# Patient Record
Sex: FEMALE
[De-identification: names, ages, dates, MRNs, and addresses within clinical notes are randomized per-mention and may not be internally consistent; named-entity substitution may affect disease eponyms.]

---

## 2018-10-06 ENCOUNTER — HOSPITAL ENCOUNTER (EMERGENCY)
Dept: HOSPITAL 88 - ER | Age: 53
Discharge: HOME | End: 2018-10-06
Payer: COMMERCIAL

## 2018-10-06 VITALS — WEIGHT: 187 LBS | BODY MASS INDEX: 36.71 KG/M2 | HEIGHT: 60 IN

## 2018-10-06 DIAGNOSIS — Z46.6: Primary | ICD-10-CM

## 2018-10-06 PROCEDURE — 51700 IRRIGATION OF BLADDER: CPT

## 2018-10-06 PROCEDURE — 99282 EMERGENCY DEPT VISIT SF MDM: CPT

## 2018-10-16 NOTE — XMS REPORT
Continuity of Care Document

                             Created on: 2016



CRISTINA RICH

External Reference #: [id number]

: 1965

Sex: Female



Demographics







                          Address                   [address] [place] [address]  [id number]

 

                          Home Phone                [phone]

 

                          Preferred Language        Unknown

 

                          Marital Status            Unknown

 

                          Jehovah's witness Affiliation     Unknown

 

                          Race                      Unknown

 

                          Ethnic Group              Unknown





Author







                          Author                    [facility]

 

                          [facility]              Interface

 

                          Address                   Unknown

 

                          Phone                     Unavailable



                                                    



Problems

                    





                    Problem                            Status                            Onset Date     

                          Classification                            Date Reported       

                          Comments                            Source                    

 

                                        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    

                                                      2016                                            

                                        [facility]                    

 

                    GEN. PAIN                            Active                            2016   

                                                                                       

                                        [facility]                    

 

                                        Discharge Diagnosis: Urinary tract infection, site not specified                

                                                2016                                     

                    01/10/2016                                                        [facility]                    

 

                    WEAKNESS                            Active                            2016    

                                                                                       

                                        [facility]                    

 

                    MRSA<sup>1</sup>                            Active                                  

                          Problem                            2016                  

                          Problem added by Discern Expert.                            [facility]   

                 



                                                                                
                                                                       



Medications

                    





                    Medication                            Details                            Route      

                          Status                            Patient Instructions         

                          Ordering Provider                            Order Date           

                                        Source                    

 

                          Ativan                            1 mg, Route: PO, Drug form: TAB, ONCE, Dosing Weight

 86.364, kg, Priority: STAT, Start date: 16 23:44:00, Stop date: 16 
23:44:00                                                        No Longer Active     

                                                                               2016

                                        [facility]                    

 

                          tramadol hydrochloride 50 MG Oral Tablet                            50 mg=1 tab, PO,

 BID, X 15 day, # 30 tab, 0 Refill(s)                                              

                    Active                                                                       

                          2016                            [facility]                    

 

                          tramadol hydrochloride 50 MG Oral Tablet [Ultram]                            1 - 2

 tabs, PO, Q4-6H, PRN as needed for pain, X 7 day, # 12 tab, 0 Refill(s)        
                                                Active                                 

                                                      2016                        

                                        [facility]                    

 

                                        Sulfamethoxazole 800 MG / Trimethoprim 160 MG Oral Tablet [Bactrim]             

                          1 tab, PO, BID, # 20 tab, 0 Refill(s)                                 

                       Active                                                          

                          2016                            [facility]         

           

 

                          Rocephin                            1 gm, Route: IM, ONCE, Dosing Weight 84.091, kg,

 Start date: 16 17:48:00, Stop date: 16 17:48:00                    
                                                Inactive                                           

                                                2016                            [facility]

                    

 

                          Ondansetron                            4 mg, 2 mL, Route: IVP, Drug form: INJ, ONCE,

 Dosing Weight 84.091, kg, Priority: STAT, Start date: 16 15:36:00, Stop 
date: 16 15:36:00Notes: (Same as: Zofran)   *** MEDICATION WASTE *** 
Product Size:  4 mg Product Wasted:  ___ mg                                        

                    Inactive                                                               

                          2016                            [facility]              

      

 

                          Sodium Chloride 0.154 MEQ/ML Injectable Solution                            1,000 

mL, 1000 ml/hr, Infuse Over: 1 hr, Route: IV, 1,000, Drug form: INJ, ONCE, 
Priority: STAT, Dosing Weight 84.091 kg, Start date: 16 15:36:00, 
Duration: 1 doses or times, Stop date: 16 15:36:00                        
                                                Inactive                                               

                                                2016                            [facility]

                    

 

                          Saline Flush 0.9%                            10 mL, Route: IVP, Drug Form: INJ, Dosing

 Weight 84.091, kg, PRN, PRN Line Flush, Start date: 16 15:36:00, 
Duration: 30 day, Stop date: 16 15:35:00Notes: Same as: BD Posiflush 
Sterile                                                        Inactive              

                                                                            2016       

                                        [facility]                    



                                                                                
                                                                                
                                        



Allergies, Adverse Reactions, Alerts

                    





                    Substance                            Category                            Reaction   

                          Severity                            Reaction type           

                          Status                            Date Reported                     

                          Comments                            Source                    

 

                    diphenhydrAMINE                            Assertion                                

                                                      Drug allergy                     

                    Active                                                                          

                                        [facility]                    



                                                                        



Immunizations

                    





                    Immunization                            Date Given                            Site  

                          Status                            Last Updated             

                          Comments                            Source                    



                                                                        



Results

                    





                    Order Name                            Results                            Value      

                          Reference Range                            Date                

                          Interpretation                            Comments                       

                                        Source                    

 

                          URINE AND STOOL                            UA Urobilinogen                        

                    <=1.0 mg/dL                              0.1 - 1.0                            2016

                                                                                    [facility]                    

 

                    URINE AND STOOL                            UA Protein                            100

 mg/dL                              Negative mg/dL                            2016

                                                                                    [facility]                    

 

                    URINE AND STOOL                            UA Glucose                            Negative

 mg/dL                              Negative mg/dL                            2016

                                                                                    [facility]                    

 

                    URINE AND STOOL                            UA Ketones                            Negative

 mg/dL                              Negative mg/dL                            2016

                                                                                    [facility]                    

 

                    URINE AND STOOL                            UA Bili                            Negative

 

*NA*

                    (16 4:54 PM)                              Negative                            2016

                                                                                    [facility]                    

 

                    URINE AND STOOL                            UA Blood                            Moderate

 

*ABN*

                    (16 4:54 PM)                              Negative                            2016

                                                                                    [facility]                    

 

                    URINE AND STOOL                            UA Nitrite                            Positive

 

*ABN*

                    (16 4:54 PM)                              Negative                            2016

                                                                                    [facility]                    

 

                    URINE AND STOOL                            UA Leuk Est                            Large

 

*ABN*

                    (16 4:54 PM)                              Negative                            2016

                                                                                    [facility]                    

 

                    URINE AND STOOL                            UA Sq Epi                            Few 

/LPF                              Few /LPF                            2016     

                                                                               [facility]

                    

 

                    URINE AND STOOL                            UA WBC                            null   

                          0 - 5                            2016                 

                                                                            [facility]        

            

 

                    URINE AND STOOL                            UA RBC                            155 /HPF

                             0 - 2                            2016             

                                                                            [facility]    

                

 

                    URINE AND STOOL                            UA Bacteria                            Many

 /HPF                              None Seen /HPF                            2016

                                                                                    [facility]                    

 

                    URINE AND STOOL                            UA WBC Cast                            34

 /LPF                             <=0 /LPF                            2016     

                                                                               [facility]

                    

 

                    URINE AND STOOL                            UA Mucus                            Few /LPF

                              None Seen /LPF                            2016   

                                                                                 [facility]

                    

 

                    URINE AND STOOL                            UA Color                            Yellow

 

*NA*

                    (16 4:54 PM)                              Yellow                            2016

                                                                                    [facility]                    

 

                    URINE AND STOOL                            UA Turbidity                            Marked

 

*ABN*

                    (16 4:54 PM)                              Clear                            2016

                                                                                    [facility]                    

 

                    URINE AND STOOL                            UA pH                            5.0     

                          5.0 - 8.0                            2016             

                                                                            [facility]    

                

 

                    URINE AND STOOL                            UA Spec Grav                            1.018

                              <=1.030                            2016          

                                                                            [facility] 

                   

 

                    CHEM PANEL                            A/G Ratio                            0.6      

                          0.7 - 1.6                            2016              

                                                                            [facility]     

               

 

                    CHEM PANEL                            Globulin                            5.8 g/dL  

                           2.0 - 4.0                            2016           

                                                                            [facility]  

                  

 

                    CHEM PANEL                            B/C Ratio                            12       

                          6 - 25                            2016                  

                                                                            [facility]         

           

 

                    CHEM PANEL                            AGAP                            12.6 meq/L    

                          10.0 - 20.0                            2016           

                                                                            [facility]  

                  

 

                    CHEM PANEL                            Alk Phos                            115 unit/L

                             39 - 136                            2016          

                                                                            [facility] 

                   

 

                    CHEM PANEL                            AST                            28 unit/L      

                          0 - 37                            2016                  

                                                                            [facility]         

           

 

                    CHEM PANEL                            Bili Total                            0.3 mg/dL

                             0.2 - 1.3                            2016         

                                                                            [facility]

                    

 

                    CHEM PANEL                            eGFR                            73 mL/min/1.73m2

                                                         2016                  

                                                      Result Comment: The eGFR is calculated using the

 CKD-EPI formula. In most young, healthy individuals the eGFR will be >90 
mL/min/1.73m2. The eGFR declines with age. An eGFR of 60-89 may be normal in 
some populations, particularly the elderly, for whom the CKD-EPI formula has not
 been extensively validated. Use of the eGFR is not recommended in the following
 populations:



Individuals with unstable creatinine concentrations, including pregnant patients
 and those with serious co-morbid conditions.



Patients with extremes in muscle mass or diet. 



The data above are obtained from the National Kid[employer] Education Program 
(NKDEP) which additionally recommends that when the eGFR is used in patients 
with extremes of body mass index for purposes of drug dosing, the eGFR should be
 multiplied by the estimated BMI.                            [facility]          

          

 

                    CHEM PANEL                            ALT                            25 unit/L      

                          0 - 65                            2016                  

                                                                            [facility]         

           

 

                    CHEM PANEL                            Albumin Lvl                            3.4 g/dL

                             3.5 - 5.0                            2016         

                                                                            [facility]

                    

 

                    CHEM PANEL                            Total Protein                            9.2 g/dL

                             6.4 - 8.4                            2016         

                                                                            [facility]

                    

 

                    CHEM PANEL                            Calcium Lvl                            9.1 mg/dL

                             8.5 - 10.5                            2016        

                                                                            [facility]

                    

 

                    CHEM PANEL                            CO2                            26 meq/L       

                          24 - 32                            2016                  

                                                                            [facility]         

           

 

                    CHEM PANEL                            Sodium Lvl                            140 meq/L

                             135 - 145                            2016         

                                                                            [facility]

                    

 

                    CHEM PANEL                            Chloride Lvl                            106 meq/L

                             95 - 109                            2016          

                                                                            [facility] 

                   

 

                    CHEM PANEL                            Potassium Lvl                            4.6 meq/L

                             3.5 - 5.1                            2016         

                                                                            [facility]

                    

 

                    CHEM PANEL                            Creatinine Lvl                            0.91

 mg/dL                             0.50 - 1.40                            2016 

                                                                                   [facility]

                    

 

                    CHEM PANEL                            BUN                            11 mg/dL       

                          7 - 22                            2016                   

                                                                            [facility]          

          

 

                    CHEM PANEL                            Glucose Lvl                            118 mg/dL

                             70 - 99                            2016           

                                                                            [facility]  

                  

 

                    ENDOCRINOLOGY                            S Preg                            Negative 



*NA*

                    (16 4:40 PM)                              Negative                            2016

                                                                                    [facility]                    

 

                    HEMATOLOGY                            Lymphocytes                            32.8 % 

                            20.0 - 40.0                            2016        

                                                                            [facility]

                    

 

                    HEMATOLOGY                            Segs                            60.0 %        

                          45.0 - 75.0                            2016               

                                                                            [facility]      

              

 

                    HEMATOLOGY                            Eosinophils #                            0.2 K/CMM

                             0.0 - 0.5                            2016         

                                                                            [facility]

                    

 

                    HEMATOLOGY                            Basophils #                            0.2 K/CMM

                             0.0 - 0.2                            2016         

                                                                            [facility]

                    

 

                    HEMATOLOGY                            Monocytes                            4.6 %    

                          2.0 - 12.0                            2016            

                                                                            [facility]   

                 

 

                    HEMATOLOGY                            Eosinophils                            1.2 %  

                           0.0 - 4.0                            2016           

                                                                            [facility]  

                  

 

                    HEMATOLOGY                            Basophils                            1.4 %    

                          0.0 - 1.0                            2016             

                                                                            [facility]    

                

 

                    HEMATOLOGY                            Segs-Bands #                            7.3 K/CMM

                             1.5 - 8.1                            2016         

                                                                            [facility]

                    

 

                    HEMATOLOGY                            Monocytes #                            0.6 K/CMM

                             0.0 - 0.8                            2016         

                                                                            [facility]

                    

 

                    HEMATOLOGY                            Lymphocytes #                            4.0 K/CMM

                             1.0 - 5.5                            2016         

                                                                            [facility]

                    

 

                    HEMATOLOGY                            Plt Morph                            Clumped 

                    (16 4:40 PM)                                                          2016

                                                                                    [facility]                    

 

                    HEMATOLOGY                            RBC Morph                            Normal 

                    (16 4:40 PM)                                                          2016

                                                                                    [facility]                    

 

                    HEMATOLOGY                            MPV                            8.7 fL         

                          7.4 - 10.4                            2016                 

                                                                            [facility]        

            

 

                    HEMATOLOGY                            WBC                            12.0 K/CMM     

                          3.7 - 10.4                            2016             

                                                                            [facility]    

                

 

                    HEMATOLOGY                            RBC                            4.90 M/CMM     

                          4.20 - 5.40                            2016            

                                                                            [facility]   

                 

 

                    HEMATOLOGY                            Hct                            39.0 %         

                          36.0 - 48.0                            2016                

                                                                            [facility]       

             

 

                    HEMATOLOGY                            MCV                            79.7 fL        

                          80.0 - 98.0                            2016               

                                                                            [facility]      

              

 

                    HEMATOLOGY                            Hgb                            12.0 g/dL      

                          12.0 - 16.0                            2016             

                                                                            [facility]    

                

 

                    HEMATOLOGY                            RDW                            14.8 %         

                          11.5 - 14.5                            2016                

                                                                            [facility]                            MCHC                            30.8 g/dL     

                          32.0 - 36.0                            2016            

                                                                            [facility]   

                 

 

                    HEMATOLOGY                            Platelet                            220 K/CMM 

                            133 - 450                            2016          

                                                                            [facility]                            MCH                            24.5 pg        

                          27.0 - 31.0                            2016               

                                                                            [facility]      

              



                                                                                
                                                                                
                                                                                
                                                                                
                                                                                
                                                                                
                                                                                
                                                                                
                                                                                
                                                                                
                                                                                
                                                                                
                                                                                



Vital Signs

                    





                    Vital Sign                            Value                            Date         

                          Comments                            Source                    

 

                    Weight                            86.364                             2016     

                                                      [facility]                    

 

                    Temperature Oral (F)                            97.9 F                            2016

                                                        [facility]                 

   

 

                    Respitory Rate                            18                             2016 

                                                       [facility]                  

  

 

                    Heart Rate                            91                             2016     

                                                      [facility]                    

 

                    Systolic (mm Hg)                            140                             2016

                                                        [facility]                 

   

 

                    Diastolic (mm Hg)                            99                             2016

                                                        [facility]                 

   

 

                    Temperature Oral (F)                            98.4 F                            2016

                                                        [facility]                 

   

 

                    Respitory Rate                            18                             2016 

                                                       [facility]                  

  

 

                    Heart Rate                            97                             2016     

                                                      [facility]                    

 

                    Systolic (mm Hg)                            142                             2016

                                                        [facility]                 

   

 

                    Diastolic (mm Hg)                            82                             2016

                                                        [facility]                 

   

 

                    Systolic (mm Hg)                            147                             2016

                                                        [facility]                 

   

 

                    Diastolic (mm Hg)                            93                             2016

                                                        [facility]                 

   

 

                    Weight                            84.091                             2016     

                                                      [facility]                    

 

                    BMI Calculated                            36.21                             2016

                                                        [facility]                 

   

 

                    Height                            152.4 cm                            2016    

                                                      [facility]                    

 

                    Heart Rate                            76                             2016     

                                                      [facility]                    

 

                    Respitory Rate                            18                             2016 

                                                       [facility]                  

  

 

                    Temperature Oral (F)                            98.1 F                            2016

                                                        [facility]                 

   



                                                                                
                                                                                
                                                                                
                                                                                
                                                        



Encounters

                    





                    Location                            Location Details                            Encounter

 Type                            Encounter Number                            Reason For

 Visit                            Attending Provider                            ADM Date

                            DC Date                            Status                

                                        Source                    

 

                          [facility]                            [id number]                 

                                                Parulchristopher Wilson                             2016                                               

                                        [facility]                            [id number]                 

                                                Mohan Curtis                             2016                                               

                                        [facility]                    



                                                                                
                



Procedures

                    





                    Procedure                            Code                            Date           

                          Perfomer                            Comments                        

                                        Source

## 2018-10-22 ENCOUNTER — HOSPITAL ENCOUNTER (INPATIENT)
Dept: HOSPITAL 88 - ER | Age: 53
LOS: 4 days | Discharge: HOME | DRG: 698 | End: 2018-10-26
Attending: INTERNAL MEDICINE | Admitting: INTERNAL MEDICINE
Payer: COMMERCIAL

## 2018-10-22 VITALS — BODY MASS INDEX: 38.09 KG/M2 | HEIGHT: 60 IN | WEIGHT: 194 LBS

## 2018-10-22 DIAGNOSIS — T83.518A: Primary | ICD-10-CM

## 2018-10-22 DIAGNOSIS — N18.9: ICD-10-CM

## 2018-10-22 DIAGNOSIS — Z85.41: ICD-10-CM

## 2018-10-22 DIAGNOSIS — N20.0: ICD-10-CM

## 2018-10-22 DIAGNOSIS — N17.9: ICD-10-CM

## 2018-10-22 DIAGNOSIS — D64.9: ICD-10-CM

## 2018-10-22 DIAGNOSIS — B95.2: ICD-10-CM

## 2018-10-22 DIAGNOSIS — B96.89: ICD-10-CM

## 2018-10-22 DIAGNOSIS — E87.2: ICD-10-CM

## 2018-10-22 DIAGNOSIS — B96.5: ICD-10-CM

## 2018-10-22 DIAGNOSIS — R00.0: ICD-10-CM

## 2018-10-22 DIAGNOSIS — E86.0: ICD-10-CM
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
## 2018-10-22 DIAGNOSIS — A41.9: ICD-10-CM

## 2018-10-22 DIAGNOSIS — I12.9: ICD-10-CM

## 2018-10-22 DIAGNOSIS — E11.22: ICD-10-CM

## 2018-10-22 DIAGNOSIS — E87.6: ICD-10-CM

## 2018-10-22 LAB
ALBUMIN SERPL-MCNC: 3.4 G/DL (ref 3.5–5)
ALBUMIN/GLOB SERPL: 0.7 {RATIO} (ref 0.8–2)
ALP SERPL-CCNC: 89 IU/L (ref 40–150)
ALT SERPL-CCNC: 24 IU/L (ref 0–55)
AMYLASE SERPL-CCNC: 46 U/L (ref 25–125)
ANION GAP SERPL CALC-SCNC: 17.4 MMOL/L (ref 8–16)
BACTERIA URNS QL MICRO: (no result) /HPF
BASOPHILS # BLD AUTO: 0.1 10*3/UL (ref 0–0.1)
BASOPHILS NFR BLD AUTO: 0.4 % (ref 0–1)
BILIRUB UR QL: NEGATIVE
BUN SERPL-MCNC: 17 MG/DL (ref 7–26)
BUN/CREAT SERPL: 12 (ref 6–25)
CALCIUM SERPL-MCNC: 9.5 MG/DL (ref 8.4–10.2)
CHLORIDE SERPL-SCNC: 105 MMOL/L (ref 98–107)
CLARITY UR: (no result)
CO2 SERPL-SCNC: 20 MMOL/L (ref 22–29)
COLOR UR: YELLOW
DEPRECATED NEUTROPHILS # BLD AUTO: 10.7 10*3/UL (ref 2.1–6.9)
DEPRECATED RBC URNS MANUAL-ACNC: (no result) /HPF (ref 0–5)
EGFRCR SERPLBLD CKD-EPI 2021: 38 ML/MIN (ref 60–?)
EOSINOPHIL # BLD AUTO: 0.1 10*3/UL (ref 0–0.4)
EOSINOPHIL NFR BLD AUTO: 0.5 % (ref 0–6)
EPI CELLS URNS QL MICRO: (no result) /LPF
ERYTHROCYTE [DISTWIDTH] IN CORD BLOOD: 15.3 % (ref 11.7–14.4)
GLOBULIN PLAS-MCNC: 4.6 G/DL (ref 2.3–3.5)
GLUCOSE SERPLBLD-MCNC: 164 MG/DL (ref 74–118)
HCT VFR BLD AUTO: 34.6 % (ref 34.2–44.1)
HGB BLD-MCNC: 11 G/DL (ref 12–16)
KETONES UR QL STRIP.AUTO: NEGATIVE
LEUKOCYTE ESTERASE UR QL STRIP.AUTO: (no result)
LIPASE SERPL-CCNC: 24 U/L (ref 8–78)
LYMPHOCYTES # BLD: 1.4 10*3/UL (ref 1–3.2)
LYMPHOCYTES NFR BLD AUTO: 10.7 % (ref 18–39.1)
MCH RBC QN AUTO: 25.3 PG (ref 28–32)
MCHC RBC AUTO-ENTMCNC: 31.8 G/DL (ref 31–35)
MCV RBC AUTO: 79.7 FL (ref 81–99)
MONOCYTES # BLD AUTO: 0.9 10*3/UL (ref 0.2–0.8)
MONOCYTES NFR BLD AUTO: 6.5 % (ref 4.4–11.3)
NEUTS SEG NFR BLD AUTO: 81.6 % (ref 38.7–80)
NITRITE UR QL STRIP.AUTO: POSITIVE
PLATELET # BLD AUTO: 360 X10E3/UL (ref 140–360)
POTASSIUM SERPL-SCNC: 3.4 MMOL/L (ref 3.5–5.1)
PROT UR QL STRIP.AUTO: (no result)
RBC # BLD AUTO: 4.34 X10E6/UL (ref 3.6–5.1)
SODIUM SERPL-SCNC: 139 MMOL/L (ref 136–145)
SP GR UR STRIP: 1.01 (ref 1.01–1.02)
UROBILINOGEN UR STRIP-MCNC: 0.2 MG/DL (ref 0.2–1)

## 2018-10-22 PROCEDURE — 51700 IRRIGATION OF BLADDER: CPT

## 2018-10-22 PROCEDURE — 82150 ASSAY OF AMYLASE: CPT

## 2018-10-22 PROCEDURE — 80053 COMPREHEN METABOLIC PANEL: CPT

## 2018-10-22 PROCEDURE — 93005 ELECTROCARDIOGRAM TRACING: CPT

## 2018-10-22 PROCEDURE — 80048 BASIC METABOLIC PNL TOTAL CA: CPT

## 2018-10-22 PROCEDURE — 87040 BLOOD CULTURE FOR BACTERIA: CPT

## 2018-10-22 PROCEDURE — 74176 CT ABD & PELVIS W/O CONTRAST: CPT

## 2018-10-22 PROCEDURE — 85025 COMPLETE CBC W/AUTO DIFF WBC: CPT

## 2018-10-22 PROCEDURE — 83690 ASSAY OF LIPASE: CPT

## 2018-10-22 PROCEDURE — 96361 HYDRATE IV INFUSION ADD-ON: CPT

## 2018-10-22 PROCEDURE — 99284 EMERGENCY DEPT VISIT MOD MDM: CPT

## 2018-10-22 PROCEDURE — 87086 URINE CULTURE/COLONY COUNT: CPT

## 2018-10-22 PROCEDURE — 87186 SC STD MICRODIL/AGAR DIL: CPT

## 2018-10-22 PROCEDURE — 81001 URINALYSIS AUTO W/SCOPE: CPT

## 2018-10-22 PROCEDURE — 83605 ASSAY OF LACTIC ACID: CPT

## 2018-10-22 PROCEDURE — 36415 COLL VENOUS BLD VENIPUNCTURE: CPT

## 2018-10-22 RX ADMIN — SODIUM CHLORIDE SCH MLS/HR: 9 INJECTION, SOLUTION INTRAVENOUS at 23:01

## 2018-10-22 RX ADMIN — MEROPENEM SCH GM: 1 INJECTION INTRAVENOUS at 20:20

## 2018-10-22 NOTE — DIAGNOSTIC IMAGING REPORT
EXAM: CT Abdomen and Pelvis WITHOUT contrast  

INDICATION: Left-sided abdominal pain. Kidney stones? Cervical cancer.

COMPARISON: /9/18.

TECHNIQUE: Abdomen and pelvis were scanned utilizing a multidetector helical

scanner from the lung base to the pubic symphysis without administration of IV

contrast. Absence of intravenous contrast decreases sensitivity for detection

of focal lesions and vascular pathology. Coronal and sagittal reformations were

obtained. Routine protocol was performed. 

            IV CONTRAST: None.

            ORAL CONTRAST: Water

            RADIATION DOSE: Total DLP: 567.26 mGy*cm

             Estimated effective dose: (DLP x 0.015 x size factor) mSv

            COMPLICATIONS: None



FINDINGS:



LINES and TUBES: Right nephrostomy tube with distal tip coiled within the right

renal pelvis. Interval placement of a left double-J ureteral stent in adequate

position. Pole catheter within the bladder.



LOWER THORAX:  Costophrenic granuloma in the posterior right lung base.



HEPATOBILIARY:   No focal hepatic lesions. No biliary ductal dilation. 



GALLBLADDER: No stomach and observed. No wall thickening.



SPLEEN: No splenomegaly. 



PANCREAS: No focal masses or ductal dilatation.  



ADRENALS: No adrenal nodules.



KIDNEYS/URETERS:  9 mm top within the lower pole of the left kidney. Mild right

hydronephrosis new since the prior examinations the presence of a nephrostomy

tube. Mild to moderate left hydronephrosis appears unchanged. New left

perinephric stranding may reflect pyelonephritis. Left periureteral fat

stranding.



GI TRACT: No abnormal distention, wall thickening, or evidence of bowel

obstruction.   Appendix is normal.



PELVIC ORGANS/BLADDER: Radiation seeds again observed in the uterine cervix.

Urinary bladder is decompressed by a Gomez catheter.



LYMPH NODES: No lymphadenopathy.



VESSELS: Unremarkable.



PERITONEUM / RETROPERITONEUM: No free air or fluid.



BONES: There are degenerative changes in the lumbar spine.



SOFT TISSUES: Unremarkable.  



IMPRESSION: 



1.  New left perinephric stranding may reflect pyelonephritis.  Mild to

moderate left hydroureteronephrosis is stable.

2. Interval development of right hydroureteronephrosis in spite of presence of

a nephrostomy tube. Please correlate with nephrostomy output.



Signed by: Dr. DELL Grimaldo M.D. on 10/22/2018 6:39 PM

## 2018-10-23 VITALS — DIASTOLIC BLOOD PRESSURE: 63 MMHG | SYSTOLIC BLOOD PRESSURE: 105 MMHG

## 2018-10-23 VITALS — DIASTOLIC BLOOD PRESSURE: 76 MMHG | SYSTOLIC BLOOD PRESSURE: 127 MMHG

## 2018-10-23 VITALS — DIASTOLIC BLOOD PRESSURE: 75 MMHG | SYSTOLIC BLOOD PRESSURE: 128 MMHG

## 2018-10-23 VITALS — DIASTOLIC BLOOD PRESSURE: 79 MMHG | SYSTOLIC BLOOD PRESSURE: 131 MMHG

## 2018-10-23 LAB
ALBUMIN SERPL-MCNC: 2.5 G/DL (ref 3.5–5)
ALBUMIN/GLOB SERPL: 0.6 {RATIO} (ref 0.8–2)
ALP SERPL-CCNC: 78 IU/L (ref 40–150)
ALT SERPL-CCNC: 17 IU/L (ref 0–55)
ANION GAP SERPL CALC-SCNC: 11.3 MMOL/L (ref 8–16)
BASOPHILS # BLD AUTO: 0.1 10*3/UL (ref 0–0.1)
BASOPHILS NFR BLD AUTO: 0.3 % (ref 0–1)
BUN SERPL-MCNC: 15 MG/DL (ref 7–26)
BUN/CREAT SERPL: 13 (ref 6–25)
CALCIUM SERPL-MCNC: 8.4 MG/DL (ref 8.4–10.2)
CHLORIDE SERPL-SCNC: 107 MMOL/L (ref 98–107)
CO2 SERPL-SCNC: 21 MMOL/L (ref 22–29)
DEPRECATED NEUTROPHILS # BLD AUTO: 16.8 10*3/UL (ref 2.1–6.9)
EGFRCR SERPLBLD CKD-EPI 2021: 49 ML/MIN (ref 60–?)
EOSINOPHIL # BLD AUTO: 0 10*3/UL (ref 0–0.4)
EOSINOPHIL NFR BLD AUTO: 0.1 % (ref 0–6)
ERYTHROCYTE [DISTWIDTH] IN CORD BLOOD: 15.1 % (ref 11.7–14.4)
GLOBULIN PLAS-MCNC: 4 G/DL (ref 2.3–3.5)
GLUCOSE SERPLBLD-MCNC: 210 MG/DL (ref 74–118)
HCT VFR BLD AUTO: 30.5 % (ref 34.2–44.1)
HGB BLD-MCNC: 9.5 G/DL (ref 12–16)
LYMPHOCYTES # BLD: 0.8 10*3/UL (ref 1–3.2)
LYMPHOCYTES NFR BLD AUTO: 4.5 % (ref 18–39.1)
MCH RBC QN AUTO: 25.8 PG (ref 28–32)
MCHC RBC AUTO-ENTMCNC: 31.1 G/DL (ref 31–35)
MCV RBC AUTO: 82.9 FL (ref 81–99)
MONOCYTES # BLD AUTO: 0.9 10*3/UL (ref 0.2–0.8)
MONOCYTES NFR BLD AUTO: 4.9 % (ref 4.4–11.3)
NEUTS SEG NFR BLD AUTO: 89.6 % (ref 38.7–80)
PLATELET # BLD AUTO: 252 X10E3/UL (ref 140–360)
POTASSIUM SERPL-SCNC: 3.3 MMOL/L (ref 3.5–5.1)
RBC # BLD AUTO: 3.68 X10E6/UL (ref 3.6–5.1)
SODIUM SERPL-SCNC: 136 MMOL/L (ref 136–145)

## 2018-10-23 RX ADMIN — MEROPENEM SCH GM: 1 INJECTION INTRAVENOUS at 20:41

## 2018-10-23 RX ADMIN — HYDROMORPHONE HYDROCHLORIDE PRN MG: 2 INJECTION INTRAMUSCULAR; INTRAVENOUS; SUBCUTANEOUS at 17:00

## 2018-10-23 RX ADMIN — MEROPENEM SCH GM: 1 INJECTION INTRAVENOUS at 08:00

## 2018-10-23 RX ADMIN — HYDROMORPHONE HYDROCHLORIDE PRN MG: 2 INJECTION INTRAMUSCULAR; INTRAVENOUS; SUBCUTANEOUS at 23:18

## 2018-10-23 RX ADMIN — SODIUM CHLORIDE PRN MG: 900 INJECTION INTRAVENOUS at 05:05

## 2018-10-23 RX ADMIN — SODIUM CHLORIDE SCH MLS/HR: 9 INJECTION, SOLUTION INTRAVENOUS at 17:00

## 2018-10-23 RX ADMIN — SODIUM CHLORIDE SCH MLS/HR: 9 INJECTION, SOLUTION INTRAVENOUS at 20:41

## 2018-10-23 RX ADMIN — FAMOTIDINE SCH MG: 10 INJECTION, SOLUTION INTRAVENOUS at 17:00

## 2018-10-23 RX ADMIN — SODIUM CHLORIDE SCH MLS/HR: 9 INJECTION, SOLUTION INTRAVENOUS at 00:52

## 2018-10-23 RX ADMIN — SODIUM CHLORIDE PRN MG: 900 INJECTION INTRAVENOUS at 17:00

## 2018-10-23 RX ADMIN — SODIUM CHLORIDE PRN MG: 900 INJECTION INTRAVENOUS at 23:18

## 2018-10-23 RX ADMIN — FAMOTIDINE SCH MG: 10 INJECTION, SOLUTION INTRAVENOUS at 08:00

## 2018-10-24 VITALS — SYSTOLIC BLOOD PRESSURE: 139 MMHG | DIASTOLIC BLOOD PRESSURE: 77 MMHG

## 2018-10-24 VITALS — SYSTOLIC BLOOD PRESSURE: 132 MMHG | DIASTOLIC BLOOD PRESSURE: 78 MMHG

## 2018-10-24 VITALS — DIASTOLIC BLOOD PRESSURE: 57 MMHG | SYSTOLIC BLOOD PRESSURE: 111 MMHG

## 2018-10-24 VITALS — SYSTOLIC BLOOD PRESSURE: 122 MMHG | DIASTOLIC BLOOD PRESSURE: 61 MMHG

## 2018-10-24 VITALS — DIASTOLIC BLOOD PRESSURE: 75 MMHG | SYSTOLIC BLOOD PRESSURE: 128 MMHG

## 2018-10-24 VITALS — DIASTOLIC BLOOD PRESSURE: 77 MMHG | SYSTOLIC BLOOD PRESSURE: 139 MMHG

## 2018-10-24 VITALS — SYSTOLIC BLOOD PRESSURE: 147 MMHG | DIASTOLIC BLOOD PRESSURE: 85 MMHG

## 2018-10-24 VITALS — DIASTOLIC BLOOD PRESSURE: 76 MMHG | SYSTOLIC BLOOD PRESSURE: 138 MMHG

## 2018-10-24 RX ADMIN — MEROPENEM SCH GM: 1 INJECTION INTRAVENOUS at 09:12

## 2018-10-24 RX ADMIN — MEROPENEM SCH GM: 1 INJECTION INTRAVENOUS at 20:00

## 2018-10-24 RX ADMIN — FAMOTIDINE SCH MG: 10 INJECTION, SOLUTION INTRAVENOUS at 09:13

## 2018-10-24 RX ADMIN — HYDROCODONE BITARTRATE AND ACETAMINOPHEN PRN EA: 7.5; 325 TABLET ORAL at 14:30

## 2018-10-24 RX ADMIN — SODIUM CHLORIDE SCH MLS/HR: 9 INJECTION, SOLUTION INTRAVENOUS at 12:26

## 2018-10-24 RX ADMIN — SODIUM CHLORIDE SCH MLS/HR: 9 INJECTION, SOLUTION INTRAVENOUS at 04:56

## 2018-10-24 RX ADMIN — FAMOTIDINE SCH MG: 10 INJECTION, SOLUTION INTRAVENOUS at 17:38

## 2018-10-24 RX ADMIN — SODIUM CHLORIDE SCH MLS/HR: 9 INJECTION, SOLUTION INTRAVENOUS at 19:35

## 2018-10-25 VITALS — SYSTOLIC BLOOD PRESSURE: 116 MMHG | DIASTOLIC BLOOD PRESSURE: 77 MMHG

## 2018-10-25 VITALS — DIASTOLIC BLOOD PRESSURE: 80 MMHG | SYSTOLIC BLOOD PRESSURE: 140 MMHG

## 2018-10-25 VITALS — DIASTOLIC BLOOD PRESSURE: 80 MMHG | SYSTOLIC BLOOD PRESSURE: 131 MMHG

## 2018-10-25 VITALS — SYSTOLIC BLOOD PRESSURE: 142 MMHG | DIASTOLIC BLOOD PRESSURE: 97 MMHG

## 2018-10-25 VITALS — DIASTOLIC BLOOD PRESSURE: 78 MMHG | SYSTOLIC BLOOD PRESSURE: 153 MMHG

## 2018-10-25 VITALS — SYSTOLIC BLOOD PRESSURE: 140 MMHG | DIASTOLIC BLOOD PRESSURE: 80 MMHG

## 2018-10-25 VITALS — DIASTOLIC BLOOD PRESSURE: 74 MMHG | SYSTOLIC BLOOD PRESSURE: 127 MMHG

## 2018-10-25 LAB
ANION GAP SERPL CALC-SCNC: 8.7 MMOL/L (ref 8–16)
BASOPHILS # BLD AUTO: 0 10*3/UL (ref 0–0.1)
BASOPHILS NFR BLD AUTO: 0.4 % (ref 0–1)
BUN SERPL-MCNC: 6 MG/DL (ref 7–26)
BUN/CREAT SERPL: 8 (ref 6–25)
CALCIUM SERPL-MCNC: 8.9 MG/DL (ref 8.4–10.2)
CHLORIDE SERPL-SCNC: 103 MMOL/L (ref 98–107)
CO2 SERPL-SCNC: 27 MMOL/L (ref 22–29)
DEPRECATED NEUTROPHILS # BLD AUTO: 5.3 10*3/UL (ref 2.1–6.9)
EGFRCR SERPLBLD CKD-EPI 2021: > 60 ML/MIN (ref 60–?)
EOSINOPHIL # BLD AUTO: 0.2 10*3/UL (ref 0–0.4)
EOSINOPHIL NFR BLD AUTO: 2.4 % (ref 0–6)
ERYTHROCYTE [DISTWIDTH] IN CORD BLOOD: 14.6 % (ref 11.7–14.4)
GLUCOSE SERPLBLD-MCNC: 121 MG/DL (ref 74–118)
HCT VFR BLD AUTO: 29.1 % (ref 34.2–44.1)
HGB BLD-MCNC: 9.2 G/DL (ref 12–16)
LYMPHOCYTES # BLD: 1.9 10*3/UL (ref 1–3.2)
LYMPHOCYTES NFR BLD AUTO: 23.5 % (ref 18–39.1)
MCH RBC QN AUTO: 25.2 PG (ref 28–32)
MCHC RBC AUTO-ENTMCNC: 31.6 G/DL (ref 31–35)
MCV RBC AUTO: 79.7 FL (ref 81–99)
MONOCYTES # BLD AUTO: 0.5 10*3/UL (ref 0.2–0.8)
MONOCYTES NFR BLD AUTO: 5.8 % (ref 4.4–11.3)
NEUTS SEG NFR BLD AUTO: 67.4 % (ref 38.7–80)
PLATELET # BLD AUTO: 244 X10E3/UL (ref 140–360)
POTASSIUM SERPL-SCNC: 2.7 MMOL/L (ref 3.5–5.1)
RBC # BLD AUTO: 3.65 X10E6/UL (ref 3.6–5.1)
SODIUM SERPL-SCNC: 136 MMOL/L (ref 136–145)

## 2018-10-25 RX ADMIN — POTASSIUM CHLORIDE SCH MEQ: 1500 TABLET, EXTENDED RELEASE ORAL at 15:48

## 2018-10-25 RX ADMIN — POTASSIUM CHLORIDE SCH MEQ: 1500 TABLET, EXTENDED RELEASE ORAL at 11:03

## 2018-10-25 RX ADMIN — POTASSIUM CHLORIDE SCH MEQ: 1500 TABLET, EXTENDED RELEASE ORAL at 16:20

## 2018-10-25 RX ADMIN — MEROPENEM SCH GM: 1 INJECTION INTRAVENOUS at 20:05

## 2018-10-25 RX ADMIN — SODIUM CHLORIDE SCH MLS/HR: 9 INJECTION, SOLUTION INTRAVENOUS at 23:30

## 2018-10-25 RX ADMIN — FAMOTIDINE SCH MG: 10 INJECTION, SOLUTION INTRAVENOUS at 16:20

## 2018-10-25 RX ADMIN — HYDROCODONE BITARTRATE AND ACETAMINOPHEN PRN EA: 7.5; 325 TABLET ORAL at 13:03

## 2018-10-25 RX ADMIN — HYDROCODONE BITARTRATE AND ACETAMINOPHEN PRN EA: 7.5; 325 TABLET ORAL at 04:02

## 2018-10-25 RX ADMIN — SODIUM CHLORIDE PRN MG: 900 INJECTION INTRAVENOUS at 04:02

## 2018-10-25 RX ADMIN — SODIUM CHLORIDE SCH MLS/HR: 9 INJECTION, SOLUTION INTRAVENOUS at 03:37

## 2018-10-25 RX ADMIN — FAMOTIDINE SCH MG: 10 INJECTION, SOLUTION INTRAVENOUS at 08:30

## 2018-10-25 RX ADMIN — POTASSIUM CHLORIDE SCH MEQ: 1500 TABLET, EXTENDED RELEASE ORAL at 12:54

## 2018-10-25 RX ADMIN — SODIUM CHLORIDE SCH MLS/HR: 9 INJECTION, SOLUTION INTRAVENOUS at 12:54

## 2018-10-25 RX ADMIN — HYDROCODONE BITARTRATE AND ACETAMINOPHEN PRN EA: 7.5; 325 TABLET ORAL at 23:30

## 2018-10-25 RX ADMIN — MEROPENEM SCH GM: 1 INJECTION INTRAVENOUS at 08:30

## 2018-10-25 RX ADMIN — VANCOMYCIN HYDROCHLORIDE SCH MLS/HR: 1 INJECTION, SOLUTION INTRAVENOUS at 18:35

## 2018-10-26 VITALS — DIASTOLIC BLOOD PRESSURE: 79 MMHG | SYSTOLIC BLOOD PRESSURE: 144 MMHG

## 2018-10-26 VITALS — DIASTOLIC BLOOD PRESSURE: 75 MMHG | SYSTOLIC BLOOD PRESSURE: 121 MMHG

## 2018-10-26 VITALS — SYSTOLIC BLOOD PRESSURE: 130 MMHG | DIASTOLIC BLOOD PRESSURE: 80 MMHG

## 2018-10-26 VITALS — DIASTOLIC BLOOD PRESSURE: 84 MMHG | SYSTOLIC BLOOD PRESSURE: 143 MMHG

## 2018-10-26 LAB
ANION GAP SERPL CALC-SCNC: 11.5 MMOL/L (ref 8–16)
BUN SERPL-MCNC: 6 MG/DL (ref 7–26)
BUN/CREAT SERPL: 8 (ref 6–25)
CALCIUM SERPL-MCNC: 8.8 MG/DL (ref 8.4–10.2)
CHLORIDE SERPL-SCNC: 107 MMOL/L (ref 98–107)
CO2 SERPL-SCNC: 26 MMOL/L (ref 22–29)
EGFRCR SERPLBLD CKD-EPI 2021: > 60 ML/MIN (ref 60–?)
GLUCOSE SERPLBLD-MCNC: 119 MG/DL (ref 74–118)
POTASSIUM SERPL-SCNC: 3.5 MMOL/L (ref 3.5–5.1)
SODIUM SERPL-SCNC: 141 MMOL/L (ref 136–145)

## 2018-10-26 RX ADMIN — HYDROCODONE BITARTRATE AND ACETAMINOPHEN PRN EA: 7.5; 325 TABLET ORAL at 05:45

## 2018-10-26 RX ADMIN — SODIUM CHLORIDE PRN MG: 900 INJECTION INTRAVENOUS at 04:44

## 2018-10-26 RX ADMIN — MEROPENEM SCH GM: 1 INJECTION INTRAVENOUS at 09:29

## 2018-10-26 RX ADMIN — FAMOTIDINE SCH MG: 10 INJECTION, SOLUTION INTRAVENOUS at 09:29

## 2018-10-26 RX ADMIN — VANCOMYCIN HYDROCHLORIDE SCH MLS/HR: 1 INJECTION, SOLUTION INTRAVENOUS at 06:01

## 2018-10-26 RX ADMIN — SODIUM CHLORIDE SCH MLS/HR: 9 INJECTION, SOLUTION INTRAVENOUS at 09:29

## 2018-11-01 ENCOUNTER — HOSPITAL ENCOUNTER (INPATIENT)
Dept: HOSPITAL 88 - ER | Age: 53
LOS: 5 days | Discharge: HOME | DRG: 872 | End: 2018-11-06
Attending: INTERNAL MEDICINE | Admitting: INTERNAL MEDICINE
Payer: COMMERCIAL

## 2018-11-01 VITALS — BODY MASS INDEX: 36.32 KG/M2 | HEIGHT: 60 IN | WEIGHT: 185 LBS

## 2018-11-01 VITALS — SYSTOLIC BLOOD PRESSURE: 146 MMHG | DIASTOLIC BLOOD PRESSURE: 73 MMHG

## 2018-11-01 VITALS — DIASTOLIC BLOOD PRESSURE: 73 MMHG | SYSTOLIC BLOOD PRESSURE: 146 MMHG

## 2018-11-01 DIAGNOSIS — N23: ICD-10-CM

## 2018-11-01 DIAGNOSIS — J06.9: ICD-10-CM

## 2018-11-01 DIAGNOSIS — R31.29: ICD-10-CM

## 2018-11-01 DIAGNOSIS — Z85.41: ICD-10-CM

## 2018-11-01 DIAGNOSIS — N20.0: ICD-10-CM

## 2018-11-01 DIAGNOSIS — E66.9: ICD-10-CM

## 2018-11-01 DIAGNOSIS — D63.8: ICD-10-CM

## 2018-11-01 DIAGNOSIS — B96.89: ICD-10-CM

## 2018-11-01 DIAGNOSIS — E87.6: ICD-10-CM

## 2018-11-01 DIAGNOSIS — A41.9: Primary | ICD-10-CM

## 2018-11-01 DIAGNOSIS — N13.6: ICD-10-CM

## 2018-11-01 DIAGNOSIS — R53.81: ICD-10-CM

## 2018-11-01 LAB
ALBUMIN SERPL-MCNC: 3.5 G/DL (ref 3.5–5)
ALBUMIN/GLOB SERPL: 0.7 {RATIO} (ref 0.8–2)
ALP SERPL-CCNC: 94 IU/L (ref 40–150)
ALT SERPL-CCNC: 15 IU/L (ref 0–55)
ANION GAP SERPL CALC-SCNC: 17.6 MMOL/L (ref 8–16)
BACTERIA URNS QL MICRO: (no result) /HPF
BASOPHILS # BLD AUTO: 0.1 10*3/UL (ref 0–0.1)
BASOPHILS NFR BLD AUTO: 0.4 % (ref 0–1)
BILIRUB UR QL: NEGATIVE
BUN SERPL-MCNC: 17 MG/DL (ref 7–26)
BUN/CREAT SERPL: 17 (ref 6–25)
CALCIUM SERPL-MCNC: 10 MG/DL (ref 8.4–10.2)
CHLORIDE SERPL-SCNC: 104 MMOL/L (ref 98–107)
CLARITY UR: (no result)
CO2 SERPL-SCNC: 20 MMOL/L (ref 22–29)
COLOR UR: YELLOW
DEPRECATED NEUTROPHILS # BLD AUTO: 11.8 10*3/UL (ref 2.1–6.9)
EGFRCR SERPLBLD CKD-EPI 2021: 58 ML/MIN (ref 60–?)
EOSINOPHIL # BLD AUTO: 0.1 10*3/UL (ref 0–0.4)
EOSINOPHIL NFR BLD AUTO: 0.4 % (ref 0–6)
EPI CELLS URNS QL MICRO: (no result) /LPF
ERYTHROCYTE [DISTWIDTH] IN CORD BLOOD: 15.1 % (ref 11.7–14.4)
GLOBULIN PLAS-MCNC: 5.3 G/DL (ref 2.3–3.5)
GLUCOSE SERPLBLD-MCNC: 161 MG/DL (ref 74–118)
HCT VFR BLD AUTO: 34.8 % (ref 34.2–44.1)
HGB BLD-MCNC: 10.6 G/DL (ref 12–16)
KETONES UR QL STRIP.AUTO: NEGATIVE
LEUKOCYTE ESTERASE UR QL STRIP.AUTO: (no result)
LYMPHOCYTES # BLD: 1.4 10*3/UL (ref 1–3.2)
LYMPHOCYTES NFR BLD AUTO: 10.1 % (ref 18–39.1)
MCH RBC QN AUTO: 24.5 PG (ref 28–32)
MCHC RBC AUTO-ENTMCNC: 30.5 G/DL (ref 31–35)
MCV RBC AUTO: 80.6 FL (ref 81–99)
MONOCYTES # BLD AUTO: 0.7 10*3/UL (ref 0.2–0.8)
MONOCYTES NFR BLD AUTO: 4.7 % (ref 4.4–11.3)
NEUTS SEG NFR BLD AUTO: 84 % (ref 38.7–80)
NITRITE UR QL STRIP.AUTO: NEGATIVE
PLATELET # BLD AUTO: 396 X10E3/UL (ref 140–360)
POTASSIUM SERPL-SCNC: 3.6 MMOL/L (ref 3.5–5.1)
PROT UR QL STRIP.AUTO: (no result)
RBC # BLD AUTO: 4.32 X10E6/UL (ref 3.6–5.1)
SODIUM SERPL-SCNC: 138 MMOL/L (ref 136–145)
SP GR UR STRIP: 1.01 (ref 1.01–1.02)
UROBILINOGEN UR STRIP-MCNC: 0.2 MG/DL (ref 0.2–1)
WBC #/AREA URNS HPF: >50 /HPF (ref 0–5)
YEAST URNS QL MICRO: (no result)

## 2018-11-01 PROCEDURE — 36415 COLL VENOUS BLD VENIPUNCTURE: CPT

## 2018-11-01 PROCEDURE — 87086 URINE CULTURE/COLONY COUNT: CPT

## 2018-11-01 PROCEDURE — 87040 BLOOD CULTURE FOR BACTERIA: CPT

## 2018-11-01 PROCEDURE — 83605 ASSAY OF LACTIC ACID: CPT

## 2018-11-01 PROCEDURE — 74176 CT ABD & PELVIS W/O CONTRAST: CPT

## 2018-11-01 PROCEDURE — 85025 COMPLETE CBC W/AUTO DIFF WBC: CPT

## 2018-11-01 PROCEDURE — 50435 EXCHANGE NEPHROSTOMY CATH: CPT

## 2018-11-01 PROCEDURE — 99152 MOD SED SAME PHYS/QHP 5/>YRS: CPT

## 2018-11-01 PROCEDURE — 36568 INSJ PICC <5 YR W/O IMAGING: CPT

## 2018-11-01 PROCEDURE — 80053 COMPREHEN METABOLIC PANEL: CPT

## 2018-11-01 PROCEDURE — 80048 BASIC METABOLIC PNL TOTAL CA: CPT

## 2018-11-01 PROCEDURE — 83735 ASSAY OF MAGNESIUM: CPT

## 2018-11-01 PROCEDURE — 87186 SC STD MICRODIL/AGAR DIL: CPT

## 2018-11-01 PROCEDURE — 81001 URINALYSIS AUTO W/SCOPE: CPT

## 2018-11-01 PROCEDURE — 74470 X-RAY EXAM OF KIDNEY LESION: CPT

## 2018-11-01 PROCEDURE — 99284 EMERGENCY DEPT VISIT MOD MDM: CPT

## 2018-11-01 RX ADMIN — SODIUM CHLORIDE PRN MG: 900 INJECTION INTRAVENOUS at 15:20

## 2018-11-01 RX ADMIN — SODIUM CHLORIDE PRN MG: 900 INJECTION INTRAVENOUS at 20:13

## 2018-11-01 RX ADMIN — SODIUM CHLORIDE SCH MLS/HR: 9 INJECTION, SOLUTION INTRAVENOUS at 17:50

## 2018-11-01 RX ADMIN — Medication PRN MG: at 20:13

## 2018-11-01 RX ADMIN — MEROPENEM SCH GM: 1 INJECTION INTRAVENOUS at 15:15

## 2018-11-01 RX ADMIN — Medication PRN MG: at 15:23

## 2018-11-01 RX ADMIN — MEROPENEM SCH GM: 1 INJECTION INTRAVENOUS at 23:20

## 2018-11-01 NOTE — DIAGNOSTIC IMAGING REPORT
EXAM: CT Abdomen and Pelvis WITHOUT contrast  

INDICATION: Left flank pain. Previous kidney stone with stent.

COMPARISON: 10/22/2018

TECHNIQUE: Abdomen and pelvis were scanned utilizing a multidetector helical

scanner from the lung base to the pubic symphysis without administration of IV

contrast. Absence of intravenous contrast decreases sensitivity for detection

of focal lesions and vascular pathology. Coronal and sagittal reformations were

obtained. Routine protocol was performed. 

            IV CONTRAST: None.

            ORAL CONTRAST: Water

            RADIATION DOSE: Total DLP: 797.04 mGy*cm

             Estimated effective dose: (DLP x 0.015 x size factor) mSv

            COMPLICATIONS: None



FINDINGS:



LINES and TUBES: Right nephrostomy tube with distal tip coiled within the right

renal pelvis. Left double-J ureteral stent in adequate position. Catheter

within the bladder.



LOWER THORAX:  Calcified granuloma in the posterior right lung base.



HEPATOBILIARY:   No focal hepatic lesions. No biliary ductal dilation. 



GALLBLADDER: Peripherally calcified gallstone in the lumen of the gallbladder.

No wall thickening.



SPLEEN: No splenomegaly. 



PANCREAS: No focal masses or ductal dilatation.  



ADRENALS: No adrenal nodules.



KIDNEYS/URETERS: 7.0 mm stone within the lower pole of the left kidney. This

appears to be fragmented when compared with the prior exam. Minimal right

hydronephrosis improved since the prior exam. Mild left hydronephrosis improved

when compared with the prior exam. 



Unchanged left perinephric stranding may reflect pyelonephritis. Unchanged left

periureteral fat stranding.



GI TRACT: No abnormal distention, wall thickening, or evidence of bowel

obstruction.      



PELVIC ORGANS/BLADDER: Radiation seeds again observed in the uterine cervix.

Urinary bladder is decompressed by a Gomez catheter.



LYMPH NODES: No lymphadenopathy.



VESSELS: Unremarkable.



PERITONEUM / RETROPERITONEUM: No free air or fluid.



BONES: There are degenerative changes in the lumbar spine.



SOFT TISSUES: Unremarkable.  



IMPRESSION: 



1.  Unchanged left perinephric stranding may reflect pyelonephritis. Minimal

left hydroureteronephrosis is improved when compared with the prior exam. The

previously seen 9.0 mm stone in the lower pole of the left kidney appears to be

fragmented and slightly smaller.

2. Minimal right hydroureteronephrosis improved when compared with the prior

exam. Signed by: Dr. Negrito Del Castillo M.D. on 11/1/2018 1:09 PM

## 2018-11-01 NOTE — CONSULTATION
DATE OF CONSULTATION:  November 01, 2018 



REASON FOR CONSULTATION:  URI.



HISTORY OF PRESENT ILLNESS:  This patient who is a 53-year-old female who 

was recently in the hospital with acute pyelonephritis, so she was admitted 

on November 18, 2017.  The patient comes in at that time with dysuria, 

fever, and chills.  Patient has history of cervical cancer, hypertension, 

and right nephrostomy tube.  She was admitted with UTI.  She was seen by 

urology.  She was given antibiotic and discharged home after a stent 

placement.  The patient is coming back to the hospital now with left-sided 

pain.



Patient still have pain 2 days ago.  It is on the left side, severe and 

feverish.  Patient is admitted.



PAST MEDICAL HISTORY:  Hyperlipidemia, obesity, depression, anxiety, 

cervical cancer.



PAST SURGICAL HISTORY:  Bilateral renal stent placement.



ALLERGIES:  NKA.



SOCIAL HISTORY:  There is no smoking, drug abuse, alcohol abuse.



FAMILY HISTORY:  Otherwise unremarkable.



LABORATORY DATA:  Urine culture is still pending.  Back in October 22nd, 

her blood culture was negative, but she was on meropenem.  White count 

today 14.0, hemoglobin of 10.  Her sodium 130, potassium 3.6, creatinine 

0.99.  She has a CAT scan of abdomen and pelvis showed unchanged left 

perinephric stranding reflect pyelonephritis.  There is 9 mm stone in the 

left kidney.



PHYSICAL EXAMINATION

GENERAL:  She is currently alert and oriented.  Does not seem in acute 

distress.

VITALS:  Stable, currently afebrile.

HEENT:  She is not icteric.

NECK:  Supple.

CHEST:  Clear.

ABDOMEN:  Soft.  Bowel sounds present.  No tenderness.

EXTREMITIES:  No edema.  She did have left CVA tenderness.



IMPRESSION

1. Pyelonephritis.

2. Urinary tract infection.  Agree with meropenem.  Agree with urologic 

evaluation with blood cultures and urine cultures.

3. History of cervical cancer.

4. Obesity.

5. We will follow.







DD:  11/01/2018 15:17

DT:  11/01/2018 15:28

Job#:  [id number] [email or web address]

## 2018-11-02 VITALS — DIASTOLIC BLOOD PRESSURE: 77 MMHG | SYSTOLIC BLOOD PRESSURE: 118 MMHG

## 2018-11-02 VITALS — SYSTOLIC BLOOD PRESSURE: 138 MMHG | DIASTOLIC BLOOD PRESSURE: 77 MMHG

## 2018-11-02 VITALS — SYSTOLIC BLOOD PRESSURE: 112 MMHG | DIASTOLIC BLOOD PRESSURE: 68 MMHG

## 2018-11-02 VITALS — DIASTOLIC BLOOD PRESSURE: 68 MMHG | SYSTOLIC BLOOD PRESSURE: 112 MMHG

## 2018-11-02 VITALS — DIASTOLIC BLOOD PRESSURE: 74 MMHG | SYSTOLIC BLOOD PRESSURE: 125 MMHG

## 2018-11-02 VITALS — SYSTOLIC BLOOD PRESSURE: 100 MMHG | DIASTOLIC BLOOD PRESSURE: 64 MMHG

## 2018-11-02 VITALS — DIASTOLIC BLOOD PRESSURE: 90 MMHG | SYSTOLIC BLOOD PRESSURE: 136 MMHG

## 2018-11-02 LAB
ANION GAP SERPL CALC-SCNC: 10.5 MMOL/L (ref 8–16)
BASOPHILS # BLD AUTO: 0.1 10*3/UL (ref 0–0.1)
BASOPHILS NFR BLD AUTO: 0.4 % (ref 0–1)
BUN SERPL-MCNC: 11 MG/DL (ref 7–26)
BUN/CREAT SERPL: 12 (ref 6–25)
CALCIUM SERPL-MCNC: 8.5 MG/DL (ref 8.4–10.2)
CHLORIDE SERPL-SCNC: 109 MMOL/L (ref 98–107)
CO2 SERPL-SCNC: 24 MMOL/L (ref 22–29)
DEPRECATED NEUTROPHILS # BLD AUTO: 10 10*3/UL (ref 2.1–6.9)
EGFRCR SERPLBLD CKD-EPI 2021: > 60 ML/MIN (ref 60–?)
EOSINOPHIL # BLD AUTO: 0.1 10*3/UL (ref 0–0.4)
EOSINOPHIL NFR BLD AUTO: 0.5 % (ref 0–6)
ERYTHROCYTE [DISTWIDTH] IN CORD BLOOD: 15.2 % (ref 11.7–14.4)
GLUCOSE SERPLBLD-MCNC: 133 MG/DL (ref 74–118)
HCT VFR BLD AUTO: 28.8 % (ref 34.2–44.1)
HGB BLD-MCNC: 8.9 G/DL (ref 12–16)
LYMPHOCYTES # BLD: 2 10*3/UL (ref 1–3.2)
LYMPHOCYTES NFR BLD AUTO: 15.8 % (ref 18–39.1)
MCH RBC QN AUTO: 24.9 PG (ref 28–32)
MCHC RBC AUTO-ENTMCNC: 30.9 G/DL (ref 31–35)
MCV RBC AUTO: 80.4 FL (ref 81–99)
MONOCYTES # BLD AUTO: 0.7 10*3/UL (ref 0.2–0.8)
MONOCYTES NFR BLD AUTO: 5.5 % (ref 4.4–11.3)
NEUTS SEG NFR BLD AUTO: 77.3 % (ref 38.7–80)
PLATELET # BLD AUTO: 304 X10E3/UL (ref 140–360)
POTASSIUM SERPL-SCNC: 3.5 MMOL/L (ref 3.5–5.1)
RBC # BLD AUTO: 3.58 X10E6/UL (ref 3.6–5.1)
SODIUM SERPL-SCNC: 140 MMOL/L (ref 136–145)

## 2018-11-02 RX ADMIN — MEROPENEM SCH GM: 1 INJECTION INTRAVENOUS at 14:12

## 2018-11-02 RX ADMIN — Medication PRN MG: at 05:53

## 2018-11-02 RX ADMIN — SODIUM CHLORIDE SCH MLS/HR: 9 INJECTION, SOLUTION INTRAVENOUS at 20:02

## 2018-11-02 RX ADMIN — SODIUM CHLORIDE PRN MG: 900 INJECTION INTRAVENOUS at 20:03

## 2018-11-02 RX ADMIN — SODIUM CHLORIDE SCH MLS/HR: 9 INJECTION, SOLUTION INTRAVENOUS at 14:30

## 2018-11-02 RX ADMIN — SODIUM CHLORIDE PRN MG: 900 INJECTION INTRAVENOUS at 00:38

## 2018-11-02 RX ADMIN — Medication PRN MG: at 00:38

## 2018-11-02 RX ADMIN — SODIUM CHLORIDE SCH MLS/HR: 9 INJECTION, SOLUTION INTRAVENOUS at 02:14

## 2018-11-02 RX ADMIN — SODIUM CHLORIDE PRN MG: 900 INJECTION INTRAVENOUS at 05:53

## 2018-11-02 RX ADMIN — MEROPENEM SCH GM: 1 INJECTION INTRAVENOUS at 21:01

## 2018-11-02 RX ADMIN — MEROPENEM SCH GM: 1 INJECTION INTRAVENOUS at 05:53

## 2018-11-02 RX ADMIN — FLUCONAZOLE IN SODIUM CHLORIDE SCH MLS/HR: 2 INJECTION, SOLUTION INTRAVENOUS at 13:05

## 2018-11-02 RX ADMIN — SODIUM CHLORIDE SCH MLS/HR: 9 INJECTION, SOLUTION INTRAVENOUS at 08:53

## 2018-11-02 RX ADMIN — Medication PRN MG: at 20:03

## 2018-11-02 NOTE — HISTORY AND PHYSICAL
HISTORY OF PRESENT ILLNESS:  Patient is a 53-year-old female with past 

medical history positive for bilateral kidney stone.  She got a stent 

placed last week in the left kidney.  Also she had a nephrostomy on the 

right side by Dr. Herndon, urologist.  Patient came here with complaint of 

left flank pain.  She was found to have possible pyelonephritis.



REVIEW OF SYSTEMS:  

CARDIOVASCULAR:  No chest pain or palpitation. 

RESPIRATORY:  No shortness of breath.  No cough. 

GASTROINTESTINAL:  She has nausea and vomiting, no diarrhea.  She did have 

left flank pain. 

GENITOURINARY:  No frequency, no dysuria.



ALLERGIES:  SHE CLAIMS THAT SHE IS NOT ALLERGIC TO ANY MEDICATION.



SOCIAL HISTORY:  She does not smoke.  She does not drink.



PAST MEDICAL HISTORY:  She claims that she has no other past medical 

history except for kidney stones and a recent nephrostomy tube placed on 

the right flank.



PHYSICAL EXAMINATION:  

HEART:   Shows regular rhythm.  Normal S1 and S2 sounds. 

LUNGS:  Clear bilaterally. 

ABDOMEN:  Soft.  She does have a nephrostomy tube in the right flank.  

VITAL SIGNS:  Blood pressure 118/77, temperature 99.0, heart rate 81 per 

minute, respiratory rate 18 per minute, and oxygen saturation 96%.  



On the BMP:  Sodium 140, potassium 3.5, chloride 109, CO2 24, BUN 11, 

creatinine 0.91, glucose 133.  On the CBC:  White blood count 12.8, 

hemoglobin 8.9, hematocrit 28.8, platelet count 304,000.  AST 16, ALT 15, 

total bilirubin 0.7, alkaline phosphatase 94. 



FINAL IMPRESSION:  

1. Left pyelonephritis.

2. Bilateral hydronephrosis.

3. Chronic anemia.



PLAN OF TREATMENT:  Continue fluconazole 100 mg IV once a day.  Normal 

saline 125 mL an hour.  Morphine 4 mg IV q.4 h. as needed.  Zofran 4 mg IV 

q.4 h.  Dr. Herndon has been consulted from urology and Dr. Hinton for 

infectious diseases.  We are going to repeat a CBC tomorrow, replace the 

potassium.  We are going to recheck BMP, magnesium level tomorrow.  Dr. Patel is covering for me from today, which is November 2 at 4:30 p.m. 

until Monday, November 5, at 7 a.m.









DD:  11/02/2018 17:27

DT:  11/02/2018 17:37

Job#:  [id number]

## 2018-11-03 VITALS — DIASTOLIC BLOOD PRESSURE: 71 MMHG | SYSTOLIC BLOOD PRESSURE: 119 MMHG

## 2018-11-03 VITALS — DIASTOLIC BLOOD PRESSURE: 82 MMHG | SYSTOLIC BLOOD PRESSURE: 117 MMHG

## 2018-11-03 VITALS — SYSTOLIC BLOOD PRESSURE: 145 MMHG | DIASTOLIC BLOOD PRESSURE: 86 MMHG

## 2018-11-03 VITALS — DIASTOLIC BLOOD PRESSURE: 58 MMHG | SYSTOLIC BLOOD PRESSURE: 115 MMHG

## 2018-11-03 VITALS — SYSTOLIC BLOOD PRESSURE: 119 MMHG | DIASTOLIC BLOOD PRESSURE: 71 MMHG

## 2018-11-03 VITALS — SYSTOLIC BLOOD PRESSURE: 134 MMHG | DIASTOLIC BLOOD PRESSURE: 84 MMHG

## 2018-11-03 VITALS — SYSTOLIC BLOOD PRESSURE: 138 MMHG | DIASTOLIC BLOOD PRESSURE: 57 MMHG

## 2018-11-03 LAB
ANION GAP SERPL CALC-SCNC: 12.6 MMOL/L (ref 8–16)
BASOPHILS # BLD AUTO: 0 10*3/UL (ref 0–0.1)
BASOPHILS NFR BLD AUTO: 0.4 % (ref 0–1)
BUN SERPL-MCNC: 8 MG/DL (ref 7–26)
BUN/CREAT SERPL: 10 (ref 6–25)
CALCIUM SERPL-MCNC: 8.9 MG/DL (ref 8.4–10.2)
CHLORIDE SERPL-SCNC: 108 MMOL/L (ref 98–107)
CO2 SERPL-SCNC: 23 MMOL/L (ref 22–29)
DEPRECATED NEUTROPHILS # BLD AUTO: 7 10*3/UL (ref 2.1–6.9)
EGFRCR SERPLBLD CKD-EPI 2021: > 60 ML/MIN (ref 60–?)
EOSINOPHIL # BLD AUTO: 0.1 10*3/UL (ref 0–0.4)
EOSINOPHIL NFR BLD AUTO: 1.5 % (ref 0–6)
ERYTHROCYTE [DISTWIDTH] IN CORD BLOOD: 15 % (ref 11.7–14.4)
GLUCOSE SERPLBLD-MCNC: 127 MG/DL (ref 74–118)
HCT VFR BLD AUTO: 29.2 % (ref 34.2–44.1)
HGB BLD-MCNC: 9 G/DL (ref 12–16)
LYMPHOCYTES # BLD: 1.7 10*3/UL (ref 1–3.2)
LYMPHOCYTES NFR BLD AUTO: 17.5 % (ref 18–39.1)
MCH RBC QN AUTO: 24.9 PG (ref 28–32)
MCHC RBC AUTO-ENTMCNC: 30.8 G/DL (ref 31–35)
MCV RBC AUTO: 80.7 FL (ref 81–99)
MONOCYTES # BLD AUTO: 0.7 10*3/UL (ref 0.2–0.8)
MONOCYTES NFR BLD AUTO: 7 % (ref 4.4–11.3)
NEUTS SEG NFR BLD AUTO: 73.2 % (ref 38.7–80)
PLATELET # BLD AUTO: 307 X10E3/UL (ref 140–360)
POTASSIUM SERPL-SCNC: 3.6 MMOL/L (ref 3.5–5.1)
RBC # BLD AUTO: 3.62 X10E6/UL (ref 3.6–5.1)
SODIUM SERPL-SCNC: 140 MMOL/L (ref 136–145)

## 2018-11-03 RX ADMIN — MEROPENEM SCH GM: 1 INJECTION INTRAVENOUS at 13:33

## 2018-11-03 RX ADMIN — MEROPENEM SCH GM: 1 INJECTION INTRAVENOUS at 23:23

## 2018-11-03 RX ADMIN — SODIUM CHLORIDE SCH MLS/HR: 9 INJECTION, SOLUTION INTRAVENOUS at 22:41

## 2018-11-03 RX ADMIN — LISINOPRIL SCH MG: 2.5 TABLET ORAL at 08:33

## 2018-11-03 RX ADMIN — Medication PRN MG: at 15:44

## 2018-11-03 RX ADMIN — MEROPENEM SCH GM: 1 INJECTION INTRAVENOUS at 05:15

## 2018-11-03 RX ADMIN — SODIUM CHLORIDE SCH MLS/HR: 9 INJECTION, SOLUTION INTRAVENOUS at 13:47

## 2018-11-03 RX ADMIN — SODIUM CHLORIDE PRN MG: 900 INJECTION INTRAVENOUS at 04:33

## 2018-11-03 RX ADMIN — SODIUM CHLORIDE SCH MLS/HR: 9 INJECTION, SOLUTION INTRAVENOUS at 05:16

## 2018-11-03 RX ADMIN — FLUCONAZOLE IN SODIUM CHLORIDE SCH MLS/HR: 2 INJECTION, SOLUTION INTRAVENOUS at 13:33

## 2018-11-03 RX ADMIN — Medication PRN MG: at 04:34

## 2018-11-04 VITALS — SYSTOLIC BLOOD PRESSURE: 118 MMHG | DIASTOLIC BLOOD PRESSURE: 80 MMHG

## 2018-11-04 VITALS — SYSTOLIC BLOOD PRESSURE: 120 MMHG | DIASTOLIC BLOOD PRESSURE: 76 MMHG

## 2018-11-04 VITALS — SYSTOLIC BLOOD PRESSURE: 130 MMHG | DIASTOLIC BLOOD PRESSURE: 77 MMHG

## 2018-11-04 VITALS — DIASTOLIC BLOOD PRESSURE: 85 MMHG | SYSTOLIC BLOOD PRESSURE: 138 MMHG

## 2018-11-04 VITALS — DIASTOLIC BLOOD PRESSURE: 72 MMHG | SYSTOLIC BLOOD PRESSURE: 110 MMHG

## 2018-11-04 VITALS — DIASTOLIC BLOOD PRESSURE: 65 MMHG | SYSTOLIC BLOOD PRESSURE: 108 MMHG

## 2018-11-04 RX ADMIN — SODIUM CHLORIDE SCH MLS/HR: 9 INJECTION, SOLUTION INTRAVENOUS at 15:08

## 2018-11-04 RX ADMIN — MEROPENEM SCH GM: 1 INJECTION INTRAVENOUS at 05:49

## 2018-11-04 RX ADMIN — MEROPENEM SCH GM: 1 INJECTION INTRAVENOUS at 21:25

## 2018-11-04 RX ADMIN — SODIUM CHLORIDE PRN MG: 900 INJECTION INTRAVENOUS at 18:00

## 2018-11-04 RX ADMIN — FLUCONAZOLE IN SODIUM CHLORIDE SCH MLS/HR: 2 INJECTION, SOLUTION INTRAVENOUS at 14:40

## 2018-11-04 RX ADMIN — MEROPENEM SCH GM: 1 INJECTION INTRAVENOUS at 14:40

## 2018-11-04 RX ADMIN — LISINOPRIL SCH MG: 2.5 TABLET ORAL at 08:35

## 2018-11-04 RX ADMIN — SODIUM CHLORIDE SCH MLS/HR: 9 INJECTION, SOLUTION INTRAVENOUS at 08:35

## 2018-11-04 RX ADMIN — Medication PRN MG: at 18:00

## 2018-11-04 RX ADMIN — Medication PRN MG: at 01:04

## 2018-11-04 NOTE — CONSULTATION
DATE OF CONSULTATION:  November 02, 2018 



UROLOGY CONSULTATION



CONSULTATION CALLED BY:  Dr. Lowry.



CHIEF COMPLAINT/REASON FOR CONSULTATION:  Nephrostomy stent, 

pyelonephritis.



HISTORY OF PRESENT ILLNESS:  Ms. Jackson is a 53-year-old female patient, 

previously seen Dr. Goldy Rachel who has left stent, right nephrostomy, 

re-admitted to the hospital for pyelonephritis.  Denied gross hematuria.



PAST MEDICAL HISTORY:  Depression, anxiety, cervical cancer, 

hyperlipidemia.



MEDICATIONS:  Please see MAR.



ALLERGIES:  NKDA.



SOCIAL HISTORY:  Denied smoking or drinking.



FAMILY HISTORY:  Denied urologic stones or malignancies.



REVIEW OF SYSTEMS:  Noncontributory other than problems as mentioned above 

for 12 organ systems.



PHYSICAL EXAMINATION

GENERAL:  Elderly female, in no acute distress.

VITAL SIGNS:  Temperature 99.3, pulse 113, respirations 16, blood pressure 

100/64.

HEENT:  Sclerae anicteric.

NECK:  Supple.

BACK:  Without costovertebral angle tenderness bilaterally.

ABDOMEN:  Soft, nontender, and nondistended.  No palpable mass.  No 

palpable hernias.  No palpable inguinal lymphadenopathy.

:  Normal female external genitalia.

EXTREMITIES:  Without edema.

NEURO:  Moves all 4 extremities.

PSYCH:  Mood appropriate.

SKIN:  Intact.  Normal color.



LABORATORY DATA:  CT scan revealing left perinephric stranding, right-sided 

hydronephrosis.  Sodium 140, potassium 3.5, chloride 109, bicarb 24, BUN 

11, creatinine 0.9, glucose 133.  Hemoglobin 8.9, hematocrit 28, platelet 

count 304,000, white cell count 12,800.  Urinalysis greater 50 white cells, 

10 to 20 reds.



IMPRESSION

1. Right nephrostomy.

2. Left ureteral stent.

3. Right hydronephrosis.

4. Urinary tract infection.

5. Microscopic hematuria.

6. Pyelonephritis.

7. Anemia.

8. Hypokalemia.

9. Renal colic.

10. Tachycardia.





PLAN:  The patient has been on broad-spectrum antibiotics, I agree with 

this.  The patient needs followup with routine nephrostomy changes and 

stent changes.  Explained the importance of this to the patient.  We will 

await culture, adjust culture-specific antibiotics.



Thank you for allowing us to participate in the care of this patient.  We 

will be happy to follow along with you.









DD:  11/04/2018 08:48

DT:  11/04/2018 16:44

Job#:  [id number]

## 2018-11-05 VITALS — SYSTOLIC BLOOD PRESSURE: 138 MMHG | DIASTOLIC BLOOD PRESSURE: 65 MMHG

## 2018-11-05 VITALS — DIASTOLIC BLOOD PRESSURE: 65 MMHG | SYSTOLIC BLOOD PRESSURE: 138 MMHG

## 2018-11-05 VITALS — SYSTOLIC BLOOD PRESSURE: 119 MMHG | DIASTOLIC BLOOD PRESSURE: 92 MMHG

## 2018-11-05 VITALS — DIASTOLIC BLOOD PRESSURE: 73 MMHG | SYSTOLIC BLOOD PRESSURE: 133 MMHG

## 2018-11-05 VITALS — DIASTOLIC BLOOD PRESSURE: 83 MMHG | SYSTOLIC BLOOD PRESSURE: 135 MMHG

## 2018-11-05 VITALS — SYSTOLIC BLOOD PRESSURE: 108 MMHG | DIASTOLIC BLOOD PRESSURE: 57 MMHG

## 2018-11-05 VITALS — SYSTOLIC BLOOD PRESSURE: 117 MMHG | DIASTOLIC BLOOD PRESSURE: 75 MMHG

## 2018-11-05 RX ADMIN — ACETAMINOPHEN AND CODEINE PHOSPHATE PRN EA: 300; 30 TABLET ORAL at 22:23

## 2018-11-05 RX ADMIN — MEROPENEM SCH GM: 1 INJECTION INTRAVENOUS at 14:36

## 2018-11-05 RX ADMIN — LISINOPRIL SCH MG: 2.5 TABLET ORAL at 09:00

## 2018-11-05 RX ADMIN — ACETAMINOPHEN AND CODEINE PHOSPHATE PRN EA: 300; 30 TABLET ORAL at 14:37

## 2018-11-05 RX ADMIN — MEROPENEM SCH GM: 1 INJECTION INTRAVENOUS at 05:38

## 2018-11-05 RX ADMIN — FLUCONAZOLE IN SODIUM CHLORIDE SCH MLS/HR: 2 INJECTION, SOLUTION INTRAVENOUS at 14:36

## 2018-11-05 RX ADMIN — SODIUM CHLORIDE PRN MG: 900 INJECTION INTRAVENOUS at 04:27

## 2018-11-05 RX ADMIN — Medication PRN MG: at 04:27

## 2018-11-05 RX ADMIN — MEROPENEM SCH GM: 1 INJECTION INTRAVENOUS at 22:15

## 2018-11-05 NOTE — PROGRESS NOTE
DATE:  November 05, 2018 



INTERNAL MEDICINE PROGRESS NOTE



SUBJECTIVE:  Patient is doing well.  No significant complaint.



PHYSICAL EXAM:  

VITAL SIGNS:  Blood pressure 138/65.  Temperature 97.9.  Heart rate 70 per 

minute.  Respiratory rate is 17 per minute.  Oxygen saturation 99%. 

HEART:  Shows regular rhythm.  Normal S1 and S2 sounds. 

LUNGS:  Clear bilaterally. 

ABDOMEN:  Soft.  She has got a right nephrostomy.

EXTREMITIES:  Show no evidence of cyanosis, edema or trauma.  



BMP showed sodium 140, potassium 3.6, chloride 108, CO2 23, BUN 8, 

creatinine 0.82, glucose 127.



CBC:  White blood count 9.59, hemoglobin 9.0, hematocrit 29.2, platelet 

count 307,000.  AST 16, ALT 15, total bilirubin 0.7, alkaline phosphatase 

94. 



FINAL IMPRESSION:  

1. Urinary tract infection.

2. Possible pyelonephritis.  

3. Left hydronephrosis.  

4. Anemia of chronic disease probably.  



PLAN OF TREATMENT:   Continue meropenem 1 gram IV q.8 hours for 14 days.  

Fluconazole 100 mg IV daily.  Morphine 4 mg IV q.4 hours as needed.  

Tylenol 650 mg p.o. q.4 hours as needed for pain and fever.  Lisinopril 2.5 

mg daily.  Tylenol No. 3 two tablets q.6. hours as 

needed for pain.  Zofran 4 mg IV q.4 hours as needed.  We are going to 

discontinue the IV fluids. We are going to have  arrange for 

outpatient IV antibiotics.









DD:  11/05/2018 18:02

DT:  11/05/2018 18:45

Job#:  [id number]

## 2018-11-06 VITALS — SYSTOLIC BLOOD PRESSURE: 106 MMHG | DIASTOLIC BLOOD PRESSURE: 63 MMHG

## 2018-11-06 VITALS — DIASTOLIC BLOOD PRESSURE: 72 MMHG | SYSTOLIC BLOOD PRESSURE: 119 MMHG

## 2018-11-06 VITALS — SYSTOLIC BLOOD PRESSURE: 109 MMHG | DIASTOLIC BLOOD PRESSURE: 76 MMHG

## 2018-11-06 VITALS — SYSTOLIC BLOOD PRESSURE: 110 MMHG | DIASTOLIC BLOOD PRESSURE: 69 MMHG

## 2018-11-06 PROCEDURE — 0T25X0Z CHANGE DRAINAGE DEVICE IN KIDNEY, EXTERNAL APPROACH: ICD-10-PCS | Performed by: UROLOGY

## 2018-11-06 RX ADMIN — MEROPENEM SCH GM: 1 INJECTION INTRAVENOUS at 13:28

## 2018-11-06 RX ADMIN — FLUCONAZOLE IN SODIUM CHLORIDE SCH MLS/HR: 2 INJECTION, SOLUTION INTRAVENOUS at 13:28

## 2018-11-06 RX ADMIN — LISINOPRIL SCH MG: 2.5 TABLET ORAL at 08:22

## 2018-11-06 RX ADMIN — MEROPENEM SCH GM: 1 INJECTION INTRAVENOUS at 05:33

## 2018-11-07 NOTE — DISCHARGE SUMMARY
HOSPITAL COURSE:  A 53-year-old female who came here to the hospital 

because of flank pain.  She was found to have a urinary tract infection 

with multidrug resistant bacteria.  Seen by Dr. Rachel, seen by Dr. Hinton. 

 She was started on meropenem.  Dr. Rachel changed the nephrostomy tube and 

she went home today.   has arranged for outpatient IV 

antibiotics for 14 days.



PHYSICAL EXAM:  The heart showed regular rhythm.  Normal S1 and S2 sounds.  

Lungs are clear bilaterally.  Abdomen is soft.  Extremities show no 

evidence of cyanosis, edema, or trauma.



On the blood pressure 110/69, temperature 98.2, heart rate 76 per minute, 

respiratory rate 18 per minute, and oxygen saturation 99%.



On the lab work, her BMP was sodium 140, potassium 3.6, chloride 108, CO2 

of 23, BUN 7, creatinine 0.82, and glucose 127.  On the CBC, white blood 

count 9.59, hemoglobin 9.0, hematocrit 29.2, and platelet count 307,000.  

AST 16, ALT 15, total bilirubin 0.7, and alkaline phosphatase 94.



FINAL IMPRESSIONS

1.  Sepsis secondary to urinary tract infection.

2.  Urinary tract infection.

3.  Pyelonephritis.

4.  Hypertension.

5.  Status post right nephrostomy.

6.  Anemia of chronic disease.



PLAN OF TREATMENT

1.  She is going to continue the meropenem 1 g IV q.8 h. x14 days.  

Continue lisinopril 2.5 mg daily.  Patient has IV antibiotic arranged as an 

outpatient.



2.  She is going to follow up with Dr. Rachel and Dr. Hinton as an 

outpatient in a week.









 _________________________________

MATTHEW FLORENTINO MD



DD:  11/06/2018 17:23

DT:  11/07/2018 00:20

Job#:  [id number]

## 2018-11-09 ENCOUNTER — HOSPITAL ENCOUNTER (EMERGENCY)
Dept: HOSPITAL 88 - ER | Age: 53
Discharge: HOME | End: 2018-11-09
Payer: COMMERCIAL

## 2018-11-09 VITALS — WEIGHT: 185 LBS | HEIGHT: 66 IN | BODY MASS INDEX: 29.73 KG/M2

## 2018-11-09 DIAGNOSIS — R21: ICD-10-CM

## 2018-11-09 DIAGNOSIS — Z48.03: Primary | ICD-10-CM

## 2018-11-09 PROCEDURE — 71045 X-RAY EXAM CHEST 1 VIEW: CPT

## 2018-11-09 PROCEDURE — 99283 EMERGENCY DEPT VISIT LOW MDM: CPT

## 2018-11-09 NOTE — DIAGNOSTIC IMAGING REPORT
Procedure: Right nephrostomy catheter exchange



Medications: Versed 2 mg intravenous, Fentanyl 100 mcg intravenous.  The

patient's vital signs, including pulse oximetry, were continuously monitored by

the interventional radiology nurse.



Fluoroscopy time: 1.9 minutes.

Dose area product: 1.76 cGycm2





Procedure in detail:



Informed consent for the procedure was obtained from the patient after

discussion of risks and benefits. The right back was prepped and draped in the

usual fashion.



1% lidocaine was administered into the skin and subcutaneous tissues of the

right back for local anesthesia. Contrast was injected showing proximal

ureteral obstruction. A 

0.035  " Amplatz superstiff wire was placed through the catheter. A new 10

Brune[place] all-purpose drainage catheter was then placed into the renal pelvis.

Contrast injection confirms appropriate position.  Catheter was secured to the

skin with a sterile dressing.



Impression:



Successful right percutaneous nephrostomy catheter exchange.



Signed by: Dr. José Luis Munoz DO on 11/9/2018 7:11 AM

## 2018-11-09 NOTE — DIAGNOSTIC IMAGING REPORT
EXAM: CHEST SINGLE (NOT PORTABLE), AP 1 view

INDICATION: PIC line leaking

COMPARISON: None



FINDINGS:

LINES/TUBES: There is a left midline catheter with tip projected over the

axilla.



LUNGS: No consolidations or edema. 



PLEURA: No effusions or pneumothorax.



HEART AND MEDIASTINUM: Normal size and contour.



BONES AND SOFT TISSUES: No acute findings. 



IMPRESSION:

There is a left midline catheter with tip projected over the axilla.

No priors are available to determine original location.







Signed by: Dr. Savannah Main M.D. on 11/9/2018 6:03 PM

## 2018-11-13 ENCOUNTER — HOSPITAL ENCOUNTER (EMERGENCY)
Dept: HOSPITAL 88 - ER | Age: 53
Discharge: HOME | End: 2018-11-13
Payer: COMMERCIAL

## 2018-11-13 VITALS — HEIGHT: 66 IN | BODY MASS INDEX: 29.73 KG/M2 | WEIGHT: 185 LBS

## 2018-11-13 DIAGNOSIS — E78.5: ICD-10-CM

## 2018-11-13 DIAGNOSIS — Z85.41: ICD-10-CM

## 2018-11-13 DIAGNOSIS — T85.890A: Primary | ICD-10-CM

## 2018-11-13 DIAGNOSIS — I10: ICD-10-CM

## 2018-11-13 DIAGNOSIS — Z48.03: ICD-10-CM

## 2018-11-13 DIAGNOSIS — F41.9: ICD-10-CM

## 2018-11-13 PROCEDURE — 99283 EMERGENCY DEPT VISIT LOW MDM: CPT

## 2018-11-13 NOTE — XMS REPORT
Continuity of Care Document

                             Created on: 2016



CRISTINA RICH

External Reference #: [id number]

: 1965

Sex: Female



Demographics







                          Address                   [address] [place] [address]  [id number]

 

                          Home Phone                [phone]

 

                          Preferred Language        Unknown

 

                          Marital Status            Unknown

 

                          Oriental orthodox Affiliation     Unknown

 

                          Race                      Unknown

 

                          Ethnic Group              Unknown





Author







                          Author                    [facility]

 

                          [facility]              Interface

 

                          Address                   Unknown

 

                          Phone                     Unavailable



                                                    



Problems

                    





                    Problem                            Status                            Onset Date     

                          Classification                            Date Reported       

                          Comments                            Source                    

 

                                        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    

                                                      2016                                            

                                        [facility]                    

 

                    GEN. PAIN                            Active                            2016   

                                                                                       

                                        [facility]                    

 

                                        Discharge Diagnosis: Urinary tract infection, site not specified                

                                                2016                                     

                    01/10/2016                                                        [facility]                    

 

                    WEAKNESS                            Active                            2016    

                                                                                       

                                        [facility]                    

 

                    MRSA<sup>1</sup>                            Active                                  

                          Problem                            2016                  

                          Problem added by Discern Expert.                            [facility]   

                 



                                                                                
                                                                       



Medications

                    





                    Medication                            Details                            Route      

                          Status                            Patient Instructions         

                          Ordering Provider                            Order Date           

                                        Source                    

 

                          Ativan                            1 mg, Route: PO, Drug form: TAB, ONCE, Dosing Weight

 86.364, kg, Priority: STAT, Start date: 16 23:44:00, Stop date: 16 
23:44:00                                                        No Longer Active     

                                                                               2016

                                        [facility]                    

 

                          tramadol hydrochloride 50 MG Oral Tablet                            50 mg=1 tab, PO,

 BID, X 15 day, # 30 tab, 0 Refill(s)                                              

                    Active                                                                       

                          2016                            [facility]                    

 

                          tramadol hydrochloride 50 MG Oral Tablet [Ultram]                            1 - 2

 tabs, PO, Q4-6H, PRN as needed for pain, X 7 day, # 12 tab, 0 Refill(s)        
                                                Active                                 

                                                      2016                        

                                        [facility]                    

 

                                        Sulfamethoxazole 800 MG / Trimethoprim 160 MG Oral Tablet [Bactrim]             

                          1 tab, PO, BID, # 20 tab, 0 Refill(s)                                 

                       Active                                                          

                          2016                            [facility]         

           

 

                          Rocephin                            1 gm, Route: IM, ONCE, Dosing Weight 84.091, kg,

 Start date: 16 17:48:00, Stop date: 16 17:48:00                    
                                                Inactive                                           

                                                2016                            [facility]

                    

 

                          Ondansetron                            4 mg, 2 mL, Route: IVP, Drug form: INJ, ONCE,

 Dosing Weight 84.091, kg, Priority: STAT, Start date: 16 15:36:00, Stop 
date: 16 15:36:00Notes: (Same as: Zofran)   *** MEDICATION WASTE *** 
Product Size:  4 mg Product Wasted:  ___ mg                                        

                    Inactive                                                               

                          2016                            [facility]              

      

 

                          Sodium Chloride 0.154 MEQ/ML Injectable Solution                            1,000 

mL, 1000 ml/hr, Infuse Over: 1 hr, Route: IV, 1,000, Drug form: INJ, ONCE, 
Priority: STAT, Dosing Weight 84.091 kg, Start date: 16 15:36:00, 
Duration: 1 doses or times, Stop date: 16 15:36:00                        
                                                Inactive                                               

                                                2016                            [facility]

                    

 

                          Saline Flush 0.9%                            10 mL, Route: IVP, Drug Form: INJ, Dosing

 Weight 84.091, kg, PRN, PRN Line Flush, Start date: 16 15:36:00, 
Duration: 30 day, Stop date: 16 15:35:00Notes: Same as: BD Posiflush 
Sterile                                                        Inactive              

                                                                            2016       

                                        [facility]                    



                                                                                
                                                                                
                                        



Allergies, Adverse Reactions, Alerts

                    





                    Substance                            Category                            Reaction   

                          Severity                            Reaction type           

                          Status                            Date Reported                     

                          Comments                            Source                    

 

                    diphenhydrAMINE                            Assertion                                

                                                      Drug allergy                     

                    Active                                                                          

                                        [facility]                    



                                                                        



Immunizations

                    





                    Immunization                            Date Given                            Site  

                          Status                            Last Updated             

                          Comments                            Source                    



                                                                        



Results

                    





                    Order Name                            Results                            Value      

                          Reference Range                            Date                

                          Interpretation                            Comments                       

                                        Source                    

 

                          URINE AND STOOL                            UA Urobilinogen                        

                    <=1.0 mg/dL                              0.1 - 1.0                            2016

                                                                                    [facility]                    

 

                    URINE AND STOOL                            UA Protein                            100

 mg/dL                              Negative mg/dL                            2016

                                                                                    [facility]                    

 

                    URINE AND STOOL                            UA Glucose                            Negative

 mg/dL                              Negative mg/dL                            2016

                                                                                    [facility]                    

 

                    URINE AND STOOL                            UA Ketones                            Negative

 mg/dL                              Negative mg/dL                            2016

                                                                                    [facility]                    

 

                    URINE AND STOOL                            UA Bili                            Negative

 

*NA*

                    (16 4:54 PM)                              Negative                            2016

                                                                                    [facility]                    

 

                    URINE AND STOOL                            UA Blood                            Moderate

 

*ABN*

                    (16 4:54 PM)                              Negative                            2016

                                                                                    [facility]                    

 

                    URINE AND STOOL                            UA Nitrite                            Positive

 

*ABN*

                    (16 4:54 PM)                              Negative                            2016

                                                                                    [facility]                    

 

                    URINE AND STOOL                            UA Leuk Est                            Large

 

*ABN*

                    (16 4:54 PM)                              Negative                            2016

                                                                                    [facility]                    

 

                    URINE AND STOOL                            UA Sq Epi                            Few 

/LPF                              Few /LPF                            2016     

                                                                               [facility]

                    

 

                    URINE AND STOOL                            UA WBC                            null   

                          0 - 5                            2016                 

                                                                            [facility]        

            

 

                    URINE AND STOOL                            UA RBC                            155 /HPF

                             0 - 2                            2016             

                                                                            [facility]    

                

 

                    URINE AND STOOL                            UA Bacteria                            Many

 /HPF                              None Seen /HPF                            2016

                                                                                    [facility]                    

 

                    URINE AND STOOL                            UA WBC Cast                            34

 /LPF                             <=0 /LPF                            2016     

                                                                               [facility]

                    

 

                    URINE AND STOOL                            UA Mucus                            Few /LPF

                              None Seen /LPF                            2016   

                                                                                 [facility]

                    

 

                    URINE AND STOOL                            UA Color                            Yellow

 

*NA*

                    (16 4:54 PM)                              Yellow                            2016

                                                                                    [facility]                    

 

                    URINE AND STOOL                            UA Turbidity                            Marked

 

*ABN*

                    (16 4:54 PM)                              Clear                            2016

                                                                                    [facility]                    

 

                    URINE AND STOOL                            UA pH                            5.0     

                          5.0 - 8.0                            2016             

                                                                            [facility]    

                

 

                    URINE AND STOOL                            UA Spec Grav                            1.018

                              <=1.030                            2016          

                                                                            [facility] 

                   

 

                    CHEM PANEL                            A/G Ratio                            0.6      

                          0.7 - 1.6                            2016              

                                                                            [facility]     

               

 

                    CHEM PANEL                            Globulin                            5.8 g/dL  

                           2.0 - 4.0                            2016           

                                                                            [facility]  

                  

 

                    CHEM PANEL                            B/C Ratio                            12       

                          6 - 25                            2016                  

                                                                            [facility]         

           

 

                    CHEM PANEL                            AGAP                            12.6 meq/L    

                          10.0 - 20.0                            2016           

                                                                            [facility]  

                  

 

                    CHEM PANEL                            Alk Phos                            115 unit/L

                             39 - 136                            2016          

                                                                            [facility] 

                   

 

                    CHEM PANEL                            AST                            28 unit/L      

                          0 - 37                            2016                  

                                                                            [facility]         

           

 

                    CHEM PANEL                            Bili Total                            0.3 mg/dL

                             0.2 - 1.3                            2016         

                                                                            [facility]

                    

 

                    CHEM PANEL                            eGFR                            73 mL/min/1.73m2

                                                         2016                  

                                                      Result Comment: The eGFR is calculated using the

 CKD-EPI formula. In most young, healthy individuals the eGFR will be >90 
mL/min/1.73m2. The eGFR declines with age. An eGFR of 60-89 may be normal in 
some populations, particularly the elderly, for whom the CKD-EPI formula has not
 been extensively validated. Use of the eGFR is not recommended in the following
 populations:



Individuals with unstable creatinine concentrations, including pregnant patients
 and those with serious co-morbid conditions.



Patients with extremes in muscle mass or diet. 



The data above are obtained from the National Kid[employer] Education Program 
(NKDEP) which additionally recommends that when the eGFR is used in patients 
with extremes of body mass index for purposes of drug dosing, the eGFR should be
 multiplied by the estimated BMI.                            [facility]          

          

 

                    CHEM PANEL                            ALT                            25 unit/L      

                          0 - 65                            2016                  

                                                                            [facility]         

           

 

                    CHEM PANEL                            Albumin Lvl                            3.4 g/dL

                             3.5 - 5.0                            2016         

                                                                            [facility]

                    

 

                    CHEM PANEL                            Total Protein                            9.2 g/dL

                             6.4 - 8.4                            2016         

                                                                            [facility]

                    

 

                    CHEM PANEL                            Calcium Lvl                            9.1 mg/dL

                             8.5 - 10.5                            2016        

                                                                            [facility]

                    

 

                    CHEM PANEL                            CO2                            26 meq/L       

                          24 - 32                            2016                  

                                                                            [facility]         

           

 

                    CHEM PANEL                            Sodium Lvl                            140 meq/L

                             135 - 145                            2016         

                                                                            [facility]

                    

 

                    CHEM PANEL                            Chloride Lvl                            106 meq/L

                             95 - 109                            2016          

                                                                            [facility] 

                   

 

                    CHEM PANEL                            Potassium Lvl                            4.6 meq/L

                             3.5 - 5.1                            2016         

                                                                            [facility]

                    

 

                    CHEM PANEL                            Creatinine Lvl                            0.91

 mg/dL                             0.50 - 1.40                            2016 

                                                                                   [facility]

                    

 

                    CHEM PANEL                            BUN                            11 mg/dL       

                          7 - 22                            2016                   

                                                                            [facility]          

          

 

                    CHEM PANEL                            Glucose Lvl                            118 mg/dL

                             70 - 99                            2016           

                                                                            [facility]  

                  

 

                    ENDOCRINOLOGY                            S Preg                            Negative 



*NA*

                    (16 4:40 PM)                              Negative                            2016

                                                                                    [facility]                    

 

                    HEMATOLOGY                            Lymphocytes                            32.8 % 

                            20.0 - 40.0                            2016        

                                                                            [facility]

                    

 

                    HEMATOLOGY                            Segs                            60.0 %        

                          45.0 - 75.0                            2016               

                                                                            [facility]      

              

 

                    HEMATOLOGY                            Eosinophils #                            0.2 K/CMM

                             0.0 - 0.5                            2016         

                                                                            [facility]

                    

 

                    HEMATOLOGY                            Basophils #                            0.2 K/CMM

                             0.0 - 0.2                            2016         

                                                                            [facility]

                    

 

                    HEMATOLOGY                            Monocytes                            4.6 %    

                          2.0 - 12.0                            2016            

                                                                            [facility]   

                 

 

                    HEMATOLOGY                            Eosinophils                            1.2 %  

                           0.0 - 4.0                            2016           

                                                                            [facility]  

                  

 

                    HEMATOLOGY                            Basophils                            1.4 %    

                          0.0 - 1.0                            2016             

                                                                            [facility]    

                

 

                    HEMATOLOGY                            Segs-Bands #                            7.3 K/CMM

                             1.5 - 8.1                            2016         

                                                                            [facility]

                    

 

                    HEMATOLOGY                            Monocytes #                            0.6 K/CMM

                             0.0 - 0.8                            2016         

                                                                            [facility]

                    

 

                    HEMATOLOGY                            Lymphocytes #                            4.0 K/CMM

                             1.0 - 5.5                            2016         

                                                                            [facility]

                    

 

                    HEMATOLOGY                            Plt Morph                            Clumped 

                    (16 4:40 PM)                                                          2016

                                                                                    [facility]                    

 

                    HEMATOLOGY                            RBC Morph                            Normal 

                    (16 4:40 PM)                                                          2016

                                                                                    [facility]                    

 

                    HEMATOLOGY                            MPV                            8.7 fL         

                          7.4 - 10.4                            2016                 

                                                                            [facility]        

            

 

                    HEMATOLOGY                            WBC                            12.0 K/CMM     

                          3.7 - 10.4                            2016             

                                                                            [facility]    

                

 

                    HEMATOLOGY                            RBC                            4.90 M/CMM     

                          4.20 - 5.40                            2016            

                                                                            [facility]   

                 

 

                    HEMATOLOGY                            Hct                            39.0 %         

                          36.0 - 48.0                            2016                

                                                                            [facility]       

             

 

                    HEMATOLOGY                            MCV                            79.7 fL        

                          80.0 - 98.0                            2016               

                                                                            [facility]      

              

 

                    HEMATOLOGY                            Hgb                            12.0 g/dL      

                          12.0 - 16.0                            2016             

                                                                            [facility]    

                

 

                    HEMATOLOGY                            RDW                            14.8 %         

                          11.5 - 14.5                            2016                

                                                                            [facility]                            [facility]                            30.8 g/dL     

                          32.0 - 36.0                            2016            

                                                                            [facility]   

                 

 

                    HEMATOLOGY                            Platelet                            220 K/CMM 

                            133 - 450                            2016          

                                                                            [facility]                            [facility]CH                            24.5 pg        

                          27.0 - 31.0                            2016               

                                                                            [facility]      

              



                                                                                
                                                                                
                                                                                
                                                                                
                                                                                
                                                                                
                                                                                
                                                                                
                                                                                
                                                                                
                                                                                
                                                                                
                                                                                



Vital Signs

                    





                    Vital Sign                            Value                            Date         

                          Comments                            Source                    

 

                    Weight                            86.364                             2016     

                                                      [facility]                    

 

                    Temperature Oral (F)                            97.9 F                            2016

                                                        [facility]                 

   

 

                    Respitory Rate                            18                             2016 

                                                       [facility]                  

  

 

                    Heart Rate                            91                             2016     

                                                      [facility]                    

 

                    Systolic (mm Hg)                            140                             2016

                                                        [facility]                 

   

 

                    Diastolic (mm Hg)                            99                             2016

                                                        [facility]                 

   

 

                    Temperature Oral (F)                            98.4 F                            2016

                                                        [facility]                 

   

 

                    Respitory Rate                            18                             2016 

                                                       [facility]                  

  

 

                    Heart Rate                            97                             2016     

                                                      [facility]                    

 

                    Systolic (mm Hg)                            142                             2016

                                                        [facility]                 

   

 

                    Diastolic (mm Hg)                            82                             2016

                                                        [facility]                 

   

 

                    Systolic (mm Hg)                            147                             2016

                                                        [facility]                 

   

 

                    Diastolic (mm Hg)                            93                             2016

                                                        [facility]                 

   

 

                    Weight                            84.091                             2016     

                                                      [facility]                    

 

                    BMI Calculated                            36.21                             2016

                                                        [facility]                 

   

 

                    Height                            152.4 cm                            2016    

                                                      [facility]                    

 

                    Heart Rate                            76                             2016     

                                                      [facility]                    

 

                    Respitory Rate                            18                             2016 

                                                       [facility]                  

  

 

                    Temperature Oral (F)                            98.1 F                            2016

                                                        [facility]                 

   



                                                                                
                                                                                
                                                                                
                                                                                
                                                        



Encounters

                    





                    Location                            Location Details                            Encounter

 Type                            Encounter Number                            Reason For

 Visit                            Attending Provider                            ADM Date

                            DC Date                            Status                

                                        Source                    

 

                          [facility]                            [id number]                 

                                                Parulchristopher Wilson                             2016                                               

                                        [facility]                            [id number]                 

                                                Mohan Curtis                             2016                                               

                                        [facility]                    



                                                                                
                



Procedures

                    





                    Procedure                            Code                            Date           

                          Perfomer                            Comments                        

                                        Source

## 2018-11-30 ENCOUNTER — HOSPITAL ENCOUNTER (INPATIENT)
Dept: HOSPITAL 88 - ER | Age: 53
LOS: 5 days | Discharge: HOME | DRG: 660 | End: 2018-12-05
Attending: INTERNAL MEDICINE | Admitting: INTERNAL MEDICINE
Payer: COMMERCIAL

## 2018-11-30 VITALS — BODY MASS INDEX: 36.98 KG/M2 | WEIGHT: 188.38 LBS | HEIGHT: 60 IN

## 2018-11-30 DIAGNOSIS — Z85.43: ICD-10-CM

## 2018-11-30 DIAGNOSIS — N95.2: ICD-10-CM

## 2018-11-30 DIAGNOSIS — Z93.6: ICD-10-CM

## 2018-11-30 DIAGNOSIS — B48.8: ICD-10-CM

## 2018-11-30 DIAGNOSIS — Z87.440: ICD-10-CM

## 2018-11-30 DIAGNOSIS — Z82.49: ICD-10-CM

## 2018-11-30 DIAGNOSIS — N35.92: ICD-10-CM

## 2018-11-30 DIAGNOSIS — D64.9: ICD-10-CM

## 2018-11-30 DIAGNOSIS — R33.8: ICD-10-CM

## 2018-11-30 DIAGNOSIS — T83.511A: Primary | ICD-10-CM

## 2018-11-30 DIAGNOSIS — N11.0: ICD-10-CM

## 2018-11-30 DIAGNOSIS — B37.49: ICD-10-CM

## 2018-11-30 DIAGNOSIS — N13.6: ICD-10-CM

## 2018-11-30 DIAGNOSIS — N28.9: ICD-10-CM

## 2018-11-30 DIAGNOSIS — Z87.442: ICD-10-CM

## 2018-11-30 LAB
ALBUMIN SERPL-MCNC: 3.4 G/DL (ref 3.5–5)
ALBUMIN/GLOB SERPL: 0.6 {RATIO} (ref 0.8–2)
ALP SERPL-CCNC: 120 IU/L (ref 40–150)
ALT SERPL-CCNC: 25 IU/L (ref 0–55)
ANION GAP SERPL CALC-SCNC: 16.9 MMOL/L (ref 8–16)
BACTERIA URNS QL MICRO: (no result) /HPF
BASOPHILS # BLD AUTO: 0 10*3/UL (ref 0–0.1)
BASOPHILS NFR BLD AUTO: 0.4 % (ref 0–1)
BILIRUB UR QL: NEGATIVE
BUN SERPL-MCNC: 19 MG/DL (ref 7–26)
BUN/CREAT SERPL: 14 (ref 6–25)
CALCIUM SERPL-MCNC: 10.1 MG/DL (ref 8.4–10.2)
CHLORIDE SERPL-SCNC: 106 MMOL/L (ref 98–107)
CLARITY UR: (no result)
CO2 SERPL-SCNC: 22 MMOL/L (ref 22–29)
COLOR UR: YELLOW
DEPRECATED NEUTROPHILS # BLD AUTO: 7.2 10*3/UL (ref 2.1–6.9)
EGFRCR SERPLBLD CKD-EPI 2021: 41 ML/MIN (ref 60–?)
EOSINOPHIL # BLD AUTO: 0.1 10*3/UL (ref 0–0.4)
EOSINOPHIL NFR BLD AUTO: 1.4 % (ref 0–6)
EPI CELLS URNS QL MICRO: (no result) /LPF
ERYTHROCYTE [DISTWIDTH] IN CORD BLOOD: 16.1 % (ref 11.7–14.4)
GLOBULIN PLAS-MCNC: 6 G/DL (ref 2.3–3.5)
GLUCOSE SERPLBLD-MCNC: 132 MG/DL (ref 74–118)
HCT VFR BLD AUTO: 32.8 % (ref 34.2–44.1)
HGB BLD-MCNC: 10.1 G/DL (ref 12–16)
KETONES UR QL STRIP.AUTO: NEGATIVE
LEUKOCYTE ESTERASE UR QL STRIP.AUTO: (no result)
LYMPHOCYTES # BLD: 2 10*3/UL (ref 1–3.2)
LYMPHOCYTES NFR BLD AUTO: 19.8 % (ref 18–39.1)
MCH RBC QN AUTO: 25 PG (ref 28–32)
MCHC RBC AUTO-ENTMCNC: 30.8 G/DL (ref 31–35)
MCV RBC AUTO: 81.2 FL (ref 81–99)
MONOCYTES # BLD AUTO: 0.7 10*3/UL (ref 0.2–0.8)
MONOCYTES NFR BLD AUTO: 6.6 % (ref 4.4–11.3)
NEUTS SEG NFR BLD AUTO: 71.4 % (ref 38.7–80)
NITRITE UR QL STRIP.AUTO: NEGATIVE
PLATELET # BLD AUTO: 326 X10E3/UL (ref 140–360)
POTASSIUM SERPL-SCNC: 4.9 MMOL/L (ref 3.5–5.1)
PROT UR QL STRIP.AUTO: (no result)
RBC # BLD AUTO: 4.04 X10E6/UL (ref 3.6–5.1)
SODIUM SERPL-SCNC: 140 MMOL/L (ref 136–145)
SP GR UR STRIP: 1.01 (ref 1.01–1.02)
UROBILINOGEN UR STRIP-MCNC: 0.2 MG/DL (ref 0.2–1)
WBC #/AREA URNS HPF: >50 /HPF (ref 0–5)

## 2018-11-30 PROCEDURE — 99284 EMERGENCY DEPT VISIT MOD MDM: CPT

## 2018-11-30 PROCEDURE — 36415 COLL VENOUS BLD VENIPUNCTURE: CPT

## 2018-11-30 PROCEDURE — 51700 IRRIGATION OF BLADDER: CPT

## 2018-11-30 PROCEDURE — 74420 UROGRAPHY RTRGR +-KUB: CPT

## 2018-11-30 PROCEDURE — 87206 SMEAR FLUORESCENT/ACID STAI: CPT

## 2018-11-30 PROCEDURE — 80048 BASIC METABOLIC PNL TOTAL CA: CPT

## 2018-11-30 PROCEDURE — 85025 COMPLETE CBC W/AUTO DIFF WBC: CPT

## 2018-11-30 PROCEDURE — 87102 FUNGUS ISOLATION CULTURE: CPT

## 2018-11-30 PROCEDURE — 80053 COMPREHEN METABOLIC PANEL: CPT

## 2018-11-30 PROCEDURE — 87086 URINE CULTURE/COLONY COUNT: CPT

## 2018-11-30 PROCEDURE — 81001 URINALYSIS AUTO W/SCOPE: CPT

## 2018-11-30 RX ADMIN — Medication PRN MG: at 20:50

## 2018-11-30 RX ADMIN — MEROPENEM SCH MLS/HR: 1 INJECTION INTRAVENOUS at 20:26

## 2018-11-30 RX ADMIN — SODIUM CHLORIDE SCH MLS/HR: 9 INJECTION, SOLUTION INTRAVENOUS at 23:10

## 2018-12-01 VITALS — SYSTOLIC BLOOD PRESSURE: 111 MMHG | DIASTOLIC BLOOD PRESSURE: 58 MMHG

## 2018-12-01 VITALS — SYSTOLIC BLOOD PRESSURE: 111 MMHG | DIASTOLIC BLOOD PRESSURE: 57 MMHG

## 2018-12-01 VITALS — DIASTOLIC BLOOD PRESSURE: 66 MMHG | SYSTOLIC BLOOD PRESSURE: 137 MMHG

## 2018-12-01 VITALS — SYSTOLIC BLOOD PRESSURE: 111 MMHG | DIASTOLIC BLOOD PRESSURE: 67 MMHG

## 2018-12-01 VITALS — SYSTOLIC BLOOD PRESSURE: 120 MMHG | DIASTOLIC BLOOD PRESSURE: 72 MMHG

## 2018-12-01 VITALS — SYSTOLIC BLOOD PRESSURE: 114 MMHG | DIASTOLIC BLOOD PRESSURE: 59 MMHG

## 2018-12-01 VITALS — SYSTOLIC BLOOD PRESSURE: 137 MMHG | DIASTOLIC BLOOD PRESSURE: 60 MMHG

## 2018-12-01 LAB
ANION GAP SERPL CALC-SCNC: 13.7 MMOL/L (ref 8–16)
BASOPHILS # BLD AUTO: 0 10*3/UL (ref 0–0.1)
BASOPHILS NFR BLD AUTO: 0.4 % (ref 0–1)
BUN SERPL-MCNC: 15 MG/DL (ref 7–26)
BUN/CREAT SERPL: 12 (ref 6–25)
CALCIUM SERPL-MCNC: 9.1 MG/DL (ref 8.4–10.2)
CHLORIDE SERPL-SCNC: 104 MMOL/L (ref 98–107)
CO2 SERPL-SCNC: 22 MMOL/L (ref 22–29)
DEPRECATED NEUTROPHILS # BLD AUTO: 5.8 10*3/UL (ref 2.1–6.9)
EGFRCR SERPLBLD CKD-EPI 2021: 44 ML/MIN (ref 60–?)
EOSINOPHIL # BLD AUTO: 0.2 10*3/UL (ref 0–0.4)
EOSINOPHIL NFR BLD AUTO: 2.8 % (ref 0–6)
ERYTHROCYTE [DISTWIDTH] IN CORD BLOOD: 16 % (ref 11.7–14.4)
GLUCOSE SERPLBLD-MCNC: 134 MG/DL (ref 74–118)
HCT VFR BLD AUTO: 28.8 % (ref 34.2–44.1)
HGB BLD-MCNC: 8.9 G/DL (ref 12–16)
LYMPHOCYTES # BLD: 1.6 10*3/UL (ref 1–3.2)
LYMPHOCYTES NFR BLD AUTO: 18.8 % (ref 18–39.1)
MCH RBC QN AUTO: 25.1 PG (ref 28–32)
MCHC RBC AUTO-ENTMCNC: 30.9 G/DL (ref 31–35)
MCV RBC AUTO: 81.1 FL (ref 81–99)
MONOCYTES # BLD AUTO: 0.7 10*3/UL (ref 0.2–0.8)
MONOCYTES NFR BLD AUTO: 8.1 % (ref 4.4–11.3)
NEUTS SEG NFR BLD AUTO: 69.7 % (ref 38.7–80)
PLATELET # BLD AUTO: 269 X10E3/UL (ref 140–360)
POTASSIUM SERPL-SCNC: 3.7 MMOL/L (ref 3.5–5.1)
RBC # BLD AUTO: 3.55 X10E6/UL (ref 3.6–5.1)
SODIUM SERPL-SCNC: 136 MMOL/L (ref 136–145)

## 2018-12-01 RX ADMIN — SODIUM CHLORIDE PRN MG: 900 INJECTION INTRAVENOUS at 23:48

## 2018-12-01 RX ADMIN — Medication SCH MG: at 17:21

## 2018-12-01 RX ADMIN — Medication PRN MG: at 02:53

## 2018-12-01 RX ADMIN — MEROPENEM SCH MLS/HR: 1 INJECTION INTRAVENOUS at 08:21

## 2018-12-01 RX ADMIN — SODIUM CHLORIDE SCH MLS/HR: 9 INJECTION, SOLUTION INTRAVENOUS at 11:26

## 2018-12-01 RX ADMIN — NEBIVOLOL HYDROCHLORIDE SCH MG: 10 TABLET ORAL at 09:47

## 2018-12-01 RX ADMIN — SODIUM CHLORIDE PRN MG: 900 INJECTION INTRAVENOUS at 02:53

## 2018-12-01 RX ADMIN — Medication SCH MG: at 17:22

## 2018-12-01 RX ADMIN — Medication PRN MG: at 14:48

## 2018-12-01 RX ADMIN — MEROPENEM SCH MLS/HR: 1 INJECTION INTRAVENOUS at 18:14

## 2018-12-01 RX ADMIN — Medication PRN MG: at 23:48

## 2018-12-01 NOTE — HISTORY AND PHYSICAL
CONSULTANT:  Dr. Goldy Rachel.



CHIEF COMPLAINT

1. Complicated urinary tract infection associated with fever.

2. Urinary retention.

3. Right nephrostomy tube.

4. Right ureteral stent.

5. Left hydronephrosis.



HISTORY:  This is a 53-year-old female, chronically ill with multiple 

recurrent infection due to indwelling Gomez catheter, but also has a 

nephrostomy tube along with right ureteral stent.  Patient has a long 

history of urinary problems due to her previous surgery for her pelvic 

cancer.  She did have radiation and chemotherapy.  Since then, she has a 

lot of scarring and had recurrent stent placement to the urinary tract 

system.  Patient is admitted for infection and meropenem was initiated.  

She has her right nephrostomy tube exchange, right stent exchange, and 

Gomez change as well.  The patient is stable now, continue antibiotic, 

awaiting for urine culture and sensitivity.



PAST MEDICAL HISTORY:  Extensive with urinary tract system infection and 

obstruction with percutaneous nephrostomy tube on the right, stent on the 

left and right as well with bilateral hydronephrosis with a long history of 

urinary retention.  Gomez catheter in place.



SOCIAL HISTORY:  Patient does not smoke or use alcohol.  No recreational 

drugs.



ALLERGIES:  TO NO KNOWN ALLERGIES.



HOME MEDICATIONS:  List will be available for review.



REVIEW OF SYSTEMS:  Right and left flank pain, low-grade fever.  Some 

nausea, but no chest pain or shortness of breath.



PHYSICAL EXAMINATION

VITAL SIGNS:  Temperature is 99.6, blood pressure 148/91, pulse rate 111, 

respirations 20.

GENERAL:  The patient is in no acute distress.  He is awake.

HEENT:  Normocephalic, atraumatic, and anicteric.

NECK:  Supple grossly.

PULMONARY:  Diminished breath sounds without any wheezing.

CARDIOVASCULAR:  Tachycardia.

ABDOMEN:  Soft.  Generalized tenderness.  Right nephrostomy tube.  Gomez 

catheter in place.

NEUROLOGIC:  No focal deficit.



LABORATORY DATA:  WBC is 10.0.  Hemoglobin 10.1, hematocrit 32.8, and 

platelets 326.  Urinalysis:  2+ protein, 2+ blood, 2+ leukocyte esterase 

greater than 50 wbc's.  Many bacteria.  Chemistry:  Sodium is 140, 

potassium 4.9, chloride 106, bicarb 22, BUN 19, and creatinine 1.33.  

Glucose 132.



IMPRESSION

1. Complicated urinary tract infection associated with Gomez catheter, 

nephrostomy tube, and stents.

2. Recurrent urinary bladder infection secondary to the above.

3. Acute on chronic urinary tract infection.



PLAN:  Continue with current treatment.  Microbiology is still pending.  

Patient will be on meropenem for now.  IV fluid rehydration.  Repeated lab 

workup.  Continue with home medications.  The patient will be admitted for 

treatment.







DD:  12/01/2018 09:39

DT:  12/01/2018 09:52

Job#:  [id number]

## 2018-12-02 VITALS — SYSTOLIC BLOOD PRESSURE: 132 MMHG | DIASTOLIC BLOOD PRESSURE: 59 MMHG

## 2018-12-02 VITALS — SYSTOLIC BLOOD PRESSURE: 128 MMHG | DIASTOLIC BLOOD PRESSURE: 68 MMHG

## 2018-12-02 VITALS — DIASTOLIC BLOOD PRESSURE: 72 MMHG | SYSTOLIC BLOOD PRESSURE: 123 MMHG

## 2018-12-02 VITALS — SYSTOLIC BLOOD PRESSURE: 167 MMHG | DIASTOLIC BLOOD PRESSURE: 78 MMHG

## 2018-12-02 VITALS — SYSTOLIC BLOOD PRESSURE: 113 MMHG | DIASTOLIC BLOOD PRESSURE: 63 MMHG

## 2018-12-02 VITALS — SYSTOLIC BLOOD PRESSURE: 129 MMHG | DIASTOLIC BLOOD PRESSURE: 62 MMHG

## 2018-12-02 LAB
ANION GAP SERPL CALC-SCNC: 9.2 MMOL/L (ref 8–16)
BASOPHILS # BLD AUTO: 0 10*3/UL (ref 0–0.1)
BASOPHILS NFR BLD AUTO: 0.5 % (ref 0–1)
BUN SERPL-MCNC: 14 MG/DL (ref 7–26)
BUN/CREAT SERPL: 11 (ref 6–25)
CALCIUM SERPL-MCNC: 9 MG/DL (ref 8.4–10.2)
CHLORIDE SERPL-SCNC: 108 MMOL/L (ref 98–107)
CO2 SERPL-SCNC: 26 MMOL/L (ref 22–29)
DEPRECATED NEUTROPHILS # BLD AUTO: 5.2 10*3/UL (ref 2.1–6.9)
EGFRCR SERPLBLD CKD-EPI 2021: 42 ML/MIN (ref 60–?)
EOSINOPHIL # BLD AUTO: 0.3 10*3/UL (ref 0–0.4)
EOSINOPHIL NFR BLD AUTO: 3.3 % (ref 0–6)
ERYTHROCYTE [DISTWIDTH] IN CORD BLOOD: 15.9 % (ref 11.7–14.4)
GLUCOSE SERPLBLD-MCNC: 134 MG/DL (ref 74–118)
HCT VFR BLD AUTO: 28.8 % (ref 34.2–44.1)
HGB BLD-MCNC: 8.6 G/DL (ref 12–16)
LYMPHOCYTES # BLD: 1.9 10*3/UL (ref 1–3.2)
LYMPHOCYTES NFR BLD AUTO: 23.8 % (ref 18–39.1)
MCH RBC QN AUTO: 24.4 PG (ref 28–32)
MCHC RBC AUTO-ENTMCNC: 29.9 G/DL (ref 31–35)
MCV RBC AUTO: 81.8 FL (ref 81–99)
MONOCYTES # BLD AUTO: 0.6 10*3/UL (ref 0.2–0.8)
MONOCYTES NFR BLD AUTO: 7.4 % (ref 4.4–11.3)
NEUTS SEG NFR BLD AUTO: 64.5 % (ref 38.7–80)
PLATELET # BLD AUTO: 270 X10E3/UL (ref 140–360)
POTASSIUM SERPL-SCNC: 4.2 MMOL/L (ref 3.5–5.1)
RBC # BLD AUTO: 3.52 X10E6/UL (ref 3.6–5.1)
SODIUM SERPL-SCNC: 139 MMOL/L (ref 136–145)

## 2018-12-02 RX ADMIN — Medication SCH MG: at 16:46

## 2018-12-02 RX ADMIN — SODIUM CHLORIDE SCH MLS/HR: 9 INJECTION, SOLUTION INTRAVENOUS at 01:14

## 2018-12-02 RX ADMIN — MEROPENEM SCH MLS/HR: 1 INJECTION INTRAVENOUS at 05:43

## 2018-12-02 RX ADMIN — Medication SCH MG: at 08:20

## 2018-12-02 RX ADMIN — MEROPENEM SCH MLS/HR: 1 INJECTION INTRAVENOUS at 18:06

## 2018-12-02 RX ADMIN — NEBIVOLOL HYDROCHLORIDE SCH MG: 10 TABLET ORAL at 08:20

## 2018-12-02 RX ADMIN — Medication PRN MG: at 15:48

## 2018-12-03 VITALS — SYSTOLIC BLOOD PRESSURE: 135 MMHG | DIASTOLIC BLOOD PRESSURE: 78 MMHG

## 2018-12-03 VITALS — DIASTOLIC BLOOD PRESSURE: 52 MMHG | SYSTOLIC BLOOD PRESSURE: 107 MMHG

## 2018-12-03 VITALS — DIASTOLIC BLOOD PRESSURE: 72 MMHG | SYSTOLIC BLOOD PRESSURE: 147 MMHG

## 2018-12-03 VITALS — DIASTOLIC BLOOD PRESSURE: 86 MMHG | SYSTOLIC BLOOD PRESSURE: 150 MMHG

## 2018-12-03 VITALS — SYSTOLIC BLOOD PRESSURE: 147 MMHG | DIASTOLIC BLOOD PRESSURE: 72 MMHG

## 2018-12-03 VITALS — DIASTOLIC BLOOD PRESSURE: 70 MMHG | SYSTOLIC BLOOD PRESSURE: 119 MMHG

## 2018-12-03 VITALS — DIASTOLIC BLOOD PRESSURE: 83 MMHG | SYSTOLIC BLOOD PRESSURE: 146 MMHG

## 2018-12-03 VITALS — SYSTOLIC BLOOD PRESSURE: 107 MMHG | DIASTOLIC BLOOD PRESSURE: 52 MMHG

## 2018-12-03 LAB
ANION GAP SERPL CALC-SCNC: 13 MMOL/L (ref 8–16)
BASOPHILS # BLD AUTO: 0 10*3/UL (ref 0–0.1)
BASOPHILS NFR BLD AUTO: 0.4 % (ref 0–1)
BUN SERPL-MCNC: 14 MG/DL (ref 7–26)
BUN/CREAT SERPL: 11 (ref 6–25)
CALCIUM SERPL-MCNC: 9.6 MG/DL (ref 8.4–10.2)
CHLORIDE SERPL-SCNC: 109 MMOL/L (ref 98–107)
CO2 SERPL-SCNC: 23 MMOL/L (ref 22–29)
DEPRECATED NEUTROPHILS # BLD AUTO: 6 10*3/UL (ref 2.1–6.9)
EGFRCR SERPLBLD CKD-EPI 2021: 45 ML/MIN (ref 60–?)
EOSINOPHIL # BLD AUTO: 0.3 10*3/UL (ref 0–0.4)
EOSINOPHIL NFR BLD AUTO: 2.6 % (ref 0–6)
ERYTHROCYTE [DISTWIDTH] IN CORD BLOOD: 15.6 % (ref 11.7–14.4)
GLUCOSE SERPLBLD-MCNC: 147 MG/DL (ref 74–118)
HCT VFR BLD AUTO: 32.7 % (ref 34.2–44.1)
HGB BLD-MCNC: 9.9 G/DL (ref 12–16)
LYMPHOCYTES # BLD: 3.1 10*3/UL (ref 1–3.2)
LYMPHOCYTES NFR BLD AUTO: 30.9 % (ref 18–39.1)
MCH RBC QN AUTO: 24.5 PG (ref 28–32)
MCHC RBC AUTO-ENTMCNC: 30.3 G/DL (ref 31–35)
MCV RBC AUTO: 80.9 FL (ref 81–99)
MONOCYTES # BLD AUTO: 0.5 10*3/UL (ref 0.2–0.8)
MONOCYTES NFR BLD AUTO: 5.3 % (ref 4.4–11.3)
NEUTS SEG NFR BLD AUTO: 60.4 % (ref 38.7–80)
PLATELET # BLD AUTO: 367 X10E3/UL (ref 140–360)
POTASSIUM SERPL-SCNC: 4 MMOL/L (ref 3.5–5.1)
RBC # BLD AUTO: 4.04 X10E6/UL (ref 3.6–5.1)
SODIUM SERPL-SCNC: 141 MMOL/L (ref 136–145)

## 2018-12-03 PROCEDURE — 0TP98DZ REMOVAL OF INTRALUMINAL DEVICE FROM URETER, VIA NATURAL OR ARTIFICIAL OPENING ENDOSCOPIC: ICD-10-PCS | Performed by: UROLOGY

## 2018-12-03 PROCEDURE — 0T778DZ DILATION OF LEFT URETER WITH INTRALUMINAL DEVICE, VIA NATURAL OR ARTIFICIAL OPENING ENDOSCOPIC: ICD-10-PCS | Performed by: UROLOGY

## 2018-12-03 PROCEDURE — BT1B1ZZ FLUOROSCOPY OF BLADDER AND URETHRA USING LOW OSMOLAR CONTRAST: ICD-10-PCS | Performed by: UROLOGY

## 2018-12-03 RX ADMIN — MEROPENEM SCH MLS/HR: 1 INJECTION INTRAVENOUS at 18:00

## 2018-12-03 RX ADMIN — MEROPENEM SCH MLS/HR: 1 INJECTION INTRAVENOUS at 05:45

## 2018-12-03 RX ADMIN — NEBIVOLOL HYDROCHLORIDE SCH MG: 10 TABLET ORAL at 10:00

## 2018-12-03 RX ADMIN — FLUCONAZOLE IN SODIUM CHLORIDE SCH MLS/HR: 2 INJECTION, SOLUTION INTRAVENOUS at 10:00

## 2018-12-03 RX ADMIN — Medication SCH MG: at 10:00

## 2018-12-03 RX ADMIN — SODIUM CHLORIDE PRN MG: 900 INJECTION INTRAVENOUS at 15:09

## 2018-12-03 RX ADMIN — Medication PRN MG: at 00:10

## 2018-12-03 RX ADMIN — Medication SCH MG: at 17:00

## 2018-12-03 RX ADMIN — SODIUM CHLORIDE PRN MG: 900 INJECTION INTRAVENOUS at 00:10

## 2018-12-03 RX ADMIN — Medication PRN MG: at 15:09

## 2018-12-03 RX ADMIN — SODIUM CHLORIDE PRN MG: 900 INJECTION INTRAVENOUS at 05:59

## 2018-12-04 VITALS — SYSTOLIC BLOOD PRESSURE: 122 MMHG | DIASTOLIC BLOOD PRESSURE: 58 MMHG

## 2018-12-04 VITALS — DIASTOLIC BLOOD PRESSURE: 59 MMHG | SYSTOLIC BLOOD PRESSURE: 124 MMHG

## 2018-12-04 VITALS — DIASTOLIC BLOOD PRESSURE: 58 MMHG | SYSTOLIC BLOOD PRESSURE: 108 MMHG

## 2018-12-04 VITALS — DIASTOLIC BLOOD PRESSURE: 54 MMHG | SYSTOLIC BLOOD PRESSURE: 110 MMHG

## 2018-12-04 VITALS — SYSTOLIC BLOOD PRESSURE: 137 MMHG | DIASTOLIC BLOOD PRESSURE: 95 MMHG

## 2018-12-04 VITALS — SYSTOLIC BLOOD PRESSURE: 132 MMHG | DIASTOLIC BLOOD PRESSURE: 70 MMHG

## 2018-12-04 LAB
ANION GAP SERPL CALC-SCNC: 11.2 MMOL/L (ref 8–16)
BASOPHILS # BLD AUTO: 0 10*3/UL (ref 0–0.1)
BASOPHILS NFR BLD AUTO: 0.3 % (ref 0–1)
BUN SERPL-MCNC: 16 MG/DL (ref 7–26)
BUN/CREAT SERPL: 14 (ref 6–25)
CALCIUM SERPL-MCNC: 9.4 MG/DL (ref 8.4–10.2)
CHLORIDE SERPL-SCNC: 105 MMOL/L (ref 98–107)
CO2 SERPL-SCNC: 26 MMOL/L (ref 22–29)
DEPRECATED NEUTROPHILS # BLD AUTO: 10.2 10*3/UL (ref 2.1–6.9)
EGFRCR SERPLBLD CKD-EPI 2021: 48 ML/MIN (ref 60–?)
EOSINOPHIL # BLD AUTO: 0 10*3/UL (ref 0–0.4)
EOSINOPHIL NFR BLD AUTO: 0.1 % (ref 0–6)
ERYTHROCYTE [DISTWIDTH] IN CORD BLOOD: 15.5 % (ref 11.7–14.4)
GLUCOSE SERPLBLD-MCNC: 246 MG/DL (ref 74–118)
HCT VFR BLD AUTO: 30.2 % (ref 34.2–44.1)
HGB BLD-MCNC: 9.4 G/DL (ref 12–16)
LYMPHOCYTES # BLD: 1.8 10*3/UL (ref 1–3.2)
LYMPHOCYTES NFR BLD AUTO: 13.9 % (ref 18–39.1)
MCH RBC QN AUTO: 25 PG (ref 28–32)
MCHC RBC AUTO-ENTMCNC: 31.1 G/DL (ref 31–35)
MCV RBC AUTO: 80.3 FL (ref 81–99)
MONOCYTES # BLD AUTO: 0.6 10*3/UL (ref 0.2–0.8)
MONOCYTES NFR BLD AUTO: 4.7 % (ref 4.4–11.3)
NEUTS SEG NFR BLD AUTO: 80 % (ref 38.7–80)
PLATELET # BLD AUTO: 336 X10E3/UL (ref 140–360)
POTASSIUM SERPL-SCNC: 4.2 MMOL/L (ref 3.5–5.1)
RBC # BLD AUTO: 3.76 X10E6/UL (ref 3.6–5.1)
SODIUM SERPL-SCNC: 138 MMOL/L (ref 136–145)

## 2018-12-04 RX ADMIN — NEBIVOLOL HYDROCHLORIDE SCH MG: 10 TABLET ORAL at 08:22

## 2018-12-04 RX ADMIN — MEROPENEM SCH MLS/HR: 1 INJECTION INTRAVENOUS at 18:16

## 2018-12-04 RX ADMIN — Medication SCH MG: at 08:22

## 2018-12-04 RX ADMIN — Medication PRN MG: at 06:10

## 2018-12-04 RX ADMIN — FLUCONAZOLE IN SODIUM CHLORIDE SCH MLS/HR: 2 INJECTION, SOLUTION INTRAVENOUS at 09:05

## 2018-12-04 RX ADMIN — Medication SCH MG: at 18:16

## 2018-12-04 RX ADMIN — MEROPENEM SCH MLS/HR: 1 INJECTION INTRAVENOUS at 07:15

## 2018-12-04 NOTE — OPERATIVE REPORT
DATE OF PROCEDURE:  December 03, 2018 

 

PREOPERATIVE DIAGNOSES

1. Left-sided hydronephrosis.

2. Left indwelling ureteral stent.  



POSTOPERATIVE DIAGNOSES 

1. Left indwelling ureteral stent.  

2. Left ureteral stricture.  

3. Left hydronephrosis due to stricture.

4. Atrophic (senile) vaginitis.



PROCEDURES PERFORMED

1. Cystourethroscopy with complicated removal of left indwelling 

ureteral stent (separate procedure performed with separate scope for the 

diagnosis of stent).  

2. Left ureteroscopy with dilation of ureteral stricture (separate 

procedure performed for the diagnosis of stricture).

3. Left cystourethroscopy with insertion of left indwelling ureteral 

stent times 2 (separate procedure performed to relieve the 

hydronephrosis).

4. Radiological services for supervision and interpretation of 

ureteroscopy.

5. Interpretation of retrograde ureteropyelography.

6. Supervision of fluoroscopy.  No radiologist present.

7. Pelvic examination under anesthesia.



ANESTHESIA:  General.  



COMPLICATIONS:  None. 



CLINICAL SUMMARY:  Savannah Jackson is a 53-year-old woman who 

underwent radiotherapy for a gynecological malignancy.  The patient 

developed severe right ureteral stricture that has failed stenting and is 

now managed with chronic nephrostomy tube.  The patient developed new 

left-sided hydronephrosis and underwent ureteral stenting and evaluation 

and was found to have vesicoureteric reflux.  She also has a very small 

bladder and immediately refluxes and has no bladder capacity.  This has 

probably caused some nephropathy.  The patient is brought to the operating 

room to remove her stent and evaluate the need for additional stenting.  

She is aware of the risks of bleeding, infection, injury to adjacent 

structures, need for additional procedures and elected to proceed.  The 

patient's urine culture only grew yeast.



OPERATIVE PROCEDURE IN DETAIL:  Informed consent was verified.  Savannah Jackson was properly identified, taken to the operating room, 

placed on the cystoscopy table in the supine position.  Anesthesia was 

uneventfully begun.  The patient was then carefully and gently repositioned 

in dorsal lithotomy position with all pressure points well padded.  Her 

genitalia were prepared and draped in the usual sterile fashion.  A 

22.5-Bangl[place]hi cystoscope sheath was inserted into the patient's urethra, and 

the bladder was drained.  Panendoscopy revealed a very small bladder with 

blanching and changes from radiation, but no suspicious lesions were 

identified.  A stent was emerging from the left ureteral orifice.  A 

guidewire was then placed alongside the stent and guided to the level of 

the patient's kidney.  The stent was then grasped, completely removed and 

discarded.  



Semirigid ureteroscope was placed alongside the guidewire and guided into 

the left ureter.  We identified a rather dense stricture.  We could not 

turn the ureteroscope in such a way as to dilate with the rigid 

ureteroscope.  Therefore, the ureteroscope was withdrawn.  A double-lumen 

ureteral catheter was then placed and utilized as a coaxial dilator.  Thus, 

we dilated this significant ureteral stricture that is new.  Flexible 

ureteroscope was then brought up over a guidewire into the patient's kidney 

where panendoscopy revealed no stones.  Maxwell plaques were present, but 

there were fungus balls.  We irrigated.  We aspirated approximately 80 mL 

of whitish, thick fluid consistent with candidal infection.  We then 

thoroughly irrigated out the kidney until no visible fungus balls remained. 

 A secondary guidewire was left in place.  



With cystoscopic and fluoroscopic guidance, 2 separate indwelling ureteral 

stents were then placed.  They were coiled in the patient's kidney as well 

as the patient's bladder.  The retaining sutures were removed.  



Interpretation of retrograde ureteropyelography:  Contrast was instilled in 

a retrograde fashion on the left-hand side via the ureteroscope and the 

double-lumen ureteral catheter.  There was chronic hydronephrosis.  The 

stents were in good position, coiled in the patient's kidney as well as the 

patient's bladder at the end of the case.  



The cystoscope was withdrawn.  The Gomez catheter was placed.  It was 

irrigated to and fro to ensure it worked properly.  Pelvic examination 

revealed severely atrophic vaginitis.  No abnormal palpable pelvic masses 

could be appreciated.  Patient was then uneventfully reversed from 

anesthesia and taken to the recovery room in stable condition.  



Explicit postoperative instructions were given.  Will follow the patient.  

Will also follow up on the urine culture we sent from inside the patient's 

left kidney.  







DD:  12/04/2018 08:57

DT:  12/04/2018 09:26

Job#:  [id number]

## 2018-12-04 NOTE — CONSULTATION
DATE OF CONSULTATION:    



REASON FOR CONSULTATION:  UTI.  



HISTORY OF PRESENT ILLNESS:  This patient who is well known to me from 

before.  She is a 53-year-old  female, history of recurrent 

UTI, history of right nephrostomy tube, history of stent placement.  The 

patient had history of UTI before.  She is coming because she is having a 

recurrent infection from a stent from a stone and chronic Gomez.  Patient 

has been seen by Dr. Goldy Rachel.  Multiple stent procedures.  She is 

admitting for fatigue and not feeling well.  In October, she had a 

cystogram with pelvic examination which showed that she had a left 

vesicoureteric reflux disease, left hydronephrosis, atrophic vaginitis.  

The patient had multiple pathogens before.  She was admitted and started on 

meropenem because she has been in the hospital several times, but the urine 

culture showing yeast, so infectious disease was consulted.  Patient is 

currently laying in bed comfortably.  No fever or chills since admission.  

She says she has been having pain on the left side, but she said that since 

they exchanged the stent, she is feeling better and the urine catheter is 

overall doing well.



PAST MEDICAL HISTORY:  Multiple UTIs, recurrent infection, multiple 

pathogens before.



PAST SURGICAL HISTORY:  Multiple stents replacement.



ALLERGIES:  NKA.



SOCIAL HISTORY:  There is no smoking, drug abuse, alcohol abuse.



FAMILY HISTORY:  Hypertension.



REVIEW OF SYSTEMS   

HEENT:  Negative.  

PULMONARY:  Negative. 

CARDIAC:  Negative. 

:  Negative.  

SKIN:  There are no other rashes.  



PHYSICAL EXAMINATION 

GENERAL:  She is currently alert, oriented.  Does not seem to be in acute 

distress.  

VITAL SIGNS:  Stable, currently afebrile.  

HEENT:  Normocephalic.  Not icteric.  

NECK:  Supple.  

CHEST:  Clear.  

HEART:  S1, S2.  No S3, S4, or murmur. 

ABDOMEN:  Soft.  Bowel sounds present.  No tenderness.  

EXTREMITIES:  No edema. 

SKIN:  No rash. 



LABORATORY DATA:  Reviewed.  White count 12.7, hemoglobin 9.4.  Sodium 138, 

potassium 4.2, creatinine of 1.18.  Urine culture showing Candida albicans. 

 



The patient underwent, on December 3rd, left indwelling ureteral stent 

placement, underwent cystoscopy, removal of the left indwelling ureteral 

stent, left ureteroscopy with dilatation of the ureteral stricture.  



Patient is currently lying in bed comfortably.



IMPRESSION:  Funguria urinary tract infection.  



Patient is doing well.  She is currently on meropenem and Diflucan.  We 

will keep on same while she is in the hospital, but I think we can 

discharge her with oral Diflucan for 3 weeks.  Follow up as an outpatient.  

Will follow.



 



DD:  12/04/2018 15:47

DT:  12/04/2018 15:58

Job#:  [id number]

## 2018-12-05 VITALS — SYSTOLIC BLOOD PRESSURE: 128 MMHG | DIASTOLIC BLOOD PRESSURE: 60 MMHG

## 2018-12-05 VITALS — DIASTOLIC BLOOD PRESSURE: 65 MMHG | SYSTOLIC BLOOD PRESSURE: 127 MMHG

## 2018-12-05 VITALS — SYSTOLIC BLOOD PRESSURE: 127 MMHG | DIASTOLIC BLOOD PRESSURE: 65 MMHG

## 2018-12-05 VITALS — SYSTOLIC BLOOD PRESSURE: 143 MMHG | DIASTOLIC BLOOD PRESSURE: 59 MMHG

## 2018-12-05 VITALS — SYSTOLIC BLOOD PRESSURE: 90 MMHG | DIASTOLIC BLOOD PRESSURE: 62 MMHG

## 2018-12-05 VITALS — SYSTOLIC BLOOD PRESSURE: 141 MMHG | DIASTOLIC BLOOD PRESSURE: 68 MMHG

## 2018-12-05 RX ADMIN — FLUCONAZOLE IN SODIUM CHLORIDE SCH MLS/HR: 2 INJECTION, SOLUTION INTRAVENOUS at 09:50

## 2018-12-05 RX ADMIN — Medication PRN MG: at 01:03

## 2018-12-05 RX ADMIN — Medication SCH MG: at 09:50

## 2018-12-05 RX ADMIN — NEBIVOLOL HYDROCHLORIDE SCH MG: 10 TABLET ORAL at 09:50

## 2018-12-05 RX ADMIN — MEROPENEM SCH MLS/HR: 1 INJECTION INTRAVENOUS at 06:35

## 2018-12-05 RX ADMIN — SODIUM CHLORIDE PRN MG: 900 INJECTION INTRAVENOUS at 01:03

## 2018-12-12 NOTE — DISCHARGE SUMMARY
CHIEF COMPLAINT:  Right flank pain.



FINAL DIAGNOSES

1. Sepsis/urinary tract infection.  

2. Urostomy.  

3. Hypertension.



DISPOSITION:  Home.



[age]-year-old female, history of hypertension, cervical CA, urinary 

calculus, brought to the ER with complaint of right flank pain.  She does 

have an onboard urostomy.  There has been no fever or chills.  No nausea or 

vomiting.  No chest pain or shortness of breath.  He is a historian of her 

right nephrectomy.



Noted in the ER that her urostomy is in place.  Further care and review was 

given.  The patient was admitted to the facility for treatment regarding a 

left flank pain, UTI, history of urostomy tube, hypertension, history of 

cervical CA.  Will be undergoing urine cultures, begin IV antibiotics.  

Continue home medications.  With her admission, she was being reviewed and 

followed by urology, Dr. Rachel, with issues of the urostomy tube and as 

well as the flank pain that she was having, the right ureteral stricture.



Laboratory studies were showing a potassium to be at 3.3.  White cell count 

was at 18,000.  Blood sugar was 210.  Hemoglobin was 9.5.  Gomez was placed 

on the patient.  Urine was showing Gram stain of gram-negative rods along 

with strep.  She was continued on IV fluids.  Continuing on pain medication 

of hydrocodone.  Antibiotics were consistent of meropenem.  White cell 

count improved to 7900.  Potassium trended down to 2.7.  She was receiving 

replenishment.  Her antibiotics continued.  Urine culture was showing 

evidence of Pseudomonas enterobacter, Enterococcus faecalis.  Vancomycin 

was being added to the meropenem.  



Potassium improved to 3.5.  Hemoglobin now at 9.2, white cell count still 

at 7900.  She was being given clearance for discharge.  She will be taken 

off her IV antibiotics.  She will be switched over to p.o. Cipro and she 

will continue her Gomez as an outpatient per Dr. Rachel and she was 

discharged home on 10/26/2018 in stable condition.



EKG shows sinus tachycardia, nonspecific T-wave abnormality.



With discharge, she will continue on her current diet.  She was discharged 

with her nephrostomy tube and Gomez catheter in place.  She was given a 

prescription for Cipro 250 mg 1 tablet p.o. b.i.d. #28.  Activity level as 

tolerated, by myself, as well as Dr. Rachel's explanations.  She will 

continue on:

1. Acetaminophen 650 every 4 hours needed for pain and temperature.  

2. Tylenol No. 3 two tablets every 6 hours as needed for pain.  

3. Diflucan 100 mg 1 tablet daily.  

4. Lisinopril 2.5 mg daily.  



As mentioned, she will be following back up with Dr. Rachel's office within 

1 to 2 weeks for further Gomez care and nephrostomy care. 





Dictated By:  FABIAN Heredia









DD:  12/12/2018 00:36

DT:  12/12/2018 01:17

Job#:  [id number]

## 2019-01-13 ENCOUNTER — HOSPITAL ENCOUNTER (EMERGENCY)
Dept: HOSPITAL 88 - ER | Age: 54
Discharge: HOME | End: 2019-01-13
Payer: COMMERCIAL

## 2019-01-13 VITALS — HEIGHT: 60 IN | BODY MASS INDEX: 36.91 KG/M2 | WEIGHT: 188 LBS

## 2019-01-13 DIAGNOSIS — E78.5: ICD-10-CM

## 2019-01-13 DIAGNOSIS — R10.30: Primary | ICD-10-CM

## 2019-01-13 DIAGNOSIS — I10: ICD-10-CM

## 2019-01-13 DIAGNOSIS — N30.91: ICD-10-CM

## 2019-01-13 DIAGNOSIS — F41.9: ICD-10-CM

## 2019-01-13 LAB
BACTERIA URNS QL MICRO: (no result) /HPF
BILIRUB UR QL: NEGATIVE
CLARITY UR: (no result)
COLOR UR: YELLOW
EPI CELLS URNS QL MICRO: (no result) /LPF
KETONES UR QL STRIP.AUTO: NEGATIVE
LEUKOCYTE ESTERASE UR QL STRIP.AUTO: (no result)
NITRITE UR QL STRIP.AUTO: NEGATIVE
PROT UR QL STRIP.AUTO: (no result)
SP GR UR STRIP: 1.03 (ref 1.01–1.02)
UROBILINOGEN UR STRIP-MCNC: 0.2 MG/DL (ref 0.2–1)
WBC #/AREA URNS HPF: (no result) /HPF (ref 0–5)

## 2019-01-13 PROCEDURE — 87086 URINE CULTURE/COLONY COUNT: CPT

## 2019-01-13 PROCEDURE — 87186 SC STD MICRODIL/AGAR DIL: CPT

## 2019-01-13 PROCEDURE — 99284 EMERGENCY DEPT VISIT MOD MDM: CPT

## 2019-01-13 PROCEDURE — 81001 URINALYSIS AUTO W/SCOPE: CPT

## 2019-01-30 ENCOUNTER — HOSPITAL ENCOUNTER (EMERGENCY)
Dept: HOSPITAL 88 - ER | Age: 54
Discharge: HOME | End: 2019-01-30
Payer: COMMERCIAL

## 2019-01-30 VITALS — BODY MASS INDEX: 36.91 KG/M2 | WEIGHT: 188 LBS | HEIGHT: 60 IN

## 2019-01-30 VITALS — DIASTOLIC BLOOD PRESSURE: 92 MMHG | SYSTOLIC BLOOD PRESSURE: 164 MMHG

## 2019-01-30 DIAGNOSIS — R30.0: Primary | ICD-10-CM

## 2019-01-30 DIAGNOSIS — M54.5: ICD-10-CM

## 2019-01-30 DIAGNOSIS — N30.91: ICD-10-CM

## 2019-01-30 LAB
ALBUMIN SERPL-MCNC: 3.4 G/DL (ref 3.5–5)
ALBUMIN/GLOB SERPL: 0.8 {RATIO} (ref 0.8–2)
ALP SERPL-CCNC: 98 IU/L (ref 40–150)
ALT SERPL-CCNC: 20 IU/L (ref 0–55)
ANION GAP SERPL CALC-SCNC: 15.5 MMOL/L (ref 8–16)
BACTERIA URNS QL MICRO: (no result) /HPF
BASOPHILS # BLD AUTO: 0.1 10*3/UL (ref 0–0.1)
BASOPHILS NFR BLD AUTO: 0.5 % (ref 0–1)
BILIRUB UR QL: NEGATIVE
BUN SERPL-MCNC: 20 MG/DL (ref 7–26)
BUN/CREAT SERPL: 18 (ref 6–25)
CALCIUM SERPL-MCNC: 9.3 MG/DL (ref 8.4–10.2)
CHLORIDE SERPL-SCNC: 102 MMOL/L (ref 98–107)
CLARITY UR: (no result)
CO2 SERPL-SCNC: 22 MMOL/L (ref 22–29)
COLOR UR: (no result)
DEPRECATED NEUTROPHILS # BLD AUTO: 8.4 10*3/UL (ref 2.1–6.9)
EGFRCR SERPLBLD CKD-EPI 2021: 49 ML/MIN (ref 60–?)
EOSINOPHIL # BLD AUTO: 0.1 10*3/UL (ref 0–0.4)
EOSINOPHIL NFR BLD AUTO: 1 % (ref 0–6)
EPI CELLS URNS QL MICRO: (no result) /LPF
ERYTHROCYTE [DISTWIDTH] IN CORD BLOOD: 16.8 % (ref 11.7–14.4)
GLOBULIN PLAS-MCNC: 4.3 G/DL (ref 2.3–3.5)
GLUCOSE SERPLBLD-MCNC: 162 MG/DL (ref 74–118)
HCT VFR BLD AUTO: 32.6 % (ref 34.2–44.1)
HGB BLD-MCNC: 10.2 G/DL (ref 12–16)
KETONES UR QL STRIP.AUTO: NEGATIVE
LEUKOCYTE ESTERASE UR QL STRIP.AUTO: (no result)
LYMPHOCYTES # BLD: 1.8 10*3/UL (ref 1–3.2)
LYMPHOCYTES NFR BLD AUTO: 16.4 % (ref 18–39.1)
MCH RBC QN AUTO: 25.3 PG (ref 28–32)
MCHC RBC AUTO-ENTMCNC: 31.3 G/DL (ref 31–35)
MCV RBC AUTO: 80.9 FL (ref 81–99)
MONOCYTES # BLD AUTO: 0.6 10*3/UL (ref 0.2–0.8)
MONOCYTES NFR BLD AUTO: 5.6 % (ref 4.4–11.3)
NEUTS SEG NFR BLD AUTO: 76.2 % (ref 38.7–80)
NITRITE UR QL STRIP.AUTO: NEGATIVE
PLATELET # BLD AUTO: 289 X10E3/UL (ref 140–360)
POTASSIUM SERPL-SCNC: 3.5 MMOL/L (ref 3.5–5.1)
PROT UR QL STRIP.AUTO: (no result)
RBC # BLD AUTO: 4.03 X10E6/UL (ref 3.6–5.1)
SODIUM SERPL-SCNC: 136 MMOL/L (ref 136–145)
SP GR UR STRIP: 1.02 (ref 1.01–1.02)
UROBILINOGEN UR STRIP-MCNC: 0.2 MG/DL (ref 0.2–1)

## 2019-01-30 PROCEDURE — 80053 COMPREHEN METABOLIC PANEL: CPT

## 2019-01-30 PROCEDURE — 99284 EMERGENCY DEPT VISIT MOD MDM: CPT

## 2019-01-30 PROCEDURE — 74176 CT ABD & PELVIS W/O CONTRAST: CPT

## 2019-01-30 PROCEDURE — 85025 COMPLETE CBC W/AUTO DIFF WBC: CPT

## 2019-01-30 PROCEDURE — 36415 COLL VENOUS BLD VENIPUNCTURE: CPT

## 2019-01-30 PROCEDURE — 81001 URINALYSIS AUTO W/SCOPE: CPT

## 2019-01-30 NOTE — DIAGNOSTIC IMAGING REPORT
EXAMINATION: CT of the abdomen and pelvis without contrast.

 

TECHNIQUE: Spiral CT images of the abdomen and pelvis were performed from the

lung bases to the lesser trochanters.  No intravenous contrast was given per

renal stone protocol.  Coronal and sagittal reformatted images were obtained.



COMPARISON:  None.



CLINICAL HISTORY:Nephrostomy tubes, catheters obstruction

     

DISCUSSION: ABSENCE OF INTRAVENOUS CONTRAST DECREASES SENSITIVITY FOR DETECTION

OF FOCAL LESIONS AND VASCULAR PATHOLOGY.



ABDOMEN/PELVIS:



LOWER THORAX: Unremarkable. 



HEPATOBILIARY:No focal hepatic lesions. Calcified gallstone.



SPLEEN: No splenomegaly.



PANCREAS: No focal masses or ductal dilatation.



ADRENALS: No adrenal nodules.



KIDNEYS/URETERS: Right percutaneous nephrostomy tube. No obstruction. 



2 left ureteral stents extending from the renal pelvis to the bladder. Left

hydronephrosis. In the inferior pole 3 small calculi measuring up to 3 mm.



PELVIC ORGANS/BLADDER: Gomez catheter. Bladder is decompressed. Hysterectomy.



PERITONEUM/RETROPERITONEUM: No free air or fluid.



LYMPH NODES: No intra-abdominal,retroperitoneal, pelvic or inguinal

lymphadenopathy.



VESSELS: Vascular calcifications.



GI TRACT: No distention or wall thickening.



BONES AND SOFT TISSUES: No bony destructive lesions.  No soft tissue

abnormalities.  



IMPRESSION: 



Left hydronephrosis despite 2 left renal stents present.



Small nonobstructing left inferior pole renal calculi measuring up to 3 mm



Right percutaneous nephrostomy tube. No obstruction.



Gomez catheter within the decompressed bladder.



Cholelithiasis.



Signed by: Dr. Andrew Palisch, M.D. on 1/30/2019 2:10 PM

## 2019-02-14 LAB
ANION GAP SERPL CALC-SCNC: 17.8 MMOL/L (ref 8–16)
BASOPHILS # BLD AUTO: 0.1 10*3/UL (ref 0–0.1)
BASOPHILS NFR BLD AUTO: 0.5 % (ref 0–1)
BUN SERPL-MCNC: 17 MG/DL (ref 7–26)
BUN/CREAT SERPL: 16 (ref 6–25)
CALCIUM SERPL-MCNC: 9.5 MG/DL (ref 8.4–10.2)
CHLORIDE SERPL-SCNC: 103 MMOL/L (ref 98–107)
CO2 SERPL-SCNC: 19 MMOL/L (ref 22–29)
DEPRECATED NEUTROPHILS # BLD AUTO: 7.2 10*3/UL (ref 2.1–6.9)
EGFRCR SERPLBLD CKD-EPI 2021: 54 ML/MIN (ref 60–?)
EOSINOPHIL # BLD AUTO: 0.1 10*3/UL (ref 0–0.4)
EOSINOPHIL NFR BLD AUTO: 1.1 % (ref 0–6)
ERYTHROCYTE [DISTWIDTH] IN CORD BLOOD: 15.9 % (ref 11.7–14.4)
GLUCOSE SERPLBLD-MCNC: 155 MG/DL (ref 74–118)
HCT VFR BLD AUTO: 33.2 % (ref 34.2–44.1)
HGB BLD-MCNC: 10.3 G/DL (ref 12–16)
LYMPHOCYTES # BLD: 1.9 10*3/UL (ref 1–3.2)
LYMPHOCYTES NFR BLD AUTO: 19.3 % (ref 18–39.1)
MCH RBC QN AUTO: 25.8 PG (ref 28–32)
MCHC RBC AUTO-ENTMCNC: 31 G/DL (ref 31–35)
MCV RBC AUTO: 83.2 FL (ref 81–99)
MONOCYTES # BLD AUTO: 0.5 10*3/UL (ref 0.2–0.8)
MONOCYTES NFR BLD AUTO: 5.2 % (ref 4.4–11.3)
NEUTS SEG NFR BLD AUTO: 73.5 % (ref 38.7–80)
PLATELET # BLD AUTO: 255 X10E3/UL (ref 140–360)
POTASSIUM SERPL-SCNC: 3.8 MMOL/L (ref 3.5–5.1)
RBC # BLD AUTO: 3.99 X10E6/UL (ref 3.6–5.1)
SODIUM SERPL-SCNC: 136 MMOL/L (ref 136–145)

## 2019-02-14 NOTE — DIAGNOSTIC IMAGING REPORT
Exam: Abdominal film 



Clinical History: Nephrolithiasis



Comparison: CT abdomen and pelvis 1/30/2019



DISCUSSION: Right percutaneous nephrostomy and parallel left internal ureteral

stents are again noted. Positions are unchanged relative to  tomogram from

comparison CT. Pelvic surgical clips are unchanged. No calcifications project

over the renal shadows or expected ureteral courses. Calcification in the

dependent portion of the dilated left collecting system seen on the comparison

CT is not appreciated by plain radiography.



Bowel gas pattern is nonobstructive. Regional skeletal structures are intact.

Calcified gallstone projects over the right upper quadrant.



IMPRESSION:



Right percutaneous nephrostomy catheter and parallel left internal ureteral

stents are unchanged in position. Left renal calculus seen on the comparison CT

is not identified by plain radiography.









Signed by: Dr. Zeus Gonzalez M.D. on 2/14/2019 2:47 PM

## 2019-02-18 ENCOUNTER — HOSPITAL ENCOUNTER (EMERGENCY)
Dept: HOSPITAL 88 - FSED | Age: 54
Discharge: HOME | End: 2019-02-18
Payer: COMMERCIAL

## 2019-02-18 VITALS — HEIGHT: 60 IN | BODY MASS INDEX: 36.91 KG/M2 | WEIGHT: 188 LBS

## 2019-02-18 DIAGNOSIS — N30.91: ICD-10-CM

## 2019-02-18 DIAGNOSIS — R30.0: Primary | ICD-10-CM

## 2019-02-18 DIAGNOSIS — Z85.41: ICD-10-CM

## 2019-02-18 DIAGNOSIS — I10: ICD-10-CM

## 2019-02-18 DIAGNOSIS — N12: ICD-10-CM

## 2019-02-18 PROCEDURE — 87086 URINE CULTURE/COLONY COUNT: CPT

## 2019-02-18 PROCEDURE — 99283 EMERGENCY DEPT VISIT LOW MDM: CPT

## 2019-02-18 PROCEDURE — 81003 URINALYSIS AUTO W/O SCOPE: CPT

## 2019-02-18 PROCEDURE — 87186 SC STD MICRODIL/AGAR DIL: CPT

## 2019-02-18 NOTE — NUR
RT NEPHROSTOMY TUBE OPENED AND ALLOWED APPROX 3 CC DRAIN OUT AND THEN DRIPPED 
CLEAN STERILE SPECIMEN OBTAINED AND RAN AND PUT INTO GREY CX TUBE AND LABELED 
AND BIOBAG AND INTO FRIDGE.

## 2019-02-27 ENCOUNTER — HOSPITAL ENCOUNTER (OUTPATIENT)
Dept: HOSPITAL 88 - OR | Age: 54
Discharge: HOME | End: 2019-02-27
Attending: UROLOGY
Payer: COMMERCIAL

## 2019-02-27 VITALS — DIASTOLIC BLOOD PRESSURE: 88 MMHG | SYSTOLIC BLOOD PRESSURE: 170 MMHG

## 2019-02-27 DIAGNOSIS — Z01.810: ICD-10-CM

## 2019-02-27 DIAGNOSIS — N31.9: ICD-10-CM

## 2019-02-27 DIAGNOSIS — Z92.3: ICD-10-CM

## 2019-02-27 DIAGNOSIS — N20.0: Primary | ICD-10-CM

## 2019-02-27 DIAGNOSIS — E11.9: ICD-10-CM

## 2019-02-27 DIAGNOSIS — N81.6: ICD-10-CM

## 2019-02-27 DIAGNOSIS — Z46.6: ICD-10-CM

## 2019-02-27 DIAGNOSIS — Z01.812: ICD-10-CM

## 2019-02-27 DIAGNOSIS — C53.9: ICD-10-CM

## 2019-02-27 DIAGNOSIS — F32.9: ICD-10-CM

## 2019-02-27 DIAGNOSIS — Z92.21: ICD-10-CM

## 2019-02-27 DIAGNOSIS — N81.10: ICD-10-CM

## 2019-02-27 DIAGNOSIS — F41.9: ICD-10-CM

## 2019-02-27 DIAGNOSIS — N95.2: ICD-10-CM

## 2019-02-27 DIAGNOSIS — N13.1: ICD-10-CM
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
## 2019-02-27 DIAGNOSIS — Z87.440: ICD-10-CM

## 2019-02-27 DIAGNOSIS — I10: ICD-10-CM

## 2019-02-27 DIAGNOSIS — T19.1XXA: ICD-10-CM

## 2019-02-27 DIAGNOSIS — Z93.6: ICD-10-CM

## 2019-02-27 DIAGNOSIS — D64.9: ICD-10-CM

## 2019-02-27 PROCEDURE — 74420 UROGRAPHY RTRGR +-KUB: CPT

## 2019-02-27 PROCEDURE — 74018 RADEX ABDOMEN 1 VIEW: CPT

## 2019-02-27 PROCEDURE — 87186 SC STD MICRODIL/AGAR DIL: CPT

## 2019-02-27 PROCEDURE — 85025 COMPLETE CBC W/AUTO DIFF WBC: CPT

## 2019-02-27 PROCEDURE — 93005 ELECTROCARDIOGRAM TRACING: CPT

## 2019-02-27 PROCEDURE — 80048 BASIC METABOLIC PNL TOTAL CA: CPT

## 2019-02-27 PROCEDURE — 52332 CYSTOSCOPY AND TREATMENT: CPT

## 2019-02-27 PROCEDURE — 36415 COLL VENOUS BLD VENIPUNCTURE: CPT

## 2019-02-27 PROCEDURE — 52344 CYSTO/URETERO STRICTURE TX: CPT

## 2019-02-27 PROCEDURE — 87086 URINE CULTURE/COLONY COUNT: CPT

## 2019-03-04 NOTE — OPERATIVE REPORT
DATE OF PROCEDURE:  02/27/2019

 

SURGEON:  Goldy Rachel MD

 

PREOPERATIVE DIAGNOSES:  

1. Left ureteral stricture.

2. Left nephrolithiasis.

3. Left hydronephrosis due to stricture.

4. Left indwelling ureteral stents.

5. Cystocele.

6. Rectocele.

7. Atrophic vaginitis.

8. Neurogenic bladder.

 

POSTOPERATIVE DIAGNOSES:  

1. Left ureteral stricture.

2. Left nephrolithiasis.

3. Left hydronephrosis due to stricture.

4. Left indwelling ureteral stents.

5. Cystocele.

6. Rectocele.

7. Atrophic vaginitis.

8. Neurogenic bladder.

 

OPERATIONS PERFORMED:  

1. Cystourethroscopy with complicated removal of left indwelling ureteral stents

(separate procedure performed for the diagnosis of stents). 

2. Left cystourethroscopy with ureteroscopy with left ureteral stricture dilation

(separate procedure performed for the diagnosis of stricture). 

3. Cystourethroscopy with ureteroscopy with stone manipulation (separate procedure

performed for the nephrolithiasis). 

4. Radiological services for supervision and interpretation of ureteroscopy.

5. Interpretation of retrograde ureteropyelography.

6. Supervision of fluoroscopy, no radiologist present.

7. Interpretation of cystography.

8. Cystourethroscopy with insertion of 2 separate left-sided indwelling ureteral stents

(separate procedure performed to relieve the extensive hydronephrosis). 

9. Injection procedure for cystography.

10. Pelvic examination under anesthesia.

 

ANESTHESIA:  General.

 

COMPLICATIONS:  None.

 

CLINICAL SUMMARY:  Savannah Jackson is a complicated 53-year-old woman, who has

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

The patient has a chronic right-sided ureteral stricture that has failed ureteral

stenting and is managed with a chronic nephrostomy tube.  The left side has ureteral

stricture, it is managed with 2 separate ureteral stents, both on the same side.  She

has had recurrent urinary tract infections.  She also has a neurogenic bladder that is

extremely small with very prompt reflux and to protect her deteriorating left kidney,

she is to manage with a chronic Gomez catheter.  She is aware of the risks of bleeding,

infection, injury to adjacent structures, need for additional procedures, and elected to

proceed. 

 

OPERATIVE PROCEDURE IN DETAIL:  Informed consent was verified.  Savannah Jackson was

properly identified, taken to the operating room, placed on the cystoscopy table in

supine position.  Anesthesia was uneventfully begun.  The patient was then carefully and

gently repositioned in dorsal lithotomy position with all pressure points well padded.

Her genitalia were prepared and draped in usual sterile fashion.  The cystoscope sheath

was inserted under direct vision into the patient's bladder.  Panendoscopy revealed 2

hairs in the bladder.  These hair were grasped with graspers and extracted.  It is

unclear how the hair ended up in the patient's bladder.  We presume it is from ureteral

catheterization by the emergency room at her last emergency room visit.  Two stents were

emerging from the left ureteral orifice.  There was also appeared to be a small

diverticulum near the dome of the bladder.  A guidewire was then placed into the left

ureter and guided to the level of the patient's kidney.  The stents were then grasped,

completely removed, and discarded. 

 

Semi-rigid ureteroscope was then placed alongside the guidewire and guided into the left

ureter.  In the left ureter, the patient had a very dense ureteral stricture at the mid

and distal ureter.  We then dilated this stricture utilizing a flexible ureteroscopy

sheath of 13-Malawian and 15-Malawian diameters.  We progressively dilated the stricture to

15-Malawian in size and allowed the dilator stay in position for a few moments.  We then

removed it and then we were able to place a secondary guidewire as well.  Flexible

ureteroscope was then brought up over the guidewire and into the patient's bladder.  A

panendoscopy revealed Maxwell's plaques throughout the patient's left kidney and there

was some fine sand.  We vigorously irrigated the stone fragments to dislodge them from

the patient's renal mucosa.  These stones were all small and should be passable.  They

were too small to grasp with a Nitinol tipless basket.  We carefully re-examined the

ureter as we exited leading to 2 wires in place. 

 

With cystoscopic and fluoroscopic guidance, 2 separate left-sided indwelling ureteral

stents were then placed to coil the patient's kidneys as well as the patient's bladder.

The retaining sutures were cut short. 

 

Interpretation of Retrograde Ureteropyelography:  Contrast was instilled in a retrograde

fashion bilaterally.  There was chronic-appearing type of left-sided hydronephrosis.

The stents were in good position.  We coiled the patient's kidneys as well as the

patient's bladder at the end of the case. 

 

We then studied that diverticulum at the dome of the bladder by injecting it with a

ureteral catheter.  There was no evidence of fistulous tract. 

 

The interpretation of cystography:  Cystography was performed.  There was no evidence of

fistulous tract from the bladder.  There were what appeared to be significant

trabeculations noted upon cystography. 

 

The patient's bladder was drained, cystoscope withdrawn, the Gomez catheter was placed

and she was uneventfully reversed from the anesthesia and taken to the recovery room in

stable condition.  There were no complications to the procedure.  She tolerated the

procedure well. 

Explicit postoperative instructions were given.  We will follow up with the patient up

in approximately 1 month to change the Gomez catheter. 

 

 

 

______________________________

Goldy MD PIERRE Rachel/MALORIE

D:  03/03/2019 19:33:45

T:  03/04/2019 03:56:10

Job #:  [id number]

## 2019-03-28 ENCOUNTER — HOSPITAL ENCOUNTER (INPATIENT)
Dept: HOSPITAL 88 - ER | Age: 54
LOS: 4 days | Discharge: HOME | DRG: 690 | End: 2019-04-01
Attending: INTERNAL MEDICINE | Admitting: INTERNAL MEDICINE
Payer: COMMERCIAL

## 2019-03-28 VITALS — DIASTOLIC BLOOD PRESSURE: 77 MMHG | SYSTOLIC BLOOD PRESSURE: 159 MMHG

## 2019-03-28 VITALS — WEIGHT: 199.12 LBS | BODY MASS INDEX: 39.09 KG/M2 | HEIGHT: 60 IN

## 2019-03-28 VITALS — SYSTOLIC BLOOD PRESSURE: 159 MMHG | DIASTOLIC BLOOD PRESSURE: 77 MMHG

## 2019-03-28 VITALS — DIASTOLIC BLOOD PRESSURE: 65 MMHG | SYSTOLIC BLOOD PRESSURE: 133 MMHG

## 2019-03-28 DIAGNOSIS — R31.29: ICD-10-CM

## 2019-03-28 DIAGNOSIS — N39.0: Primary | ICD-10-CM

## 2019-03-28 DIAGNOSIS — E11.9: ICD-10-CM

## 2019-03-28 DIAGNOSIS — E66.9: ICD-10-CM

## 2019-03-28 DIAGNOSIS — E87.6: ICD-10-CM

## 2019-03-28 DIAGNOSIS — Z85.89: ICD-10-CM

## 2019-03-28 DIAGNOSIS — B96.5: ICD-10-CM

## 2019-03-28 DIAGNOSIS — Z16.24: ICD-10-CM

## 2019-03-28 DIAGNOSIS — N99.528: ICD-10-CM

## 2019-03-28 DIAGNOSIS — D64.9: ICD-10-CM

## 2019-03-28 DIAGNOSIS — R32: ICD-10-CM

## 2019-03-28 DIAGNOSIS — N13.30: ICD-10-CM

## 2019-03-28 LAB
ALBUMIN SERPL-MCNC: 3.4 G/DL (ref 3.5–5)
ALBUMIN/GLOB SERPL: 0.6 {RATIO} (ref 0.8–2)
ALP SERPL-CCNC: 107 IU/L (ref 40–150)
ALT SERPL-CCNC: 17 IU/L (ref 0–55)
ANION GAP SERPL CALC-SCNC: 13.5 MMOL/L (ref 8–16)
BACTERIA URNS QL MICRO: (no result) /HPF
BACTERIA URNS QL MICRO: (no result) /HPF
BASOPHILS # BLD AUTO: 0 10*3/UL (ref 0–0.1)
BASOPHILS NFR BLD AUTO: 0.3 % (ref 0–1)
BILIRUB UR QL: NEGATIVE
BILIRUB UR QL: NEGATIVE
BUN SERPL-MCNC: 14 MG/DL (ref 7–26)
BUN/CREAT SERPL: 12 (ref 6–25)
CALCIUM SERPL-MCNC: 9.9 MG/DL (ref 8.4–10.2)
CHLORIDE SERPL-SCNC: 102 MMOL/L (ref 98–107)
CLARITY UR: (no result)
CLARITY UR: (no result)
CO2 SERPL-SCNC: 25 MMOL/L (ref 22–29)
COLOR UR: YELLOW
COLOR UR: YELLOW
DEPRECATED NEUTROPHILS # BLD AUTO: 6.5 10*3/UL (ref 2.1–6.9)
DEPRECATED RBC URNS MANUAL-ACNC: (no result) /HPF (ref 0–5)
EGFRCR SERPLBLD CKD-EPI 2021: 47 ML/MIN (ref 60–?)
EOSINOPHIL # BLD AUTO: 0.1 10*3/UL (ref 0–0.4)
EOSINOPHIL NFR BLD AUTO: 1.1 % (ref 0–6)
EPI CELLS URNS QL MICRO: (no result) /LPF
EPI CELLS URNS QL MICRO: (no result) /LPF
ERYTHROCYTE [DISTWIDTH] IN CORD BLOOD: 14.4 % (ref 11.7–14.4)
GLOBULIN PLAS-MCNC: 5.5 G/DL (ref 2.3–3.5)
GLUCOSE SERPLBLD-MCNC: 179 MG/DL (ref 74–118)
HCT VFR BLD AUTO: 33.8 % (ref 34.2–44.1)
HGB BLD-MCNC: 10.3 G/DL (ref 12–16)
KETONES UR QL STRIP.AUTO: NEGATIVE
KETONES UR QL STRIP.AUTO: NEGATIVE
LEUKOCYTE ESTERASE UR QL STRIP.AUTO: (no result)
LEUKOCYTE ESTERASE UR QL STRIP.AUTO: (no result)
LYMPHOCYTES # BLD: 2.2 10*3/UL (ref 1–3.2)
LYMPHOCYTES NFR BLD AUTO: 23.4 % (ref 18–39.1)
MCH RBC QN AUTO: 25.6 PG (ref 28–32)
MCHC RBC AUTO-ENTMCNC: 30.5 G/DL (ref 31–35)
MCV RBC AUTO: 83.9 FL (ref 81–99)
MONOCYTES # BLD AUTO: 0.4 10*3/UL (ref 0.2–0.8)
MONOCYTES NFR BLD AUTO: 4.8 % (ref 4.4–11.3)
NEUTS SEG NFR BLD AUTO: 70.1 % (ref 38.7–80)
NITRITE UR QL STRIP.AUTO: POSITIVE
NITRITE UR QL STRIP.AUTO: POSITIVE
PLATELET # BLD AUTO: 333 X10E3/UL (ref 140–360)
POTASSIUM SERPL-SCNC: 3.5 MMOL/L (ref 3.5–5.1)
PROT UR QL STRIP.AUTO: (no result)
PROT UR QL STRIP.AUTO: (no result)
RBC # BLD AUTO: 4.03 X10E6/UL (ref 3.6–5.1)
SODIUM SERPL-SCNC: 137 MMOL/L (ref 136–145)
SP GR UR STRIP: 1.01 (ref 1.01–1.02)
SP GR UR STRIP: 1.02 (ref 1.01–1.02)
UROBILINOGEN UR STRIP-MCNC: 0.2 MG/DL (ref 0.2–1)
UROBILINOGEN UR STRIP-MCNC: 0.2 MG/DL (ref 0.2–1)
WBC #/AREA URNS HPF: (no result) /HPF (ref 0–5)

## 2019-03-28 PROCEDURE — 84702 CHORIONIC GONADOTROPIN TEST: CPT

## 2019-03-28 PROCEDURE — 80048 BASIC METABOLIC PNL TOTAL CA: CPT

## 2019-03-28 PROCEDURE — 50435 EXCHANGE NEPHROSTOMY CATH: CPT

## 2019-03-28 PROCEDURE — 74470 X-RAY EXAM OF KIDNEY LESION: CPT

## 2019-03-28 PROCEDURE — 81001 URINALYSIS AUTO W/SCOPE: CPT

## 2019-03-28 PROCEDURE — 82948 REAGENT STRIP/BLOOD GLUCOSE: CPT

## 2019-03-28 PROCEDURE — 87040 BLOOD CULTURE FOR BACTERIA: CPT

## 2019-03-28 PROCEDURE — 36415 COLL VENOUS BLD VENIPUNCTURE: CPT

## 2019-03-28 PROCEDURE — 99284 EMERGENCY DEPT VISIT MOD MDM: CPT

## 2019-03-28 PROCEDURE — 74018 RADEX ABDOMEN 1 VIEW: CPT

## 2019-03-28 PROCEDURE — 85025 COMPLETE CBC W/AUTO DIFF WBC: CPT

## 2019-03-28 PROCEDURE — 80053 COMPREHEN METABOLIC PANEL: CPT

## 2019-03-28 PROCEDURE — 87086 URINE CULTURE/COLONY COUNT: CPT

## 2019-03-28 PROCEDURE — 87186 SC STD MICRODIL/AGAR DIL: CPT

## 2019-03-28 RX ADMIN — SODIUM CHLORIDE SCH MLS/HR: 9 INJECTION, SOLUTION INTRAVENOUS at 22:32

## 2019-03-28 RX ADMIN — SODIUM CHLORIDE SCH MLS/HR: 9 INJECTION, SOLUTION INTRAVENOUS at 12:43

## 2019-03-28 RX ADMIN — HYDROCODONE BITARTRATE AND ACETAMINOPHEN PRN EA: 10; 325 TABLET ORAL at 19:57

## 2019-03-28 RX ADMIN — CEFEPIME HYDROCHLORIDE SCH MLS/HR: 1 INJECTION, POWDER, FOR SOLUTION INTRAMUSCULAR; INTRAVENOUS at 17:34

## 2019-03-28 NOTE — DIAGNOSTIC IMAGING REPORT
Exam: KUB.



Clinical History: Nephrostomy placement.



Comparison: 2/14/2019. CT scan 1/30/2019.



Findings: Right percutaneous nephrostomy and parallel left internal ureteral

stents are again noted.  Pelvic surgical clips are unchanged. No calcifications

project over the renal shadows or expected ureteral courses. Calcification in

the dependent portion of the dilated left collecting system seen on the

comparison CT is not appreciated by plain radiography.



Bowel gas pattern is nonobstructive. Regional skeletal structures are intact.

Calcified gallstone projects over the right upper quadrant.



IMPRESSION:



Right percutaneous nephrostomy catheter and parallel left internal ureteral

stents are again seen. Left renal calculus seen on the comparison CT is not

identified by plain radiography.



Signed by: Dr. Negrito Del Castillo M.D. on 3/28/2019 12:58 PM

## 2019-03-28 NOTE — NUR
RIGHT NEPHROSTOMY TUBE DRAINED 150CC

NOLASCO CATH DRAINED 200 CC

CHANGED LEG BAG TO BEDSIDE DRAINAGE

## 2019-03-28 NOTE — NUR
PATIENT IS REQUESTING PAIN MEDICATION, NO MEDICATIONS AVAILABLE. PAGED DR. PEMBERTON FOR PAIN 
MEDICATION.

## 2019-03-28 NOTE — NUR
RECEIVED PATIENT FROM ER TO ROOM 285, SHE IS IN STABLE CONDITION. PAIN AT A TOLERABLE LEVEL 
AT THIS TIME. PER ER PHYSICIAN NEPHROSTOMY BAG LEAKING DR. ARORA ALREADY NOTIFIED. 
ADMISSION HISTORY AND INITIAL PHYSICAL ASSESSMENT COMPLETED. PATIENT ORIENTED TO ROOM AND 
POLICIES. CALL LIGHT WITHIN REACH. BED IN THE LOWEST POSITION.

## 2019-03-28 NOTE — NUR
REPORT GIVEN TO ONCOMING NURSE, PATIENT IS RESTING IN BED. NO ACUTE DISTRESS NOTED. CALL 
LIGHT WITHIN REACH. BED IN THE LOWEST POSITION.

## 2019-03-28 NOTE — NUR
PT IS RESTING IN BED. RESPIRATION IS EVEN AND UNLABORED, NO DISTRESS NOTED. BED IN THE 
LOWEST POSITION, LOCKED, AND CALL LIGHT WITHIN REACH. WILL CONTINUE TO MONITOR.

## 2019-03-29 VITALS — DIASTOLIC BLOOD PRESSURE: 66 MMHG | SYSTOLIC BLOOD PRESSURE: 141 MMHG

## 2019-03-29 VITALS — DIASTOLIC BLOOD PRESSURE: 58 MMHG | SYSTOLIC BLOOD PRESSURE: 104 MMHG

## 2019-03-29 VITALS — DIASTOLIC BLOOD PRESSURE: 93 MMHG | SYSTOLIC BLOOD PRESSURE: 178 MMHG

## 2019-03-29 VITALS — DIASTOLIC BLOOD PRESSURE: 80 MMHG | SYSTOLIC BLOOD PRESSURE: 159 MMHG

## 2019-03-29 VITALS — DIASTOLIC BLOOD PRESSURE: 76 MMHG | SYSTOLIC BLOOD PRESSURE: 145 MMHG

## 2019-03-29 VITALS — SYSTOLIC BLOOD PRESSURE: 117 MMHG | DIASTOLIC BLOOD PRESSURE: 75 MMHG

## 2019-03-29 VITALS — DIASTOLIC BLOOD PRESSURE: 61 MMHG | SYSTOLIC BLOOD PRESSURE: 128 MMHG

## 2019-03-29 VITALS — SYSTOLIC BLOOD PRESSURE: 117 MMHG | DIASTOLIC BLOOD PRESSURE: 63 MMHG

## 2019-03-29 VITALS — DIASTOLIC BLOOD PRESSURE: 66 MMHG | SYSTOLIC BLOOD PRESSURE: 144 MMHG

## 2019-03-29 VITALS — SYSTOLIC BLOOD PRESSURE: 159 MMHG | DIASTOLIC BLOOD PRESSURE: 80 MMHG

## 2019-03-29 VITALS — SYSTOLIC BLOOD PRESSURE: 128 MMHG | DIASTOLIC BLOOD PRESSURE: 61 MMHG

## 2019-03-29 LAB
ALBUMIN SERPL-MCNC: 2.7 G/DL (ref 3.5–5)
ALBUMIN/GLOB SERPL: 0.6 {RATIO} (ref 0.8–2)
ALP SERPL-CCNC: 87 IU/L (ref 40–150)
ALT SERPL-CCNC: 13 IU/L (ref 0–55)
ANION GAP SERPL CALC-SCNC: 11.5 MMOL/L (ref 8–16)
BASOPHILS # BLD AUTO: 0 10*3/UL (ref 0–0.1)
BASOPHILS NFR BLD AUTO: 0.4 % (ref 0–1)
BUN SERPL-MCNC: 12 MG/DL (ref 7–26)
BUN/CREAT SERPL: 11 (ref 6–25)
CALCIUM SERPL-MCNC: 8.8 MG/DL (ref 8.4–10.2)
CHLORIDE SERPL-SCNC: 110 MMOL/L (ref 98–107)
CO2 SERPL-SCNC: 22 MMOL/L (ref 22–29)
DEPRECATED NEUTROPHILS # BLD AUTO: 4.7 10*3/UL (ref 2.1–6.9)
EGFRCR SERPLBLD CKD-EPI 2021: 54 ML/MIN (ref 60–?)
EOSINOPHIL # BLD AUTO: 0.1 10*3/UL (ref 0–0.4)
EOSINOPHIL NFR BLD AUTO: 1.8 % (ref 0–6)
ERYTHROCYTE [DISTWIDTH] IN CORD BLOOD: 14.3 % (ref 11.7–14.4)
GLOBULIN PLAS-MCNC: 4.6 G/DL (ref 2.3–3.5)
GLUCOSE SERPLBLD-MCNC: 145 MG/DL (ref 74–118)
HCT VFR BLD AUTO: 33.3 % (ref 34.2–44.1)
HGB BLD-MCNC: 10.1 G/DL (ref 12–16)
LYMPHOCYTES # BLD: 1.7 10*3/UL (ref 1–3.2)
LYMPHOCYTES NFR BLD AUTO: 24.3 % (ref 18–39.1)
MCH RBC QN AUTO: 25.8 PG (ref 28–32)
MCHC RBC AUTO-ENTMCNC: 30.3 G/DL (ref 31–35)
MCV RBC AUTO: 84.9 FL (ref 81–99)
MONOCYTES # BLD AUTO: 0.5 10*3/UL (ref 0.2–0.8)
MONOCYTES NFR BLD AUTO: 7.5 % (ref 4.4–11.3)
NEUTS SEG NFR BLD AUTO: 65.7 % (ref 38.7–80)
PLATELET # BLD AUTO: 274 X10E3/UL (ref 140–360)
POTASSIUM SERPL-SCNC: 3.5 MMOL/L (ref 3.5–5.1)
RBC # BLD AUTO: 3.92 X10E6/UL (ref 3.6–5.1)
SODIUM SERPL-SCNC: 140 MMOL/L (ref 136–145)

## 2019-03-29 PROCEDURE — 0T25X0Z CHANGE DRAINAGE DEVICE IN KIDNEY, EXTERNAL APPROACH: ICD-10-PCS | Performed by: RADIOLOGY

## 2019-03-29 RX ADMIN — CEFEPIME HYDROCHLORIDE SCH MLS/HR: 1 INJECTION, POWDER, FOR SOLUTION INTRAMUSCULAR; INTRAVENOUS at 10:25

## 2019-03-29 RX ADMIN — SODIUM CHLORIDE SCH MLS/HR: 9 INJECTION, SOLUTION INTRAVENOUS at 06:58

## 2019-03-29 RX ADMIN — SODIUM CHLORIDE SCH MLS/HR: 9 INJECTION, SOLUTION INTRAVENOUS at 20:15

## 2019-03-29 RX ADMIN — CEFEPIME HYDROCHLORIDE SCH MLS/HR: 1 INJECTION, POWDER, FOR SOLUTION INTRAMUSCULAR; INTRAVENOUS at 01:40

## 2019-03-29 RX ADMIN — SODIUM CHLORIDE SCH MLS/HR: 9 INJECTION, SOLUTION INTRAVENOUS at 18:17

## 2019-03-29 RX ADMIN — HYDROCODONE BITARTRATE AND ACETAMINOPHEN PRN EA: 10; 325 TABLET ORAL at 16:45

## 2019-03-29 RX ADMIN — HYDROCODONE BITARTRATE AND ACETAMINOPHEN PRN EA: 10; 325 TABLET ORAL at 05:16

## 2019-03-29 RX ADMIN — CEFEPIME HYDROCHLORIDE SCH MLS/HR: 1 INJECTION, POWDER, FOR SOLUTION INTRAMUSCULAR; INTRAVENOUS at 17:30

## 2019-03-29 NOTE — NUR
RECEIVED PATIENT RESTING IN BED, RESPIRATIONS EVEN AND UNLABORED, NO ACUTE DISTRESS NOTED. 
CALL LIGHT WITHIN REACH. BED IN THE LOWEST POSITION. BED ALARM ON.

## 2019-03-29 NOTE — NUR
REPORT GIVEN TO ONCOMING NURSE. PATIENT IS RESTING IN BED. NO ACUTE DISTRESS NOTED. NO S/S 
OF PAIN NOTED. CALL LIGHT WITHIN REACH. BED IN THE LOWEST POSITION.

## 2019-03-29 NOTE — NUR
PATIENT C/O NAUSEA, NO MEDICATIONS AVAILABLE ON MAR. CALLED DR. PEMBERTON FOR ORDERS, NO ANSWER, 
LVM.

## 2019-03-30 VITALS — SYSTOLIC BLOOD PRESSURE: 176 MMHG | DIASTOLIC BLOOD PRESSURE: 92 MMHG

## 2019-03-30 VITALS — SYSTOLIC BLOOD PRESSURE: 123 MMHG | DIASTOLIC BLOOD PRESSURE: 83 MMHG

## 2019-03-30 VITALS — DIASTOLIC BLOOD PRESSURE: 75 MMHG | SYSTOLIC BLOOD PRESSURE: 131 MMHG

## 2019-03-30 VITALS — SYSTOLIC BLOOD PRESSURE: 150 MMHG | DIASTOLIC BLOOD PRESSURE: 94 MMHG

## 2019-03-30 VITALS — DIASTOLIC BLOOD PRESSURE: 71 MMHG | SYSTOLIC BLOOD PRESSURE: 140 MMHG

## 2019-03-30 VITALS — DIASTOLIC BLOOD PRESSURE: 77 MMHG | SYSTOLIC BLOOD PRESSURE: 129 MMHG

## 2019-03-30 RX ADMIN — CEFEPIME HYDROCHLORIDE SCH MLS/HR: 1 INJECTION, POWDER, FOR SOLUTION INTRAMUSCULAR; INTRAVENOUS at 17:35

## 2019-03-30 RX ADMIN — HYDROCODONE BITARTRATE AND ACETAMINOPHEN PRN EA: 10; 325 TABLET ORAL at 04:32

## 2019-03-30 RX ADMIN — CEFEPIME HYDROCHLORIDE SCH MLS/HR: 1 INJECTION, POWDER, FOR SOLUTION INTRAMUSCULAR; INTRAVENOUS at 09:00

## 2019-03-30 RX ADMIN — HYDROCODONE BITARTRATE AND ACETAMINOPHEN PRN EA: 10; 325 TABLET ORAL at 20:09

## 2019-03-30 RX ADMIN — SODIUM CHLORIDE SCH MLS/HR: 9 INJECTION, SOLUTION INTRAVENOUS at 13:51

## 2019-03-30 RX ADMIN — SODIUM CHLORIDE SCH MLS/HR: 9 INJECTION, SOLUTION INTRAVENOUS at 05:33

## 2019-03-30 RX ADMIN — CEFEPIME HYDROCHLORIDE SCH MLS/HR: 1 INJECTION, POWDER, FOR SOLUTION INTRAMUSCULAR; INTRAVENOUS at 02:00

## 2019-03-30 NOTE — NUR
Received patient from day nurse, patient is alert, safety and fall precautions maintained as 
per hospital protocol: bed in lowest position and locked, needed items beside bed, and call 
bell placed close to patient. patient is currently stable will continue to monitor.

## 2019-03-30 NOTE — NUR
RECEIVED PT N BED AOX3 .RESPIRATIONS ARE EVEN AND UNLABORED .PT HAS RT 
NEPHROSTOMY.DENIESPAIN .CALL LIGHT WITH IN REACH .CONTINUE TO MONITOR

## 2019-03-30 NOTE — PROGRESS NOTE
DATE:    

 

SUBJECTIVE:  Ms. Jackson is doing well.  There are no new complaints.

 

REVIEW OF SYSTEMS:

HEENT:  Negative. 

PULMONARY:  Negative. 

CARDIAC:  Negative.

 

LABORATORY DATA:  Reviewed.

 

PHYSICAL EXAMINATION:

GENERAL:  She is currently alert, oriented, does not seem to be in acute distress. 

VITAL SIGNS:  Stable.  Currently afebrile. 

HEENT:  She is not icteric. 

NECK:  Supple. 

CHEST:  Clear. 

HEART:  S1, S2.  No S3, S4, or murmur. 

ABDOMEN:  Soft.  Bowel sounds present.  No tenderness. 

EXTREMITIES:  No edema.

IMPRESSION:  Urinary tract infection.  Clinically, seems to be doing better.  No new

complaint.  Urology is following. 

 

We will follow.

 

 

 

 

______________________________

MD ROWDY Lopez/MODMARKUS

D:  03/30/2019 19:29:28

T:  03/30/2019 23:03:53

Job #:  [id number]

## 2019-03-30 NOTE — NUR
Patient alert and responsive, VSS, tolerated IV fluids Gomez in place draining C/Y/U, 
urostomy also in place and draining, no c/o pains, continues on IV abx, call light within 
reach and will monitor.

## 2019-03-31 VITALS — SYSTOLIC BLOOD PRESSURE: 172 MMHG | DIASTOLIC BLOOD PRESSURE: 93 MMHG

## 2019-03-31 VITALS — SYSTOLIC BLOOD PRESSURE: 143 MMHG | DIASTOLIC BLOOD PRESSURE: 69 MMHG

## 2019-03-31 VITALS — SYSTOLIC BLOOD PRESSURE: 122 MMHG | DIASTOLIC BLOOD PRESSURE: 69 MMHG

## 2019-03-31 VITALS — DIASTOLIC BLOOD PRESSURE: 59 MMHG | SYSTOLIC BLOOD PRESSURE: 120 MMHG

## 2019-03-31 VITALS — SYSTOLIC BLOOD PRESSURE: 147 MMHG | DIASTOLIC BLOOD PRESSURE: 77 MMHG

## 2019-03-31 VITALS — DIASTOLIC BLOOD PRESSURE: 78 MMHG | SYSTOLIC BLOOD PRESSURE: 163 MMHG

## 2019-03-31 VITALS — SYSTOLIC BLOOD PRESSURE: 130 MMHG | DIASTOLIC BLOOD PRESSURE: 74 MMHG

## 2019-03-31 RX ADMIN — SODIUM CHLORIDE SCH MLS/HR: 9 INJECTION, SOLUTION INTRAVENOUS at 03:43

## 2019-03-31 RX ADMIN — CEFEPIME HYDROCHLORIDE SCH MLS/HR: 1 INJECTION, POWDER, FOR SOLUTION INTRAMUSCULAR; INTRAVENOUS at 17:13

## 2019-03-31 RX ADMIN — GENTAMICIN SULFATE SCH MLS/HR: 80 INJECTION, SOLUTION INTRAVENOUS at 20:45

## 2019-03-31 RX ADMIN — HYDROCODONE BITARTRATE AND ACETAMINOPHEN PRN EA: 10; 325 TABLET ORAL at 21:43

## 2019-03-31 RX ADMIN — SODIUM CHLORIDE SCH MLS/HR: 9 INJECTION, SOLUTION INTRAVENOUS at 07:17

## 2019-03-31 RX ADMIN — CEFEPIME HYDROCHLORIDE SCH MLS/HR: 1 INJECTION, POWDER, FOR SOLUTION INTRAMUSCULAR; INTRAVENOUS at 02:00

## 2019-03-31 RX ADMIN — SODIUM CHLORIDE SCH MLS/HR: 9 INJECTION, SOLUTION INTRAVENOUS at 12:23

## 2019-03-31 RX ADMIN — CEFEPIME HYDROCHLORIDE SCH MLS/HR: 1 INJECTION, POWDER, FOR SOLUTION INTRAMUSCULAR; INTRAVENOUS at 10:00

## 2019-03-31 RX ADMIN — SODIUM CHLORIDE SCH MLS/HR: 9 INJECTION, SOLUTION INTRAVENOUS at 20:04

## 2019-03-31 RX ADMIN — HYDROCODONE BITARTRATE AND ACETAMINOPHEN PRN EA: 10; 325 TABLET ORAL at 11:28

## 2019-03-31 NOTE — NUR
PT C/O PAIN AND GIVEN NORCO .PT RESTING .DRAINING CLEAR FLUID IN THE NEPHROSTOMY BAG .CALL 
LIHT WITH IN REACH.CONTINUE TO MONITOR

## 2019-03-31 NOTE — PROGRESS NOTE
DATE:    

 

SUBJECTIVE:  Ms. Jackson is feeling better.  No new complaints.

 

LABORATORY DATA:  Her urine showed Pseudomonas aeruginosa, which was multidrug

resistant, but clinically she is doing better.  The patient is on cefepime, but there is

no fever, no chills.  Urine was clear. 

 

PHYSICAL EXAMINATION:

GENERAL:  She is currently alert, oriented, does not seem to be in acute distress. 

VITAL SIGNS:  Stable, afebrile. 

HEENT:  She is not icteric. 

NECK:  Supple. 

CHEST:  Clear. 

HEART:  S1, S2.  No murmurs. 

ABDOMEN:  Soft.  Bowel sounds present.  No tenderness. 

EXTREMITIES:  No edema.

IMPRESSION:  Urinary tract infection with multidrug resistant.  Clinically, seems to be

stable, so I am going to avoid aminoglycoside because that puts her in acute renal

failure.  The patient had diabetes.  We will discuss with Urology tomorrow.  I am hoping

that maybe the 

concentration of urine exceed SRINATH and that is what we are seeing clinical improvement.

We will follow. 

 

 

 

 

______________________________

MD ROWDY Lopez/MODL

D:  03/31/2019 15:50:56

T:  03/31/2019 19:07:28

Job #:  [id number]

## 2019-04-01 VITALS — DIASTOLIC BLOOD PRESSURE: 86 MMHG | SYSTOLIC BLOOD PRESSURE: 190 MMHG

## 2019-04-01 VITALS — SYSTOLIC BLOOD PRESSURE: 133 MMHG | DIASTOLIC BLOOD PRESSURE: 73 MMHG

## 2019-04-01 VITALS — DIASTOLIC BLOOD PRESSURE: 90 MMHG | SYSTOLIC BLOOD PRESSURE: 149 MMHG

## 2019-04-01 VITALS — SYSTOLIC BLOOD PRESSURE: 162 MMHG | DIASTOLIC BLOOD PRESSURE: 75 MMHG

## 2019-04-01 LAB
ANION GAP SERPL CALC-SCNC: 11.3 MMOL/L (ref 8–16)
BASOPHILS # BLD AUTO: 0 10*3/UL (ref 0–0.1)
BASOPHILS NFR BLD AUTO: 0.5 % (ref 0–1)
BUN SERPL-MCNC: 9 MG/DL (ref 7–26)
BUN/CREAT SERPL: 9 (ref 6–25)
CALCIUM SERPL-MCNC: 9.5 MG/DL (ref 8.4–10.2)
CHLORIDE SERPL-SCNC: 108 MMOL/L (ref 98–107)
CO2 SERPL-SCNC: 25 MMOL/L (ref 22–29)
DEPRECATED NEUTROPHILS # BLD AUTO: 4.7 10*3/UL (ref 2.1–6.9)
EGFRCR SERPLBLD CKD-EPI 2021: 57 ML/MIN (ref 60–?)
EOSINOPHIL # BLD AUTO: 0.2 10*3/UL (ref 0–0.4)
EOSINOPHIL NFR BLD AUTO: 2.5 % (ref 0–6)
ERYTHROCYTE [DISTWIDTH] IN CORD BLOOD: 13.6 % (ref 11.7–14.4)
GLUCOSE SERPLBLD-MCNC: 127 MG/DL (ref 74–118)
HCT VFR BLD AUTO: 32.7 % (ref 34.2–44.1)
HGB BLD-MCNC: 10.2 G/DL (ref 12–16)
LYMPHOCYTES # BLD: 2.2 10*3/UL (ref 1–3.2)
LYMPHOCYTES NFR BLD AUTO: 28.6 % (ref 18–39.1)
MCH RBC QN AUTO: 25.6 PG (ref 28–32)
MCHC RBC AUTO-ENTMCNC: 31.2 G/DL (ref 31–35)
MCV RBC AUTO: 82 FL (ref 81–99)
MONOCYTES # BLD AUTO: 0.4 10*3/UL (ref 0.2–0.8)
MONOCYTES NFR BLD AUTO: 5.8 % (ref 4.4–11.3)
NEUTS SEG NFR BLD AUTO: 62.3 % (ref 38.7–80)
PLATELET # BLD AUTO: 296 X10E3/UL (ref 140–360)
POTASSIUM SERPL-SCNC: 3.3 MMOL/L (ref 3.5–5.1)
RBC # BLD AUTO: 3.99 X10E6/UL (ref 3.6–5.1)
SODIUM SERPL-SCNC: 141 MMOL/L (ref 136–145)

## 2019-04-01 RX ADMIN — HYDROCODONE BITARTRATE AND ACETAMINOPHEN PRN EA: 10; 325 TABLET ORAL at 14:40

## 2019-04-01 RX ADMIN — SODIUM CHLORIDE SCH MLS/HR: 9 INJECTION, SOLUTION INTRAVENOUS at 04:15

## 2019-04-01 RX ADMIN — GENTAMICIN SULFATE SCH MLS/HR: 80 INJECTION, SOLUTION INTRAVENOUS at 08:45

## 2019-04-01 RX ADMIN — SODIUM CHLORIDE SCH MLS/HR: 9 INJECTION, SOLUTION INTRAVENOUS at 14:53

## 2019-04-01 NOTE — NUR
Call to Dr. Hinton and he stated patient does not need to be on Gentamycin and that patient 
can go home on Cipro 500mg BID for 3 weeks.

## 2019-04-01 NOTE — NUR
Rounds by Dr. Rachel and stated patient will need to be on Gentamycin and will need this for 
home infusion to be discharged. Call to ID and left message regarding this plan and waiting 
for call back

## 2019-04-01 NOTE — CONSULTATION
DATE OF CONSULTATION:    

 

REASON FOR CONSULTATION:  UTI.

 

HISTORY OF PRESENT ILLNESS:  This patient who is known to me from before, she is a

53-year-old  female, who has history of left flank UTI, history of

nephrostomy tube.  She does see Dr. Goldy Rachel as an outpatient.  We will plan for her

to have it changed.  She is coming with feeling really bad.  There is no specific fever

or chills.  She is just not feeling well.  The urine looked turbulent according to her,

so the patient came to the Emergency Room.  She was supposed to see Dr. Goldy Rachel

tomorrow to change her bag.  The patient who has history of recurrent infection,

indwelling Gomez catheterization, obesity, nephrostomy tube, right ureteral stent

placement, multiple surgeries, pelvic cancer and radiation chemotherapy.  The patient

who has history of kidney stone, nephrostomy tube on the left. 

 

The patient had multiple stent procedures done.  The patient was admitted with not

feeling well, UTI.  The patient was admitted, started on linezolid and Azactam.  Blood

cultures and urine cultures were obtained.  Infectious Disease was asked to see the

patient. 

 

PAST MEDICAL HISTORY:  As mentioned above.

 

PAST SURGICAL HISTORY:  Multiple procedures, nephrostomy.

 

ALLERGIES:  NKA.

 

SOCIAL HISTORY:  There is no smoking, drug abuse, or alcohol abuse.

 

FAMILY HISTORY:  Noncontributory.

 

REVIEW OF SYSTEMS:

HEENT:  There is no headache, visual changes, or hearing changes. 

GI:  There is no nausea, no vomiting, no diarrhea. 

CARDIAC:  There is no arrhythmia.

 

IMPRESSION:  

1. Bacteriuria and urinary tract infection in a patient with complicated medical

history.  We will put her on cefepime 1 g q.8.  Obtain urine culture and blood cultures.

 There was leaking from the nephrostomy tube, Dr. Rachel was consulted. 

2. Obesity.

3. Debility. 

 

We will follow with you.  Thank you for asking me to see this patient.

 

 

 

 

______________________________

MD ROWDY Lopez/MALORIE

D:  03/28/2019 17:00:08

T:  03/29/2019 04:51:46

Job #:  [id number]

## 2019-04-01 NOTE — NUR
Spoke with Dr. Elias and orders in place for discharge patient as Dr. Rachel and Jamaal also 
cleared patient. Provided with discharge summary and med list, Dr. Hinton will send an 
electronic prescription to her pharmacy for Cipro 500mg BID for 3 weeks. IV line removed and 
cath tip in place, dressing in place. Contact info provided for f/u appt with all MDs. 
Patient discharged.

## 2019-04-20 ENCOUNTER — HOSPITAL ENCOUNTER (EMERGENCY)
Dept: HOSPITAL 88 - ER | Age: 54
Discharge: HOME | End: 2019-04-20
Payer: COMMERCIAL

## 2019-04-20 VITALS — BODY MASS INDEX: 39.07 KG/M2 | WEIGHT: 199 LBS | HEIGHT: 60 IN

## 2019-04-20 DIAGNOSIS — Z46.6: Primary | ICD-10-CM

## 2019-04-20 DIAGNOSIS — N30.91: ICD-10-CM

## 2019-04-20 LAB
ALBUMIN SERPL-MCNC: 3.4 G/DL (ref 3.5–5)
ALBUMIN/GLOB SERPL: 0.7 {RATIO} (ref 0.8–2)
ALP SERPL-CCNC: 108 IU/L (ref 40–150)
ALT SERPL-CCNC: 25 IU/L (ref 0–55)
ANION GAP SERPL CALC-SCNC: 13.9 MMOL/L (ref 8–16)
BACTERIA URNS QL MICRO: (no result) /HPF
BASOPHILS # BLD AUTO: 0 10*3/UL (ref 0–0.1)
BASOPHILS NFR BLD AUTO: 0.3 % (ref 0–1)
BILIRUB UR QL: NEGATIVE
BUN SERPL-MCNC: 10 MG/DL (ref 7–26)
BUN/CREAT SERPL: 8 (ref 6–25)
CALCIUM SERPL-MCNC: 9.8 MG/DL (ref 8.4–10.2)
CHLORIDE SERPL-SCNC: 104 MMOL/L (ref 98–107)
CLARITY UR: (no result)
CO2 SERPL-SCNC: 23 MMOL/L (ref 22–29)
COLOR UR: (no result)
DEPRECATED NEUTROPHILS # BLD AUTO: 9.4 10*3/UL (ref 2.1–6.9)
DEPRECATED RBC URNS MANUAL-ACNC: >50 /HPF (ref 0–5)
EGFRCR SERPLBLD CKD-EPI 2021: 43 ML/MIN (ref 60–?)
EOSINOPHIL # BLD AUTO: 0 10*3/UL (ref 0–0.4)
EOSINOPHIL NFR BLD AUTO: 0.3 % (ref 0–6)
EPI CELLS URNS QL MICRO: (no result) /LPF
ERYTHROCYTE [DISTWIDTH] IN CORD BLOOD: 14.8 % (ref 11.7–14.4)
GLOBULIN PLAS-MCNC: 5 G/DL (ref 2.3–3.5)
GLUCOSE SERPLBLD-MCNC: 125 MG/DL (ref 74–118)
HCT VFR BLD AUTO: 34.4 % (ref 34.2–44.1)
HGB BLD-MCNC: 10.7 G/DL (ref 12–16)
KETONES UR QL STRIP.AUTO: NEGATIVE
LEUKOCYTE ESTERASE UR QL STRIP.AUTO: (no result)
LYMPHOCYTES # BLD: 1.4 10*3/UL (ref 1–3.2)
LYMPHOCYTES NFR BLD AUTO: 11.8 % (ref 18–39.1)
MCH RBC QN AUTO: 25.8 PG (ref 28–32)
MCHC RBC AUTO-ENTMCNC: 31.1 G/DL (ref 31–35)
MCV RBC AUTO: 82.9 FL (ref 81–99)
MONOCYTES # BLD AUTO: 0.5 10*3/UL (ref 0.2–0.8)
MONOCYTES NFR BLD AUTO: 4.6 % (ref 4.4–11.3)
NEUTS SEG NFR BLD AUTO: 82.6 % (ref 38.7–80)
NITRITE UR QL STRIP.AUTO: NEGATIVE
PLATELET # BLD AUTO: 305 X10E3/UL (ref 140–360)
POTASSIUM SERPL-SCNC: 3.9 MMOL/L (ref 3.5–5.1)
PROT UR QL STRIP.AUTO: (no result)
RBC # BLD AUTO: 4.15 X10E6/UL (ref 3.6–5.1)
SODIUM SERPL-SCNC: 137 MMOL/L (ref 136–145)
SP GR UR STRIP: 1.01 (ref 1.01–1.02)
UROBILINOGEN UR STRIP-MCNC: 0.2 MG/DL (ref 0.2–1)
WBC #/AREA URNS HPF: >50 /HPF (ref 0–5)

## 2019-04-20 PROCEDURE — 81001 URINALYSIS AUTO W/SCOPE: CPT

## 2019-04-20 PROCEDURE — 74176 CT ABD & PELVIS W/O CONTRAST: CPT

## 2019-04-20 PROCEDURE — 80053 COMPREHEN METABOLIC PANEL: CPT

## 2019-04-20 PROCEDURE — 85025 COMPLETE CBC W/AUTO DIFF WBC: CPT

## 2019-04-20 PROCEDURE — 51702 INSERT TEMP BLADDER CATH: CPT

## 2019-04-20 PROCEDURE — 99284 EMERGENCY DEPT VISIT MOD MDM: CPT

## 2019-04-20 PROCEDURE — 87186 SC STD MICRODIL/AGAR DIL: CPT

## 2019-04-20 PROCEDURE — 87086 URINE CULTURE/COLONY COUNT: CPT

## 2019-04-20 PROCEDURE — 36415 COLL VENOUS BLD VENIPUNCTURE: CPT

## 2019-04-20 NOTE — DIAGNOSTIC IMAGING REPORT
EXAM: CT Abdomen and Pelvis WITHOUT contrast  

INDICATION:      

^CT CYSTOGRAM

^[id number]

^1626

^Y    



COMPARISON: Nephrostomy tube x-ray 3/29/2019

TECHNIQUE: Abdomen and pelvis were scanned utilizing a multidetector helical

scanner from the lung base to the pubic symphysis without administration of IV

contrast. Absence of intravenous contrast decreases sensitivity for detection

of focal lesions and vascular pathology. Coronal and sagittal reformations were

obtained. Routine protocol was performed.  Contrast injected through Gomez

times 2.

            IV CONTRAST: None.

            ORAL CONTRAST: Water

            RADIATION DOSE: Total DLP: 889.2 mGy*cm

             Estimated effective dose: (DLP x 0.015 x size factor) mSv

            COMPLICATIONS: None



FINDINGS:



LINES and TUBES: Right percutaneous nephrostomy tube. Left internal ureteral

stent with proximal aspect in the left renal pelvis and distal stent within the

urinary bladder.



LOWER THORAX:  Unremarkable



HEPATOBILIARY:      No focal hepatic lesions. No biliary ductal dilation. 



GALLBLADDER: Cholelithiasis. 



SPLEEN: No splenomegaly. 



PANCREAS: No focal masses or ductal dilatation.  



ADRENALS: No adrenal nodules    



KIDNEYS/URETERS:  Stable position of the right buttock without intravenous

nephrostomy tube with decompressed right kidney. No new calcified stones in the

right kidney. The right ureter is decompressed without calcified stones.

Moderate left hydroureteronephrosis has not improved despite with stent

placement. Persistent 3 mm calcified stone in the inferior pole of the left

kidney. No visualized new when stones along the left ureter on limited

evaluation or remaining left kidney. Mild left perinephric fat stranding

without fluid collections.



GI TRACT: No abnormal distention, wall thickening, or evidence of bowel

obstruction.       Appendix is normal.



PELVIC ORGANS/BLADDER: The urinary bladder is decompressed with 4 Mata catheter

in place. No calcified stones in the urinary bladder. Hysterectomy.



LYMPH NODES: No lymphadenopathy.



VESSELS: Unremarkable.



PERITONEUM / RETROPERITONEUM: No free air or fluid.



BONES: Unremarkable.



SOFT TISSUES: Unremarkable.            



IMPRESSION: 

1.  Unchanged right nephrostomy tube with decompressed right kidney. No

calcified stones in the right kidney or right ureter.



2.  Persistent moderate left hydroureteronephrosis despite ureteral stent in

adequate position. Unchanged left nephrolithiasis. Recommend revision of the

left ureteral stent.



3.  Suboptimal cystogram with only a small amount of contrast in the urinary

bladder, which persist decompressed with Gomez catheter in place.



Signed by: Dr. Georgiana Reagan M.D. on 4/20/2019 5:43 PM

## 2019-06-10 ENCOUNTER — HOSPITAL ENCOUNTER (EMERGENCY)
Dept: HOSPITAL 88 - ER | Age: 54
Discharge: HOME | End: 2019-06-10
Payer: COMMERCIAL

## 2019-06-10 VITALS — HEIGHT: 60 IN | WEIGHT: 199 LBS | BODY MASS INDEX: 39.07 KG/M2

## 2019-06-10 VITALS — DIASTOLIC BLOOD PRESSURE: 70 MMHG | SYSTOLIC BLOOD PRESSURE: 136 MMHG

## 2019-06-10 DIAGNOSIS — T83.511A: Primary | ICD-10-CM

## 2019-06-10 DIAGNOSIS — E78.5: ICD-10-CM

## 2019-06-10 DIAGNOSIS — Z93.6: ICD-10-CM

## 2019-06-10 DIAGNOSIS — I10: ICD-10-CM

## 2019-06-10 DIAGNOSIS — N30.21: ICD-10-CM

## 2019-06-10 LAB
ANION GAP SERPL CALC-SCNC: 12.6 MMOL/L (ref 8–16)
BACTERIA URNS QL MICRO: (no result) /HPF
BACTERIA URNS QL MICRO: (no result) /HPF
BASOPHILS # BLD AUTO: 0.1 10*3/UL (ref 0–0.1)
BASOPHILS NFR BLD AUTO: 0.8 % (ref 0–1)
BILIRUB UR QL: NEGATIVE
BILIRUB UR QL: NEGATIVE
BUN SERPL-MCNC: 18 MG/DL (ref 7–26)
BUN/CREAT SERPL: 14 (ref 6–25)
CALCIUM SERPL-MCNC: 9.8 MG/DL (ref 8.4–10.2)
CHLORIDE SERPL-SCNC: 105 MMOL/L (ref 98–107)
CLARITY UR: CLEAR
CLARITY UR: CLEAR
CO2 SERPL-SCNC: 21 MMOL/L (ref 22–29)
COLOR UR: YELLOW
COLOR UR: YELLOW
DEPRECATED NEUTROPHILS # BLD AUTO: 5.8 10*3/UL (ref 2.1–6.9)
DEPRECATED RBC URNS MANUAL-ACNC: (no result) /HPF (ref 0–5)
DEPRECATED RBC URNS MANUAL-ACNC: (no result) /HPF (ref 0–5)
EGFRCR SERPLBLD CKD-EPI 2021: 42 ML/MIN (ref 60–?)
EOSINOPHIL # BLD AUTO: 0.2 10*3/UL (ref 0–0.4)
EOSINOPHIL NFR BLD AUTO: 2.3 % (ref 0–6)
EPI CELLS URNS QL MICRO: (no result) /LPF
EPI CELLS URNS QL MICRO: (no result) /LPF
ERYTHROCYTE [DISTWIDTH] IN CORD BLOOD: 16.1 % (ref 11.7–14.4)
GLUCOSE SERPLBLD-MCNC: 147 MG/DL (ref 74–118)
HCT VFR BLD AUTO: 33.7 % (ref 34.2–44.1)
HGB BLD-MCNC: 10.2 G/DL (ref 12–16)
HYALINE CASTS #/AREA URNS LPF: (no result) /[LPF] (ref 0–1)
KETONES UR QL STRIP.AUTO: NEGATIVE
KETONES UR QL STRIP.AUTO: NEGATIVE
LEUKOCYTE ESTERASE UR QL STRIP.AUTO: (no result)
LEUKOCYTE ESTERASE UR QL STRIP.AUTO: NEGATIVE
LYMPHOCYTES # BLD: 1.9 10*3/UL (ref 1–3.2)
LYMPHOCYTES NFR BLD AUTO: 22.7 % (ref 18–39.1)
MCH RBC QN AUTO: 25.2 PG (ref 28–32)
MCHC RBC AUTO-ENTMCNC: 30.3 G/DL (ref 31–35)
MCV RBC AUTO: 83.4 FL (ref 81–99)
MONOCYTES # BLD AUTO: 0.5 10*3/UL (ref 0.2–0.8)
MONOCYTES NFR BLD AUTO: 5.4 % (ref 4.4–11.3)
NEUTS SEG NFR BLD AUTO: 68.2 % (ref 38.7–80)
NITRITE UR QL STRIP.AUTO: NEGATIVE
NITRITE UR QL STRIP.AUTO: NEGATIVE
PLATELET # BLD AUTO: 276 X10E3/UL (ref 140–360)
POTASSIUM SERPL-SCNC: 4.6 MMOL/L (ref 3.5–5.1)
PROT UR QL STRIP.AUTO: (no result)
PROT UR QL STRIP.AUTO: (no result)
RBC # BLD AUTO: 4.04 X10E6/UL (ref 3.6–5.1)
SODIUM SERPL-SCNC: 134 MMOL/L (ref 136–145)
SP GR UR STRIP: 1.02 (ref 1.01–1.02)
SP GR UR STRIP: <=1.005 (ref 1.01–1.02)
UROBILINOGEN UR STRIP-MCNC: 0.2 MG/DL (ref 0.2–1)
UROBILINOGEN UR STRIP-MCNC: 0.2 MG/DL (ref 0.2–1)
WBC #/AREA URNS HPF: (no result) /HPF (ref 0–5)

## 2019-06-10 PROCEDURE — 81001 URINALYSIS AUTO W/SCOPE: CPT

## 2019-06-10 PROCEDURE — 99283 EMERGENCY DEPT VISIT LOW MDM: CPT

## 2019-06-10 PROCEDURE — 87086 URINE CULTURE/COLONY COUNT: CPT

## 2019-06-10 PROCEDURE — 36415 COLL VENOUS BLD VENIPUNCTURE: CPT

## 2019-06-10 PROCEDURE — 85025 COMPLETE CBC W/AUTO DIFF WBC: CPT

## 2019-06-10 PROCEDURE — 87186 SC STD MICRODIL/AGAR DIL: CPT

## 2019-06-10 PROCEDURE — 80048 BASIC METABOLIC PNL TOTAL CA: CPT

## 2019-06-10 NOTE — NUR
Two urinalysis sent off at this time. One from urostomy and other from current 
Gomez catheter. 

-------------------------------------------------------------------------------

Addendum: 06/10/19 at 1638 by GURDEEP

-------------------------------------------------------------------------------

Patient has nephrostomy not a urostomy.

## 2019-06-21 ENCOUNTER — HOSPITAL ENCOUNTER (EMERGENCY)
Dept: HOSPITAL 88 - ER | Age: 54
Discharge: HOME | End: 2019-06-21
Payer: COMMERCIAL

## 2019-06-21 VITALS — HEIGHT: 60 IN | WEIGHT: 199 LBS | BODY MASS INDEX: 39.07 KG/M2

## 2019-06-21 VITALS — SYSTOLIC BLOOD PRESSURE: 127 MMHG | DIASTOLIC BLOOD PRESSURE: 85 MMHG

## 2019-06-21 DIAGNOSIS — Z46.6: Primary | ICD-10-CM

## 2019-06-21 DIAGNOSIS — Z85.53: ICD-10-CM

## 2019-06-21 DIAGNOSIS — E11.9: ICD-10-CM

## 2019-06-21 DIAGNOSIS — I10: ICD-10-CM

## 2019-06-21 PROCEDURE — 51700 IRRIGATION OF BLADDER: CPT

## 2019-06-21 PROCEDURE — 99282 EMERGENCY DEPT VISIT SF MDM: CPT

## 2019-06-21 NOTE — NUR
CATHETER PLACEMENT PER MD/NP OF 16 FR NOLASCO AND DRAIN TO GRAVITY AND THEN TO 
LEG BAG. PT EDUCATION DONE. NOLASCO PLACED PER ORDER AND POLICY PROTOCOL. LEG BAG 
AT BEDSIDE TO [facility]. BLOODY URINE NO CLOTS NOTED. NOTIFIED NP OF FINDINGS.

## 2019-06-28 ENCOUNTER — HOSPITAL ENCOUNTER (OUTPATIENT)
Dept: HOSPITAL 88 - CATH LAB | Age: 54
Discharge: HOME | End: 2019-06-28
Attending: UROLOGY
Payer: COMMERCIAL

## 2019-06-28 VITALS — DIASTOLIC BLOOD PRESSURE: 69 MMHG | SYSTOLIC BLOOD PRESSURE: 170 MMHG

## 2019-06-28 VITALS — BODY MASS INDEX: 37.69 KG/M2 | HEIGHT: 60 IN | WEIGHT: 192 LBS

## 2019-06-28 VITALS — SYSTOLIC BLOOD PRESSURE: 131 MMHG | DIASTOLIC BLOOD PRESSURE: 77 MMHG

## 2019-06-28 VITALS — SYSTOLIC BLOOD PRESSURE: 134 MMHG | DIASTOLIC BLOOD PRESSURE: 84 MMHG

## 2019-06-28 VITALS — DIASTOLIC BLOOD PRESSURE: 74 MMHG | SYSTOLIC BLOOD PRESSURE: 142 MMHG

## 2019-06-28 DIAGNOSIS — R33.9: ICD-10-CM

## 2019-06-28 DIAGNOSIS — Z46.6: ICD-10-CM

## 2019-06-28 DIAGNOSIS — N13.5: Primary | ICD-10-CM

## 2019-06-28 PROCEDURE — 75984 XRAY CONTROL CATHETER CHANGE: CPT

## 2019-06-28 PROCEDURE — 36415 COLL VENOUS BLD VENIPUNCTURE: CPT

## 2019-06-28 PROCEDURE — 50435 EXCHANGE NEPHROSTOMY CATH: CPT

## 2019-06-28 PROCEDURE — 84702 CHORIONIC GONADOTROPIN TEST: CPT

## 2019-06-28 NOTE — NUR
1330 Received pt in Rm #9 Report Enrique GONSALES Identiferx2 Gomez remains in place. Rt Posterior 
Neph. tube in place. clear output dressing intact. Back to baseline orientation Ok to dc at 
1400pm. Resp shallow and regular 989% RA.  Abdomen soft and non tender denies necessity to 
defecate. Bilateral PPx4 Pt/Dp.No gross issues pain pallor pressure .

Discharge plans discussed with family has copies aware of importance of f/o 3mths for 
replacement or if s/s infection occurs

Has copies of POC .

1400pm Pt dressed and discharged with family . Iv removed no s/s infiltration Coban 
dressing in place. Pt assisted to car

with  and copies of Dc papers Escorted per [facility] employee per wc. No gross issues pain 
pallor or pressure Rt Neph Tube site w/o s/s oozing.

ds/rn

## 2019-06-28 NOTE — XMS REPORT
Continuity of Care Document

                             Created on: 2019



CRISTINA RICH

External Reference #: [id number]

: 1965

Sex: Female



Demographics







                          Address                   [address] [place] [address]  [id number]

 

                          Home Phone                [phone]

 

                          Preferred Language        English

 

                          Marital Status            Unknown

 

                          Sikhism Affiliation     Unknown

 

                          Race                      Unknown

 

                          Ethnic Group              Unknown





Author







                          Author                    [employer]

 

                          Address                   Unknown

 

                          Phone                     Unavailable







Care Team Providers







                    Care Team Member Name    Role                Phone

 

                    BusyEvent    Unavailable         Unavailable



                                    



Problems

                    





                    Problem                            Status                            Onset Date     

                          Classification                            Date Reported       

                          Comments                            Source                    

 

                                        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    

                                                      2016                                            

                                        [facility]                    

 

                    GEN. PAIN                            Active                            2016   

                                                                                       

                                        [facility]                    

 

                                        Discharge Diagnosis: Urinary tract infection, site not specified                

                                                2016                                     

                    01/10/2016                                                        [facility]                    

 

                    WEAKNESS                            Active                            2016    

                                                                                       

                                        [facility]                    

 

                    MRSA1                            Active                                             

                    Problem                            2016                            Problem

 added by Discern Expert.                            [facility]                  

  



                                                                                
                                                       



Medications

                    





                    Medication                            Details                            Route      

                          Status                            Patient Instructions         

                          Ordering Provider                            Order Date           

                                        Source                    

 

                          Ativan                            1 mg, Route: PO, Drug form: TAB, ONCE, Dosing Weight

 86.364, kg, Priority: STAT, Start date: 16 23:44:00, Stop date: 16 
23:44:00                                                        No Longer Active     

                                                                               2016

                                        [facility]                    

 

                          tramadol hydrochloride 50 MG Oral Tablet                            50 mg=1 tab, PO,

 BID, X 15 day, # 30 tab, 0 Refill(s)                                              

                    Active                                                                       

                          2016                            [facility]                    

 

                          tramadol hydrochloride 50 MG Oral Tablet [Ultram]                            1 - 2

 tabs, PO, Q4-6H, PRN as needed for pain, X 7 day, # 12 tab, 0 Refill(s)        
                                                Active                                 

                                                      2016                        

                                        [facility]                    

 

                                        Sulfamethoxazole 800 MG / Trimethoprim 160 MG Oral Tablet [Bactrim]             

                          1 tab, PO, BID, # 20 tab, 0 Refill(s)                                 

                       Active                                                          

                          2016                            [facility]         

           

 

                          Rocephin                            1 gm, Route: IM, ONCE, Dosing Weight 84.091, kg,

 Start date: 16 17:48:00, Stop date: 16 17:48:00                    
                                                Inactive                                           

                                                2016                            [facility]

                    

 

                          Ondansetron                            4 mg, 2 mL, Route: IVP, Drug form: INJ, ONCE,

 Dosing Weight 84.091, kg, Priority: STAT, Start date: 16 15:36:00, Stop 
date: 16 15:36:00Notes: (Same as: Zofran)   *** MEDICATION WASTE *** 
Product Size:  4 mg Product Wasted:  ___ mg                                        

                    Inactive                                                               

                          2016                            [facility]              

      

 

                          Sodium Chloride 0.154 MEQ/ML Injectable Solution                            1,000 

mL, 1000 ml/hr, Infuse Over: 1 hr, Route: IV, 1,000, Drug form: INJ, ONCE, 
Priority: STAT, Dosing Weight 84.091 kg, Start date: 16 15:36:00, 
Duration: 1 doses or times, Stop date: 16 15:36:00                        
                                                Inactive                                               

                                                2016                            [facility]

                    

 

                          Saline Flush 0.9%                            10 mL, Route: IVP, Drug Form: INJ, Dosing

 Weight 84.091, kg, PRN, PRN Line Flush, Start date: 16 15:36:00, 
Duration: 30 day, Stop date: 16 15:35:00Notes: Same as: BD Posiflush 
Sterile                                                        Inactive              

                                                                            2016       

                                        [facility]                    



                                                                                
                                                                                
                                    



Allergies, Adverse Reactions, Alerts

                    





                    Substance                            Category                            Reaction   

                          Severity                            Reaction type           

                          Status                            Date Reported                     

                          Comments                            Source                    

 

                    diphenhydrAMINE                            Assertion                                

                                                      Drug allergy                     

                    Active                                                                          

                                        [facility]                    



                                                        



Immunizations

        





                                        No Data Provided for This Section



                                    



Results







                    Order Name                            Results                            Value      

                          Reference Range                            Date                

                          Interpretation                            Comments                       

                                        Source                    

 

                    URINE AND STOOL                            UA Urobilinogen     <=1.0 mg/dL           

                          0.1 - 1.0                            2016                     

                                                                            [facility]            

        

 

                    URINE AND STOOL                            UA Protein          100 mg/dL                  

                          Negative mg/dL                            2016                       

                                                                            [facility]              

      

 

                    URINE AND STOOL                            UA Glucose          Negative mg/dL             

                          Negative mg/dL                            2016                  

                                                                            [facility]         

           

 

                    URINE AND STOOL                            UA Ketones          Negative mg/dL             

                          Negative mg/dL                            2016                  

                                                                            [facility]         

           

 

                    URINE AND STOOL                            UA Bili             Negative 

*NA*

                    (16 4:54 PM)                            Negative                            2016

                                                                                    [facility]                    

 

                    URINE AND STOOL                            UA Blood            Moderate 

*ABN*

                    (16 4:54 PM)                            Negative                            2016

                                                                                    [facility]                    

 

                    URINE AND STOOL                            UA Nitrite          Positive 

*ABN*

                    (16 4:54 PM)                            Negative                            2016

                                                                                    [facility]                    

 

                    URINE AND STOOL                            UA Leuk Est         Large 

*ABN*

                    (16 4:54 PM)                            Negative                            2016

                                                                                    [facility]                    

 

                    URINE AND STOOL                            UA Sq Epi           Few /LPF                    

                    Few /LPF                            2016                                 

                                                      [facility]                    

 

                URINE AND STOOL                            UA WBC          >182                            0

 - 5                            2016                                           

                                                      [facility]                    

 

                URINE AND STOOL                            UA RBC          155                            0 

- 2                            2016                                            

                                                      [facility]                    

 

                    URINE AND STOOL                            UA Bacteria         Many /HPF                 

                          None Seen /HPF                            2016                      

                                                                            [facility]             

       

 

                    URINE AND STOOL                            UA WBC Cast         34                        

                    <=0 /LPF                            2016                                     

                                                      [facility]                    

 

                    URINE AND STOOL                            UA Mucus            Few /LPF                     

                    None Seen /LPF                            2016                            

                                                        [facility]                 

   

 

                    URINE AND STOOL                            UA Color            Yellow 

*NA*

                    (16 4:54 PM)                            Yellow                            2016

                                                                                    [facility]                    

 

                    URINE AND STOOL                            UA Turbidity        Marked 

*ABN*

                    (16 4:54 PM)                            Clear                            2016

                                                                                    [facility]                    

 

                URINE AND STOOL                            UA pH           5.0                            5.0

 - 8.0                            2016                                         

                                                      [facility]                    

 

                    URINE AND STOOL                            UA Spec Grav        1.018                    

                    <=1.030                            2016                                  

                                                      [facility]                    

 

                CHEM PANEL                            A/G Ratio       0.6                            0.7 

- 1.6                            2016                                          

                                                      [facility]                    

 

                CHEM PANEL                            Globulin        5.8                            2.0 -

 4.0                            2016                                           

                                                      [facility]                    

 

                CHEM PANEL                            B/C Ratio       12                            6 - 25

                            2016                                               

                                                      [facility]                    

 

                CHEM PANEL                            AGAP            12.6                            10.0 - 20.0

                            2016                                               

                                                      [facility]                    

 

                CHEM PANEL                            Alk Phos        115                            39 - 

136                            2016                                            

                                                      [facility]                    

 

                CHEM PANEL                            AST             28                            0 - 37     

                          2016                                                    

                                                      [facility]                    

 

                CHEM PANEL                            Bili Total      0.3                            0.2

 - 1.3                            2016                                         

                                                      [facility]                    

 

                CHEM PANEL                            eGFR            73                                      

                    2016                                                        Result

 Comment: The eGFR is calculated using the CKD-EPI formula. In most young, 
healthy individuals the eGFR will be >90 mL/min/1.73m2. The eGFR declines with 
age. An eGFR of 60-89 may be normal in some populations, particularly the 
elderly, for whom the CKD-EPI formula has not been extensively validated. Use of
 the eGFR is not recommended in the following populations:<br/><br/>Individuals 
with unstable creatinine concentrations, including pregnant patients and those 
with serious co-morbid conditions.<br/><br/>Patients with extremes in muscle 
mass or diet. <br/><br/>The data above are obtained from the National Kid[employer] 
Disease Education Program (NKDEP) which additionally recommends that when the 
eGFR is used in patients with extremes of body mass index for purposes of drug 
dosing, the eGFR should be multiplied by the estimated BMI.                     
                                        [facility]                    

 

                CHEM PANEL                            ALT             25                            0 - 65     

                          2016                                                    

                                                      [facility]                    

 

                CHEM PANEL                            Albumin Lvl     3.4                            3.5

 - 5.0                            2016                                         

                                                      [facility]                    

 

                CHEM PANEL                            Total Protein    9.2                            

6.4 - 8.4                            2016                                      

                                                      [facility]                    

 

                CHEM PANEL                            Calcium Lvl     9.1                            8.5

 - 10.5                            2016                                        

                                                      [facility]                    

 

                CHEM PANEL                            CO2             26                            24 - 32    

                          2016                                                   

                                                      [facility]                    

 

                CHEM PANEL                            Sodium Lvl      140                            135

 - 145                            2016                                         

                                                      [facility]                    

 

                CHEM PANEL                            Chloride Lvl    106                            95

 - 109                            2016                                         

                                                      [facility]                    

 

                CHEM PANEL                            Potassium Lvl    4.6                            

3.5 - 5.1                            2016                                      

                                                      [facility]                    

 

                    CHEM PANEL                            Creatinine Lvl      0.91                        

                    0.50 - 1.40                            2016                                  

                                                      [facility]                    

 

                CHEM PANEL                            BUN             11                            7 - 22     

                          2016                                                    

                                                      [facility]                    

 

                CHEM PANEL                            Glucose Lvl     118                            70

 - 99                            2016                                          

                                                      [facility]                    

 

                    ENDOCRINOLOGY                            S Preg              Negative 

*NA*

                    (16 4:40 PM)                            Negative                            2016

                                                                                    [facility]                    

 

                HEMATOLOGY                            Lymphocytes     32.8                            20.0

 - 40.0                            2016                                        

                                                      [facility]                    

 

                HEMATOLOGY                            Segs            60.0                            45.0 - 75.0

                            2016                                               

                                                      [facility]                    

 

                HEMATOLOGY                            Eosinophils #    0.2                            

0.0 - 0.5                            2016                                      

                                                      [facility]                    

 

                HEMATOLOGY                            Basophils #     0.2                            0.0

 - 0.2                            2016                                         

                                                      [facility]                    

 

                HEMATOLOGY                            Monocytes       4.6                            2.0 

- 12.0                            2016                                         

                                                      [facility]                    

 

                HEMATOLOGY                            Eosinophils     1.2                            0.0

 - 4.0                            2016                                         

                                                      [facility]                    

 

                HEMATOLOGY                            Basophils       1.4                            0.0 

- 1.0                            2016                                          

                                                      [facility]                    

 

                HEMATOLOGY                            Segs-Bands #    7.3                            1.5

 - 8.1                            2016                                         

                                                      [facility]                            Monocytes #     0.6                            0.0

 - 0.8                            2016                                         

                                                      [facility]                    

 

                HEMATOLOGY                            Lymphocytes #    4.0                            

1.0 - 5.5                            2016                                      

                                                      [facility]                    

 

                    HEMATOLOGY                            Plt Morph           Clumped 

                    (16 4:40 PM)                                                        2016  

                                                                                  [facility]                            RBC Morph           Normal 

                    (16 4:40 PM)                                                        2016  

                                                                                  [facility]

                    

 

                HEMATOLOGY                            MPV             8.7                            7.4 - 10.4

                            2016                                               

                                                      [facility]                    

 

                HEMATOLOGY                            WBC             12.0                            3.7 - 10.4

                            2016                                               

                                                      [facility]                    

 

                HEMATOLOGY                            RBC             4.90                            4.20 - 5.40

                            2016                                               

                                                      [facility]                    

 

                HEMATOLOGY                            Hct             39.0                            36.0 - 48.0

                            2016                                               

                                                      [facility]                    

 

                HEMATOLOGY                            MCV             79.7                            80.0 - 98.0

                            2016                                               

                                                      [facility]                    

 

                HEMATOLOGY                            Hgb             12.0                            12.0 - 16.0

                            2016                                               

                                                      [facility]                    

 

                HEMATOLOGY                            RDW             14.8                            11.5 - 14.5

                            2016                                               

                                                      [facility]                    

 

                HEMATOLOGY                            MCHC            30.8                            32.0 - 36.0

                            2016                                               

                                                      [facility]                    

 

                HEMATOLOGY                            Platelet        220                            133 -

 450                            2016                                           

                                                      [facility]                    

 

                HEMATOLOGY                            MCH             24.5                            27.0 - 31.0

                            2016                                               

                                                      [facility]                    



                                                                                
                                                                                
                                                                                
                                                                                
                                                                                
                                                                                
                                                                        



Pathology Reports







                                        No Data Provided for This Section                    



                            



Diagnostic Reports

            





                                        No Data Provided for This Section                    



                                                            



Consultation Notes

                    





                                        No Data Provided for This Section                    



                                                            



Discharge Summaries

                    





                                        No Data Provided for This Section                    



                                                            



History and Physicals

                    





                                        No Data Provided for This Section                    



                                                                



Vital Signs

                     





                    Vital Sign                            Value                            Date         

                          Comments                            Source                    

 

                    Weight                            86.364                             2016     

                                                      [facility]                    

 

                    Temperature Oral (F)                            97.9 F                            2016

                                                        [facility]                 

   

 

                    Respitory Rate                            18                             2016 

                                                       [facility]                  

  

 

                    Heart Rate                            91                             2016     

                                                      [facility]                    

 

                    Systolic (mm Hg)                            140                             2016

                                                        [facility]                 

   

 

                    Diastolic (mm Hg)                            99                             2016

                                                        [facility]                 

   

 

                    Temperature Oral (F)                            98.4 F                            2016

                                                        [facility]                 

   

 

                    Respitory Rate                            18                             2016 

                                                       [facility]                  

  

 

                    Heart Rate                            97                             2016     

                                                      [facility]                    

 

                    Systolic (mm Hg)                            142                             2016

                                                        [facility]                 

   

 

                    Diastolic (mm Hg)                            82                             2016

                                                        [facility]                 

   

 

                    Systolic (mm Hg)                            147                             2016

                                                        [facility]                 

   

 

                    Diastolic (mm Hg)                            93                             2016

                                                        [facility]                 

   

 

                    Weight                            84.091                             2016     

                                                      [facility]                    

 

                    BMI Calculated                            36.21                             2016

                                                        [facility]                 

   

 

                    Height                            152.4 cm                            2016    

                                                      [facility]                    

 

                    Heart Rate                            76                             2016     

                                                      [facility]                    

 

                    Respitory Rate                            18                             2016 

                                                       [facility]                  

  

 

                    Temperature Oral (F)                            98.1 F                            2016

                                                        [facility]                 

   



                                                                                
                                                                                
                                                                                
                                                                                
                                                        



Encounters

                    





                    Location                            Location Details                            Encounter

 Type                            Encounter Number                            Reason For

 Visit                            Attending Provider                            ADM Date

                            DC Date                            Status                

                                        Source                    

 

                          [facility]                            [id number]                 

                                                Parul Gonzálescole                             2016                                               

                                        [facility]                                               

                          [facility]                            [id number]                 

                                                Mohan Curtis                             2016                                               

                                        [facility]                    



                                                                                
    



Procedures

        





                                        No Data Provided for This Section



                                                    



Assessment and Plan

                    





                                        No Data Provided for This Section                    



                                     



Plan of Care







                                        No Data Provided for This Section                    



                                                                



Social History

                    





                    Social History                            Date                            Source    

                

 

                                        Social History TypeResponse

Smoking Status

Never smoker; Exposure to Tobacco Smoke None; Cigarette Smoking Last 365 Days 
No; Reg Smoking Cessation Counseling No

                            2016                            [facility]       

             



                                                                    



Family History

                    





                                        No Data Provided for This Section                    



                                                            



Advance Directives

                    





                                        No Data Provided for This Section                    



                                                            



Functional Status

                    





                                        No Data Provided for This Section

## 2019-07-09 NOTE — DIAGNOSTIC IMAGING REPORT
PROCEDURE:CHG PERC TUBE/DRN CATH/CON/S I

 

COMPARISON:Prior nephrostomy tube change 4/8/2019

 

Preprocedure diagnosis: right ureteral obstruction, chronic indwelling 

nephrostomy

Post procedure diagnosis: Right ureteral obstruction, chronic 

indwelling nephrostomy.

 

Sedation/anesthesia: Intravenous fentanyl and Versed. Refer to nursing 

record. The patient's heart rate and pulse oximetry were continuously 

monitored by the interventional radiology nurse. Blood pressure was 

monitored at 5 minute intervals.

 

Additional medications: Lidocaine 1% for local anesthesia.

 

Total fluoroscopy time: One minute

Dose-area product:  267.5 cGycm2

 

Contrast used: 10 cc Isovue-300

Estimated blood loss: Minimal

Blood products administered: None

Specimens: 10 [place]ian percutaneous nephrostomy catheter, discarded

Implants/grafts: 10 Gambian percutaneous nephrostomy catheter

 

Condition at completion: Stable

Disposition: Catheter lab holding area for recovery

Complications: No immediate

 

Procedure in detail:

Informed consent was obtained and documented in the medical record 

after discussion of risks and benefits.

 

The patient was placed in the prone position on the angiographic table. 

The right flank and existing percutaneous nephrostomy catheter were 

prepped and draped in standard sterile fashion. 1% lidocaine was 

infiltrated into the skin and subcutaneous tissues along the existing 

catheter for local anesthesia.

 

Injection of dilute contrast material through the catheter confirmed 

appropriate position within the collecting system. The catheter was 

severed and a 0.035 inch stiff Glidewire was advanced through the 

catheter and coiled within the renal pelvis. The catheter was then 

exchanged over-the-wire for a new 10 Gambian locking loop percutaneous 

nephrostomy catheter. The wire was removed and the locking loop was 

formed in the renal pelvis. Appropriate positioning was confirmed by 

injection of dilute contrast material. The catheter was then flushed 

with sterile saline, secured to the skin with monofilament nylon 

suture, and connected to gravity drainage. A sterile dressing was 

applied. The patient tolerated the procedure well without immediate 

complication.

 

 

CONCLUSION:

 

Successful exchange of a 10 Gambian locking loop right percutaneous 

nephrostomy catheter under fluoroscopic guidance.

 

The patient should return to interventional radiology for routine 

catheter exchange in 3 months if the catheter is still needed at that 

time. 

Dictated by:  Zeus Gonzalez M.D. on 7/08/2019 at 14:18     

Electronically approved by:  Zeus Gonzalez M.D. on 7/08/2019 at 14:18

## 2019-08-01 ENCOUNTER — HOSPITAL ENCOUNTER (INPATIENT)
Dept: HOSPITAL 88 - ER | Age: 54
LOS: 4 days | Discharge: HOME | DRG: 699 | End: 2019-08-05
Attending: INTERNAL MEDICINE | Admitting: INTERNAL MEDICINE
Payer: COMMERCIAL

## 2019-08-01 VITALS — BODY MASS INDEX: 39.07 KG/M2 | HEIGHT: 60 IN | WEIGHT: 199 LBS

## 2019-08-01 DIAGNOSIS — Z16.24: ICD-10-CM

## 2019-08-01 DIAGNOSIS — E66.9: ICD-10-CM

## 2019-08-01 DIAGNOSIS — N13.6: ICD-10-CM

## 2019-08-01 DIAGNOSIS — T83.593A: Primary | ICD-10-CM

## 2019-08-01 DIAGNOSIS — B95.61: ICD-10-CM

## 2019-08-01 DIAGNOSIS — N12: ICD-10-CM

## 2019-08-01 LAB
ALBUMIN SERPL-MCNC: 3.6 G/DL (ref 3.5–5)
ALBUMIN/GLOB SERPL: 0.7 {RATIO} (ref 0.8–2)
ALP SERPL-CCNC: 122 IU/L (ref 40–150)
ALT SERPL-CCNC: 14 IU/L (ref 0–55)
ANION GAP SERPL CALC-SCNC: 18.2 MMOL/L (ref 8–16)
BACTERIA URNS QL MICRO: (no result) /HPF
BASOPHILS # BLD AUTO: 0.1 10*3/UL (ref 0–0.1)
BASOPHILS NFR BLD AUTO: 0.3 % (ref 0–1)
BILIRUB UR QL: NEGATIVE
BUN SERPL-MCNC: 12 MG/DL (ref 7–26)
BUN/CREAT SERPL: 11 (ref 6–25)
CALCIUM SERPL-MCNC: 9.8 MG/DL (ref 8.4–10.2)
CHLORIDE SERPL-SCNC: 104 MMOL/L (ref 98–107)
CLARITY UR: (no result)
CO2 SERPL-SCNC: 21 MMOL/L (ref 22–29)
COLOR UR: (no result)
DEPRECATED NEUTROPHILS # BLD AUTO: 13.1 10*3/UL (ref 2.1–6.9)
DEPRECATED RBC URNS MANUAL-ACNC: (no result) /HPF (ref 0–5)
EGFRCR SERPLBLD CKD-EPI 2021: 54 ML/MIN (ref 60–?)
EOSINOPHIL # BLD AUTO: 0.1 10*3/UL (ref 0–0.4)
EOSINOPHIL NFR BLD AUTO: 0.3 % (ref 0–6)
EPI CELLS URNS QL MICRO: (no result) /LPF
ERYTHROCYTE [DISTWIDTH] IN CORD BLOOD: 16 % (ref 11.7–14.4)
GLOBULIN PLAS-MCNC: 5.5 G/DL (ref 2.3–3.5)
GLUCOSE SERPLBLD-MCNC: 161 MG/DL (ref 74–118)
HCT VFR BLD AUTO: 36.1 % (ref 34.2–44.1)
HGB BLD-MCNC: 11.5 G/DL (ref 12–16)
KETONES UR QL STRIP.AUTO: NEGATIVE
LEUKOCYTE ESTERASE UR QL STRIP.AUTO: (no result)
LYMPHOCYTES # BLD: 1.6 10*3/UL (ref 1–3.2)
LYMPHOCYTES NFR BLD AUTO: 10.1 % (ref 18–39.1)
MCH RBC QN AUTO: 25.6 PG (ref 28–32)
MCHC RBC AUTO-ENTMCNC: 31.9 G/DL (ref 31–35)
MCV RBC AUTO: 80.2 FL (ref 81–99)
MONOCYTES # BLD AUTO: 0.4 10*3/UL (ref 0.2–0.8)
MONOCYTES NFR BLD AUTO: 2.6 % (ref 4.4–11.3)
NEUTS SEG NFR BLD AUTO: 86 % (ref 38.7–80)
NITRITE UR QL STRIP.AUTO: NEGATIVE
PLATELET # BLD AUTO: 390 X10E3/UL (ref 140–360)
POTASSIUM SERPL-SCNC: 3.2 MMOL/L (ref 3.5–5.1)
PROT UR QL STRIP.AUTO: NEGATIVE
RBC # BLD AUTO: 4.5 X10E6/UL (ref 3.6–5.1)
SODIUM SERPL-SCNC: 140 MMOL/L (ref 136–145)
SP GR UR STRIP: 1.01 (ref 1.01–1.02)
UROBILINOGEN UR STRIP-MCNC: 0.2 MG/DL (ref 0.2–1)

## 2019-08-01 PROCEDURE — 99284 EMERGENCY DEPT VISIT MOD MDM: CPT

## 2019-08-01 PROCEDURE — 80048 BASIC METABOLIC PNL TOTAL CA: CPT

## 2019-08-01 PROCEDURE — 80053 COMPREHEN METABOLIC PANEL: CPT

## 2019-08-01 PROCEDURE — 83036 HEMOGLOBIN GLYCOSYLATED A1C: CPT

## 2019-08-01 PROCEDURE — 74018 RADEX ABDOMEN 1 VIEW: CPT

## 2019-08-01 PROCEDURE — 36415 COLL VENOUS BLD VENIPUNCTURE: CPT

## 2019-08-01 PROCEDURE — 82948 REAGENT STRIP/BLOOD GLUCOSE: CPT

## 2019-08-01 PROCEDURE — 87086 URINE CULTURE/COLONY COUNT: CPT

## 2019-08-01 PROCEDURE — 87186 SC STD MICRODIL/AGAR DIL: CPT

## 2019-08-01 PROCEDURE — 85025 COMPLETE CBC W/AUTO DIFF WBC: CPT

## 2019-08-01 PROCEDURE — 81001 URINALYSIS AUTO W/SCOPE: CPT

## 2019-08-01 PROCEDURE — 84443 ASSAY THYROID STIM HORMONE: CPT

## 2019-08-01 RX ADMIN — Medication PRN MG: at 21:50

## 2019-08-01 RX ADMIN — CEFEPIME SCH MLS/HR: 1 INJECTION, SOLUTION INTRAVENOUS at 19:15

## 2019-08-01 RX ADMIN — LINEZOLID SCH MLS/HR: 600 INJECTION, SOLUTION INTRAVENOUS at 20:06

## 2019-08-01 NOTE — XMS REPORT
Continuity of Care Document

                             Created on: 2019



CRISTINA RICH

External Reference #: [id number]

: 1965

Sex: Female



Demographics







                          Address                   [address] [place] [address]  [address]

 

                          Home Phone                [phone]

 

                          Preferred Language        English

 

                          Marital Status            Unknown

 

                          Anabaptist Affiliation     Unknown

 

                          Race                      Unknown

 

                          Ethnic Group              Unknown





Author







                          Author                    [facility]

 

                          Address                   Unknown

 

                          Phone                     Unavailable







Care Team Providers







                    Care Team Member Name    Role                Phone

 

                    [employer]    Unavailable         Unavailable



                                    



Problems

                    





                    Problem                            Status                            Onset Date     

                          Classification                            Date Reported       

                          Comments                            Source                    

 

                                        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    

                                                      2016                                            

                                        [facility]                    

 

                    GEN. PAIN                            Active                            2016   

                                                                                       

                                        [facility]                    

 

                                        Discharge Diagnosis: Urinary tract infection, site not specified                

                                                2016                                     

                    01/10/2016                                                        [facility]                    

 

                    WEAKNESS                            Active                            2016    

                                                                                       

                                        [facility]                    

 

                    MRSA1                            Active                                             

                    Problem                            2016                            Problem

 added by Discern Expert.                            [facility]                  

  



                                                                                
                                                       



Medications

                    





                    Medication                            Details                            Route      

                          Status                            Patient Instructions         

                          Ordering Provider                            Order Date           

                                        Source                    

 

                          Ativan                            1 mg, Route: PO, Drug form: TAB, ONCE, Dosing Weight

 86.364, kg, Priority: STAT, Start date: 16 23:44:00, Stop date: 16 
23:44:00                                                        No Longer Active     

                                                                               2016

                                        [facility]                    

 

                          tramadol hydrochloride 50 MG Oral Tablet                            50 mg=1 tab, PO,

 BID, X 15 day, # 30 tab, 0 Refill(s)                                              

                    Active                                                                       

                          2016                            [facility]                    

 

                          tramadol hydrochloride 50 MG Oral Tablet [Ultram]                            1 - 2

 tabs, PO, Q4-6H, PRN as needed for pain, X 7 day, # 12 tab, 0 Refill(s)        
                                                Active                                 

                                                      2016                        

                                        [facility]                    

 

                                        Sulfamethoxazole 800 MG / Trimethoprim 160 MG Oral Tablet [Bactrim]             

                          1 tab, PO, BID, # 20 tab, 0 Refill(s)                                 

                       Active                                                          

                          2016                            [facility]         

           

 

                          Rocephin                            1 gm, Route: IM, ONCE, Dosing Weight 84.091, kg,

 Start date: 16 17:48:00, Stop date: 16 17:48:00                    
                                                Inactive                                           

                                                2016                            [facility]

                    

 

                          Ondansetron                            4 mg, 2 mL, Route: IVP, Drug form: INJ, ONCE,

 Dosing Weight 84.091, kg, Priority: STAT, Start date: 16 15:36:00, Stop 
date: 16 15:36:00Notes: (Same as: Zofran)   *** MEDICATION WASTE *** 
Product Size:  4 mg Product Wasted:  ___ mg                                        

                    Inactive                                                               

                          2016                            [facility]              

      

 

                          Sodium Chloride 0.154 MEQ/ML Injectable Solution                            1,000 

mL, 1000 ml/hr, Infuse Over: 1 hr, Route: IV, 1,000, Drug form: INJ, ONCE, 
Priority: STAT, Dosing Weight 84.091 kg, Start date: 16 15:36:00, 
Duration: 1 doses or times, Stop date: 16 15:36:00                        
                                                Inactive                                               

                                                2016                            [facility]

                    

 

                          Saline Flush 0.9%                            10 mL, Route: IVP, Drug Form: INJ, Dosing

 Weight 84.091, kg, PRN, PRN Line Flush, Start date: 16 15:36:00, 
Duration: 30 day, Stop date: 16 15:35:00Notes: Same as: BD Posiflush 
Sterile                                                        Inactive              

                                                                            2016       

                                        [facility]                    



                                                                                
                                                                                
                                    



Allergies, Adverse Reactions, Alerts

                    





                    Substance                            Category                            Reaction   

                          Severity                            Reaction type           

                          Status                            Date Reported                     

                          Comments                            Source                    

 

                    diphenhydrAMINE                            Assertion                                

                                                      Drug allergy                     

                    Active                                                                          

                                        [facility]                    



                                                        



Immunizations

        





                                        No Data Provided for This Section



                                    



Results







                    Order Name                            Results                            Value      

                          Reference Range                            Date                

                          Interpretation                            Comments                       

                                        Source                    

 

                    URINE AND STOOL                            UA Urobilinogen     <=1.0 mg/dL           

                          0.1 - 1.0                            2016                     

                                                                            [facility]            

        

 

                    URINE AND STOOL                            UA Protein          100 mg/dL                  

                          Negative mg/dL                            2016                       

                                                                            [facility]              

      

 

                    URINE AND STOOL                            UA Glucose          Negative mg/dL             

                          Negative mg/dL                            2016                  

                                                                            [facility]         

           

 

                    URINE AND STOOL                            UA Ketones          Negative mg/dL             

                          Negative mg/dL                            2016                  

                                                                            [facility]         

           

 

                    URINE AND STOOL                            UA Bili             Negative 

*NA*

                    (16 4:54 PM)                            Negative                            2016

                                                                                    [facility]                    

 

                    URINE AND STOOL                            UA Blood            Moderate 

*ABN*

                    (16 4:54 PM)                            Negative                            2016

                                                                                    [facility]                    

 

                    URINE AND STOOL                            UA Nitrite          Positive 

*ABN*

                    (16 4:54 PM)                            Negative                            2016

                                                                                    [facility]                    

 

                    URINE AND STOOL                            UA Leuk Est         Large 

*ABN*

                    (16 4:54 PM)                            Negative                            2016

                                                                                    [facility]                    

 

                    URINE AND STOOL                            UA Sq Epi           Few /LPF                    

                    Few /LPF                            2016                                 

                                                      [facility]                    

 

                URINE AND STOOL                            UA WBC          >182                            0

 - 5                            2016                                           

                                                      [facility]                    

 

                URINE AND STOOL                            UA RBC          155                            0 

- 2                            2016                                            

                                                      [facility]                    

 

                    URINE AND STOOL                            UA Bacteria         Many /HPF                 

                          None Seen /HPF                            2016                      

                                                                            [facility]             

       

 

                    URINE AND STOOL                            UA WBC Cast         34                        

                    <=0 /LPF                            2016                                     

                                                      [facility]                    

 

                    URINE AND STOOL                            UA Mucus            Few /LPF                     

                    None Seen /LPF                            2016                            

                                                        [facility]                 

   

 

                    URINE AND STOOL                            UA Color            Yellow 

*NA*

                    (16 4:54 PM)                            Yellow                            2016

                                                                                    [facility]                    

 

                    URINE AND STOOL                            UA Turbidity        Marked 

*ABN*

                    (16 4:54 PM)                            Clear                            2016

                                                                                    [facility]                    

 

                URINE AND STOOL                            UA pH           5.0                            5.0

 - 8.0                            2016                                         

                                                      [facility]                    

 

                    URINE AND STOOL                            UA Spec Grav        1.018                    

                    <=1.030                            2016                                  

                                                      [facility]                    

 

                CHEM PANEL                            A/G Ratio       0.6                            0.7 

- 1.6                            2016                                          

                                                      [facility]                    

 

                CHEM PANEL                            Globulin        5.8                            2.0 -

 4.0                            2016                                           

                                                      [facility]                    

 

                CHEM PANEL                            B/C Ratio       12                            6 - 25

                            2016                                               

                                                      [facility]                    

 

                CHEM PANEL                            AGAP            12.6                            10.0 - 20.0

                            2016                                               

                                                      [facility]                    

 

                CHEM PANEL                            Alk Phos        115                            39 - 

136                            2016                                            

                                                      [facility]                    

 

                CHEM PANEL                            AST             28                            0 - 37     

                          2016                                                    

                                                      [facility]                    

 

                CHEM PANEL                            Bili Total      0.3                            0.2

 - 1.3                            2016                                         

                                                      [facility]                    

 

                CHEM PANEL                            eGFR            73                                      

                    2016                                                        Result

 Comment: The eGFR is calculated using the CKD-EPI formula. In most young, 
healthy individuals the eGFR will be >90 mL/min/1.73m2. The eGFR declines with 
age. An eGFR of 60-89 may be normal in some populations, particularly the 
elderly, for whom the CKD-EPI formula has not been extensively validated. Use of
 the eGFR is not recommended in the following populations:<br/><br/>Individuals 
with unstable creatinine concentrations, including pregnant patients and those 
with serious co-morbid conditions.<br/><br/>Patients with extremes in muscle 
mass or diet. <br/><br/>The data above are obtained from the National [employer] 
D[employer] Education Program (NKDEP) which additionally recommends that when the 
eGFR is used in patients with extremes of body mass index for purposes of drug 
dosing, the eGFR should be multiplied by the estimated BMI.                     
                                        [facility]                    

 

                CHEM PANEL                            ALT             25                            0 - 65     

                          2016                                                    

                                                      [facility]                    

 

                CHEM PANEL                            Albumin Lvl     3.4                            3.5

 - 5.0                            2016                                         

                                                      [facility]                    

 

                CHEM PANEL                            Total Protein    9.2                            

6.4 - 8.4                            2016                                      

                                                      [facility]                    

 

                CHEM PANEL                            Calcium Lvl     9.1                            8.5

 - 10.5                            2016                                        

                                                      [facility]                    

 

                CHEM PANEL                            CO2             26                            24 - 32    

                          2016                                                   

                                                      [facility]                    

 

                CHEM PANEL                            Sodium Lvl      140                            135

 - 145                            2016                                         

                                                      [facility]                    

 

                CHEM PANEL                            Chloride Lvl    106                            95

 - 109                            2016                                         

                                                      [facility]                    

 

                CHEM PANEL                            Potassium Lvl    4.6                            

3.5 - 5.1                            2016                                      

                                                      [facility]                    

 

                    CHEM PANEL                            Creatinine Lvl      0.91                        

                    0.50 - 1.40                            2016                                  

                                                      [facility]                    

 

                CHEM PANEL                            BUN             11                            7 - 22     

                          2016                                                    

                                                      [facility]                    

 

                CHEM PANEL                            Glucose Lvl     118                            70

 - 99                            2016                                          

                                                      [facility]                    

 

                    ENDOCRINOLOGY                            S Preg              Negative 

*NA*

                    (16 4:40 PM)                            Negative                            2016

                                                                                    [facility]                    

 

                HEMATOLOGY                            Lymphocytes     32.8                            20.0

 - 40.0                            2016                                        

                                                      [facility]                    

 

                HEMATOLOGY                            Segs            60.0                            45.0 - 75.0

                            2016                                               

                                                      [facility]                    

 

                HEMATOLOGY                            Eosinophils #    0.2                            

0.0 - 0.5                            2016                                      

                                                      [facility]                    

 

                HEMATOLOGY                            Basophils #     0.2                            0.0

 - 0.2                            2016                                         

                                                      [facility]                    

 

                HEMATOLOGY                            Monocytes       4.6                            2.0 

- 12.0                            2016                                         

                                                      [facility]                    

 

                HEMATOLOGY                            Eosinophils     1.2                            0.0

 - 4.0                            2016                                         

                                                      [facility]                    

 

                HEMATOLOGY                            Basophils       1.4                            0.0 

- 1.0                            2016                                          

                                                      [facility]                    

 

                HEMATOLOGY                            Segs-Bands #    7.3                            1.5

 - 8.1                            2016                                         

                                                      [facility]                            Monocytes #     0.6                            0.0

 - 0.8                            2016                                         

                                                      [facility]                    

 

                HEMATOLOGY                            Lymphocytes #    4.0                            

1.0 - 5.5                            2016                                      

                                                      [facility]                    

 

                    HEMATOLOGY                            Plt Morph           Clumped 

                    (16 4:40 PM)                                                        2016  

                                                                                  [facility]                            RBC Morph           Normal 

                    (16 4:40 PM)                                                        2016  

                                                                                  [facility]

                    

 

                HEMATOLOGY                            MPV             8.7                            7.4 - 10.4

                            2016                                               

                                                      [facility]                    

 

                HEMATOLOGY                            WBC             12.0                            3.7 - 10.4

                            2016                                               

                                                      [facility]                    

 

                HEMATOLOGY                            RBC             4.90                            4.20 - 5.40

                            2016                                               

                                                      [facility]                    

 

                HEMATOLOGY                            Hct             39.0                            36.0 - 48.0

                            2016                                               

                                                      [facility]                    

 

                HEMATOLOGY                            MCV             79.7                            80.0 - 98.0

                            2016                                               

                                                      [facility]                    

 

                HEMATOLOGY                            Hgb             12.0                            12.0 - 16.0

                            2016                                               

                                                      [facility]                    

 

                HEMATOLOGY                            RDW             14.8                            11.5 - 14.5

                            2016                                               

                                                      [facility]                    

 

                HEMATOLOGY                            MCHC            30.8                            32.0 - 36.0

                            2016                                               

                                                      [facility]                    

 

                HEMATOLOGY                            Platelet        220                            133 -

 450                            2016                                           

                                                      [facility]                    

 

                HEMATOLOGY                            MCH             24.5                            27.0 - 31.0

                            2016                                               

                                                      [facility]                    



                                                                                
                                                                                
                                                                                
                                                                                
                                                                                
                                                                                
                                                                        



Pathology Reports







                                        No Data Provided for This Section                    



                            



Diagnostic Reports

            





                                        No Data Provided for This Section                    



                                                            



Consultation Notes

                    





                                        No Data Provided for This Section                    



                                                            



Discharge Summaries

                    





                                        No Data Provided for This Section                    



                                                            



History and Physicals

                    





                                        No Data Provided for This Section                    



                                                                



Vital Signs

                     





                    Vital Sign                            Value                            Date         

                          Comments                            Source                    

 

                    Weight                            86.364                             2016     

                                                      [facility]                    

 

                    Temperature Oral (F)                            97.9 F                            2016

                                                        [facility]                 

   

 

                    Respitory Rate                            18                             2016 

                                                       [facility]                  

  

 

                    Heart Rate                            91                             2016     

                                                      [facility]                    

 

                    Systolic (mm Hg)                            140                             2016

                                                        [facility]                 

   

 

                    Diastolic (mm Hg)                            99                             2016

                                                        [facility]                 

   

 

                    Temperature Oral (F)                            98.4 F                            2016

                                                        [facility]                 

   

 

                    Respitory Rate                            18                             2016 

                                                       [facility]                  

  

 

                    Heart Rate                            97                             2016     

                                                      [facility]                    

 

                    Systolic (mm Hg)                            142                             2016

                                                        [facility]                 

   

 

                    Diastolic (mm Hg)                            82                             2016

                                                        [facility]                 

   

 

                    Systolic (mm Hg)                            147                             2016

                                                        [facility]                 

   

 

                    Diastolic (mm Hg)                            93                             2016

                                                        [facility]                 

   

 

                    Weight                            84.091                             2016     

                                                      [facility]                    

 

                    BMI Calculated                            36.21                             2016

                                                        [facility]                 

   

 

                    Height                            152.4 cm                            2016    

                                                      [facility]                    

 

                    Heart Rate                            76                             2016     

                                                      [facility]                    

 

                    Respitory Rate                            18                             2016 

                                                       [facility]                  

  

 

                    Temperature Oral (F)                            98.1 F                            2016

                                                        [facility]                 

   



                                                                                
                                                                                
                                                                                
                                                                                
                                                        



Encounters

                    





                    Location                            Location Details                            Encounter

 Type                            Encounter Number                            Reason For

 Visit                            Attending Provider                            ADM Date

                            DC Date                            Status                

                                        Source                    

 

                          [facility]                            [id number]                 

                                                Parul Gonzálescole                             2016                                               

                                        [facility]                                               

                          [facility]                            [id number]                 

                                                Mohan Curtis                             2016                                               

                                        [facility]                    



                                                                                
    



Procedures

        





                                        No Data Provided for This Section



                                                    



Assessment and Plan

                    





                                        No Data Provided for This Section                    



                                     



Plan of Care







                                        No Data Provided for This Section                    



                                                                



Social History

                    





                    Social History                            Date                            Source    

                

 

                                        Social History TypeResponse

Smoking Status

Never smoker; Exposure to Tobacco Smoke None; Cigarette Smoking Last 365 Days 
No; Reg Smoking Cessation Counseling No

                            2016                            [facility]       

             



                                                                    



Family History

                    





                                        No Data Provided for This Section                    



                                                            



Advance Directives

                    





                                        No Data Provided for This Section                    



                                                            



Functional Status

                    





                                        No Data Provided for This Section

## 2019-08-01 NOTE — DIAGNOSTIC IMAGING REPORT
RADIOGRAPH(S) OF THE ABDOMEN AND PELVIS, 2 view(s)    



HISTORY:  Abdominal pain, right kidney drain



COMPARISON:  CT of the abdomen and pelvis April 20, 2019. Abdominal pelvic

radiographs March 28, 2019.



FINDINGS:

No specific evidence of obstruction or ileus.  

Unchanged appearance of the right nephrostomy tube.

Unchanged appearance of the 2 left-sided ureteral stents.

Unchanged multiple metallic surgical clips within the pelvis.

Presumed Gomez catheter, the tip of the catheter is not definitively visualized

in the region of the urinary bladder.

The bones are partially obscured by stool and overlying bowel gas.

Cholelithiasis.



IMPRESSION: 

1.  Nonobstructive bowel gas pattern.

2.  Unchanged appearance of the right nephrostomy tube and left ureteral

stents.

3.  Cholelithiasis.



Signed by: Dr. Vamsi Yoon D.O., M.M.M. on 8/1/2019 6:37 PM

## 2019-08-01 NOTE — XMS REPORT
Continuity of Care Document

                             Created on: 2019



CRISTINA RICH

External Reference #: [id number]

: 1965

Sex: Female



Demographics







                          Address                   [address] [place] [address]  [id number]

 

                          Home Phone                [phone]

 

                          Preferred Language        English

 

                          Marital Status            Unknown

 

                          Evangelical Affiliation     Unknown

 

                          Race                      Unknown

 

                          Ethnic Group              Unknown





Author







                          Author                    [employer]

 

                          Address                   Unknown

 

                          Phone                     Unavailable







Care Team Providers







                    Care Team Member Name    Role                Phone

 

                    [employer]    Unavailable         Unavailable



                                    



Problems

                    





                    Problem                            Status                            Onset Date     

                          Classification                            Date Reported       

                          Comments                            Source                    

 

                                        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    

                                                      2016                                            

                                        [facility]                    

 

                    GEN. PAIN                            Active                            2016   

                                                                                       

                                        [facility]                    

 

                                        Discharge Diagnosis: Urinary tract infection, site not specified                

                                                2016                                     

                    01/10/2016                                                        [facility]                    

 

                    WEAKNESS                            Active                            2016    

                                                                                       

                                        [facility]                    

 

                    MRSA1                            Active                                             

                    Problem                            2016                            Problem

 added by Discern Expert.                            [facility]                  

  



                                                                                
                                                       



Medications

                    





                    Medication                            Details                            Route      

                          Status                            Patient Instructions         

                          Ordering Provider                            Order Date           

                                        Source                    

 

                          Ativan                            1 mg, Route: PO, Drug form: TAB, ONCE, Dosing Weight

 86.364, kg, Priority: STAT, Start date: 16 23:44:00, Stop date: 16 
23:44:00                                                        No Longer Active     

                                                                               2016

                                        [facility]                    

 

                          tramadol hydrochloride 50 MG Oral Tablet                            50 mg=1 tab, PO,

 BID, X 15 day, # 30 tab, 0 Refill(s)                                              

                    Active                                                                       

                          2016                            [facility]                    

 

                          tramadol hydrochloride 50 MG Oral Tablet [Ultram]                            1 - 2

 tabs, PO, Q4-6H, PRN as needed for pain, X 7 day, # 12 tab, 0 Refill(s)        
                                                Active                                 

                                                      2016                        

                                        [facility]                    

 

                                        Sulfamethoxazole 800 MG / Trimethoprim 160 MG Oral Tablet [Bactrim]             

                          1 tab, PO, BID, # 20 tab, 0 Refill(s)                                 

                       Active                                                          

                          2016                            [facility]         

           

 

                          Rocephin                            1 gm, Route: IM, ONCE, Dosing Weight 84.091, kg,

 Start date: 16 17:48:00, Stop date: 16 17:48:00                    
                                                Inactive                                           

                                                2016                            [facility]

                    

 

                          Ondansetron                            4 mg, 2 mL, Route: IVP, Drug form: INJ, ONCE,

 Dosing Weight 84.091, kg, Priority: STAT, Start date: 16 15:36:00, Stop 
date: 16 15:36:00Notes: (Same as: Zofran)   *** MEDICATION WASTE *** 
Product Size:  4 mg Product Wasted:  ___ mg                                        

                    Inactive                                                               

                          2016                            [facility]              

      

 

                          Sodium Chloride 0.154 MEQ/ML Injectable Solution                            1,000 

mL, 1000 ml/hr, Infuse Over: 1 hr, Route: IV, 1,000, Drug form: INJ, ONCE, 
Priority: STAT, Dosing Weight 84.091 kg, Start date: 16 15:36:00, 
Duration: 1 doses or times, Stop date: 16 15:36:00                        
                                                Inactive                                               

                                                2016                            [facility]

                    

 

                          Saline Flush 0.9%                            10 mL, Route: IVP, Drug Form: INJ, Dosing

 Weight 84.091, kg, PRN, PRN Line Flush, Start date: 16 15:36:00, 
Duration: 30 day, Stop date: 16 15:35:00Notes: Same as: BD Posiflush 
Sterile                                                        Inactive              

                                                                            2016       

                                        [facility]                    



                                                                                
                                                                                
                                    



Allergies, Adverse Reactions, Alerts

                    





                    Substance                            Category                            Reaction   

                          Severity                            Reaction type           

                          Status                            Date Reported                     

                          Comments                            Source                    

 

                    diphenhydrAMINE                            Assertion                                

                                                      Drug allergy                     

                    Active                                                                          

                                        [facility]                    



                                                        



Immunizations

        





                                        No Data Provided for This Section



                                    



Results







                    Order Name                            Results                            Value      

                          Reference Range                            Date                

                          Interpretation                            Comments                       

                                        Source                    

 

                    URINE AND STOOL                            UA Urobilinogen     <=1.0 mg/dL           

                          0.1 - 1.0                            2016                     

                                                                            [facility]            

        

 

                    URINE AND STOOL                            UA Protein          100 mg/dL                  

                          Negative mg/dL                            2016                       

                                                                            [facility]              

      

 

                    URINE AND STOOL                            UA Glucose          Negative mg/dL             

                          Negative mg/dL                            2016                  

                                                                            [facility]         

           

 

                    URINE AND STOOL                            UA Ketones          Negative mg/dL             

                          Negative mg/dL                            2016                  

                                                                            [facility]         

           

 

                    URINE AND STOOL                            UA Bili             Negative 

*NA*

                    (16 4:54 PM)                            Negative                            2016

                                                                                    [facility]                    

 

                    URINE AND STOOL                            UA Blood            Moderate 

*ABN*

                    (16 4:54 PM)                            Negative                            2016

                                                                                    [facility]                    

 

                    URINE AND STOOL                            UA Nitrite          Positive 

*ABN*

                    (16 4:54 PM)                            Negative                            2016

                                                                                    [facility]                    

 

                    URINE AND STOOL                            UA Leuk Est         Large 

*ABN*

                    (16 4:54 PM)                            Negative                            2016

                                                                                    [facility]                    

 

                    URINE AND STOOL                            UA Sq Epi           Few /LPF                    

                    Few /LPF                            2016                                 

                                                      [facility]                    

 

                URINE AND STOOL                            UA WBC          >182                            0

 - 5                            2016                                           

                                                      [facility]                    

 

                URINE AND STOOL                            UA RBC          155                            0 

- 2                            2016                                            

                                                      [facility]                    

 

                    URINE AND STOOL                            UA Bacteria         Many /HPF                 

                          None Seen /HPF                            2016                      

                                                                            [facility]             

       

 

                    URINE AND STOOL                            UA WBC Cast         34                        

                    <=0 /LPF                            2016                                     

                                                      [facility]                    

 

                    URINE AND STOOL                            UA Mucus            Few /LPF                     

                    None Seen /LPF                            2016                            

                                                        [facility]                 

   

 

                    URINE AND STOOL                            UA Color            Yellow 

*NA*

                    (16 4:54 PM)                            Yellow                            2016

                                                                                    [facility]                    

 

                    URINE AND STOOL                            UA Turbidity        Marked 

*ABN*

                    (16 4:54 PM)                            Clear                            2016

                                                                                    [facility]                    

 

                URINE AND STOOL                            UA pH           5.0                            5.0

 - 8.0                            2016                                         

                                                      [facility]                    

 

                    URINE AND STOOL                            UA Spec Grav        1.018                    

                    <=1.030                            2016                                  

                                                      [facility]                    

 

                CHEM PANEL                            A/G Ratio       0.6                            0.7 

- 1.6                            2016                                          

                                                      [facility]                    

 

                CHEM PANEL                            Globulin        5.8                            2.0 -

 4.0                            2016                                           

                                                      [facility]                    

 

                CHEM PANEL                            B/C Ratio       12                            6 - 25

                            2016                                               

                                                      [facility]                    

 

                CHEM PANEL                            AGAP            12.6                            10.0 - 20.0

                            2016                                               

                                                      [facility]                    

 

                CHEM PANEL                            Alk Phos        115                            39 - 

136                            2016                                            

                                                      [facility]                    

 

                CHEM PANEL                            AST             28                            0 - 37     

                          2016                                                    

                                                      [facility]                    

 

                CHEM PANEL                            Bili Total      0.3                            0.2

 - 1.3                            2016                                         

                                                      [facility]                    

 

                CHEM PANEL                            eGFR            73                                      

                    2016                                                        Result

 Comment: The eGFR is calculated using the CKD-EPI formula. In most young, 
healthy individuals the eGFR will be >90 mL/min/1.73m2. The eGFR declines with 
age. An eGFR of 60-89 may be normal in some populations, particularly the 
elderly, for whom the CKD-EPI formula has not been extensively validated. Use of
 the eGFR is not recommended in the following populations:<br/><br/>Individuals 
with unstable creatinine concentrations, including pregnant patients and those 
with serious co-morbid conditions.<br/><br/>Patients with extremes in muscle 
mass or diet. <br/><br/>The data above are obtained from the [employer] 
D[employer] Education Program (NKDEP) which additionally recommends that when the 
eGFR is used in patients with extremes of body mass index for purposes of drug 
dosing, the eGFR should be multiplied by the estimated BMI.                     
                                        [facility]                    

 

                CHEM PANEL                            ALT             25                            0 - 65     

                          2016                                                    

                                                      [facility]                    

 

                CHEM PANEL                            Albumin Lvl     3.4                            3.5

 - 5.0                            2016                                         

                                                      [facility]                    

 

                CHEM PANEL                            Total Protein    9.2                            

6.4 - 8.4                            2016                                      

                                                      [facility]                    

 

                CHEM PANEL                            Calcium Lvl     9.1                            8.5

 - 10.5                            2016                                        

                                                      [facility]                    

 

                CHEM PANEL                            CO2             26                            24 - 32    

                          2016                                                   

                                                      [facility]                    

 

                CHEM PANEL                            Sodium Lvl      140                            135

 - 145                            2016                                         

                                                      [facility]                    

 

                CHEM PANEL                            Chloride Lvl    106                            95

 - 109                            2016                                         

                                                      [facility]                    

 

                CHEM PANEL                            Potassium Lvl    4.6                            

3.5 - 5.1                            2016                                      

                                                      [facility]                    

 

                    CHEM PANEL                            Creatinine Lvl      0.91                        

                    0.50 - 1.40                            2016                                  

                                                      [facility]                    

 

                CHEM PANEL                            BUN             11                            7 - 22     

                          2016                                                    

                                                      [facility]                    

 

                CHEM PANEL                            Glucose Lvl     118                            70

 - 99                            2016                                          

                                                      [facility]                    

 

                    ENDOCRINOLOGY                            S Preg              Negative 

*NA*

                    (16 4:40 PM)                            Negative                            2016

                                                                                    [facility]                    

 

                HEMATOLOGY                            Lymphocytes     32.8                            20.0

 - 40.0                            2016                                        

                                                      [facility]                    

 

                HEMATOLOGY                            Segs            60.0                            45.0 - 75.0

                            2016                                               

                                                      [facility]                    

 

                HEMATOLOGY                            Eosinophils #    0.2                            

0.0 - 0.5                            2016                                      

                                                      [facility]                    

 

                HEMATOLOGY                            Basophils #     0.2                            0.0

 - 0.2                            2016                                         

                                                      [facility]                    

 

                HEMATOLOGY                            Monocytes       4.6                            2.0 

- 12.0                            2016                                         

                                                      [facility]                    

 

                HEMATOLOGY                            Eosinophils     1.2                            0.0

 - 4.0                            2016                                         

                                                      [facility]                    

 

                HEMATOLOGY                            Basophils       1.4                            0.0 

- 1.0                            2016                                          

                                                      [facility]                    

 

                HEMATOLOGY                            Segs-Bands #    7.3                            1.5

 - 8.1                            2016                                         

                                                      [facility]                            Monocytes #     0.6                            0.0

 - 0.8                            2016                                         

                                                      [facility]                    

 

                HEMATOLOGY                            Lymphocytes #    4.0                            

1.0 - 5.5                            2016                                      

                                                      [facility]                    

 

                    HEMATOLOGY                            Plt Morph           Clumped 

                    (16 4:40 PM)                                                        2016  

                                                                                  [facility]                            RBC Morph           Normal 

                    (16 4:40 PM)                                                        2016  

                                                                                  [facility]

                    

 

                HEMATOLOGY                            MPV             8.7                            7.4 - 10.4

                            2016                                               

                                                      [facility]                    

 

                HEMATOLOGY                            WBC             12.0                            3.7 - 10.4

                            2016                                               

                                                      [facility]                    

 

                HEMATOLOGY                            RBC             4.90                            4.20 - 5.40

                            2016                                               

                                                      [facility]                    

 

                HEMATOLOGY                            Hct             39.0                            36.0 - 48.0

                            2016                                               

                                                      [facility]                    

 

                HEMATOLOGY                            MCV             79.7                            80.0 - 98.0

                            2016                                               

                                                      [facility]                    

 

                HEMATOLOGY                            Hgb             12.0                            12.0 - 16.0

                            2016                                               

                                                      [facility]                    

 

                HEMATOLOGY                            RDW             14.8                            11.5 - 14.5

                            2016                                               

                                                      [facility]                    

 

                HEMATOLOGY                            MCHC            30.8                            32.0 - 36.0

                            2016                                               

                                                      [facility]                    

 

                HEMATOLOGY                            Platelet        220                            133 -

 450                            2016                                           

                                                      [facility]                    

 

                HEMATOLOGY                            MCH             24.5                            27.0 - 31.0

                            2016                                               

                                                      [facility]                    



                                                                                
                                                                                
                                                                                
                                                                                
                                                                                
                                                                                
                                                                        



Pathology Reports







                                        No Data Provided for This Section                    



                            



Diagnostic Reports

            





                                        No Data Provided for This Section                    



                                                            



Consultation Notes

                    





                                        No Data Provided for This Section                    



                                                            



Discharge Summaries

                    





                                        No Data Provided for This Section                    



                                                            



History and Physicals

                    





                                        No Data Provided for This Section                    



                                                                



Vital Signs

                     





                    Vital Sign                            Value                            Date         

                          Comments                            Source                    

 

                    Weight                            86.364                             2016     

                                                      [facility]                    

 

                    Temperature Oral (F)                            97.9 F                            2016

                                                        [facility]                 

   

 

                    Respitory Rate                            18                             2016 

                                                       [facility]                  

  

 

                    Heart Rate                            91                             2016     

                                                      [facility]                    

 

                    Systolic (mm Hg)                            140                             2016

                                                        [facility]                 

   

 

                    Diastolic (mm Hg)                            99                             2016

                                                        [facility]                 

   

 

                    Temperature Oral (F)                            98.4 F                            2016

                                                        [facility]                 

   

 

                    Respitory Rate                            18                             2016 

                                                       [facility]                  

  

 

                    Heart Rate                            97                             2016     

                                                      [facility]                    

 

                    Systolic (mm Hg)                            142                             2016

                                                        [facility]                 

   

 

                    Diastolic (mm Hg)                            82                             2016

                                                        [facility]                 

   

 

                    Systolic (mm Hg)                            147                             2016

                                                        [facility]                 

   

 

                    Diastolic (mm Hg)                            93                             2016

                                                        [facility]                 

   

 

                    Weight                            84.091                             2016     

                                                      [facility]                    

 

                    BMI Calculated                            36.21                             2016

                                                        [facility]                 

   

 

                    Height                            152.4 cm                            2016    

                                                      [facility]                    

 

                    Heart Rate                            76                             2016     

                                                      [facility]                    

 

                    Respitory Rate                            18                             2016 

                                                       [facility]                  

  

 

                    Temperature Oral (F)                            98.1 F                            2016

                                                        [facility]                 

   



                                                                                
                                                                                
                                                                                
                                                                                
                                                        



Encounters

                    





                    Location                            Location Details                            Encounter

 Type                            Encounter Number                            Reason For

 Visit                            Attending Provider                            ADM Date

                            DC Date                            Status                

                                        Source                    

 

                          [facility]                            [id number]                 

                                                Parul Gonzálescole                             2016                                               

                                        [facility]                                               

                          [facility]                            [id number]                 

                                                Mohan Curtis                             2016                                               

                                        [facility]                    



                                                                                
    



Procedures

        





                                        No Data Provided for This Section



                                                    



Assessment and Plan

                    





                                        No Data Provided for This Section                    



                                     



Plan of Care







                                        No Data Provided for This Section                    



                                                                



Social History

                    





                    Social History                            Date                            Source    

                

 

                                        Social History TypeResponse

Smoking Status

Never smoker; Exposure to Tobacco Smoke None; Cigarette Smoking Last 365 Days 
No; Reg Smoking Cessation Counseling No

                            2016                            [facility]       

             



                                                                    



Family History

                    





                                        No Data Provided for This Section                    



                                                            



Advance Directives

                    





                                        No Data Provided for This Section                    



                                                            



Functional Status

                    





                                        No Data Provided for This Section

## 2019-08-02 VITALS — SYSTOLIC BLOOD PRESSURE: 127 MMHG | DIASTOLIC BLOOD PRESSURE: 71 MMHG

## 2019-08-02 VITALS — DIASTOLIC BLOOD PRESSURE: 72 MMHG | SYSTOLIC BLOOD PRESSURE: 138 MMHG

## 2019-08-02 VITALS — SYSTOLIC BLOOD PRESSURE: 138 MMHG | DIASTOLIC BLOOD PRESSURE: 72 MMHG

## 2019-08-02 VITALS — DIASTOLIC BLOOD PRESSURE: 71 MMHG | SYSTOLIC BLOOD PRESSURE: 127 MMHG

## 2019-08-02 LAB
ALBUMIN SERPL-MCNC: 2.9 G/DL (ref 3.5–5)
ALBUMIN/GLOB SERPL: 0.6 {RATIO} (ref 0.8–2)
ALP SERPL-CCNC: 95 IU/L (ref 40–150)
ALT SERPL-CCNC: 12 IU/L (ref 0–55)
ANION GAP SERPL CALC-SCNC: 13.2 MMOL/L (ref 8–16)
BASOPHILS # BLD AUTO: 0 10*3/UL (ref 0–0.1)
BASOPHILS NFR BLD AUTO: 0.5 % (ref 0–1)
BUN SERPL-MCNC: 13 MG/DL (ref 7–26)
BUN/CREAT SERPL: 13 (ref 6–25)
CALCIUM SERPL-MCNC: 9.1 MG/DL (ref 8.4–10.2)
CHLORIDE SERPL-SCNC: 107 MMOL/L (ref 98–107)
CO2 SERPL-SCNC: 22 MMOL/L (ref 22–29)
DEPRECATED NEUTROPHILS # BLD AUTO: 6.3 10*3/UL (ref 2.1–6.9)
EGFRCR SERPLBLD CKD-EPI 2021: 58 ML/MIN (ref 60–?)
EOSINOPHIL # BLD AUTO: 0.1 10*3/UL (ref 0–0.4)
EOSINOPHIL NFR BLD AUTO: 1.4 % (ref 0–6)
ERYTHROCYTE [DISTWIDTH] IN CORD BLOOD: 16 % (ref 11.7–14.4)
GLOBULIN PLAS-MCNC: 4.6 G/DL (ref 2.3–3.5)
GLUCOSE SERPLBLD-MCNC: 153 MG/DL (ref 74–118)
HCT VFR BLD AUTO: 32.7 % (ref 34.2–44.1)
HGB BLD-MCNC: 10 G/DL (ref 12–16)
LYMPHOCYTES # BLD: 1.8 10*3/UL (ref 1–3.2)
LYMPHOCYTES NFR BLD AUTO: 20.4 % (ref 18–39.1)
MCH RBC QN AUTO: 25.3 PG (ref 28–32)
MCHC RBC AUTO-ENTMCNC: 30.6 G/DL (ref 31–35)
MCV RBC AUTO: 82.6 FL (ref 81–99)
MONOCYTES # BLD AUTO: 0.6 10*3/UL (ref 0.2–0.8)
MONOCYTES NFR BLD AUTO: 6.3 % (ref 4.4–11.3)
NEUTS SEG NFR BLD AUTO: 70.8 % (ref 38.7–80)
PLATELET # BLD AUTO: 307 X10E3/UL (ref 140–360)
POTASSIUM SERPL-SCNC: 3.2 MMOL/L (ref 3.5–5.1)
RBC # BLD AUTO: 3.96 X10E6/UL (ref 3.6–5.1)
SODIUM SERPL-SCNC: 139 MMOL/L (ref 136–145)

## 2019-08-02 RX ADMIN — LINEZOLID SCH MLS/HR: 600 INJECTION, SOLUTION INTRAVENOUS at 07:33

## 2019-08-02 RX ADMIN — SODIUM CHLORIDE PRN MG: 900 INJECTION INTRAVENOUS at 18:43

## 2019-08-02 RX ADMIN — SODIUM CHLORIDE PRN MG: 900 INJECTION INTRAVENOUS at 04:04

## 2019-08-02 RX ADMIN — Medication PRN MG: at 13:48

## 2019-08-02 RX ADMIN — Medication PRN MG: at 18:43

## 2019-08-02 RX ADMIN — LINEZOLID SCH MLS/HR: 600 INJECTION, SOLUTION INTRAVENOUS at 19:10

## 2019-08-02 RX ADMIN — SODIUM CHLORIDE PRN MG: 900 INJECTION INTRAVENOUS at 13:48

## 2019-08-02 RX ADMIN — CEFEPIME SCH MLS/HR: 1 INJECTION, SOLUTION INTRAVENOUS at 06:42

## 2019-08-02 RX ADMIN — CEFEPIME SCH MLS/HR: 1 INJECTION, SOLUTION INTRAVENOUS at 18:10

## 2019-08-02 RX ADMIN — Medication PRN MG: at 04:04

## 2019-08-02 NOTE — NUR
Pt complained of hunger, asked for diet change. Pt denies n/v/d. No procedures scheduled. 
Dr. Elias ordered new ADA diet.

## 2019-08-02 NOTE — CONSULTATION
DATE OF CONSULTATION:  08/02/2019  

 

REASON FOR CONSULTATION:  Suprapubic pain, abdominal pain.

 

HISTORY OF PRESENT ILLNESS:  This patient, who is a 53-year-old  female,

history of recurrent UTI, history of multidrug-resistant pathogen.  The patient comes

into the emergency room with suprapubic pain, not feeling well.  No fever, no chills,

but she had similar pain, whenever she has cystitis.  The patient is known to have a

history of colonization with multidrug-resistant pathogen before including gram-negative

and Enterococcus, where I consulted.  Discussed with the ER physician.  Recommend to put

her on cefepime and Zyvox in the meantime.  The patient is going to be admitted because

of severity of the pain.  The patient has history of UTI, history of nephrostomy on the

right, history stent placement, history of hydronephrosis.  She is well known to Dr. Rachel and discussed the case with him in the meantime. 

 

The patient to have excessive history of UTI before and complication.

 

PAST SURGICAL HISTORY:  Stent placement as mentioned above.

 

ALLERGIES:  NKA.

 

SOCIAL HISTORY:  Currently, there is no smoking, drug abuse, or alcohol abuse.

 

FAMILY HISTORY:  Hypertension.

 

REVIEW OF SYSTEMS:

HEENT:  There is no headache, visual changes, or hearing changes. 

GI:  There is no nausea, no vomiting, no diarrhea. 

CARDIAC:  There is no arrhythmia. 

:  There is urgency.  There is frequency, suprapubic pain. 

SKIN:  There is no rash. 

 

All other symptoms within normal limit.

 

MEDICATION LIST:  Reviewed.  She is currently on linezolid, Zofran, and cefepime.

 

LABORATORY DATA:  Reviewed.  Sodium when she first came was __________, hemoglobin 11.5,

sodium 140, potassium 3.2, and creatinine 1.06. 

 

IMPRESSION:  

1. Urinary tract infection, probably pyelonephritis __________ colonization with

multidrug-resistant.  We will put the patient on cefepime and Zyvox. 

2. She is going to await the final result.  Recheck CBC.  Recheck chem panel.

3. Suprapubic pain.

4. History of stent, history of urinary tract infection before.  Discussed with Urology.

 May 

need an ultrasound of the kidneys.  We will follow with you.  Recheck CBC.  Recheck chem

panel. 

 

 

 

 

______________________________

MD MATT Lopez

D:  08/02/2019 15:28:53

T:  08/02/2019 19:34:34

Job #:  [id number]

## 2019-08-02 NOTE — NUR
Bedside rounds completed with morning nurse. Pt alert to name. Lying in bed HOB 60 degrees. 
Pt denies pain at this time. Bed low and locked. Call bell within reach. Will continue to 
monitor.

-------------------------------------------------------------------------------

Addendum: 08/02/19 at 2135 by Richie Simpson RN

-------------------------------------------------------------------------------

time 1900

## 2019-08-02 NOTE — NUR
rounded with night shift nurse, patient aware of change and resting comfortably. call bell 
within reach and bed in lowest position.

## 2019-08-02 NOTE — NUR
patient arrived to unit via stretcher, alert and oriented. call bell within reach and bed in 
lowest position

## 2019-08-03 VITALS — DIASTOLIC BLOOD PRESSURE: 66 MMHG | SYSTOLIC BLOOD PRESSURE: 114 MMHG

## 2019-08-03 VITALS — SYSTOLIC BLOOD PRESSURE: 115 MMHG | DIASTOLIC BLOOD PRESSURE: 65 MMHG

## 2019-08-03 VITALS — DIASTOLIC BLOOD PRESSURE: 64 MMHG | SYSTOLIC BLOOD PRESSURE: 105 MMHG

## 2019-08-03 VITALS — DIASTOLIC BLOOD PRESSURE: 70 MMHG | SYSTOLIC BLOOD PRESSURE: 122 MMHG

## 2019-08-03 VITALS — SYSTOLIC BLOOD PRESSURE: 126 MMHG | DIASTOLIC BLOOD PRESSURE: 74 MMHG

## 2019-08-03 VITALS — SYSTOLIC BLOOD PRESSURE: 112 MMHG | DIASTOLIC BLOOD PRESSURE: 62 MMHG

## 2019-08-03 VITALS — SYSTOLIC BLOOD PRESSURE: 114 MMHG | DIASTOLIC BLOOD PRESSURE: 66 MMHG

## 2019-08-03 RX ADMIN — Medication PRN MG: at 00:40

## 2019-08-03 RX ADMIN — LINEZOLID SCH MLS/HR: 600 INJECTION, SOLUTION INTRAVENOUS at 18:21

## 2019-08-03 RX ADMIN — DICYCLOMINE HYDROCHLORIDE SCH MG: 20 TABLET ORAL at 10:10

## 2019-08-03 RX ADMIN — CEFEPIME SCH MLS/HR: 1 INJECTION, SOLUTION INTRAVENOUS at 05:58

## 2019-08-03 RX ADMIN — LISINOPRIL SCH MG: 2.5 TABLET ORAL at 10:11

## 2019-08-03 RX ADMIN — CEFEPIME SCH MLS/HR: 1 INJECTION, SOLUTION INTRAVENOUS at 17:35

## 2019-08-03 RX ADMIN — Medication PRN MG: at 16:50

## 2019-08-03 RX ADMIN — LINEZOLID SCH MLS/HR: 600 INJECTION, SOLUTION INTRAVENOUS at 06:30

## 2019-08-03 NOTE — NUR
REC'D PT AAOX3, LAYING IN BED ON ROOM AIR WITH NO S/S OF DISTRESS, IV TO THE LEFT HAND 
INTACT AND PATENT, NOLASCO HANGING BELOW BLADDER, NEPHROSTOMY BAG TO RIGHT SIDE AND URINE 
FLOWING, AND FLUIDS RUNNING. SIDE RAILS UPX2, CALL BELL WITHIN REACH, AND BED IN LOWEST 
POSITION.

## 2019-08-03 NOTE — NUR
PT IS LAYING IN SEMI-FOWLERS POSITION WITH NO S/S OF DISTRESS. BED IN LOWEST POSITION, SIDE 
RAILS UPX2, AND CALL BELL WITHIN REACH.

## 2019-08-03 NOTE — NUR
BS rounds completed with morning nurse. Pt alert to name. Lying in bed HOB 45 degrees. 
Denies pain at this time. Call bell within reach. Will continue to monitor.

-------------------------------------------------------------------------------

Addendum: 08/03/19 at 2122 by Richie Simpson RN

-------------------------------------------------------------------------------

[id number]

## 2019-08-04 VITALS — SYSTOLIC BLOOD PRESSURE: 123 MMHG | DIASTOLIC BLOOD PRESSURE: 59 MMHG

## 2019-08-04 VITALS — DIASTOLIC BLOOD PRESSURE: 61 MMHG | SYSTOLIC BLOOD PRESSURE: 110 MMHG

## 2019-08-04 VITALS — DIASTOLIC BLOOD PRESSURE: 66 MMHG | SYSTOLIC BLOOD PRESSURE: 114 MMHG

## 2019-08-04 VITALS — SYSTOLIC BLOOD PRESSURE: 114 MMHG | DIASTOLIC BLOOD PRESSURE: 66 MMHG

## 2019-08-04 VITALS — SYSTOLIC BLOOD PRESSURE: 147 MMHG | DIASTOLIC BLOOD PRESSURE: 78 MMHG

## 2019-08-04 VITALS — DIASTOLIC BLOOD PRESSURE: 67 MMHG | SYSTOLIC BLOOD PRESSURE: 119 MMHG

## 2019-08-04 VITALS — DIASTOLIC BLOOD PRESSURE: 70 MMHG | SYSTOLIC BLOOD PRESSURE: 121 MMHG

## 2019-08-04 LAB
ANION GAP SERPL CALC-SCNC: 14.1 MMOL/L (ref 8–16)
BASOPHILS # BLD AUTO: 0 10*3/UL (ref 0–0.1)
BASOPHILS NFR BLD AUTO: 0.3 % (ref 0–1)
BUN SERPL-MCNC: 11 MG/DL (ref 7–26)
BUN/CREAT SERPL: 10 (ref 6–25)
CALCIUM SERPL-MCNC: 9.2 MG/DL (ref 8.4–10.2)
CHLORIDE SERPL-SCNC: 106 MMOL/L (ref 98–107)
CO2 SERPL-SCNC: 23 MMOL/L (ref 22–29)
DEPRECATED NEUTROPHILS # BLD AUTO: 6.9 10*3/UL (ref 2.1–6.9)
EGFRCR SERPLBLD CKD-EPI 2021: 49 ML/MIN (ref 60–?)
EOSINOPHIL # BLD AUTO: 0.2 10*3/UL (ref 0–0.4)
EOSINOPHIL NFR BLD AUTO: 1.8 % (ref 0–6)
ERYTHROCYTE [DISTWIDTH] IN CORD BLOOD: 15.9 % (ref 11.7–14.4)
GLUCOSE SERPLBLD-MCNC: 172 MG/DL (ref 74–118)
HCT VFR BLD AUTO: 30.4 % (ref 34.2–44.1)
HGB BLD-MCNC: 9.3 G/DL (ref 12–16)
LYMPHOCYTES # BLD: 1.8 10*3/UL (ref 1–3.2)
LYMPHOCYTES NFR BLD AUTO: 19.2 % (ref 18–39.1)
MCH RBC QN AUTO: 24.9 PG (ref 28–32)
MCHC RBC AUTO-ENTMCNC: 30.6 G/DL (ref 31–35)
MCV RBC AUTO: 81.3 FL (ref 81–99)
MONOCYTES # BLD AUTO: 0.5 10*3/UL (ref 0.2–0.8)
MONOCYTES NFR BLD AUTO: 5.2 % (ref 4.4–11.3)
NEUTS SEG NFR BLD AUTO: 73.2 % (ref 38.7–80)
PLATELET # BLD AUTO: 279 X10E3/UL (ref 140–360)
POTASSIUM SERPL-SCNC: 3.1 MMOL/L (ref 3.5–5.1)
RBC # BLD AUTO: 3.74 X10E6/UL (ref 3.6–5.1)
SODIUM SERPL-SCNC: 140 MMOL/L (ref 136–145)

## 2019-08-04 RX ADMIN — Medication PRN MG: at 06:20

## 2019-08-04 RX ADMIN — LISINOPRIL SCH MG: 2.5 TABLET ORAL at 08:31

## 2019-08-04 RX ADMIN — DICYCLOMINE HYDROCHLORIDE SCH MG: 20 TABLET ORAL at 08:31

## 2019-08-04 RX ADMIN — CEFEPIME SCH MLS/HR: 1 INJECTION, SOLUTION INTRAVENOUS at 06:15

## 2019-08-04 RX ADMIN — LINEZOLID SCH MLS/HR: 600 INJECTION, SOLUTION INTRAVENOUS at 07:09

## 2019-08-04 NOTE — NUR
Completed BS rounds with morning nurse. Pt awake and oriented to name. Denies pain at this 
time. Call bell within reach. Will continue to monitor.

## 2019-08-04 NOTE — NUR
PT IS LAYING IN BED TALKING ON THE PHONE. NO S/S OF DISTRESS. BED IN LOWEST POSITION, SIDE 
RAILS UPX2, AND CALL BELL WITHIN REACH.

## 2019-08-04 NOTE — NUR
PATIENT IS LAYING IN BED RESTING WITH NO S/S OF DISTRESS. BED IN LOWEST POSITION, SIDE RAILS 
UPX2, AND CALL BELL WITHIN REACH.

## 2019-08-04 NOTE — NUR
REC'D PT AAOX3, ON ROOM AIR, NO S/S OF DISTRESS, RIGHT NEPHROSTOMY BAG DRAINING YELLOW 
URINE, LEG BAG  ATTACHED TO NOLASCO CATHETER. IV TO THE RIGHT HAND 20 GAUGE INTACT AND PATENT. 
SIDE RAILS UPX2, CALL BELL WITHIN REACH, AND BED IN LOWEST POSITION.

## 2019-08-05 VITALS — DIASTOLIC BLOOD PRESSURE: 84 MMHG | SYSTOLIC BLOOD PRESSURE: 138 MMHG

## 2019-08-05 VITALS — DIASTOLIC BLOOD PRESSURE: 71 MMHG | SYSTOLIC BLOOD PRESSURE: 136 MMHG

## 2019-08-05 VITALS — DIASTOLIC BLOOD PRESSURE: 61 MMHG | SYSTOLIC BLOOD PRESSURE: 127 MMHG

## 2019-08-05 VITALS — SYSTOLIC BLOOD PRESSURE: 138 MMHG | DIASTOLIC BLOOD PRESSURE: 84 MMHG

## 2019-08-05 VITALS — SYSTOLIC BLOOD PRESSURE: 147 MMHG | DIASTOLIC BLOOD PRESSURE: 72 MMHG

## 2019-08-05 RX ADMIN — DICYCLOMINE HYDROCHLORIDE SCH MG: 20 TABLET ORAL at 08:21

## 2019-08-05 RX ADMIN — Medication PRN MG: at 08:55

## 2019-08-05 RX ADMIN — SODIUM CHLORIDE PRN MG: 900 INJECTION INTRAVENOUS at 08:56

## 2019-08-05 RX ADMIN — LISINOPRIL SCH MG: 2.5 TABLET ORAL at 08:21

## 2019-08-05 NOTE — NUR
PATIENT DISCHARGE HOME-  PATIENT OFF THE UNIT AT 1230 PER WHEELCHAIR ACCOMPANIED BY STAFF 
MEMBER TO THE PATIENT'S PERSONAL VEHICLE. PATIENT IN STABLE CONDITION WITH NO S/S OF 
RESPIRATORY DISTRESS. NO PAIN VOICED. IV REMOVED WITH TIP INTACT. RIGHT NEPHROSTOMY INTACT 
AND DRAINING; NOLASCO INTACT AND DRAINING TO A LEG BAG. DISCHARGE TEACHING, INSTRUCTIONS, AND 
MEDICATIONS GIVEN TO THE PATIENT. ALL PERSONAL ITEMS TAKEN WITH THE PATIENT.

## 2019-08-06 NOTE — DISCHARGE SUMMARY
CONSULTANTS:  

1. Jenifer Hinton MD.

2. Goldy Rachel MD.

 

FINAL DIAGNOSES:  

1. Complicated urinary tract infection associated with right flank pain, right

nephrostomy tube, and left ureteral stents. 

2. Hydronephrosis, chronic.

3. Staphylococcus aureus bacteria sensitive to cephalosporin.

 

SUMMARY:  This is a 53-year-old female with right nephrostomy tube and left ureteral

stent.  The patient has stent and nephrostomy tube removal on August 23rd.  The patient

came in with complicated urinary tract infection.  The patient is otherwise stable.  The

urine culture grew out to be Staphylococcus aureus.  It is sensitive to cephalosporin

and currently the patient is on Ancef.  The patient is otherwise stable.  She will go

home today.  Follow up with Dr. Rachel for removal of the stent and nephrostomy tube in

approximately 3 weeks.  The patient will take Keflex 250 mg t.i.d. for 21 day and then

follow up with Dr. Rachel.  We will give the patient 

Tylenol No. 3, Colace, and Zofran as needed as well.  The patient is otherwise stable,

discharged home today and the patient to follow up with Dr. Rachel as instructed. 

 

 

 

 

______________________________

MD ALEX Knott/MALORIE

D:  08/05/2019 08:50:04

T:  08/06/2019 07:14:44

Job #:  [id number]

## 2019-08-23 ENCOUNTER — HOSPITAL ENCOUNTER (OUTPATIENT)
Dept: HOSPITAL 88 - OR | Age: 54
Discharge: HOME | End: 2019-08-23
Attending: UROLOGY
Payer: COMMERCIAL

## 2019-08-23 VITALS — SYSTOLIC BLOOD PRESSURE: 148 MMHG | DIASTOLIC BLOOD PRESSURE: 69 MMHG

## 2019-08-23 DIAGNOSIS — N81.6: ICD-10-CM

## 2019-08-23 DIAGNOSIS — Z01.810: ICD-10-CM

## 2019-08-23 DIAGNOSIS — N31.9: ICD-10-CM

## 2019-08-23 DIAGNOSIS — N13.1: ICD-10-CM

## 2019-08-23 DIAGNOSIS — I10: ICD-10-CM

## 2019-08-23 DIAGNOSIS — N95.2: ICD-10-CM

## 2019-08-23 DIAGNOSIS — N13.4: ICD-10-CM

## 2019-08-23 DIAGNOSIS — Z46.6: Primary | ICD-10-CM

## 2019-08-23 PROCEDURE — 74420 UROGRAPHY RTRGR +-KUB: CPT

## 2019-08-23 PROCEDURE — 74018 RADEX ABDOMEN 1 VIEW: CPT

## 2019-08-23 PROCEDURE — 52341 CYSTO W/URETER STRICTURE TX: CPT

## 2019-08-23 PROCEDURE — 52332 CYSTOSCOPY AND TREATMENT: CPT

## 2019-08-23 PROCEDURE — 93005 ELECTROCARDIOGRAM TRACING: CPT

## 2019-08-23 NOTE — DIAGNOSTIC IMAGING REPORT
Exam: Abdominal film 



Clinical History: Urolithiasis, stents



Comparison: Abdominal radiograph dated 8/1/2019, CT of the abdomen and pelvis

dated 4/20/2019



DISCUSSION: 

Right percutaneous nephrostomy and dual parallel left internal ureteral stents

are again noted. 

There is unchanged 5 mm calcific density overlying the region of the right

ureterovesicular junction.



Bowel gas pattern is nonobstructive. Regional skeletal structures are intact. 



A calcified gallstone is again identified projecting of the right upper

quadrant.



IMPRESSION:



Right percutaneous nephrostomy catheter and parallel left internal ureteral

stents are unchanged in position. Unchanged 5 mm calcific density in the

expected region of the right ureterovesicular junction.



Signed by: Stephen E Dreyer, MD on 8/23/2019 7:56 AM

## 2019-08-23 NOTE — XMS REPORT
Continuity of Care Document

                             Created on: 2019



CRISTINA RICH

External Reference #: [id number]

: 1965

Sex: Female



Demographics







                          Address                   [address] [place] [address]  [id number]

 

                          Home Phone                [phone]

 

                          Preferred Language        English

 

                          Marital Status            Unknown

 

                          Anglican Affiliation     Unknown

 

                          Race                      Unknown

 

                          Ethnic Group              Unknown





Author







                          Author                    [employer]

 

                          Address                   Unknown

 

                          Phone                     Unavailable







Care Team Providers







                    Care Team Member Name    Role                Phone

 

                    [employer]    Unavailable         Unavailable



                                    



Problems

                    





                    Problem                            Status                            Onset Date     

                          Classification                            Date Reported       

                          Comments                            Source                    

 

                                        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    

                                                      2016                                            

                                        [facility]                    

 

                    GEN. PAIN                            Active                            2016   

                                                                                       

                                        [facility]                    

 

                                        Discharge Diagnosis: Urinary tract infection, site not specified                

                                                2016                                     

                    01/10/2016                                                        [facility]                    

 

                    WEAKNESS                            Active                            2016    

                                                                                       

                                        [facility]                    

 

                    MRSA1                            Active                                             

                    Problem                            2016                            Problem

 added by Discern Expert.                            [facility]                  

  



                                                                                
                                                       



Medications

                    





                    Medication                            Details                            Route      

                          Status                            Patient Instructions         

                          Ordering Provider                            Order Date           

                                        Source                    

 

                          Ativan                            1 mg, Route: PO, Drug form: TAB, ONCE, Dosing Weight

 86.364, kg, Priority: STAT, Start date: 16 23:44:00, Stop date: 16 
23:44:00                                                        No Longer Active     

                                                                               2016

                                        [facility]                    

 

                          tramadol hydrochloride 50 MG Oral Tablet                            50 mg=1 tab, PO,

 BID, X 15 day, # 30 tab, 0 Refill(s)                                              

                    Active                                                                       

                          2016                            [facility]                    

 

                          tramadol hydrochloride 50 MG Oral Tablet [Ultram]                            1 - 2

 tabs, PO, Q4-6H, PRN as needed for pain, X 7 day, # 12 tab, 0 Refill(s)        
                                                Active                                 

                                                      2016                        

                                        [facility]                    

 

                                        Sulfamethoxazole 800 MG / Trimethoprim 160 MG Oral Tablet [Bactrim]             

                          1 tab, PO, BID, # 20 tab, 0 Refill(s)                                 

                       Active                                                          

                          2016                            [facility]         

           

 

                          Rocephin                            1 gm, Route: IM, ONCE, Dosing Weight 84.091, kg,

 Start date: 16 17:48:00, Stop date: 16 17:48:00                    
                                                Inactive                                           

                                                2016                            [facility]

                    

 

                          Ondansetron                            4 mg, 2 mL, Route: IVP, Drug form: INJ, ONCE,

 Dosing Weight 84.091, kg, Priority: STAT, Start date: 16 15:36:00, Stop 
date: 16 15:36:00Notes: (Same as: Zofran)   *** MEDICATION WASTE *** 
Product Size:  4 mg Product Wasted:  ___ mg                                        

                    Inactive                                                               

                          2016                            [facility]              

      

 

                          Sodium Chloride 0.154 MEQ/ML Injectable Solution                            1,000 

mL, 1000 ml/hr, Infuse Over: 1 hr, Route: IV, 1,000, Drug form: INJ, ONCE, 
Priority: STAT, Dosing Weight 84.091 kg, Start date: 16 15:36:00, 
Duration: 1 doses or times, Stop date: 16 15:36:00                        
                                                Inactive                                               

                                                2016                            [facility]

                    

 

                          Saline Flush 0.9%                            10 mL, Route: IVP, Drug Form: INJ, Dosing

 Weight 84.091, kg, PRN, PRN Line Flush, Start date: 16 15:36:00, 
Duration: 30 day, Stop date: 16 15:35:00Notes: Same as: BD Posiflush 
Sterile                                                        Inactive              

                                                                            2016       

                                        [facility]                    



                                                                                
                                                                                
                                    



Allergies, Adverse Reactions, Alerts

                    





                    Substance                            Category                            Reaction   

                          Severity                            Reaction type           

                          Status                            Date Reported                     

                          Comments                            Source                    

 

                    diphenhydrAMINE                            Assertion                                

                                                      Drug allergy                     

                    Active                                                                          

                                        [facility]                    



                                                        



Immunizations

        





                                        No Data Provided for This Section



                                    



Results







                    Order Name                            Results                            Value      

                          Reference Range                            Date                

                          Interpretation                            Comments                       

                                        Source                    

 

                    URINE AND STOOL                            UA Urobilinogen     <=1.0 mg/dL           

                          0.1 - 1.0                            2016                     

                                                                            [facility]            

        

 

                    URINE AND STOOL                            UA Protein          100 mg/dL                  

                          Negative mg/dL                            2016                       

                                                                            [facility]              

      

 

                    URINE AND STOOL                            UA Glucose          Negative mg/dL             

                          Negative mg/dL                            2016                  

                                                                            [facility]         

           

 

                    URINE AND STOOL                            UA Ketones          Negative mg/dL             

                          Negative mg/dL                            2016                  

                                                                            [facility]         

           

 

                    URINE AND STOOL                            UA Bili             Negative 

*NA*

                    (16 4:54 PM)                            Negative                            2016

                                                                                    [facility]                    

 

                    URINE AND STOOL                            UA Blood            Moderate 

*ABN*

                    (16 4:54 PM)                            Negative                            2016

                                                                                    [facility]                    

 

                    URINE AND STOOL                            UA Nitrite          Positive 

*ABN*

                    (16 4:54 PM)                            Negative                            2016

                                                                                    [facility]                    

 

                    URINE AND STOOL                            UA Leuk Est         Large 

*ABN*

                    (16 4:54 PM)                            Negative                            2016

                                                                                    [facility]                    

 

                    URINE AND STOOL                            UA Sq Epi           Few /LPF                    

                    Few /LPF                            2016                                 

                                                      [facility]                    

 

                URINE AND STOOL                            UA WBC          >182                            0

 - 5                            2016                                           

                                                      [facility]                    

 

                URINE AND STOOL                            UA RBC          155                            0 

- 2                            2016                                            

                                                      [facility]                    

 

                    URINE AND STOOL                            UA Bacteria         Many /HPF                 

                          None Seen /HPF                            2016                      

                                                                            [facility]             

       

 

                    URINE AND STOOL                            UA WBC Cast         34                        

                    <=0 /LPF                            2016                                     

                                                      [facility]                    

 

                    URINE AND STOOL                            UA Mucus            Few /LPF                     

                    None Seen /LPF                            2016                            

                                                        [facility]                 

   

 

                    URINE AND STOOL                            UA Color            Yellow 

*NA*

                    (16 4:54 PM)                            Yellow                            2016

                                                                                    [facility]                    

 

                    URINE AND STOOL                            UA Turbidity        Marked 

*ABN*

                    (16 4:54 PM)                            Clear                            2016

                                                                                    [facility]                    

 

                URINE AND STOOL                            UA pH           5.0                            5.0

 - 8.0                            2016                                         

                                                      [facility]                    

 

                    URINE AND STOOL                            UA Spec Grav        1.018                    

                    <=1.030                            2016                                  

                                                      [facility]                    

 

                CHEM PANEL                            A/G Ratio       0.6                            0.7 

- 1.6                            2016                                          

                                                      [facility]                    

 

                CHEM PANEL                            Globulin        5.8                            2.0 -

 4.0                            2016                                           

                                                      [facility]                    

 

                CHEM PANEL                            B/C Ratio       12                            6 - 25

                            2016                                               

                                                      [facility]                    

 

                CHEM PANEL                            AGAP            12.6                            10.0 - 20.0

                            2016                                               

                                                      [facility]                    

 

                CHEM PANEL                            Alk Phos        115                            39 - 

136                            2016                                            

                                                      [facility]                    

 

                CHEM PANEL                            AST             28                            0 - 37     

                          2016                                                    

                                                      [facility]                    

 

                CHEM PANEL                            Bili Total      0.3                            0.2

 - 1.3                            2016                                         

                                                      [facility]                    

 

                CHEM PANEL                            eGFR            73                                      

                    2016                                                        Result

 Comment: The eGFR is calculated using the CKD-EPI formula. In most young, 
healthy individuals the eGFR will be >90 mL/min/1.73m2. The eGFR declines with 
age. An eGFR of 60-89 may be normal in some populations, particularly the 
elderly, for whom the CKD-EPI formula has not been extensively validated. Use of
 the eGFR is not recommended in the following populations:<br/><br/>Individuals 
with unstable creatinine concentrations, including pregnant patients and those 
with serious co-morbid conditions.<br/><br/>Patients with extremes in muscle 
mass or diet. <br/><br/>The data above are obtained from the National Kid[employer] 
Disease Education Program (NKDEP) which additionally recommends that when the 
eGFR is used in patients with extremes of body mass index for purposes of drug 
dosing, the eGFR should be multiplied by the estimated BMI.                     
                                        [facility]                    

 

                CHEM PANEL                            ALT             25                            0 - 65     

                          2016                                                    

                                                      [facility]                    

 

                CHEM PANEL                            Albumin Lvl     3.4                            3.5

 - 5.0                            2016                                         

                                                      [facility]                    

 

                CHEM PANEL                            Total Protein    9.2                            

6.4 - 8.4                            2016                                      

                                                      [facility]                    

 

                CHEM PANEL                            Calcium Lvl     9.1                            8.5

 - 10.5                            2016                                        

                                                      [facility]                    

 

                CHEM PANEL                            CO2             26                            24 - 32    

                          2016                                                   

                                                      [facility]                    

 

                CHEM PANEL                            Sodium Lvl      140                            135

 - 145                            2016                                         

                                                      [facility]                    

 

                CHEM PANEL                            Chloride Lvl    106                            95

 - 109                            2016                                         

                                                      [facility]                    

 

                CHEM PANEL                            Potassium Lvl    4.6                            

3.5 - 5.1                            2016                                      

                                                      [facility]                    

 

                    CHEM PANEL                            Creatinine Lvl      0.91                        

                    0.50 - 1.40                            2016                                  

                                                      [facility]                    

 

                CHEM PANEL                            BUN             11                            7 - 22     

                          2016                                                    

                                                      [facility]                    

 

                CHEM PANEL                            Glucose Lvl     118                            70

 - 99                            2016                                          

                                                      [facility]                    

 

                    ENDOCRINOLOGY                            S Preg              Negative 

*NA*

                    (16 4:40 PM)                            Negative                            2016

                                                                                    [facility]                    

 

                HEMATOLOGY                            Lymphocytes     32.8                            20.0

 - 40.0                            2016                                        

                                                      [facility]                    

 

                HEMATOLOGY                            Segs            60.0                            45.0 - 75.0

                            2016                                               

                                                      [facility]                    

 

                HEMATOLOGY                            Eosinophils #    0.2                            

0.0 - 0.5                            2016                                      

                                                      [facility]                    

 

                HEMATOLOGY                            Basophils #     0.2                            0.0

 - 0.2                            2016                                         

                                                      [facility]                    

 

                HEMATOLOGY                            Monocytes       4.6                            2.0 

- 12.0                            2016                                         

                                                      [facility]                    

 

                HEMATOLOGY                            Eosinophils     1.2                            0.0

 - 4.0                            2016                                         

                                                      [facility]                    

 

                HEMATOLOGY                            Basophils       1.4                            0.0 

- 1.0                            2016                                          

                                                      [facility]                    

 

                HEMATOLOGY                            Segs-Bands #    7.3                            1.5

 - 8.1                            2016                                         

                                                      [facility]                            Monocytes #     0.6                            0.0

 - 0.8                            2016                                         

                                                      [facility]                    

 

                HEMATOLOGY                            Lymphocytes #    4.0                            

1.0 - 5.5                            2016                                      

                                                      [facility]                    

 

                    HEMATOLOGY                            Plt Morph           Clumped 

                    (16 4:40 PM)                                                        2016  

                                                                                  [facility] [facility]                            [facility] Morph           Normal 

                    (16 4:40 PM)                                                        2016  

                                                                                  [facility]

                    

 

                HEMATOLOGY                            MPV             8.7                            7.4 - 10.4

                            2016                                               

                                                      [facility]                    

 

                HEMATOLOGY                            WBC             12.0                            3.7 - 10.4

                            2016                                               

                                                      [facility]                    

 

                HEMATOLOGY                            RBC             4.90                            4.20 - 5.40

                            2016                                               

                                                      [facility]                    

 

                HEMATOLOGY                            Hct             39.0                            36.0 - 48.0

                            2016                                               

                                                      [facility]                    

 

                HEMATOLOGY                            MCV             79.7                            80.0 - 98.0

                            2016                                               

                                                      [facility]                    

 

                HEMATOLOGY                            Hgb             12.0                            12.0 - 16.0

                            2016                                               

                                                      [facility]                    

 

                HEMATOLOGY                            RDW             14.8                            11.5 - 14.5

                            2016                                               

                                                      [facility]                    

 

                HEMATOLOGY                            MCHC            30.8                            32.0 - 36.0

                            2016                                               

                                                      [facility]                    

 

                HEMATOLOGY                            Platelet        220                            133 -

 450                            2016                                           

                                                      [facility]                    

 

                HEMATOLOGY                            MCH             24.5                            27.0 - 31.0

                            2016                                               

                                                      [facility]                    



                                                                                
                                                                                
                                                                                
                                                                                
                                                                                
                                                                                
                                                                        



Pathology Reports







                                        No Data Provided for This Section                    



                            



Diagnostic Reports

            





                                        No Data Provided for This Section                    



                                                            



Consultation Notes

                    





                                        No Data Provided for This Section                    



                                                            



Discharge Summaries

                    





                                        No Data Provided for This Section                    



                                                            



History and Physicals

                    





                                        No Data Provided for This Section                    



                                                                



Vital Signs

                     





                    Vital Sign                            Value                            Date         

                          Comments                            Source                    

 

                    Weight                            86.364                             2016     

                                                      [facility]                    

 

                    Temperature Oral (F)                            97.9 F                            2016

                                                        [facility]                 

   

 

                    Respitory Rate                            18                             2016 

                                                       [facility]                  

  

 

                    Heart Rate                            91                             2016     

                                                      [facility]                    

 

                    Systolic (mm Hg)                            140                             2016

                                                        [facility]                 

   

 

                    Diastolic (mm Hg)                            99                             2016

                                                        [facility]                 

   

 

                    Temperature Oral (F)                            98.4 F                            2016

                                                        [facility]                 

   

 

                    Respitory Rate                            18                             2016 

                                                       [facility]                  

  

 

                    Heart Rate                            97                             2016     

                                                      [facility]                    

 

                    Systolic (mm Hg)                            142                             2016

                                                        [facility]                 

   

 

                    Diastolic (mm Hg)                            82                             2016

                                                        [facility]                 

   

 

                    Systolic (mm Hg)                            147                             2016

                                                        [facility]                 

   

 

                    Diastolic (mm Hg)                            93                             2016

                                                        [facility]                 

   

 

                    Weight                            84.091                             2016     

                                                      [facility]                    

 

                    BMI Calculated                            36.21                             2016

                                                        [facility]                 

   

 

                    Height                            152.4 cm                            2016    

                                                      [facility]                    

 

                    Heart Rate                            76                             2016     

                                                      [facility]                    

 

                    Respitory Rate                            18                             2016 

                                                       [facility]                  

  

 

                    Temperature Oral (F)                            98.1 F                            2016

                                                        [facility]                 

   



                                                                                
                                                                                
                                                                                
                                                                                
                                                        



Encounters

                    





                    Location                            Location Details                            Encounter

 Type                            Encounter Number                            Reason For

 Visit                            Attending Provider                            ADM Date

                            DC Date                            Status                

                                        Source                    

 

                          [facility]                            [id number]                 

                                                Parul Gonzálescole                             2016                                               

                                        [facility]                                               

                          [facility]                            [id number]                 

                                                Mohan Curtis                             2016                                               

                                        [facility]                    



                                                                                
    



Procedures

        





                                        No Data Provided for This Section



                                                    



Assessment and Plan

                    





                                        No Data Provided for This Section                    



                                     



Plan of Care







                                        No Data Provided for This Section                    



                                                                



Social History

                    





                    Social History                            Date                            Source    

                

 

                                        Social History TypeResponse

Smoking Status

Never smoker; Exposure to Tobacco Smoke None; Cigarette Smoking Last 365 Days 
No; Reg Smoking Cessation Counseling No

                            2016                            [facility]       

             



                                                                    



Family History

                    





                                        No Data Provided for This Section                    



                                                            



Advance Directives

                    





                                        No Data Provided for This Section                    



                                                            



Functional Status

                    





                                        No Data Provided for This Section

## 2019-09-14 ENCOUNTER — HOSPITAL ENCOUNTER (EMERGENCY)
Dept: HOSPITAL 88 - ER | Age: 54
Discharge: HOME | End: 2019-09-14
Payer: COMMERCIAL

## 2019-09-14 VITALS — BODY MASS INDEX: 39.07 KG/M2 | HEIGHT: 60 IN | WEIGHT: 199 LBS

## 2019-09-14 VITALS — DIASTOLIC BLOOD PRESSURE: 78 MMHG | SYSTOLIC BLOOD PRESSURE: 122 MMHG

## 2019-09-14 DIAGNOSIS — N39.0: Primary | ICD-10-CM

## 2019-09-14 DIAGNOSIS — Z96.0: ICD-10-CM

## 2019-09-14 DIAGNOSIS — E11.9: ICD-10-CM

## 2019-09-14 DIAGNOSIS — Z93.6: ICD-10-CM

## 2019-09-14 DIAGNOSIS — I10: ICD-10-CM

## 2019-09-14 PROCEDURE — 99282 EMERGENCY DEPT VISIT SF MDM: CPT

## 2019-09-14 NOTE — XMS REPORT
Continuity of Care Document

                             Created on: 2019



CRISTINA RICH

External Reference #: [id number]

: 1965

Sex: Female



Demographics







                          Address                   [address] [place] [address]  [id number]

 

                          Home Phone                [phone]

 

                          Preferred Language        English

 

                          Marital Status            Unknown

 

                          Taoism Affiliation     Unknown

 

                          Race                      Unknown

 

                          Ethnic Group              Unknown





Author







                          Author                    [employer]

 

                          Address                   Unknown

 

                          Phone                     Unavailable







Care Team Providers







                    Care Team Member Name    Role                Phone

 

                    [employer]    Unavailable         Unavailable



                                    



Problems

                    





                    Problem                            Status                            Onset Date     

                          Classification                            Date Reported       

                          Comments                            Source                    

 

                                        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    

                                                      2016                                            

                                        [facility] [place]                    

 

                    GEN. PAIN                            Active                            2016   

                                                                                       

                                        [facility] [place]                    

 

                                        Discharge Diagnosis: Urinary tract infection, site not specified                

                                                2016                                     

                    01/10/2016                                                        [facility] [place]                    

 

                    WEAKNESS                            Active                            2016    

                                                                                       

                                        [facility] [place]                    

 

                    MRSA1                            Active                                             

                    Problem                            2016                            Problem

 added by Discern Expert.                            [facility] [place]                  

  



                                                                                
                                                       



Medications

                    





                    Medication                            Details                            Route      

                          Status                            Patient Instructions         

                          Ordering Provider                            Order Date           

                                        Source                    

 

                          Ativan                            1 mg, Route: PO, Drug form: TAB, ONCE, Dosing Weight

 86.364, kg, Priority: STAT, Start date: 16 23:44:00, Stop date: 16 
23:44:00                                                        No Longer Active     

                                                                               2016

                                        [facility] [place]                    

 

                          tramadol hydrochloride 50 MG Oral Tablet                            50 mg=1 tab, PO,

 BID, X 15 day, # 30 tab, 0 Refill(s)                                              

                    Active                                                                       

                          2016                            [facility] [place]                    

 

                          tramadol hydrochloride 50 MG Oral Tablet [Ultram]                            1 - 2

 tabs, PO, Q4-6H, PRN as needed for pain, X 7 day, # 12 tab, 0 Refill(s)        
                                                Active                                 

                                                      2016                        

                                        [facility] [place]                    

 

                                        Sulfamethoxazole 800 MG / Trimethoprim 160 MG Oral Tablet [Bactrim]             

                          1 tab, PO, BID, # 20 tab, 0 Refill(s)                                 

                       Active                                                          

                          2016                            [facility] [place]         

           

 

                          Rocephin                            1 gm, Route: IM, ONCE, Dosing Weight 84.091, kg,

 Start date: 16 17:48:00, Stop date: 16 17:48:00                    
                                                Inactive                                           

                                                2016                            [facility] [place]

                    

 

                          Ondansetron                            4 mg, 2 mL, Route: IVP, Drug form: INJ, ONCE,

 Dosing Weight 84.091, kg, Priority: STAT, Start date: 16 15:36:00, Stop 
date: 16 15:36:00Notes: (Same as: Zofran)   *** MEDICATION WASTE *** 
Product Size:  4 mg Product Wasted:  ___ mg                                        

                    Inactive                                                               

                          2016                            [facility] [place]              

      

 

                          Sodium Chloride 0.154 MEQ/ML Injectable Solution                            1,000 

mL, 1000 ml/hr, Infuse Over: 1 hr, Route: IV, 1,000, Drug form: INJ, ONCE, 
Priority: STAT, Dosing Weight 84.091 kg, Start date: 16 15:36:00, 
Duration: 1 doses or times, Stop date: 16 15:36:00                        
                                                Inactive                                               

                                                2016                            [facility] [place]

                    

 

                          Saline Flush 0.9%                            10 mL, Route: IVP, Drug Form: INJ, Dosing

 Weight 84.091, kg, PRN, PRN Line Flush, Start date: 16 15:36:00, 
Duration: 30 day, Stop date: 16 15:35:00Notes: Same as: BD Posiflush 
Sterile                                                        Inactive              

                                                                            2016       

                                        [facility] [place]                    



                                                                                
                                                                                
                                    



Allergies, Adverse Reactions, Alerts

                    





                    Substance                            Category                            Reaction   

                          Severity                            Reaction type           

                          Status                            Date Reported                     

                          Comments                            Source                    

 

                    diphenhydrAMINE                            Assertion                                

                                                      Drug allergy                     

                    Active                                                                          

                                        [facility] [place]                    



                                                        



Immunizations

        





                                        No Data Provided for This Section



                                    



Results







                    Order Name                            Results                            Value      

                          Reference Range                            Date                

                          Interpretation                            Comments                       

                                        Source                    

 

                    URINE AND STOOL                            UA Urobilinogen     <=1.0 mg/dL           

                          0.1 - 1.0                            2016                     

                                                                            [facility] [place]            

        

 

                    URINE AND STOOL                            UA Protein          100 mg/dL                  

                          Negative mg/dL                            2016                       

                                                                            [facility] [place]              

      

 

                    URINE AND STOOL                            UA Glucose          Negative mg/dL             

                          Negative mg/dL                            2016                  

                                                                            [facility] [place]         

           

 

                    URINE AND STOOL                            UA Ketones          Negative mg/dL             

                          Negative mg/dL                            2016                  

                                                                            [facility] [place]         

           

 

                    URINE AND STOOL                            UA Bili             Negative 

*NA*

                    (16 4:54 PM)                            Negative                            2016

                                                                                    [facility] [place]                    

 

                    URINE AND STOOL                            UA Blood            Moderate 

*ABN*

                    (16 4:54 PM)                            Negative                            2016

                                                                                    [facility] [place]                    

 

                    URINE AND STOOL                            UA Nitrite          Positive 

*ABN*

                    (16 4:54 PM)                            Negative                            2016

                                                                                    [facility] [place]                    

 

                    URINE AND STOOL                            UA Leuk Est         Large 

*ABN*

                    (16 4:54 PM)                            Negative                            2016

                                                                                    [facility] [place]                    

 

                    URINE AND STOOL                            UA Sq Epi           Few /LPF                    

                    Few /LPF                            2016                                 

                                                      [facility] [place]                    

 

                URINE AND STOOL                            UA WBC          >182                            0

 - 5                            2016                                           

                                                      [facility] [place]                    

 

                URINE AND STOOL                            UA RBC          155                            0 

- 2                            2016                                            

                                                      [facility] [place]                    

 

                    URINE AND STOOL                            UA Bacteria         Many /HPF                 

                          None Seen /HPF                            2016                      

                                                                            [facility] [place]             

       

 

                    URINE AND STOOL                            UA WBC Cast         34                        

                    <=0 /LPF                            2016                                     

                                                      [facility] [place]                    

 

                    URINE AND STOOL                            UA Mucus            Few /LPF                     

                    None Seen /LPF                            2016                            

                                                        [facility] [place]                 

   

 

                    URINE AND STOOL                            UA Color            Yellow 

*NA*

                    (16 4:54 PM)                            Yellow                            2016

                                                                                    [facility] [place]                    

 

                    URINE AND STOOL                            UA Turbidity        Marked 

*ABN*

                    (16 4:54 PM)                            Clear                            2016

                                                                                    [facility] [place]                    

 

                URINE AND STOOL                            UA pH           5.0                            5.0

 - 8.0                            2016                                         

                                                      [facility] [place]                    

 

                    URINE AND STOOL                            UA Spec Grav        1.018                    

                    <=1.030                            2016                                  

                                                      [facility] [place]                    

 

                CHEM PANEL                            A/G Ratio       0.6                            0.7 

- 1.6                            2016                                          

                                                      [facility] [place]                    

 

                CHEM PANEL                            Globulin        5.8                            2.0 -

 4.0                            2016                                           

                                                      [facility] [place]                    

 

                CHEM PANEL                            B/C Ratio       12                            6 - 25

                            2016                                               

                                                      [facility] [place]                    

 

                CHEM PANEL                            AGAP            12.6                            10.0 - 20.0

                            2016                                               

                                                      [facility] [place]                    

 

                CHEM PANEL                            Alk Phos        115                            39 - 

136                            2016                                            

                                                      [facility] [place]                    

 

                CHEM PANEL                            AST             28                            0 - 37     

                          2016                                                    

                                                      [facility] [place]                    

 

                CHEM PANEL                            Bili Total      0.3                            0.2

 - 1.3                            2016                                         

                                                      [facility] [place]                    

 

                CHEM PANEL                            eGFR            73                                      

                    2016                                                        Result

 Comment: The eGFR is calculated using the CKD-EPI formula. In most young, 
healthy individuals the eGFR will be >90 mL/min/1.73m2. The eGFR declines with 
age. An eGFR of 60-89 may be normal in some populations, particularly the 
elderly, for whom the CKD-EPI formula has not been extensively validated. Use of
 the eGFR is not recommended in the following populations:<br/><br/>Individuals 
with unstable creatinine concentrations, including pregnant patients and those 
with serious co-morbid conditions.<br/><br/>Patients with extremes in muscle 
mass or diet. <br/><br/>The data above are obtained from the National Kid[employer] 
Disease Education Program (NKDEP) which additionally recommends that when the 
eGFR is used in patients with extremes of body mass index for purposes of drug 
dosing, the eGFR should be multiplied by the estimated BMI.                     
                                        [facility] [place]                    

 

                CHEM PANEL                            ALT             25                            0 - 65     

                          2016                                                    

                                                      [facility] [place]                    

 

                CHEM PANEL                            Albumin Lvl     3.4                            3.5

 - 5.0                            2016                                         

                                                      [facility] [place]                    

 

                CHEM PANEL                            Total Protein    9.2                            

6.4 - 8.4                            2016                                      

                                                      [facility] [place]                    

 

                CHEM PANEL                            Calcium Lvl     9.1                            8.5

 - 10.5                            2016                                        

                                                      [facility] [place]                    

 

                CHEM PANEL                            CO2             26                            24 - 32    

                          2016                                                   

                                                      [facility] [place]                    

 

                CHEM PANEL                            Sodium Lvl      140                            135

 - 145                            2016                                         

                                                      [facility] [place]                    

 

                CHEM PANEL                            Chloride Lvl    106                            95

 - 109                            2016                                         

                                                      [facility] [place]                    

 

                CHEM PANEL                            Potassium Lvl    4.6                            

3.5 - 5.1                            2016                                      

                                                      [facility] [place]                    

 

                    CHEM PANEL                            Creatinine Lvl      0.91                        

                    0.50 - 1.40                            2016                                  

                                                      [facility] [place]                    

 

                CHEM PANEL                            BUN             11                            7 - 22     

                          2016                                                    

                                                      [facility] [place]                    

 

                CHEM PANEL                            Glucose Lvl     118                            70

 - 99                            2016                                          

                                                      [facility] [place]                    

 

                    ENDOCRINOLOGY                            S Preg              Negative 

*NA*

                    (16 4:40 PM)                            Negative                            2016

                                                                                    [facility] [place]                    

 

                HEMATOLOGY                            Lymphocytes     32.8                            20.0

 - 40.0                            2016                                        

                                                      [facility] [place]                    

 

                HEMATOLOGY                            Segs            60.0                            45.0 - 75.0

                            2016                                               

                                                      [facility] [place]                    

 

                HEMATOLOGY                            Eosinophils #    0.2                            

0.0 - 0.5                            2016                                      

                                                      [facility] [place]                    

 

                HEMATOLOGY                            Basophils #     0.2                            0.0

 - 0.2                            2016                                         

                                                      [facility] [place]                    

 

                HEMATOLOGY                            Monocytes       4.6                            2.0 

- 12.0                            2016                                         

                                                      [facility] [place]                    

 

                HEMATOLOGY                            Eosinophils     1.2                            0.0

 - 4.0                            2016                                         

                                                      [facility] [place]                    

 

                HEMATOLOGY                            Basophils       1.4                            0.0 

- 1.0                            2016                                          

                                                      [facility] [place]                    

 

                HEMATOLOGY                            Segs-Bands #    7.3                            1.5

 - 8.1                            2016                                         

                                                      [facility] Center                            Monocytes #     0.6                            0.0

 - 0.8                            2016                                         

                                                      [facility] [place]                    

 

                HEMATOLOGY                            Lymphocytes #    4.0                            

1.0 - 5.5                            2016                                      

                                                      [facility] [place]                    

 

                    HEMATOLOGY                            Plt Morph           Clumped 

                    (16 4:40 PM)                                                        2016  

                                                                                  [facility]                            RBC Morph           Normal 

                    (16 4:40 PM)                                                        2016  

                                                                                  [facility] [place]

                    

 

                HEMATOLOGY                            MPV             8.7                            7.4 - 10.4

                            2016                                               

                                                      [facility] [place]                    

 

                HEMATOLOGY                            WBC             12.0                            3.7 - 10.4

                            2016                                               

                                                      [facility] [place]                    

 

                HEMATOLOGY                            RBC             4.90                            4.20 - 5.40

                            2016                                               

                                                      [facility] [place]                    

 

                HEMATOLOGY                            Hct             39.0                            36.0 - 48.0

                            2016                                               

                                                      [facility] [place]                    

 

                HEMATOLOGY                            MCV             79.7                            80.0 - 98.0

                            2016                                               

                                                      [facility] [place]                    

 

                HEMATOLOGY                            Hgb             12.0                            12.0 - 16.0

                            2016                                               

                                                      [facility] [place]                    

 

                HEMATOLOGY                            RDW             14.8                            11.5 - 14.5

                            2016                                               

                                                      [facility] [place]                    

 

                HEMATOLOGY                            MCHC            30.8                            32.0 - 36.0

                            2016                                               

                                                      [facility] [place]                    

 

                HEMATOLOGY                            Platelet        220                            133 -

 450                            2016                                           

                                                      [facility] [place]                    

 

                HEMATOLOGY                            MCH             24.5                            27.0 - 31.0

                            2016                                               

                                                      [facility] [place]                    



                                                                                
                                                                                
                                                                                
                                                                                
                                                                                
                                                                                
                                                                        



Pathology Reports







                                        No Data Provided for This Section                    



                            



Diagnostic Reports

            





                                        No Data Provided for This Section                    



                                                            



Consultation Notes

                    





                                        No Data Provided for This Section                    



                                                            



Discharge Summaries

                    





                                        No Data Provided for This Section                    



                                                            



History and Physicals

                    





                                        No Data Provided for This Section                    



                                                                



Vital Signs

                     





                    Vital Sign                            Value                            Date         

                          Comments                            Source                    

 

                    Weight                            86.364                             2016     

                                                      [facility] [place]                    

 

                    Temperature Oral (F)                            97.9 F                            2016

                                                        [facility] [place]                 

   

 

                    Respitory Rate                            18                             2016 

                                                       [facility] [place]                  

  

 

                    Heart Rate                            91                             2016     

                                                      [facility] [place]                    

 

                    Systolic (mm Hg)                            140                             2016

                                                        [facility] [place]                 

   

 

                    Diastolic (mm Hg)                            99                             2016

                                                        [facility] [place]                 

   

 

                    Temperature Oral (F)                            98.4 F                            2016

                                                        [facility] [place]                 

   

 

                    Respitory Rate                            18                             2016 

                                                       [facility] [place]                  

  

 

                    Heart Rate                            97                             2016     

                                                      [facility] [place]                    

 

                    Systolic (mm Hg)                            142                             2016

                                                        [facility] [place]                 

   

 

                    Diastolic (mm Hg)                            82                             2016

                                                        [facility] [place]                 

   

 

                    Systolic (mm Hg)                            147                             2016

                                                        [facility] [place]                 

   

 

                    Diastolic (mm Hg)                            93                             2016

                                                        [facility] [place]                 

   

 

                    Weight                            84.091                             2016     

                                                      [facility] [place]                    

 

                    BMI Calculated                            36.21                             2016

                                                        [facility] [place]                 

   

 

                    Height                            152.4 cm                            2016    

                                                      [facility] [place]                    

 

                    Heart Rate                            76                             2016     

                                                      [facility] [place]                    

 

                    Respitory Rate                            18                             2016 

                                                       [facility] [place]                  

  

 

                    Temperature Oral (F)                            98.1 F                            2016

                                                        [facility] [place]                 

   



                                                                                
                                                                                
                                                                                
                                                                                
                                                        



Encounters

                    





                    Location                            Location Details                            Encounter

 Type                            Encounter Number                            Reason For

 Visit                            Attending Provider                            ADM Date

                            DC Date                            Status                

                                        Source                    

 

                          [facility]                            [id number]                 

                                                Parul Gonzálescole                             2016                                               

                                        [facility]                                               

                          [facility]                            [id number]                 

                                                Moahn Curtis                             2016                                               

                                        [facility] [place]                    



                                                                                
    



Procedures

        





                                        No Data Provided for This Section



                                                    



Assessment and Plan

                    





                                        No Data Provided for This Section                    



                                     



Plan of Care







                                        No Data Provided for This Section                    



                                                                



Social History

                    





                    Social History                            Date                            Source    

                

 

                                        Social History TypeResponse

Smoking Status

Never smoker; Exposure to Tobacco Smoke None; Cigarette Smoking Last 365 Days 
No; Reg Smoking Cessation Counseling No

                            2016                            [facility] [place]       

             



                                                                    



Family History

                    





                                        No Data Provided for This Section                    



                                                            



Advance Directives

                    





                                        No Data Provided for This Section                    



                                                            



Functional Status

                    





                                        No Data Provided for This Section

## 2019-09-22 ENCOUNTER — HOSPITAL ENCOUNTER (EMERGENCY)
Dept: HOSPITAL 88 - ER | Age: 54
Discharge: HOME | End: 2019-09-22
Payer: COMMERCIAL

## 2019-09-22 VITALS — SYSTOLIC BLOOD PRESSURE: 127 MMHG | DIASTOLIC BLOOD PRESSURE: 70 MMHG

## 2019-09-22 VITALS — HEIGHT: 60 IN | WEIGHT: 199 LBS | BODY MASS INDEX: 39.07 KG/M2

## 2019-09-22 DIAGNOSIS — N18.9: ICD-10-CM

## 2019-09-22 DIAGNOSIS — E78.5: ICD-10-CM
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
## 2019-09-22 DIAGNOSIS — E11.22: ICD-10-CM

## 2019-09-22 DIAGNOSIS — I12.9: ICD-10-CM

## 2019-09-22 DIAGNOSIS — T83.091A: Primary | ICD-10-CM

## 2019-09-22 DIAGNOSIS — F41.9: ICD-10-CM
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
## 2019-09-22 PROCEDURE — 51700 IRRIGATION OF BLADDER: CPT

## 2019-09-22 PROCEDURE — 99282 EMERGENCY DEPT VISIT SF MDM: CPT

## 2019-09-22 PROCEDURE — 51702 INSERT TEMP BLADDER CATH: CPT

## 2019-09-22 NOTE — XMS REPORT
Continuity of Care Document

                             Created on: 2019



CRISTINA RICH

External Reference #: [id number]

: 1965

Sex: Female



Demographics







                          Address                   [address] [place] [address]  [id number]

 

                          Home Phone                [phone]

 

                          Preferred Language        English

 

                          Marital Status            Unknown

 

                          Hindu Affiliation     Unknown

 

                          Race                      Unknown

 

                          Ethnic Group              Unknown





Author







                          Author                    [employer]

 

                          Address                   Unknown

 

                          Phone                     Unavailable







Care Team Providers







                    Care Team Member Name    Role                Phone

 

                    Spacedeck    Unavailable         Unavailable



                                    



Problems

                    





                    Problem                            Status                            Onset Date     

                          Classification                            Date Reported       

                          Comments                            Source                    

 

                                        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    

                                                      2016                                            

                                        [facility]                    

 

                    GEN. PAIN                            Active                            2016   

                                                                                       

                                        [facility]                    

 

                                        Discharge Diagnosis: Urinary tract infection, site not specified                

                                                2016                                     

                    01/10/2016                                                        [facility]                    

 

                    WEAKNESS                            Active                            2016    

                                                                                       

                                        [facility]                    

 

                    MRSA1                            Active                                             

                    Problem                            2016                            Problem

 added by Discern Expert.                            [facility]                  

  



                                                                                
                                                       



Medications

                    





                    Medication                            Details                            Route      

                          Status                            Patient Instructions         

                          Ordering Provider                            Order Date           

                                        Source                    

 

                          Ativan                            1 mg, Route: PO, Drug form: TAB, ONCE, Dosing Weight

 86.364, kg, Priority: STAT, Start date: 16 23:44:00, Stop date: 16 
23:44:00                                                        No Longer Active     

                                                                               2016

                                        [facility]                    

 

                          tramadol hydrochloride 50 MG Oral Tablet                            50 mg=1 tab, PO,

 BID, X 15 day, # 30 tab, 0 Refill(s)                                              

                    Active                                                                       

                          2016                            [facility]                    

 

                          tramadol hydrochloride 50 MG Oral Tablet [Ultram]                            1 - 2

 tabs, PO, Q4-6H, PRN as needed for pain, X 7 day, # 12 tab, 0 Refill(s)        
                                                Active                                 

                                                      2016                        

                                        [facility]                    

 

                                        Sulfamethoxazole 800 MG / Trimethoprim 160 MG Oral Tablet [Bactrim]             

                          1 tab, PO, BID, # 20 tab, 0 Refill(s)                                 

                       Active                                                          

                          2016                            [facility]         

           

 

                          Rocephin                            1 gm, Route: IM, ONCE, Dosing Weight 84.091, kg,

 Start date: 16 17:48:00, Stop date: 16 17:48:00                    
                                                Inactive                                           

                                                2016                            [facility]

                    

 

                          Ondansetron                            4 mg, 2 mL, Route: IVP, Drug form: INJ, ONCE,

 Dosing Weight 84.091, kg, Priority: STAT, Start date: 16 15:36:00, Stop 
date: 16 15:36:00Notes: (Same as: Zofran)   *** MEDICATION WASTE *** 
Product Size:  4 mg Product Wasted:  ___ mg                                        

                    Inactive                                                               

                          2016                            [facility]              

      

 

                          Sodium Chloride 0.154 MEQ/ML Injectable Solution                            1,000 

mL, 1000 ml/hr, Infuse Over: 1 hr, Route: IV, 1,000, Drug form: INJ, ONCE, 
Priority: STAT, Dosing Weight 84.091 kg, Start date: 16 15:36:00, 
Duration: 1 doses or times, Stop date: 16 15:36:00                        
                                                Inactive                                               

                                                2016                            [facility]

                    

 

                          Saline Flush 0.9%                            10 mL, Route: IVP, Drug Form: INJ, Dosing

 Weight 84.091, kg, PRN, PRN Line Flush, Start date: 16 15:36:00, 
Duration: 30 day, Stop date: 16 15:35:00Notes: Same as: BD Posiflush 
Sterile                                                        Inactive              

                                                                            2016       

                                        [facility]                    



                                                                                
                                                                                
                                    



Allergies, Adverse Reactions, Alerts

                    





                    Substance                            Category                            Reaction   

                          Severity                            Reaction type           

                          Status                            Date Reported                     

                          Comments                            Source                    

 

                    diphenhydrAMINE                            Assertion                                

                                                      Drug allergy                     

                    Active                                                                          

                                        [facility]                    



                                                        



Immunizations

        





                                        No Data Provided for This Section



                                    



Results







                    Order Name                            Results                            Value      

                          Reference Range                            Date                

                          Interpretation                            Comments                       

                                        Source                    

 

                    URINE AND STOOL                            UA Urobilinogen     <=1.0 mg/dL           

                          0.1 - 1.0                            2016                     

                                                                            [facility]            

        

 

                    URINE AND STOOL                            UA Protein          100 mg/dL                  

                          Negative mg/dL                            2016                       

                                                                            [facility]              

      

 

                    URINE AND STOOL                            UA Glucose          Negative mg/dL             

                          Negative mg/dL                            2016                  

                                                                            [facility]         

           

 

                    URINE AND STOOL                            UA Ketones          Negative mg/dL             

                          Negative mg/dL                            2016                  

                                                                            [facility]         

           

 

                    URINE AND STOOL                            UA Bili             Negative 

*NA*

                    (16 4:54 PM)                            Negative                            2016

                                                                                    [facility]                    

 

                    URINE AND STOOL                            UA Blood            Moderate 

*ABN*

                    (16 4:54 PM)                            Negative                            2016

                                                                                    [facility]                    

 

                    URINE AND STOOL                            UA Nitrite          Positive 

*ABN*

                    (16 4:54 PM)                            Negative                            2016

                                                                                    [facility]                    

 

                    URINE AND STOOL                            UA Leuk Est         Large 

*ABN*

                    (16 4:54 PM)                            Negative                            2016

                                                                                    [facility]                    

 

                    URINE AND STOOL                            UA Sq Epi           Few /LPF                    

                    Few /LPF                            2016                                 

                                                      [facility]                    

 

                URINE AND STOOL                            UA WBC          >182                            0

 - 5                            2016                                           

                                                      [facility]                    

 

                URINE AND STOOL                            UA RBC          155                            0 

- 2                            2016                                            

                                                      [facility]                    

 

                    URINE AND STOOL                            UA Bacteria         Many /HPF                 

                          None Seen /HPF                            2016                      

                                                                            [facility]             

       

 

                    URINE AND STOOL                            UA WBC Cast         34                        

                    <=0 /LPF                            2016                                     

                                                      [facility]                    

 

                    URINE AND STOOL                            UA Mucus            Few /LPF                     

                    None Seen /LPF                            2016                            

                                                        [facility]                 

   

 

                    URINE AND STOOL                            UA Color            Yellow 

*NA*

                    (16 4:54 PM)                            Yellow                            2016

                                                                                    [facility]                    

 

                    URINE AND STOOL                            UA Turbidity        Marked 

*ABN*

                    (16 4:54 PM)                            Clear                            2016

                                                                                    [facility]                    

 

                URINE AND STOOL                            UA pH           5.0                            5.0

 - 8.0                            2016                                         

                                                      [facility]                    

 

                    URINE AND STOOL                            UA Spec Grav        1.018                    

                    <=1.030                            2016                                  

                                                      [facility]                    

 

                CHEM PANEL                            A/G Ratio       0.6                            0.7 

- 1.6                            2016                                          

                                                      [facility]                    

 

                CHEM PANEL                            Globulin        5.8                            2.0 -

 4.0                            2016                                           

                                                      [facility]                    

 

                CHEM PANEL                            B/C Ratio       12                            6 - 25

                            2016                                               

                                                      [facility]                    

 

                CHEM PANEL                            AGAP            12.6                            10.0 - 20.0

                            2016                                               

                                                      [facility]                    

 

                CHEM PANEL                            Alk Phos        115                            39 - 

136                            2016                                            

                                                      [facility]                    

 

                CHEM PANEL                            AST             28                            0 - 37     

                          2016                                                    

                                                      [facility]                    

 

                CHEM PANEL                            Bili Total      0.3                            0.2

 - 1.3                            2016                                         

                                                      [facility]                    

 

                CHEM PANEL                            eGFR            73                                      

                    2016                                                        Result

 Comment: The eGFR is calculated using the CKD-EPI formula. In most young, 
healthy individuals the eGFR will be >90 mL/min/1.73m2. The eGFR declines with 
age. An eGFR of 60-89 may be normal in some populations, particularly the 
elderly, for whom the CKD-EPI formula has not been extensively validated. Use of
 the eGFR is not recommended in the following populations:<br/><br/>Individuals 
with unstable creatinine concentrations, including pregnant patients and those 
with serious co-morbid conditions.<br/><br/>Patients with extremes in muscle 
mass or diet. <br/><br/>The data above are obtained from the National Kid[employer] 
D[employer] Education Program (NKDEP) which additionally recommends that when the 
eGFR is used in patients with extremes of body mass index for purposes of drug 
dosing, the eGFR should be multiplied by the estimated BMI.                     
                                        [facility]                    

 

                CHEM PANEL                            ALT             25                            0 - 65     

                          2016                                                    

                                                      [facility]                    

 

                CHEM PANEL                            Albumin Lvl     3.4                            3.5

 - 5.0                            2016                                         

                                                      [facility]                    

 

                CHEM PANEL                            Total Protein    9.2                            

6.4 - 8.4                            2016                                      

                                                      [facility]                    

 

                CHEM PANEL                            Calcium Lvl     9.1                            8.5

 - 10.5                            2016                                        

                                                      [facility]                    

 

                CHEM PANEL                            CO2             26                            24 - 32    

                          2016                                                   

                                                      [facility]                    

 

                CHEM PANEL                            Sodium Lvl      140                            135

 - 145                            2016                                         

                                                      [facility]                    

 

                CHEM PANEL                            Chloride Lvl    106                            95

 - 109                            2016                                         

                                                      [facility]                    

 

                CHEM PANEL                            Potassium Lvl    4.6                            

3.5 - 5.1                            2016                                      

                                                      [facility]                    

 

                    CHEM PANEL                            Creatinine Lvl      0.91                        

                    0.50 - 1.40                            2016                                  

                                                      [facility]                    

 

                CHEM PANEL                            BUN             11                            7 - 22     

                          2016                                                    

                                                      [facility]                    

 

                CHEM PANEL                            Glucose Lvl     118                            70

 - 99                            2016                                          

                                                      [facility]                    

 

                    ENDOCRINOLOGY                            S Preg              Negative 

*NA*

                    (16 4:40 PM)                            Negative                            2016

                                                                                    [facility]                    

 

                HEMATOLOGY                            Lymphocytes     32.8                            20.0

 - 40.0                            2016                                        

                                                      [facility]                    

 

                HEMATOLOGY                            Segs            60.0                            45.0 - 75.0

                            2016                                               

                                                      [facility]                    

 

                HEMATOLOGY                            Eosinophils #    0.2                            

0.0 - 0.5                            2016                                      

                                                      [facility]                    

 

                HEMATOLOGY                            Basophils #     0.2                            0.0

 - 0.2                            2016                                         

                                                      [facility]                    

 

                HEMATOLOGY                            Monocytes       4.6                            2.0 

- 12.0                            2016                                         

                                                      [facility]                    

 

                HEMATOLOGY                            Eosinophils     1.2                            0.0

 - 4.0                            2016                                         

                                                      [facility]                    

 

                HEMATOLOGY                            Basophils       1.4                            0.0 

- 1.0                            2016                                          

                                                      [facility]                    

 

                HEMATOLOGY                            Segs-Bands #    7.3                            1.5

 - 8.1                            2016                                         

                                                      [facility]                            Monocytes #     0.6                            0.0

 - 0.8                            2016                                         

                                                      [facility]                    

 

                HEMATOLOGY                            Lymphocytes #    4.0                            

1.0 - 5.5                            2016                                      

                                                      [facility]                    

 

                    HEMATOLOGY                            Plt Morph           Clumped 

                    (16 4:40 PM)                                                        2016  

                                                                                  [facility]                            RBC Morph           Normal 

                    (16 4:40 PM)                                                        2016  

                                                                                  [facility]

                    

 

                HEMATOLOGY                            MPV             8.7                            7.4 - 10.4

                            2016                                               

                                                      [facility]                    

 

                HEMATOLOGY                            WBC             12.0                            3.7 - 10.4

                            2016                                               

                                                      [facility]                    

 

                HEMATOLOGY                            RBC             4.90                            4.20 - 5.40

                            2016                                               

                                                      [facility]                    

 

                HEMATOLOGY                            Hct             39.0                            36.0 - 48.0

                            2016                                               

                                                      [facility]                    

 

                HEMATOLOGY                            MCV             79.7                            80.0 - 98.0

                            2016                                               

                                                      [facility]                    

 

                HEMATOLOGY                            Hgb             12.0                            12.0 - 16.0

                            2016                                               

                                                      [facility]                    

 

                HEMATOLOGY                            RDW             14.8                            11.5 - 14.5

                            2016                                               

                                                      [facility]                    

 

                HEMATOLOGY                            MCHC            30.8                            32.0 - 36.0

                            2016                                               

                                                      [facility]                    

 

                HEMATOLOGY                            Platelet        220                            133 -

 450                            2016                                           

                                                      [facility]                    

 

                HEMATOLOGY                            MCH             24.5                            27.0 - 31.0

                            2016                                               

                                                      [facility]                    



                                                                                
                                                                                
                                                                                
                                                                                
                                                                                
                                                                                
                                                                        



Pathology Reports







                                        No Data Provided for This Section                    



                            



Diagnostic Reports

            





                                        No Data Provided for This Section                    



                                                            



Consultation Notes

                    





                                        No Data Provided for This Section                    



                                                            



Discharge Summaries

                    





                                        No Data Provided for This Section                    



                                                            



History and Physicals

                    





                                        No Data Provided for This Section                    



                                                                



Vital Signs

                     





                    Vital Sign                            Value                            Date         

                          Comments                            Source                    

 

                    Weight                            86.364                             2016     

                                                      [facility]                    

 

                    Temperature Oral (F)                            97.9 F                            2016

                                                        [facility]                 

   

 

                    Respitory Rate                            18                             2016 

                                                       [facility]                  

  

 

                    Heart Rate                            91                             2016     

                                                      [facility]                    

 

                    Systolic (mm Hg)                            140                             2016

                                                        [facility]                 

   

 

                    Diastolic (mm Hg)                            99                             2016

                                                        [facility]                 

   

 

                    Temperature Oral (F)                            98.4 F                            2016

                                                        [facility]                 

   

 

                    Respitory Rate                            18                             2016 

                                                       [facility]                  

  

 

                    Heart Rate                            97                             2016     

                                                      [facility]                    

 

                    Systolic (mm Hg)                            142                             2016

                                                        [facility]                 

   

 

                    Diastolic (mm Hg)                            82                             2016

                                                        [facility]                 

   

 

                    Systolic (mm Hg)                            147                             2016

                                                        [facility]                 

   

 

                    Diastolic (mm Hg)                            93                             2016

                                                        [facility]                 

   

 

                    Weight                            84.091                             2016     

                                                      [facility]                    

 

                    BMI Calculated                            36.21                             2016

                                                        [facility]                 

   

 

                    Height                            152.4 cm                            2016    

                                                      [facility]                    

 

                    Heart Rate                            76                             2016     

                                                      [facility]                    

 

                    Respitory Rate                            18                             2016 

                                                       [facility]                  

  

 

                    Temperature Oral (F)                            98.1 F                            2016

                                                        [facility]                 

   



                                                                                
                                                                                
                                                                                
                                                                                
                                                        



Encounters

                    





                    Location                            Location Details                            Encounter

 Type                            Encounter Number                            Reason For

 Visit                            Attending Provider                            ADM Date

                            DC Date                            Status                

                                        Source                    

 

                          [facility]                            [id number]                 

                                                Parul Gonzálescole                             2016                                               

                                        [facility]                                               

                          [facility]                            [id number]                 

                                                Mohan Curtis                             2016                                               

                                        [facility]                    



                                                                                
    



Procedures

        





                                        No Data Provided for This Section



                                                    



Assessment and Plan

                    





                                        No Data Provided for This Section                    



                                     



Plan of Care







                                        No Data Provided for This Section                    



                                                                



Social History

                    





                    Social History                            Date                            Source    

                

 

                                        Social History TypeResponse

Smoking Status

Never smoker; Exposure to Tobacco Smoke None; Cigarette Smoking Last 365 Days 
No; Reg Smoking Cessation Counseling No

                            2016                            [facility]       

             



                                                                    



Family History

                    





                                        No Data Provided for This Section                    



                                                            



Advance Directives

                    





                                        No Data Provided for This Section                    



                                                            



Functional Status

                    





                                        No Data Provided for This Section

## 2019-10-01 NOTE — OPERATIVE REPORT
DATE OF PROCEDURE:  08/23/2019

 

SURGEON:  Goldy Rachel MD

 

PREOPERATIVE DIAGNOSES:  

1. Left ureteral stricture.

2. Left hydronephrosis due to stricture.

3. Left indwelling ureteral stent.

4. Neurogenic bladder.

 

POSTOPERATIVE DIAGNOSES:  

1. Left ureteral stricture.

2. Left hydronephrosis due to stricture.

3. Left indwelling ureteral stent.

4. Neurogenic bladder.

5. Grade 1 rectocele.

6. Atrophic (senile) vaginitis.

 

OPERATION PERFORMED:  

1. Cystourethroscopy with complicated removal of left indwelling ureteral stent

(separate procedure performed with separate scope for the diagnosis of stent). 

2. Cystourethroscopy with dilation of ureteral stricture (separate procedure performed

for the diagnosis of stricture). 

3. Radiological services for supervision and interpretation of stricture dilation.

4. Cystourethroscopy with insertion of 2 separate stents into the left ureter (separate

procedure performed to release the hydronephrosis). 

5. Interpretation of retrograde ureteropyelography.

6. Supervision of fluoroscopy, no radiologist present.

7. Interpretation of cystography.

8. Pelvic examination under anesthesia.

 

ANESTHESIA:  General.

 

COMPLICATIONS:  None.

 

CLINICAL SUMMARY:  Savannah Jackson is a complicated 53-year-old woman with

bilateral ureteral strictures, the right one is so severe she currently is managed with

percutaneous nephrostomy, to be changed every couple of months.  The patient has

recurrent infections.  She has a neurogenic bladder and therefore requires a Gomez

catheter.  She refluxes into the left side.  She has 2 left-sided stents that has severe

ureteral stricture.  The patient was brought for the above procedures.  She is aware of

the risks of bleeding, infection, injury to adjacent structures, need for additional

procedures, and elected to proceed. 

 

OPERATIVE PROCEDURE IN DETAIL:  Informed consent was verified.  Savannah Jackson was

properly identified, taken to the operating room, and placed on the cystoscopy table in

supine position.  Anesthesia was uneventfully begun.  The patient was then carefully and

gently repositioned in the dorsal lithotomy position with all pressure points well

padded.  Her genitalia were prepared and draped in usual sterile fashion.  The

cystoscope sheath with obturator in place was atraumatically inserted into the patient's

urethra and the bladder was drained.  Panendoscopy revealed 2 stents emerging from the

left ureteral orifice.  They were somewhat encrusted by debris.  Panendoscopy revealed

blanching as well as erythema of the bladder due to prior radiotherapy.  The patient had

a small opening in the posterior wall, which could potentially be a fistula.  The

guidewire was then placed into the left ureter and guided to the level of the patient's

kidney.  The stents were then grasped, completely removed, and discarded. 

 

A double-lumen ureteral catheter was then were utilized as a coaxial dilator sheath.  It

was advanced over the guidewire to the level of the patient's kidney.  Contrast was

injected. 

 

A secondary guidewire was placed under cystoscopic and fluoroscopic guidance.  Two

separate 7-Albanian by 22 cm indwelling ureteral stents were then placed to coil the

patient's kidney as well as the patient's bladder.  The retaining sutures were removed.

An opening of that we noted in the posterior wall, contrast was injected.  We also

performed cystogram with Gomez catheter that was subsequently placed. 

 

Interpretation of cystography, the bladder wall was relatively smooth.  The bladder was

small.  The stents were within the patient's bladder.  No fistulous tract could be

established radiographically in today's case. 

 

Final placement of Gomez catheter, pelvic examination revealed a grade 1 rectocele with

severe atrophic vaginitis.  No abnormal palpable pelvic masses could be appreciated.

There were no obvious mucosal lesions.  The patient was then uneventfully reversed from

anesthesia and taken to recovery room in stable condition.  There were no complications

to the procedure.  She tolerated the procedure well.  Plans will be to follow up in the

office in about a month to change 

her Gomez catheter.  We will order Interventional Radiology to change her right

nephrostomy collectively. 

 

 

 

 

______________________________

Goldy Rachel MD

 

OH/MALORIE

D:  10/01/2019 00:59:28

T:  10/01/2019 07:22:36

Job #:  [id number]

## 2019-10-24 ENCOUNTER — HOSPITAL ENCOUNTER (OUTPATIENT)
Dept: HOSPITAL 88 - DX | Age: 54
End: 2019-10-24
Attending: UROLOGY
Payer: COMMERCIAL

## 2019-10-24 DIAGNOSIS — N13.1: ICD-10-CM

## 2019-10-24 DIAGNOSIS — N13.5: Primary | ICD-10-CM

## 2019-10-24 PROCEDURE — 50431 NJX PX NFROSGRM &/URTRGRM: CPT

## 2019-10-24 NOTE — DIAGNOSTIC IMAGING REPORT
PROCEDURE: Genitourinary catheter exchange



Procedural Personnel

Attending physician(s): Karely Laguna MD

Fellow physician(s): None

Resident physician(s): None

Advanced practice provider(s): None



Pre-procedure diagnosis: Ureteral obstruction

Post-procedure diagnosis: Same

Indication: Routine scheduled exchange

Additional clinical history: None



Complications: Nephrostomy tube was heavily encrusted and calcified and

required advanced maneuvers to remove and exchange. 



IMPRESSION:



Successful exchange of right nephrostomy tube. Nephrostomy tube was heavily

encrusted and calcified and required advanced maneuvers to remove and exchange.





Plan: 

Future exchanges should be done at no longer than 6-8 week intervals.

_______________________________________________________________



PROCEDURE SUMMARY

- Target organ: Right native kidney

- Antegrade nephrostogram(s) via the existing access

- Nephrostomy tube exchange

- Additional procedure(s): None



PROCEDURE DETAILS:



Pre-procedure

Consent: Informed consent for the procedure including risks, benefits and

alternatives was obtained and time-out was performed prior to the procedure.

Preparation: The site was prepared and draped using maximal sterile barrier

technique including cutaneous antisepsis.



Anesthesia/sedation

Level of anesthesia/sedation: Minimal sedation 50mcg Fentanyl

Anesthesia/sedation administered by: Independent trained observer under

attending supervision with continuous monitoring of the patient?s level of

consciousness and physiologic status



Right genitourinary catheter exchange

Local anesthesia was administered. Initial nephrostogram was performed. A wire

was placed via the existing tube and the tube was very encrusted. Wire could

not be passed all the way through the tube and the loop would not unform. A

12Fr peel-away sheath was advanced over the nephrostomy tube and the tube was

retracted through the sheath and removed. A Kumpe catheter and guidewire were

then advanced through the sheath and the sheath removed. A new 10Fr nephrostomy

tube was advanced over the wire and position was confirmed with contrast

injection.

Pre-existing genitourinary catheter: 10Fr Uresil

Genitourinary catheter(s) placed: 10Fr Uresil 

Findings: Loop formed in renal pelvis.

External catheter securement: Non-absorbable suture 



Additional genitourinary system intervention

Genitourinary intervention: None

Location of intervention: Not applicable

Device used: Not applicable

Description of intervention: Not applicable

Post-intervention findings: Not applicable



Contrast

Contrast agent: Isovue 370

Contrast volume (mL): 10



Radiation Dose

Fluoroscopy time (minutes): 13.24  

Reference air kerma (mGy): 134 



Additional Details

Additional description of procedure: None

Equipment details: None

Specimens removed: None

Estimated blood loss (mL): Less than 10

Standardized report: [id number]



Attestation

Signer name: Karely Laguna MD

I attest that I was present for the entire procedure. I reviewed the stored

images and agree with the report as written.









Signed by: Karely Laguna MD on 10/24/2019 2:21 PM

## 2019-11-05 ENCOUNTER — HOSPITAL ENCOUNTER (EMERGENCY)
Dept: HOSPITAL 88 - ER | Age: 54
Discharge: HOME | End: 2019-11-05
Payer: COMMERCIAL

## 2019-11-05 VITALS — BODY MASS INDEX: 39.07 KG/M2 | HEIGHT: 60 IN | WEIGHT: 199 LBS

## 2019-11-05 DIAGNOSIS — Z82.49: ICD-10-CM

## 2019-11-05 DIAGNOSIS — N30.01: Primary | ICD-10-CM

## 2019-11-05 DIAGNOSIS — Z93.6: ICD-10-CM

## 2019-11-05 DIAGNOSIS — I10: ICD-10-CM

## 2019-11-05 DIAGNOSIS — F41.9: ICD-10-CM

## 2019-11-05 DIAGNOSIS — E11.9: ICD-10-CM

## 2019-11-05 DIAGNOSIS — Z83.3: ICD-10-CM

## 2019-11-05 LAB
BACTERIA URNS QL MICRO: (no result) /HPF
BILIRUB UR QL: NEGATIVE
CLARITY UR: (no result)
COLOR UR: YELLOW
DEPRECATED RBC URNS MANUAL-ACNC: >50 /HPF (ref 0–5)
EPI CELLS URNS QL MICRO: (no result) /LPF
KETONES UR QL STRIP.AUTO: NEGATIVE
LEUKOCYTE ESTERASE UR QL STRIP.AUTO: (no result)
NITRITE UR QL STRIP.AUTO: NEGATIVE
PROT UR QL STRIP.AUTO: (no result)
SP GR UR STRIP: 1.01 (ref 1.01–1.02)
UROBILINOGEN UR STRIP-MCNC: 0.2 MG/DL (ref 0.2–1)
WBC #/AREA URNS HPF: >50 /HPF (ref 0–5)
YEAST URNS QL MICRO: (no result)

## 2019-11-05 PROCEDURE — 87086 URINE CULTURE/COLONY COUNT: CPT

## 2019-11-05 PROCEDURE — 81001 URINALYSIS AUTO W/SCOPE: CPT

## 2019-11-05 PROCEDURE — 87186 SC STD MICRODIL/AGAR DIL: CPT

## 2019-11-05 PROCEDURE — 99283 EMERGENCY DEPT VISIT LOW MDM: CPT

## 2020-04-25 ENCOUNTER — HOSPITAL ENCOUNTER (INPATIENT)
Dept: HOSPITAL 88 - ER | Age: 55
LOS: 7 days | Discharge: HOME | DRG: 660 | End: 2020-05-02
Attending: INTERNAL MEDICINE | Admitting: INTERNAL MEDICINE
Payer: COMMERCIAL

## 2020-04-25 VITALS — WEIGHT: 198 LBS | HEIGHT: 60 IN | BODY MASS INDEX: 38.87 KG/M2

## 2020-04-25 VITALS — DIASTOLIC BLOOD PRESSURE: 74 MMHG | SYSTOLIC BLOOD PRESSURE: 132 MMHG

## 2020-04-25 VITALS — SYSTOLIC BLOOD PRESSURE: 132 MMHG | DIASTOLIC BLOOD PRESSURE: 74 MMHG

## 2020-04-25 VITALS — DIASTOLIC BLOOD PRESSURE: 83 MMHG | SYSTOLIC BLOOD PRESSURE: 182 MMHG

## 2020-04-25 DIAGNOSIS — T83.593A: Primary | ICD-10-CM

## 2020-04-25 DIAGNOSIS — N13.6: ICD-10-CM

## 2020-04-25 DIAGNOSIS — Z96.0: ICD-10-CM

## 2020-04-25 DIAGNOSIS — E11.9: ICD-10-CM

## 2020-04-25 DIAGNOSIS — N39.0: ICD-10-CM

## 2020-04-25 DIAGNOSIS — N31.9: ICD-10-CM

## 2020-04-25 DIAGNOSIS — N17.9: ICD-10-CM

## 2020-04-25 DIAGNOSIS — N81.6: ICD-10-CM

## 2020-04-25 DIAGNOSIS — B96.20: ICD-10-CM
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
## 2020-04-25 DIAGNOSIS — E86.0: ICD-10-CM

## 2020-04-25 DIAGNOSIS — E87.6: ICD-10-CM

## 2020-04-25 DIAGNOSIS — Z16.24: ICD-10-CM

## 2020-04-25 DIAGNOSIS — T83.24XA: ICD-10-CM

## 2020-04-25 DIAGNOSIS — N11.0: ICD-10-CM

## 2020-04-25 DIAGNOSIS — I10: ICD-10-CM

## 2020-04-25 LAB
ALBUMIN SERPL-MCNC: 3.3 G/DL (ref 3.5–5)
ALBUMIN/GLOB SERPL: 0.6 {RATIO} (ref 0.8–2)
ALP SERPL-CCNC: 109 IU/L (ref 40–150)
ALT SERPL-CCNC: 14 IU/L (ref 0–55)
ANION GAP SERPL CALC-SCNC: 15.6 MMOL/L (ref 8–16)
BACTERIA URNS QL MICRO: (no result) /HPF
BACTERIA URNS QL MICRO: (no result) /HPF
BASOPHILS # BLD AUTO: 0.1 10*3/UL (ref 0–0.1)
BASOPHILS NFR BLD AUTO: 0.5 % (ref 0–1)
BILIRUB UR QL: NEGATIVE
BILIRUB UR QL: NEGATIVE
BUN SERPL-MCNC: 20 MG/DL (ref 7–26)
BUN/CREAT SERPL: 12 (ref 6–25)
CALCIUM SERPL-MCNC: 9.9 MG/DL (ref 8.4–10.2)
CHLORIDE SERPL-SCNC: 103 MMOL/L (ref 98–107)
CK MB SERPL-MCNC: 0.4 NG/ML (ref 0–5)
CK SERPL-CCNC: 28 IU/L (ref 29–168)
CLARITY UR: (no result)
CLARITY UR: (no result)
CO2 SERPL-SCNC: 23 MMOL/L (ref 22–29)
COARSE GRAN CASTS URNS QL MICRO: (no result)
COLOR UR: YELLOW
COLOR UR: YELLOW
DEPRECATED APTT PLAS QN: 33.1 SECONDS (ref 23.8–35.5)
DEPRECATED INR PLAS: 0.99
DEPRECATED NEUTROPHILS # BLD AUTO: 7.5 10*3/UL (ref 2.1–6.9)
DEPRECATED RBC URNS MANUAL-ACNC: (no result) /HPF (ref 0–5)
DEPRECATED RBC URNS MANUAL-ACNC: >50 /HPF (ref 0–5)
EGFRCR SERPLBLD CKD-EPI 2021: 31 ML/MIN (ref 60–?)
EOSINOPHIL # BLD AUTO: 0.1 10*3/UL (ref 0–0.4)
EOSINOPHIL NFR BLD AUTO: 0.5 % (ref 0–6)
EPI CELLS URNS QL MICRO: (no result) /LPF
EPI CELLS URNS QL MICRO: (no result) /LPF
ERYTHROCYTE [DISTWIDTH] IN CORD BLOOD: 15 % (ref 11.7–14.4)
GLOBULIN PLAS-MCNC: 5.1 G/DL (ref 2.3–3.5)
GLUCOSE SERPLBLD-MCNC: 196 MG/DL (ref 74–118)
HCT VFR BLD AUTO: 34.2 % (ref 34.2–44.1)
HGB BLD-MCNC: 10.7 G/DL (ref 12–16)
KETONES UR QL STRIP.AUTO: NEGATIVE
KETONES UR QL STRIP.AUTO: NEGATIVE
LEUKOCYTE ESTERASE UR QL STRIP.AUTO: (no result)
LEUKOCYTE ESTERASE UR QL STRIP.AUTO: (no result)
LYMPHOCYTES # BLD: 1.5 10*3/UL (ref 1–3.2)
LYMPHOCYTES NFR BLD AUTO: 15.7 % (ref 18–39.1)
MAGNESIUM SERPL-MCNC: 1.8 MG/DL (ref 1.3–2.1)
MCH RBC QN AUTO: 25.7 PG (ref 28–32)
MCHC RBC AUTO-ENTMCNC: 31.3 G/DL (ref 31–35)
MCV RBC AUTO: 82.2 FL (ref 81–99)
MONOCYTES # BLD AUTO: 0.5 10*3/UL (ref 0.2–0.8)
MONOCYTES NFR BLD AUTO: 4.7 % (ref 4.4–11.3)
MUCOUS THREADS URNS QL MICRO: (no result)
NEUTS SEG NFR BLD AUTO: 78.2 % (ref 38.7–80)
NITRITE UR QL STRIP.AUTO: NEGATIVE
NITRITE UR QL STRIP.AUTO: POSITIVE
PLATELET # BLD AUTO: 282 X10E3/UL (ref 140–360)
POTASSIUM SERPL-SCNC: 3.6 MMOL/L (ref 3.5–5.1)
PROT UR QL STRIP.AUTO: >=300
PROT UR QL STRIP.AUTO: >=300
PROTHROMBIN TIME: 13.7 SECONDS (ref 11.9–14.5)
RBC # BLD AUTO: 4.16 X10E6/UL (ref 3.6–5.1)
SODIUM SERPL-SCNC: 138 MMOL/L (ref 136–145)
SP GR UR STRIP: 1.02 (ref 1.01–1.02)
SP GR UR STRIP: 1.02 (ref 1.01–1.02)
UROBILINOGEN UR STRIP-MCNC: 0.2 MG/DL (ref 0.2–1)
UROBILINOGEN UR STRIP-MCNC: 0.2 MG/DL (ref 0.2–1)
WBC #/AREA URNS HPF: >50 /HPF (ref 0–5)
WBC #/AREA URNS HPF: >50 /HPF (ref 0–5)

## 2020-04-25 PROCEDURE — 50431 NJX PX NFROSGRM &/URTRGRM: CPT

## 2020-04-25 PROCEDURE — 85025 COMPLETE CBC W/AUTO DIFF WBC: CPT

## 2020-04-25 PROCEDURE — 87086 URINE CULTURE/COLONY COUNT: CPT

## 2020-04-25 PROCEDURE — 93005 ELECTROCARDIOGRAM TRACING: CPT

## 2020-04-25 PROCEDURE — 82948 REAGENT STRIP/BLOOD GLUCOSE: CPT

## 2020-04-25 PROCEDURE — 96372 THER/PROPH/DIAG INJ SC/IM: CPT

## 2020-04-25 PROCEDURE — 84484 ASSAY OF TROPONIN QUANT: CPT

## 2020-04-25 PROCEDURE — 70450 CT HEAD/BRAIN W/O DYE: CPT

## 2020-04-25 PROCEDURE — 71045 X-RAY EXAM CHEST 1 VIEW: CPT

## 2020-04-25 PROCEDURE — 81001 URINALYSIS AUTO W/SCOPE: CPT

## 2020-04-25 PROCEDURE — 85730 THROMBOPLASTIN TIME PARTIAL: CPT

## 2020-04-25 PROCEDURE — 84100 ASSAY OF PHOSPHORUS: CPT

## 2020-04-25 PROCEDURE — 87186 SC STD MICRODIL/AGAR DIL: CPT

## 2020-04-25 PROCEDURE — 36415 COLL VENOUS BLD VENIPUNCTURE: CPT

## 2020-04-25 PROCEDURE — 87040 BLOOD CULTURE FOR BACTERIA: CPT

## 2020-04-25 PROCEDURE — 85610 PROTHROMBIN TIME: CPT

## 2020-04-25 PROCEDURE — 87635 SARS-COV-2 COVID-19 AMP PRB: CPT

## 2020-04-25 PROCEDURE — 74470 X-RAY EXAM OF KIDNEY LESION: CPT

## 2020-04-25 PROCEDURE — 82550 ASSAY OF CK (CPK): CPT

## 2020-04-25 PROCEDURE — 99284 EMERGENCY DEPT VISIT MOD MDM: CPT

## 2020-04-25 PROCEDURE — 74176 CT ABD & PELVIS W/O CONTRAST: CPT

## 2020-04-25 PROCEDURE — 80048 BASIC METABOLIC PNL TOTAL CA: CPT

## 2020-04-25 PROCEDURE — 74420 UROGRAPHY RTRGR +-KUB: CPT

## 2020-04-25 PROCEDURE — 83036 HEMOGLOBIN GLYCOSYLATED A1C: CPT

## 2020-04-25 PROCEDURE — 82553 CREATINE MB FRACTION: CPT

## 2020-04-25 PROCEDURE — 83735 ASSAY OF MAGNESIUM: CPT

## 2020-04-25 PROCEDURE — 80053 COMPREHEN METABOLIC PANEL: CPT

## 2020-04-25 RX ADMIN — Medication SCH EA: at 18:05

## 2020-04-25 RX ADMIN — SODIUM CHLORIDE SCH MLS/HR: 9 INJECTION, SOLUTION INTRAVENOUS at 13:50

## 2020-04-25 RX ADMIN — SODIUM CHLORIDE PRN MG: 900 INJECTION INTRAVENOUS at 13:50

## 2020-04-25 RX ADMIN — Medication PRN MG: at 17:32

## 2020-04-25 RX ADMIN — METOPROLOL TARTRATE SCH MG: 25 TABLET, FILM COATED ORAL at 18:05

## 2020-04-25 NOTE — NUR
nurse examined pt; pt face and bilateral arms/hands does not appear red; pt not 
c/o itching states she feels better

## 2020-04-25 NOTE — NUR
received patient from the ER via wheelchair, pt is alert and ambulated to bed. no s/s of 
distress, pt c/o severe pain in the R flank morphine was given in ER. pt oriented to room, 
call light within reach and instructed pt to call RN for help

## 2020-04-25 NOTE — DIAGNOSTIC IMAGING REPORT
CT Abdomen and Pelvis without contrast



INDICATION:  UTI/abdominal pain, ureteral stents and percutaneous nephrostomies

^ABD PAIN

^20200425

^1700



TECHNIQUE: Thin collimation axial images obtained from the diaphragm to the

level of the pubic symphysis without nonionic intravenous contrast. 



Dose reduction techniques used: Automated exposure control, adjustment of the

mAs and/or kVp according to patient size, standardized low-dose protocol,

and/or iterative reconstruction technique.



RADIATION DOSE:

     Total DLP: 634.06 mGy*cm

     Estimated effective dose: DLP x 0.015 x size factor mSv

     CTDIvol has been reviewed. It is below the limits set by the Radiation

Protocol Committee (RPC).



COMPARISON: CT abdomen/pelvis 4/20/2019.

 

ABDOMEN FINDINGS:



Lung Bases: Calcified granuloma in the lateral right lower lobe is stable.

Small amount of posterior layering atelectasis. The visualized portion of the

mediastinum is normal.



Liver: Normal in attenuation without mass.



Gallbladder: Present with a calcified gallstone measuring 2 cm near the neck. 

No ductal dilatation.



Pancreas: Normal attenuation without mass.



Spleen: Normal size without mass.



Adrenal Glands: No evidence for mass.



Kidneys: 

Right: Percutaneous the approximate is in the posterior aspect of the

collecting system. There is marked hydronephrosis. Trace perinephric

inflammation. No calculus in the collecting system.  No cortical mass



Left:  There are 2 stents in the collecting system are redemonstrated that

extend into the urinary bladder. The renal pelvis remains dilated. No evidence

of calculus.  Mild renal atrophy. No cortical mass



Lymph Nodes: Prominent left periaortic lymph nodes are stable and likely

reactive.



Aorta:  Normal in diameter with scattered calcifications.



PELVIS FINDINGS: 



Bowel: 

Stomach:  Normal.

Small Bowel: Normal in caliber with normal wall thickness.

Large Bowel: Normal in caliber with normal wall thickness.

Appendix: Normal.



Bladder: Collapsed around a Gomez catheter. Coarse calcifications at the right

UVJ are redemonstrated. There is a small amount of encrustation surrounding a

loop of the ureteral stent.



Ureters: The right ureter is distended to the iliac artery bifurcation. There

are 1-2 linear hyperdensities along the course of the mid/distal ureter

suggestive of calculi. The ureter distal to this is collapsed.



The proximal left ureter is distended just past the UPJ. There are no

calcifications along the course of the stent.



The uterus is atrophic. No adnexal mass.



Peritoneum/retroperitoneum: No free fluid or fluid collection.



Bones: Mild degenerative changes of the lower lumbar spine. Mild to moderate

degenerative changes of the lower thoracic spine. Diffuse demineralization of

the sacrum is redemonstrated. No associated fracture.



IMPRESSION:



1.  New right hydroureteronephrosis, possibly due to at least two calculi in

the mid/distal right ureter. Percutaneous nephrostomy is in appropriate

position.



2.  Persistent left hydronephrosis despite the presence of 2 stents in the left

ureter. One loop in the pelvis appears to have an encrustation.



3. Other findings as described above are stable.



Signed by: Dr. Austin Benavides MD on 4/25/2020 5:55 PM

## 2020-04-25 NOTE — NUR
called Dr. Elias and left message to discuss home med reconciliation and possible new orders 
for pain meds. pt states that the current medication is not lasting long enough. waiting for 
call back/new orders

## 2020-04-25 NOTE — NUR
called Dr. Elias and left message to discuss home med reconciliation and possible new orders 
for pain meds. pt states that the current medication is not lasting long enough. waiting for 
call back/new orders 

-------------------------------------------------------------------------------

Addendum: 04/25/20 at 1814 by Saida Smith RN

-------------------------------------------------------------------------------

wrong time

## 2020-04-25 NOTE — DIAGNOSTIC IMAGING REPORT
Examination: CT BRAIN WO CONTRAST



History: Acute headache.

Comparison studies:None



Technique:

Axial images were obtained from the skull base to the vertex.

Coronal and sagittal images reconstructed from the axial data.

Dose modulation, iterative reconstruction, and/or weight based adjustment of

the mA/kV was utilized to reduce the radiation dose to as low as reasonably

achievable. 

Intravenous contrast: None



Findings:



Scalp: No abnormalities.

Bones: No fractures, blastic or lytic lesions.



Brain sulci: Appropriate for age.

Ventricles: Normal in size and configuration. No hydrocephalus.



Extra-axial space:

No abnormalities.



Parenchyma: 

No abnormal densities. 

No masses, hemorrhage, or acute or chronic cortical based vascular insults..



Sellar/suprasellar region: No abnormalities.

Craniocervical junction: Patent foramen magnum. No Chiari one malformation.



Incidental findings: 

None.



Impression:

 

No intracranial abnormalities.



Signed by: Dr. Mame James M.D. on 4/25/2020 3:31 PM

## 2020-04-25 NOTE — NUR
pt given morphine and zofran followed by merropenum; started to c/o hearing 
popping in ears and redness to both arms and face and itching; md made aware 
who went to see pt; pt states she's had morphine before with no reactions says 
she's never had merropenum before; md orders benadryl, solumedrol, and pepcid; 
zyvox held per md; nurse watches pt for 15 min and pt states she's starting to 
feel better and she just needs to calm down states she does take anxiety 
medication at home nurse offered to ask md for anxiety med for pt and pt 
declined

## 2020-04-25 NOTE — DIAGNOSTIC IMAGING REPORT
EXAMINATION:  CHEST SINGLE (PORTABLE)   



COMPARISON: Chest x-ray 11/9/2018



INDICATION: Fever, pain, weakness 

^LOW GRADE FEVER, SOMNOLENCE, LIKELY UTI

^20200425

^1430  



DISCUSSION:

Frontal view of the chest obtained at 1439 hours.



HEART AND MEDIASTINUM:  The heart is normal in size. The aortic arch is

tortuous but stable

  

LINES:  None.



LUNGS:  The lungs are well inflated and clear. Calcified granuloma in the right

lung base is stable. No pneumonia or pulmonary edema.



PLEURA:  No pleural effusion or pneumothorax. Stable mild eventration of the

right diaphragm.



BONES AND SOFT TISSUES: Degenerative changes of the right AC joint area No

focal osseous lesion. The soft tissues are normal.





IMPRESSION: 

Stable chest. No acute cardiopulmonary disease.



Signed by: Dr. Austin Benavides MD on 4/25/2020 3:19 PM

## 2020-04-26 VITALS — SYSTOLIC BLOOD PRESSURE: 138 MMHG | DIASTOLIC BLOOD PRESSURE: 72 MMHG

## 2020-04-26 VITALS — SYSTOLIC BLOOD PRESSURE: 122 MMHG | DIASTOLIC BLOOD PRESSURE: 63 MMHG

## 2020-04-26 VITALS — SYSTOLIC BLOOD PRESSURE: 136 MMHG | DIASTOLIC BLOOD PRESSURE: 76 MMHG

## 2020-04-26 VITALS — DIASTOLIC BLOOD PRESSURE: 77 MMHG | SYSTOLIC BLOOD PRESSURE: 168 MMHG

## 2020-04-26 VITALS — DIASTOLIC BLOOD PRESSURE: 79 MMHG | SYSTOLIC BLOOD PRESSURE: 166 MMHG

## 2020-04-26 VITALS — DIASTOLIC BLOOD PRESSURE: 71 MMHG | SYSTOLIC BLOOD PRESSURE: 138 MMHG

## 2020-04-26 VITALS — DIASTOLIC BLOOD PRESSURE: 74 MMHG | SYSTOLIC BLOOD PRESSURE: 143 MMHG

## 2020-04-26 VITALS — DIASTOLIC BLOOD PRESSURE: 76 MMHG | SYSTOLIC BLOOD PRESSURE: 136 MMHG

## 2020-04-26 LAB
ALBUMIN SERPL-MCNC: 2.8 G/DL (ref 3.5–5)
ALBUMIN/GLOB SERPL: 0.6 {RATIO} (ref 0.8–2)
ALP SERPL-CCNC: 90 IU/L (ref 40–150)
ALT SERPL-CCNC: 12 IU/L (ref 0–55)
ANION GAP SERPL CALC-SCNC: 11.8 MMOL/L (ref 8–16)
BASOPHILS # BLD AUTO: 0 10*3/UL (ref 0–0.1)
BASOPHILS NFR BLD AUTO: 0.1 % (ref 0–1)
BUN SERPL-MCNC: 22 MG/DL (ref 7–26)
BUN/CREAT SERPL: 12 (ref 6–25)
CALCIUM SERPL-MCNC: 9.1 MG/DL (ref 8.4–10.2)
CHLORIDE SERPL-SCNC: 106 MMOL/L (ref 98–107)
CO2 SERPL-SCNC: 22 MMOL/L (ref 22–29)
DEPRECATED NEUTROPHILS # BLD AUTO: 6.8 10*3/UL (ref 2.1–6.9)
EGFRCR SERPLBLD CKD-EPI 2021: 28 ML/MIN (ref 60–?)
EOSINOPHIL # BLD AUTO: 0 10*3/UL (ref 0–0.4)
EOSINOPHIL NFR BLD AUTO: 0 % (ref 0–6)
ERYTHROCYTE [DISTWIDTH] IN CORD BLOOD: 14.9 % (ref 11.7–14.4)
GLOBULIN PLAS-MCNC: 4.5 G/DL (ref 2.3–3.5)
GLUCOSE SERPLBLD-MCNC: 322 MG/DL (ref 74–118)
HCT VFR BLD AUTO: 30.2 % (ref 34.2–44.1)
HGB BLD-MCNC: 9.2 G/DL (ref 12–16)
LYMPHOCYTES # BLD: 1 10*3/UL (ref 1–3.2)
LYMPHOCYTES NFR BLD AUTO: 12.4 % (ref 18–39.1)
MCH RBC QN AUTO: 25.1 PG (ref 28–32)
MCHC RBC AUTO-ENTMCNC: 30.5 G/DL (ref 31–35)
MCV RBC AUTO: 82.5 FL (ref 81–99)
MONOCYTES # BLD AUTO: 0.3 10*3/UL (ref 0.2–0.8)
MONOCYTES NFR BLD AUTO: 3.1 % (ref 4.4–11.3)
NEUTS SEG NFR BLD AUTO: 83.8 % (ref 38.7–80)
PLATELET # BLD AUTO: 263 X10E3/UL (ref 140–360)
POTASSIUM SERPL-SCNC: 3.8 MMOL/L (ref 3.5–5.1)
RBC # BLD AUTO: 3.66 X10E6/UL (ref 3.6–5.1)
SODIUM SERPL-SCNC: 136 MMOL/L (ref 136–145)

## 2020-04-26 RX ADMIN — INSULIN LISPRO SCH UNIT: 100 INJECTION, SOLUTION INTRAVENOUS; SUBCUTANEOUS at 11:30

## 2020-04-26 RX ADMIN — ACETAMINOPHEN AND CODEINE PHOSPHATE PRN EA: 300; 30 TABLET ORAL at 21:19

## 2020-04-26 RX ADMIN — CEFEPIME HYDROCHLORIDE SCH MLS/HR: 1 INJECTION, POWDER, FOR SOLUTION INTRAMUSCULAR; INTRAVENOUS at 17:10

## 2020-04-26 RX ADMIN — INSULIN LISPRO SCH UNIT: 100 INJECTION, SOLUTION INTRAVENOUS; SUBCUTANEOUS at 17:12

## 2020-04-26 RX ADMIN — SODIUM CHLORIDE SCH MLS/HR: 9 INJECTION, SOLUTION INTRAVENOUS at 03:15

## 2020-04-26 RX ADMIN — METOPROLOL TARTRATE SCH MG: 25 TABLET, FILM COATED ORAL at 08:26

## 2020-04-26 RX ADMIN — Medication SCH EA: at 17:11

## 2020-04-26 RX ADMIN — Medication SCH EA: at 08:26

## 2020-04-26 RX ADMIN — METOPROLOL TARTRATE SCH MG: 25 TABLET, FILM COATED ORAL at 17:11

## 2020-04-26 RX ADMIN — ACETAMINOPHEN AND CODEINE PHOSPHATE PRN EA: 300; 30 TABLET ORAL at 17:10

## 2020-04-26 RX ADMIN — INSULIN LISPRO SCH UNIT: 100 INJECTION, SOLUTION INTRAVENOUS; SUBCUTANEOUS at 20:59

## 2020-04-26 NOTE — NUR
INFECTIOUS DISEASE CONSULT NOTE

4/26/20



CC: "No one will give me a new bag, and my tube won't drain"



HPI: This patient, who is a 53-year-old  female, history of 
recurrent UTI, history of multidrug-resistant pathogen.  The patient comes 
into the emergency room with flank pain and complaints that her nephrostomy 
tube was not emptying properly. The patient denies fever or chills. The 
patient is known to have a history of colonization with multidrug-resistant 
pathogen before including gram-negative and Enterococcus, where we were 
consulted. The patient has history of UTI, history of nephrostomy on the 
right, history stent placement, history of hydronephrosis.  She is well known 
to Dr. Rachel.



ROS: Reports: flank pain

Denies: fever, chills, abdominal pain, nausea, vomiting, diarrhea 

ROS 14 point obtained and negative unless otherwise documented



PHYSICAL EXAM

VS:

General: sitting up in bed 

HEENT: AAOX3, awake, alert

CV: s1, s2, no s3, s4 

CHEST: symmetrical expansion, CLAB

ABD: soft, non-tender, obese, paez and nephrostomy tube 

EXT: moves all

PSYCH: intact 



LABS: no leukocytosis, all labs reviewed 

MEDS: will d/c zyvox and cipro IV 



RADIOLOGY: IMPRESSION:

1.  New right hydroureteronephrosis, possibly due to at least two calculi in

the mid/distal right ureter. Percutaneous nephrostomy is in appropriate

position.

2.  Persistent left hydronephrosis despite the presence of 2 stents in the left

ureter. One loop in the pelvis appears to have an encrustation.

3. Other findings as described above are stable.



ASSESSMENT:

1. Right Hydronephrosis with nephrostomy tube

2. UTI- MRDO colonization

3. Flank pain 

4. Obesity 

5. Left Hydronephrosis with stents 



PLAN: 

4/26/20

Urine is likely colonization, patient is afebrile. We will d/c zyvox and cipro. 
Await cultures and monitor clinically. For now we will do Cefepime. Further 
reqs depending on culture results



Discussed with Dr. Hinton

## 2020-04-26 NOTE — NUR
received bedside report. pt is alert ambulating from bathroom, no s/s of distress. no c/o of 
pain at this time. call light within reach and instructed pt to call RN for help

## 2020-04-26 NOTE — NUR
RECEIVED REPORT FROM [facility] NURSE. RESTING ON BED. AAOX3. R FA 20G SALINE LOCK. NO SIGNS 
OF INFILTRATION. NEPHROSTOMY TO RIGHT LOWER FLANK. NO ADVERSE SIGNS. NOLASCO DRAINING URINE TO 
GRAVITY. SR UPX2. BED LOCKED AND IN LOW POSITION. CALL LIGHT WITHIN REACH.

## 2020-04-27 VITALS — DIASTOLIC BLOOD PRESSURE: 72 MMHG | SYSTOLIC BLOOD PRESSURE: 134 MMHG

## 2020-04-27 VITALS — SYSTOLIC BLOOD PRESSURE: 134 MMHG | DIASTOLIC BLOOD PRESSURE: 72 MMHG

## 2020-04-27 VITALS — DIASTOLIC BLOOD PRESSURE: 72 MMHG | SYSTOLIC BLOOD PRESSURE: 128 MMHG

## 2020-04-27 VITALS — DIASTOLIC BLOOD PRESSURE: 66 MMHG | SYSTOLIC BLOOD PRESSURE: 129 MMHG

## 2020-04-27 VITALS — SYSTOLIC BLOOD PRESSURE: 127 MMHG | DIASTOLIC BLOOD PRESSURE: 64 MMHG

## 2020-04-27 VITALS — SYSTOLIC BLOOD PRESSURE: 135 MMHG | DIASTOLIC BLOOD PRESSURE: 77 MMHG

## 2020-04-27 VITALS — DIASTOLIC BLOOD PRESSURE: 77 MMHG | SYSTOLIC BLOOD PRESSURE: 135 MMHG

## 2020-04-27 VITALS — SYSTOLIC BLOOD PRESSURE: 132 MMHG | DIASTOLIC BLOOD PRESSURE: 72 MMHG

## 2020-04-27 LAB
ANION GAP SERPL CALC-SCNC: 10.3 MMOL/L (ref 8–16)
BUN SERPL-MCNC: 20 MG/DL (ref 7–26)
BUN/CREAT SERPL: 13 (ref 6–25)
CALCIUM SERPL-MCNC: 8.4 MG/DL (ref 8.4–10.2)
CHLORIDE SERPL-SCNC: 110 MMOL/L (ref 98–107)
CO2 SERPL-SCNC: 22 MMOL/L (ref 22–29)
EGFRCR SERPLBLD CKD-EPI 2021: 34 ML/MIN (ref 60–?)
GLUCOSE SERPLBLD-MCNC: 196 MG/DL (ref 74–118)
POTASSIUM SERPL-SCNC: 3.3 MMOL/L (ref 3.5–5.1)
SODIUM SERPL-SCNC: 139 MMOL/L (ref 136–145)

## 2020-04-27 RX ADMIN — Medication PRN MG: at 22:49

## 2020-04-27 RX ADMIN — INSULIN LISPRO SCH UNIT: 100 INJECTION, SOLUTION INTRAVENOUS; SUBCUTANEOUS at 11:30

## 2020-04-27 RX ADMIN — METOPROLOL TARTRATE SCH MG: 25 TABLET, FILM COATED ORAL at 08:51

## 2020-04-27 RX ADMIN — Medication PRN MG: at 20:38

## 2020-04-27 RX ADMIN — CEFEPIME HYDROCHLORIDE SCH MLS/HR: 1 INJECTION, POWDER, FOR SOLUTION INTRAMUSCULAR; INTRAVENOUS at 16:40

## 2020-04-27 RX ADMIN — INSULIN LISPRO SCH UNIT: 100 INJECTION, SOLUTION INTRAVENOUS; SUBCUTANEOUS at 07:30

## 2020-04-27 RX ADMIN — SODIUM CHLORIDE PRN MG: 900 INJECTION INTRAVENOUS at 16:34

## 2020-04-27 RX ADMIN — METOPROLOL TARTRATE SCH MG: 25 TABLET, FILM COATED ORAL at 16:40

## 2020-04-27 RX ADMIN — Medication PRN MG: at 06:44

## 2020-04-27 RX ADMIN — Medication SCH EA: at 16:36

## 2020-04-27 RX ADMIN — Medication PRN MG: at 04:25

## 2020-04-27 RX ADMIN — INSULIN LISPRO SCH UNIT: 100 INJECTION, SOLUTION INTRAVENOUS; SUBCUTANEOUS at 20:40

## 2020-04-27 RX ADMIN — Medication SCH EA: at 08:52

## 2020-04-27 RX ADMIN — TRAMADOL HYDROCHLORIDE PRN MG: 50 TABLET, FILM COATED ORAL at 19:38

## 2020-04-27 RX ADMIN — SODIUM CHLORIDE SCH MLS/HR: 9 INJECTION, SOLUTION INTRAVENOUS at 13:51

## 2020-04-27 RX ADMIN — INSULIN LISPRO SCH UNIT: 100 INJECTION, SOLUTION INTRAVENOUS; SUBCUTANEOUS at 16:30

## 2020-04-27 RX ADMIN — Medication PRN MG: at 16:35

## 2020-04-27 RX ADMIN — Medication PRN MG: at 18:34

## 2020-04-27 NOTE — CONSULTATION
DATE OF CONSULTATION:  04/27/2020

 

Initial Nephrology Consultation Report 

 

REASON FOR CONSULTATION:  Elevated serum creatinine.

 

HISTORY OF PRESENT ILLNESS:  Ms. Jackson is a 54-year-old female with a history of a

right nephrostomy tube.  The patient presented because of just not feeling well, and

some little bit of weakness.  The patient is now being admitted and being treated for

urinary tract infection.  I am being asked to see her because of her serum creatinine is

elevated.  When she presented, her serum creatinine was about 1.7 to 1.8 and today it is

1.57.  However, going back to January of this year, she had a normal serum creatinine of

0.9.  The patient has extensive medical history.  She has a right nephrostomy tube.  She

says that she has had for several years.  She is not sure why it was placed.  She says

that she had cancer in that kidney. 

 

PAST MEDICAL HISTORY:  

1. Hypertension.

2. Diabetes.

 

PAST SURGICAL HISTORY:  The patient has a right nephrostomy tube in place.  The patient

also has ureteral stents bilaterally according to the patient and she also has a chronic

indwelling Gomez catheter. 

 

MEDICATIONS:  The patient is on buspirone, senna, potassium, amlodipine, hydralazine,

magnesium hydroxide, stool softener, and insulin Lispro. 

 

PHYSICAL EXAMINATION:

VITAL SIGNS:  Blood pressure 134/72. 

GENERAL:  The patient is obese. 

HEENT:  No increased JVD. 

CARDIOVASCULAR:  Regular rate and rhythm. 

LUNGS:  Decreased breath sounds. 

ABDOMEN:  Positive bowel sounds. 

EXTREMITIES:  No edema. 

:  The patient has a right nephrostomy tube and indwelling Gomez catheter.

LABORATORY RESULTS:  BUN and creatinine 20 and 1.57.  Electrolytes are within normal

limits except for potassium.  Potassium is 3.3.  H and H are 9 and 30.  Her urine

culture is growing out Citrobacter freundii. 

 

IMPRESSION/PLAN:  

1. Urinary tract infection.

2. Acute kidney injury.

3. Hypokalemia.

4. Diabetes.

5. Hypertension.

 

PLAN:  At the present time, she is being followed by Infectious Disease and Urology.

Her current serum creatinine is starting to get better.  I will go ahead and start her

on some normal saline at 80 mL an hour.  I will replace her potassium orally and

__________ IV contrast should be avoided.  I will follow up the patient with you. 

 

Thank you, Dr. Elias, for this consultation.

 

 

 

 

______________________________

Ather MD RIGO Lala/MALORIE

D:  04/27/2020 12:45:23

T:  04/27/2020 14:37:57

Job #:  [id number]

## 2020-04-27 NOTE — NUR
REPORT GIVEN TO [facility] NURSE. AAOX3. RESTING IN BED. R UA SALINE LOCK. NO SIGNS OF 
INFILTRATION. R NEPHROSTOMY DRAINING TO URINE. NOLASCO DRAINING TO GRAVITY. NO ADVERSE SIGNS. 
SR UPX2. BED LOCKED AND IN LOW POSITION. CALL LIGHT WITHIN REACH.

## 2020-04-27 NOTE — CONSULTATION
DATE OF CONSULTATION:  04/27/2020  

 

HISTORY OF PRESENT ILLNESS:  The patient is seen and evaluated, discussed with Dr. Hinton in details.  This is a 54-year-old  female with a 5 years history

of right nephrostomy tube and 1-year history of Gomez catheterization, sees Dr. Rachel

as an outpatient on a monthly basis for exchange of her Gomez catheter.  The patient

comes in secondary to weakness and not feeling well. She knows this is the way she feels

like when she gets infection with the urine.  She feels better since admission.  Denied

fever. 

 

REVIEW OF SYSTEMS:

Feels better.  No nausea, no vomiting, no fever, no chills, no chest pain, no shortness

of breath, no headache, no dysuria. 

 

PHYSICAL EXAMINATION:

VITAL SIGNS: Temperature is 96.6, pulse is 56, respirations 18, and blood pressure

134/72. 

GENERAL:  Alert and oriented, in no acute distress. 

CV: S1 and S2. 

CHEST: Equal expansion. Clear to auscultation. No acute distress. 

ABDOMEN:  Soft and nontender.  No distention.  Positive bowel sounds.  Obese. 

HEENT:  Moist.  No pallor.  No JVD. 

 

The patient has a right-sided nephrostomy, seems to be draining well.  Also has a

chronic Gomez catheter as mentioned above. 

MEDICATIONS:  Medication list reviewed. As far as Infectious Disease point of view, she

is on cefepime. 

 

LABORATORY STUDIES:  White count is 8.09, hemoglobin 9.2, platelet 263.  Sodium 139,

potassium 3.3, creatinine 1.57. 

 

MICROBIOLOGY:  Blood cultures negative 24 hours.  Urine is growing gram-negative bacilli

with identification and sensitivity pending. 

 

PATHOLOGY:  No new pathology available.

 

RADIOLOGY STUDIES:  She had a CT of abdomen and pelvis on 04/25:

1. Showing new right hydroureteronephrosis.

2. Showing persistent left hydronephrosis despite the presence of two stents in her left

ureter.  Also one loop in the pelvis appears to have an incrustation. 

 

ASSESSMENT AND PLAN:  A 54-year-old  female with of 5 years history of

right nephrostomy tube and 1-year history of Gomez catheterization. The patient admitted

for not feeling well. Antibiotic adjusted, currently on cefepime.  Follow with urine

culture as mentioned above. Blood is being clear to far in past 24 hours.  The plan is

to change stent after cultures sensitivities available.  Continue monitor the patient

clinically.  Follow up with the labs.  Overall, the patient feels better.  Further

management of this patient is based on daily findings, on laboratory and physical

examination.  Other medical conditions including hypertension, 

chronic pain syndrome, diabetes, debility, and obesity.  Discussed with Dr. Hinton in

details. Please see chart for more information. 

 

 

Dictated by Juanito Biswas) RASHID Pride

 

______________________________

Jenifer Hinton MD

 

AS/MODL

D:  04/27/2020 10:08:17

T:  04/27/2020 10:57:57

Job #:  [id number]

## 2020-04-27 NOTE — NUR
Called Dr. Elias to ask if patient can have a different pain medication since Morphine 4mg 
(according to patient) does not lower much pain level. Dr Elias refused to order new pain 
medication and stated Morphine 4mg IV is more than enough for the patient at this time.

## 2020-04-27 NOTE — NUR
ASSESSMENT: Spiritual concern

Pt contemplates purpose and meaning in life. Pt states she lives to help others. Pt 
identifies as Buddhist. Pt concerned about her siblings' support for their mother, a 
nursing home resident.



Intervention:  Provided unhurried empathic listening. Provided prayer. Provided information 
on how to reach , if needed.



Outcome: Pt expressed appreciation for visit. No need to follow at this time. 





ESVIN KELLY



Spiritual Care Department

O: [phone]

## 2020-04-27 NOTE — NUR
ASSUMED CARE. PATIENT AAOX3. ACYANOTIC. RESTING IN BED. NO DISTRESS NOTED. CALL LIGHT IN 
REACH. SIDE RAILS UP X2. BED LOW.

## 2020-04-27 NOTE — NUR
Patient visited in room during nursing rounds. Patient alert and oriented x3. Ambulatory in 
room prn. On IVF (NS at 80ml/hr). Pt c/o frequent pain on lower back radiating to lower 
abdomen. 2 Paez catheter noted (one connected to right upper back - s/p right nephrostomy 3 
months ago and another paez - urethral per urology). Call bell within reach. Will monitor 
pt closely.

## 2020-04-28 VITALS — SYSTOLIC BLOOD PRESSURE: 119 MMHG | DIASTOLIC BLOOD PRESSURE: 59 MMHG

## 2020-04-28 VITALS — SYSTOLIC BLOOD PRESSURE: 168 MMHG | DIASTOLIC BLOOD PRESSURE: 83 MMHG

## 2020-04-28 VITALS — DIASTOLIC BLOOD PRESSURE: 93 MMHG | SYSTOLIC BLOOD PRESSURE: 187 MMHG

## 2020-04-28 VITALS — DIASTOLIC BLOOD PRESSURE: 70 MMHG | SYSTOLIC BLOOD PRESSURE: 142 MMHG

## 2020-04-28 VITALS — DIASTOLIC BLOOD PRESSURE: 71 MMHG | SYSTOLIC BLOOD PRESSURE: 130 MMHG

## 2020-04-28 VITALS — DIASTOLIC BLOOD PRESSURE: 83 MMHG | SYSTOLIC BLOOD PRESSURE: 168 MMHG

## 2020-04-28 VITALS — SYSTOLIC BLOOD PRESSURE: 159 MMHG | DIASTOLIC BLOOD PRESSURE: 76 MMHG

## 2020-04-28 LAB
ANION GAP SERPL CALC-SCNC: 13.9 MMOL/L (ref 8–16)
BUN SERPL-MCNC: 25 MG/DL (ref 7–26)
BUN/CREAT SERPL: 13 (ref 6–25)
CALCIUM SERPL-MCNC: 8.9 MG/DL (ref 8.4–10.2)
CHLORIDE SERPL-SCNC: 112 MMOL/L (ref 98–107)
CO2 SERPL-SCNC: 22 MMOL/L (ref 22–29)
EGFRCR SERPLBLD CKD-EPI 2021: 27 ML/MIN (ref 60–?)
GLUCOSE SERPLBLD-MCNC: 140 MG/DL (ref 74–118)
POTASSIUM SERPL-SCNC: 3.9 MMOL/L (ref 3.5–5.1)
SODIUM SERPL-SCNC: 144 MMOL/L (ref 136–145)

## 2020-04-28 RX ADMIN — CEFEPIME HYDROCHLORIDE SCH MLS/HR: 1 INJECTION, POWDER, FOR SOLUTION INTRAMUSCULAR; INTRAVENOUS at 16:26

## 2020-04-28 RX ADMIN — INSULIN LISPRO SCH UNIT: 100 INJECTION, SOLUTION INTRAVENOUS; SUBCUTANEOUS at 21:00

## 2020-04-28 RX ADMIN — SODIUM CHLORIDE SCH MLS/HR: 9 INJECTION, SOLUTION INTRAVENOUS at 16:14

## 2020-04-28 RX ADMIN — INSULIN LISPRO SCH UNIT: 100 INJECTION, SOLUTION INTRAVENOUS; SUBCUTANEOUS at 16:30

## 2020-04-28 RX ADMIN — INSULIN LISPRO SCH UNIT: 100 INJECTION, SOLUTION INTRAVENOUS; SUBCUTANEOUS at 07:57

## 2020-04-28 RX ADMIN — METOPROLOL TARTRATE SCH MG: 25 TABLET, FILM COATED ORAL at 16:26

## 2020-04-28 RX ADMIN — Medication SCH EA: at 08:11

## 2020-04-28 RX ADMIN — SODIUM CHLORIDE PRN MG: 900 INJECTION INTRAVENOUS at 05:05

## 2020-04-28 RX ADMIN — INSULIN LISPRO SCH UNIT: 100 INJECTION, SOLUTION INTRAVENOUS; SUBCUTANEOUS at 11:30

## 2020-04-28 RX ADMIN — Medication SCH EA: at 16:14

## 2020-04-28 RX ADMIN — SODIUM CHLORIDE SCH MLS/HR: 9 INJECTION, SOLUTION INTRAVENOUS at 01:57

## 2020-04-28 RX ADMIN — METOPROLOL TARTRATE SCH MG: 25 TABLET, FILM COATED ORAL at 08:12

## 2020-04-28 NOTE — NUR
Patient visited in room during nursing rounds. Patient alert and oriented x3. Ambulatory in 
room prn. S/P right Nephrostomy tube replacement draining straw colored urine. Gomez 
catheter in place. On IVF (NS at 80ml/hr). No c/o pain or discomfort at this time. Call bell 
within reach. Will monitor pt closely.

## 2020-04-28 NOTE — DIAGNOSTIC IMAGING REPORT
Right frontal catheter exchange/upsize.                                        

   

                                                                        

History: Malfunctioning indwelling right nephrostomy catheter. Exchange and

upsize requested by the referring provider.                                    

   

                                                                               

Modality: Fluoroscopy

                                                                              

Sedation: Versed 2 mg and fentanyl 100 mcg was given intravenously for

conscious sedation.  Vital signs were monitored throughout the procedure by a

nurse, and remained stable. Physician intra-service time was 20.     

                                                                               

Fluoroscopy Time: 3.0 min.

Reference Air Kerma (Ka, r): 27.4 mGy.                                        



Approach:  The indwelling 10 Colombian nephrostomy catheter



Estimated blood loss:  < 5 cc.



Specimen: None.  



: Kirk Mcfarlane MD.



Assistant: None.



Technique/findings: 

Informed written consent was obtained. Discussion of risks, benefits, and

alternatives were made with the patient. The patient expressed understanding

and agreed to proceed.  A universal timeout was performed prior to starting the

procedure.  All elements maximal sterile barrier technique was utilized for

this procedure, including utilization of sterile scrub solution for skin prep,

a large sterile sheet to cover the areas of the patient that were not prepped,

and hand hygiene, mask, head covering, and sterile gown for performing

radiologist and scrub technologist.



Contrast is injected via the indwelling right nephrostomy catheter

demonstrating intraluminal position within the right collecting system.

Catheter noted to be retracted within a right calyx.



The existing catheter was cut. A 0.035 wire could not be advanced through the

catheter given occluded end hole. Subsequent, a 5 Colombian Kumpe catheter and

0.035 angled Glidewire was used to obtain access into the right collecting

system along side the indwelling nephrostomy catheter. The Glidewire was

exchanged for a 0.035 Bentson wire. The indwelling nephrostomy catheter was

removed. A new 12 Colombian and a prosthetic catheter was advanced with the

pigtail formed within the right renal pelvis. Contrast injection confirmed

position. The catheter was fixed to the skin with silk suture. A sterile

dressing was applied.



The patient tolerated the procedure without immediate complication.



                                                                       

Impression:                                         



Successful fluoroscopic guided right nephrostomy catheter exchange/upsize with

moderate sedation as detailed above.                    



Signed by: Dr. Kirk Mcfarlane MD on 4/28/2020 4:03 PM

## 2020-04-28 NOTE — PROGRESS NOTE
DATE:  04/28/2020  

 

SUBJECTIVE:  The patient is seen and evaluated.  Available labs and notes reviewed.

 

REVIEW OF SYSTEMS:

The patient has some difficulties with right nephrostomy tube, apparently it was clogged

up last night, however, it was resolved and is draining well.  No nausea.  No vomiting.

No fever.  No chills.  No chest pain.  No shortness of breath. 

 

OBJECTIVE:  VITAL SIGNS:  Temperature 98.3, pulse is 71, respirations 20, and blood

pressure 168/83. 

GENERAL:  Alert and oriented, in no acute distress. 

CV:  S1 and S2. 

CHEST:  Equal expansion, clear to auscultation, in no acute distress. 

ABDOMEN:  Soft and nontender.  No distention. 

HEENT:  Moist.  No pallor.  No JVD.

 

MEDICATIONS:  Medication list reviewed.  As far as Infectious Disease point of view, the

patient is on cefepime. 

 

LABORATORY STUDIES:  No new CBC from today, however, sodium was 144, potassium 3.9, and

creatinine 1.93 today, which is slightly elevated, but overall in the baseline. 

 

MICROBIOLOGY:  Urine culture shows Citrobacter freundii, which is sensitive to cefepime.

 Blood culture was negative x2. 

 

RADIOLOGY STUDIES:  No new radiology studies available.

 

ASSESSMENT AND PLAN:  

1. Malaise and not feeling well.

2. Chronic right nephrostomy tube.

3. Chronic Gomez catheter.

4. Left-sided stent x2.

5. Currently right hydronephrosis.

6. Persistent left hydronephrosis despite the presence of 2 stents in the left and some

loop incrustation. 

7. Urine culture as mentioned above. 

Continue with cefepime.  Discussed with .  Plan to change the stents hopefully later

this week and possible nephrostomy tube exchange sometime soon.  Discussed with Dr. Hinton.  Please refer to chart for more information. 

 

 

Dictated by Juanito Pride PA-C (Al)

 

______________________________

Jenifer Hinton MD

 

AS/MODL

D:  04/28/2020 08:46:58

T:  04/28/2020 09:25:44

Job #:  [id number]

## 2020-04-28 NOTE — DIAGNOSTIC IMAGING REPORT
Right frontal catheter exchange/upsize.                                        

   

                                                                        

History: Malfunctioning indwelling right nephrostomy catheter. Exchange and

upsize requested by the referring provider.                                    

   

                                                                               

Modality: Fluoroscopy

                                                                              

Sedation: Versed 2 mg and fentanyl 100 mcg was given intravenously for

conscious sedation.  Vital signs were monitored throughout the procedure by a

nurse, and remained stable. Physician intra-service time was 20.     

                                                                               

Fluoroscopy Time: 3.0 min.

Reference Air Kerma (Ka, r): 27.4 mGy.                                        



Approach:  The indwelling 10 Bahraini nephrostomy catheter



Estimated blood loss:  < 5 cc.



Specimen: None.  



: Kirk Mcfarlane MD.



Assistant: None.



Technique/findings: 

Informed written consent was obtained. Discussion of risks, benefits, and

alternatives were made with the patient. The patient expressed understanding

and agreed to proceed.  A universal timeout was performed prior to starting the

procedure.  All elements maximal sterile barrier technique was utilized for

this procedure, including utilization of sterile scrub solution for skin prep,

a large sterile sheet to cover the areas of the patient that were not prepped,

and hand hygiene, mask, head covering, and sterile gown for performing

radiologist and scrub technologist.



Contrast is injected via the indwelling right nephrostomy catheter

demonstrating intraluminal position within the right collecting system.

Catheter noted to be retracted within a right calyx.



The existing catheter was cut. A 0.035 wire could not be advanced through the

catheter given occluded end hole. Subsequent, a 5 Bahraini Kumpe catheter and

0.035 angled Glidewire was used to obtain access into the right collecting

system along side the indwelling nephrostomy catheter. The Glidewire was

exchanged for a 0.035 Bentson wire. The indwelling nephrostomy catheter was

removed. A new 12 Bahraini and a prosthetic catheter was advanced with the

pigtail formed within the right renal pelvis. Contrast injection confirmed

position. The catheter was fixed to the skin with silk suture. A sterile

dressing was applied.



The patient tolerated the procedure without immediate complication.



                                                                       

Impression:                                         



Successful fluoroscopic guided right nephrostomy catheter exchange/upsize with

moderate sedation as detailed above.                    



Signed by: Dr. Kirk Mcfarlane MD on 4/28/2020 4:03 PM

## 2020-04-29 VITALS — SYSTOLIC BLOOD PRESSURE: 172 MMHG | DIASTOLIC BLOOD PRESSURE: 80 MMHG

## 2020-04-29 VITALS — SYSTOLIC BLOOD PRESSURE: 166 MMHG | DIASTOLIC BLOOD PRESSURE: 97 MMHG

## 2020-04-29 VITALS — DIASTOLIC BLOOD PRESSURE: 73 MMHG | SYSTOLIC BLOOD PRESSURE: 145 MMHG

## 2020-04-29 VITALS — SYSTOLIC BLOOD PRESSURE: 170 MMHG | DIASTOLIC BLOOD PRESSURE: 87 MMHG

## 2020-04-29 VITALS — SYSTOLIC BLOOD PRESSURE: 151 MMHG | DIASTOLIC BLOOD PRESSURE: 79 MMHG

## 2020-04-29 VITALS — DIASTOLIC BLOOD PRESSURE: 79 MMHG | SYSTOLIC BLOOD PRESSURE: 151 MMHG

## 2020-04-29 VITALS — DIASTOLIC BLOOD PRESSURE: 75 MMHG | SYSTOLIC BLOOD PRESSURE: 142 MMHG

## 2020-04-29 LAB
ANION GAP SERPL CALC-SCNC: 13.3 MMOL/L (ref 8–16)
BASOPHILS # BLD AUTO: 0 10*3/UL (ref 0–0.1)
BASOPHILS NFR BLD AUTO: 0.3 % (ref 0–1)
BUN SERPL-MCNC: 22 MG/DL (ref 7–26)
BUN/CREAT SERPL: 13 (ref 6–25)
CALCIUM SERPL-MCNC: 8.1 MG/DL (ref 8.4–10.2)
CHLORIDE SERPL-SCNC: 106 MMOL/L (ref 98–107)
CO2 SERPL-SCNC: 25 MMOL/L (ref 22–29)
DEPRECATED NEUTROPHILS # BLD AUTO: 6.8 10*3/UL (ref 2.1–6.9)
EGFRCR SERPLBLD CKD-EPI 2021: 32 ML/MIN (ref 60–?)
EOSINOPHIL # BLD AUTO: 0.1 10*3/UL (ref 0–0.4)
EOSINOPHIL NFR BLD AUTO: 1.3 % (ref 0–6)
ERYTHROCYTE [DISTWIDTH] IN CORD BLOOD: 15 % (ref 11.7–14.4)
GLUCOSE SERPLBLD-MCNC: 139 MG/DL (ref 74–118)
HCT VFR BLD AUTO: 32.3 % (ref 34.2–44.1)
HGB BLD-MCNC: 9.9 G/DL (ref 12–16)
LYMPHOCYTES # BLD: 2.5 10*3/UL (ref 1–3.2)
LYMPHOCYTES NFR BLD AUTO: 24.4 % (ref 18–39.1)
MCH RBC QN AUTO: 25.3 PG (ref 28–32)
MCHC RBC AUTO-ENTMCNC: 30.7 G/DL (ref 31–35)
MCV RBC AUTO: 82.6 FL (ref 81–99)
MONOCYTES # BLD AUTO: 0.7 10*3/UL (ref 0.2–0.8)
MONOCYTES NFR BLD AUTO: 7.1 % (ref 4.4–11.3)
NEUTS SEG NFR BLD AUTO: 66.4 % (ref 38.7–80)
PLATELET # BLD AUTO: 270 X10E3/UL (ref 140–360)
POTASSIUM SERPL-SCNC: 3.3 MMOL/L (ref 3.5–5.1)
RBC # BLD AUTO: 3.91 X10E6/UL (ref 3.6–5.1)
SODIUM SERPL-SCNC: 141 MMOL/L (ref 136–145)

## 2020-04-29 RX ADMIN — INSULIN LISPRO SCH UNIT: 100 INJECTION, SOLUTION INTRAVENOUS; SUBCUTANEOUS at 22:07

## 2020-04-29 RX ADMIN — INSULIN LISPRO SCH UNIT: 100 INJECTION, SOLUTION INTRAVENOUS; SUBCUTANEOUS at 11:30

## 2020-04-29 RX ADMIN — METOPROLOL TARTRATE SCH MG: 25 TABLET, FILM COATED ORAL at 17:00

## 2020-04-29 RX ADMIN — SODIUM CHLORIDE SCH MLS/HR: 9 INJECTION, SOLUTION INTRAVENOUS at 20:55

## 2020-04-29 RX ADMIN — SODIUM CHLORIDE PRN MG: 900 INJECTION INTRAVENOUS at 09:10

## 2020-04-29 RX ADMIN — ATROPA BELLADONNA AND OPIUM PRN MG: 16.2; 6 SUPPOSITORY RECTAL at 23:34

## 2020-04-29 RX ADMIN — SODIUM CHLORIDE PRN MG: 900 INJECTION INTRAVENOUS at 12:16

## 2020-04-29 RX ADMIN — Medication SCH EA: at 09:04

## 2020-04-29 RX ADMIN — CEFEPIME HYDROCHLORIDE SCH MLS/HR: 1 INJECTION, POWDER, FOR SOLUTION INTRAMUSCULAR; INTRAVENOUS at 17:00

## 2020-04-29 RX ADMIN — SODIUM CHLORIDE SCH MLS/HR: 9 INJECTION, SOLUTION INTRAVENOUS at 05:00

## 2020-04-29 RX ADMIN — SODIUM CHLORIDE SCH MLS/HR: 9 INJECTION, SOLUTION INTRAVENOUS at 13:30

## 2020-04-29 RX ADMIN — HYDROCODONE BITARTRATE AND ACETAMINOPHEN PRN EA: 10; 325 TABLET ORAL at 17:00

## 2020-04-29 RX ADMIN — METOPROLOL TARTRATE SCH MG: 25 TABLET, FILM COATED ORAL at 09:04

## 2020-04-29 RX ADMIN — HYDROCODONE BITARTRATE AND ACETAMINOPHEN PRN EA: 10; 325 TABLET ORAL at 13:33

## 2020-04-29 RX ADMIN — INSULIN LISPRO SCH UNIT: 100 INJECTION, SOLUTION INTRAVENOUS; SUBCUTANEOUS at 16:30

## 2020-04-29 RX ADMIN — INSULIN LISPRO SCH UNIT: 100 INJECTION, SOLUTION INTRAVENOUS; SUBCUTANEOUS at 07:30

## 2020-04-29 RX ADMIN — Medication SCH EA: at 17:00

## 2020-04-29 NOTE — PROGRESS NOTE
DATE:    

 

SUBJECTIVE:  The patient is seen and evaluated and discussed with the nurse.  No new

events overnight. 

 

MEDICATIONS:  Medication list reviewed.  As far as Infectious Disease point of view, the

patient is on cefepime. 

 

REVIEW OF SYSTEMS:

No nausea, vomiting, fever, chills, chest pain, shortness of breath, headache, dysuria,

polyuria.  Ostomy tubes seem to be functional. 

 

OBJECTIVE:  VITAL SIGNS:  Temperature is 99.1, pulse is 74, respirations 20, and blood

pressure 151/79. 

GENERAL:  Alert and oriented, comfortable in bed, no acute distress. 

CV:  S1, S2. 

CHEST:  Equal expansion.  Clear to auscultation.  No acute distress. 

ABDOMEN:  Soft, obese, and nontender. 

HEENT:  Moist.  No pallor.  No JVD. 

EXTREMITIES:  Moves all.

 

LABORATORY DATA:  White count of 10.2, hemoglobin 9.9, platelet 270.  Sodium 141,

potassium 3.3, creatinine 1.68, improved from 1.93. 

 

MICROBIOLOGY:  No new microbiology studies available.

 

RADIOLOGY STUDIES:  No new radiology studies available.  The patient had a nephrostogram

yesterday. 

 

ASSESSMENT AND PLAN:  The patient was admitted recently with:

1. Malaise and not feeling well, however, resolved.

2. Chronic right nephrostomy - status post nephrostogram yesterday, then exchange of

nephrostomy tube. 

3. Chronic Gomez catheterization.

4. Left-sided stent - plan to exchange those stents tomorrow.

5. Right hydronephrosis.

6. Persistent left hydronephrosis despite of 2 stents - pending stent exchange.

7. Urinary tract infection.

8. Continue with cefepime.

9. Await stent exchange.

10. Currently, monitor the patient clinically.  Follow with the labs.

11. Discussed with Dr. Hinton.

12. Please refer to chart for more information.

 

 

Dictated by Juanito Pride PA-C (Al)

 

______________________________

Jenifer Hinton MD

 

AS/MODL

D:  04/29/2020 08:45:02

T:  04/29/2020 11:46:41

Job #:  [id number]

## 2020-04-29 NOTE — NUR
NURSE CALLED TO  PT STATES " MY NOLASCO JUST POPPED"  NOLASCO BULB CHECKED NO FLUID RETURNED, 
NOTIFIED DR ARORA, ORDERED TO REPLACE NOLASCO. 20F CATHETER PLACED USING STERIL TECHNIQUE, PT 
TOLERATED WELL. WILL CONTINUE TO MONITOR

## 2020-04-29 NOTE — NUR
BEDSIDE SHIFT REPORT RECEVIED PT IN STABLE CONDITION CELINA PAIN AT THIS TIME, UPDATED ON POC 
VOICED UNDERSTANDING, RUE 22G NO SS OF INFILTRATION NOTED, NO OTHER CO VOIOCED CALL LIGHT IN 
REACH WILL CONTINUE TO MONITOR

## 2020-04-29 NOTE — NUR
PAGE PLACED FOR MD NAVYA ARORA CONCERNING PT REPORTS OF BLADDER SPASMS NOT RELIEVED BY 
MORPHINE OR NORCO. WAITING FOR CALLBACK.

## 2020-04-30 VITALS — SYSTOLIC BLOOD PRESSURE: 124 MMHG | DIASTOLIC BLOOD PRESSURE: 67 MMHG

## 2020-04-30 VITALS — SYSTOLIC BLOOD PRESSURE: 122 MMHG | DIASTOLIC BLOOD PRESSURE: 73 MMHG

## 2020-04-30 VITALS — SYSTOLIC BLOOD PRESSURE: 131 MMHG | DIASTOLIC BLOOD PRESSURE: 68 MMHG

## 2020-04-30 VITALS — SYSTOLIC BLOOD PRESSURE: 123 MMHG | DIASTOLIC BLOOD PRESSURE: 60 MMHG

## 2020-04-30 VITALS — SYSTOLIC BLOOD PRESSURE: 150 MMHG | DIASTOLIC BLOOD PRESSURE: 72 MMHG

## 2020-04-30 VITALS — SYSTOLIC BLOOD PRESSURE: 134 MMHG | DIASTOLIC BLOOD PRESSURE: 62 MMHG

## 2020-04-30 LAB
ANION GAP SERPL CALC-SCNC: 13.4 MMOL/L (ref 8–16)
BUN SERPL-MCNC: 18 MG/DL (ref 7–26)
BUN/CREAT SERPL: 13 (ref 6–25)
CALCIUM SERPL-MCNC: 8.8 MG/DL (ref 8.4–10.2)
CHLORIDE SERPL-SCNC: 108 MMOL/L (ref 98–107)
CO2 SERPL-SCNC: 24 MMOL/L (ref 22–29)
DEPRECATED PHOSPHATE SERPL-MCNC: 2.7 MG/DL (ref 2.3–4.7)
EGFRCR SERPLBLD CKD-EPI 2021: 41 ML/MIN (ref 60–?)
GLUCOSE SERPLBLD-MCNC: 124 MG/DL (ref 74–118)
MAGNESIUM SERPL-MCNC: 1.9 MG/DL (ref 1.3–2.1)
POTASSIUM SERPL-SCNC: 3.4 MMOL/L (ref 3.5–5.1)
SODIUM SERPL-SCNC: 142 MMOL/L (ref 136–145)

## 2020-04-30 PROCEDURE — 0TP98DZ REMOVAL OF INTRALUMINAL DEVICE FROM URETER, VIA NATURAL OR ARTIFICIAL OPENING ENDOSCOPIC: ICD-10-PCS | Performed by: UROLOGY

## 2020-04-30 PROCEDURE — 0T778DZ DILATION OF LEFT URETER WITH INTRALUMINAL DEVICE, VIA NATURAL OR ARTIFICIAL OPENING ENDOSCOPIC: ICD-10-PCS | Performed by: UROLOGY

## 2020-04-30 PROCEDURE — BT1F1ZZ FLUOROSCOPY OF LEFT KIDNEY, URETER AND BLADDER USING LOW OSMOLAR CONTRAST: ICD-10-PCS | Performed by: UROLOGY

## 2020-04-30 PROCEDURE — 0T7D8ZZ DILATION OF URETHRA, VIA NATURAL OR ARTIFICIAL OPENING ENDOSCOPIC: ICD-10-PCS | Performed by: UROLOGY

## 2020-04-30 RX ADMIN — SODIUM CHLORIDE SCH MLS/HR: 9 INJECTION, SOLUTION INTRAVENOUS at 23:38

## 2020-04-30 RX ADMIN — METOPROLOL TARTRATE SCH MG: 25 TABLET, FILM COATED ORAL at 17:10

## 2020-04-30 RX ADMIN — ATROPA BELLADONNA AND OPIUM PRN MG: 16.2; 6 SUPPOSITORY RECTAL at 22:21

## 2020-04-30 RX ADMIN — Medication SCH EA: at 17:00

## 2020-04-30 RX ADMIN — Medication SCH EA: at 09:00

## 2020-04-30 RX ADMIN — METOPROLOL TARTRATE SCH MG: 25 TABLET, FILM COATED ORAL at 09:00

## 2020-04-30 RX ADMIN — CEFEPIME HYDROCHLORIDE SCH MLS/HR: 1 INJECTION, POWDER, FOR SOLUTION INTRAMUSCULAR; INTRAVENOUS at 17:10

## 2020-04-30 RX ADMIN — SODIUM CHLORIDE SCH MLS/HR: 9 INJECTION, SOLUTION INTRAVENOUS at 12:00

## 2020-04-30 RX ADMIN — INSULIN LISPRO SCH UNIT: 100 INJECTION, SOLUTION INTRAVENOUS; SUBCUTANEOUS at 09:00

## 2020-04-30 RX ADMIN — INSULIN LISPRO SCH UNIT: 100 INJECTION, SOLUTION INTRAVENOUS; SUBCUTANEOUS at 16:30

## 2020-04-30 RX ADMIN — INSULIN LISPRO SCH UNIT: 100 INJECTION, SOLUTION INTRAVENOUS; SUBCUTANEOUS at 21:26

## 2020-04-30 RX ADMIN — ATROPA BELLADONNA AND OPIUM PRN MG: 16.2; 6 SUPPOSITORY RECTAL at 05:48

## 2020-04-30 RX ADMIN — INSULIN LISPRO SCH UNIT: 100 INJECTION, SOLUTION INTRAVENOUS; SUBCUTANEOUS at 11:30

## 2020-04-30 NOTE — PROGRESS NOTE
DATE:  04/30/2020  

 

REVIEW OF SYSTEMS:

No acute distress.  No specific complaints per staff.

 

OBJECTIVE:  VITAL SIGNS:  Temperature is 98.6, pulse 76, respirations 16, and blood

pressure 124/73. 

GENERAL:  Comfortable in bed, in no acute distress. 

CV:  S1 and S2. 

CHEST:  Equal expansion, clear to auscultation. 

ABDOMEN:  Soft, obese, nontender. 

HEENT:  Moist.  No pallor.  No JVD. 

EXTREMITIES:  Weak.

 

MEDICATIONS:  Medication list is reviewed.  As far as Infectious Disease point of view,

the patient is on cefepime. 

 

LABORATORY STUDIES:  No new CBC. 

 

Sodium 142, potassium 3.4, and creatinine 1.34 today.

 

SEROLOGY:  COVID-19 not detected on 04/29/2020.

 

MICROBIOLOGY:  Recheck urine culture pending.  Previous urine culture showed Citrobacter.

 

IMAGING:  No new radiology studies available.

 

ASSESSMENT AND PLAN:  

1. Chronic right-sided nephrostomy, status post exchange of the tube.

2. Left-sided stents x2-discussed with , both stents were changed today.

3. Right-sided hydronephrosis.

4. Persistent left hydronephrosis, see above.

5. Urinary tract infection.  Follow with a recheck urine culture.  Previous urine

culture was Citrobacter. 

6. Malaise and not feeling well-improved/resolved. 

Discussed with staff. 

 

Please refer to chart for more information. 

 

Continue with cefepime and follow up with urine culture.  Discussed with Dr. Hinton in

detail. 

 

 

Dictated by Juanito Pride PA-C (Al)

 

______________________________

Jenifer Hinton MD

 

AS/MODL

D:  04/30/2020 08:51:08

T:  04/30/2020 09:21:52

Job #:  [id number]

## 2020-04-30 NOTE — NUR
PROVIDED CASTILE SOAP WIPES TO PERFORM SELF URINARY CATHETER CARE. DEMONSTRATED CORRECT USE 
OF WIPES.

## 2020-04-30 NOTE — DIAGNOSTIC IMAGING REPORT
OR Fluoroscopy:



IMPRESSION: Fluoroscopy service provided in the OR. Interpretation not

requested.



Signed by: Salvador Mena MD on 4/30/2020 2:31 PM

## 2020-04-30 NOTE — NUR
REPORT GIVEN TO DAYSHIFT NURSE. NO ADVERSE SIGNS TO R WRIST IV. NO ADVERSE SIGNS TO NOLASCO OR 
NEPHROSTOMY. AAOX3. RESTING IN BED. BED LOCKED AND IN LOW POSITION. CALL LIGHT WITHIN REACH.

## 2020-05-01 VITALS — DIASTOLIC BLOOD PRESSURE: 74 MMHG | SYSTOLIC BLOOD PRESSURE: 137 MMHG

## 2020-05-01 VITALS — SYSTOLIC BLOOD PRESSURE: 146 MMHG | DIASTOLIC BLOOD PRESSURE: 96 MMHG

## 2020-05-01 VITALS — DIASTOLIC BLOOD PRESSURE: 71 MMHG | SYSTOLIC BLOOD PRESSURE: 161 MMHG

## 2020-05-01 VITALS — DIASTOLIC BLOOD PRESSURE: 73 MMHG | SYSTOLIC BLOOD PRESSURE: 135 MMHG

## 2020-05-01 VITALS — SYSTOLIC BLOOD PRESSURE: 163 MMHG | DIASTOLIC BLOOD PRESSURE: 78 MMHG

## 2020-05-01 VITALS — SYSTOLIC BLOOD PRESSURE: 171 MMHG | DIASTOLIC BLOOD PRESSURE: 82 MMHG

## 2020-05-01 LAB
ANION GAP SERPL CALC-SCNC: 9.6 MMOL/L (ref 8–16)
BASOPHILS # BLD AUTO: 0 10*3/UL (ref 0–0.1)
BASOPHILS NFR BLD AUTO: 0.2 % (ref 0–1)
BUN SERPL-MCNC: 19 MG/DL (ref 7–26)
BUN/CREAT SERPL: 15 (ref 6–25)
CALCIUM SERPL-MCNC: 8.6 MG/DL (ref 8.4–10.2)
CHLORIDE SERPL-SCNC: 109 MMOL/L (ref 98–107)
CO2 SERPL-SCNC: 23 MMOL/L (ref 22–29)
DEPRECATED NEUTROPHILS # BLD AUTO: 8.8 10*3/UL (ref 2.1–6.9)
EGFRCR SERPLBLD CKD-EPI 2021: 44 ML/MIN (ref 60–?)
EOSINOPHIL # BLD AUTO: 0.1 10*3/UL (ref 0–0.4)
EOSINOPHIL NFR BLD AUTO: 0.6 % (ref 0–6)
ERYTHROCYTE [DISTWIDTH] IN CORD BLOOD: 14.6 % (ref 11.7–14.4)
GLUCOSE SERPLBLD-MCNC: 163 MG/DL (ref 74–118)
HCT VFR BLD AUTO: 32.7 % (ref 34.2–44.1)
HGB BLD-MCNC: 10.1 G/DL (ref 12–16)
LYMPHOCYTES # BLD: 3 10*3/UL (ref 1–3.2)
LYMPHOCYTES NFR BLD AUTO: 23.3 % (ref 18–39.1)
MCH RBC QN AUTO: 25.5 PG (ref 28–32)
MCHC RBC AUTO-ENTMCNC: 30.9 G/DL (ref 31–35)
MCV RBC AUTO: 82.6 FL (ref 81–99)
MONOCYTES # BLD AUTO: 0.7 10*3/UL (ref 0.2–0.8)
MONOCYTES NFR BLD AUTO: 5.5 % (ref 4.4–11.3)
NEUTS SEG NFR BLD AUTO: 69.8 % (ref 38.7–80)
PLATELET # BLD AUTO: 273 X10E3/UL (ref 140–360)
POTASSIUM SERPL-SCNC: 3.6 MMOL/L (ref 3.5–5.1)
RBC # BLD AUTO: 3.96 X10E6/UL (ref 3.6–5.1)
SODIUM SERPL-SCNC: 138 MMOL/L (ref 136–145)

## 2020-05-01 RX ADMIN — HYDROCODONE BITARTRATE AND ACETAMINOPHEN PRN EA: 10; 325 TABLET ORAL at 14:32

## 2020-05-01 RX ADMIN — METOPROLOL TARTRATE SCH MG: 25 TABLET, FILM COATED ORAL at 09:13

## 2020-05-01 RX ADMIN — METOPROLOL TARTRATE SCH MG: 25 TABLET, FILM COATED ORAL at 17:34

## 2020-05-01 RX ADMIN — INSULIN LISPRO SCH UNIT: 100 INJECTION, SOLUTION INTRAVENOUS; SUBCUTANEOUS at 21:00

## 2020-05-01 RX ADMIN — TRAMADOL HYDROCHLORIDE PRN MG: 50 TABLET, FILM COATED ORAL at 09:12

## 2020-05-01 RX ADMIN — INSULIN LISPRO SCH UNIT: 100 INJECTION, SOLUTION INTRAVENOUS; SUBCUTANEOUS at 16:30

## 2020-05-01 RX ADMIN — Medication SCH EA: at 17:00

## 2020-05-01 RX ADMIN — Medication SCH EA: at 09:00

## 2020-05-01 RX ADMIN — SODIUM CHLORIDE SCH MLS/HR: 9 INJECTION, SOLUTION INTRAVENOUS at 14:32

## 2020-05-01 RX ADMIN — INSULIN LISPRO SCH UNIT: 100 INJECTION, SOLUTION INTRAVENOUS; SUBCUTANEOUS at 07:30

## 2020-05-01 RX ADMIN — HYDROCODONE BITARTRATE AND ACETAMINOPHEN PRN EA: 10; 325 TABLET ORAL at 21:14

## 2020-05-01 RX ADMIN — INSULIN LISPRO SCH UNIT: 100 INJECTION, SOLUTION INTRAVENOUS; SUBCUTANEOUS at 11:30

## 2020-05-01 NOTE — PROGRESS NOTE
DATE:  05/01/2020  

 

SUBJECTIVE:  The patient is seen and evaluated.  Discussed with Dr. Hinton in detail.

 

REVIEW OF SYSTEMS:

No nausea.  No vomiting.  No fever.  No chills.  No chest pain.  No shortness of breath.

 No headache.  No dysuria.  No polyuria. 

 

OBJECTIVE:  VITAL SIGNS:  Temperature is 97.3, pulse is 64, respirations 18, and blood

pressure 137/74. 

GENERAL:  Alert and oriented, very pleasant, in no acute distress. 

CV:  S1 and S2. 

CHEST:  Equal expansion, clear to auscultation.  No acute distress. 

ABDOMEN:  Soft and nontender.  No distention. 

HEENT:  Moist.  No pallor.  No JVD. 

EXTREMITIES:  Moves all. 

:  The patient remains with a right-sided nephrostomy tube and Gomez catheter.

 

MEDICATIONS:  Medication list reviewed.  As far as Infectious Disease point of view, the

patient is on cefepime. 

 

LABORATORY STUDIES:  White blood cells 12.65, increased from 10.2 on 04/29; hemoglobin

is 10.1; and platelets 273.  Sodium 138, potassium 3.6, creatinine 1.27, which seems to

have overall gradually improving. 

 

MICROBIOLOGY STUDIES:  Urine culture with Citrobacter on 04/25.  Recheck urine culture

from 04/30, is pending. 

 

IMAGING:  No new radiology studies available.

 

ASSESSMENT AND PLAN:  

1. Urinary tract infection with Citrobacter.  The patient remains on cefepime.  Culture

and sensitivity reviewed.  Recheck urine culture pending.  Chronic right-sided

nephrostomy tube, status post exchange. 

2. Left-sided stent x2-status post exchange.

3. Right-sided hydronephrosis.

4. Persistent left hydronephrosis.

5. Malaise and debility-resolved. 

Overall feels good and wants to go home with oral antibiotic. 

 

Cultures reviewed and discussed with Dr. Hinton. 

 

Recheck urine cultures still pending and leukocytosis at 12.6, which has increased

slightly since 2 days ago. 

 

The patient can be discharged with Ceftin p.o. as of this point for total of 14 days

whenever the patient is ready for discharge.  Please refer to chart for more

information. 

 

 

Dictated by Juanito Pride PA-C (Al)

 

______________________________

Jenifer Hinton MD

 

AS/ADRIANAL

D:  05/01/2020 08:53:42

T:  05/01/2020 10:43:54

Job #:  [id number]

## 2020-05-01 NOTE — NUR
Nutrition Screen Note



RD Recommendation for Physician: 

-Continue current diet as ordered



Plan of Care: RD following, monitoring for tolerance and adequacy



Nutrition reason for involvement: Length of stay



Primary Diagnose(s): complicated UTI/pyelonephritis



PMH: HTN, diabetes, right nephrostomy tube



Ht: 60 in 

Wt:198 lb

BMI: 38.7 kg/m2

IBW:100 lb



RD Assessment:

(5/1/20) Chart reviewed. Labs and meds reviewed. Pt is a 54 year old female admitted with 
complicated UTI and pyelonephritis. Pt reports she typically eats 50% of her meals. It is 
recorded that pt consumed 75% of meals yesterday and 100% of breakfast this morning. Pt 
reports she has gained weight and that her usual weight is 185 lbs. No N/V/D/C or 
chewing/swallowing issues at this time. Pt did not have any questions at time of visit. Will 
continue to  monitor. 





Current Diet: ADA 1800 kcal



Malnutrition Evaluation (5/1/20)

The patient does not meet criteria for a specified degree of malnutrition at this time. Will 
re-evaluate at follow-up as appropriate. 







Diet Education Needs Assessment: Pt declined diet education materials at time of visit.







Nutrition Care Level: low





Signed: Ivet Olivo, RD, LD

## 2020-05-01 NOTE — OPERATIVE REPORT
DATE OF PROCEDURE:  04/30/2020

 

SURGEON:  Goldy Rachel MD

 

PREOPERATIVE DIAGNOSES:  

1. Left ureteral stricture.

2. Left indwelling ureteral stents.

3. Left hydronephrosis due to stricture.

4. Vesicoureteric reflux.

 

POSTOPERATIVE DIAGNOSES:  

1. Left ureteral stricture.

2. Left indwelling ureteral stents.

3. Left hydronephrosis due to stricture.

4. Vesicoureteric reflux.

5. Grade 1 rectocele.

6. Atrophic (senile) vaginitis.

7. 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

 

OPERATION PERFORMED:  

1. Cystourethroscopy with cystography (separate procedure performed to evaluate the

vesicoureteric reflux). 

2. Cystourethroscopy with complicated removal of 2 separate left-sided indwelling

ureteral stents (separate procedure performed for the diagnosis of stents). 

3. Left ureteroscopy with dilation of ureteral stricture (separate procedure performed

for the diagnosis of stricture). 

4. Radiological Services for supervision and interpretation of ureteroscopy, no

radiologist present. 

5. Cystourethroscopy with insertion of 2 separate left-sided indwelling ureteral stents

(separate procedure performed to relieve the hydronephrosis). 

6. Interpretation of retrograde ureteropyelography, no radiologist present.

7. Supervision of fluoroscopy, no radiologist present.

8. Pelvic examination under anesthesia.

9. Interpretation of cystography.

 

ANESTHESIA:  General.

 

COMPLICATIONS:  None.

 

CLINICAL SUMMARY:  Please refer to the patient's voluminous chart.  The patient is

brought for the above procedures.  She is a very complicated patient. 

 

This procedure was performed during the COVID-19 emergency situation due to the fact

that this is not an elective procedure.  The patient has a worsening hydronephrosis on

the left hand side.  She probably has nonfunctional stents.  The patient is way overdue

for changing of these stents due to her noncompliance scheduling.  Nevertheless, this

procedure is mandatory. 

 

OPERATIVE PROCEDURE IN DETAIL:  Informed consent verified.  Savannah Jackson was

properly identified, taken to the operating room, placed in the cystoscopy table in

supine position, and anesthesia was uneventfully begun.  The patient was then carefully

and gently repositioned in the dorsal lithotomy position with all pressure points well

padded.  Her Gomez catheter was removed.  Her genitalia were prepared and draped in

usual sterile fashion.  The cystoscope sheath with obturator in place was atraumatically

inserted into the patient's urethra and the bladder was drained.  Panendoscopy revealed

erythema, inflammation, blood clots, one hair, and 2 stents emerging from the left

ureteral orifice.  These stents appeared old, but they were not significantly encrusted.

 Multiple attempts were made to find the right ureteral orifice in order to perform

retrograde pyelograms for documentation.  This was not surprising given the degree of

inflammation and mucosal irregularity in this radiated bladder.  We then injected

contrast and the performed cystography. 

 

Interpretation of cystography contrast was injected via the cystoscope into the

patient's bladder.  There was no evidence of right vesicoureteric reflux.  The bladder

was extremely small.  There was almost immediate left-sided vesicoureteric reflux into

the distal left ureter, which was dilated. 

 

A guidewire was then placed alongside the stent into the left ureter.  The stents were

then grasped and discarded. 

 

A double-lumen ureteral catheter was then placed over the guidewire as we dilated the

distal ureteral stricture that began several centimeter into the left ureter.  We

obtained a brisk hydronephrotic drip, which was relatively clear, was sent for culture

and sensitivity nevertheless.  Contrast was injected.  Secondary guidewire was placed. 

 

A semi-rigid ureteroscope was then placed alongside the guidewire and guided into the

patient's distal ureter.  The distal ureter was blanched.  It was obviously damaged from

radiation.  After we were up into the ureter for several centimeters, the ureter took a

turn.  The ureter was completely fixed negotiating the semi-rigid ureteroscope up this

ureter any further would result in injury and we were not going to do that.  It was

obvious that this region was dilated adequately with the coaxial dilator sheath or

double-lumen ureteral catheter. 

 

With cystoscopic and fluoroscopic guidance, 2 separate indwelling ureteral stents were

then placed.  These were coiled in the patient's kidney as well as the patient's

bladder. 

 

We grasped the one here that was within the bladder and extracted it.  Again,

panendoscopy revealed no suspicious lesions, although there was diffuse

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 

 

An 18-Ukrainian Gomez catheter with a 10 mL balloon was then placed.  It was irrigated to

and fro to ensure it worked properly.  The patient's bladder was then drained. 

 

Pelvic examination under anesthesia revealed a grade 1 rectocele with atrophic (senile)

vaginitis.  No abnormal palpable pelvic masses could be appreciated.  There were no

obvious mucosal lesions.  The patient was then uneventfully reversed from anesthesia,

taken to recovery room in stable condition.  There were no complications to the

procedure.  The patient tolerated the procedure well.  We will observe the patient in

the hospital another day prior to her discharge on culture specific antibiotics based on

the Infectious Diseases consultants. 

 

 

 

 

______________________________

MD PIERRE Edmond/MALORIE

D:  04/30/2020 22:53:55

T:  05/01/2020 00:09:49

Job #:  [id number]

## 2020-05-01 NOTE — NUR
PATIENT RESTING IN BED AOX4, NO SIGNS OF DISTRESS NOTED. PATIENT HAS NO IV ACCESS AND WAS 
CONFIRMED WITH PHYSICIAN. PATIENT VOICED PAIN AT A LEVEL OF 9 AND WAS MEDICATED AS ORDERED. 
RIGHT NEPHROSTOMY BAG NOTED AND NOLASCO IS PATENT AND INTACT. BED IS IN LOWEST POSITION, BOTH 
SIDE RAILS ARE UP, CALL LIGHT IS WITHIN EASY REACH, WILL CONTINUE TO MONITOR.

## 2020-05-02 VITALS — DIASTOLIC BLOOD PRESSURE: 71 MMHG | SYSTOLIC BLOOD PRESSURE: 161 MMHG

## 2020-05-02 VITALS — SYSTOLIC BLOOD PRESSURE: 160 MMHG | DIASTOLIC BLOOD PRESSURE: 73 MMHG

## 2020-05-02 VITALS — DIASTOLIC BLOOD PRESSURE: 73 MMHG | SYSTOLIC BLOOD PRESSURE: 160 MMHG

## 2020-05-02 VITALS — SYSTOLIC BLOOD PRESSURE: 156 MMHG | DIASTOLIC BLOOD PRESSURE: 80 MMHG

## 2020-05-02 LAB
BASOPHILS # BLD AUTO: 0 10*3/UL (ref 0–0.1)
BASOPHILS NFR BLD AUTO: 0.4 % (ref 0–1)
DEPRECATED NEUTROPHILS # BLD AUTO: 5.7 10*3/UL (ref 2.1–6.9)
EOSINOPHIL # BLD AUTO: 0.3 10*3/UL (ref 0–0.4)
EOSINOPHIL NFR BLD AUTO: 2.5 % (ref 0–6)
ERYTHROCYTE [DISTWIDTH] IN CORD BLOOD: 14.7 % (ref 11.7–14.4)
HCT VFR BLD AUTO: 32.4 % (ref 34.2–44.1)
HGB BLD-MCNC: 10 G/DL (ref 12–16)
LYMPHOCYTES # BLD: 3.7 10*3/UL (ref 1–3.2)
LYMPHOCYTES NFR BLD AUTO: 35 % (ref 18–39.1)
MCH RBC QN AUTO: 25.6 PG (ref 28–32)
MCHC RBC AUTO-ENTMCNC: 30.9 G/DL (ref 31–35)
MCV RBC AUTO: 82.9 FL (ref 81–99)
MONOCYTES # BLD AUTO: 0.7 10*3/UL (ref 0.2–0.8)
MONOCYTES NFR BLD AUTO: 7.1 % (ref 4.4–11.3)
NEUTS SEG NFR BLD AUTO: 54.2 % (ref 38.7–80)
PLATELET # BLD AUTO: 272 X10E3/UL (ref 140–360)
RBC # BLD AUTO: 3.91 X10E6/UL (ref 3.6–5.1)

## 2020-05-02 RX ADMIN — Medication SCH EA: at 08:15

## 2020-05-02 RX ADMIN — TRAMADOL HYDROCHLORIDE PRN MG: 50 TABLET, FILM COATED ORAL at 08:22

## 2020-05-02 RX ADMIN — INSULIN LISPRO SCH UNIT: 100 INJECTION, SOLUTION INTRAVENOUS; SUBCUTANEOUS at 07:30

## 2020-05-02 RX ADMIN — METOPROLOL TARTRATE SCH MG: 25 TABLET, FILM COATED ORAL at 08:21

## 2020-05-02 NOTE — DISCHARGE SUMMARY
CONSULTANTS:  

1. Dr. Molina Singer.

2. Dr. Goldy Rachel.

3. Dr. Jenifer Hinton.

 

FINAL DIAGNOSES:  

1. Acute kidney injury, secondary to dehydration.  The patient does have a chronic

kidney disease. 

2. Multi-drug resistant urinary tract infection, complicated urinary tract infection

associated with right pyelonephritis, left hydronephrosis with urethral stents. 

3. Right obstructed nephrostomy tube, secondary to right calculi.

4. Status post right nephrostomy tube exchange and also left urethral stent exchange.

5. Chronic indwelling Gomez catheter.

 

SUMMARY:  This is a 54 years old female, who came in to the hospital dehydrated,

increase in BUN and creatinine.  The patient also has right malfunction and nephrostomy

tube, need to be exchanged and also has left ureteral 2 stents need to be exchanged as

well.  The patient placed on antibiotics.  Consultation with Dr. Hinton.  The patient

have multidrug resistant bacteria.  The patient was treated.  Subsequently, right

nephrostomy tube was exchanged after infection subsided and then subsequently also

cystoscopy with left ureteral stent exchange as well.  The patient has chronic

indwelling Gomez catheter.  She has neurogenic urinary bladder and also outlet

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  The patient is otherwise stable

at this time.  She will go home today.  Her newly diagnosis is diabetes with

glycohemoglobin A1c of 6.9.  The patient is otherwise stable.  She will go home with

Norco 7.5 q.8 p.r.n. for pain, glipizide XL 2.5 mg daily, Zofran ODT 4 mg sublingual q.4

p.r.n. for nausea and vomiting, Lopressor 50 mg twice a day, Norvasc 5 mg daily, Senna S

one tablet twice a day hold for diarrhea. 

 

The patient will resume her anxiety medication. 

 

The patient is otherwise stable.  She will follow up with her family doctor within a

week for adjustment of her medication, diabetic monitoring, and continue with treatment.

 She will follow up with Dr. Goldy Rachel per his instruction, most likely within 1 to 2

weeks.  The patient is stable, discharged home today. 

 

 

 

 

______________________________

MD ALEX Knott/MALORIE

D:  05/02/2020 09:36:59

T:  05/02/2020 10:52:32

Job #:  [id number]

## 2020-05-25 ENCOUNTER — HOSPITAL ENCOUNTER (INPATIENT)
Dept: HOSPITAL 88 - ER | Age: 55
LOS: 5 days | Discharge: HOME | DRG: 699 | End: 2020-05-30
Attending: INTERNAL MEDICINE | Admitting: INTERNAL MEDICINE
Payer: COMMERCIAL

## 2020-05-25 VITALS — WEIGHT: 202 LBS | BODY MASS INDEX: 39.66 KG/M2 | HEIGHT: 60 IN

## 2020-05-25 DIAGNOSIS — E11.9: ICD-10-CM

## 2020-05-25 DIAGNOSIS — B95.2: ICD-10-CM

## 2020-05-25 DIAGNOSIS — T83.511A: Primary | ICD-10-CM

## 2020-05-25 DIAGNOSIS — Z96.0: ICD-10-CM

## 2020-05-25 DIAGNOSIS — N13.9: ICD-10-CM

## 2020-05-25 DIAGNOSIS — Z87.440: ICD-10-CM

## 2020-05-25 DIAGNOSIS — B96.5: ICD-10-CM

## 2020-05-25 DIAGNOSIS — Z85.42: ICD-10-CM

## 2020-05-25 DIAGNOSIS — Z92.3: ICD-10-CM

## 2020-05-25 DIAGNOSIS — N12: ICD-10-CM

## 2020-05-25 DIAGNOSIS — I10: ICD-10-CM

## 2020-05-25 DIAGNOSIS — B37.49: ICD-10-CM

## 2020-05-25 DIAGNOSIS — F41.9: ICD-10-CM

## 2020-05-25 LAB
ALBUMIN SERPL-MCNC: 3.8 G/DL (ref 3.5–5)
ALBUMIN/GLOB SERPL: 0.7 {RATIO} (ref 0.8–2)
ALP SERPL-CCNC: 125 IU/L (ref 40–150)
ALT SERPL-CCNC: 19 IU/L (ref 0–55)
ANION GAP SERPL CALC-SCNC: 17.7 MMOL/L (ref 8–16)
BACTERIA URNS QL MICRO: (no result) /HPF
BACTERIA URNS QL MICRO: (no result) /HPF
BASOPHILS # BLD AUTO: 0.1 10*3/UL (ref 0–0.1)
BASOPHILS NFR BLD AUTO: 0.4 % (ref 0–1)
BILIRUB UR QL: (no result)
BILIRUB UR QL: NEGATIVE
BUN SERPL-MCNC: 19 MG/DL (ref 7–26)
BUN/CREAT SERPL: 14 (ref 6–25)
CALCIUM SERPL-MCNC: 9.9 MG/DL (ref 8.4–10.2)
CHLORIDE SERPL-SCNC: 104 MMOL/L (ref 98–107)
CLARITY UR: (no result)
CLARITY UR: (no result)
CO2 SERPL-SCNC: 20 MMOL/L (ref 22–29)
COLOR UR: (no result)
COLOR UR: YELLOW
DEPRECATED APTT PLAS QN: 28.6 SECONDS (ref 23.8–35.5)
DEPRECATED INR PLAS: 0.9
DEPRECATED NEUTROPHILS # BLD AUTO: 8 10*3/UL (ref 2.1–6.9)
EGFRCR SERPLBLD CKD-EPI 2021: 39 ML/MIN (ref 60–?)
EOSINOPHIL # BLD AUTO: 0.1 10*3/UL (ref 0–0.4)
EOSINOPHIL NFR BLD AUTO: 0.6 % (ref 0–6)
EPI CELLS URNS QL MICRO: (no result) /LPF
EPI CELLS URNS QL MICRO: (no result) /LPF
ERYTHROCYTE [DISTWIDTH] IN CORD BLOOD: 14.9 % (ref 11.7–14.4)
GLOBULIN PLAS-MCNC: 5.4 G/DL (ref 2.3–3.5)
GLUCOSE SERPLBLD-MCNC: 142 MG/DL (ref 74–118)
HCT VFR BLD AUTO: 36 % (ref 34.2–44.1)
HGB BLD-MCNC: 11.1 G/DL (ref 12–16)
KETONES UR QL STRIP.AUTO: (no result)
KETONES UR QL STRIP.AUTO: NEGATIVE
LEUKOCYTE ESTERASE UR QL STRIP.AUTO: (no result)
LEUKOCYTE ESTERASE UR QL STRIP.AUTO: (no result)
LYMPHOCYTES # BLD: 2.7 10*3/UL (ref 1–3.2)
LYMPHOCYTES NFR BLD AUTO: 23.1 % (ref 18–39.1)
MCH RBC QN AUTO: 24.4 PG (ref 28–32)
MCHC RBC AUTO-ENTMCNC: 30.8 G/DL (ref 31–35)
MCV RBC AUTO: 79.3 FL (ref 81–99)
MONOCYTES # BLD AUTO: 0.6 10*3/UL (ref 0.2–0.8)
MONOCYTES NFR BLD AUTO: 5.6 % (ref 4.4–11.3)
NEUTS SEG NFR BLD AUTO: 69.8 % (ref 38.7–80)
NITRITE UR QL STRIP.AUTO: NEGATIVE
NITRITE UR QL STRIP.AUTO: NEGATIVE
PLATELET # BLD AUTO: 362 X10E3/UL (ref 140–360)
POTASSIUM SERPL-SCNC: 3.7 MMOL/L (ref 3.5–5.1)
PROT UR QL STRIP.AUTO: >=300
PROT UR QL STRIP.AUTO: >=300
PROTHROMBIN TIME: 12.7 SECONDS (ref 11.9–14.5)
RBC # BLD AUTO: 4.54 X10E6/UL (ref 3.6–5.1)
SODIUM SERPL-SCNC: 138 MMOL/L (ref 136–145)
SP GR UR STRIP: 1.02 (ref 1.01–1.02)
SP GR UR STRIP: 1.03 (ref 1.01–1.02)
UROBILINOGEN UR STRIP-MCNC: 0.2 MG/DL (ref 0.2–1)
UROBILINOGEN UR STRIP-MCNC: 0.2 MG/DL (ref 0.2–1)
WBC #/AREA URNS HPF: >50 /HPF (ref 0–5)

## 2020-05-25 PROCEDURE — 87635 SARS-COV-2 COVID-19 AMP PRB: CPT

## 2020-05-25 PROCEDURE — 80053 COMPREHEN METABOLIC PANEL: CPT

## 2020-05-25 PROCEDURE — 85610 PROTHROMBIN TIME: CPT

## 2020-05-25 PROCEDURE — 36415 COLL VENOUS BLD VENIPUNCTURE: CPT

## 2020-05-25 PROCEDURE — 87086 URINE CULTURE/COLONY COUNT: CPT

## 2020-05-25 PROCEDURE — 81001 URINALYSIS AUTO W/SCOPE: CPT

## 2020-05-25 PROCEDURE — 87040 BLOOD CULTURE FOR BACTERIA: CPT

## 2020-05-25 PROCEDURE — 85730 THROMBOPLASTIN TIME PARTIAL: CPT

## 2020-05-25 PROCEDURE — 82948 REAGENT STRIP/BLOOD GLUCOSE: CPT

## 2020-05-25 PROCEDURE — 99284 EMERGENCY DEPT VISIT MOD MDM: CPT

## 2020-05-25 PROCEDURE — 83605 ASSAY OF LACTIC ACID: CPT

## 2020-05-25 PROCEDURE — 80048 BASIC METABOLIC PNL TOTAL CA: CPT

## 2020-05-25 PROCEDURE — 85025 COMPLETE CBC W/AUTO DIFF WBC: CPT

## 2020-05-25 PROCEDURE — 87186 SC STD MICRODIL/AGAR DIL: CPT

## 2020-05-25 RX ADMIN — CEFEPIME SCH MLS/HR: 1 INJECTION, SOLUTION INTRAVENOUS at 22:01

## 2020-05-25 RX ADMIN — CEFEPIME SCH MLS/HR: 1 INJECTION, SOLUTION INTRAVENOUS at 22:00

## 2020-05-25 RX ADMIN — Medication PRN MG: at 22:01

## 2020-05-25 NOTE — XMS REPORT
Patient Summary Document

                             Created on: 2020



CRISTINA RICH

External Reference #: [id number]

: 1965

Sex: Female



Demographics





                          Address                   [address] [place]  [address]

 

                          Home Phone                [phone]

 

                          Preferred Language        English

 

                          Marital Status            Unknown

 

                          Christianity Affiliation     Unknown

 

                          Race                      Unknown

 

                          Additional Race(s)        Other

White/



 

                          Ethnic Group              /Latin





Author





                          Author                    [facility]

t

 

                          Organization              [facility]

 

                          Address                   [address]

[place]  [address]



 

                          Phone                     Unavailable







Support





                Name            Relationship    Address         Phone

 

                    JAZZMINE LABOY,  ROYER Caregiver           [address]

[address]

[place], [place]  [address] [phone]

 

                    JAZZMINE LABOY,  ROYER Caregiver           [address]

[address]

[place]  [address]                     [phone]

 

                LYNNE PUGH Caregiver       Unknown         Unavailable

 

                NONSTAFF        Caregiver       Unknown         Unavailable

 

                    XANDER LABOY, MITZI RODRIGUEZ   Caregiver           [address]

[place]  [address]                 Unavailable

 

                    GENEVA RICH    Next Of Kin         [address] [place]  [address]                      [phone]

 

                    DESIRE ZARAGOZA,  KALINA Caregiver           [address]

[place]  [address]                      [phone]

 

                    POWER LABOY, MITZI SHETH  Caregiver           [address]

[place]  [address]                 Unavailable

 

                    ADAMA JUNIOR MD Caregiver           [address]

[place]  [address]                 Unavailable

 

                    MARKUS OCONNELL MD Caregiver           [address] [facility] [address]

[place]  [address]                      Unavailable

 

                    PETER LABOY, MITZI PARKS Caregiver           [address]

[place]  [address]                 Unavailable

 

                    BALA RICH  Next Of Kin         [address] [place]  [address]                      [phone]

 

                    MD SHADY PEMBERTON MD  Caregiver           [address] [place]  [address]                     [phone]

 

                RICH GRIFFITHS  Caregiver       Unknown         [phone]

 

                    KODY LABOY, S LISSETTE Caregiver           [address]

[place]  [address]                 Unavailable

 

                RICH PEREZ Caregiver       Unknown         Unavailable

 

                    ADAMA DINERO MD    Caregiver           [address]

[place]  [address]                 Unavailable

 

                RICH MONTE     Caregiver       Unknown         [phone]

 

                JAY LABOY,  RICH Caregiver       Unknown         Unavailable

 

                    ETHEL ESCOBAR    Caregiver           [address]

[place]  [address]                       [phone]

 

                KIERA LABOY MD  RICH Caregiver       Unknown         [phone]

 

                    ANISH LABOY, MARKUS TREY Caregiver           [address]

[address]

[place]  [address]                       [phone]

 

                    XANDER LABOY, MITZI LAIGEMMA   Caregiver           [address]

[address]

[place]  [address]                       [phone]

 

                    SONIA NUNN  LISSETTE Caregiver           [address]

[address]

[place]  [address]                       [phone]

 

                    MD MITZI TERRELL LAIRD   PRS                 [address]

[place]  [address]                       [phone]

 

                    Geneva Rich    ECON                [address] [place]  [address]                      [phone]

 

                    Bala Rich  ECON                [address] [place]  [address]                      [phone]







Care Team Providers





                    Care Team Member Name Role                Phone

 

                    NONSTAFF            PCP                 Unavailable

 

                    SHADY PEMBERTON        Attphys             Unavailable

 

                    SANDHIR,  AMBICA    Attphys             Unavailable

 

                    HAMPEL,  JACQUELINE        Attphys             Unavailable

 

                    SWEET, MITZI LAIRD      Attphys             Unavailable

 

                    MISHEL,  MATTHEW     Attphys             Unavailable

 

                    ADAMA DINERO       Attphys             Unavailable

 

                    DOVER,  SOUHEIL    Attphys             Unavailable

 

                    GABRIELA NGO      Attphys             Unavailable

 

                    ZOMPA A MERYL     Attphys             Unavailable

 

                    AJAY GE     Attphys             Unavailable

 

                    Grayson Curtis Attphys             [phone]

 

                    Elli Wilson Attphys             [phone]

 

                    SHADY PEMBERTON        Admphys             Unavailable

 

                    HAMPEL,  JACQUELINE        Admphys             Unavailable

 

                    MISHEL,  MATTHEW     Admphys             Unavailable

 

                    DOVER,  SOUHEIL    Admphys             Unavailable

 

                    AJAY GE     Admphys             Unavailable







Payers





           Payer Name Policy Type Policy Number Effective Date Expiration Date YAO curran

 

           [employer]            [id number] 2019 00:00:00      

      [facility]

 

           [employer]            [id number] 2019 00:00:00      

      [facility] Cross Exchange            [id number]                       [facility]

 

           [employer]            [id number] 2019 00:00:00      

      [facility]

 

           [employer]            [id number] 2019 00:00:00      

      [facility]

 

           [employer]            [id number] 2019 00:00:00      

      [facility]

 

           [employer]            [id number] 2019 00:00:00      

      [facility]

 

           [employer]            [id number] 2019 00:00:00      

      [facility]

 

           [employer]            [id number] 2019 00:00:00      

      [facility]

 

           [employer]            [id number] 2019 00:00:00      

      [facility]

 

           [employer]            [id number] 2019 00:00:00      

      [facility]

 

           [employer]            [id number] 2019 00:00:00      

      [facility]

 

           [employer]            [id number] 2018 00:00:00      

      [facility]

 

           [employer]            [id number] 2018 00:00:00      

      [facility]

 

           [employer]            [id number] 2018 00:00:00      

      [facility]

 

           [employer]            [id number] 2018 00:00:00      

      [facility]

 

           [employer]            [id number] 2018 00:00:00      

      [facility]

 

           [employer]            [id number] 2018 00:00:00      

      [facility]

 

           [employer]            [id number] 2018 00:00:00      

      [facility]

 

           [employer]            [id number] 2018 00:00:00      

      [facility]

 

           [employer]            [id number] 2018 00:00:00      

      [facility]

 

           [employer]            [id number] 2018 00:00:00      

      [facility]







Advance Directives





           Directive  Decision   Effective Date Termination Date Comments   Sour

ce

 

           Yes        N/A                                         [facility]







Problems





           Condition Name Condition Details Condition Category Status     Onset 

Date Resolution

Date            Last Treatment Date Treating Clinician Comments        Source

 

                Complicated urinary tract infection UTI (urinary tract infection

) Problem         Active

           2016 00:00:00                                             [facility]

 

                          GEN. PAIN                                         GEN.

 PAIN                        Active       

                2016                                                      
                                          [facility]                     

Diagnosis Active    2016 00:00:00           2016-09-15 22:11:00           

          [facility]

 

                          WEAKNESS                                          WEAK

NESS                        Active         

              2016                                                        
                                       [facility]                     

Diagnosis Active    2016 00:00:00           2016 16:11:00           

          [facility]

 

        BMI 36.0-36.9,adult BMI 36.0-36.9,adult Disease Active  2014 00:00

:00                  

                          Overview: Obese           [facility]

 

       Obese  Obese  Disease Active 2014 00:00:00                         

    [facility]

 

                          Complicated UTI (urinary tract infection) Complicated 

UTI (urinary tract 

infection) Disease Active  2014 00:00:00                                 [facility]

 

       Hematuria Hematuria Disease Active 2014 00:00:00                   

          [facility]

 

       Fatigue Fatigue Disease Active 2013 00:00:00                       

      [facility]

 

       Myalgia Myalgia Disease Active 2013 00:00:00                       

      [facility]

 

       Fever  Fever  Disease Active 2013 00:00:00                         

    [facility]

 

                UTI (lower urinary tract infection) UTI (lower urinary tract inf

ection) Disease         

Active     2012-10-31 00:00:00                                             [facility]

 

           Hydronephrosis, right Hydronephrosis, right Disease    Active     201

08 00:00:00  

                                                                [facility]

 

       Flank pain Flank pain Disease Active 2012 00:00:00                 

            [facility]

 

       Pyelonephritis Pyelonephritis Disease Active 2012 00:00:00         

                    [facility]

 

       Cough  Cough  Disease Active 2011 00:00:00                         

    [facility]

 

       Colitis Colitis Problem Active                                    [facility]

 

       Hypokalemia Hypokalemia Problem Active                                   

 [facility]

 

       Obstruction of urinary tract Urinary obstruction Problem Active          

                          [facility]

 

       Nephrostomy status Nephrostomy status Problem Active                     

               [facility]

 

       Complication of nephrostomy Nephrostomy complication Problem Active      

                              

[facility]

 

                          Urinary tract infection due to Enterococcus UTI (urina

ry tract infection) due to

Enterococcus Problem Active                                          [facility]

 

                          Urinary tract infection associated with catheterizatio

n of urinary tract UTI 

(urinary tract infection) due to urinary indwelling catheter Problem      Active

                                 

                                                            [facility]

 

       Abdominal pain Abdominal pain Problem Active                             

       [facility]

 

       Infection with microorganism resistant to multiple drugs        Problem  

                                         

[facility]

 

                          Methicillin resistant Staphylococcus aureus (organism)

                         

Methicillin resistant Staphylococcus aureus (organism)                        
Active                                                Problem                   
    2016                        Problem added by Discern Expert.          
             [facility]                     Problem      Active               

                  2016 

06:01:38                                                    [facility]

 

                          Discharge Diagnosis: Compression of lateral cutaneous 

femoral nerve of thigh    

                    Discharge Diagnosis: Compression of lateral cutaneous 
femoral nerve of thigh                                                2016                       
                        [facility]                     Problem                

                 2016 

06:00:00     2016 06:01:38 2016 06:01:38                           M

[facility]

 

                          Discharge Diagnosis: Urinary tract infection, site not

 specified                

        Discharge Diagnosis: Urinary tract infection, site not specified        
                                       2016                               
                01/10/2016                                                [facility]                     Problem                   2016 06:00:00 2016

-01-10 06:06:10 

2016-01-10 06:06:10                                         [facility]







Allergies, Adverse Reactions, Alerts





        Allergy Name Allergy Type Status  Severity Reaction(s) Onset Date Inacti

ve Date 

Treating Clinician        Comments                  Source

 

        Meropenem Allergy to substance Active          REDNESS, ITCHING  00:00:00          

                                                    [facility]

 

           Diphenhydramine Propensity to adverse reactions to drug Active       

         Other      

2009 00:00:00                                 BECAME ANXIOUS AND FELT STRA

NGE [facility]

 

       diphenhydrAMINE diphenhydrAMINE Active                                   

        [facility]







Family History





           Family Member Diagnosis  Comments   Start Date Stop Date  Source

 

           Natural father Diabetes                                    [facility]







Social History





           Social Habit Start Date Stop Date  Quantity   Comments   Source

 

           Sex Assigned At Birth                                             [facility]

 

           Alcohol intake 2015 00:00:00 2015 00:00:00               

        [facility]







                Smoking Status  Start Date      Stop Date       Source

 

                Never smoker                                    [facility]







Medications





             Ordered Medication Name Filled Medication Name Start Date   Stop Da

te    Current 

Medication? Ordering Clinician Indication Dosage     Frequency  Signature (SIG) 

Comments                  Components                Source

 

        Acetaminophen Acetaminophen 2017 09:47:00 2018 00:00:00 No  

                    650      

                                                                [facility]

 

                          Acetaminophen/Codeine Phosphate (Tylenol # 3*) 1 Ea TA

B Acetaminophen/Codeine 

Phosphate (Tylenol # 3*) 1 Ea TAB 2017 09:47:00 2018 00:00:00 No    

                     

           2                                                      [facility]

 

                Meropenem (Merrem) 500 Mg INJ Meropenem (Merrem) 500 Mg INJ 2017 09:47:00 

2018 00:00:00 No                      500                                 

    [facility]

 

      Lisinopril Lisinopril 2017 12:33:00 2018 00:00:00 No          

      2.5                      

                                        [facility]

 

        Metformin Hcl Metformin Hcl 2017 12:33:00 2017 00:00:00 No  

                    500      

                                                                [facility]

 

      Fluconazole Fluconazole 2017 10:56:00 2017 00:00:00 No        

        200                

                                                    [facility]

 

                    Levofloxacin (Levaquin) 500 Mg TABLET Levofloxacin (Levaquin

) 500 Mg TABLET 

2017 10:56:00 2017 00:00:00 No                   500                

                [facility]

 

                          Sulfamethoxazole/Trimethoprim (Bactrim Ds Tablet) 1 Ea

ch TABLET 

Sulfamethoxazole/Trimethoprim (Bactrim Ds Tablet) 1 Each TABLET 2017 

10:56:00 2017 00:00:00 No                   1                             

     [facility]

 

       Ativan        2016 05:44:00        No                              

   1 mg, Route: PO, Drug form: TAB, ONCE,

Dosing Weight 86.364, kg, Priority: STAT, Start date: 16 23:44:00, Stop 
date: 16 23:44:00                                         [facility]

 

       tramadol hydrochloride 50 MG Oral Tablet        2016 05:43:00      

  Yes                                50 

mg = 1 tab, PO, BID, X 15 day, # 30 tab, 0 Refill(s)                            

             [facility]

 

          tramadol hydrochloride 50 MG Oral Tablet [Ultram]           2016

 01:10:00           Yes                 

                                                            1 - 2 tabs, PO, Q4-6

H, PRN as needed for pain, X 7 day, # 12 tab, 0 

Refill(s)                                                   [facility]

 

                    Sulfamethoxazole 800 MG / Trimethoprim 160 MG Oral Tablet [B

actrim]                     2016

 01:09:00        Yes                                1 tab, PO, BID, # 20 tab, 0 

Refill(s)               [facility]

 

       Rocephin        2016 23:48:00        No                            

     1 gm, Route: IM, ONCE, Dosing Weight

 84.091, kg, Start date: 16 17:48:00, Stop date: 16 17:48:00        

                                 [facility]

 

       Ondansetron        2016 21:36:00        No                         

        Notes: (Same as: Zofran)   *** 

MEDICATION WASTE *** Product Size:  4 mg Product Wasted:  ___ mg                

                         [facility]

 

        Sodium Chloride 0.154 MEQ/ML Injectable Solution         2016 21:3

6:00         No                      

                                                    1,000 mL, 1000 ml/hr, Infuse

 Over: 1 hr, Route: IV, 1,000, Drug form: INJ, 

ONCE, Priority: STAT, Dosing Weight 84.091 kg, Start date: 16 15:36:00, 
Duration: 1 doses or times, Stop date: 16 15:36:00                        

                 [facility]

 

       Saline Flush 0.9%        2016 21:36:00        No                   

              Notes: Same as: BD 

Posiflush Sterile                                           [facility]

 

       traMADol (ULTRAM) 50 mg tablet        2015 16:46:57        Yes     

             50mg          Take 50 mg

 by mouth as needed.                                         [facility]

 

       acetaminophen (TYLENOL) 325 mg tablet        2015 16:46:57        Y

es                  650mg         

Take 650 mg by mouth every 6 hours as needed.                                   

      [facility]

 

       metFORMIN (GLUCOPHAGE) 500 mg tablet        2015 16:46:57        Ye

s                  500mg         

Take 500 mg by mouth 2 times daily (with meals).                                

         [facility]

 

           fenofibrate nanocrystallized (TRICOR) 145 mg tablet             16:46:57            Yes        

                    145mg     QD        Take 145 mg by mouth daily.             

        [facility]

 

        lisinopril (PRINIVIL, ZESTRIL) 2.5 mg tablet         2015 16:46:57

         Yes                     

2.5mg        QD           Take 2.5 mg by mouth daily.                           

[facility]

 

       atorvastatin (LIPITOR) 20 mg tablet        2015 16:46:57        Yes

                  20mg          Take 

20 mg by mouth at bedtime nightly.                                         [facility]

 

           HYDROcodone-acetaminophen (NORCO)  mg tablet            2014 00:00:00            Yes         

                Pyelonephritis  1{tbl}                          Take 1 tablet by

 mouth every 6 hours as needed for 

Pain.                                                       [facility]

 

          cyclobenzaprine (FLEXERIL) 10 mg tablet           2013-10-14 00:00:00 

          Yes                 Flank Pain

           10mg                  Take 1 tablet by mouth 2 times daily as needed 

for Muscle Spasms.                       

[facility]

 

          flavoxate (URISPAS) 100 mg tablet           2012 00:00:00       

    Yes                 Hydronephrosis, 

right      100mg                 Take 1 tablet by mouth 3 times daily as needed 

(dysuria).                       

[facility]

 

           tamsulosin (FLOMAX) 0.4 mg extended release capsule             00:00:00            Yes        

          Hydronephrosis .4mg      QD        Take 1 capsule by mouth daily.     

                [facility]

 

          famotidine (PEPCID) 20 mg tablet           2012-01-10 00:00:00        

   Yes                 GERD 

(gastroesophageal reflux disease) 20mg            Q.5D            Take 1 Tab by 

mouth 2 times daily.  

                                                    [facility]

 

          oxybutynin (DITROPAN) 5 mg tablet           2010 00:00:00       

    Yes                 Ureteral 

stricture  5mg                   Take 1 Tab by mouth 3 times daily. For bladder 

spasms                       [facility]

 

          doxazosin (CARDURA) 2 mg tablet           2010 00:00:00         

  Yes                 Ureteral stricture

           2mg                   Take 1 Tab by mouth at bedtime.                

       [facility]

 

                Amlodipine Besylate (Norvasc) 5 Mg TAB Amlodipine Besylate (Norv

asc) 5 Mg TAB                 

        Yes                     5                                       [facility]

 

     Buspirone Hcl Buspirone Hcl           Yes            10                    

   [facility]

 

                          Cefuroxime Axetil (Cefuroxime) 500 Mg TABLET Cefuroxim

e Axetil (Cefuroxime) 500 

Mg TABLET             Yes               500                           [facility]

 

                    Glipizide (Glipizide Er) 5 Mg TAB.ER.24 Glipizide (Glipizide

 Er) 5 Mg TAB.ER.24 

              Yes                  2.5                                [facility]

 

                          Hydrocodone Bit/Acetaminophen (Norco 7.5-325 Tablet) 1

 Each TABLET Hydrocodone 

Bit/Acetaminophen (Norco 7.5-325 Tablet) 1 Each TABLET             Yes          

     1                             [facility]

 

                          Metoprolol Tartrate (Lopressor) 25 Mg TAB Metoprolol T

artrate (Lopressor) 25 Mg 

TAB               Yes               50                            [facility]

 

             Ondansetron Hcl (Zofran) 8 Mg TABLET Ondansetron Hcl (Zofran) 8 Mg 

TABLET                           

Yes                     4                                       [facility]

 

                          Sennosides/Docusate Sodium (Senokot-S Tablet) 1 Each T

ABLET Sennosides/Docusate 

Sodium (Senokot-S Tablet) 1 Each TABLET             Yes               1         

                    [facility]

 

                          Acetaminophen With Codeine (Tylenol With Codeine #3 Ta

blet) 1 Each TABLET 

Acetaminophen With Codeine (Tylenol With Codeine #3 Tablet) 1 Each TABLET       

                    

2020 00:00:00 No                      1                                   

    [facility]

 

     Metoprolol Tartrate Metoprolol Tartrate      2020 00:00:00 No        

     25                       

[facility]

 

     Fluconazole Fluconazole      2020 00:00:00 No             100        

              [facility]

 

                Levofloxacin (Levaquin) 500 Mg TABLET Levofloxacin (Levaquin) 50

0 Mg TABLET                 

2020 00:00:00 No                      250                                 

    [facility]

 

                Linezolid (Zyvox) 600 Mg TABLET Linezolid (Zyvox) 600 Mg TABLET 

                2020 

00:00:00 No                      600                                     [facility]

 

                Cephalexin (Keflex) 250 Mg CAPSULE Cephalexin (Keflex) 250 Mg CA

PSULE                 

2020 00:00:00 No                                                          

    [facility]

 

     Chrofloxacin Chrofloxacin      2020 00:00:00 No             25       

                [facility]

 

     Lisinopril Lisinopril      2019 00:00:00 No             5            

            [facility]

 

     Dicyclomine Hcl Dicyclomine Hcl      2019 00:00:00 No             20 

                      [facility]

 

                Docusate Sodium (Colace) 100 Mg CAP Docusate Sodium (Colace) 100

 Mg CAP                 

2019 00:00:00 No                      100                                 

    [facility]

 

                Ondansetron Hcl (Zofran*) 4 Mg TABLET Ondansetron Hcl (Zofran*) 

4 Mg TABLET                 

2019 00:00:00 No                      4                                   

    [facility]

 

     Buspirone Hcl Buspirone Hcl      2019 00:00:00 No             10     

                  [facility]

 

                    Ciprofloxacin Hcl (Cipro) 500 Mg TABLET Ciprofloxacin Hcl (C

ipro) 500 Mg TABLET 

       2019 00:00:00 No                   500                             

   [facility]

 

                          Acetaminophen With Codeine (Tylenol With Codeine #3 Ta

blet) 1 Each TABLET 

Acetaminophen With Codeine (Tylenol With Codeine #3 Tablet) 1 Each TABLET       

                    

2019-06-10 00:00:00 No                      1                                   

    [facility]

 

                Fluconazole (Diflucan) 200 Mg TABLET Fluconazole (Diflucan) 200 

Mg TABLET                 

2019-06-10 00:00:00 No                      200                                 

    [facility]

 

     Ondansetron Hcl Ondansetron Hcl      2019-06-10 00:00:00 No             4  

                      [facility]

 

                          Cephalexin Monohydrate (Keflex) 500 Mg CAPSULE Cephale

nancy Monohydrate (Keflex) 

500 Mg CAPSULE       2019 00:00:00 No                500                  

         [facility]

 

     Lisinopril Lisinopril      2019 00:00:00 No             5            

            [facility]

 

     Ondansetron Ondansetron      2019 00:00:00 No             4          

              [facility]

 

                    Ciprofloxacin Hcl (Cipro) 500 Mg TABLET Ciprofloxacin Hcl (C

ipro) 500 Mg TABLET 

       2019-02-15 00:00:00 No                   500                             

   [facility]

 

     Meropenem Meropenem      2018 00:00:00 No             1              

          [facility]

 

                    Ciprofloxacin Hcl (Cipro) 500 Mg TABLET Ciprofloxacin Hcl (C

ipro) 500 Mg TABLET 

       2018 00:00:00 No                   250                             

   [facility]

 

                Fluconazole (Diflucan) 100 Mg TABLET Fluconazole (Diflucan) 100 

Mg TABLET                 

2018 00:00:00 No                                                          

    [facility]

 

                    Ciprofloxacin Hcl (Cipro) 500 Mg TABLET Ciprofloxacin Hcl (C

ipro) 500 Mg TABLET 

       2017 00:00:00 No                   250                             

   [facility]

 

                Fluconazole (Diflucan) 100 Mg TABLET Fluconazole (Diflucan) 100 

Mg TABLET                 

2017 00:00:00 No                                                          

    [facility]

 

                Metronidazole (Flagyl) 250 Mg TABLET Metronidazole (Flagyl) 250 

Mg TABLET                 

2017 00:00:00 No                      250                                 

    [facility]

 

                          Cranberry/Vit C/L. Sporogenes (Cranberry Tablet) 1 Eac

h TABLET Cranberry/Vit 

C/L. Sporogenes (Cranberry Tablet) 1 Each TABLET            2017-06-15 00:00:00 

No                               

1000                                                             [facility]

 

      Atorvastatin Calcium Atorvastatin Calcium       2017 00:00:00 No    

            20                      

                                        [facility]

 

                Cefdinir (Omnicef) 300 Mg CAPSULE Cefdinir (Omnicef) 300 Mg CAPS

ULE                 2017

 00:00:00 No                                                              [facility]

 

                          Cyanocobalamin (Vitamin B-12) (Vitamin B-12) 1,000 Mcg

 TAB.SUBL Cyanocobalamin 

(Vitamin B-12) (Vitamin B-12) 1,000 Mcg TAB.SUBL           2017 00:00:00 N

o                                      

                                                                [facility]

 

                          Fenofibrate Nanocrystallized (Fenofibrate) 145 Mg TABL

ET Fenofibrate 

Nanocrystallized (Fenofibrate) 145 Mg TABLET         2017 00:00:00 No     

                                         

                                                    [facility]

 

     Lisinopril Lisinopril      2017 00:00:00 No             25           

            [facility]

 

                          Amoxicillin/Potassium Clav (Amox Tr-K Clv 875-125 Mg T

ab) 1 Each TABLET 

Amoxicillin/Potassium Clav (Amox Tr-K Clv 875-125 Mg Tab) 1 Each TABLET         

                  

2016 00:00:00 No                      1                                   

    [facility]

 

      Atorvastatin Calcium Atorvastatin Calcium       2016 00:00:00 No    

            20                      

                                        [facility]

 

                          Cholecalciferol (Vitamin D3) (Vitamin D3) 50,000 Unit 

CAPSULE Cholecalciferol 

(Vitamin D3) (Vitamin D3) 50,000 Unit CAPSULE         2016 00:00:00 No    

                  1                

                                                            [facility]

 

                          Cyanocobalamin (Vitamin B-12) 1,000 Mcg TAB Cyanocobal

amin (Vitamin B-12) 1,000 

Mcg TAB       2016 00:00:00 No                1000                        

  [facility]

 

                          Fenofibrate Nanocrystallized (Fenofibrate) 145 Mg TABL

ET Fenofibrate 

Nanocrystallized (Fenofibrate) 145 Mg TABLET         2016 00:00:00 No     

                 145             

                                                            [facility]

 

     Lisinopril Lisinopril      2016 00:00:00 No             2.5          

            [facility]

 

     Metformin Hcl Metformin Hcl      2016 00:00:00 No             500    

                  [facility]

 

     Trazodone Hcl Trazodone Hcl      2016 00:00:00 No             100    

                  [facility]

 

      Atorvastatin Calcium Atorvastatin Calcium       2016-10-07 00:00:00 No    

            20                      

                                        [facility]

 

                Cefdinir (Omnicef) 300 Mg CAPSULE Cefdinir (Omnicef) 300 Mg CAPS

ULE                 2016-10-07

 00:00:00 No                      300                                     [facility]

 

                          Cyanocobalamin (Vitamin B-12) 1,000 Mcg TAB Cyanocobal

amin (Vitamin B-12) 1,000 

Mcg TAB       2016-10-07 00:00:00 No                1000                        

  [facility]

 

                          Fenofibrate Nanocrystallized (Fenofibrate) 145 Mg TABL

ET Fenofibrate 

Nanocrystallized (Fenofibrate) 145 Mg TABLET         2016-10-07 00:00:00 No     

                 1                       

                                                    [facility]

 

                Fluconazole (Diflucan) 100 Mg TABLET Fluconazole (Diflucan) 100 

Mg TABLET                 

2016-10-07 00:00:00 No                      100                                 

    [facility]

 

                          Lisinopril (Prinavil / Zestril) 20 Mg TABLET Lisinopri

l (Prinavil / Zestril) 20 

Mg TABLET       2016-10-07 00:00:00 No                25                        

    [facility]

 

     Metformin Hcl Metformin Hcl      2016 00:00:00 No             500    

                  [facility]

 

                Tramadol Hcl (Ultram) 50 Mg TABLET Tramadol Hcl (Ultram) 50 Mg T

ABLET                 

2016 00:00:00 No                      50                                  

    [facility]







Vital Signs





             Vital Name   Observation Time Observation Value Comments     Source

 

             Body Temperature 2020 08:25:00 98.5 [degF]               [facility]

 

             Weight       2020 15:24:00 198 [lb_av]               [facility]

 

             BMI (Body Mass Index) 2020 15:24:00 38.7 kg/m2               

 [facility]

 

             Weight       2016 02:31:00                           [facility]

 

             Temperature Oral (F) 2016 02:31:00 97.9 F                    

[facility]

 

             Respitory Rate 2016 02:31:00                            [facility]

 

             Heart Rate   2016 02:31:00                           [facility]

 

             Systolic (mm Hg) 2016 02:31:00                            [facility]

 

             Diastolic (mm Hg) 2016 02:31:00                           [facility]

 

             Temperature Oral (F) 2016 01:28:00 98.4 F                    

[facility]

 

             Respitory Rate 2016 01:28:00                            [facility]

 

             Heart Rate   2016 01:28:00                           [facility]

 

             Systolic (mm Hg) 2016 01:28:00                           [facility]

 

             Diastolic (mm Hg) 2016 01:28:00                           [facility]

 

             Systolic (mm Hg) 2016 21:32:00                           [facility]

 

             Diastolic (mm Hg) 2016 21:32:00                           [facility]

 

             Weight       2016 21:32:00                           [facility]

 

             BMI Calculated 2016 21:32:00                            [facility]

 

             Height       2016 21:32:00 152.4 cm                  [facility]

 

             Heart Rate   2016 21:32:00                           [facility]

 

             Respitory Rate 2016 21:32:00                            [facility]

 

             Temperature Oral (F) 2016 21:32:00 98.1 F                    

 [facility]







Procedures





                Procedure       Date / Time Performed Performing Clinician [facility]

e

 

                Computed tomography of brain without radiopaque contrast 2020 00:00:00                 

[facility]

 

                CT of abdomen and pelvis without contrast 2020 00:00:00   

              [facility]

 

                CHANGE DRAINAGE DEVICE IN KIDNEY, EXTERNAL APPROACH 2019 0

0:00:00                 [facility]

 

                CYSTOSCOPY AND TREATMENT 2019 00:00:00                 [facility]

 

                CYSTO W/URETER STRICTURE [place] 2019 00:00:00                 

[facility]

 

                CYSTOSCOPY AND TREATMENT 2019 00:00:00                 [facility]

 

                CYSTO W/URETER STRICTURE TX 2019 00:00:00                 

[facility]







Plan of Care





             Planned Activity Planned Date Details      Comments     Source

 

                    Future Scheduled Test 2020-10-01 00:00:00 IMM Influenza Seas

onal Oct to March 

(>/= 19 yrs) [code = IMM Influenza Seasonal Oct to March (>/= 19 yrs)]          

                 [facility] Scheduled Test 2013 00:00:00 Colorectal Cancer 

Scrn Annual 

(FIT/FOBT) Age 50 to 75 [code = Colorectal Cancer Scrn Annual (FIT/FOBT) Age 50 
to 75]                                              [facility] Scheduled Test 2005 00:00:00 Breast Cancer Scrn

 (Yearly) [code = 

Breast Cancer Scrn (Yearly)]                           [facility] Scheduled Test 1984 00:00:00 Cervical Cancer Sc

rn (3 Yrs) [code = 

Cervical Cancer Scrn (3 Yrs)]                           [facility]                      Patient referral [code = 3015448 ]       

       [facility]

 

             Goal                      Patient referral [code = [id number] ]       

       [facility]

 

             Goal                      Patient referral [code = [id number] ]       

       [facility]

 

             Goal                      Patient referral [code = [id number] ]       

       [facility]

 

             Goal                      Patient referral [code = [id number] ]       

       [facility]

 

             Goal                      Patient referral [code = [id number] ]       

       [facility]

 

             Goal                      Patient referral [code = [id number] ]       

       [facility]

 

             Goal                      Patient referral [code = [id number] ]       

       [facility]

 

             Goal                      Patient referral [code = [id number] ]       

       [facility]

 

             Goal                      Patient referral [code = [id number] ]       

       [facility]

 

             Instructions              Gomez Catheter Care              [facility]

 

             Instructions              Post Operative Pain              [facility]







Encounters





             Start Date/Time End Date/Time Encounter Type Admission Type Attendi

[facility]   Care Department Encounter ID    Source

 

           2020 13:17:00 2020 10:40:00 Discharged Inpatient 1       

   SHADY PEMBERTON 

[facility] [id number]        [facility]

 

             2020 11:21:00 2020 20:46:00 Departed Emergency Room 1  

          LISSETTE SCOTT          [facility] [id number]    [facility]

 

           2019 08:38:00 2020 11:48:00 Discharged Inpatient 1       

   SHADY PEMBERTON 

[facility] [id number]        [facility]

 

             2019 10:39:00 2019 10:39:00 Registered Surgical Day Car

e JACQUELINE DUNBAR             [facility] [id number]    [facility]

 

          2019 20:48:00 2019 00:40:00 Departed Emergency Room       

              [facility] [id number]              [facility]

 

          2019 09:50:00 2019 12:01:00 Departed Emergency Room       

              [facility] [id number]              [facility]

 

          2019-10-24 08:28:00 2019-10-24 08:28:00 Registered Clinic 3         PONCE

[facility], [facility]    

[facility] [id number]              [facility]

 [facility]

 

          2019 05:33:00 2019 06:27:00 Departed Emergency Room       

              [facility]    [facility] [id number]              [facility]

 

          2019 12:06:00 2019 13:42:00 Departed Emergency Room       

              [facility]    [facility] [id number]              [facility]

 

             2019 06:19:00 2019 06:19:00 Registered Surgical Day Car

e 3            ULYSSES 

[facility]          [facility] [id number]    [facility] 

[facility]

 

           2019 18:21:00 2019 12:48:00 Discharged Inpatient 1       

   JENNIFER TERRELL 

[facility]              [facility] [id number]        [facility] 

[facility]

 

             2019 09:33:00 2019 09:33:00 Registered Surgical Day Car

e 3            ULYSSES 

[facility]          [id number]    [facility]

 

          2019 10:15:00 2019 11:33:00 Departed Emergency Room       

              [facility]                    [id number]              [facility]

 

          2019-06-10 14:11:00 2019-06-10 19:19:00 Departed Emergency Room       

              [facility]                    [id number]              [facility]

 

           2019 13:14:00 2019 19:30:00 Departed Emergency Room 1    

      JENNIFER TERRELL 

[facility]              [id number]        [facility]

 

           2019 14:04:00 2019 16:20:00 Discharged Inpatient 1       

   SHADY PEMBERTON 

[facility]              [id number]        [facility]

 

             2019 08:50:00 2019 08:50:00 Registered Surgical Day Car

e JACQUELINE DUNBAR             [facility]          [id number]    [facility]

 

          2019 15:25:00 2019 17:05:00 Departed Emergency Room       

              [facility]                    [id number]              [facility] Patients [facility]

 

             2019 12:32:00 2019 16:55:00 Departed Emergency Room 1  

          LISSETTE GATES

                [facility]          [id number]    [facility]

 

          2019 12:46:00 2019 16:46:00 Departed Emergency Room       

              [facility]                    [id number]              [facility] Patients [facility]

 

        2018 09:35:00 2018 16:22:00 Discharged Inpatient            

     [facility]  

[id number]                            [facility]

 

          2018 12:28:00 2018 12:55:00 Departed Emergency Room       

              [facility]                    [id number]              [facility]

 

           2018 16:30:00 2018 21:52:00 Departed Emergency Room 1    

      JENNIFER TERRELL 

[facility]              [id number]        [facility]

 

           2018 14:41:00 2018 18:30:00 Discharged Inpatient 1       

   MATTHEW FLORENTINO 

[facility]              [id number]        [facility]

 

           2018-10-22 20:49:00 2018-10-26 16:53:00 Discharged Inpatient 1       

   DREW DINERO 

[facility]              [id number]        [facility]

 

          2018-10-06 20:27:00 2018-10-06 21:25:00 Departed Emergency Room       

              [facility]                    [id number]              [facility]

 

          2018 15:08:00 2018 15:08:00 Registered Clinic 3         JACQUELINE ENCARNACION [facility]                    [id number]              [facility]

 

           2018-08-10 01:04:00 2018-08-15 18:35:00 Discharged Inpatient 1       

   ROYER DOVER 

[facility]              [id number]        [facility]. [facility]

 

          2018 13:10:00 2018 18:25:00 Departed Emergency Room       

              [facility]                    [id number]              [facility]

 

          2018 02:07:00 2018 02:38:00 Departed Emergency Room       

              [facility]                    [id number]              [facility]

 

          2018 12:59:00 2018 13:01:00 Departed Emergency Room       

              [facility]                    [id number]              [facility]

 

          2018 07:21:00 2018 07:21:00 Registered Surgical Day Care  

                   [facility]                    [id number]              [facility]

 

             2018 11:08:00 2018 11:08:00 Registered Surgical Day Car

e CLAU BROWN           [facility]          [id number]    [facility]. [facility]

 

             2018 13:23:00 2018 17:28:00 Departed Emergency Room ER 

          STEVENMERYL CARTAGENA

                [facility]          [id number]    [facility]

 

           2018 06:30:00 2018 14:25:00 Discharged Inpatient ER      

   ROYER DOVER 

[facility]              [id number]        [facility]

 

           2017 20:18:00 2017 14:28:00 Discharged Inpatient ER      

   AJAY GE 

[facility]              [id number]        [facility]

 

           2017 07:17:00 2017 17:05:00 Discharged Inpatient ER      

   AJAY GE 

[facility]              [id number]        [facility]

 

        2017 18:56:00 2017 17:00:00 Discharged Inpatient            

     [facility]  

[id number]                            [facility]

 

        2017-06-15 15:58:00 2017 12:09:00 Discharged Inpatient            

     [facility]  

[id number]                            [facility]

 

          2017 08:01:00 2017 08:01:00 Registered Surgical Day Care  

                   [facility]                    [id number]              [facility]

 

          2016 02:28:00 2016 05:43:00 EC Emergency Center           

          [facility] [id number]              [facility]

 

           2016 20:28:00 2016 23:43:00 Outpatient            DARSHAN Curtis 

[facility]               [id number]         

 

          2016 21:13:00 2016 01:33:00 EC Emergency Center           

          [facility] [id number]              [facility]

 

             2016 15:13:00 2016 19:33:00 Outpatient                Parul Padilla      [facility]           [id number]     







Results





           Test Description Test Time  Test Comments Results    Result Comments 

Source

 

                    Blood leukocytes automated count (number/volume) 2020 

04:45:00   

 

                                        Test Item

 

             White Blood Count (test code = 6690-2) 10.45                       

            





[facility] Center[facility] erythrocytes automated count 
(number/volume)2020 04:45:00* 



             Test Item    Value        Reference Range Interpretation Comments

 

             Red Blood Count (test code = 789-8) 3.91                           

         





[facility] Center[facility] hemoglobin measurement 
(moles/volume)2020 04:45:00* 



             Test Item    Value        Reference Range Interpretation Comments

 

             Hemoglobin (test code = 85273-2) 10.0                              

      





[facility] CenterAutomated blood hematocrit (volume 
fraction)2020 04:45:00* 



             Test Item    Value        Reference Range Interpretation Comments

 

             Hematocrit (test code = 4544-3) 32.4                               

     





[facility] CenterAutomated erythrocyte mean corpuscular 
twubfm5701-83-38 04:45:00* 



             Test Item    Value        Reference Range Interpretation Comments

 

             Mean Corpuscular Volume (test code = 787-2) 82.9                   

                 





[facility] CenterAutomated erythrocyte mean corpuscular 
hemoglobin (mass per erythrocyte)2020 04:45:00* 



             Test Item    Value        Reference Range Interpretation Comments

 

             Mean Corpuscular Hemoglobin (test code = 785-6) 25.6               

                     





[facility] CenterAutomated erythrocyte mean corpuscular 
hemoglobin concentration measurement (mass/volume)2020 04:45:00* 



             Test Item    Value        Reference Range Interpretation Comments

 

             Mean Corpuscular Hemoglobin Concent (test code = 786-4) 30.9       

                             





[facility] CenterRD YdaVh-Ovo8445-09-02 04:45:00* 



             Test Item    Value        Reference Range Interpretation Comments

 

             Red Cell Distribution Width (test code = 44451-1) 14.7             

                       





[facility] CenterAutomated blood platelet count 
(count/volume)2020 04:45:00* 



             Test Item    Value        Reference Range Interpretation Comments

 

             Platelet Count (test code = 777-3) 272                             

        





[facility] CenterAutC[facility] Centered blood segmented neutrophil 
count as percentage of total vaxdcfncck6593-35-95 04:45:00* 



             Test Item    Value        Reference Range Interpretation Comments

 

             Neutrophils (%) (Auto) (test code = 12642-2) 54.2                  

                  





[facility] CenterAutomated blood lymphocyte count as 
percentage ot total trxsdmpqqq7124-01-30 04:45:00* 



             Test Item    Value        Reference Range Interpretation Comments

 

             Lymphocytes (%) (Auto) (test code = 736-9) 35.0                    

                





[facility] monocyte count as 
percentage of total mrndjajjoe1136-07-38 04:45:00* 



             Test Item    Value        Reference Range Interpretation Comments

 

             Monocytes (%) (Auto) (test code = 5905-5) 7.1                      

               





[facility] CenterAutomated blood eosinophil count as 
percentage of total nzhqhtdklx5880-39-24 04:45:00* 



             Test Item    Value        Reference Range Interpretation Comments

 

             Eosinophils (%) (Auto) (test code = 713-8) 2.5                     

                





[facility] CenterAutomated blood basophil count as 
percentage of total edkswdixsc3889-03-67 04:45:00* 



             Test Item    Value        Reference Range Interpretation Comments

 

             Basophils (%) (Auto) (test code = 706-2) 0.4                       

              





[facility] CenterFluoroscopic procedure less than one hour
cjhmzkez9680-25-92 04:45:00* 



             Test Item    Value        Reference Range Interpretation Comments

 

             IM GRANULOCYTES % (test code = IM GRANULOCYTES %) 0.8              

                       





[facility] CenterAutomated blood neutrophil count
2020 04:45:00* 



             Test Item    Value        Reference Range Interpretation Comments

 

             Neutrophils # (Auto) (test code = 751-8) 5.7                       

              





[facility] CenterBlood lymphocytes count (number/volume)
2020 04:45:00* 



             Test Item    Value        Reference Range Interpretation Comments

 

             Lymphocytes # (Auto) (test code = 34722-1) 3.7                     

                





[facility] CenterBlSt[facility] monocytes automated count 
(number/volume)2020 04:45:00* 



             Test Item    Value        Reference Range Interpretation Comments

 

             Monocytes # (Auto) (test code = 742-7) 0.7                         

            





[facility] CenterAutomated blood eosinophil count
2020 04:45:00* 



             Test Item    Value        Reference Range Interpretation Comments

 

             Eosinophils # (Auto) (test code = 711-2) 0.3                       

              





[facility] blood basophil count 
(count/volume)2020 04:45:00* 



             Test Item    Value        Reference Range Interpretation Comments

 

             Basophils # (Auto) (test code = 704-7) 0.0                         

            





[facility] CenterFluoroscopic procedure less than one hour
narcrgvq3806-09-38 04:45:00* 



             Test Item    Value        Reference Range Interpretation Comments

 

                                        Absolute Immature Granulocyte (auto (lloyd

t code = Absolute Immature Granulocyte 

(auto)          0.08                                             





[facility] CenterCapillary blood glucose measurement by 
glucometer (mass/volume)2020 19:25:00* 



             Test Item    Value        Reference Range Interpretation Comments

 

             Bedside Glucose (test code = 76434-7) 187                          

           





[facility] or [facility] sodium measurement 
(moles/volume)2020 05:20:00* 



             Test Item    Value        Reference Range Interpretation Comments

 

             Sodium Level (test code = 2951-2) 138                              

       





[facility] or plasma potassium measurement 
(moles/volume)2020 05:20:00* 



             Test Item    Value        Reference Range Interpretation Comments

 

             Potassium Level (test code = 2823-3) 3.6                           

          





[facility] or plasma chloride measurement 
(moles/volume)2020 05:20:00* 



             Test Item    Value        Reference Range Interpretation Comments

 

             Chloride Level (test code = 2075-0) 109                            

         





[facility] Hospitalerum or plasma carbon dioxide, total 
measurement (moles/volume)2020 05:20:00* 



             Test Item    Value        Reference Range Interpretation Comments

 

             Carbon Dioxide Level (test code = 2028-9) 23                       

               





[facility] Hospitalerum or plasma anion bhd6449-85-70 
05:20:00* 



             Test Item    Value        Reference Range Interpretation Comments

 

             Anion Gap (test code = 33037-3) 9.6                                

     





[facility] Hospitalerum or plasma urea nitrogen measurement
(mass/volume)2020 05:20:00* 



             Test Item    Value        Reference Range Interpretation Comments

 

             Blood Urea Nitrogen (test code = 3094-0) 19                        

              





[facility] or plasma creatinine measurement 
(mass/volume)2020 05:20:00* 



             Test Item    Value        Reference Range Interpretation Comments

 

             Creatinine (test code = 2160-0) 1.27                               

     





[facility] Hospitalerum or plasma urea nitrogen/creatinine 
mass stchx1634-12-35 05:20:00* 



             Test Item    Value        Reference Range Interpretation Comments

 

             BUN/Creatinine Ratio (test code = 3097-3) 15                       

               





[facility] CenterEstimated glomerular filtration rate 
(GFR) yvqwwartrwnxi2783-69-62 05:20:00* 



             Test Item    Value        Reference Range Interpretation Comments

 

             Estimat Glomerular Filtration Rate (test code = 225383928) 44      

                                





[facility] CenterGlucose kujwnmtxvgk6280-76-38 05:20:00* 



             Test Item    Value        Reference Range Interpretation Comments

 

             Glucose Level (test code = YKW9196) 163                            

         





[facility] or plasma calcium measurement 
(mass/volume)2020 05:20:00* 



             Test Item    Value        Reference Range Interpretation Comments

 

             Calcium Level (test code = 56953-6) 8.6                            

         





[facility] CenterRETROGRADE ZFVTESFYO9765-18-46 14:31:00  
                                                                                
  [place] [address]      Patient Name: CRISTINA RICH     
                          MR #: [id number]                  : 1965     
                             Age/Sex: 54/F  Acct #: [id number]                
             Req #: [id number]  Adm Physician: SHADY PEMBERTON MD                    
                   Ordered by: JACQUELINE ARORA MD                         Report #: 
[id number]        Location: MED/SURG                                Room/Bed: 
[facility]             ________________________________________________________
___________________________________________    Procedure: [id number] DX/RETROGRAD
E PYELOGRAM  Exam Date: 20                            Exam Time: 0714     
                                        REPORT STATUS: Signed    OR Fluoroscopy:
     IMPRESSION: Fluoroscopy service provided in the OR. Interpretation not   
requested.      Signed by: Salvador Kramer MD on 2020 2:31 PM        Dictate
d By: SALVADOR KRAMER MD  Electronically Signed By: SALVADOR KRAMER MD on  1431  Transcribed By: LUCINDA on 20 1431       COPY TO:   JACQUELINE ARORA MD
        Bacterial urine ljugyql9371-27-75 07:40:00* 



             Test Item    Value        Reference Range Interpretation Comments

 

             Urine Culture (test code = 630-4) CANDIDA ALBICANS                 

           





[facility] CenterPhosphorus ejdoyapghxi4899-95-45 05:05:00
  * 



             Test Item    Value        Reference Range Interpretation Comments

 

             Phosphorus Level (test code = JUJ5555) 2.7                         

            





[facility] or plasma magnesium measurement 
(mass/volume)2020 05:05:00* 



             Test Item    Value        Reference Range Interpretation Comments

 

             Magnesium Level (test code = 35652-4) 1.9                          

           





[facility] CenterFluoroscopic procedure less than one hour
ulnmyqka1778-60-67 09:50:00* 



             Test Item    Value        Reference Range Interpretation Comments

 

             Coronavirus (PCR) (test code = Coronavirus (PCR)) NOT DETECTED     

                       





[facility] CenterNEPHRO/URET W IMG/INJ-IQTUUHEG2121-69-67 
15:57:00                                                                        
             [facility]                        [address] [address]      Patient Name: CRISTINA RICH                                MR #: [id number]                  : 
1965                                   Age/Sex: 54/F  Acct #: [id number]
                             Req #: [id number]  Adm Physician: SHADY PEMBERTON MD    
                                   Ordered by: JACQUELINE ARORA MD                   
     Report #: [id number]        Location: MED/SURG                              
 Room/Bed: [facility]             
________________________________________________________
___________________________________________    Procedure: [id number] IR/NEPHRO/UR
ET W IMG/INJ-EXISTING  Exam Date:                             Exam Time:        
                                      REPORT STATUS: Signed    Right frontal ca
theter exchange/upsize.                                                         
                                                                  History: Malf
unctioning indwelling right nephrostomy catheter. Exchange and   upsize requeste
d by the referring provider.                                                    
                                                                          Modal
ity: Fluoroscopy                                                                
                   Sedation: Versed 2 mg and fentanyl 100 mcg was given intrave
nously for   conscious sedation.  Vital signs were monitored throughout the proc
edure by a   nurse, and remained stable. Physician intra-service time was 20.   
                                                                                
     Fluoroscopy Time: 3.0 min.   Reference Air Kerma (Ka, r): 27.4 mGy.        
                                     Approach:  The indwelling 10 French neph
rostomy catheter      Estimated blood loss:  < 5 cc.      Specimen: None.       
: Kirk Mcfarlane MD.      Assistant: None.      
Technique/findings:    Informed written consent was obtained. Discussion of 
risks, benefits, and   alternatives were made with the patient. The patient 
expressed understanding   and agreed to proceed.  A universal timeout was 
performed prior to starting the   procedure.  All elements maximal sterile b
arrier technique was utilized for   this procedure, including utilization of bryan
rile scrub solution for skin prep,   a large sterile sheet to cover the areas of
the patient that were not prepped,   and hand hygiene, mask, head covering, and 
sterile gown for performing   radiologist and scrub technologist.      Contrast 
is injected via the indwelling right nephrostomy catheter   demonstrating intra
luminal position within the right collecting system.   Catheter noted to be retr
acted within a right calyx.      The existing catheter was cut. A 0.035 wire cou
ld not be advanced through the   catheter given occluded end hole. Subsequent, a
5 French Kumpe catheter and   0.035 angled Glidewire was used to obtain access 
into the right collecting   system along side the indwelling nephrostomy cathete
r. The Glidewire was   exchanged for a 0.035 Bentson wire. The indwelling nephro
stomy catheter was   removed. A new 12 French and a prosthetic catheter was adva
nced with the   pigtail formed within the right renal pelvis. Contrast injection
confirmed   position. The catheter was fixed to the skin with silk suture. A st
erile   dressing was applied.      The patient tolerated the procedure without i
mmediate complication.                                                          
                     Impression:                                               
Successful fluoroscopic guided right nephrostomy catheter exchange/upsize with  
moderate sedation as detailed above.                          Signed by: Dr. Ashley Rangel MD on 2020 4:03 PM        Dictated By: KIRK MCFARLANE MD  Electro
nically Signed By: KIRK MCFARLANE MD on 20 1603  Transcribed By: LUCINDA on 
20 1603       COPY TO:   JACQUELINE ARORA MD         IR BVRZDHY1400-83-73 
15:57:00                                                                        
             [place] [address]      Patient Name: CRISTINA RICH                                MR #: [id number]                  : 
1965                                   Age/Sex: 54/F  Acct #: [id number]
                             Req #: [id number]  [facility] Physician: SHADY PEMBERTON MD    
                                   Ordered by: JACQUELINE ARORA MD                   
     Report #: [id number]        Location: MED/SURG                              
 Room/Bed: [facility]             
________________________________________________________
___________________________________________    Procedure: [id number] DX/IR CONSUL
T  Exam Date:                             Exam Time:                            
                  REPORT STATUS: Signed    Right frontal catheter exchange/upsi
ze.                                                                             
                                              History: Malfunctioning indwelling
right nephrostomy catheter. Exchange and   upsize requested by the referring leilani stephen.                                                                        
                                                      Modality: Fluoroscopy     
                                                                              
Sedation: Versed 2 mg and fentanyl 100 mcg was given intravenously for   conscio
us sedation.  Vital signs were monitored throughout the procedure by a   nurse, 
and remained stable. Physician intra-service time was 20.                       
                                                                  Fluoroscopy T
alexi: 3.0 min.   Reference Air Kerma (Ka, r): 27.4 mGy.                          
                   Approach:  The indwelling 10 French nephrostomy catheter     
Estimated blood loss:  < 5 cc.      Specimen: None.        : 
Kirk Mcfarlane MD.      Assistant: None.      Technique/findings:    Informed 
written consent was obtained. Discussion of risks, benefits, and   alternatives 
were made with the patient. The patient expressed understanding   and agreed to 
proceed.  A universal timeout was performed prior to starting the   procedure.  
All elements maximal sterile barrier technique was utilized for   this 
procedure, including utilization of sterile scrub solution for skin prep,   a 
large sterile sheet to cover the areas of the patient that were not prepped,   
and hand hygiene, mask, head covering, and sterile gown for performing   
radiologist and scrub technologist.      Contrast is injected via the indwelling
right nephrostomy catheter   demonstrating intraluminal position within the 
right collecting system.   Catheter noted to be retracted within a right calyx. 
    The existing catheter was cut. A 0.035 wire could not be advanced through 
the   catheter given occluded end hole. Subsequent, a 5 French Kumpe catheter 
and   0.035 angled Glidewire was used to obtain access into the right collecting
  system along side the indwelling nephrostomy catheter. The Glidewire was   
exchanged for a 0.035 Bentson wire. The indwelling nephrostomy catheter was   
removed. A new 12 French and a prosthetic catheter was advanced with the   
pigtail formed within the right renal pelvis. Contrast injection confirmed   
position. The catheter was fixed to the skin with silk suture. A sterile   
dressing was applied.      The patient tolerated the procedure without immediate
complication.                                                                   
            Impression:                                               Successful
fluoroscopic guided right nephrostomy catheter exchange/upsize with   moderate 
sedation as detailed above.                          Signed by: Dr. Kirk Mcfarlane MD on 2020 4:03 PM        Dictated By: KIRK MCFARLANE MD  Electro
nically Signed By: KIRK MCFARLANE MD on 20  Transcribed By: LUCINDA on 
20       COPY TO:   JACQUELINE ARORA MD         Fluoroscopic procedure less
than one hour dznkgddf8795-60-20 04:40:00* 



             Test Item    Value        Reference Range Interpretation Comments

 

             Hemoglobin A1c Percent (test code = Hemoglobin A1c Percent) 6.9    

                                 





[facility] plasma total bilirubin 
measurement (mass/volume)2020 05:10:00* 



             Test Item    Value        Reference Range Interpretation Comments

 

             Total Bilirubin (test code = 1975-2) 0.2                           

          





[facility] CenterFluoroscopic procedure less than one hour
wwvoimze7264-05-47 05:10:00* 



             Test Item    Value        Reference Range Interpretation Comments

 

                                        Aspartate Amino Transf (AST/SGOT) (test 

code = Aspartate Amino Transf 

(AST/SGOT))     9                                                





[facility] Hospitaler[facility] plasma alanine aminotransferase 
measurement (enzymatic activity/volume)2020 05:10:00* 



             Test Item    Value        Reference Range Interpretation Comments

 

             Alanine Aminotransferase (ALT/SGPT) (test code = 1742-6) 12        

                              





[facility]er[facility] protein measurement 
(mass/volume)2020 05:10:00* 



             Test Item    Value        Reference Range Interpretation Comments

 

             Total Protein (test code = 2885-2) 7.3                             

        





[facility]er[facility] albumin measurement 
(mass/volume)2020 05:10:00* 



             Test Item    Value        Reference Range Interpretation Comments

 

             Albumin (test code = 1751-7) 2.8                                   

  





[facility] CenterPlasma globulin measurement (mass/volume)
2020 05:10:00* 



             Test Item    Value        Reference Range Interpretation Comments

 

             Globulin (test code = 95610-4) 4.5                                 

    





[facility]er[facility] or plasma albumin/globulin mass 
mygrf0974-83-41 05:10:00* 



             Test Item    Value        Reference Range Interpretation Comments

 

             Albumin/Globulin Ratio (test code = 1759-0) 0.6                    

                 





[facility]er[facility] or plasma alkaline phosphatase 
measurement (enzymatic activity/volume)2020 05:10:00* 



             Test Item    Value        Reference Range Interpretation Comments

 

             Alkaline Phosphatase (test code = 6768-6) 90                       

               





[facility] CenterCT ABDOMEN/PELVIS [id number] 17:22:00  
                                                                                
  [facility]                        [address] [address]      Patient Name: CRISTINA RICH     
                             MR #: [id number]                     :                                    Age/Sex: 54/F  Acct #: [id number]   
                          Req #: [id number]  Adm Physician: SHADY PEMBERTON MD       
                                Ordered by: SHADY PEMBERTON MD                      
     Report #: [id number]        Location: MED/SURG                              
 Room/Bed: [facility]               _____________________________________________
______________________________________________________    Procedure: [id number] C
T/CT ABDOMEN/PELVIS WO  Exam Date: 20                            Exam Time
: 1700                                              REPORT STATUS: Signed    CT 
Abdomen and Pelvis without contrast      INDICATION:  UTI/abdominal pain, ureter
al stents and percutaneous nephrostomies    ABD PAIN    2020      TE
CHNIQUE: Thin collimation axial images obtained from the diaphragm to the   leve
l of the pubic symphysis without nonionic intravenous contrast.       Dose reduc
tion techniques used: Automated exposure control, adjustment of the   mAs and/or
kVp according to patient size, standardized low-dose protocol,   and/or iterati
ve reconstruction technique.      RADIATION DOSE:        Total DLP: 634.06 mGy*c
m        Estimated effective dose: DLP x 0.015 x size factor mSv        CTDIvol 
has been reviewed. It is below the limits set by the Radiation   Protocol Commit
anastacio (RP).      COMPARISON: CT abdomen/pelvis 2019.       ABDOMEN FINDINGS:
     Lung Bases: Calcified granuloma in the lateral right lower lobe is stable. 
 Small amount of posterior layering atelectasis. The visualized portion of the  
mediastinum is normal.      Liver: Normal in attenuation without mass.      G
allbladder: Present with a calcified gallstone measuring 2 cm near the neck.    
No ductal dilatation.      Pancreas: Normal attenuation without mass.      Splee
n: Normal size without mass.      Adrenal Glands: No evidence for mass.      Kid
neys:    Right: Percutaneous the approximate is in the posterior aspect of the  
collecting system. There is marked hydronephrosis. Trace perinephric   inflamma
tion. No calculus in the collecting system.  No cortical mass      Left:  There 
are 2 stents in the collecting system are redemonstrated that   extend into the 
urinary bladder. The renal pelvis remains dilated. No evidence   of calculus.  M
ild renal atrophy. No cortical mass      Lymph Nodes: Prominent left periaortic 
lymph nodes are stable and likely   reactive.      Aorta:  Normal in diameter wi
th scattered calcifications.      PELVIS FINDINGS:       Bowel:    Stomach:  Nor
mal.   Small Bowel: Normal in caliber with normal wall thickness.   Large Bowel:
Normal in caliber with normal wall thickness.   Appendix: Normal.      Bladder: 
Collapsed around a Gomez catheter. Coarse calcifications at the right   UVJ are 
redemonstrated. There is a small amount of encrustation surrounding a   loop of 
the ureteral stent.      Ureters: The right ureter is distended to the iliac ar
osiris bifurcation. There   are 1-2 linear hyperdensities along the course of the 
mid/distal ureter   suggestive of calculi. The ureter distal to this is collapse
d.      The proximal left ureter is distended just past the UPJ. There are no   
calcifications along the course of the stent.      The uterus is atrophic. No ad
nexal mass.      Peritoneum/retroperitoneum: No free fluid or fluid collection. 
    Bones: Mild degenerative changes of the lower lumbar spine. Mild to moderate
  degenerative changes of the lower thoracic spine. Diffuse demineralization of 
 the sacrum is redemonstrated. No associated fracture.      IMPRESSION:      1. 
New right hydroureteronephrosis, possibly due to at least two calculi in   the 
mid/distal right ureter. Percutaneous nephrostomy is in appropriate   position. 
    2.  Persistent left hydronephrosis despite the presence of 2 stents in the 
left   ureter. One loop in the pelvis appears to have an encrustation.      3. 
Other findings as described above are stable.      Signed by: Dr. Austin vargas MD on 2020 5:55 PM        Dictated By: AUSTIN RIVERO MD  Electro
nically Signed By: AUSTIN RIVERO MD on 20  Transcribed By: JAVED QUINTERO on 20       COPY TO:   SHADY PEMBERTON MD         CT BRAIN [id number] 
15:26:00                                                                        
             [address] [address]      Patient Name: CRISTINA RICH                                   MR #: [id number]                     
: 1965                                   Age/Sex: 54/F  Acct #: 
[id number]                              Req #: [id number]  Adm Physician: 
SHADY PEMBERTON MD                                        Ordered by: JENNIFER TERRELL MD                            Report #: [id number]        Location: MED/SURG     
                          Room/Bed: [facility]               
__________________________________________
_________________________________________________________    Procedure: [id number]
5 CT/CT BRAIN WO  Exam Date: 20                            Exam Time: 1435
                                             REPORT STATUS: Signed    Examinati
on: CT BRAIN WO CONTRAST      History: Acute headache.   Comparison studies:None
     Technique:   Axial images were obtained from the skull base to the vertex. 
 Coronal and sagittal images reconstructed from the axial data.   Dose modulat
ion, iterative reconstruction, and/or weight based adjustment of   the mA/kV was
utilized to reduce the radiation dose to as low as reasonably   achievable.    
Intravenous contrast: None      Findings:      Scalp: No abnormalities.   Bones:
No fractures, blastic or lytic lesions.      Brain sulci: Appropriate for age.  
Ventricles: Normal in size and configuration. No hydrocephalus.      Extra-axial
space:   No abnormalities.      Parenchyma:    No abnormal densities.    No m
asses, hemorrhage, or acute or chronic cortical based vascular insults..      Se
llar/suprasellar region: No abnormalities.   Craniocervical junction: Patent for
amen magnum. No Chiari one malformation.      Incidental findings:    None.     
Impression:       No intracranial abnormalities.      Signed by: Dr. Mame scott M.D. on 2020 3:31 PM        Dictated By: MAME ALLEN  Electronically Signed By: MAME BADILLO MD on 20 153  Trans
cribed By: LUCINDA on 20       COPY TO:   JENNIFER TERRELL MD         
CHEST SINGLE (PORTABLE)2020 15:17:00                                      
                                               [address] [address]
     Patient Name: CRISTINA RICH                                   MR #: 
[id number]                     : 1965                                  
Age/Sex: 54/F  Acct #: [id number]                              Req #: [id number]
[id number]  Adm Physician: SHADY PEMBERTON MD                                        
Ordered by: JENNIFER TERRELL MD                            Report #: [id number]    
   Location: MED/SURG                                Room/Bed: [facility]            
  __________________________________________
_________________________________________________________    Procedure: [id number]
6 DX/CHEST SINGLE (PORTABLE)  Exam Date: 20                            Bernardo calderon Time: 1430                                              REPORT STATUS: Signed 
  EXAMINATION:  CHEST SINGLE (PORTABLE)         COMPARISON: Chest x-ray 20
18      INDICATION: Fever, pain, weakness     LOW GRADE FEVER, SOMNOLENCE, LIKEL
Y UTI    [id number]    1430        DISCUSSION:   Frontal view of the chest rashad allen at 1439 hours.      HEART AND MEDIASTINUM:  The heart is normal in size. The a
ortic arch is   tortuous but stable        LINES:  None.      LUNGS:  The lungs 
are well inflated and clear. Calcified granuloma in the right   lung base is sta
ble. No pneumonia or pulmonary edema.      PLEURA:  No pleural effusion or pneum
othorax. Stable mild eventration of the   right diaphragm.      BONES AND SOFT T
ISSUES: Degenerative changes of the right AC joint area No   focal osseous lesio
n. The soft tissues are normal.         IMPRESSION:    Stable chest. No acute ca
rdiopulmonary disease.      Signed by: Dr. Austin Rivero MD on 2020 3:
19 PM        Dictated By: AUSTIN RIVERO MD  Electronically Signed By: DEMETRIA RIVERO MD on 20  Transcribed By: LUCINDA on 20       
COPY TO:   JENNIFER TERRELL MD         Blood ayrltyz0109-54-08 12:11:00* 



             Test Item    Value        Reference Range Interpretation Comments

 

             Blood Culture (test code = 43909217) NO GROWTH AFTER 5 DAYS, FINAL 

REPORT                            





[facility] CenterProthrombin time (PT) in platelet poor 
plasma by coagulation cudvy8736-39-15 11:56:00* 



             Test Item    Value        Reference Range Interpretation Comments

 

             Prothrombin Time (test code = 5902-2) 13.7                         

           





[facility] CenterINR in Platelet poor plasma by 
Coagulation eyqrf5342-54-67 11:56:00* 



             Test Item    Value        Reference Range Interpretation Comments

 

             Prothromb Time International Ratio (test code = 6301-6) 0.99       

                             





[facility] CenterActivated partial thromboplastin time 
(aPTT) in platelet poor plasma by coagulation bkrqh5040-42-73 11:56:00* 



             Test Item    Value        Reference Range Interpretation Comments

 

             Activated Partial Thromboplast Time (test code = 49355-5) 33.1     

                               





[facility]er[facility] creatine kinase 
measurement (enzymatic activity/volume)2020 11:56:00* 



             Test Item    Value        Reference Range Interpretation Comments

 

             Creatine Kinase (test code = 2157-6) 28                            

          





[facility] Hospitalerum or plasma creatine kinase MB 
measurement (mass/volume)2020 11:56:00* 



             Test Item    Value        Reference Range Interpretation Comments

 

             Creatine Kinase MB (test code = 15846-6) 0.40                      

              





[facility] CenterTroponin I measurement by highly 
sensitive enzyme iokbzesdapf9794-37-68 11:56:00* 



             Test Item    Value        Reference Range Interpretation Comments

 

             Troponin I (test code = 14527-5) < 0.001                           

      





[facility] CenterUrine color eyndosqnlluho4906-71-70 
11:55:00* 



             Test Item    Value        Reference Range Interpretation Comments

 

             Urine Color (test code = 5778-6) YELLOW                            

      





[facility] CenterUrine nogpxow1572-84-61 11:55:00* 



             Test Item    Value        Reference Range Interpretation Comments

 

             Urine Clarity (test code = 89727-1) HAZY                           

         





[facility] Hospitalpecific gravity of Urine by Test strip
2020 11:55:00* 



             Test Item    Value        Reference Range Interpretation Comments

 

             Urine Specific Gravity (test code = 5811-5) 1.020                  

                 





[facility] CenterUrine pH measurement by automated test 
vhwzc4410-92-00 11:55:00* 



             Test Item    Value        Reference Range Interpretation Comments

 

             Urine pH (test code = 78439-9) 8                                   

    





[facility] CenterUrine leukocyte esterase detection by 
mgoizqbf2330-68-18 11:55:00* 



             Test Item    Value        Reference Range Interpretation Comments

 

             Urine Leukocyte Esterase (test code = 5799-2) LARGE                

                   





[facility] CenterUrine nitrite yygkvxpdh9111-70-23 
11:55:00* 



             Test Item    Value        Reference Range Interpretation Comments

 

             Urine Nitrite (test code = 30469-2) NEGATIVE                       

         





[facility] CenterUrine protein measurement by test strip 
(mass/volume)2020 11:55:00* 



             Test Item    Value        Reference Range Interpretation Comments

 

             Urine Protein (test code = 5804-0) >=300                           

        





[facility] CenterUrine glucose gspfuuieo6260-02-30 
11:55:00* 



             Test Item    Value        Reference Range Interpretation Comments

 

             Urine Glucose (UA) (test code = 2349-9) NEGATIVE                   

             





[facility] CenterUrine ketones detection by automated test
jlzvc4407-32-92 11:55:00* 



             Test Item    Value        Reference Range Interpretation Comments

 

             Urine Ketones (test code = 47666-8) NEGATIVE                       

         





[facility] CenterUrine urobilinogen measurement by test 
strip (mass/volume)2020 11:55:00* 



             Test Item    Value        Reference Range Interpretation Comments

 

             Urine Urobilinogen (test code = 63414-1) 0.2                       

              





[facility] CenterUrine total bilirubin measurement 
(mass/volume)2020 11:55:00* 



             Test Item    Value        Reference Range Interpretation Comments

 

             Urine Bilirubin (test code = 1978-6) NEGATIVE                      

          





[facility] CenterUrine erythrocytes fvgdlyowj3631-19-54 
11:55:00* 



             Test Item    Value        Reference Range Interpretation Comments

 

             Urine Blood (test code = 62295-5) 3+                               

       





[facility] CenterAutomated urine sediment leukocyte count 
by microscopy (number/high power field)2020 11:55:00* 



             Test Item    Value        Reference Range Interpretation Comments

 

             Urine WBC (test code = 5821-4) >50                                 

    





[facility] CenterErythrocytes detection in urine sediment 
by light ualblfehai7350-72-03 11:55:00* 



             Test Item    Value        Reference Range Interpretation Comments

 

             Urine RBC (test code = 33527-5) >50                                

     





[facility] CenterBacteria detection in urine sediment by 
light rkvyhsibmn8059-35-96 11:55:00* 



             Test Item    Value        Reference Range Interpretation Comments

 

             Urine Bacteria (test code = 69151-3) MODERATE                      

          





[facility] CenterEpithelial cells detection in urine 
sediment by light rjzjcpjrlc4983-01-35 11:55:00* 



             Test Item    Value        Reference Range Interpretation Comments

 

             Urine Epithelial Cells (test code = 70505-7) FEW                   

                  





[facility] CenterCoarse granular casts detection in urine 
sediment by light cvqoenvpmt4003-83-30 11:55:00* 



             Test Item    Value        Reference Range Interpretation Comments

 

             Urine Coarse Granular Casts (test code = 29058-0) 1-5              

                       





[facility] CenterMucus detection in urine sediment by 
light dandsvcbin1853-22-88 11:55:00* 



             Test Item    Value        Reference Range Interpretation Comments

 

             Urine Mucus (test code = 8247-9) MODERATE                          

      





[facility] CenterABDOMEN-1VIEW (KUB)2020 19:48:00   
                                                                                
 [facility]                        [address] [place] [address]      Patient Name: CRISTINA RICH     
                             MR #: [id number]                     :                                    Age/Sex: 54/F  Acct #: [id number]   
                          Req #: [id number]  Adm Physician:                     
                                Ordered by: TINO MALHOTRA NP                 
          Report #: [id number]        Location: ER                               
      Room/Bed:                     ________________________________________
___________________________________________________________    Procedure: 0116-0
074 DX/ABDOMEN-1VIEW (KUB)  Exam Date: 20                            Exam 
Time:                                               REPORT STATUS: Signed   
   Exam: Abdominal film       Clinical History: Pain      Comparison: 
9. 2019      DISCUSSION:       2 left-sided ureteral stents stable i
n position.      Right percutaneous nephrostomy tube stable in position.      No
nobstructive bowel gas pattern.      IMPRESSION:      Stable appearance of the l
eft ureteral stents and right percutaneous   nephrostomy tube            Signed 
by: Dr. Deb Del Castillo M.D. on 2020 7:49 PM        Dictated By: DEB DEL CASTILLO MD, MD  Electronically Signed By: DEB DEL CASTILLO MD, MD on 20  Transcri
bed By: LUCINDA on 20       COPY TO:   TINO MALHOTRA NP         
Blood platelets count by estimate (number/volume)2020 16:28:00* 



             Test Item    Value        Reference Range Interpretation Comments

 

             Platelet Estimate (test code = 51278-3) SLIGHTLY DECREASED         

                   





[facility] CenterPlatelet hzkpfwjhyr1544-68-92 16:28:00* 



             Test Item    Value        Reference Range Interpretation Comments

 

             Platelet Morphology Comment (test code = 26805-1) NORMAL           

                       





[facility] CenterBlood hypochromia detection by light 
wfvhdpaafb1370-72-43 16:28:00* 



             Test Item    Value        Reference Range Interpretation Comments

 

             Hypochromasia (test code = 728-6) MODERATE                         

       





[facility] CenterBlood poikilocytosis detection by light 
aodtjdzzco0558-23-70 16:28:00* 



             Test Item    Value        Reference Range Interpretation Comments

 

             Poikilocytosis (test code = 779-9) MODERATE                        

        





[facility] CenterBlood anisocytosis detection by light 
wsuawznipw0015-13-71 16:28:00* 



             Test Item    Value        Reference Range Interpretation Comments

 

             Anisocytosis (test code = 702-1) SLIGHT                            

      





[facility] HospitalRBC mjyvusgmkx9003-56-62 16:28:00* 



             Test Item    Value        Reference Range Interpretation Comments

 

             Red Cell Morphology Comment (test code = 6742-1) NORMAL            

                      





[facility] CenterIR VEJUWFM1250-47-18 15:02:00            
                                                                         [facility]                        [address] [address]      Patient Name: CRISTINA RICH     
                             MR #: [id number]                     :                                    Age/Sex: 54/F  Acct #: [id number]   
                          Req #: [id number]  Adm Physician: SHADY PEMBERTON MD       
                                Ordered by: JACQUELINE ARORA MD                      
     Report #: [id number]        Location: MED/[facility]                             
 Room/Bed: [facility]               _____________________________________________
______________________________________________________    Procedure: [id number] D
X/IR CONSULT  Exam Date:                             Exam Time:                 
                             REPORT STATUS: Signed    PROCEDURE: Genitourinary 
catheter exchange      Procedural Personnel   Attending physician(s): Radha Florez MD   Fellow physician(s): None   Resident physician(s): None   Advanced practi
ce provider(s): None      Pre-procedure diagnosis: Right ureteral obstruction   
Post-procedure diagnosis: Same   Indication: Routine scheduled exchange   Additi
onal clinical history: None      Complications: No immediate complications.     
IMPRESSION:      Successful routine exchange of right nephrostomy tube for new 
10Fr nephrostomy   tube.      Plan:    Routine exchange in 8 weeks.   __________
_____________________________________________________      PROCEDURE SUMMARY   -
Target organ: Unilateral native kidney   - Antegrade nephrostogram(s) via the e
xisting access   - Nephrostomy tube exchange   - Additional procedure(s): None  
   PROCEDURE DETAILS:      Pre-procedure   Consent: Informed consent for the pr
ocedure including risks, benefits and   alternatives was obtained and time-out w
as performed prior to the procedure.   Preparation: The site was prepared and dr harmon using maximal sterile barrier   technique including cutaneous antisepsis.  
   Anesthesia/sedation   Level of anesthesia/sedation: Moderate sedation (consc
ious sedation) 1mg   Versed, 50mcg Fentanyl   Anesthesia/sedation administered b
y: Independent trained observer under   attending supervision with continuous mo
nitoring of the patient?s level of   consciousness and physiologic status   Tota
l intra-service sedation time (minutes): 30      Right genitourinary catheter ex
change   Local anesthesia was administered. Initial nephrostogram was performed.
A wire   was placed through the existing tube and it was removed. The new tube 
was   advanced over the wire and position was confirmed with contrast injection.
  Pre-existing genitourinary catheter: 10Fr Uresil   Genitourinary catheter(s) 
placed: 10Fr Uresil    Findings: Locking loop in renal pelvis.   External cathet
er securement: Non-absorbable suture       Additional genitourinary system inter
vention   Genitourinary intervention: None   Location of intervention: Not appli
cable   Device used: Not applicable   Description of intervention: Not applicabl
e   Post-intervention findings: Not applicable      Contrast   Contrast agent: I
sovue 300   Contrast volume (mL): 10      Radiation Dose   Fluoroscopy time (min
utes): 1.0     Reference air kerma (mGy): 8.7       Additional Details   Additio
nal description of procedure: None   Equipment details: None   Specimens removed
: None   Estimated blood loss (mL): Less than 10   Standardized report: Zehra
theterExchange_v3      Attestation   Signer name: Radha Florez MD   I attest that
I was present for the entire procedure. I reviewed the stored   images and agree
with the report as written.               Signed by: Radha Florez MD on 
019 3:05 PM        Dictated By: RADHA FLOREZ MD  Electronically Signed By: RADHA FLOREZ MD on 19  Transcribed By: LUCINDA on 19       COPY TO: 
 JACQUELINE ARORA MD         NEPHRO/URET W IMG/INJ-[id number] 15:02:00        
                                                                             [place] [address]      Patient Name: CRISTINA RICH     
                             MR #: [id number]                     :                                    Age/Sex: 54/F  Acct #: [id number]   
                          Req #: [id number]  Adm Physician: SHADY PEMBERTON MD       
                                Ordered by: SHADY PEMBERTON MD                      
     Report #: [id number]        Location: MED/[facility]                             
 Room/Bed: [facility]               _____________________________________________
______________________________________________________    Procedure: [id number] I
R/NEPHRO/URET W IMG/INJ-EXISTING  Exam Date:                             Exam Ti
me:                                               REPORT STATUS: Signed    PROCE
DURE: Genitourinary catheter exchange      Procedural Personnel   Attending phys
ician(s): Radha Florez MD   Fellow physician(s): None   Resident physician(s): No
ne   Advanced practice provider(s): None      Pre-procedure diagnosis: Right ure
teral obstruction   Post-procedure diagnosis: Same   Indication: Routine schedul
ed exchange   Additional clinical history: None      Complications: No immediate
complications.      IMPRESSION:      Successful routine exchange of right nephr
ostomy tube for new 10Fr nephrostomy   tube.      Plan:    Routine exchange in 8
weeks.   _______________________________________________________________      P
ROCEDURE SUMMARY   - Target organ: Unilateral native kidney   - Antegrade nephro
stogram(s) via the existing access   - Nephrostomy tube exchange   - Additional 
procedure(s): None      PROCEDURE DETAILS:      Pre-procedure   Consent: Informe
d consent for the procedure including risks, benefits and   alternatives was obt
ained and time-out was performed prior to the procedure.   Preparation: The site
was prepared and draped using maximal sterile barrier   technique including cut
aneous antisepsis.      Anesthesia/sedation   Level of anesthesia/sedation: Mode
rate sedation (conscious sedation) 1mg   Versed, 50mcg Fentanyl   Anesthesia/sed
ation administered by: Independent trained observer under   attending supervisio
n with continuous monitoring of the patient?s level of   consciousness and physi
ologic status   Total intra-service sedation time (minutes): 30      Right genit
ourinary catheter exchange   Local anesthesia was administered. Initial nephrost
ogram was performed. A wire   was placed through the existing tube and it was re
moved. The new tube was   advanced over the wire and position was confirmed with
contrast injection.   Pre-existing genitourinary catheter: 10Fr Uresil   Genito
urinary catheter(s) placed: 10Fr Uresil    Findings: Locking loop in renal pelvi
s.   External catheter securement: Non-absorbable suture       Additional genito
urinary system intervention   Genitourinary intervention: None   Location of int
ervention: Not applicable   Device used: Not applicable   Description of interve
ntion: Not applicable   Post-intervention findings: Not applicable      Contrast
  Contrast agent: Isovue 300   Contrast volume (mL): 10      Radiation Dose   F
luoroscopy time (minutes): 1.0     Reference air kerma (mGy): 8.7       Addition
al Details   Additional description of procedure: None   Equipment details: None
  Specimens removed: None   Estimated blood loss (mL): Less than 10   Standardi
zed report: SIR_GUCatheterExchange_v3      Attestation   Signer name: Radha Florez MD   I attest that I was present for the entire procedure. I reviewed the stor
ed   images and agree with the report as written.               Signed by: Fahad Florez MD on 2019 3:05 PM        Dictated By: RADHA FLOREZ MD  Electronicall
y Signed By: RADHA FLOREZ MD on 19 9360  Transcribed By: LUCINDA on 19
1825       COPY TO:   SHADY PEMBERTON MD         ABDOMEN-1VIEW ([facility])2019 
08:49:00                                                                        
             [place] [address]      Patient Name: CRISTINA RICH                                   MR #: [id number]                     
: 1965                                   Age/Sex: 54/F  Acct #: 
[id number]                              Req #: [id number]  Adm Physician:     
                                                Ordered by: JENNIFER TERRELL MD   
                        Report #: [id number]        Location: ER                 
                    Room/Bed:                     
__________________________________________
_________________________________________________________    Procedure: [id number]
0 DX/ABDOMEN-1VIEW ([facility])  Exam Date: 19                            Exam Ti
me: 0835                                              REPORT STATUS: Signed     
 Exam: Abdominal film       Clinical History: Pain      Comparison: 2019      DISCUSSION:       2 left-sided ureteral stents stable in position.    
 Right percutaneous nephrostomy tube stable in position.      Nonobstructive b
owel gas pattern.      IMPRESSION:      Stable appearance of the left ureteral s
tents and right percutaneous   nephrostomy tube                        Signed by
: Dr. Andrew Palisch, M.D. on 2019 8:52 AM        Dictated By: ANDREW R PA
LISCH MD  Electronically Signed By: ANDREW R PALISCH MD on 19  Transc
ribed By: LUCINDA on 19       COPY TO:   JENNIFER TERRELL MD         
Bacterial urine [id number] 07:10:00* 



             Test Item    Value        Reference Range Interpretation Comments

 

             Urine Culture (test code = 630-4) PSEUDOMONAS AERUGINOSA           

                 





[facility]00   
                                                                                
 [place] [address]      Patient Name: CRISTINA RICH     
                             MR #: [id number]                     :                                    Age/Sex: 54/F  Acct #: [id number]   
                          Req #: [id number]  Adm Physician:                     
                                Ordered by: JACQUELINE ARORA MD                      
     Report #: [id number]        Location: OR                                    
 Room/Bed:                     _____________________________________________
______________________________________________________    Procedure: [id number] D
X/ABDOMEN-1VIEW ([facility])  Exam Date: 19                            Exam Time:
1632                                              REPORT STATUS: Signed    Abdo
men, one view      Clinical indication: Preoperative evaluation, hydronephrosis 
    Comparison: 2019 and 2019      Findings:   A right percutaneous n
ephrostomy catheter is in place. Two parallel left   double-J stent are in place
. The bowel gas pattern is nonobstructive. No acute   osseous abdomen bodies.   
  Impression:   Unchanged appearance of right nephrostomy catheter and left ure
teral stents.      Signed by: Stephen E Dreyer, MD on 2019 4:50 PM        D
ictated By: STEPHEN E DREYER MD  Electronically Signed By: STEPHEN E DREYER MD o
n 19 1650  Transcribed By: LUCINDA on 19 1650       COPY TO:   JACQUELINE SMITH MD         Bacterial urine [id number] 23:00:00* 



             Test Item    Value        Reference Range Interpretation Comments

 

             Urine Culture (test code = 630-4) ENTEROCOCCUS FAECIUM             

               





[facility] 
sediment by light [id number] 11:33:00* 



             Test Item    Value        Reference Range Interpretation Comments

 

             Urine Amorphous Sediment (test code = 8246-1) FEW                  

                   





[facility] CenterYeast detection in urine sediment by 
light gpwxxkbpse1778-00-06 11:33:00* 



             Test Item    Value        Reference Range Interpretation Comments

 

             Urine Yeast (test code = 59884-6) MANY                             

       





[facility] [id number] 
11:33:00* 



             Test Item    Value        Reference Range Interpretation Comments

 

             Urine Culture (test code = 630-4) ENTEROCOCCUS FAECIUM             

               





[facility] 
14:16:00                                                                        
             [facility]                        [address] [address]      Patient Name: CRISTINA RICH                                   MR #: [id number]                     
: 1965                                   Age/Sex: 54/F  Acct #: 
[id number]                              Req #: [id number]  Adm Physician:     
                                                Ordered by: JACQUELINE ARORA MD      
                     Report #: [id number]        Location: DX                    
                 Room/Bed:                     
_____________________________________________
______________________________________________________    Procedure: [id number] I
R/NEPHRO/URET W IMG/INJ-EXISTING  Exam Date:                             Exam Ti
me:                                               REPORT STATUS: Signed    PROCE
DURE: Genitourinary catheter exchange      Procedural Personnel   Attending phys
aaron(s): Radha Florez MD   Fellow physician(s): None   Resident physician(s): No
ne   Advanced practice provider(s): None      Pre-procedure diagnosis: Ureteral 
obstruction   Post-procedure diagnosis: Same   Indication: Routine scheduled exc
hange   Additional clinical history: None      Complications: Nephrostomy tube w
as heavily encrusted and calcified and   required advanced maneuvers to remove a
nd exchange.       IMPRESSION:      Successful exchange of right nephrostomy tub
e. Nephrostomy tube was heavily   encrusted and calcified and required advanced 
maneuvers to remove and exchange.         Plan:    Future exchanges should be do
ne at no longer than 6-8 week intervals.   _____________________________________
__________________________      PROCEDURE SUMMARY   - Target organ: Right native
kidney   - Antegrade nephrostogram(s) via the existing access   - Nephrostomy t
ube exchange   - Additional procedure(s): None      PROCEDURE DETAILS:      Pre-
procedure   Consent: Informed consent for the procedure including risks, benefit
s and   alternatives was obtained and time-out was performed prior to the proced
ure.   Preparation: The site was prepared and draped using maximal sterile barri
er   technique including cutaneous antisepsis.      Anesthesia/sedation   Level 
of anesthesia/sedation: Minimal sedation 50mcg Fentanyl   Anesthesia/sedation ad
ministered by: Independent trained observer under   attending supervision with c
ontinuous monitoring of the patient?s level of   consciousness and physiologic s
tatus      Right genitourinary catheter exchange   Local anesthesia was administ
ered. Initial nephrostogram was performed. A wire   was placed via the existing 
tube and the tube was very encrusted. Wire could   not be passed all the way thr
ough the tube and the loop would not unform. A   12Fr peel-away sheath was advan
melanie over the nephrostomy tube and the tube was   retracted through the sheath an
d removed. A Kumpe catheter and guidewire were   then advanced through the sheat
h and the sheath removed. A new 10Fr nephrostomy   tube was advanced over the wi
re and position was confirmed with contrast   injection.   Pre-existing genitour
inary catheter: 10Fr Uresil   Genitourinary catheter(s) placed: 10Fr Uresil    F
indings: Loop formed in renal pelvis.   External catheter securement: Non-absorb
able suture       Additional genitourinary system intervention   Genitourinary i
ntervention: None   Location of intervention: Not applicable   Device used: Not 
applicable   Description of intervention: Not applicable   Post-intervention fin
dings: Not applicable      Contrast   Contrast agent: Isovue 370   Contrast volu
me (mL): 10      Radiation Dose   Fluoroscopy time (minutes): 13.24     Referenc
e air kerma (mGy): 134       Additional Details   Additional description of proc
edure: None   Equipment details: None   Specimens removed: None   Estimated bloo
d loss (mL): Less than 10   Standardized report: SIR_GUCatheterExchange_v3      
Attestation   Signer name: Radha Florez MD   I attest that I was present for the 
entire procedure. I reviewed the stored   images and agree with the report as dominick barretoen.               Signed by: Radha Florez MD on 10/24/2019 2:21 PM        Dict
ated By: RADHA FLOREZ MD  Electronically Signed By: RADHA FLOREZ MD on 10/24/19 1421
 Transcribed By: LUCINDA on 10/24/19 1421       COPY TO:   JACQUELINE ARORA MD        
ABDOMEN-1VIEW ([facility])2019 07:49:00                                          
                                           [place] [address]    
 Patient Name: CRISTINA RICH                                   MR #: 
[id number]                     : 1965                                  
Age/Sex: 53/F  Acct #: [id number]                              Req #: [id number]
[id number]  [facility] Physician:                                                      
Ordered by: JACQUELINE ARORA MD                            Report #: [id number]       
Location: OR                                      Room/Bed:                     
_____________________________________________
______________________________________________________    Procedure: [id number] D
X/ABDOMEN-1VIEW ([facility])  Exam Date: 19                            Exam Time:
0725                                              REPORT STATUS: Signed       E
xam: Abdominal film       Clinical History: Urolithiasis, stents      Comparison
: Abdominal radiograph dated 2019, CT of the abdomen and pelvis   dated       DISCUSSION:    Right percutaneous nephrostomy and dual parallel left 
internal ureteral stents   are again noted.    There is unchanged 5 mm calcific 
density overlying the region of the right   ureterovesicular junction.      Charlie
l gas pattern is nonobstructive. Regional skeletal structures are intact.       
A calcified gallstone is again identified projecting of the right upper   quadra
nt.      IMPRESSION:      Right percutaneous nephrostomy catheter and parallel l
eft internal ureteral   stents are unchanged in position. Unchanged 5 mm calcifi
c density in the   expected region of the right ureterovesicular junction.      
Signed by: Stephen E Dreyer, MD on 2019 7:56 AM        Dictated By: STEPHEN
E DREYER MD  Electronically Signed By: STEPHEN E DREYER MD on 19  Tr
anscribed By: LUCINDA on 19       COPY TO:   JACQUELINE ARORA MD         
Serum or plasma thyrotropin measurement by detection limit <= 0.005 miu/l 
(units/volume)2019 05:15:00* 



             Test Item    Value        Reference Range Interpretation Comments

 

             Thyroid Stimulating Hormone (TSH) (test code = 82836-9) 2.374      

                             





[facility] 18[facility]34[facility]00   
                                                                                
 [place] [address]      Patient Name: CRISTINA RICH     
                             MR #: [id number]                     :                                    Age/Sex: 53/F  Acct #: [id number]   
                          Req #: [id number]  Adm Physician:                     
                                Ordered by: TINO MALHOTRA NP                 
          Report #: [id number]        Location: ER                               
      Room/Bed:                     ________________________________________
___________________________________________________________    Procedure: [id number]
054 DX/ABDOMEN-1VIEW ([facility])  Exam Date:                             Exam Time:   
                                           REPORT STATUS: Signed    RADIOGRAPH(
S) OF THE ABDOMEN AND PELVIS, 2 view(s)          HISTORY:  Abdominal pain, right
kidney drain      COMPARISON:  CT of the abdomen and pelvis 2019. Abd
ominal pelvic   radiographs 2019.      FINDINGS:   No specific evidenc
e of obstruction or ileus.     Unchanged appearance of the right nephrostomy tub
e.   Unchanged appearance of the 2 left-sided ureteral stents.   Unchanged multi
ple metallic surgical clips within the pelvis.   Presumed Gomez catheter, the ti
p of the catheter is not definitively visualized   in the region of the urinary 
bladder.   The bones are partially obscured by stool and overlying bowel gas.   
Cholelithiasis.      IMPRESSION:    1.  Nonobstructive bowel gas pattern.   2.  
Unchanged appearance of the right nephrostomy tube and left ureteral   stents.  
3.  Cholelithiasis.      Signed by: Dr. Vamsi Guerin D.O., M.M.M. on 2019 6
:37 PM        Dictated By: VAMSI GUERIN DO  Electronically Signed By: VAMSI GUERIN DO on 19  Transcribed By: LUCINDA on 19       COPY TO:   
TINO MALHOTRA NP         SPECIAL PROCEDURE IN CATH [id number] 14:18:00     
                                                                                
[place] [address]      Patient Name: CRISTINA RICH     
                             MR #: [id number]                     :                                    Age/Sex: 53/F  Acct #: [id number]   
                          Req #: [id number]  Adm Physician:                     
                                Ordered by: JACQUELINE ARORA MD                      
     Report #: [id number]        Location: CATH LAB                              
 Room/Bed:                     _____________________________________________
______________________________________________________    Procedure: [id number] I
R/SPECIAL PROCEDURE IN CATH LAB  Exam Date:                             Exam Tate
e:                                               REPORT STATUS: Signed    PROCED
URE:   CHG PERC TUBE/DEBBIE CATH/CON/S I       COMPARISON:   Prior nephrostomy tube
change 2019       Preprocedure diagnosis: right ureteral obstruction, chron
ic indwelling    nephrostomy   Post procedure diagnosis: Right ureteral obstruct
ion, chronic    indwelling nephrostomy.       Sedation/anesthesia: Intravenous f
entanyl and Versed. Refer to nursing    record. The patient's heart rate and pul
se oximetry were continuously    monitored by the interventional radiology nurse
. Blood pressure was    monitored at 5 minute intervals.       Additional medica
tions: Lidocaine 1% for local anesthesia.       Total fluoroscopy time: One alfonso
te   Dose-area product:  267.5 cGycm2       Contrast used: 10 cc Isovue-300   Es
timated blood loss: Minimal   Blood products administered: None   Specimens: 10 
French percutaneous nephrostomy catheter, discarded   Implants/grafts: 10 French
percutaneous nephrostomy catheter       Condition at completion: Stable   Dispo
sition: Catheter lab holding area for recovery   Complications: No immediate    
  Procedure in detail:   Informed consent was obtained and documented in the me
dical record    after discussion of risks and benefits.       The patient was pl
aced in the prone position on the angiographic table.    The right flank and exi
sting percutaneous nephrostomy catheter were    prepped and draped in standard s
terile fashion. 1% lidocaine was    infiltrated into the skin and subcutaneous t
issues along the existing    catheter for local anesthesia.       Injection of d
ilute contrast material through the catheter confirmed    appropriate position w
ithin the collecting system. The catheter was    severed and a 0.035 inch stiff 
Glidewire was advanced through the    catheter and coiled within the renal pelvi
s. The catheter was then    exchanged over-the-wire for a new 10 French locking 
loop percutaneous    nephrostomy catheter. The wire was removed and the locking 
loop was    formed in the renal pelvis. Appropriate positioning was confirmed by
   injection of dilute contrast material. The catheter was then flushed    with 
sterile saline, secured to the skin with monofilament nylon    suture, and conn
ected to gravity drainage. A sterile dressing was    applied. The patient tolera
hugh the procedure well without immediate    complication.           CONCLUSION: 
        Successful exchange of a 10 French locking loop right percutaneous    n
ephrostomy catheter under fluoroscopic guidance.       The patient should return
to interventional radiology for routine    catheter exchange in 3 months if the 
catheter is still needed at that    time.    Dictated by:  Clau Ngo M.D. on 
2019 at 14:18        Electronically approved by:  Clau Ngo M.D. on 2019 at 14:18                Dictated By: CLAU NGO MD  Electronically Katerina
d By: CLAU NGO MD on 19 1418  Transcribed By: FERNANDO on 19 1418 
     COPY TO:   JACQUELINE ARORA MD         CT ABDOMEN/PELVIS YO3522-05-14 17:33:00   
                                                                                
 [place] [address]      Patient Name: CRISTINA RICH            
                      MR #: [id number]                     : 1965      
                            Age/Sex: 53/F  Acct #: [id number]                 
            Req #: [id number]  [facility] Physician:                                   
                  Ordered by: JENNIFER TERRELL MD                            
Report #: [id number]        Location: ER                                      
Room/Bed:                     _________________________________________________
__________________________________________________    Procedure: [id number] CT/CT
ABDOMEN/PELVIS WO  Exam Date: 19                            Exam Time:                                               REPORT STATUS: Signed    EXAM: C
T Abdomen and Pelvis WITHOUT contrast     INDICATION:          CT CYSTOGRAM    2
[id number]    1626    Y          COMPARISON: Nephrostomy tube x-ray 3/29/2019   LUIS FERNANDO
HNIQUE: Abdomen and pelvis were scanned utilizing a multidetector helical   scan
ner from the lung base to the pubic symphysis without administration of IV   con
trast. Absence of intravenous contrast decreases sensitivity for detection   of 
focal lesions and vascular pathology. Coronal and sagittal reformations were   o
btained. Routine protocol was performed.  Contrast injected through Gomez   time
s 2.               IV CONTRAST: None.               ORAL CONTRAST: Water        
      RADIATION DOSE: Total DLP: 889.2 mGy*cm                Estimated effective
dose: (DLP x 0.015 x size factor) mSv               COMPLICATIONS: None      F
INDINGS:      LINES and TUBES: Right percutaneous nephrostomy tube. Left interna
l ureteral   stent with proximal aspect in the left renal pelvis and distal sten
t within the   urinary bladder.      LOWER THORAX:  Unremarkable      HEPATOBILI
PROMISE:      No focal hepatic lesions. No biliary ductal dilation.       GALLBLADDE
R: Cholelithiasis.       SPLEEN: No splenomegaly.       PANCREAS: No focal michael
s or ductal dilatation.        ADRENALS: No adrenal nodules          KIDNEYS/URE
TERS:  Stable position of the right buttock without intravenous   nephrostomy tu
be with decompressed right kidney. No new calcified stones in the   right kidney
. The right ureter is decompressed without calcified stones.   Moderate left hyd
roureteronephrosis has not improved despite with stent   placement. Persistent 3
mm calcified stone in the inferior pole of the left   kidney. No visualized new 
when stones along the left ureter on limited   evaluation or remaining left kid
marcela. Mild left perinephric fat stranding   without fluid collections.      GI TR
ACT: No abnormal distention, wall thickening, or evidence of bowel   obstruction
.       Appendix is normal.      PELVIC ORGANS/BLADDER: The urinary bladder is d
ecompressed with 4 Lake catheter   in place. No calcified stones in the urinary 
bladder. Hysterectomy.      LYMPH NODES: No lymphadenopathy.      VESSELS: Unrem
arkable.      PERITONEUM / RETROPERITONEUM: No free air or fluid.      BONES: Un
remarkable.      SOFT TISSUES: Unremarkable.                  IMPRESSION:    1. 
Unchanged right nephrostomy tube with decompressed right kidney. No   calcified 
stones in the right kidney or right ureter.      2.  Persistent moderate left h
ydroureteronephrosis despite ureteral stent in   adequate position. Unchanged le
ft nephrolithiasis. Recommend revision of the   left ureteral stent.      3.  Spangler
boptimal cystogram with only a small amount of contrast in the urinary   bladder
, which persist decompressed with Gomez catheter in place.      Signed by: Dr. DIANE Purcell M.D. on 2019 5:43 PM        Dictated By: GEORGIANA PURCELL MD  Electronically Signed By: GEORGIANA PURCELL MD on 1743  Transcribed By: LUCINDA on 19       COPY TO:   JENNIFER TERRELL MD         SPECIAL PROCEDURE IN CATH [id number] 12:30:00                     
                                                                [place] [address]      Patient Name: CRISTINA RICH            
                      MR #: [id number]                     : 1965      
                            Age/Sex: 53/F  Acct #: [id number]                 
            Req #: [id number]  Adm Physician: SHADY PEMBERTON MD                     
                  Ordered by: JACQUELINE ARORA MD                            Report 
#: [id number]        Location: MED/SURG3                               Room/Bed: 
[facility]               ____________________________________________________
_______________________________________________    Procedure: [id number] IR/HOMERO AGUILAR PROCEDURE IN CATH LAB  Exam Date:                             Exam Time:     
                                         REPORT STATUS: Signed    Procedure: Ri
ght nephrostomy catheter exchange with fluoroscopic guidance      Comparison: Ri
ght nephrostomy exchange 2018.       : Naomi Martinez MD      Sedation
/Medications: Conscious sedation. IV Fentanyl and Versed per nursing   administr
ation records.  The patient's vital signs, including pulse oximetry,   were cont
inuously monitored by the interventional radiology nurse.      Fluoroscopy time:
0.6 minutes.   Dose area product: 77.1 cGycm2      Blood loss: 0 cc   Specimens:
8 Fr right nephrostomy catheter   Implants: 10 Fr right nephrostomy catheter    
 Technique/Findings:   Informed consent for the procedure was obtained from the 
patient after   discussion of risks and benefits. The right back was prepped and
draped in the   usual fashion.      1% lidocaine was administered into the skin 
and subcutaneous tissues of the   right back for local anesthesia.       Con
trast was injected demonstrating catheter was in place with mild   hydronephrosi
s. Some encrustation patient was noted at the tip of the existing   catheter. Spangler
bsequently, a stiff Glidewire was advanced through the catheter and   the cathet
er was exchanged for a new 10 French nephrostomy catheter. Contrast   injection 
confirmed satisfactory positioning. Catheter was secured with suture   and overl
gabino sterile dressing. There were no immediate complications.      Impression:  
Fluoroscopic guided right nephrostomy catheter exchange as above.      Signed b
y: Dr. Naomi Martinez MD on 2019 12:34 PM        Dictated By: NAOMI MARTINEZ MD  
ectronically Signed By: NAOMI MARTINEZ MD on 19 1234  Transcribed By: LUCINDA 
on 19 1234       COPY TO:   JACQUELINE ARORA MD         IR [id number] 
12:30:00                                                                        
             [place] [address]      Patient Name: CRISTINA RICH
                                  MR #: [id number]                     : 
1965                                   Age/Sex: 53/F  Acct #: [id number]
                             Req #: [id number]  Adm Physician: SHADY PEMBERTON MD    
                                   Ordered by: JACQUELINE ARORA MD                   
        Report #: [id number]        Location: MED/SURG3                          
    Room/Bed: [facility]               
____________________________________________________
_______________________________________________    Procedure: [id number] DX/IR CO
NSULT  Exam Date:                             Exam Time:                        
                      REPORT STATUS: Signed    Procedure: Right nephrostomy cat
heter exchange with fluoroscopic guidance      Comparison: Right nephrostomy exc
hange 2018.       : Naomi Martinez MD      Sedation/Medications: Consc
ious sedation. IV Fentanyl and Versed per nursing   administration records.  The
patient's vital signs, including pulse oximetry,   were continuously monitored 
by the interventional radiology nurse.      Fluoroscopy time: 0.6 minutes.   Dos
e area product: 77.1 cGycm2      Blood loss: 0 cc   Specimens: 8 Fr right nephro
stomy catheter   Implants: 10 Fr right nephrostomy catheter      Technique/Findi
ngs:   Informed consent for the procedure was obtained from the patient after   
discussion of risks and benefits. The right back was prepped and draped in the  
usual fashion.      1% lidocaine was administered into the skin and subcutaneous
tissues of the   right back for local anesthesia.       Contrast was injected 
demonstrating catheter was in place with mild   hydronephrosis. Some encrustatio
n patient was noted at the tip of the existing   catheter. Subsequently, a stiff
Glidewire was advanced through the catheter and   the catheter was exchanged for
a new 10 French nephrostomy catheter. Contrast   injection confirmed satisfact
ory positioning. Catheter was secured with suture   and overlying sterile dressi
ng. There were no immediate complications.      Impression:   Fluoroscopic guide
d right nephrostomy catheter exchange as above.      Signed by: Dr. Naomi Martinez MD on 2019 12:34 PM        Dictated By: NAOMI MARTINEZ MD  Electronically Signed
By: NAOMI MARTINEZ MD on 19 1234  Transcribed By: LUCINDA on 19 1238    
  COPY TO:   JACQUELINE ARORA MD         ABDOMEN-1VIEW (KUB)2019 12:55:00       
                                                                              [facility]                        [address] [place] [address]      Patient Name: CRISTINA RICH            
                      MR #: [id number]                     : 1965      
                            Age/Sex: 53/F  Acct #: [id number]                 
            Req #: [id number]  Adm Physician:                                   
                  Ordered by: LISSETTE GATES MD                            
Report #: [id number]        Location: ER                                      
Room/Bed:                     _______________________________________________
____________________________________________________    Procedure: [id number] DX/
ABDOMEN-1VIEW ([facility])  Exam Date: 19                            Exam Time:                                               REPORT STATUS: Signed       Exa
m: GIGI.      Clinical History: Nephrostomy placement.      Comparison: 2019
. CT scan 2019.      Findings: Right percutaneous nephrostomy and parallel 
left internal ureteral   stents are again noted.  Pelvic surgical clips are unch
anged. No calcifications   project over the renal shadows or expected ureteral c
ourses. Calcification in   the dependent portion of the dilated left collecting 
system seen on the   comparison CT is not appreciated by plain radiography.     
Bowel gas pattern is nonobstructive. Regional skeletal structures are intact.   
Calcified gallstone projects over the right upper quadrant.      IMPRESSION:    
 Right percutaneous nephrostomy catheter and parallel left internal ureteral   
stents are again seen. Left renal calculus seen on the comparison CT is not   i
dentified by plain radiography.      Signed by: Dr. Deb Del Castillo M.D. on 3/28/20
19 12:58 PM        Dictated By: DEB DEL CASTILLO MD, MD  Electronically Signed By: DARSHAN DEL CASTILLO MD, MD on 19 1254  Transcribed By: LUCINDA on 19 1258   
   COPY TO:   LISSETTE GATES MD         ABDOMEN-1VIEW ([facility])2019 14:44:00 
                                                                                
   [facility]                        [address], [place] [address]      Patient Name: CRISTINA RICH
                                  MR #: [id number]                     :                                    Age/Sex: 53/F  Acct #: [id number]   
                          Req #: [id number]  Adm Physician:                     
                                Ordered by: JACQUELINE ARORA MD                      
     Report #: [id number]        Location: OR                                    
 Room/Bed:                     _____________________________________________
______________________________________________________    Procedure: [id number] D
X/ABDOMEN-1VIEW (KUB)  Exam Date: 19                            Exam Time:
1315                                              REPORT STATUS: Signed       E
xam: Abdominal film       Clinical History: Nephrolithiasis      Comparison: CT 
abdomen and pelvis 2019      DISCUSSION: Right percutaneous nephrostomy and
parallel left internal ureteral   stents are again noted. Positions are unchang
ed relative to  tomogram from   comparison CT. Pelvic surgical clips are un
changed. No calcifications project   over the renal shadows or expected ureteral
courses. Calcification in the   dependent portion of the dilated left collecting
system seen on the comparison   CT is not appreciated by plain radiography.     
Bowel gas pattern is nonobstructive. Regional skeletal structures are intact.   
Calcified gallstone projects over the right upper quadrant.      IMPRESSION:    
 Right percutaneous nephrostomy catheter and parallel left internal ureteral   
stents are unchanged in position. Left renal calculus seen on the comparison CT 
 is not identified by plain radiography.               Signed by: Dr. Clau Ngo M.D. on 2019 2:47 PM        Dictated By: CLAU NGO MD  Electro
nically Signed By: CLAU NGO MD on 19 1447  Transcribed By: LUCINDA on
19 1447       COPY TO:   JACQUELINE ARORA MD         CT ABDOMEN/PELVIS WO
2019 13:57:00                                                             
                        [facility]                       
[address] [place] [address]      Patient Name: 
CRISTINA RICH                                   MR #: [id number]         
           : 1965                                   Age/Sex: 53/F  [facility]
#: [id number]                              Req #: [id number]  Adm Physician:  
                                                   Ordered by: LISSETTE GATES MD                            Report #: [id number]        Location: ER           
                          Room/Bed:                     
________________________________________
___________________________________________________________    Procedure: [id number]
[id number] CT/CT ABDOMEN/PELVIS WO  Exam Date: 19                            Exam
Time: 1320                                              REPORT STATUS: Signed   
EXAMINATION: CT of the abdomen and pelvis without contrast.       TECHNIQUE: S
piral CT images of the abdomen and pelvis were performed from the   lung bases t
o the lesser trochanters.  No intravenous contrast was given per   renal stone p
rotocol.  Coronal and sagittal reformatted images were obtained.      COMPARISON
:  None.      CLINICAL HISTORY:Nephrostomy tubes, catheters obstruction         
 DISCUSSION: ABSENCE OF INTRAVENOUS CONTRAST DECREASES SENSITIVITY FOR DETECTION
  OF FOCAL LESIONS AND VASCULAR PATHOLOGY.      ABDOMEN/PELVIS:      LOWER THO
RAX: Unremarkable.       HEPATOBILIARY:No focal hepatic lesions. Calcified galls
tone.      SPLEEN: No splenomegaly.      PANCREAS: No focal masses or ductal dil
atation.      ADRENALS: No adrenal nodules.      KIDNEYS/URETERS: Right percutan
eous nephrostomy tube. No obstruction.       2 left ureteral stents extending fr
om the renal pelvis to the bladder. Left   hydronephrosis. In the inferior pole 
3 small calculi measuring up to 3 mm.      PELVIC ORGANS/BLADDER: Gomez catheter
. Bladder is decompressed. Hysterectomy.      PERITONEUM/RETROPERITONEUM: No rizwan
e air or fluid.      LYMPH NODES: No intra-abdominal,retroperitoneal, pelvic or 
inguinal   lymphadenopathy.      VESSELS: Vascular calcifications.      GI TRACT
: No distention or wall thickening.      BONES AND SOFT TISSUES: No bony destruc
tive lesions.  No soft tissue   abnormalities.        IMPRESSION:       Left hyd
ronephrosis despite 2 left renal stents present.      Small nonobstructing left 
inferior pole renal calculi measuring up to 3 mm      Right percutaneous nephros
edison tube. No obstruction.      Gomez catheter within the decompressed bladder. 
    Cholelithiasis.      Signed by: Dr. Andrew Palisch, M.D. on 2019 2:10 
PM        Dictated By: ANDREW R PALISCH MD  Electronically Signed By: ANDREW R P
ALISCH MD on 19 141  Transcribed By: LUCINDA on 19 141       COPY 
TO:   LISSETTE GATES MD        CHEST SINGLE (NOT PORTABLE)2018 18:00:00  
                                                                                
  [place] [address]      Patient Name: CRISTINA RICH     
                             MR #: [id number]                     :                                    Age/Sex: 53/F  Acct #: [id number]   
                          Req #: [id number]  Adm Physician:                     
                                Ordered by: JENNIFER TERRELL MD                   
        Report #: [id number]        Location: ER                                 
    Room/Bed:                     __________________________________________
_________________________________________________________    Procedure: [id number]
6 DX/CHEST SINGLE (NOT PORTABLE)  Exam Date: 18                           
Exam Time: 1750                                              REPORT STATUS: Sig
ki    EXAM: CHEST SINGLE (NOT PORTABLE), AP 1 view   INDICATION: PIC line leaki
ng   COMPARISON: None      FINDINGS:   LINES/TUBES: There is a left midline cath
eter with tip projected over the   axilla.      LUNGS: No consolidations or don
a.       PLEURA: No effusions or pneumothorax.      HEART AND MEDIASTINUM: Cinda
l size and contour.      BONES AND SOFT TISSUES: No acute findings.       IMPRES
WILFRED:   There is a left midline catheter with tip projected over the axilla.   N
o priors are available to determine original location.            Signed by: Dr. Cristina Main M.D. on 2018 6:03 PM        Dictated By: CRISTINA MAIN MD  Electronically Signed By: CRISTINA MAIN MD on 18  Transcribed By:
LUCINDA on 18       COPY TO:   JENNIFER TERRELL MD        NEPHROSTOMY 
CATHETER [id number] 07:05:00                                            
                                         [place] [address]      
Patient Name: CRISTINA RICH                                   MR #: 
[id number]                     : 1965                                  
Age/Sex: 53/F  Acct #: [id number]                              Req #: [id number]
[id number]  Adm Physician: MATTHEW FLORENTINO MD                                      
Ordered by: JACQUELINE ARORA MD                            Report #: [id number]       
Location: [facility]                               Room/Bed: [facility]               
____________________________________________
_______________________________________________________    Procedure: [id number] 
DX/NEPHROSTOMY CATHETER EXCHANGE  Exam Date:                             Exam Ti
me:                                               REPORT STATUS: Signed    Proce
dure: Right nephrostomy catheter exchange      Medications: Versed 2 mg intraven
ous, Fentanyl 100 mcg intravenous.  The   patient's vital signs, including pulse
oximetry, were continuously monitored by   the interventional radiology nurse.  
   Fluoroscopy time: 1.9 minutes.   Dose area product: 1.76 cGycm2         Pro
cedure in detail:      Informed consent for the procedure was obtained from the 
patient after   discussion of risks and benefits. The right back was prepped and
draped in the   usual fashion.      1% lidocaine was administered into the skin 
and subcutaneous tissues of the   right back for local anesthesia. Contrast was 
injected showing proximal   ureteral obstruction. A    0.035  " Amplatz superst
iff wire was placed through the catheter. A new 10   French all-purpose drainage
catheter was then placed into the renal pelvis.   Contrast injection confirms a
ppropriate position.  Catheter was secured to the   skin with a sterile dressing
.      Impression:      Successful right percutaneous nephrostomy catheter excha
nge.      Signed by: Dr. Shayla Krause DO on 2018 7:11 AM        Dictated 
By: SHAYLA KRAUSE DO  Electronically Signed By: SHAYLA KRAUSE DO on 18 071
1  Transcribed By: LUCINDA on 18 0711       COPY TO:   JACQUELINE ARORA MD      
 CT ABDOMEN/PELVIS [id number] 12:56:00                                        
                                             [place] [address]  
   Patient Name: CRISTINA RICH                                   MR #: 
[id number]                     : 1965                                  
Age/Sex: 53/F  Acct #: [id number]                              Req #: [id number]
[id number]  Adm Physician:                                                      
Ordered by: SHARIF BRITO MD                            Report #: [id number]
       Location: ER                                      Room/Bed:              
      ______________________________________
_____________________________________________________________    Procedure: 1101
-0019 CT/CT ABDOMEN/PELVIS WO  Exam Date: 18                            Ex
am Time: 1230                                              REPORT STATUS: Signed
   EXAM: CT Abdomen and Pelvis WITHOUT contrast     INDICATION: Left flank pain.
Previous kidney stone with stent.   COMPARISON: 10/22/2018   TECHNIQUE: Abdomen 
and pelvis were scanned utilizing a multidetector helical   scanner from the l
yoav base to the pubic symphysis without administration of IV   contrast. Absence
of intravenous contrast decreases sensitivity for detection   of focal lesions 
and vascular pathology. Coronal and sagittal reformations were   obtained. Routi
ne protocol was performed.                IV CONTRAST: None.               ORAL 
CONTRAST: Water               RADIATION DOSE: Total DLP: 797.04 mGy*cm          
     Estimated effective dose: (DLP x 0.015 x size factor) mSv               CO
MPLICATIONS: None      FINDINGS:      LINES and TUBES: Right nephrostomy tube wi
th distal tip coiled within the right   renal pelvis. Left double-J ureteral bryan
nt in adequate position. Catheter   within the bladder.      LOWER THORAX:  Calc
ified granuloma in the posterior right lung base.      HEPATOBILIARY:   No focal
hepatic lesions. No biliary ductal dilation.       GALLBLADDER: Peripherally ca
lcified gallstone in the lumen of the gallbladder.   No wall thickening.      SP
CONSTANTINO: No splenomegaly.       PANCREAS: No focal masses or ductal dilatation.    
   ADRENALS: No adrenal nodules.      KIDNEYS/URETERS: 7.0 mm stone within the 
lower pole of the left kidney. This   appears to be fragmented when compared wit
h the prior exam. Minimal right   hydronephrosis improved since the prior exam. 
Mild left hydronephrosis improved   when compared with the prior exam.       Unc
hanged left perinephric stranding may reflect pyelonephritis. Unchanged left   p
eriureteral fat stranding.      GI TRACT: No abnormal distention, wall thickenin
g, or evidence of bowel   obstruction.            PELVIC ORGANS/BLADDER: Radiati
on seeds again observed in the uterine cervix.   Urinary bladder is decompressed
by a Gomez catheter.      LYMPH NODES: No lymphadenopathy.      VESSELS: Unrema
rkable.      PERITONEUM / RETROPERITONEUM: No free air or fluid.      BONES: The
re are degenerative changes in the lumbar spine.      SOFT TISSUES: Unremarkable
.        IMPRESSION:       1.  Unchanged left perinephric stranding may reflect 
pyelonephritis. Minimal   left hydroureteronephrosis is improved when compared w
ith the prior exam. The   previously seen 9.0 mm stone in the lower pole of the 
left kidney appears to be   fragmented and slightly smaller.   2. Minimal right 
hydroureteronephrosis improved when compared with the prior   exam. Signed by: ALEJANDRO Del Castillo M.D. on 2018 1:09 PM        Dictated By: DEB ALLEN  Electronically Signed By: DEB DEL CASTILLO MD, MD on 18 130  Transcribed B
y: LUCINDA on 18 1304       COPY TO:   SHARIF BRITO MD        CT 
ABDOMEN/PELVIS WO8-10-22 18:26:00                                            
                                         [place] [address]      
Patient Name: CRISTINA RICH                                   MR #: 
[id number]                     : 1965                                  
Age/Sex: 53/F  Acct #: [id number]                              Req #: [id number]
[id number]  Adm Physician:                                                      
Ordered by: IGNACIO MARQUES NP                            Report #: [id number]
       Location: ER                                      Room/Bed:              
      ______________________________________
_____________________________________________________________    Procedure: 1022
-0022 CT/CT ABDOMEN/PELVIS WO  Exam Date:                             Exam Time:
                                              REPORT STATUS: Signed    EXAM: CT 
Abdomen and Pelvis WITHOUT contrast     INDICATION: Left-sided abdominal pain. 
Kidney stones? Cervical cancer.   COMPARISON: .   TECHNIQUE: Abdomen and pe
lvis were scanned utilizing a multidetector helical   scanner from the lung base
to the pubic symphysis without administration of IV   contrast. Absence of intr
avenous contrast decreases sensitivity for detection   of focal lesions and vasc
ular pathology. Coronal and sagittal reformations were   obtained. Routine roland
col was performed.                IV CONTRAST: None.               ORAL CONTRAST
: Water               RADIATION DOSE: Total DLP: 567.26 mGy*cm                Es
timated effective dose: (DLP x 0.015 x size factor) mSv               COMPLICATI
ONS: None      FINDINGS:      LINES and TUBES: Right nephrostomy tube with dista
l tip coiled within the right   renal pelvis. Interval placement of a left doubl
e-J ureteral stent in adequate   position. Pole catheter within the bladder.    
 LOWER THORAX:  Costophrenic granuloma in the posterior right lung base.      H
EPATOBILIARY:   No focal hepatic lesions. No biliary ductal dilation.       GALL
BLADDER: No stomach and observed. No wall thickening.      SPLEEN: No splenomega
ly.       PANCREAS: No focal masses or ductal dilatation.        ADRENALS: No ad
renal nodules.      KIDNEYS/URETERS:  9 mm top within the lower pole of the left
kidney. Mild right   hydronephrosis new since the prior examinations the presen
ce of a nephrostomy   tube. Mild to moderate left hydronephrosis appears unchang
ed. New left   perinephric stranding may reflect pyelonephritis. Left periureter
al fat   stranding.      GI TRACT: No abnormal distention, wall thickening, or e
vidence of bowel   obstruction.   Appendix is normal.      PELVIC ORGANS/BLADDER
: Radiation seeds again observed in the uterine cervix.   Urinary bladder is dec
ompressed by a Gomez catheter.      LYMPH NODES: No lymphadenopathy.      VESSEL
S: Unremarkable.      PERITONEUM / RETROPERITONEUM: No free air or fluid.      B
ONES: There are degenerative changes in the lumbar spine.      SOFT TISSUES: Unr
emarkable.        IMPRESSION:       1.  New left perinephric stranding may refle
ct pyelonephritis.  Mild to   moderate left hydroureteronephrosis is stable.   2
. Interval development of right hydroureteronephrosis in spite of presence of   
a nephrostomy tube. Please correlate with nephrostomy output.      Signed by: Dr Vannessa Grimaldo M.D. on 10/22/2018 6:39 PM        Dictated By: EMMA DELGADILLO MD, MD  Electronically Signed By: EMMA GRIMALDO MD, MD on 10/22/18 1839  Tr
anscribed By: LUCINDA on 10/22/18 1839       COPY TO:   IGNACIO MARQUES NP    
   RENAL SCAN W/FLOW   [id number] 19:56:00    [place] [address]      Patient 
Name: CRISTINA RICH   MR #: [id number]    : 1965 Age/Sex: 53/F  Acct #:
[id number] Req #: [id number]  Adm Physician:     Ordered by: JACQUELINE ARORA MD  
Report #: [id number]   Location: NM  Room/Bed:     
_______________________________________________________________________________
____________________    Procedure: [id number] NM/RENAL SCAN W/FLOW   FUNCTION  Ex
am Date: 18                            Exam Time: 1510       REPORT STATUS
: Signed       Renal Scan       Reason for exam: 53 F with vesicoureteral reflux
.      Radiopharmaceutical: Tc-99m MAG3 11 mCi      Report:  After administratio
n of the radiopharmaceutical, dynamic images of the   kidneys were obtained thro
[facility] 40 minutes.        LEFT KIDNEY:  Perfusion is prompt.  The left kidney has a
reniform shape with   irregular contours and mild thinning of the renal cortex, 
more apparent in the   upper pole.  Extraction of tracer from the blood pool is 
mildly decreased.   Clearance of tracer from the renal parenchyma is prompt. The
pelvicaliceal   system is very mildly dilated. Some increase in pooling of tra
cer is seen   within the pelvicaliceal system. Drainage of tracer from the pelvi
caliceal   system is adequate.  No stasis of tracer is seen in the left ureter. 
    RIGHT KIDNEY: A PERCUTANEOUS NEPHROSTOMY CATHETER IN THE RIGHT KIDNEY IS OP
EN   DURING THE STUDY.  Perfusion to the right kidney is prompt. The kidney has 
a   distorted reniform shape with moderate thinning of the renal cortex and   ir
regular contours.  The kidney is smaller than the left kidney. Extraction of   t
racer from the blood pool is mildly decreased. Clearance of tracer from the   re
nal parenchyma is prompt. The pelvicaliceal system does not appear dilated   alt
sean calices in the upper pole are slightly prominent.  Tracer drains   freely 
in the PCN catheter so the renal pelvis is not adequately evaluated.   There is 
no significant increase in pooling of tracer within the pelvicaliceal   system. 
Drainage of tracer from the pelvicaliceal study cannot be assessed   because of 
the open PCN tube. No tracer is seen in the right ureter.      DIFFERENTIAL SANTOSH
AL FUNCTION: Left kidney 62% and right kidney 38% (normal   43-57%.      Impress
ion:        1.  The left kidney shows evidence of mild medical renal disease and
some   scarring.  Mild hydronephrosis is present.  Obstruction is not suspected.
     2. The right kidney shows evidence of mild medical renal disease.  The ki
dney   shows significant scarring and this accounts for the decreased differenti
al   function of 38%.  Hydronephrosis cannot be assessed because an open PCN is 
 draining the pelvicaliceal system throughout the study.                  Signed
by: Dr. Riddhi Mcdermott M.D. on 2018 8:12 PM        Dictated By: RIDDHI MCDERMOTT MD  Electronically Signed By: RIDDHI MCDERMOTT MD on 18  Transcribed By: 
LUCINDA on 18       COPY TO:   JACQUELINE ARORA MD        IR CONSULT
2018 07:24:00    [facility]   [address] [place] [address]      Patient Name: CRISTINA RICH   MR #: 
[id number]    : 1965 Age/Sex: 52/F  Acct #: [id number] Req #: [id number]
[id number]  Adm Physician: ROYER DOVER MD    Ordered by: JACQUELINE ARORA MD  Report 
#: [id number]   Location: MED/SURG3  Room/Bed: [facility]    
__________________________________________________
_________________________________________________    Procedure:  DX/IR 
CONSULT  Exam Date:                             Exam Time:        REPORT STATUS:
Signed    Nephrostomy catheter evaluation and fluoroscopic exchange      Pre-Pr
ocedure Diagnosis: Cervical cancer status post radiation therapy with   right ur
eteral stenosis.   Post-procedure Diagnosis:Cervical cancer status post radiatio
n therapy with   right ureteral stenosis      : Naomi Martinez MD   Assista
nt: None   Sedation: Local 5 cc of 1% subcutaneous lidocaine      Radiation Dose
:235  cGycm2 (Dose Area Product)   Fluoroscopy time 1.1  minutes      Estimate b
lood loss: None    Blood administered: None   Complications: No immediate compli
cations   Implants/Grafts: 10 Fr UreSil    Specimen: None      Procedure:   Info
rmed consent was obtained and the patient positioned prone in the   fluoroscopy 
suite. A timeout was performed. The indwelling right nephrostomy   was prepped a
nd draped in standard fashion.      Diagnostic antegrade nephrostogram was perfo
ed nephrostomy. See findings   below.      An Amplatz wire was placed to secur
e access. The existing catheter was removed   demonstrating cloudy fluid and enc
rustation. A new 10 Fr nephrostomy catheter   was coiled in the renal pelvis wit
h fluoroscopic contrast injection confirming   position.       At the end of the
procedure the catheter was connected to gravity drainage and   a sterile dressi
ng applied. The patient tolerated the procedure well and   without immediate com
plication.      Findings:   Antegrade nephrostogram: Patent catheter. No hydrone
phrosis.  Diffuse right   ureteral stenosis with distal occlusion, as before. Th
e catheter was encrusted   with debris with cloudy urine.       Impression:   Ri
ght nephrostomy catheter exchange with fluoroscopic guidance as above.      Surya
mmendations:   Routine catheter exchange in 8-10 weeks.       Signed by: Dr. Shell Martinez MD on 2018 7:29 AM        Dictated By: NAOMI MARTINEZ MD  Electronical
ly Signed By: NAOMI MARTINEZ MD on 18 1326  Transcribed By: LUCINDA on 
8 1326       COPY TO:   JACQUELINE ARORA MD        SPECIAL PROCEDURE IN CATH LAB
2018 07:24:00    [place] [address]      Patient Name: CRISTINA RICH   MR #: 
[id number]    : 1965 Age/Sex: 52/F  Acct #: [id number] Req #: [id number]
[id number]  Adm Physician: ROYER DOVER MD    Ordered by: JACQUELINE ARORA MD  Report 
#: [id number]   Location: MED/SURG3  Room/Bed: [facility]    
__________________________________________________
_________________________________________________    Procedure: [id number] IR/SPE
CIAL PROCEDURE IN CATH LAB  Exam Date:                             Exam Time:   
    REPORT STATUS: Signed    Nephrostomy catheter evaluation and fluoroscopic e
xchange      Pre-Procedure Diagnosis: Cervical cancer status post radiation ther
apy with   right ureteral stenosis.   Post-procedure Diagnosis:Cervical cancer s
tatus post radiation therapy with   right ureteral stenosis      : Leonor Martinez MD   Assistant: None   Sedation: Local 5 cc of 1% subcutaneous lidocaine 
    Radiation Dose:235  cGycm2 (Dose Area Product)   Fluoroscopy time 1.1  alfonso
lloyd      Estimate blood loss: None    Blood administered: None   Complications: 
No immediate complications   Implants/Grafts: 10 Fr UreSil    Specimen: None    
 Procedure:   Informed consent was obtained and the patient positioned prone in 
the   fluoroscopy suite. A timeout was performed. The indwelling right nephrost
angelina   was prepped and draped in standard fashion.      Diagnostic antegrade neph
rostogram was performed nephrostomy. See findings   below.      An Amplatz wire 
was placed to secure access. The existing catheter was removed   demonstrating c
loudy fluid and encrustation. A new 10 Fr nephrostomy catheter   was coiled in t
he renal pelvis with fluoroscopic contrast injection confirming   position.     
 At the end of the procedure the catheter was connected to gravity drainage and 
 a sterile dressing applied. The patient tolerated the procedure well and   wi
thout immediate complication.      Findings:   Antegrade nephrostogram: Patent c
atheter. No hydronephrosis.  Diffuse right   ureteral stenosis with distal occlu
wilfred, as before. The catheter was encrusted   with debris with cloudy urine.    
  Impression:   Right nephrostomy catheter exchange with fluoroscopic guidance 
as above.      Recommendations:   Routine catheter exchange in 8-10 weeks.      
Signed by: Dr. Naomi Martinez MD on 2018 7:29 AM        Dictated By: NAOMI MOHAMDU MD  Electronically Signed By: NAOMI MARTINEZ MD on 18 1326  Transcribed By:
LUCINDA on 18 1326       COPY TO:   JACQUELINE ARORA MD        CHEST XRAY LINE 
[id number] 11:46:00    [place] [address]      Patient Name: CRISTINA RICH
  MR #: [id number]    : 1965 Age/Sex: 52/F  Acct #: [id number] Req #:
[id number]  Adm Physician: ROYER DOVER MD    Ordered by: JACQUELINE ARORA MD  
Report #: [id number]   Location: MED/SURG3  Room/Bed: [facility]    
__________________________________________________
_________________________________________________    Procedure: [id number] DX/BAKARI
ST XRAY LINE PLACEMENT  Exam Date:                             Exam Time:       
REPORT STATUS: Signed    PROCEDURE:   A single AP view of the chest.       COMP
ARISON: None.       INDICATIONS:   PICC LINE PLACEMENT           FINDINGS:   Birgit
es/tubes:  Right sided PICC terminating in the expected location of    the SVC. 
      Lungs: Low lung volumes. There is no evidence of pneumonia or pulmonary   
edema. Mild patchy bibasilar atelectasis.        Pleura:  There is no pleural 
effusion or pneumothorax.       Heart and mediastinum:  The cardiomediastinal si
lhouette is    unremarkable.       Bones:  No acute bony abnormality.       IMPR
ESSION:    Right sided PICC terminating in the expected location of the SVC. No 
  evidence of pneumothorax.            Dictated by:  NAOMI MARTINEZ M.D. on 
018 at 11:46        Electronically approved by:  NAOMI MARTINEZ M.D. on 2018 a
t 11:46                Dictated By: NAOMI MARTINEZ MD  Electronically Signed By: SHELL MARTINEZ MD on 18 1146  Transcribed By: FERNANDO on 18 1146       COPY T
O:   JACQUELINE ARORA MD        [facility]2018-08-10 07:34:00    [place] [address]      Patient Name: CRISTINA RICH   MR #: [id number]    : 1965 
Age/Sex: 52/F  Acct #: [id number] Req #: [id number]  [facility] Physician: ROYER LAW MD    Ordered by: JACQUELINE ARORA MD  Report #: [id number]   Location: 
[facility]  Room/Bed: [place] [address]    __________________________________________________
_________________________________________________    Procedure: [id number] DX/ABD
OMEN-1VIEW (KUB)  Exam Date: 08/10/18                            Exam Time: 710
      REPORT STATUS: Signed    PROCEDURE:   X-RAY ABDOMEN - KUB       COMPARISO
N:   CT Abdomen/Pelvis 3/4/18.       INDICATIONS:   PREOPERATIVE XRAY FOR ESWL  
     FINDINGS:      Right sided PCN is present. A 5 mm and 2 mm calcification p
rojects over    the left kidney. No calcifications over the expected location of
the    ureters. Surgical clips in the pelvis.        There is a non-obstructed 
bowel-gas pattern. There are no acute osseous    abnormalities.        CONCLUSIO
N:      Right sided PCN in similar position.        Left sided renal stones kristi
uring up to 5 mm, better characterized on    CT from 3/4/18. No evidence of ston
es overlying the ureters.       Dictated by:  NAOMI MARTINEZ M.D. on 8/10/2018 at 7
:34        Electronically approved by:  NAOMI MARTINEZ M.D. on 8/10/2018 at 7:34   
            Dictated By: NAOMI MARTINEZ MD  Electronically Signed By: NAOMI MARTINEZ MD 
on 08/10/18 0734  Transcribed By: FERNANDO on 08/10/18 0734       COPY TO:   JACQUELINE SMITH MD        CT ABDOMEN/PELVIS  19:09:00    [place] [address]      
Patient Name: CRISTINA RICH   MR #: [id number]    : 1965 Age/Sex: 52/F 
Acct #: [id number] Req #: [id number]  Adm Physician:     Ordered by: MARIFER REYNA NP  Report #: [id number]   Location: ER  Room/Bed:     
_________________________________________________________________________
__________________________    Procedure: [id number] CT/CT ABDOMEN/PELVIS WO  Exam
Date: 18                            Exam Time: 1830       REPORT STATUS: 
Signed    EXAM: CT Abdomen and Pelvis WITHOUT contrast     INDICATION: UTI. Feve
r.   COMPARISON: CT abdomen and pelvis 3/4/2018.   TECHNIQUE: Abdomen and pelvis
were scanned utilizing a multidetector helical   scanner from the lung base to 
the pubic symphysis without administration of IV   contrast. Absence of intraven
ous contrast decreases sensitivity for detection   of focal lesions and vascular
pathology. Coronal and sagittal reformations were   obtained. Renal stone roland
col was performed.                IV CONTRAST: None.               ORAL CONTRAST
: Water               RADIATION DOSE: Total DLP: 560.77 mGy*cm                Es
timated effective dose: (DLP x 0.015 x size factor) mSv               COMPLICATI
ONS: None      FINDINGS:      LINES and TUBES: Right percutaneous nephrostomy tu
be, unchanged position.      LOWER THORAX:  2 mm right lower lobe calcified gran
uloma, unchanged.      HEPATOBILIARY: Mild diffuse low-attenuation of the hepati
c parenchyma   suggesting mild steatosis.  No focal hepatic lesions. No biliary 
ductal   dilation.       GALLBLADDER: Calcified gallstone.  No wall thickening. 
    SPLEEN: No splenomegaly.       PANCREAS: No focal masses or ductal dilatati
on.        ADRENALS: No adrenal nodules          KIDNEYS/URETERS: Interval devel
opment of mild to moderate left-sided   hydronephrosis. Mild to moderate left ur
eteral dilatation all the way to the   level just proximal to the UVJ. 2 calculi
were previously present in the lower   pole of the left kidney; the smallest one
is no longer visualized, possibly   passed. 5.8 x 5.8 mm calculus in the lower 
pole of the left kidney appears   slightly bulkier, previously measuring 4 mm.  
   GI TRACT: No abnormal distention, wall thickening, or evidence of bowel   o
bstruction.       Appendix is normal.      REPRODUCTIVE ORGANS: The uterus is sm
all. No adnexal masses. Likely radiation   seeds in the cervix.      BLADDER: A 
small contracted bladder is again observed. Redemonstration of a   cystic struct
ure in the pelvis suggestive of a small bladder, which is   unchanged in appeara
nce. Radiation seeds again observed in the uterine cervix.      LYMPH NODES: No 
lymphadenopathy.      VESSELS: There is mild atherosclerotic disease in the aort
a and major arterial   branches.      PERITONEUM / RETROPERITONEUM: No free air 
or fluid.      BONES: Lower thoracic DISH.      SOFT TISSUES: Unremarkable.     
   IMPRESSION:    1.  Interval development of mild to moderate left-sided hydro
nephrosis which   may be related to a recently passed calculus or less likely du
e to distal   ureteral stricture. Otherwise unchanged exam.      Signed by: Dr. DELL Grimaldo M.D. on 2018 7:28 PM        Dictated By: EMMA VIERA MD, MD  Electronically Signed By: EMMA GRIMALDO MD, MD on 18  Transc
ribed By: LUCINDA on 18       COPY TO:   MARIFER REYNA         
URINE AND [id number] 22:54:00Negative *NA*(16 4:54 PM) [facility]
AND LZPZO5312-87-12 22:54:00Moderate *ABN*(16 4:54 PM)[facility] 
SXWZE7833-92-46 22:54:00Positive *ABN*(16 4:54 PM)[facility] 
VJOBM6164-92-47 22:54:00Large *ABN*(16 4:54 PM) [facility] STOOL
2016 22:54:00>182[facility] RYAAT3805-34-38 22:54:59686XH 
[facility] TZAKF5740-50-60 22:54:0034[facility] STOOL
2016 22:54:00Yellow *NA*(16 4:54 PM) [facility] STOOL
2016 22:54:00Marked *ABN*(16 4:54 PM) [facility] STOOL
2016 22:54:005.0[facility] FXBEN8313-32-80 22:54:001.018 
[facility] KVSYR5360-71-86 22:40:000.6MH [facility] TXXHK9756-94-94 
22:40:005.8MH [facility] KRVWL8221-72-85 22:40:0012 [facility] PANEL
2016 22:40:0012.6MH [facility] JEJTP6010-03-84 22:40:14253UM [facility] DZQFK2961-91-11 22:40:0028MH [facility] CLIFN6798-07-52 22:40:000.3MH 
[facility] IVJJY7999-82-63 22:40:0073MH [facility] DNTQB5121-13-62 
22:40:0025MH [facility] SMHST8195-78-29 22:40:003.4 [facility] PANEL
2016 22:40:009.2[facility] XPIUD3605-69-98 22:40:009.1[facility] NYKNP2263-68-51 22:40:0026 [facility] YDHED0995-85-42 22:40:76937LG 
[facility] XLDOI9644-12-76 22:40:84158EO [facility] NIDTV7283-87-88 
22:40:004.6MH [facility] IFHXV9149-72-87 22:40:000.91 [facility] PANEL
2016 22:40:0011 [facility] ZCZWG2327-97-63 22:40:99141[facility]
NJNLNCFUEXPKO4547-60-17 22:40:00Negative *NA*(16 4:40 PM)[facility]
ZCKQEGDZKV5859-24-42 22:40:0032.8 LnygjkkfoIRIIRBUJZS5182-78-66 22:40:0060.0
OonahqnbkAPBOADMLKM3949-11-56 22:40:000.2M YuwsataqyKNRMJHWZHN6168-54-95 
22:40:000.2M WarylbvuhHIGXJWDOSX2559-09-80 22:40:004.6M [facility]
2016 22:40:001.2M VjkpypuwrLDWKSAANTP6672-91-51 22:40:001.4[facility]
VGMZNVOQNW8282-79-31 22:40:007.3M OdwmrybueAMHZREGCCT3769-29-36 22:40:000.6M 
WddotmahmJDWINYXLKG6024-24-66 22:40:004.0 IjiqbcmrrCOAEIOUERM0959-19-40 
22:40:00Clumped (16 4:40 PM) ItxpjjnorQPQMIPQYTB1029-06-96 22:40:00Normal 
(16 4:40 PM) XsjmeunfdUOYHNVBOAB7165-84-70 22:40:008.7[facility]
ZAMMVSQVXY2932-47-36 22:40:0012.0[facility]aryHlakpvlomEAJBPHZMMU9437-12-46 22:40:004.90
DukxjuhuhOFXWDGJJCK0709-07-00 22:40:0039.0 NnnaroagwDFAVLOTHOZ7384-36-58 
22:40:0079.7 HxggzeuqxVJKCBVJTZR0014-11-72 22:40:0012.0 [facility]
2016 22:40:0014.8 TqoytfnotDXXWMQMJVN5175-23-66 22:40:0030.8 [facility]
HQGSCBWULS9523-73-92 22:40:89513DE VahtcmsqrBNBAFPWGRH0548-03-00 22:40:00* 



             Test Item    Value        Reference Range Interpretation Comments

 

             MCH (test code = MCH) 24.5 pg      27.0-31.0                  





[facility]    [place] [address]      Patient Name: 
CRISTINA RICH   MR #: [id number]    : 1965 Age/Sex: 52/F  Acct #: 
[id number] Req #: [id number]  [facility] Physician:     Ordered by: JACQUELINE ARORA MD  
Report #: [id number]   Location: CATH LAB  Room/Bed:     
_________________________________________________________________________
__________________________    Procedure: [id number] IR/SPECIAL PROCEDURE IN CATH 
LAB  Exam Date:                             Exam Time:        REPORT STATUS: Sig
ki    Nephrostomy catheter evaluation and exchange, 2018      Pre-Procedur
e Diagnosis: Cervical cancer status post radiation therapy with   right ureteral
stenosis.   Post-procedure Diagnosis:Cervical cancer status post radiation ther
apy with   right ureteral stenosis      : NIK Patel   Assistant: Non
e   Sedation: None.      Radiation Dose:1.931 cGycm2 (Dose Area Product)   Fluor
oscopy time:0.7 minutes      Estimate blood loss: None Blood administered: None 
 Complications: None   Implants/Grafts: 8-French right nephrostomy   Specimen: 
None         Procedure:   Informed consent was obtained and the patient position
ed prone in the   fluoroscopy suite. A timeout was performed. The indwelling rig
ht nephrostomy   was prepped and draped in standard fashion.      Diagnostic ant
egrade nephrostogram was performed nephrostomy. See findings   below.      The i
ndwelling catheter was severed and removed over a Glidewire given heavy   encrus
tation. Glidewire was exchanged for a Bentson wire with a short KMP   catheter. 
A new 10-French nephrostomy was coiled in the renal pelvis without   complicatio
n. Contrast injection confirmed placement within the renal pelvis.      At the e
nd of the procedure the catheter was connected to gravity drainage and   a steri
le dressing applied. The patient tolerated the procedure well and   without imme
diate complication.      Findings:   Antegrade nephrostogram: Patent catheter. N
o hydronephrosis. Mildly prominent   pelvis. Diffuse right ureteral stenosis wit
h distal occlusion. The catheter was   heavily encrusted with debris.      Impre
ssion:   Successful right 10-French nephrostomy exchange with antegrade nephrost
ogram.         Recommendations:   Routine catheter exchange in 2 months given st
ent encrustation.            This report was generated with voice-recognition te
chnology. Errors in   transcription can occur. Please interpret accordingly and 
contact a radiologist   if there are any questions regarding the report.      Si
gned by: Dr. Best Patel M.D. on 2018 11:55 AM        Dictated By: BEST PATEL MD  Electronically Signed By: BEST PATEL MD on 18 1522  Tra
nscribed By: LUCINDA on 18 1522       COPY TO:   JACQUELINE ARORA MD        CT 
ABDOMEN/PELVIS [place] [address]      Patient Name: CRISTINA RICH   MR #: 
[id number]    : 1965 Age/Sex: 52/F  Acct #: [id number] Req #: [id number]
[id number]  Adm Physician:     Ordered by: TINO MALHOTRA NP  Report #: [id number]
  Location: ER  Room/Bed:     
__________________________________________________________________________
_________________________    Procedure: [id number] CT/CT ABDOMEN/PELVIS WO  Exam 
Date: 18                            Exam Time: 1500       REPORT STATUS: S
igned    EXAM: CT Abdomen and Pelvis WITHOUT contrast     INDICATION: Abdominal 
pain, concerning for renal stone   COMPARISON: CT abdomen and pelvis from 
018 fluoroscopy for nephrostomy   tube change 2018   TECHNIQUE: Abdomen and
pelvis were scanned utilizing a multidetector helical   scanner from the lung b
ase to the pubic symphysis without administration of IV   contrast. Absence of i
ntravenous contrast decreases sensitivity for detection   of focal lesions and v
ascular pathology. Coronal and sagittal reformations were   obtained. Renal ston
e protocol was performed.                IV CONTRAST: None.               ORAL C
ONTRAST: Water               RADIATION DOSE: Total DLP: 550.3 mGy*cm            
   Estimated effective dose: (DLP x 0.015 x size factor) mSv               COMP
LICATIONS: None      FINDINGS:      LINES and TUBES: Right percutaneous nephrost
angelina tube.      LOWER THORAX:  2 mm right lower lobe calcified granuloma.      HE
PATOBILIARY:      No focal hepatic lesions. No biliary ductal dilation.       GA
LLBLADDER: Unchanged peripherally calcified 2 cm gallstone and a few   additiona
l 2 to 3 mm gallstones.  No wall thickening.      SPLEEN: No splenomegaly.      
PANCREAS: No focal masses or ductal dilatation.        ADRENALS: No adrenal nod
ules          KIDNEYS/URETERS:  Interval resolution of the right hydronephrosis 
after   percutaneous nephrostomy tube exchange. Few other foci in the right kidn
ey   likely due to procedure. Previously noted right perinephric fat stranding h
aved   resolved. No cystic or solid mass lesions.  Multiple up to 5 mm left abeba
l   stones remain unchanged. Minimal dilatation of the renal pelves, otherwise, 
no   left hydronephrosis. Unchanged mild left hydroureter.      GI TRACT: No abn
ormal distention, wall thickening, or evidence of bowel   obstruction.       Kelly
endix is normal.      REPRODUCTIVE ORGANS: The uterus is small. No adnexal michael
s. Likely radiation   seeds in the cervix.      BLADDER: The bladder is small an
d irregular, possibly due to postradiation   changes.      LYMPH NODES: No lymph
adenopathy.      VESSELS: Unremarkable.      PERITONEUM / RETROPERITONEUM: No fr
ee air or fluid.      BONES: Unremarkable.      SOFT TISSUES: Unremarkable.     
            IMPRESSION:    1.  Interval resolution of the right hydronephrosis 
after but without new   nephrostomy tube exchange.      2.  Stable left nephroli
thiasis.      3.  No acute abnormalities in the abdomen and pelvis.      Signed 
by: Dr. Georgiana Purcell M.D. on 3/4/2018 4:03 PM        Dictated By: SHAYY PURCELL MD  Electronically Signed By: GEORGIANA quintero 18 1603  Transcribed By: LUCINDA on 18 1603       COPY TO:   TINO MALHOTRA NP        SPECIAL PROCEDURE IN CATH LAB    [place] [address]      Patient 
Name: CRISTINA RICH   MR #: [id number]    : 1965 Age/Sex: 52/F  Acct #:
[id number] Req #: [id number]  Adm Physician: ROYER DOVER MD    Ordered by: 
JACQUELINE ARORA MD  Report #: [id number]   Location: MED/SURG2  Room/Bed: [facility]    
__________________________________________________
_________________________________________________    Procedure: [id number] IR/SPE
CIAL PROCEDURE IN CATH LAB  Exam Date:                             Exam Time:   
    REPORT STATUS: Signed    Nephrostomy catheter evaluation and subsequent exc
hange, 2018      Pre-Procedure Diagnosis: Cervical cancer status post radia
tion therapy with   right ureteral stenosis.   Post-procedure Diagnosis:Cervical
cancer status post radiation therapy with   right ureteral stenosis      Operat
or: NIK Patel   Assistant: None   Sedation: Moderate sedation was provided w
ith intravenous fentanyl and Versed.    Heart rate, rhythm and oxygen saturation
were monitored and real-time by a   dedicated interventional radiology nurse un
donn my direct supervision.  Blood   pressure was monitored in 5 minute increment
s.  1% lidocaine was used for local   anesthesia. Total sedation time: 30 minute
s.      Radiation Dose <1: cGycm2 (Dose Area Product)   Fluoroscopy time:1.2 
minutes      Estimate blood loss: None Blood administered: None   Complications:
None   Implants/Grafts: 10-French right nephrostomy   Specimen: None         
Procedure:   Informed consent was obtained and the patient positioned prone in 
the   fluoroscopy suite. A timeout was performed. The indwelling right 
nephrostomy   was prepped and draped in standard fashion.      Diagnostic 
antegrade nephrostogram was performed given the long indwelling time   of the 
previously placed for ostomy. See findings below.      Attempts to remove the 
indwelling 8-French nephrostomy over a Bentson and   Glidewire were unsuccessful
due to heavy encrustation. The catheter was   subsequently severed and removed 
through an 11-French peel-away sheath. A   Bentson wire was advanced through the
peel-away sheath and exchanged for a   10-French nephrostomy. Contrast injection
confirmed placement within the renal   pelvis.      At the end of the procedure 
the catheter was secured to the skin, connected to   gravity drainage and a 
sterile dressing applied. The patient tolerated the   procedure well and without
immediate complication.      Findings:   Antegrade nephrostogram demonstrated 
mild hydronephrosis of the right   collecting system and a patent, but heavily 
encrusted catheter requiring   removal via peel away sheath. The distal ureter 
is narrowed consistent with   stricture. Aggressive nephrostogram was not 
performed given superimposed   infection.      Impression:   Successful right 
nephrostomy  exchange.   Distal right ureteral stricture      Recommendations:  
Routine catheter exchange in 2 months given heavy encrustation.            This 
report was generated with voice-recognition technology. Errors in   
transcription can occur. Please interpret accordingly and contact a radiologist 
 if there are any questions regarding the report.      Signed by: Dr. Best Patel M.D. on 2018 2:37 PM        Dictated By: BEST PATEL MD  E
lectronically Signed By: BEST PATEL MD on 18  Transcribed By: CHARI PEMBERTON on 18       COPY TO:   JACQUELINE ARORA MD        IR CONSULT    [address] 
[place] [address]      Patient Name: CRISTINA RICH   MR #: [id number]    :  Age/Sex: 52/F  [facility] #: [id number] Req #: [id number]  [facility] Physician: ROYER LAW MD    Ordered by: JENNIFER TERRELL MD  Report #: [id number]   Locatio
n: MED/SURG2  Room/Bed: [facility]    _______________________________________________
____________________________________________________    Procedure: [id number] DX/
IR CONSULT  Exam Date:                             Exam Time:        REPORT STAT
US: Signed    Nephrostomy catheter evaluation and subsequent exchange, 2018
     Pre-Procedure Diagnosis: Cervical cancer status post radiation therapy with
  right ureteral stenosis.   Post-procedure Diagnosis:Cervical cancer status p
ost radiation therapy with   right ureteral stenosis      : OLIVIA Patel   Assistant: None   Sedation: Moderate sedation was provided with intravenous 
fentanyl and Versed.    Heart rate, rhythm and oxygen saturation were monitored 
and real-time by a   dedicated interventional radiology nurse under my direct spangler
pervision.  Blood   pressure was monitored in 5 minute increments.  1% lidocaine
was used for local   anesthesia. Total sedation time: 30 minutes.      Radiation
Dose <1: cGycm2 (Dose Area Product)   Fluoroscopy time:1.2 minutes      Estimate
blood loss: None Blood administered: None   Complications: None   
Implants/Grafts: 10-French right nephrostomy   Specimen: None         Procedure:
  Informed consent was obtained and the patient positioned prone in the   
fluoroscopy suite. A timeout was performed. The indwelling right nephrostomy   
was prepped and draped in standard fashion.      Diagnostic antegrade 
nephrostogram was performed given the long indwelling time   of the previously 
placed for ostomy. See findings below.      Attempts to remove the indwelling 8-
French nephrostomy over a Bentson and   Glidewire were unsuccessful due to heavy
encrustation. The catheter was   subsequently severed and removed through an 11-
French peel-away sheath. A   Bentson wire was advanced through the peel-away 
sheath and exchanged for a   10-French nephrostomy. Contrast injection confirmed
placement within the renal   pelvis.      At the end of the procedure the 
catheter was secured to the skin, connected to   gravity drainage and a sterile 
dressing applied. The patient tolerated the   procedure well and without 
immediate complication.      Findings:   Antegrade nephrostogram demonstrated 
mild hydronephrosis of the right   collecting system and a patent, but heavily 
encrusted catheter requiring   removal via peel away sheath. The distal ureter 
is narrowed consistent with   stricture. Aggressive nephrostogram was not 
performed given superimposed   infection.      Impression:   Successful right 
nephrostomy  exchange.   Distal right ureteral stricture      Recommendations:  
Routine catheter exchange in 2 months given heavy encrustation.            This 
report was generated with voice-recognition technology. Errors in   
transcription can occur. Please interpret accordingly and contact a radiologist 
 if there are any questions regarding the report.      Signed by: Dr. Best Patel M.D. on 2018 2:37 PM        Dictated By: BEST PATEL MD  E
lectronically Signed By: BEST PATEL MD on 18  Transcribed By: CHARI
PEMBERTON on 18       COPY TO:   JENNIFER TERRELL MD        CT ABDOMEN/PELVIS
WO    [place] [address]      Patient Name: CRISTINA RICH   MR #: [id number]    
: 1965 Age/Sex: 52/F  Acct #: [id number] Req #: [id number]  Adm 
Physician:     Ordered by: JENNIFER TERRELL MD  Report #: [id number]   Location: ER
 Room/Bed:     
____________________________________________________________________________
_______________________    Procedure: [id number] CT/CT ABDOMEN/PELVIS WO  Exam Da
te: 18                            Exam Time: 0056       REPORT STATUS: Sig
ki    EXAM: CT ABDOMEN AND PELVIS without IV CONTRAST   ORDER DATE: 2018 1
2:14 AM  Time stamp on Exam: 0058 hours   INDICATION:  Pain in right kidney stat
us post nephrostomy, decreased urine   output   COMPARISON:  CT of the abdomen a
nd pelvis Ree 15, 2017   TECHNIQUE: The abdomen and pelvis were scanned using a
multidetector helical   scanner. Coronal and sagittal reformations were obtaine
d. Renal stone protocol   performed.   IV Contrast: None   Oral Contrast: None  
CTDIvol has been reviewed. It is below the limits set by the Radiation Protocol 
 Committee (RPC).      FINDINGS:   LOWER THORAX: No consolidations      LIVER: 
No masses   BILIARY: Gallstone in an otherwise normal gallbladder. No ductal di
lation.       SPLEEN: Normal   PANCREAS: Normal      ADRENALS: Normal      RIGHT
KIDNEY: Percutaneously nephrostomy tube in place with distal end coiled   within
the pelvis. Severe hydronephrosis. Mild perinephric fat stranding.      LEFT K
IDNEY: Mild hydroureteronephrosis of the left kidney, similar to prior   exam. P
unctate nonobstructing stones in the inferior calyx.       GI TRACT: No wall thi
ckening or obstruction. Normal appendix.      VESSELS:  Minimal atherosclerotic 
changes of the abdominal aorta without   aneurysm.   PERITONEUM/RETROPERITONEUM:
No free air or fluid   LYMPH NODES: No lymphadenopathy      REPRODUCTIVE ORGANS:
The uterus is small. No adnexal masses. Likely radiation   seeds in the cervix. 
 BLADDER: The bladder is small and irregular, possibly due to postradiation   
changes.      SOFT TISSUES: Normal   BONES: No suspicious bone lesions.      IM
PRESSION:   Severe right hydronephrosis with percutaneous nephrostomy tube in pl
ace,   consistent with tube obstruction.      Signed by: Dr. Cristina Main M.D. on 2018 1:28 AM        Dictated By: CRISTINA MAIN MD  Electronically S
igned By: CRISTINA MAIN MD on 18  Transcribed By: LUCINDA on 18       COPY TO:   JENNIFER TERRELL MD        SPECIAL PROCEDURE IN CATH LAB    
[place] [address]      Patient Name: CRISTINA RICH   MR #: [id number]    :  Age/Sex: 52/F  Acct #: [id number] Req #: [id number]  Adm Physician: AJAY DUENAS MD    Ordered by: JACQUELINE ARORA MD  Report #: [id number]   Location: M
ED/SURG3  Room/Bed: [facility]    ___________________________________________________
________________________________________________    Procedure: [id number] IR/SPEC
IAL PROCEDURE IN CATH LAB  Exam Date:                             Exam Time:    
   REPORT STATUS: Signed    Date and Time: 2017      Procedure: Right per
cutaneous nephrostomy catheter exchange      : Dr. Lisa CAMPOS
re-operative diagnosis: Right sided pyelonephritis with indwelling   nephrostomy
.   Post-operative diagnosis: Indwelling nephrostomy      Conscious Sedation: Fe
ntanyl 50 mcg.   The patient's heart rate and pulse oximetry were continuously m
onitored by the   interventional radiology nurse.  Blood pressure was monitored 
at 5 minute   intervals.      Additional Medications: Lidocaine 1% for local ane
sthesia   Fluoroscopy time:  1.0 minutes   Dose-area Product:   22.7 cGycm2.    
 Contrast used: 20 cc Isovue-300   Estimated blood loss: Minimal   Specimens: 10
French nephrostomy catheter, discarded   Implants: New 10 French nephrostomy c
atheter         DISCUSSION:      Informed consent for the procedure was obtained
from the patient and documented   in the medical record after discussion of ris
ks and benefits.      The patient was placed in the prone position on the fluoro
scopic table. The   right flank and existing percutaneous nephrostomy catheter w
ere prepped and   draped in standard sterile fashion. 1% lidocaine was infiltrat
ed into the skin   and subcutaneous tissues along the catheter for local anesthe
sana. The retention   suture was cut. Dilute contrast material was injected throu
gh the catheter,   confirming appropriate positioning within the collecting syst
em. The catheter   was then severed at the hub and a 0.0 3 5-in. wire was advanc
ed through the   catheter under fluoroscopic guidance. The catheter was removed 
over the wire   and a new 10 French locking loop percutaneous the prostate was a
dvanced over   the wire, which was then removed. The locking loop of the cathete
r was   positioned in the renal pelvis with appropriate positioning confirmed by
  injection of dilute contrast material. The catheter was flushed with sterile  
saline and connected to gravity drainage. The catheter was secured to the skin  
with monofilament nylon suture and a sterile dressing was applied.            
FINDINGS:       Mild right hydronephrosis.      IMPRESSION:         Successful 
exchange of a right percutaneous nephrostomy catheter (10 French   locking loop)
under fluoroscopic guidance.      The patient should return to interventional r
adiology in 8 weeks for routine   catheter exchange.      Signed by: Dr. Clau Ngo M.D. on 2017 7:09 AM        Dictated By: CLAU NGO MD  Electro
nically Signed By: CLAU NGO MD on 17  Transcribed By: LUCINDA on
17       COPY TO:   JACQUELINE ARORA MD        CHEST XRAY LINE PLACEMENT   
[place] [address]      Patient Name: CRISTINA RICH   MR #: [id number]    :  Age/Sex: 52/F  Acct #: [id number] Req #: [id number]  Adm Physician: AJAY DUENAS MD    Ordered by: YESENIA SOUSA MD  Report #: [id number]   Location:
[facility]  Room/Bed: [facility]    _________________________________________________
__________________________________________________    Procedure: [id number] DX/
EST XRAY LINE PLACEMENT  Exam Date: 17                            Exam Tate
e: 1440       REPORT STATUS: Signed    PROCEDURE:   CHEST XRAY LINE PLACEMENT   
TECHNIQUE:   Portable AP chest   INDICATION:   PICC placement   COMPARISON:   No
ne.       FINDINGS:   See conclusion.           CONCLUSION:   1. Right PICC term
inating in the high SVC.   2. Low lung volume with left lower lobe medial subseg
mental    atelectasis.   3. Normal heart size and mediastinal contour for techni
que.   4. Crescentic skeleton.        Dictated by:  Best Patel M.D. on 
2017 at 14:58        Electronically approved by:  Best Patel M.D.
on 2017 at    14:58                Dictated By: BEST PATEL MD  Electr
onically Signed By: BEST PATEL MD on 17  Transcribed By: FERNANDO on
17       COPY TO:   YESENIA SOUSA MD        IR CONSULT    [place] [address]      Patient Name: CRISTINA RICH   MR #: [id number]    : 1965 
Age/Sex: 52/F  Acct #: [id number] Req #: [id number]  Adm Physician: AJAY DUENAS MD    Ordered by: JACQUELINE ARORA MD  Report #: [id number]   Location: 
ED/SURG2  Room/Bed: [facility]    ___________________________________________________
________________________________________________    Procedure: [id number] DX/IR C
ONSULT  Exam Date:                             Exam Time:        REPORT STATUS: 
Signed    Date and Time: 2017      Procedure: Replacement of right percutan
eous nephrostomy catheter      : Dr. Ngo   Assistants: None   
     Pre-operative diagnosis: Dislodged right percutaneous nephrostomy   Post-o
perative diagnosis: Replaced right percutaneous nephrostomy      Conscious Sedat
ion: Versed 1  mg and Fentanyl 100 mcg.   The patient's heart rate and pulse oxi
metry were continuously monitored by the   interventional radiology nurse.  Bloo
d pressure was monitored at 5 minute   intervals.      Additional Medications: L
idocaine 1% for local anesthesia   Fluoroscopy time:  1.3 minutes   Dose-area Pr
oduct:   499.8 cGycm2.      Contrast used: 20 cc Isovue-300   Estimated blood lo
ss: Minimal   Specimens: None   Implants: 10 French locking loop nephrostomy geoff
inage catheter   Blood products administered: None   Complications: No immediate
  Condition at completion of procedure: Stable   Disposition: Returned to floor 
unit         DISCUSSION:   Informed consent was obtained and documented in the 
medical record.      The patient was placed in the prone position on the fluoros
copic table and the   right flank was prepped and draped in standard sterile fas
hion. 1% lidocaine   was infiltrated into the skin and subcutaneous tissues for 
local anesthesia.      Then under fluoroscopic guidance, a 5 French angled terry
ter was gently   advanced through the existing percutaneous nephrostomy tract an
d into the renal   pelvis, with appropriate positioning confirmed by injection o
f dilute contrast   material.      A 0.0 3 5-in. Amplatz wire was then advanced 
through the catheter and into the   proximal right ureter. The catheter was axel
ayo and the tract was dilated. Then   a 10 French locking loop nephrostomy terry
ter was advanced over the wire and   formed in the renal pelvis. Injection of di
lute contrast material confirmed   appropriate positioning. The catheter was flu
shed with sterile saline, secured   to the skin with monofilament nylon suture, 
and connected to gravity drainage.   A sterile dressing was applied. Patient swapna
erated the procedure well without   immediate complication.         FINDINGS:   
Moderate right hydronephrosis.         IMPRESSION:   Successful replacement of a
right percutaneous nephrostomy catheter (10 French   locking loop) through the 
existing subcutaneous tract.      The patient should return to interventional r
adiology for routine catheter   exchange in 3 months.      Signed by: Dr. Clau Ngo M.D. on 10/4/2017 10:12 AM        Dictated By: CLAU NGO MD  Electr
onically Signed By: CLAU NGO MD on 10/04/17 1012  Transcribed By: LUCINDA quintero 10/04/17 1012       COPY TO:   JACQUELINE ARORA MD        SPECIAL PROCEDURE IN CATH 
LAB    [place] [address]      Patient Name: CRISTINA RICH   MR #: [id number]    
: 1965 Age/Sex: 52/F  Acct #: [id number] Req #: [id number]  Adm 
Physician: AJAY GE MD    Ordered by: JACQUELINE ARORA MD  Report #: [id number]  
Location: MED/SURG2  Room/Bed: [facility]    
___________________________________________________
________________________________________________    Procedure: [id number] IR/SPEC
IAL PROCEDURE IN CATH LAB  Exam Date:                             Exam Time:    
   REPORT STATUS: Signed    Date and Time: 2017      Procedure: Replacement
of right percutaneous nephrostomy catheter      : Dr. Ngo   
Assistants: None         Pre-operative diagnosis: Dislodged right percutaneous n
ephrostomy   Post-operative diagnosis: Replaced right percutaneous nephrostomy  
   Conscious Sedation: Versed 1  mg and Fentanyl 100 mcg.   The patient's heart 
rate and pulse oximetry were continuously monitored by the   interventional rad
iology nurse.  Blood pressure was monitored at 5 minute   intervals.      Additi
onal Medications: Lidocaine 1% for local anesthesia   Fluoroscopy time:  1.3 min
utes   Dose-area Product:   499.8 cGycm2.      Contrast used: 20 cc Isovue-300  
Estimated blood loss: Minimal   Specimens: None   Implants: 10 French locking l
oop nephrostomy drainage catheter   Blood products administered: None   Complica
tions: No immediate   Condition at completion of procedure: Stable   Disposition
: Returned to floor unit         DISCUSSION:   Informed consent was obtained and
documented in the medical record.      The patient was placed in the prone posi
tion on the fluoroscopic table and the   right flank was prepped and draped in s
tandard sterile fashion. 1% lidocaine   was infiltrated into the skin and subcut
aneous tissues for local anesthesia.      Then under fluoroscopic guidance, a 5 
French angled catheter was gently   advanced through the existing percutaneous n
ephrostomy tract and into the renal   pelvis, with appropriate positioning confi
rmed by injection of dilute contrast   material.      A 0.0 3 5-in. Amplatz wire
was then advanced through the catheter and into the   proximal right ureter. The
catheter was removed and the tract was dilated. Then   a 10 French locking loop 
nephrostomy catheter was advanced over the wire and   formed in the renal pelv
is. Injection of dilute contrast material confirmed   appropriate positioning. T
he catheter was flushed with sterile saline, secured   to the skin with monofila
ment nylon suture, and connected to gravity drainage.   A sterile dressing was a
pplied. Patient tolerated the procedure well without   immediate complication.  
      FINDINGS:    Moderate right hydronephrosis.         IMPRESSION:   Success
ful replacement of a right percutaneous nephrostomy catheter (10 French   lockin
g loop) through the existing subcutaneous tract.      The patient should return 
to interventional radiology for routine catheter   exchange in 3 months.      Si
gned by: Dr. Clau Ngo M.D. on 10/4/2017 10:12 AM        Dictated By: CLAU NGO MD  Electronically Signed By: CLAU NGO MD on 10/04/17 1012  Transc
ribed By: LUCINDA on 10/04/17 1012       COPY TO:   JACQUELINE ARORA MD        
ABDOMEN-1VIEW (KUB)    [place] [address]      Patient Name: CRISTINA RICH   MR #: 
[id number]    : 1965 Age/Sex: 52/F  Acct #: [id number] Req #: [id number]
[id number]  Adm Physician:     Ordered by: SANDRA JUNIOR MD  Report #: [id number]
  Location: ER  Room/Bed:     
__________________________________________________________________________
_________________________    Procedure: [id number] DX/ABDOMEN-1VIEW (KUB)  Exam D
ate: 17                            Exam Time: 1430       REPORT STATUS: Si
gned    PROCEDURE:   X-RAY ABDOMEN - KUB       COMPARISON:   CT of the abdomen a
nd pelvis from 6/15/2017       INDICATIONS:   right side abdomen       FINDINGS:
         No dilated loops of bowel or abnormal air-fluid levels patterns.  No   
free air underneath the diaphragm.  Visualized portions of the lung    bases are
clear.         A nephrostomy tube is projected over the right hemiabdomen. A 2.5
cm    calcified gallstone is unchanged. No definite calcifications are seen    
overlying the urinary system.  Amorphous calcifications in the right    hemip
dick are indeterminate. There are surgical clips in the pelvis.    Five non-rib
bearing lumbar type vertebral bodies identified.       CONCLUSION:      A nephr
ostomy tube is projected over the right hemiabdomen.   Indeterminate calcificati
ons in the right hemipelvis which could    represent ureteral stones, phlebolith
, or vascular calcifications.       Dictated by:  Vita Moreira M.D. on 
017 at 14:59        Electronically approved by:  Vita Moreira M.D. on 20
17 at 14:59                   Dictated By: VITA MOREIRA MD  Electronically S
igned By: VITA MOREIRA MD on 17 1451  Transcribed By: FERNANDO on 
7 7201       COPY TO:   SANDRA JUNIOR MD

## 2020-05-25 NOTE — XMS REPORT
Patient Summary Document

                             Created on: 2020



CRISTINA RICH

External Reference #: [id number]

: 1965

Sex: Female



Demographics





                          Address                   [address] [place]  [address]

 

                          Home Phone                [phone]

 

                          Preferred Language        English

 

                          Marital Status            Unknown

 

                          Scientologist Affiliation     Unknown

 

                          Race                      Unknown

 

                          Additional Race(s)        Other

White/



 

                          Ethnic Group              /Latin





Author





                          Author                    [facility]

t

 

                          Organization              [facility]

 

                          Address                   [address]

[place]  [address]



 

                          Phone                     Unavailable







Support





                Name            Relationship    Address         Phone

 

                    JAZZMINE LABOY,  ROYER Caregiver           [address]

[address]

[place], [place]  [address] [phone]

 

                    JAZZMINE LABOY,  ROYER Caregiver           [address]

[address]

[place]  [address]                     [phone]

 

                LYNNE PUGH Caregiver       Unknown         Unavailable

 

                NONSTAFF        Caregiver       Unknown         Unavailable

 

                    XANDER LABOY, MITZI RODRIGUEZ   Caregiver           [address]

[place]  [address]                 Unavailable

 

                    GENEVA RICH    Next Of Kin         [address] [place]  [address]                      [phone]

 

                    DESIRE ZARAGOZA,  KALINA Caregiver           [address]

[place]  [address]                      [phone]

 

                    POWER LABOY, MITZI SHETH  Caregiver           [address]

[place]  [address]                 Unavailable

 

                    ADAMA JUNIOR MD Caregiver           [address]

[place]  [address]                 Unavailable

 

                    MARKUS OCONNELL MD Caregiver           [address] [facility] [address]

[place]  [address]                      Unavailable

 

                    PETER LABOY, MITZI PARKS Caregiver           [address]

[place]  [address]                 Unavailable

 

                    BALA RICH  Next Of Kin         [address] [place]  [address]                      [phone]

 

                    MD SHADY PEMBERTON MD  Caregiver           [address] [place]  [address]                     [phone]

 

                RICH GRIFFITHS  Caregiver       Unknown         [phone]

 

                    KODY LABOY, S LISSETTE Caregiver           [address]

[place]  [address]                 Unavailable

 

                RICH PEREZ Caregiver       Unknown         Unavailable

 

                    ADAMA DINERO MD    Caregiver           [address]

[place]  [address]                 Unavailable

 

                RICH MONTE     Caregiver       Unknown         [phone]

 

                JAY LABOY,  RICH Caregiver       Unknown         Unavailable

 

                    ETHEL ESCOBAR    Caregiver           [address]

[place]  [address]                       [phone]

 

                KIERA LABOY MD  RICH Caregiver       Unknown         [phone]

 

                    ANISH LABOY, MARKUS TREY Caregiver           [address]

[address]

[place]  [address]                       [phone]

 

                    XANDER LABOY, MITZI LAIGEMMA   Caregiver           [address]

[address]

[place]  [address]                       [phone]

 

                    SONIA NUNN  LISSETTE Caregiver           [address]

[address]

[place]  [address]                       [phone]

 

                    MD MITZI TERRELL LAIRD   PRS                 [address]

[place]  [address]                       [phone]

 

                    Geneva iRch    ECON                [address] [place]  [address]                      [phone]

 

                    Bala Rich  ECON                [address] [place]  [address]                      [phone]







Care Team Providers





                    Care Team Member Name Role                Phone

 

                    NONSTAFF            PCP                 Unavailable

 

                    SHADY PEMBERTON        Attphys             Unavailable

 

                    SANDHIR,  AMBICA    Attphys             Unavailable

 

                    HAMPEL,  JACQUELINE        Attphys             Unavailable

 

                    SWEET, MITZI LAIRD      Attphys             Unavailable

 

                    MISHEL,  MATTHEW     Attphys             Unavailable

 

                    ADAMA DINERO       Attphys             Unavailable

 

                    DOVER,  SOUHEIL    Attphys             Unavailable

 

                    GABRIELA NGO      Attphys             Unavailable

 

                    ZOMPA A MERYL     Attphys             Unavailable

 

                    AJAY GE     Attphys             Unavailable

 

                    Grayson Curtis Attphys             [phone]

 

                    Elli Wilson Attphys             [phone]

 

                    SHADY PEMBERTON        Admphys             Unavailable

 

                    HAMPEL,  JACQUELINE        Admphys             Unavailable

 

                    MISHEL,  MATTHWE     Admphys             Unavailable

 

                    DOVER,  SOUHEIL    Admphys             Unavailable

 

                    AJAY GE     Admphys             Unavailable







Payers





           Payer Name Policy Type Policy Number Effective Date Expiration Date YAO curran

 

           [employer]            [id number] 2019 00:00:00      

      [facility]

 

           [employer]            [id number] 2019 00:00:00      

      [facility] Cross Exchange            [id number]                       [facility]

 

           [employer]            [id number] 2019 00:00:00      

      [facility]

 

           [employer]            [id number] 2019 00:00:00      

      [facility]

 

           [employer]            [id number] 2019 00:00:00      

      [facility]

 

           [employer]            [id number] 2019 00:00:00      

      [facility]

 

           [employer]            [id number] 2019 00:00:00      

      [facility]

 

           [employer]            [id number] 2019 00:00:00      

      [facility]

 

           [employer]            [id number] 2019 00:00:00      

      [facility]

 

           [employer]            [id number] 2019 00:00:00      

      [facility]

 

           [employer]            [id number] 2019 00:00:00      

      [facility]

 

           [employer]            [id number] 2018 00:00:00      

      [facility]

 

           [employer]            [id number] 2018 00:00:00      

      [facility]

 

           [employer]            [id number] 2018 00:00:00      

      [facility]

 

           [employer]            [id number] 2018 00:00:00      

      [facility]

 

           [employer]            [id number] 2018 00:00:00      

      [facility]

 

           [employer]            [id number] 2018 00:00:00      

      [facility]

 

           [employer]            [id number] 2018 00:00:00      

      [facility]

 

           [employer]            [id number] 2018 00:00:00      

      [facility]

 

           [employer]            [id number] 2018 00:00:00      

      [facility]

 

           [employer]            [id number] 2018 00:00:00      

      [facility]







Advance Directives





           Directive  Decision   Effective Date Termination Date Comments   Sour

ce

 

           Yes        N/A                                         [facility]







Problems





           Condition Name Condition Details Condition Category Status     Onset 

Date Resolution

Date            Last Treatment Date Treating Clinician Comments        Source

 

                Complicated urinary tract infection UTI (urinary tract infection

) Problem         Active

           2016 00:00:00                                             [facility]

 

                          GEN. PAIN                                         GEN.

 PAIN                        Active       

                2016                                                      
                                          [facility]                     

Diagnosis Active    2016 00:00:00           2016-09-15 22:11:00           

          [facility]

 

                          WEAKNESS                                          WEAK

NESS                        Active         

              2016                                                        
                                       [facility]                     

Diagnosis Active    2016 00:00:00           2016 16:11:00           

          [facility]

 

        BMI 36.0-36.9,adult BMI 36.0-36.9,adult Disease Active  2014 00:00

:00                  

                          Overview: Obese           [facility]

 

       Obese  Obese  Disease Active 2014 00:00:00                         

    [facility]

 

                          Complicated UTI (urinary tract infection) Complicated 

UTI (urinary tract 

infection) Disease Active  2014 00:00:00                                 [facility]

 

       Hematuria Hematuria Disease Active 2014 00:00:00                   

          [facility]

 

       Fatigue Fatigue Disease Active 2013 00:00:00                       

      [facility]

 

       Myalgia Myalgia Disease Active 2013 00:00:00                       

      [facility]

 

       Fever  Fever  Disease Active 2013 00:00:00                         

    [facility]

 

                UTI (lower urinary tract infection) UTI (lower urinary tract inf

ection) Disease         

Active     2012-10-31 00:00:00                                             [facility]

 

           Hydronephrosis, right Hydronephrosis, right Disease    Active     201

08 00:00:00  

                                                                [facility]

 

       Flank pain Flank pain Disease Active 2012 00:00:00                 

            [facility]

 

       Pyelonephritis Pyelonephritis Disease Active 2012 00:00:00         

                    [facility]

 

       Cough  Cough  Disease Active 2011 00:00:00                         

    [facility]

 

       Colitis Colitis Problem Active                                    [facility]

 

       Hypokalemia Hypokalemia Problem Active                                   

 [facility]

 

       Obstruction of urinary tract Urinary obstruction Problem Active          

                          [facility]

 

       Nephrostomy status Nephrostomy status Problem Active                     

               [facility]

 

       Complication of nephrostomy Nephrostomy complication Problem Active      

                              

[facility]

 

                          Urinary tract infection due to Enterococcus UTI (urina

ry tract infection) due to

Enterococcus Problem Active                                          [facility]

 

                          Urinary tract infection associated with catheterizatio

n of urinary tract UTI 

(urinary tract infection) due to urinary indwelling catheter Problem      Active

                                 

                                                            [facility]

 

       Abdominal pain Abdominal pain Problem Active                             

       [facility]

 

       Infection with microorganism resistant to multiple drugs        Problem  

                                         

[facility]

 

                          Methicillin resistant Staphylococcus aureus (organism)

                         

Methicillin resistant Staphylococcus aureus (organism)                        
Active                                                Problem                   
    2016                        Problem added by Discern Expert.          
             [facility]                     Problem      Active               

                  2016 

06:01:38                                                    [facility]

 

                          Discharge Diagnosis: Compression of lateral cutaneous 

femoral nerve of thigh    

                    Discharge Diagnosis: Compression of lateral cutaneous 
femoral nerve of thigh                                                2016                       
                        [facility]                     Problem                

                 2016 

06:00:00     2016 06:01:38 2016 06:01:38                           M

[facility]

 

                          Discharge Diagnosis: Urinary tract infection, site not

 specified                

        Discharge Diagnosis: Urinary tract infection, site not specified        
                                       2016                               
                01/10/2016                                                [facility]                     Problem                   2016 06:00:00 2016

-01-10 06:06:10 

2016-01-10 06:06:10                                         [facility]







Allergies, Adverse Reactions, Alerts





        Allergy Name Allergy Type Status  Severity Reaction(s) Onset Date Inacti

ve Date 

Treating Clinician        Comments                  Source

 

        Meropenem Allergy to substance Active          REDNESS, ITCHING  00:00:00          

                                                    [facility]

 

           Diphenhydramine Propensity to adverse reactions to drug Active       

         Other      

2009 00:00:00                                 BECAME ANXIOUS AND FELT STRA

NGE [facility]

 

       diphenhydrAMINE diphenhydrAMINE Active                                   

        [facility]







Family History





           Family Member Diagnosis  Comments   Start Date Stop Date  Source

 

           Natural father Diabetes                                    [facility]







Social History





           Social Habit Start Date Stop Date  Quantity   Comments   Source

 

           Sex Assigned At Birth                                             [facility]

 

           Alcohol intake 2015 00:00:00 2015 00:00:00               

        [facility]







                Smoking Status  Start Date      Stop Date       Source

 

                Never smoker                                    [facility]







Medications





             Ordered Medication Name Filled Medication Name Start Date   Stop Da

te    Current 

Medication? Ordering Clinician Indication Dosage     Frequency  Signature (SIG) 

Comments                  Components                Source

 

        Acetaminophen Acetaminophen 2017 09:47:00 2018 00:00:00 No  

                    650      

                                                                [facility]

 

                          Acetaminophen/Codeine Phosphate (Tylenol # 3*) 1 Ea TA

B Acetaminophen/Codeine 

Phosphate (Tylenol # 3*) 1 Ea TAB 2017 09:47:00 2018 00:00:00 No    

                     

           2                                                      [facility]

 

                Meropenem (Merrem) 500 Mg INJ Meropenem (Merrem) 500 Mg INJ 2017 09:47:00 

2018 00:00:00 No                      500                                 

    [facility]

 

      Lisinopril Lisinopril 2017 12:33:00 2018 00:00:00 No          

      2.5                      

                                        [facility]

 

        Metformin Hcl Metformin Hcl 2017 12:33:00 2017 00:00:00 No  

                    500      

                                                                [facility]

 

      Fluconazole Fluconazole 2017 10:56:00 2017 00:00:00 No        

        200                

                                                    [facility]

 

                    Levofloxacin (Levaquin) 500 Mg TABLET Levofloxacin (Levaquin

) 500 Mg TABLET 

2017 10:56:00 2017 00:00:00 No                   500                

                [facility]

 

                          Sulfamethoxazole/Trimethoprim (Bactrim Ds Tablet) 1 Ea

ch TABLET 

Sulfamethoxazole/Trimethoprim (Bactrim Ds Tablet) 1 Each TABLET 2017 

10:56:00 2017 00:00:00 No                   1                             

     [facility]

 

       Ativan        2016 05:44:00        No                              

   1 mg, Route: PO, Drug form: TAB, ONCE,

Dosing Weight 86.364, kg, Priority: STAT, Start date: 16 23:44:00, Stop 
date: 16 23:44:00                                         [facility]

 

       tramadol hydrochloride 50 MG Oral Tablet        2016 05:43:00      

  Yes                                50 

mg = 1 tab, PO, BID, X 15 day, # 30 tab, 0 Refill(s)                            

             [facility]

 

          tramadol hydrochloride 50 MG Oral Tablet [Ultram]           2016

 01:10:00           Yes                 

                                                            1 - 2 tabs, PO, Q4-6

H, PRN as needed for pain, X 7 day, # 12 tab, 0 

Refill(s)                                                   [facility]

 

                    Sulfamethoxazole 800 MG / Trimethoprim 160 MG Oral Tablet [B

actrim]                     2016

 01:09:00        Yes                                1 tab, PO, BID, # 20 tab, 0 

Refill(s)               [facility]

 

       Rocephin        2016 23:48:00        No                            

     1 gm, Route: IM, ONCE, Dosing Weight

 84.091, kg, Start date: 16 17:48:00, Stop date: 16 17:48:00        

                                 [facility]

 

       Ondansetron        2016 21:36:00        No                         

        Notes: (Same as: Zofran)   *** 

MEDICATION WASTE *** Product Size:  4 mg Product Wasted:  ___ mg                

                         [facility]

 

        Sodium Chloride 0.154 MEQ/ML Injectable Solution         2016 21:3

6:00         No                      

                                                    1,000 mL, 1000 ml/hr, Infuse

 Over: 1 hr, Route: IV, 1,000, Drug form: INJ, 

ONCE, Priority: STAT, Dosing Weight 84.091 kg, Start date: 16 15:36:00, 
Duration: 1 doses or times, Stop date: 16 15:36:00                        

                 [facility]

 

       Saline Flush 0.9%        2016 21:36:00        No                   

              Notes: Same as: BD 

Posiflush Sterile                                           [facility]

 

       traMADol (ULTRAM) 50 mg tablet        2015 16:46:57        Yes     

             50mg          Take 50 mg

 by mouth as needed.                                         [facility]

 

       acetaminophen (TYLENOL) 325 mg tablet        2015 16:46:57        Y

es                  650mg         

Take 650 mg by mouth every 6 hours as needed.                                   

      [facility]

 

       metFORMIN (GLUCOPHAGE) 500 mg tablet        2015 16:46:57        Ye

s                  500mg         

Take 500 mg by mouth 2 times daily (with meals).                                

         [facility]

 

           fenofibrate nanocrystallized (TRICOR) 145 mg tablet             16:46:57            Yes        

                    145mg     QD        Take 145 mg by mouth daily.             

        [facility]

 

        lisinopril (PRINIVIL, ZESTRIL) 2.5 mg tablet         2015 16:46:57

         Yes                     

2.5mg        QD           Take 2.5 mg by mouth daily.                           

[facility]

 

       atorvastatin (LIPITOR) 20 mg tablet        2015 16:46:57        Yes

                  20mg          Take 

20 mg by mouth at bedtime nightly.                                         [facility]

 

           HYDROcodone-acetaminophen (NORCO)  mg tablet            2014 00:00:00            Yes         

                Pyelonephritis  1{tbl}                          Take 1 tablet by

 mouth every 6 hours as needed for 

Pain.                                                       [facility]

 

          cyclobenzaprine (FLEXERIL) 10 mg tablet           2013-10-14 00:00:00 

          Yes                 Flank Pain

           10mg                  Take 1 tablet by mouth 2 times daily as needed 

for Muscle Spasms.                       

[facility]

 

          flavoxate (URISPAS) 100 mg tablet           2012 00:00:00       

    Yes                 Hydronephrosis, 

right      100mg                 Take 1 tablet by mouth 3 times daily as needed 

(dysuria).                       

[facility]

 

           tamsulosin (FLOMAX) 0.4 mg extended release capsule             00:00:00            Yes        

          Hydronephrosis .4mg      QD        Take 1 capsule by mouth daily.     

                [facility]

 

          famotidine (PEPCID) 20 mg tablet           2012-01-10 00:00:00        

   Yes                 GERD 

(gastroesophageal reflux disease) 20mg            Q.5D            Take 1 Tab by 

mouth 2 times daily.  

                                                    [facility]

 

          oxybutynin (DITROPAN) 5 mg tablet           2010 00:00:00       

    Yes                 Ureteral 

stricture  5mg                   Take 1 Tab by mouth 3 times daily. For bladder 

spasms                       [facility]

 

          doxazosin (CARDURA) 2 mg tablet           2010 00:00:00         

  Yes                 Ureteral stricture

           2mg                   Take 1 Tab by mouth at bedtime.                

       [facility]

 

                Amlodipine Besylate (Norvasc) 5 Mg TAB Amlodipine Besylate (Norv

asc) 5 Mg TAB                 

        Yes                     5                                       [facility]

 

     Buspirone Hcl Buspirone Hcl           Yes            10                    

   [facility]

 

                          Cefuroxime Axetil (Cefuroxime) 500 Mg TABLET Cefuroxim

e Axetil (Cefuroxime) 500 

Mg TABLET             Yes               500                           [facility]

 

                    Glipizide (Glipizide Er) 5 Mg TAB.ER.24 Glipizide (Glipizide

 Er) 5 Mg TAB.ER.24 

              Yes                  2.5                                [facility]

 

                          Hydrocodone Bit/Acetaminophen (Norco 7.5-325 Tablet) 1

 Each TABLET Hydrocodone 

Bit/Acetaminophen (Norco 7.5-325 Tablet) 1 Each TABLET             Yes          

     1                             [facility]

 

                          Metoprolol Tartrate (Lopressor) 25 Mg TAB Metoprolol T

artrate (Lopressor) 25 Mg 

TAB               Yes               50                            [facility]

 

             Ondansetron Hcl (Zofran) 8 Mg TABLET Ondansetron Hcl (Zofran) 8 Mg 

TABLET                           

Yes                     4                                       [facility]

 

                          Sennosides/Docusate Sodium (Senokot-S Tablet) 1 Each T

ABLET Sennosides/Docusate 

Sodium (Senokot-S Tablet) 1 Each TABLET             Yes               1         

                    [facility]

 

                          Acetaminophen With Codeine (Tylenol With Codeine #3 Ta

blet) 1 Each TABLET 

Acetaminophen With Codeine (Tylenol With Codeine #3 Tablet) 1 Each TABLET       

                    

2020 00:00:00 No                      1                                   

    [facility]

 

     Metoprolol Tartrate Metoprolol Tartrate      2020 00:00:00 No        

     25                       

[facility]

 

     Fluconazole Fluconazole      2020 00:00:00 No             100        

              [facility]

 

                Levofloxacin (Levaquin) 500 Mg TABLET Levofloxacin (Levaquin) 50

0 Mg TABLET                 

2020 00:00:00 No                      250                                 

    [facility]

 

                Linezolid (Zyvox) 600 Mg TABLET Linezolid (Zyvox) 600 Mg TABLET 

                2020 

00:00:00 No                      600                                     [facility]

 

                Cephalexin (Keflex) 250 Mg CAPSULE Cephalexin (Keflex) 250 Mg CA

PSULE                 

2020 00:00:00 No                                                          

    [facility]

 

     Chrofloxacin Chrofloxacin      2020 00:00:00 No             25       

                [facility]

 

     Lisinopril Lisinopril      2019 00:00:00 No             5            

            [facility]

 

     Dicyclomine Hcl Dicyclomine Hcl      2019 00:00:00 No             20 

                      [facility]

 

                Docusate Sodium (Colace) 100 Mg CAP Docusate Sodium (Colace) 100

 Mg CAP                 

2019 00:00:00 No                      100                                 

    [facility]

 

                Ondansetron Hcl (Zofran*) 4 Mg TABLET Ondansetron Hcl (Zofran*) 

4 Mg TABLET                 

2019 00:00:00 No                      4                                   

    [facility]

 

     Buspirone Hcl Buspirone Hcl      2019 00:00:00 No             10     

                  [facility]

 

                    Ciprofloxacin Hcl (Cipro) 500 Mg TABLET Ciprofloxacin Hcl (C

ipro) 500 Mg TABLET 

       2019 00:00:00 No                   500                             

   [facility]

 

                          Acetaminophen With Codeine (Tylenol With Codeine #3 Ta

blet) 1 Each TABLET 

Acetaminophen With Codeine (Tylenol With Codeine #3 Tablet) 1 Each TABLET       

                    

2019-06-10 00:00:00 No                      1                                   

    [facility]

 

                Fluconazole (Diflucan) 200 Mg TABLET Fluconazole (Diflucan) 200 

Mg TABLET                 

2019-06-10 00:00:00 No                      200                                 

    [facility]

 

     Ondansetron Hcl Ondansetron Hcl      2019-06-10 00:00:00 No             4  

                      [facility]

 

                          Cephalexin Monohydrate (Keflex) 500 Mg CAPSULE Cephale

nancy Monohydrate (Keflex) 

500 Mg CAPSULE       2019 00:00:00 No                500                  

         [facility]

 

     Lisinopril Lisinopril      2019 00:00:00 No             5            

            [facility]

 

     Ondansetron Ondansetron      2019 00:00:00 No             4          

              [facility]

 

                    Ciprofloxacin Hcl (Cipro) 500 Mg TABLET Ciprofloxacin Hcl (C

ipro) 500 Mg TABLET 

       2019-02-15 00:00:00 No                   500                             

   [facility]

 

     Meropenem Meropenem      2018 00:00:00 No             1              

          [facility]

 

                    Ciprofloxacin Hcl (Cipro) 500 Mg TABLET Ciprofloxacin Hcl (C

ipro) 500 Mg TABLET 

       2018 00:00:00 No                   250                             

   [facility]

 

                Fluconazole (Diflucan) 100 Mg TABLET Fluconazole (Diflucan) 100 

Mg TABLET                 

2018 00:00:00 No                                                          

    [facility]

 

                    Ciprofloxacin Hcl (Cipro) 500 Mg TABLET Ciprofloxacin Hcl (C

ipro) 500 Mg TABLET 

       2017 00:00:00 No                   250                             

   [facility]

 

                Fluconazole (Diflucan) 100 Mg TABLET Fluconazole (Diflucan) 100 

Mg TABLET                 

2017 00:00:00 No                                                          

    [facility]

 

                Metronidazole (Flagyl) 250 Mg TABLET Metronidazole (Flagyl) 250 

Mg TABLET                 

2017 00:00:00 No                      250                                 

    [facility]

 

                          Cranberry/Vit C/L. Sporogenes (Cranberry Tablet) 1 Eac

h TABLET Cranberry/Vit 

C/L. Sporogenes (Cranberry Tablet) 1 Each TABLET            2017-06-15 00:00:00 

No                               

1000                                                             [facility]

 

      Atorvastatin Calcium Atorvastatin Calcium       2017 00:00:00 No    

            20                      

                                        [facility]

 

                Cefdinir (Omnicef) 300 Mg CAPSULE Cefdinir (Omnicef) 300 Mg CAPS

ULE                 2017

 00:00:00 No                                                              [facility]

 

                          Cyanocobalamin (Vitamin B-12) (Vitamin B-12) 1,000 Mcg

 TAB.SUBL Cyanocobalamin 

(Vitamin B-12) (Vitamin B-12) 1,000 Mcg TAB.SUBL           2017 00:00:00 N

o                                      

                                                                [facility]

 

                          Fenofibrate Nanocrystallized (Fenofibrate) 145 Mg TABL

ET Fenofibrate 

Nanocrystallized (Fenofibrate) 145 Mg TABLET         2017 00:00:00 No     

                                         

                                                    [facility]

 

     Lisinopril Lisinopril      2017 00:00:00 No             25           

            [facility]

 

                          Amoxicillin/Potassium Clav (Amox Tr-K Clv 875-125 Mg T

ab) 1 Each TABLET 

Amoxicillin/Potassium Clav (Amox Tr-K Clv 875-125 Mg Tab) 1 Each TABLET         

                  

2016 00:00:00 No                      1                                   

    [facility]

 

      Atorvastatin Calcium Atorvastatin Calcium       2016 00:00:00 No    

            20                      

                                        [facility]

 

                          Cholecalciferol (Vitamin D3) (Vitamin D3) 50,000 Unit 

CAPSULE Cholecalciferol 

(Vitamin D3) (Vitamin D3) 50,000 Unit CAPSULE         2016 00:00:00 No    

                  1                

                                                            [facility]

 

                          Cyanocobalamin (Vitamin B-12) 1,000 Mcg TAB Cyanocobal

amin (Vitamin B-12) 1,000 

Mcg TAB       2016 00:00:00 No                1000                        

  [facility]

 

                          Fenofibrate Nanocrystallized (Fenofibrate) 145 Mg TABL

ET Fenofibrate 

Nanocrystallized (Fenofibrate) 145 Mg TABLET         2016 00:00:00 No     

                 145             

                                                            [facility]

 

     Lisinopril Lisinopril      2016 00:00:00 No             2.5          

            [facility]

 

     Metformin Hcl Metformin Hcl      2016 00:00:00 No             500    

                  [facility]

 

     Trazodone Hcl Trazodone Hcl      2016 00:00:00 No             100    

                  [facility]

 

      Atorvastatin Calcium Atorvastatin Calcium       2016-10-07 00:00:00 No    

            20                      

                                        [facility]

 

                Cefdinir (Omnicef) 300 Mg CAPSULE Cefdinir (Omnicef) 300 Mg CAPS

ULE                 2016-10-07

 00:00:00 No                      300                                     [facility]

 

                          Cyanocobalamin (Vitamin B-12) 1,000 Mcg TAB Cyanocobal

amin (Vitamin B-12) 1,000 

Mcg TAB       2016-10-07 00:00:00 No                1000                        

  [facility]

 

                          Fenofibrate Nanocrystallized (Fenofibrate) 145 Mg TABL

ET Fenofibrate 

Nanocrystallized (Fenofibrate) 145 Mg TABLET         2016-10-07 00:00:00 No     

                 1                       

                                                    [facility]

 

                Fluconazole (Diflucan) 100 Mg TABLET Fluconazole (Diflucan) 100 

Mg TABLET                 

2016-10-07 00:00:00 No                      100                                 

    [facility]

 

                          Lisinopril (Prinavil / Zestril) 20 Mg TABLET Lisinopri

l (Prinavil / Zestril) 20 

Mg TABLET       2016-10-07 00:00:00 No                25                        

    [facility]

 

     Metformin Hcl Metformin Hcl      2016 00:00:00 No             500    

                  [facility]

 

                Tramadol Hcl (Ultram) 50 Mg TABLET Tramadol Hcl (Ultram) 50 Mg T

ABLET                 

2016 00:00:00 No                      50                                  

    [facility]







Vital Signs





             Vital Name   Observation Time Observation Value Comments     Source

 

             Body Temperature 2020 08:25:00 98.5 [degF]               [facility]

 

             Weight       2020 15:24:00 198 [lb_av]               [facility]

 

             BMI (Body Mass Index) 2020 15:24:00 38.7 kg/m2               

 [facility]

 

             Weight       2016 02:31:00                           [facility]

 

             Temperature Oral (F) 2016 02:31:00 97.9 F                    

[facility]

 

             Respitory Rate 2016 02:31:00                            [facility]

 

             Heart Rate   2016 02:31:00                           [facility]

 

             Systolic (mm Hg) 2016 02:31:00                            [facility]

 

             Diastolic (mm Hg) 2016 02:31:00                           [facility]

 

             Temperature Oral (F) 2016 01:28:00 98.4 F                    

[facility]

 

             Respitory Rate 2016 01:28:00                            [facility]

 

             Heart Rate   2016 01:28:00                           [facility]

 

             Systolic (mm Hg) 2016 01:28:00                           [facility]

 

             Diastolic (mm Hg) 2016 01:28:00                           [facility]

 

             Systolic (mm Hg) 2016 21:32:00                           [facility]

 

             Diastolic (mm Hg) 2016 21:32:00                           [facility]

 

             Weight       2016 21:32:00                           [facility]

 

             BMI Calculated 2016 21:32:00                            [facility]

 

             Height       2016 21:32:00 152.4 cm                  [facility]

 

             Heart Rate   2016 21:32:00                           [facility]

 

             Respitory Rate 2016 21:32:00                            [facility]

 

             Temperature Oral (F) 2016 21:32:00 98.1 F                    

 [facility]







Procedures





                Procedure       Date / Time Performed Performing Clinician [facility]

e

 

                Computed tomography of brain without radiopaque contrast 2020 00:00:00                 

[facility]

 

                CT of abdomen and pelvis without contrast 2020 00:00:00   

              [facility]

 

                CHANGE DRAINAGE DEVICE IN KIDNEY, EXTERNAL APPROACH 2019 0

0:00:00                 [facility]

 

                CYSTOSCOPY AND TREATMENT 2019 00:00:00                 [facility]

 

                CYSTO W/URETER STRICTURE TX 2019 00:00:00                 

[facility]

 

                CYSTOSCOPY AND TREATMENT 2019 00:00:00                 [facility]

 

                CYSTO W/URETER STRICTURE TX 2019 00:00:00                 

[facility]







Plan of Care





             Planned Activity Planned Date Details      Comments     Source

 

                    Future Scheduled Test 2020-10-01 00:00:00 IMM Influenza Seas

onal Oct to March 

(>/= 19 yrs) [code = IMM Influenza Seasonal Oct to March (>/= 19 yrs)]          

                 [facility] Scheduled Test 2013 00:00:00 Colorectal Cancer 

Scrn Annual 

(FIT/FOBT) Age 50 to 75 [code = Colorectal Cancer Scrn Annual (FIT/FOBT) Age 50 
to 75]                                              [facility] Scheduled Test 2005 00:00:00 Breast Cancer Scrn

 (Yearly) [code = 

Breast Cancer Scrn (Yearly)]                           [facility] Scheduled Test 1984 00:00:00 Cervical Cancer Sc

rn (3 Yrs) [code = 

Cervical Cancer Scrn (3 Yrs)]                           [facility]                      Patient referral [code = 2509668 ]       

       [facility]

 

             Goal                      Patient referral [code = [id number] ]       

       [facility]

 

             Goal                      Patient referral [code = [id number] ]       

       [facility]

 

             Goal                      Patient referral [code = [id number] ]       

       [facility]

 

             Goal                      Patient referral [code = 6508234 ]       

       [facility]

 

             Goal                      Patient referral [code = [id number] ]       

       [facility]

 

             Goal                      Patient referral [code = 2333593 ]       

       [facility]

 

             Goal                      Patient referral [code = 5130821 ]       

       [facility]

 

             Goal                      Patient referral [code = [id number] ]       

       [facility]

 

             Goal                      Patient referral [code = 8202730 ]       

       [facility]

 

             Instructions              [facility]              [facility]

 

             Instructions              Post Operative Pain              [facility]







Encounters





             Start Date/Time End Date/Time Encounter Type Admission Type Attendi

[facility]   Care Department Encounter ID    Source

 

           2020 13:17:00 2020 10:40:00 Discharged Inpatient 1       

   SHADY PEMBERTON 

[facility] [id number]        [facility]

 

             2020 11:21:00 2020 20:46:00 Departed Emergency Room 1  

          LISSETTE SCOTT          [facility] [id number]    [facility]

 

           2019 08:38:00 2020 11:48:00 Discharged Inpatient 1       

   SHADY PEMBERTON 

[facility] [id number]        [facility]

 

             2019 10:39:00 2019 10:39:00 Registered Surgical Day Car

e JACQUELINE DUNBAR             [facility] [id number]    [facility]

 

          2019 20:48:00 2019 00:40:00 Departed Emergency Room       

              [facility] [id number]              [facility]

 

          2019 09:50:00 2019 12:01:00 Departed Emergency Room       

              [facility] [id number]              [facility]

 

          2019-10-24 08:28:00 2019-10-24 08:28:00 Registered Clinic 3         PONCE

MPEL, [facility]    

[facility] [id number]              [facility]

 [facility]

 

          2019 05:33:00 2019 06:27:00 Departed Emergency Room       

              [facility]    [facility] [id number]              [facility]

 

          2019 12:06:00 2019 13:42:00 Departed Emergency Room       

              [facility]    [facility] 

[facility] [id number]              [facility]

 

             2019 06:19:00 2019 06:19:00 Registered Surgical Day Car

e 3            ULYSSES 

[facility]          [facility] [id number]    [facility] 

[facility]

 

           2019 18:21:00 2019 12:48:00 Discharged Inpatient 1       

   JENNIFER TERRELL 

[facility]              [facility] [id number]        [facility]

[facility] 

[facility]

 

             2019 09:33:00 2019 09:33:00 Registered Surgical Day Car

e 3            ULYSSES 

[facility]          [id number]    [facility]

 

          2019 10:15:00 2019 11:33:00 Departed Emergency Room       

              [facility]                    [id number]              [facility]

 

          2019-06-10 14:11:00 2019-06-10 19:19:00 Departed Emergency Room       

              [facility]                    [id number]              [facility]

 

           2019 13:14:00 2019 19:30:00 Departed Emergency Room 1    

      JENNIFER TERRELL 

[facility]              [id number]        [facility]

 

           2019 14:04:00 2019 16:20:00 Discharged Inpatient 1       

   SHADY PEMBERTON 

[facility]              [id number]        [facility]

 

             2019 08:50:00 2019 08:50:00 Registered Surgical Day Car

e JACQUELINE DUNBAR             [facility]          [id number]    [facility]

 

          2019 15:25:00 2019 17:05:00 Departed Emergency Room       

              [facility]                    [id number]              [facility] Patients [facility]

 

             2019 12:32:00 2019 16:55:00 Departed Emergency Room 1  

          LISSETTE GATES

                [facility]          [id number]    [facility]

 

          2019 12:46:00 2019 16:46:00 Departed Emergency Room       

              [facility]                    [id number]              [facility] Patients [facility]

 

        2018 09:35:00 2018 16:22:00 Discharged Inpatient            

     [facility]  

[id number]                            [facility]

 

          2018 12:28:00 2018 12:55:00 Departed Emergency Room       

              [facility]                    [id number]              [facility]. [facility]

 

           2018 16:30:00 2018 21:52:00 Departed Emergency Room 1    

      JENNIFER TERRELL 

[facility]              [id number]        [facility]

 

           2018 14:41:00 2018 18:30:00 Discharged Inpatient 1       

   MATTHEW FLORENTINO 

[facility]              [id number]        [facility]

 

           2018-10-22 20:49:00 2018-10-26 16:53:00 Discharged Inpatient 1       

   DREW DINERO 

[facility]              [id number]        [facility]

 

          2018-10-06 20:27:00 2018-10-06 21:25:00 Departed Emergency Room       

              [facility]                    [id number]              [facility]. [facility]

 

          2018 15:08:00 2018 15:08:00 Registered Clinic 3         JACQUELINE ENCARNACION [facility]                    [id number]              [facility]

 

           2018-08-10 01:04:00 2018-08-15 18:35:00 Discharged Inpatient 1       

   ROYER DOVER 

[facility]              [id number]        [facility]. [facility]

 

          2018 13:10:00 2018 18:25:00 Departed Emergency Room       

              [facility]                    [id number]              [facility]

 

          2018 02:07:00 2018 02:38:00 Departed Emergency Room       

              [facility]                    [id number]              [facility]

 

          2018 12:59:00 2018 13:01:00 Departed Emergency Room       

              [facility]                    [id number]              [facility]

 

          2018 07:21:00 2018 07:21:00 Registered Surgical Day Care  

                   [facility]                    [id number]              [facility]

 

             2018 11:08:00 2018 11:08:00 Registered Surgical Day Car

e CLAU BROWN           [facility]          [id number]    [facility]. [facility]

 

             2018 13:23:00 2018 17:28:00 Departed Emergency Room ER 

          STEVENMERYL CARTAGENA

                [facility]          [id number]    [facility]

 

           2018 06:30:00 2018 14:25:00 Discharged Inpatient ER      

   ROYER DOVER 

[facility]              [id number]        [facility]

 

           2017 20:18:00 2017 14:28:00 Discharged Inpatient ER      

   AJAY GE 

[facility]              [id number]        [facility]

 

           2017 07:17:00 2017 17:05:00 Discharged Inpatient ER      

   AJAY GE 

[facility]              [id number]        [facility]

 

        2017 18:56:00 2017 17:00:00 Discharged Inpatient            

     [facility]  

[id number]                            [facility]

 

        2017-06-15 15:58:00 2017 12:09:00 Discharged Inpatient            

     [facility]  

[id number]                            [facility]

 

          2017 08:01:00 2017 08:01:00 Registered Surgical Day Care  

                   [facility]                    [id number]              [facility]

 

          2016 02:28:00 2016 05:43:00 EC Emergency Center           

          [facility] [id number]              [facility]

 

           2016 20:28:00 2016 23:43:00 Outpatient            DARSHAN Curtis 

[facility]               [id number]         

 

          2016 21:13:00 2016 01:33:00 EC Emergency Center           

          [facility] [id number]              [facility]

 

             2016 15:13:00 2016 19:33:00 Outpatient                Parul Padilla      [facility]           [id number]     







Results





           Test Description Test Time  Test Comments Results    Result Comments 

Source

 

                    Blood leukocytes automated count (number/volume) 2020 

04:45:00   

 

                                        Test Item

 

             White Blood Count (test code = 6690-2) 10.45                       

            





[facility] automated count 
(number/volume)2020 04:45:00* 



             Test Item    Value        Reference Range Interpretation Comments

 

             Red Blood Count (test code = 789-8) 3.91                           

         





[facility] hemoglobin measurement 
(moles/volume)2020 04:45:00* 



             Test Item    Value        Reference Range Interpretation Comments

 

             Hemoglobin (test code = 67565-5) 10.0                              

      





[facility]ie[facility] blood hematocrit (volume 
fraction)2020 04:45:00* 



             Test Item    Value        Reference Range Interpretation Comments

 

             Hematocrit (test code = 4544-3) 32.4                               

     





[facility] erythrocyte mean corpuscular 
[id number] 04:45:00* 



             Test Item    Value        Reference Range Interpretation Comments

 

             Mean Corpuscular Volume (test code = 787-2) 82.9                   

                 





[facility]ie[facility] erythrocyte mean corpuscular 
hemoglobin (mass per erythrocyte)2020 04:45:00* 



             Test Item    Value        Reference Range Interpretation Comments

 

             Mean Corpuscular Hemoglobin (test code = 785-6) 25.6               

                     





[facility] erythrocyte mean corpuscular 
hemoglobin concentration measurement (mass/volume)2020 04:45:00* 



             Test Item    Value        Reference Range Interpretation Comments

 

             Mean Corpuscular Hemoglobin Concent (test code = 786-4) 30.9       

                             





[facility]ieRD ZauNt-Ggc3224-57-02 04:45:00* 



             Test Item    Value        Reference Range Interpretation Comments

 

             Red Cell Distribution Width (test code = 03806-1) 14.7             

                       





[facility]ie[facility] blood platelet count 
(count/volume)2020 04:45:00* 



             Test Item    Value        Reference Range Interpretation Comments

 

             Platelet Count (test code = 777-3) 272                             

        





[facility]achieAutUNC Health Blue Ridgeed blood segmented neutrophil 
count as percentage of total pvpnjeefxx5614-85-88 04:45:00* 



             Test Item    Value        Reference Range Interpretation Comments

 

             Neutrophils (%) (Auto) (test code = 44776-9) 54.2                  

                  





[facility]ieAutomated blood lymphocyte count as 
percentage ot total lamedomdws9563-27-48 04:45:00* 



             Test Item    Value        Reference Range Interpretation Comments

 

             Lymphocytes (%) (Auto) (test code = 736-9) 35.0                    

                





[facility] as 
percentage of total rjqmkjnxpy3142-76-04 04:45:00* 



             Test Item    Value        Reference Range Interpretation Comments

 

             Monocytes (%) (Auto) (test code = 5905-5) 7.1                      

               





[facility]ieAutom[facility] blood eosinophil count as 
percentage of total btvpdaqwlx5130-07-39 04:45:00* 



             Test Item    Value        Reference Range Interpretation Comments

 

             Eosinophils (%) (Auto) (test code = 713-8) 2.5                     

                





[facility]ieAutomated blood basophil count as 
percentage of total kxxrsnzelb5999-44-74 04:45:00* 



             Test Item    Value        Reference Range Interpretation Comments

 

             Basophils (%) (Auto) (test code = 706-2) 0.4                       

              





[facility]ieFluoroscopic procedure less than one hour
pwfcedzw8887-09-11 04:45:00* 



             Test Item    Value        Reference Range Interpretation Comments

 

             IM GRANULOCYTES % (test code = IM GRANULOCYTES %) 0.8              

                       





[facility]ieAutomated blood neutrophil count
2020 04:45:00* 



             Test Item    Value        Reference Range Interpretation Comments

 

             Neutrophils # (Auto) (test code = 751-8) 5.7                       

              





[facility]ieBlood lymphocytes count (number/volume)
2020 04:45:00* 



             Test Item    Value        Reference Range Interpretation Comments

 

             Lymphocytes # (Auto) (test code = 81336-8) 3.7                     

                





[facility]ieBl[facility] monocytes automated count 
(number/volume)2020 04:45:00* 



             Test Item    Value        Reference Range Interpretation Comments

 

             Monocytes # (Auto) (test code = 742-7) 0.7                         

            





[facility]ieAutomated blood eosinophil count
2020 04:45:00* 



             Test Item    Value        Reference Range Interpretation Comments

 

             Eosinophils # (Auto) (test code = 711-2) 0.3                       

              





[facility] bas[facility] count 
(count/volume)2020 04:45:00* 



             Test Item    Value        Reference Range Interpretation Comments

 

             Basophils # (Auto) (test code = 704-7) 0.0                         

            





[facility]ieFluoroscopic procedure less than one hour
lfpyiccc0972-10-56 04:45:00* 



             Test Item    Value        Reference Range Interpretation Comments

 

                                        Absolute Immature Granulocyte (auto (lloyd

t code = Absolute Immature Granulocyte 

(auto)          0.08                                             





[facility]achieCapillary blood glucose measurement by 
glucometer (mass/volume)2020 19:25:00* 



             Test Item    Value        Reference Range Interpretation Comments

 

             Bedside Glucose (test code = 64217-8) 187                          

           





[facility] or plasma sodium measurement 
(moles/volume)2020 05:20:00* 



             Test Item    Value        Reference Range Interpretation Comments

 

             Sodium Level (test code = 2951-2) 138                              

       





[facility] Lakeerum or plasma potassium measurement 
(moles/volume)2020 05:20:00* 



             Test Item    Value        Reference Range Interpretation Comments

 

             Potassium Level (test code = 2823-3) 3.6                           

          





[facility] Lakeerum or plasma chloride measurement 
(moles/volume)2020 05:20:00* 



             Test Item    Value        Reference Range Interpretation Comments

 

             Chloride Level (test code = 2075-0) 109                            

         





[facility] Lakeerum or plasma carbon dioxide, total 
measurement (moles/volume)2020 05:20:00* 



             Test Item    Value        Reference Range Interpretation Comments

 

             Carbon Dioxide Level (test code = 2028-9) 23                       

               





[facility] Lakeerum or plasma anion ilk9675-14-85 
05:20:00* 



             Test Item    Value        Reference Range Interpretation Comments

 

             Anion Gap (test code = 33037-3) 9.6                                

     





[facility] Lakeerum or plasma urea nitrogen measurement
(mass/volume)2020 05:20:00* 



             Test Item    Value        Reference Range Interpretation Comments

 

             Blood Urea Nitrogen (test code = 3094-0) 19                        

              





[facility] Lakeerum or plasma creatinine measurement 
(mass/volume)2020 05:20:00* 



             Test Item    Value        Reference Range Interpretation Comments

 

             Creatinine (test code = 2160-0) 1.27                               

     





[facility] Lakeerum or plasma urea nitrogen/creatinine 
mass jswoz0472-29-92 05:20:00* 



             Test Item    Value        Reference Range Interpretation Comments

 

             BUN/Creatinine Ratio (test code = 3097-3) 15                       

               





[facility]ieEstimated glomerular filtration rate 
(GFR) qjqfpadcxahay1256-27-48 05:20:00* 



             Test Item    Value        Reference Range Interpretation Comments

 

             Estimat Glomerular Filtration Rate (test code = 617510997) 44      

                                





[facility]achieGlucose wagniucvdol4641-84-39 05:20:00* 



             Test Item    Value        Reference Range Interpretation Comments

 

             Glucose Level (test code = RZR0932) 163                            

         





[facility] Lakeerum or plasma calcium measurement 
(mass/volume)2020 05:20:00* 



             Test Item    Value        Reference Range Interpretation Comments

 

             Calcium Level (test code = 39922-7) 8.6                            

         





[facility]achieRETROGRADE PCLVHQVPC1081-09-91 14:31:00  
                                                                                
  [place] [address]      Patient Name: CRISTINA RICH     
                          MR #: [id number]                  : 1965     
                             Age/Sex: 54/F  Acct #: [id number]                
             Req #: [id number]  Adm Physician: SHADY PEMBERTON MD                    
                   Ordered by: JACQUELINE ARORA MD                         Report #: 
[id number]        Location: MED/SURG                                Room/Bed: 
[facility]             ________________________________________________________
___________________________________________    Procedure: [id number] DX/RETROGRAD
E PYELOGRAM  Exam Date: 20                            Exam Time: 0714     
                                        REPORT STATUS: Signed    OR Fluoroscopy:
     IMPRESSION: Fluoroscopy service provided in the OR. Interpretation not   
requested.      Signed by: Salvador Kramer MD on 2020 2:31 PM        Dictate
d By: SALVADOR KRAMER MD  Electronically Signed By: SALVADOR KRAMER MD on  1431  Transcribed By: LUCINDA on 20 1431       COPY TO:   JACQUELINE ARORA MD
        Bacterial urine czaiykq1188-87-58 07:40:00* 



             Test Item    Value        Reference Range Interpretation Comments

 

             Urine Culture (test code = 630-4) CANDIDA ALBICANS                 

           





[facility]achiePhosphorus guhkwuumfcw7967-32-86 05:05:00
  * 



             Test Item    Value        Reference Range Interpretation Comments

 

             Phosphorus Level (test code = LYM3191) 2.7                         

            





[facility] or plasma magnesium measurement 
(mass/volume)2020 05:05:00* 



             Test Item    Value        Reference Range Interpretation Comments

 

             Magnesium Level (test code = 89595-9) 1.9                          

           





[facility]ieFluoroscopic procedure less than one hour
ecblctff1835-60-66 09:50:00* 



             Test Item    Value        Reference Range Interpretation Comments

 

             Coronavirus (PCR) (test code = Coronavirus (PCR)) NOT DETECTED     

                       





[facility]ieNEPHRO/URET W IMG/INJ-KRRADNVN4006-53-64 
15:57:00                                                                        
             [facility]                        [address] [address]      Patient Name: CRISTINA RICH                                MR #: [id number]                  : 
1965                                   Age/Sex: 54/F  Acct #: [id number]
                             Req #: [id number]  Adm Physician: SHADY PEMBERTON MD    
                                   Ordered by: JACQUELINE ARORA MD                   
     Report #: [id number]        Location: MED/SURG                              
 Room/Bed: [facility]             
________________________________________________________
___________________________________________    Procedure: [id number] IR/NEPHRO/UR
ET W IMG/INJ-EXISTING  Exam Date:                             Exam Time:        
                                      REPORT STATUS: Signed    Right frontal ca
theter exchange/upsize.                                                         
                                                                  History: Malf
unctioning indwelling right nephrostomy catheter. Exchange and   upsize requeste
d by the referring provider.                                                    
                                                                          Modal
ity: Fluoroscopy                                                                
                   Sedation: Versed 2 mg and fentanyl 100 mcg was given intrave
nously for   conscious sedation.  Vital signs were monitored throughout the proc
edure by a   nurse, and remained stable. Physician intra-service time was 20.   
                                                                                
     Fluoroscopy Time: 3.0 min.   Reference Air Kerma (Ka, r): 27.4 mGy.        
                                     Approach:  The indwelling 10 French neph
rostomy catheter      Estimated blood loss:  < 5 cc.      Specimen: None.       
: Kirk Mcfarlane MD.      Assistant: None.      
Technique/findings:    Informed written consent was obtained. Discussion of 
risks, benefits, and   alternatives were made with the patient. The patient 
expressed understanding   and agreed to proceed.  A universal timeout was 
performed prior to starting the   procedure.  All elements maximal sterile b
arrier technique was utilized for   this procedure, including utilization of bryan
rile scrub solution for skin prep,   a large sterile sheet to cover the areas of
the patient that were not prepped,   and hand hygiene, mask, head covering, and 
sterile gown for performing   radiologist and scrub technologist.      Contrast 
is injected via the indwelling right nephrostomy catheter   demonstrating intra
luminal position within the right collecting system.   Catheter noted to be retr
acted within a right calyx.      The existing catheter was cut. A 0.035 wire cou
ld not be advanced through the   catheter given occluded end hole. Subsequent, a
5 French Kumpe catheter and   0.035 angled Glidewire was used to obtain access 
into the right collecting   system along side the indwelling nephrostomy cathete
r. The Glidewire was   exchanged for a 0.035 Bentson wire. The indwelling nephro
stomy catheter was   removed. A new 12 French and a prosthetic catheter was adva
nced with the   pigtail formed within the right renal pelvis. Contrast injection
confirmed   position. The catheter was fixed to the skin with silk suture. A st
erile   dressing was applied.      The patient tolerated the procedure without i
mmediate complication.                                                          
                     Impression:                                               
Successful fluoroscopic guided right nephrostomy catheter exchange/upsize with  
moderate sedation as detailed above.                          Signed by: Dr. Ashley Rangel MD on 2020 4:03 PM        Dictated By: KIRK MCFARLANE MD  Electro
nically Signed By: KIRK MCFARLANE MD on 20 1603  Transcribed By: LUCINDA on 
20 1603       COPY TO:   JACQUELINE ARORA MD         IR [id number] 
15:57:00                                                                        
             [place] [address]      Patient Name: CRISTINA RICH                                MR #: [id number]                  : 
1965                                   Age/Sex: 54/F  Acct #: [id number]
                             Req #: [id number]  [facility] Physician: SHADY PEMBERTON MD    
                                   Ordered by: JACQUELINE ARORA MD                   
     Report #: [id number]        Location: MED/SURG                              
 Room/Bed: [facility]             
________________________________________________________
___________________________________________    Procedure: [id number] DX/IR CONSUL
T  Exam Date:                             Exam Time:                            
                  REPORT STATUS: Signed    Right frontal catheter exchange/upsi
ze.                                                                             
                                              History: Malfunctioning indwelling
right nephrostomy catheter. Exchange and   upsize requested by the referring leilani stephen.                                                                        
                                                      Modality: Fluoroscopy     
                                                                              
Sedation: Versed 2 mg and fentanyl 100 mcg was given intravenously for   conscio
us sedation.  Vital signs were monitored throughout the procedure by a   nurse, 
and remained stable. Physician intra-service time was 20.                       
                                                                  Fluoroscopy T
alexi: 3.0 min.   Reference Air Kerma (Ka, r): 27.4 mGy.                          
                   Approach:  The indwelling 10 French nephrostomy catheter     
Estimated blood loss:  < 5 cc.      Specimen: None.        : 
Kirk Mcfarlane MD.      Assistant: None.      Technique/findings:    Informed 
written consent was obtained. Discussion of risks, benefits, and   alternatives 
were made with the patient. The patient expressed understanding   and agreed to 
proceed.  A universal timeout was performed prior to starting the   procedure.  
All elements maximal sterile barrier technique was utilized for   this 
procedure, including utilization of sterile scrub solution for skin prep,   a 
large sterile sheet to cover the areas of the patient that were not prepped,   
and hand hygiene, mask, head covering, and sterile gown for performing   
radiologist and scrub technologist.      Contrast is injected via the indwelling
right nephrostomy catheter   demonstrating intraluminal position within the 
right collecting system.   Catheter noted to be retracted within a right calyx. 
    The existing catheter was cut. A 0.035 wire could not be advanced through 
the   catheter given occluded end hole. Subsequent, a 5 French Kumpe catheter 
and   0.035 angled Glidewire was used to obtain access into the right collecting
  system along side the indwelling nephrostomy catheter. The Glidewire was   
exchanged for a 0.035 Bentson wire. The indwelling nephrostomy catheter was   
removed. A new 12 French and a prosthetic catheter was advanced with the   
pigtail formed within the right renal pelvis. Contrast injection confirmed   
position. The catheter was fixed to the skin with silk suture. A sterile   
dressing was applied.      The patient tolerated the procedure without immediate
complication.                                                                   
            Impression:                                               Successful
fluoroscopic guided right nephrostomy catheter exchange/upsize with   moderate 
sedation as detailed above.                          Signed by: Dr. Kirk Mcfarlane MD on 2020 4:03 PM        Dictated By: KIRK MCFARLANE MD  Electro
nically Signed By: KIRK MCFARLANE MD on 20  Transcribed By: LUCINDA on 
20       COPY TO:   JACQUELINE ARORA MD         Fluoroscopic procedure less
than one hour obrfvsdx8873-64-15 04:40:00* 



             Test Item    Value        Reference Range Interpretation Comments

 

             Hemoglobin A1c Percent (test code = Hemoglobin A1c Percent) 6.9    

                                 





[facility] total bilirubin 
measurement (mass/volume)2020 05:10:00* 



             Test Item    Value        Reference Range Interpretation Comments

 

             Total Bilirubin (test code = 1975-2) 0.2                           

          





[facility]oroscopic procedure less than one hour
mgoeksec0690-48-66 05:10:00* 



             Test Item    Value        Reference Range Interpretation Comments

 

                                        Aspartate Amino Transf (AST/SGOT) (test 

code = Aspartate Amino Transf 

(AST/SGOT))     9                                                





[facility]um or plasma alanine aminotransferase 
measurement (enzymatic activity/volume)2020 05:10:00* 



             Test Item    Value        Reference Range Interpretation Comments

 

             Alanine Aminotransferase (ALT/SGPT) (test code = 1742-6) 12        

                              





[facility] or plasma protein measurement 
(mass/volume)2020 05:10:00* 



             Test Item    Value        Reference Range Interpretation Comments

 

             Total Protein (test code = 2885-2) 7.3                             

        





[facility] or plasma albumin measurement 
(mass/volume)2020 05:10:00* 



             Test Item    Value        Reference Range Interpretation Comments

 

             Albumin (test code = 1751-7) 2.8                                   

  





[facility]asma globulin measurement (mass/volume)
2020 05:10:00* 



             Test Item    Value        Reference Range Interpretation Comments

 

             Globulin (test code = 36949-2) 4.5                                 

    





[facility] or plasma albumin/globulin mass 
fwuzl8640-36-00 05:10:00* 



             Test Item    Value        Reference Range Interpretation Comments

 

             Albumin/Globulin Ratio (test code = 1759-0) 0.6                    

                 





[facility] or plasma alkaline phosphatase 
measurement (enzymatic activity/volume)2020 05:10:00* 



             Test Item    Value        Reference Range Interpretation Comments

 

             Alkaline Phosphatase (test code = 6768-6) 90                       

               





[facility]ieCT ABDOM[facility]/PELVIS [id number] 17:22:00  
                                                                                
  [facility]                        [address] [address]      Patient Name: CRISTINA RICH     
                             MR #: [id number]                     :                                    Age/Sex: 54/F  Acct #: [id number]   
                          Req #: [id number]  Adm Physician: SHADY PEMBERTON MD       
                                Ordered by: SHADY PEMBERTON MD                      
     Report #: [id number]        Location: MED/SURG                              
 [facility]: [facility]               _____________________________________________
______________________________________________________    Procedure: [id number] C
T/CT ABDOMEN/PELVIS WO  Exam Date: 20                            Exam Time
: 1700                                              REPORT STATUS: Signed    CT 
Abdomen and Pelvis without contrast      INDICATION:  UTI/abdominal pain, ureter
al stents and percutaneous nephrostomies    ABD PAIN    2020      TE
CHNIQUE: Thin collimation axial images obtained from the diaphragm to the   leve
l of the pubic symphysis without nonionic intravenous contrast.       Dose reduc
tion techniques used: Automated exposure control, adjustment of the   mAs and/or
kVp according to patient size, standardized low-dose protocol,   and/or iterati
ve reconstruction technique.      RADIATION DOSE:        Total DLP: 634.06 mGy*c
m        Estimated effective dose: DLP x 0.015 x size factor mSv        CTDIvol 
has been reviewed. It is below the limits set by the Radiation   Protocol Commit
anastacio (RP).      COMPARISON: CT abdomen/pelvis 2019.       ABDOMEN FINDINGS:
     Lung Bases: Calcified granuloma in the lateral right lower lobe is stable. 
 Small amount of posterior layering atelectasis. The visualized portion of the  
mediastinum is normal.      Liver: Normal in attenuation without mass.      G
allbladder: Present with a calcified gallstone measuring 2 cm near the neck.    
No ductal dilatation.      Pancreas: Normal attenuation without mass.      Splee
n: Normal size without mass.      Adrenal Glands: No evidence for mass.      Kid
neys:    Right: Percutaneous the approximate is in the posterior aspect of the  
collecting system. There is marked hydronephrosis. Trace perinephric   inflamma
tion. No calculus in the collecting system.  No cortical mass      Left:  There 
are 2 stents in the collecting system are redemonstrated that   extend into the 
urinary bladder. The renal pelvis remains dilated. No evidence   of calculus.  M
ild renal atrophy. No cortical mass      Lymph Nodes: Prominent left periaortic 
lymph nodes are stable and likely   reactive.      Aorta:  Normal in diameter wi
th scattered calcifications.      PELVIS FINDINGS:       Bowel:    Stomach:  Nor
mal.   Small Bowel: Normal in caliber with normal wall thickness.   Large Bowel:
Normal in caliber with normal wall thickness.   Appendix: Normal.      Bladder: 
Collapsed around a Gomez catheter. Coarse calcifications at the right   UVJ are 
redemonstrated. There is a small amount of encrustation surrounding a   loop of 
the ureteral stent.      Ureters: The right ureter is distended to the iliac ar
osiris bifurcation. There   are 1-2 linear hyperdensities along the course of the 
mid/distal ureter   suggestive of calculi. The ureter distal to this is collapse
d.      The proximal left ureter is distended just past the UPJ. There are no   
calcifications along the course of the stent.      The uterus is atrophic. No ad
nexal mass.      Peritoneum/retroperitoneum: No free fluid or fluid collection. 
    Bones: Mild degenerative changes of the lower lumbar spine. Mild to moderate
  degenerative changes of the lower thoracic spine. Diffuse demineralization of 
 the sacrum is redemonstrated. No associated fracture.      IMPRESSION:      1. 
New right hydroureteronephrosis, possibly due to at least two calculi in   the 
mid/distal right ureter. Percutaneous nephrostomy is in appropriate   position. 
    2.  Persistent left hydronephrosis despite the presence of 2 stents in the 
left   ureter. One loop in the pelvis appears to have an encrustation.      3. 
Other findings as described above are stable.      Signed by: Dr. Austin vargas MD on 2020 5:55 PM        Dictated By: AUSTIN RIVERO MD  Electro
nically Signed By: AUSTIN RIVERO MD on 20  Transcribed By: JAVED QUINTERO on 20       COPY TO:   SHADY PEMBERTON MD         CT BRAIN [id number] 
15:26:00                                                                        
             [address] [address]      Patient Name: CRISTINA RICH                                   MR #: [id number]                     
: 1965                                   Age/Sex: 54/F  Acct #: 
[id number]                              Req #: [id number]  Adm Physician: 
SHADY PEMBERTON MD                                        Ordered by: JENNIFER TERRELL MD                            Report #: [id number]        Location: MED/SURG     
                          Room[facility]Bed: [facility]               
__________________________________________
_________________________________________________________    Procedure: [id number]
5 CT/CT BRAIN WO  Exam Date: 20                            Exam Time: 1435
                                             REPORT STATUS: Signed    Examinati
on: CT BRAIN WO CONTRAST      History: Acute headache.   Comparison studies:None
     Technique:   Axial images were obtained from the skull base to the vertex. 
 Coronal and sagittal images reconstructed from the axial data.   Dose modulat
ion, iterative reconstruction, and/or weight based adjustment of   the mA/kV was
utilized to reduce the radiation dose to as low as reasonably   achievable.    
Intravenous contrast: None      Findings:      Scalp: No abnormalities.   Bones:
No fractures, blastic or lytic lesions.      Brain sulci: Appropriate for age.  
Ventricles: Normal in size and configuration. No hydrocephalus.      Extra-axial
space:   No abnormalities.      Parenchyma:    No abnormal densities.    No m
asses, hemorrhage, or acute or chronic cortical based vascular insults..      Se
llar/suprasellar region: No abnormalities.   Craniocervical junction: Patent for
amen magnum. No Chiari one malformation.      Incidental findings:    None.     
Impression:       No intracranial abnormalities.      Signed by: Dr. Mame scott M.D. on 2020 3:31 PM        Dictated By: MAME ALLEN  Electronically Signed By: MAEM BADILLO MD on 20 153  Trans
cribed By: LUCINDA on 20       COPY TO:   JENNIFER TERRELL MD         
CHEST SINGLE (PORTABLE)2020 15:17:00                                      
                                               [address] [address]
     Patient Name: CRISTINA RICH                                   MR #: 
[id number]                     : 1965                                  
Age/Sex: 54/F  Acct #: [id number]                              Req #: [id number]
[id number]  Adm Physician: SHADY PEMBERTON MD                                        
Ordered by: JENNIFER TERRELL MD                            Report #: [id number]    
   Location: MED/SURG                                [facility]Bed: [facility]            
  __________________________________________
_________________________________________________________    Procedure: [id number]
6 DX/CHEST SINGLE (PORTABLE)  Exam Date: 20                            Bernardo calderon Time: 1430                                              REPORT STATUS: Signed 
  EXAMINATION:  CHEST SINGLE (PORTABLE)         COMPARISON: Chest x-ray 20
18      INDICATION: Fever, pain, weakness     LOW GRADE FEVER, SOMNOLENCE, LIKEL
Y UTI    [id number]    1430        DISCUSSION:   Frontal view of the chest rashad allen at 1439 hours.      HEART AND MEDIASTINUM:  The heart is normal in size. The a
ortic arch is   tortuous but stable        LINES:  None.      LUNGS:  The lungs 
are well inflated and clear. Calcified granuloma in the right   lung base is sta
ble. No pneumonia or pulmonary edema.      PLEURA:  No pleural effusion or pneum
othorax. Stable mild eventration of the   right diaphragm.      BONES AND SOFT T
ISSUES: Degenerative changes of the right AC joint area No   focal osseous lesio
n. The soft tissues are normal.         IMPRESSION:    Stable chest. No acute ca
rdiopulmonary disease.      Signed by: Dr. Austin Rivero MD on 2020 3:
19 PM        Dictated By: AUSTIN RIVERO MD  Electronically Signed By: DEMETRIA RIVERO MD on 20  Transcribed By: LUCINDA on 20       
COPY TO:   JENNIFER TERRELL MD         Blood atuvxdw3834-51-08 12:11:00* 



             Test Item    Value        Reference Range Interpretation Comments

 

             Blood Culture (test code = 77210736) NO GROWTH AFTER 5 DAYS, FINAL 

REPORT                            





[facility]ieProthrombin time (PT) in platelet poor 
plasma by coagulation nhwvk0788-38-60 11:56:00* 



             Test Item    Value        Reference Range Interpretation Comments

 

             Prothrombin Time (test code = 5902-2) 13.7                         

           





[facility]ieINR in Platelet poor plasma by 
Coagulation rraol5572-28-48 11:56:00* 



             Test Item    Value        Reference Range Interpretation Comments

 

             Prothromb Time International Ratio (test code = 6301-6) 0.99       

                             





[facility]achieActivated partial thromboplastin time 
(aPTT) in platelet poor plasma by coagulation xgzon2590-72-38 11:56:00* 



             Test Item    Value        Reference Range Interpretation Comments

 

             Activated Partial Thromboplast Time (test code = 94590-9) 33.1     

                               





[facility] or plasma creatine kinase 
measurement (enzymatic activity/volume)2020 11:56:00* 



             Test Item    Value        Reference Range Interpretation Comments

 

             Creatine Kinase (test code = 2157-6) 28                            

          





[facility] or plasma creatine kinase MB 
measurement (mass/volume)2020 11:56:00* 



             Test Item    Value        Reference Range Interpretation Comments

 

             Creatine Kinase MB (test code = 79653-3) 0.40                      

              





[facility]ieTroponin I measurement by highly 
sensitive enzyme kpastzocafz5807-91-48 11:56:00* 



             Test Item    Value        Reference Range Interpretation Comments

 

             Troponin I (test code = 35682-1) < 0.001                           

      





[facility]ieUrine color lyzdvvdtjjjop9714-35-92 
11:55:00* 



             Test Item    Value        Reference Range Interpretation Comments

 

             Urine Color (test code = 5778-6) YELLOW                            

      





[facility]ieUrine udxqudk6710-99-36 11:55:00* 



             Test Item    Value        Reference Range Interpretation Comments

 

             Urine Clarity (test code = 23910-6) HAZY                           

         





[facility] Lakepecific gravity of Urine by Test strip
2020 11:55:00* 



             Test Item    Value        Reference Range Interpretation Comments

 

             Urine Specific Gravity (test code = 5811-5) 1.020                  

                 





[facility]ieUrine pH measurement by automated test 
eqtxq3443-28-74 11:55:00* 



             Test Item    Value        Reference Range Interpretation Comments

 

             Urine pH (test code = 68975-8) 8                                   

    





[facility]ieUrine leukocyte esterase detection by 
bhmvhgtk9831-38-96 11:55:00* 



             Test Item    Value        Reference Range Interpretation Comments

 

             Urine Leukocyte Esterase (test code = 5799-2) LARGE                

                   





[facility]ieUrine nitrite yxwuptsfr0098-13-84 
11:55:00* 



             Test Item    Value        Reference Range Interpretation Comments

 

             Urine Nitrite (test code = 13398-3) NEGATIVE                       

         





[facility]ieUrine protein measurement by test strip 
(mass/volume)2020 11:55:00* 



             Test Item    Value        Reference Range Interpretation Comments

 

             Urine Protein (test code = 5804-0) >=300                           

        





[facility]ieUrine glucose diueydtro4069-98-04 
11:55:00* 



             Test Item    Value        Reference Range Interpretation Comments

 

             Urine Glucose (UA) (test code = 2349-9) NEGATIVE                   

             





[facility]ieUrine ketones detection by automated test
hjqsm1678-79-88 11:55:00* 



             Test Item    Value        Reference Range Interpretation Comments

 

             Urine Ketones (test code = 72530-9) NEGATIVE                       

         





[facility]ieUrine urobilinogen measurement by test 
strip (mass/volume)2020 11:55:00* 



             Test Item    Value        Reference Range Interpretation Comments

 

             Urine Urobilinogen (test code = 80724-8) 0.2                       

              





[facility]ieUrine total bilirubin measurement 
(mass/volume)2020 11:55:00* 



             Test Item    Value        Reference Range Interpretation Comments

 

             Urine Bilirubin (test code = 1978-6) NEGATIVE                      

          





[facility]ieUrine erythrocytes kbwoofjsx9974-44-80 
11:55:00* 



             Test Item    Value        Reference Range Interpretation Comments

 

             Urine Blood (test code = 89684-0) 3+                               

       





[facility]achieAutomated urine sediment leukocyte count 
by microscopy (number/high power field)2020 11:55:00* 



             Test Item    Value        Reference Range Interpretation Comments

 

             Urine WBC (test code = 5821-4) >50                                 

    





[facility]achieErythrocytes detection in urine sediment 
by light amneivxxwq4563-46-03 11:55:00* 



             Test Item    Value        Reference Range Interpretation Comments

 

             Urine RBC (test code = 19470-2) >50                                

     





[facility]ieBacteria detection in urine sediment by 
light hpiazmuukz1463-20-60 11:55:00* 



             Test Item    Value        Reference Range Interpretation Comments

 

             Urine Bacteria (test code = 64623-7) MODERATE                      

          





[facility]ieEpithelial cells detection in urine 
sediment by light yxvefofppu9129-87-08 11:55:00* 



             Test Item    Value        Reference Range Interpretation Comments

 

             Urine Epithelial Cells (test code = 47181-8) FEW                   

                  





[facility]achieCoarse granular casts detection in urine 
sediment by light hplnoxxqsz1794-03-14 11:55:00* 



             Test Item    Value        Reference Range Interpretation Comments

 

             Urine Coarse Granular Casts (test code = 68620-5) 1-5              

                       





[facility]achieMucus detection in urine sediment by 
light vpzjasswfj1919-94-28 11:55:00* 



             Test Item    Value        Reference Range Interpretation Comments

 

             Urine Mucus (test code = 8247-9) MODERATE                          

      





[facility]ieABDOMEN-1VIEW (KUB)2020 19:48:00   
                                                                                
 [facility]                        [address] [place] [address]      Patient Name: CRISTINA RICH     
                             MR #: [id number]                     :                                    Age/Sex: 54/F  Acct #: [id number]   
                          Req #: [id number]  Adm Physician:                     
                                Ordered by: TINO MALHOTRA NP                 
          Report #: [id number]        Location: ER                               
      Room/Bed:                     ________________________________________
___________________________________________________________    Procedure: 0116-0
074 DX/ABDOMEN-1VIEW (KUB)  Exam Date: 20                            Exam 
Time:                                               REPORT STATUS: Signed   
   Exam: Abdominal film       Clinical History: Pain      Comparison: 
9. 2019      DISCUSSION:       2 left-sided ureteral stents stable i
n position.      Right percutaneous nephrostomy tube stable in position.      No
nobstructive bowel gas pattern.      IMPRESSION:      Stable appearance of the l
eft ureteral stents and right percutaneous   nephrostomy tube            Signed 
by: Dr. Deb Del Castillo M.D. on 2020 7:49 PM        Dictated By: DEB DEL CASTILLO MD, MD  Electronically Signed By: DEB DEL CASTILLO MD, MD on 20  Transcri
bed By: LUCINDA on 20       COPY TO:   TINO MALHOTRA NP         
Blood platelets count by estimate (number/volume)2020 16:28:00* 



             Test Item    Value        Reference Range Interpretation Comments

 

             Platelet Estimate (test code = 42851-4) SLIGHTLY DECREASED         

                   





[facility]iePlatelet mpxczcgphf0634-42-67 16:28:00* 



             Test Item    Value        Reference Range Interpretation Comments

 

             Platelet Morphology Comment (test code = 27017-5) NORMAL           

                       





[facility]ieBlood hypochromia detection by light 
blvwpfuqjc7254-93-72 16:28:00* 



             Test Item    Value        Reference Range Interpretation Comments

 

             Hypochromasia (test code = 728-6) MODERATE                         

       





[facility]ieBlood poikilocytosis detection by light 
exhjhgcxmf2327-64-13 16:28:00* 



             Test Item    Value        Reference Range Interpretation Comments

 

             Poikilocytosis (test code = 779-9) MODERATE                        

        





[facility]ieBlood anisocytosis detection by light 
bzgasbichm6509-18-72 16:28:00* 



             Test Item    Value        Reference Range Interpretation Comments

 

             Anisocytosis (test code = 702-1) SLIGHT                            

      





[facility] RenaissanceRBC pcvaensyhz8865-18-75 16:28:00* 



             Test Item    Value        Reference Range Interpretation Comments

 

             Red Cell Morphology Comment (test code = 6742-1) NORMAL            

                      





[facility]ieIR DLNXWAS0720-94-45 15:02:00            
                                                                         [facility]                        [address] [address]      Patient Name: CRISTINA RICH     
                             MR #: [id number]                     :                                    Age/Sex: 54/F  Acct #: [id number]   
                          Req #: [id number]  Adm Physician: SHADY PEMBERTON MD       
                                Ordered by: JACQUELINE ARORA MD                      
     Report #: [id number]        Location: MED/[facility]                             
 Room/Bed: [facility]               _____________________________________________
______________________________________________________    Procedure: [id number] D
X/IR CONSULT  Exam Date:                             Exam Time:                 
                             REPORT STATUS: Signed    PROCEDURE: Genitourinary 
catheter exchange      Procedural Personnel   Attending physician(s): Radha Florez MD   Fellow physician(s): None   Resident physician(s): None   Advanced practi
ce provider(s): None      Pre-procedure diagnosis: Right ureteral obstruction   
Post-procedure diagnosis: Same   Indication: Routine scheduled exchange   Additi
onal clinical history: None      Complications: No immediate complications.     
IMPRESSION:      Successful routine exchange of right nephrostomy tube for new 
10Fr nephrostomy   tube.      Plan:    Routine exchange in 8 weeks.   __________
_____________________________________________________      PROCEDURE SUMMARY   -
Target organ: Unilateral native kidney   - Antegrade nephrostogram(s) via the e
xisting access   - Nephrostomy tube exchange   - Additional procedure(s): None  
   PROCEDURE DETAILS:      Pre-procedure   Consent: Informed consent for the pr
ocedure including risks, benefits and   alternatives was obtained and time-out w
as performed prior to the procedure.   Preparation: The site was prepared and dr harmon using maximal sterile barrier   technique including cutaneous antisepsis.  
   Anesthesia/sedation   Level of anesthesia/sedation: Moderate sedation (consc
ious sedation) 1mg   Versed, 50mcg Fentanyl   Anesthesia/sedation administered b
y: Independent trained observer under   attending supervision with continuous mo
nitoring of the patient?s level of   consciousness and physiologic status   Tota
l intra-service sedation time (minutes): 30      Right genitourinary catheter ex
change   Local anesthesia was administered. Initial nephrostogram was performed.
A wire   was placed through the existing tube and it was removed. The new tube 
was   advanced over the wire and position was confirmed with contrast injection.
  Pre-existing genitourinary catheter: 10Fr Uresil   Genitourinary catheter(s) 
placed: 10Fr Uresil    Findings: Locking loop in renal pelvis.   External cathet
er securement: Non-absorbable suture       Additional genitourinary system inter
vention   Genitourinary intervention: None   Location of intervention: Not appli
cable   Device used: Not applicable   Description of intervention: Not applicabl
e   Post-intervention findings: Not applicable      Contrast   Contrast agent: I
sovue 300   Contrast volume (mL): 10      Radiation Dose   Fluoroscopy time (min
utes): 1.0     Reference air kerma (mGy): 8.7       Additional Details   Additio
nal description of procedure: None   Equipment details: None   Specimens removed
: None   Estimated blood loss (mL): Less than 10   Standardized report: Zehra
theterExchange_v3      Attestation   Signer name: Radha Florez MD   I attest that
I was present for the entire procedure. I reviewed the stored   images and agree
with the report as written.               Signed by: Radha Florez MD on 
019 3:05 PM        Dictated By: RADHA FLOREZ MD  Electronically Signed By: RADHA FLOREZ MD on 19  Transcribed By: LUCINDA on 19       COPY TO: 
 JACQUELINE ARORA MD         NEPHRO/URET W IMG/INJ-[id number] 15:02:00        
                                                                             [place] [address]      Patient Name: CRISTINA RICH     
                             MR #: [id number]                     :                                    Age/Sex: 54/F  Acct #: [id number]   
                          Req #: [id number]  Adm Physician: SHADY PEMBERTON MD       
                                Ordered by: SHADY PEMBERTON MD                      
     Report #: [id number]        Location: MED/[facility]                             
 Room/Bed: [facility]               _____________________________________________
______________________________________________________    Procedure: [id number] I
R/NEPHRO/URET W IMG/INJ-EXISTING  Exam Date:                             Exam Ti
me:                                               REPORT STATUS: Signed    PROCE
DURE: Genitourinary catheter exchange      Procedural Personnel   Attending phys
ician(s): Radha Florez MD   Fellow physician(s): None   Resident physician(s): No
ne   Advanced practice provider(s): None      Pre-procedure diagnosis: Right ure
teral obstruction   Post-procedure diagnosis: Same   Indication: Routine schedul
ed exchange   Additional clinical history: None      Complications: No immediate
complications.      IMPRESSION:      Successful routine exchange of right nephr
ostomy tube for new 10Fr nephrostomy   tube.      Plan:    Routine exchange in 8
weeks.   _______________________________________________________________      P
ROCEDURE SUMMARY   - Target organ: Unilateral native kidney   - Antegrade nephro
stogram(s) via the existing access   - Nephrostomy tube exchange   - Additional 
procedure(s): None      PROCEDURE DETAILS:      Pre-procedure   Consent: Informe
d consent for the procedure including risks, benefits and   alternatives was obt
ained and time-out was performed prior to the procedure.   Preparation: The site
was prepared and draped using maximal sterile barrier   technique including cut
aneous antisepsis.      Anesthesia/sedation   Level of anesthesia/sedation: Mode
rate sedation (conscious sedation) 1mg   Versed, 50mcg Fentanyl   Anesthesia/sed
ation administered by: Independent trained observer under   attending supervisio
n with continuous monitoring of the patient?s level of   consciousness and physi
ologic status   Total intra-service sedation time (minutes): 30      Right genit
ourinary catheter exchange   Local anesthesia was administered. Initial nephrost
ogram was performed. A wire   was placed through the existing tube and it was re
moved. The new tube was   advanced over the wire and position was confirmed with
contrast injection.   Pre-existing genitourinary catheter: 10Fr Uresil   Genito
urinary catheter(s) placed: 10Fr Uresil    Findings: Locking loop in renal pelvi
s.   External catheter securement: Non-absorbable suture       Additional genito
urinary system intervention   Genitourinary intervention: None   Location of int
ervention: Not applicable   Device used: Not applicable   Description of interve
ntion: Not applicable   Post-intervention findings: Not applicable      Contrast
  Contrast agent: Isovue 300   Contrast volume (mL): 10      Radiation Dose   F
luoroscopy time (minutes): 1.0     Reference air kerma (mGy): 8.7       Addition
al Details   Additional description of procedure: None   Equipment details: None
  Specimens removed: None   Estimated blood loss (mL): Less than 10   Standardi
zed report: SIR_GUCatheterExchange_v3      Attestation   Signer name: Radha Florez MD   I attest that I was present for the entire procedure. I reviewed the stor
ed   images and agree with the report as written.               Signed by: Fahad Florez MD on 2019 3:05 PM        Dictated By: RADHA FLOREZ MD  Electronicall
y Signed By: RADHA FLOREZ MD on 19 6837  Transcribed By: LUCINDA on 19
0875       COPY TO:   SHADY PEMBERTON MD         ABDOMEN-1VIEW ([facility])2019 
08:49:00                                                                        
             [place] [address]      Patient Name: CRISTINA RICH                                   MR #: [id number]                     
: 1965                                   Age/Sex: 54/F  Acct #: 
[id number]                              Req #: [id number]  Adm Physician:     
                                                Ordered by: JENNIFER TERRELL MD   
                        Report #: [id number]        Location: ER                 
                    Room/Bed:                     
__________________________________________
_________________________________________________________    Procedure: [id number]
0 DX/ABDOMEN-1VIEW ([facility])  Exam Date: 19                            Exam Ti
me: 0835                                              REPORT STATUS: Signed     
 Exam: Abdominal film       Clinical History: Pain      Comparison: 2019      DISCUSSION:       2 left-sided ureteral stents stable in position.    
 Right percutaneous nephrostomy tube stable in position.      Nonobstructive b
owel gas pattern.      IMPRESSION:      Stable appearance of the left ureteral s
tents and right percutaneous   nephrostomy tube                        Signed by
: Dr. Andrew Palisch, M.D. on 2019 8:52 AM        Dictated By: ANDREW R PA
LISCH MD  Electronically Signed By: ANDREW R PALISCH MD on 19  Transc
ribed By: LUCINDA on 19       COPY TO:   JENNIFER TERRELL MD         
Bacterial urine [id number] 07:10:00* 



             Test Item    Value        Reference Range Interpretation Comments

 

             Urine Culture (test code = 630-4) PSEUDOMONAS AERUGINOSA           

                 





[facility] 16[facility]47[facility]00   
                                                                                
 [place] [address]      Patient Name: CRISTINA RICH     
                             MR #: [id number]                     :                                    Age/Sex: 54/F  Acct #: [id number]   
                          Req #: [id number]  Adm Physician:                     
                                Ordered by: JACQUELINE ARORA MD                      
     Report #: [id number]        Location: OR                                    
 Room/Bed:                     _____________________________________________
______________________________________________________    Procedure: [id number] D
X/ABDOMEN-1VIEW ([facility])  Exam Date: 19                            Exam Time:
1632                                              REPORT STATUS: Signed    Abdo
men, one view      Clinical indication: Preoperative evaluation, hydronephrosis 
    Comparison: 2019 and 2019      Findings:   A right percutaneous n
ephrostomy catheter is in place. Two parallel left   double-J stent are in place
. The bowel gas pattern is nonobstructive. No acute   osseous abdomen bodies.   
  Impression:   Unchanged appearance of right nephrostomy catheter and left ure
teral stents.      Signed by: Stephen E Dreyer, MD on 2019 4:50 PM        D
ictated By: STEPHEN E DREYER MD  Electronically Signed By: STEPHEN E DREYER MD o
n 19 1650  Transcribed By: LUCINDA on 19 1650       COPY TO:   JACQUELINE SMITH MD         Bacterial urine [id number] 23:00:00* 



             Test Item    Value        Reference Range Interpretation Comments

 

             Urine Culture (test code = 630-4) ENTEROCOCCUS FAECIUM             

               





[facility] 
sediment by light [id number] 11:33:00* 



             Test Item    Value        Reference Range Interpretation Comments

 

             Urine Amorphous Sediment (test code = 8246-1) FEW                  

                   





[facility] detection [facility] urine sediment by 
light [id number] 11:33:00* 



             Test Item    Value        Reference Range Interpretation Comments

 

             Urine Yeast (test code = 98439-3) MANY                             

       





[facility] [id number] 
11:33:00* 



             Test Item    Value        Reference Range Interpretation Comments

 

             Urine Culture (test code = 630-4) ENTEROCOCCUS FAECIUM             

               





[facility] 
14:16:00                                                                        
             [facility]                        [address] [address]      Patient Name: CRISTINA RICH                                   MR #: [id number]                     
: 1965                                   Age/Sex: 54/F  Acct #: 
[id number]                              Req #: [id number]  Adm Physician:     
                                                Ordered by: JACQUELINE ARORA MD      
                     Report #: [id number]        Location: DX                    
                 Room/Bed:                     
_____________________________________________
______________________________________________________    Procedure: [id number] I
R/NEPHRO/URET W IMG/INJ-EXISTING  Exam Date:                             Exam Ti
me:                                               REPORT STATUS: Signed    PROCE
DURE: Genitourinary catheter exchange      Procedural Personnel   Attending phys
aaron(s): Radha Florez MD   Fellow physician(s): None   Resident physician(s): No
ne   Advanced practice provider(s): None      Pre-procedure diagnosis: Ureteral 
obstruction   Post-procedure diagnosis: Same   Indication: Routine scheduled exc
hange   Additional clinical history: None      Complications: Nephrostomy tube w
as heavily encrusted and calcified and   required advanced maneuvers to remove a
nd exchange.       IMPRESSION:      Successful exchange of right nephrostomy tub
e. Nephrostomy tube was heavily   encrusted and calcified and required advanced 
maneuvers to remove and exchange.         Plan:    Future exchanges should be do
ne at no longer than 6-8 week intervals.   _____________________________________
__________________________      PROCEDURE SUMMARY   - Target organ: Right native
kidney   - Antegrade nephrostogram(s) via the existing access   - Nephrostomy t
ube exchange   - Additional procedure(s): None      PROCEDURE DETAILS:      Pre-
procedure   Consent: Informed consent for the procedure including risks, benefit
s and   alternatives was obtained and time-out was performed prior to the proced
ure.   Preparation: The site was prepared and draped using maximal sterile barri
er   technique including cutaneous antisepsis.      Anesthesia/sedation   Level 
of anesthesia/sedation: Minimal sedation 50mcg Fentanyl   Anesthesia/sedation ad
ministered by: Independent trained observer under   attending supervision with c
ontinuous monitoring of the patient?s level of   consciousness and physiologic s
tatus      Right genitourinary catheter exchange   Local anesthesia was administ
ered. Initial nephrostogram was performed. A wire   was placed via the existing 
tube and the tube was very encrusted. Wire could   not be passed all the way thr
ough the tube and the loop would not unform. A   12Fr peel-away sheath was advan
melanie over the nephrostomy tube and the tube was   retracted through the sheath an
d removed. A Kumpe catheter and guidewire were   then advanced through the sheat
h and the sheath removed. A new 10Fr nephrostomy   tube was advanced over the wi
re and position was confirmed with contrast   injection.   Pre-existing genitour
inary catheter: 10Fr Uresil   Genitourinary catheter(s) placed: 10Fr Uresil    F
indings: Loop formed in renal pelvis.   External catheter securement: Non-absorb
able suture       Additional genitourinary system intervention   Genitourinary i
ntervention: None   Location of intervention: Not applicable   Device used: Not 
applicable   Description of intervention: Not applicable   Post-intervention fin
dings: Not applicable      Contrast   Contrast agent: Isovue 370   Contrast volu
me (mL): 10      Radiation Dose   Fluoroscopy time (minutes): 13.24     Referenc
e air kerma (mGy): 134       Additional Details   Additional description of proc
edure: None   Equipment details: None   Specimens removed: None   Estimated bloo
d loss (mL): Less than 10   Standardized report: SIR_GUCatheterExchange_v3      
Attestation   Signer name: Radha Florez MD   I attest that I was present for the 
entire procedure. I reviewed the stored   images and agree with the report as dominick barretoen.               Signed by: Radha Florez MD on 10/24/2019 2:21 PM        Dict
ated By: RADHA FLOREZ MD  Electronically Signed By: RADHA FLOREZ MD on 10/24/19 1421
 Transcribed By: LUCINAD on 10/24/19 1421       COPY TO:   JACQUELINE ARORA MD        
ABDOMEN-1VIEW ([facility])2019 07:49:00                                          
                                           [place] [address]    
 Patient Name: CRISTINA RICH                                   MR #: 
[id number]                     : 1965                                  
Age/Sex: 53/F  Acct #: [id number]                              Req #: [id number]
[id number]  [facility] Physician:                                                      
Ordered by: JACQUELINE ARORA MD                            Report #: [id number]       
Location: OR                                      Room/Bed:                     
_____________________________________________
______________________________________________________    Procedure: [id number] D
X/ABDOMEN-1VIEW ([facility])  Exam Date: 19                            Exam Time:
0725                                              REPORT STATUS: Signed       E
xam: Abdominal film       Clinical History: Urolithiasis, stents      Comparison
: Abdominal radiograph dated 2019, CT of the abdomen and pelvis   dated       DISCUSSION:    Right percutaneous nephrostomy and dual parallel left 
internal ureteral stents   are again noted.    There is unchanged 5 mm calcific 
density overlying the region of the right   ureterovesicular junction.      Charlie
l gas pattern is nonobstructive. Regional skeletal structures are intact.       
A calcified gallstone is again identified projecting of the right upper   quadra
nt.      IMPRESSION:      Right percutaneous nephrostomy catheter and parallel l
eft internal ureteral   stents are unchanged in position. Unchanged 5 mm calcifi
c density in the   expected region of the right ureterovesicular junction.      
Signed by: Stephen E Dreyer, MD on 2019 7:56 AM        Dictated By: STEPHEN
E DREYER MD  Electronically Signed By: STEPHEN E DREYER MD on 19  Tr
anscribed By: LUCINDA on 19       COPY TO:   JACQUELINE ARORA MD         
Serum or plasma thyrotropin measurement by detection limit <= 0.005 miu/l 
(units/volume)2019 05:15:00* 



             Test Item    Value        Reference Range Interpretation Comments

 

             Thyroid Stimulating Hormone (TSH) (test code = 37933-1) 2.374      

                             





[facility] 18[facility]00   
                                                                                
 [place] [address]      Patient Name: CRISTINA RICH     
                             MR #: [id number]                     :                                    Age/Sex: 53/F  Acct #: [id number]   
                          Req #: [id number]  Adm Physician:                     
                                Ordered by: TINO MALHOTRA NP                 
          Report #: [id number]        Location: ER                               
      Room/Bed:                     ________________________________________
___________________________________________________________    Procedure: 0801-0
054 DX/ABDOMEN-1VIEW ([facility])  Exam Date:                             Exam Time:   
                                           REPORT STATUS: Signed    RADIOGRAPH(
S) OF THE ABDOMEN AND PELVIS, 2 view(s)          HISTORY:  Abdominal pain, right
kidney drain      COMPARISON:  CT of the abdomen and pelvis 2019. Abd
ominal pelvic   radiographs 2019.      FINDINGS:   No specific evidenc
e of obstruction or ileus.     Unchanged appearance of the right nephrostomy tub
e.   Unchanged appearance of the 2 left-sided ureteral stents.   Unchanged multi
ple metallic surgical clips within the pelvis.   Presumed Gomez catheter, the ti
p of the catheter is not definitively visualized   in the region of the urinary 
bladder.   The bones are partially obscured by stool and overlying bowel gas.   
Cholelithiasis.      IMPRESSION:    1.  Nonobstructive bowel gas pattern.   2.  
Unchanged appearance of the right nephrostomy tube and left ureteral   stents.  
3.  Cholelithiasis.      Signed by: Dr. Vamsi Guerin D.O., M.M.M. on 2019 6
:37 PM        Dictated By: VAMSI GUERIN DO  Electronically Signed By: VAMSI GUERIN DO on 19  Transcribed By: LUCINDA on 19       COPY TO:   
TINO MALHOTRA NP         SPECIAL PROCEDURE IN CATH [id number] 14:18:00     
                                                                                
[place] [address]      Patient Name: CRISTINA RICH     
                             MR #: [id number]                     :                                    Age/Sex: 53/F  Acct #: [id number]   
                          Req #: [id number]  Adm Physician:                     
                                Ordered by: JACQUELINE ARORA MD                      
     Report #: [id number]        Location: CATH LAB                              
 Room/Bed:                     _____________________________________________
______________________________________________________    Procedure: [id number] I
R/SPECIAL PROCEDURE IN CATH LAB  Exam Date:                             Exam Tate
e:                                               REPORT STATUS: Signed    PROCED
URE:   CHG PERC TUBE/DEBBIE CATH/CON/S I       COMPARISON:   Prior nephrostomy tube
change 2019       Preprocedure diagnosis: right ureteral obstruction, chron
ic indwelling    nephrostomy   Post procedure diagnosis: Right ureteral obstruct
ion, chronic    indwelling nephrostomy.       Sedation/anesthesia: Intravenous f
entanyl and Versed. Refer to nursing    record. The patient's heart rate and pul
se oximetry were continuously    monitored by the interventional radiology nurse
. Blood pressure was    monitored at 5 minute intervals.       Additional medica
tions: Lidocaine 1% for local anesthesia.       Total fluoroscopy time: One alfonso
te   Dose-area product:  267.5 cGycm2       Contrast used: 10 cc Isovue-300   Es
timated blood loss: Minimal   Blood products administered: None   Specimens: 10 
French percutaneous nephrostomy catheter, discarded   Implants/grafts: 10 French
percutaneous nephrostomy catheter       Condition at completion: Stable   Dispo
sition: Catheter lab holding area for recovery   Complications: No immediate    
  Procedure in detail:   Informed consent was obtained and documented in the me
dical record    after discussion of risks and benefits.       The patient was pl
aced in the prone position on the angiographic table.    The right flank and exi
sting percutaneous nephrostomy catheter were    prepped and draped in standard s
terile fashion. 1% lidocaine was    infiltrated into the skin and subcutaneous t
issues along the existing    catheter for local anesthesia.       Injection of d
ilute contrast material through the catheter confirmed    appropriate position w
ithin the collecting system. The catheter was    severed and a 0.035 inch stiff 
Glidewire was advanced through the    catheter and coiled within the renal pelvi
s. The catheter was then    exchanged over-the-wire for a new 10 French locking 
loop percutaneous    nephrostomy catheter. The wire was removed and the locking 
loop was    formed in the renal pelvis. Appropriate positioning was confirmed by
   injection of dilute contrast material. The catheter was then flushed    with 
sterile saline, secured to the skin with monofilament nylon    suture, and conn
ected to gravity drainage. A sterile dressing was    applied. The patient tolera
hugh the procedure well without immediate    complication.           CONCLUSION: 
        Successful exchange of a 10 French locking loop right percutaneous    n
ephrostomy catheter under fluoroscopic guidance.       The patient should return
to interventional radiology for routine    catheter exchange in 3 months if the 
catheter is still needed at that    time.    Dictated by:  Clau Ngo M.D. on 
2019 at 14:18        Electronically approved by:  Clau Ngo M.D. on 2019 at 14:18                Dictated By: CLAU NGO MD  Electronically Katerina
d By: CLAU NGO MD on 19 1418  Transcribed By: FERNANDO on 19 1418 
     COPY TO:   JACQUELINE ARORA MD         CT ABDOMEN/PELVIS [id number] 17:33:00   
                                                                                
 [place] [address]      Patient Name: CRISTINA RICH            
                      MR #: [id number]                     : 1965      
                            Age/Sex: 53/F  Acct #: [id number]                 
            Req #: [id number]  [facility] Physician:                                   
                  Ordered by: JENNIFER TERRELL MD                            
Report #: [id number]        Location: ER                                      
Room/Bed:                     _________________________________________________
__________________________________________________    Procedure: [id number] CT/CT
ABDOMEN/PELVIS WO  Exam Date: 19                            Exam Time:                                               REPORT STATUS: Signed    EXAM: C
T Abdomen and Pelvis WITHOUT contrast     INDICATION:          CT CYSTOGRAM    2
[id number]    1626    Y          COMPARISON: Nephrostomy tube x-ray 3/29/2019   LUIS FERNANDO
HNIQUE: Abdomen and pelvis were scanned utilizing a multidetector helical   scan
ner from the lung base to the pubic symphysis without administration of IV   con
trast. Absence of intravenous contrast decreases sensitivity for detection   of 
focal lesions and vascular pathology. Coronal and sagittal reformations were   o
btained. Routine protocol was performed.  Contrast injected through Gomez   time
s 2.               IV CONTRAST: None.               ORAL CONTRAST: Water        
      RADIATION DOSE: Total DLP: 889.2 mGy*cm                Estimated effective
dose: (DLP x 0.015 x size factor) mSv               COMPLICATIONS: None      F
INDINGS:      LINES and TUBES: Right percutaneous nephrostomy tube. Left interna
l ureteral   stent with proximal aspect in the left renal pelvis and distal sten
t within the   urinary bladder.      LOWER THORAX:  Unremarkable      HEPATOBILI
PROMISE:      No focal hepatic lesions. No biliary ductal dilation.       GALLBLADDE
R: Cholelithiasis.       SPLEEN: No splenomegaly.       PANCREAS: No focal imchael
s or ductal dilatation.        ADRENALS: No adrenal nodules          KIDNEYS/URE
TERS:  Stable position of the right buttock without intravenous   nephrostomy tu
be with decompressed right kidney. No new calcified stones in the   right kidney
. The right ureter is decompressed without calcified stones.   Moderate left hyd
roureteronephrosis has not improved despite with stent   placement. Persistent 3
mm calcified stone in the inferior pole of the left   kidney. No visualized new 
when stones along the left ureter on limited   evaluation or remaining left kid
marcela. Mild left perinephric fat stranding   without fluid collections.      GI TR
ACT: No abnormal distention, wall thickening, or evidence of bowel   obstruction
.       Appendix is normal.      PELVIC ORGANS/BLADDER: The urinary bladder is d
ecompressed with 4 Lake catheter   in place. No calcified stones in the urinary 
bladder. Hysterectomy.      LYMPH NODES: No lymphadenopathy.      VESSELS: Unrem
arkable.      PERITONEUM / RETROPERITONEUM: No free air or fluid.      BONES: Un
remarkable.      SOFT TISSUES: Unremarkable.                  IMPRESSION:    1. 
Unchanged right nephrostomy tube with decompressed right kidney. No   calcified 
stones in the right kidney or right ureter.      2.  Persistent moderate left h
ydroureteronephrosis despite ureteral stent in   adequate position. Unchanged le
ft nephrolithiasis. Recommend revision of the   left ureteral stent.      3.  Spangler
boptimal cystogram with only a small amount of contrast in the urinary   bladder
, which persist decompressed with Gomez catheter in place.      Signed by: Dr. DIANE Purcell M.D. on 2019 5:43 PM        Dictated By: GEORGIANA PURCELL MD  Electronically Signed By: GEORGIANA PURCELL MD on 1743  Transcribed By: LUCINDA on 19       COPY TO:   JENNIFER TERRELL MD         SPECIAL PROCEDURE IN CATH [id number] 12:30:00                     
                                                                [place] [address]      Patient Name: CRISTINA RICH            
                      MR #: [id number]                     : 1965      
                            Age/Sex: 53/F  Acct #: [id number]                 
            Req #: [id number]  Adm Physician: SHADY PEMBERTON MD                     
                  Ordered by: JACQUELINE ARORA MD                            Report 
#: [id number]        Location: MED/SURG3                               Room/Bed: 
[facility]               ____________________________________________________
_______________________________________________    Procedure: [id number] IR/HOMERO AGUILAR PROCEDURE IN CATH LAB  Exam Date:                             Exam Time:     
                                         REPORT STATUS: Signed    Procedure: Ri
ght nephrostomy catheter exchange with fluoroscopic guidance      Comparison: Ri
ght nephrostomy exchange 2018.       : Naomi Martinez MD      Sedation
/Medications: Conscious sedation. IV Fentanyl and Versed per nursing   administr
ation records.  The patient's vital signs, including pulse oximetry,   were cont
inuously monitored by the interventional radiology nurse.      Fluoroscopy time:
0.6 minutes.   Dose area product: 77.1 cGycm2      Blood loss: 0 cc   Specimens:
8 Fr right nephrostomy catheter   Implants: 10 Fr right nephrostomy catheter    
 Technique/Findings:   Informed consent for the procedure was obtained from the 
patient after   discussion of risks and benefits. The right back was prepped and
draped in the   usual fashion.      1% lidocaine was administered into the skin 
and subcutaneous tissues of the   right back for local anesthesia.       Con
trast was injected demonstrating catheter was in place with mild   hydronephrosi
s. Some encrustation patient was noted at the tip of the existing   catheter. Spangler
bsequently, a stiff Glidewire was advanced through the catheter and   the cathet
er was exchanged for a new 10 French nephrostomy catheter. Contrast   injection 
confirmed satisfactory positioning. Catheter was secured with suture   and overl
gabino sterile dressing. There were no immediate complications.      Impression:  
Fluoroscopic guided right nephrostomy catheter exchange as above.      Signed b
y: Dr. Naomi Martinez MD on 2019 12:34 PM        Dictated By: NAOMI MARTINEZ MD  
ectronically Signed By: NAOMI MARTINEZ MD on 19 1234  Transcribed By: LUCINDA 
on 19 1234       COPY TO:   JACQUELINE ARORA MD         IR [id number] 
12:30:00                                                                        
             [place] [address]      Patient Name: CRISTINA RICH
                                  MR #: [id number]                     : 
1965                                   Age/Sex: 53/F  Acct #: [id number]
                             Req #: [id number]  Adm Physician: SHADY PEMBERTON MD    
                                   Ordered by: JACQUELINE ARORA MD                   
        Report #: [id number]        Location: MED/SURG3                          
    Room/Bed: [facility]               
____________________________________________________
_______________________________________________    Procedure: [id number] DX/IR CO
NSULT  Exam Date:                             Exam Time:                        
                      REPORT STATUS: Signed    Procedure: Right nephrostomy cat
heter exchange with fluoroscopic guidance      Comparison: Right nephrostomy exc
hange 2018.       : Naomi Martinez MD      Sedation/Medications: Consc
ious sedation. IV Fentanyl and Versed per nursing   administration records.  The
patient's vital signs, including pulse oximetry,   were continuously monitored 
by the interventional radiology nurse.      Fluoroscopy time: 0.6 minutes.   Dos
e area product: 77.1 cGycm2      Blood loss: 0 cc   Specimens: 8 Fr right nephro
stomy catheter   Implants: 10 Fr right nephrostomy catheter      Technique/Findi
ngs:   Informed consent for the procedure was obtained from the patient after   
discussion of risks and benefits. The right back was prepped and draped in the  
usual fashion.      1% lidocaine was administered into the skin and subcutaneous
tissues of the   right back for local anesthesia.       Contrast was injected 
demonstrating catheter was in place with mild   hydronephrosis. Some encrustatio
n patient was noted at the tip of the existing   catheter. Subsequently, a stiff
Glidewire was advanced through the catheter and   the catheter was exchanged for
a new 10 French nephrostomy catheter. Contrast   injection confirmed satisfact
ory positioning. Catheter was secured with suture   and overlying sterile dressi
ng. There were no immediate complications.      Impression:   Fluoroscopic guide
d right nephrostomy catheter exchange as above.      Signed by: Dr. Naomi Martinez MD on 2019 12:34 PM        Dictated By: NAOMI MARTINEZ MD  Electronically Signed
By: NAOMI MARTINEZ MD on 19 1234  Transcribed By: LUCINDA on 19 1235    
  COPY TO:   JACQUELINE ARORA MD         ABDOMEN-1VIEW ([facility])2019 12:55:00       
                                                                              [facility]                        [address] [place] [address]      Patient Name: CRISTINA RICH            
                      MR #: [id number]                     : 1965      
                            Age/Sex: 53/F  Acct #: [id number]                 
            Req #: [id number]  Adm Physician:                                   
                  Ordered by: LISSETTE GATES MD                            
Report #: [id number]        Location: ER                                      
Room/Bed:                     _______________________________________________
____________________________________________________    Procedure: [id number] DX/
ABDOMEN-1VIEW ([facility])  Exam Date: 19                            Exam Time:                                               REPORT STATUS: Signed       Exa
m: GIGI.      Clinical History: Nephrostomy placement.      Comparison: 2019
. CT scan 2019.      Findings: Right percutaneous nephrostomy and parallel 
left internal ureteral   stents are again noted.  Pelvic surgical clips are unch
anged. No calcifications   project over the renal shadows or expected ureteral c
ourses. Calcification in   the dependent portion of the dilated left collecting 
system seen on the   comparison CT is not appreciated by plain radiography.     
Bowel gas pattern is nonobstructive. Regional skeletal structures are intact.   
Calcified gallstone projects over the right upper quadrant.      IMPRESSION:    
 Right percutaneous nephrostomy catheter and parallel left internal ureteral   
stents are again seen. Left renal calculus seen on the comparison CT is not   i
dentified by plain radiography.      Signed by: Dr. Deb Del Castillo M.D. on 3/28/20
19 12:58 PM        Dictated By: DEB DEL CASTILLO MD, MD  Electronically Signed By: DARSHAN DEL CASTILLO MD, MD on 19 1259  Transcribed By: LUCINDA on 19 1258   
   COPY TO:   LISSETTE GATES MD         ABDOMEN-1VIEW ([facility])2019 14:44:00 
                                                                                
   [facility]                        [address], [place] [address]      Patient Name: CRISTINA RICH
                                  MR #: [id number]                     :                                    Age/Sex: 53/F  Acct #: [id number]   
                          Req #: [id number]  Adm Physician:                     
                                Ordered by: JACQUELINE ARORA MD                      
     Report #: [id number]        Location: OR                                    
 Room/Bed:                     _____________________________________________
______________________________________________________    Procedure: [id number] D
X/ABDOMEN-1VIEW ([facility])  Exam Date: 19                            Exam Time:
1315                                              REPORT STATUS: Signed       E
xam: Abdominal film       Clinical History: Nephrolithiasis      Comparison: CT 
abdomen and pelvis 2019      DISCUSSION: Right percutaneous nephrostomy and
parallel left internal ureteral   stents are again noted. Positions are unchang
ed relative to  tomogram from   comparison CT. Pelvic surgical clips are un
changed. No calcifications project   over the renal shadows or expected ureteral
courses. Calcification in the   dependent portion of the dilated left collecting
system seen on the comparison   CT is not appreciated by plain radiography.     
Bowel gas pattern is nonobstructive. Regional skeletal structures are intact.   
Calcified gallstone projects over the right upper quadrant.      IMPRESSION:    
 Right percutaneous nephrostomy catheter and parallel left internal ureteral   
stents are unchanged in position. Left renal calculus seen on the comparison CT 
 is not identified by plain radiography.               Signed by: Dr. Clau Ngo M.D. on 2019 2:47 PM        Dictated By: CLAU NGO MD  Electro
nically Signed By: CLAU NGO MD on 19 1447  Transcribed By: LUCINDA on
19 1447       COPY TO:   JACQUELINE ARORA MD         CT ABDOMEN/PELVIS WO
2019 13:57:00                                                             
                        [facility]                       
[address] [place] [address]      Patient Name: 
CRISTINA RICH                                   MR #: [id number]         
           : 1965                                   Age/Sex: 53/F  [facility]
#: [id number]                              Req #: [id number]  Adm Physician:  
                                                   Ordered by: LISSETTE GATES MD                            Report #: [id number]        Location: ER           
                          Room/Bed:                     
________________________________________
___________________________________________________________    Procedure: [id number]
[id number] CT/CT ABDOMEN/PELVIS WO  Exam Date: 19                            Exam
Time: 1320                                              REPORT STATUS: Signed   
EXAMINATION: CT of the abdomen and pelvis without contrast.       TECHNIQUE: S
piral CT images of the abdomen and pelvis were performed from the   lung bases t
o the lesser trochanters.  No intravenous contrast was given per   renal stone p
rotocol.  Coronal and sagittal reformatted images were obtained.      COMPARISON
:  None.      CLINICAL HISTORY:Nephrostomy tubes, catheters obstruction         
 DISCUSSION: ABSENCE OF INTRAVENOUS CONTRAST DECREASES SENSITIVITY FOR DETECTION
  OF FOCAL LESIONS AND VASCULAR PATHOLOGY.      ABDOMEN/PELVIS:      LOWER THO
RAX: Unremarkable.       HEPATOBILIARY:No focal hepatic lesions. Calcified galls
tone.      SPLEEN: No splenomegaly.      PANCREAS: No focal masses or ductal dil
atation.      ADRENALS: No adrenal nodules.      KIDNEYS/URETERS: Right percutan
eous nephrostomy tube. No obstruction.       2 left ureteral stents extending fr
om the renal pelvis to the bladder. Left   hydronephrosis. In the inferior pole 
3 small calculi measuring up to 3 mm.      PELVIC ORGANS/BLADDER: Gomez catheter
. Bladder is decompressed. Hysterectomy.      PERITONEUM/RETROPERITONEUM: No rizwan
e air or fluid.      LYMPH NODES: No intra-abdominal,retroperitoneal, pelvic or 
inguinal   lymphadenopathy.      VESSELS: Vascular calcifications.      GI TRACT
: No distention or wall thickening.      BONES AND SOFT TISSUES: No bony destruc
tive lesions.  No soft tissue   abnormalities.        IMPRESSION:       Left hyd
ronephrosis despite 2 left renal stents present.      Small nonobstructing left 
inferior pole renal calculi measuring up to 3 mm      Right percutaneous nephros
edison tube. No obstruction.      Gomez catheter within the decompressed bladder. 
    Cholelithiasis.      Signed by: Dr. Andrew Palisch, M.D. on 2019 2:10 
PM        Dictated By: ANDREW R PALISCH MD  Electronically Signed By: ANDREW R P
ALISCH MD on 19 141  Transcribed By: LUCINDA on 19 141       COPY 
TO:   LISSETTE GATES MD        CHEST SINGLE (NOT PORTABLE)2018 18:00:00  
                                                                                
  [place] [address]      Patient Name: CRISTINA RICH     
                             MR #: [id number]                     :                                    Age/Sex: 53/F  Acct #: [id number]   
                          Req #: [id number]  Adm Physician:                     
                                Ordered by: JENNIFER TERRELL MD                   
        Report #: [id number]        Location: ER                                 
    Room/Bed:                     __________________________________________
_________________________________________________________    Procedure: [id number]
6 DX/CHEST SINGLE (NOT PORTABLE)  Exam Date: 18                           
Exam Time: 1750                                              REPORT STATUS: Sig
ki    EXAM: CHEST SINGLE (NOT PORTABLE), AP 1 view   INDICATION: PIC line leaki
ng   COMPARISON: None      FINDINGS:   LINES/TUBES: There is a left midline cath
eter with tip projected over the   axilla.      LUNGS: No consolidations or don
a.       PLEURA: No effusions or pneumothorax.      HEART AND MEDIASTINUM: Cinda
l size and contour.      BONES AND SOFT TISSUES: No acute findings.       IMPRES
WILFRED:   There is a left midline catheter with tip projected over the axilla.   N
o priors are available to determine original location.            Signed by: Dr. Cristina Main M.D. on 2018 6:03 PM        Dictated By: CRISTINA MAIN MD  Electronically Signed By: CRISTINA MAIN MD on 18  Transcribed By:
LUCINDA on 18       COPY TO:   JENNIFER TERRELL MD        NEPHROSTOMY 
CATHETER [id number] 07:05:00                                            
                                         [place] [address]      
Patient Name: CRISTINA RICH                                   MR #: 
[id number]                     : 1965                                  
Age/Sex: 53/F  Acct #: [id number]                              Req #: [id number]
[id number]  Adm Physician: MATTHEW FLORENTINO MD                                      
Ordered by: JACQUELINE ARORA MD                            Report #: [id number]       
Location: [facility]                               Room/Bed: [facility]               
____________________________________________
_______________________________________________________    Procedure: [id number] 
DX/NEPHROSTOMY CATHETER EXCHANGE  Exam Date:                             Exam Ti
me:                                               REPORT STATUS: Signed    Proce
dure: Right nephrostomy catheter exchange      Medications: Versed 2 mg intraven
ous, Fentanyl 100 mcg intravenous.  The   patient's vital signs, including pulse
oximetry, were continuously monitored by   the interventional radiology nurse.  
   Fluoroscopy time: 1.9 minutes.   Dose area product: 1.76 cGycm2         Pro
cedure in detail:      Informed consent for the procedure was obtained from the 
patient after   discussion of risks and benefits. The right back was prepped and
draped in the   usual fashion.      1% lidocaine was administered into the skin 
and subcutaneous tissues of the   right back for local anesthesia. Contrast was 
injected showing proximal   ureteral obstruction. A    0.035  " Amplatz superst
iff wire was placed through the catheter. A new 10   French all-purpose drainage
catheter was then placed into the renal pelvis.   Contrast injection confirms a
ppropriate position.  Catheter was secured to the   skin with a sterile dressing
.      Impression:      Successful right percutaneous nephrostomy catheter excha
nge.      Signed by: Dr. Shayla Krause DO on 2018 7:11 AM        Dictated 
By: SHAYLA KRAUSE DO  Electronically Signed By: SHAYLA KRAUSE DO on 18 071
1  Transcribed By: LUCINDA on 18 0711       COPY TO:   JACQUELINE ARORA MD      
 CT ABDOMEN/PELVIS [id number] 12:56:00                                        
                                             [place] [address]  
   Patient Name: CRISTINA RICH                                   MR #: 
[id number]                     : 1965                                  
Age/Sex: 53/F  Acct #: [id number]                              Req #: [id number]
[id number]  Adm Physician:                                                      
Ordered by: SHARIF BRITO MD                            Report #: [id number]
       Location: ER                                      Room/Bed:              
      ______________________________________
_____________________________________________________________    Procedure: 1101
-0019 CT/CT ABDOMEN/PELVIS WO  Exam Date: 18                            Ex
am Time: 1230                                              REPORT STATUS: Signed
   EXAM: CT Abdomen and Pelvis WITHOUT contrast     INDICATION: Left flank pain.
Previous kidney stone with stent.   COMPARISON: 10/22/2018   TECHNIQUE: Abdomen 
and pelvis were scanned utilizing a multidetector helical   scanner from the l
yoav base to the pubic symphysis without administration of IV   contrast. Absence
of intravenous contrast decreases sensitivity for detection   of focal lesions 
and vascular pathology. Coronal and sagittal reformations were   obtained. Routi
ne protocol was performed.                IV CONTRAST: None.               ORAL 
CONTRAST: Water               RADIATION DOSE: Total DLP: 797.04 mGy*cm          
     Estimated effective dose: (DLP x 0.015 x size factor) mSv               CO
MPLICATIONS: None      FINDINGS:      LINES and TUBES: Right nephrostomy tube wi
th distal tip coiled within the right   renal pelvis. Left double-J ureteral bryan
nt in adequate position. Catheter   within the bladder.      LOWER THORAX:  Calc
ified granuloma in the posterior right lung base.      HEPATOBILIARY:   No focal
hepatic lesions. No biliary ductal dilation.       GALLBLADDER: Peripherally ca
lcified gallstone in the lumen of the gallbladder.   No wall thickening.      SP
CONSTANTINO: No splenomegaly.       PANCREAS: No focal masses or ductal dilatation.    
   ADRENALS: No adrenal nodules.      KIDNEYS/URETERS: 7.0 mm stone within the 
lower pole of the left kidney. This   appears to be fragmented when compared wit
h the prior exam. Minimal right   hydronephrosis improved since the prior exam. 
Mild left hydronephrosis improved   when compared with the prior exam.       Unc
hanged left perinephric stranding may reflect pyelonephritis. Unchanged left   p
eriureteral fat stranding.      GI TRACT: No abnormal distention, wall thickenin
g, or evidence of bowel   obstruction.            PELVIC ORGANS/BLADDER: Radiati
on seeds again observed in the uterine cervix.   Urinary bladder is decompressed
by a Gomez catheter.      LYMPH NODES: No lymphadenopathy.      VESSELS: Unrema
rkable.      PERITONEUM / RETROPERITONEUM: No free air or fluid.      BONES: The
re are degenerative changes in the lumbar spine.      SOFT TISSUES: Unremarkable
.        IMPRESSION:       1.  Unchanged left perinephric stranding may reflect 
pyelonephritis. Minimal   left hydroureteronephrosis is improved when compared w
ith the prior exam. The   previously seen 9.0 mm stone in the lower pole of the 
left kidney appears to be   fragmented and slightly smaller.   2. Minimal right 
hydroureteronephrosis improved when compared with the prior   exam. Signed by: ALEJNADRO Del Castillo M.D. on 2018 1:09 PM        Dictated By: DEB ALLEN  Electronically Signed By: DEB DEL CASTILLO MD, MD on 18 1302  Transcribed B
y: LUCINDA on 18 1300       COPY TO:   SHARIF BRITO MD        CT 
ABDOMEN/PELVIS WO8-10-22 18:26:00                                            
                                         [place] [address]      
Patient Name: CRISTINA RICH                                   MR #: 
[id number]                     : 1965                                  
Age/Sex: 53/F  Acct #: [id number]                              Req #: [id number]
[id number]  Adm Physician:                                                      
Ordered by: IGNACIO MARQUES NP                            Report #: [id number]
       Location: ER                                      Room/Bed:              
      ______________________________________
_____________________________________________________________    Procedure: 1022
-0022 CT/CT ABDOMEN/PELVIS WO  Exam Date:                             Exam Time:
                                              REPORT STATUS: Signed    EXAM: CT 
Abdomen and Pelvis WITHOUT contrast     INDICATION: Left-sided abdominal pain. 
Kidney stones? Cervical cancer.   COMPARISON: .   TECHNIQUE: Abdomen and pe
lvis were scanned utilizing a multidetector helical   scanner from the lung base
to the pubic symphysis without administration of IV   contrast. Absence of intr
avenous contrast decreases sensitivity for detection   of focal lesions and vasc
ular pathology. Coronal and sagittal reformations were   obtained. Routine roland
col was performed.                IV CONTRAST: None.               ORAL CONTRAST
: Water               RADIATION DOSE: Total DLP: 567.26 mGy*cm                Es
timated effective dose: (DLP x 0.015 x size factor) mSv               COMPLICATI
ONS: None      FINDINGS:      LINES and TUBES: Right nephrostomy tube with dista
l tip coiled within the right   renal pelvis. Interval placement of a left doubl
e-J ureteral stent in adequate   position. Pole catheter within the bladder.    
 LOWER THORAX:  Costophrenic granuloma in the posterior right lung base.      H
EPATOBILIARY:   No focal hepatic lesions. No biliary ductal dilation.       GALL
BLADDER: No stomach and observed. No wall thickening.      SPLEEN: No splenomega
ly.       PANCREAS: No focal masses or ductal dilatation.        ADRENALS: No ad
renal nodules.      KIDNEYS/URETERS:  9 mm top within the lower pole of the left
kidney. Mild right   hydronephrosis new since the prior examinations the presen
ce of a nephrostomy   tube. Mild to moderate left hydronephrosis appears unchang
ed. New left   perinephric stranding may reflect pyelonephritis. Left periureter
al fat   stranding.      GI TRACT: No abnormal distention, wall thickening, or e
vidence of bowel   obstruction.   Appendix is normal.      PELVIC ORGANS/BLADDER
: Radiation seeds again observed in the uterine cervix.   Urinary bladder is dec
ompressed by a Gomez catheter.      LYMPH NODES: No lymphadenopathy.      VESSEL
S: Unremarkable.      PERITONEUM / RETROPERITONEUM: No free air or fluid.      B
ONES: There are degenerative changes in the lumbar spine.      SOFT TISSUES: Unr
emarkable.        IMPRESSION:       1.  New left perinephric stranding may refle
ct pyelonephritis.  Mild to   moderate left hydroureteronephrosis is stable.   2
. Interval development of right hydroureteronephrosis in spite of presence of   
a nephrostomy tube. Please correlate with nephrostomy output.      Signed by: Dr Vannessa Grimaldo M.D. on 10/22/2018 6:39 PM        Dictated By: EMMA DELGADILLO MD, MD  Electronically Signed By: EMMA GRIMALDO MD, MD on 10/22/18 1839  Tr
anscribed By: LUCINDA on 10/22/18 1839       COPY TO:   IGNACIO MARQUES NP    
   RENAL SCAN W/FLOW   [id number] 19:56:00    [place] [address]      Patient 
Name: CRISTINA RICH   MR #: [id number]    : 1965 Age/Sex: 53/F  Acct #:
[id number] Req #: [id number]  Adm Physician:     Ordered by: JACQUELINE ARORA MD  
Report #: [id number]   Location: NM  Room/Bed:     
_______________________________________________________________________________
____________________    Procedure: [id number] NM/RENAL SCAN W/FLOW   FUNCTION  Ex
am Date: 18                            Exam Time: 1510       REPORT STATUS
: Signed       Renal Scan       Reason for exam: 53 F with vesicoureteral reflux
.      Radiopharmaceutical: Tc-99m MAG3 11 mCi      Report:  After administratio
n of the radiopharmaceutical, dynamic images of the   kidneys were obtained thro
[facility] 40 minutes.        LEFT KIDNEY:  Perfusion is prompt.  The left kidney has a
reniform shape with   irregular contours and mild thinning of the renal cortex, 
more apparent in the   upper pole.  Extraction of tracer from the blood pool is 
mildly decreased.   Clearance of tracer from the renal parenchyma is prompt. The
pelvicaliceal   system is very mildly dilated. Some increase in pooling of tra
cer is seen   within the pelvicaliceal system. Drainage of tracer from the pelvi
caliceal   system is adequate.  No stasis of tracer is seen in the left ureter. 
    RIGHT KIDNEY: A PERCUTANEOUS NEPHROSTOMY CATHETER IN THE RIGHT KIDNEY IS OP
EN   DURING THE STUDY.  Perfusion to the right kidney is prompt. The kidney has 
a   distorted reniform shape with moderate thinning of the renal cortex and   ir
regular contours.  The kidney is smaller than the left kidney. Extraction of   t
racer from the blood pool is mildly decreased. Clearance of tracer from the   re
nal parenchyma is prompt. The pelvicaliceal system does not appear dilated   alt
sean calices in the upper pole are slightly prominent.  Tracer drains   freely 
in the PCN catheter so the renal pelvis is not adequately evaluated.   There is 
no significant increase in pooling of tracer within the pelvicaliceal   system. 
Drainage of tracer from the pelvicaliceal study cannot be assessed   because of 
the open PCN tube. No tracer is seen in the right ureter.      DIFFERENTIAL SANOTSH
AL FUNCTION: Left kidney 62% and right kidney 38% (normal   43-57%.      Impress
ion:        1.  The left kidney shows evidence of mild medical renal disease and
some   scarring.  Mild hydronephrosis is present.  Obstruction is not suspected.
     2. The right kidney shows evidence of mild medical renal disease.  The ki
dney   shows significant scarring and this accounts for the decreased differenti
al   function of 38%.  Hydronephrosis cannot be assessed because an open PCN is 
 draining the pelvicaliceal system throughout the study.                  Signed
by: Dr. Riddhi Mcdermott M.D. on 2018 8:12 PM        Dictated By: RIDDHI MCDERMOTT MD  Electronically Signed By: RIDDHI MCDERMOTT MD on 18  Transcribed By: 
LUCINDA on 18       COPY TO:   JACQUELINE ARORA MD        IR CONSULT
2018 07:24:00    [facility]   [address] [place] [address]      Patient Name: CRISTINA RICH   MR #: 
[id number]    : 1965 Age/Sex: 52/F  Acct #: [id number] Req #: [id number]
[id number]  Adm Physician: ROYER DOVER MD    Ordered by: JACQUELINE ARORA MD  Report 
#: [id number]   Location: MED/SURG3  Room/Bed: [facility]    
__________________________________________________
_________________________________________________    Procedure:  DX/IR 
CONSULT  Exam Date:                             Exam Time:        REPORT STATUS:
Signed    Nephrostomy catheter evaluation and fluoroscopic exchange      Pre-Pr
ocedure Diagnosis: Cervical cancer status post radiation therapy with   right ur
eteral stenosis.   Post-procedure Diagnosis:Cervical cancer status post radiatio
n therapy with   right ureteral stenosis      : Naomi Martinez MD   Assista
nt: None   Sedation: Local 5 cc of 1% subcutaneous lidocaine      Radiation Dose
:235  cGycm2 (Dose Area Product)   Fluoroscopy time 1.1  minutes      Estimate b
lood loss: None    Blood administered: None   Complications: No immediate compli
cations   Implants/Grafts: 10 Fr UreSil    Specimen: None      Procedure:   Info
rmed consent was obtained and the patient positioned prone in the   fluoroscopy 
suite. A timeout was performed. The indwelling right nephrostomy   was prepped a
nd draped in standard fashion.      Diagnostic antegrade nephrostogram was perfo
ed nephrostomy. See findings   below.      An Amplatz wire was placed to secur
e access. The existing catheter was removed   demonstrating cloudy fluid and enc
rustation. A new 10 Fr nephrostomy catheter   was coiled in the renal pelvis wit
h fluoroscopic contrast injection confirming   position.       At the end of the
procedure the catheter was connected to gravity drainage and   a sterile dressi
ng applied. The patient tolerated the procedure well and   without immediate com
plication.      Findings:   Antegrade nephrostogram: Patent catheter. No hydrone
phrosis.  Diffuse right   ureteral stenosis with distal occlusion, as before. Th
e catheter was encrusted   with debris with cloudy urine.       Impression:   Ri
ght nephrostomy catheter exchange with fluoroscopic guidance as above.      Surya
mmendations:   Routine catheter exchange in 8-10 weeks.       Signed by: Dr. Shell Martinez MD on 2018 7:29 AM        Dictated By: NAOMI MARTINEZ MD  Electronical
ly Signed By: NAOMI MARTINEZ MD on 18 1326  Transcribed By: LUCINDA on 
8 1326       COPY TO:   JACQUELINE ARORA MD        SPECIAL PROCEDURE IN CATH LAB
2018 07:24:00    [place] [address]      Patient Name: CRISTINA RICH   MR #: 
[id number]    : 1965 Age/Sex: 52/F  Acct #: [id number] Req #: [id number]
[id number]  Adm Physician: ROYER DOVER MD    Ordered by: JACQUELINE ARORA MD  Report 
#: [id number]   Location: MED/SURG3  Room/Bed: [facility]    
__________________________________________________
_________________________________________________    Procedure: [id number] IR/SPE
CIAL PROCEDURE IN CATH LAB  Exam Date:                             Exam Time:   
    REPORT STATUS: Signed    Nephrostomy catheter evaluation and fluoroscopic e
xchange      Pre-Procedure Diagnosis: Cervical cancer status post radiation ther
apy with   right ureteral stenosis.   Post-procedure Diagnosis:Cervical cancer s
tatus post radiation therapy with   right ureteral stenosis      : Leonor Martinez MD   Assistant: None   Sedation: Local 5 cc of 1% subcutaneous lidocaine 
    Radiation Dose:235  cGycm2 (Dose Area Product)   Fluoroscopy time 1.1  alfonso
lloyd      Estimate blood loss: None    Blood administered: None   Complications: 
No immediate complications   Implants/Grafts: 10 Fr UreSil    Specimen: None    
 Procedure:   Informed consent was obtained and the patient positioned prone in 
the   fluoroscopy suite. A timeout was performed. The indwelling right nephrost
angelina   was prepped and draped in standard fashion.      Diagnostic antegrade neph
rostogram was performed nephrostomy. See findings   below.      An Amplatz wire 
was placed to secure access. The existing catheter was removed   demonstrating c
loudy fluid and encrustation. A new 10 Fr nephrostomy catheter   was coiled in t
he renal pelvis with fluoroscopic contrast injection confirming   position.     
 At the end of the procedure the catheter was connected to gravity drainage and 
 a sterile dressing applied. The patient tolerated the procedure well and   wi
thout immediate complication.      Findings:   Antegrade nephrostogram: Patent c
atheter. No hydronephrosis.  Diffuse right   ureteral stenosis with distal occlu
wilfred, as before. The catheter was encrusted   with debris with cloudy urine.    
  Impression:   Right nephrostomy catheter exchange with fluoroscopic guidance 
as above.      Recommendations:   Routine catheter exchange in 8-10 weeks.      
Signed by: Dr. Naomi Martinez MD on 2018 7:29 AM        Dictated By: NAOMI MOHAMUD MD  Electronically Signed By: NAOMI MARTINEZ MD on 18 1326  Transcribed By:
LUCINDA on 18 1326       COPY TO:   JACQUELINE ARORA MD        CHEST XRAY LINE 
[id number] 11:46:00    [place] [address]      Patient Name: CRISTINA RICH
  MR #: [id number]    : 1965 Age/Sex: 52/F  Acct #: [id number] Req #:
[id number]  Adm Physician: ROYER DOVER MD    Ordered by: JACQUELINE ARORA MD  
Report #: [id number]   Location: MED/SURG3  Room/Bed: [facility]    
__________________________________________________
_________________________________________________    Procedure: [id number] DX/BAKARI
ST XRAY LINE PLACEMENT  Exam Date:                             Exam Time:       
REPORT STATUS: Signed    PROCEDURE:   A single AP view of the chest.       COMP
ARISON: None.       INDICATIONS:   PICC LINE PLACEMENT           FINDINGS:   Birgit
es/tubes:  Right sided PICC terminating in the expected location of    the SVC. 
      Lungs: Low lung volumes. There is no evidence of pneumonia or pulmonary   
edema. Mild patchy bibasilar atelectasis.        Pleura:  There is no pleural 
effusion or pneumothorax.       Heart and mediastinum:  The cardiomediastinal si
lhouette is    unremarkable.       Bones:  No acute bony abnormality.       IMPR
ESSION:    Right sided PICC terminating in the expected location of the SVC. No 
  evidence of pneumothorax.            Dictated by:  NAOMI MARTINEZ M.D. on 
018 at 11:46        Electronically approved by:  NAOMI MARTINEZ M.D. on 2018 a
t 11:46                Dictated By: NAOMI MARTINEZ MD  Electronically Signed By: SHELL MARTINEZ MD on 18 1146  Transcribed By: FERNANDO on 18 1146       COPY T
O:   JACQUELINE ARORA MD        [facility]2018-08-10 07:34:00    [place] [address]      Patient Name: CRISTINA RICH   MR #: [id number]    : 1965 
Age/Sex: 52/F  Acct #: [id number] Req #: [id number]  [facility] Physician: ROYER LAW MD    Ordered by: JACQUELINE ARORA MD  Report #: [id number]   Location: 
[facility]  Room/Bed: [place] [address]    __________________________________________________
_________________________________________________    Procedure: [id number] DX/ABD
OMEN-1VIEW (KUB)  Exam Date: 08/10/18                            Exam Time: 710
      REPORT STATUS: Signed    PROCEDURE:   X-RAY ABDOMEN - KUB       COMPARISO
N:   CT Abdomen/Pelvis 3/4/18.       INDICATIONS:   PREOPERATIVE XRAY FOR ESWL  
     FINDINGS:      Right sided PCN is present. A 5 mm and 2 mm calcification p
rojects over    the left kidney. No calcifications over the expected location of
the    ureters. Surgical clips in the pelvis.        There is a non-obstructed 
bowel-gas pattern. There are no acute osseous    abnormalities.        CONCLUSIO
N:      Right sided PCN in similar position.        Left sided renal stones kristi
uring up to 5 mm, better characterized on    CT from 3/4/18. No evidence of ston
es overlying the ureters.       Dictated by:  NAOMI MARTINEZ M.D. on 8/10/2018 at 7
:34        Electronically approved by:  NAOMI MARTINEZ M.D. on 8/10/2018 at 7:34   
            Dictated By: NAOMI MARTINEZ MD  Electronically Signed By: NAOMI MARTINEZ MD 
on 08/10/18 0734  Transcribed By: FERNANDO on 08/10/18 0734       COPY TO:   JACQUELINE SMITH MD        CT ABDOMEN/PELVIS  19:09:00    [place] [address]      
Patient Name: CRISTINA RICH   MR #: [id number]    : 1965 Age/Sex: 52/F 
Acct #: [id number] Req #: [id number]  Adm Physician:     Ordered by: MARIFER REYNA NP  Report #: [id number]   Location: ER  Room/Bed:     
_________________________________________________________________________
__________________________    Procedure: [id number] CT/CT ABDOMEN/PELVIS WO  Exam
Date: 18                            Exam Time: 1830       REPORT STATUS: 
Signed    EXAM: CT Abdomen and Pelvis WITHOUT contrast     INDICATION: UTI. Feve
r.   COMPARISON: CT abdomen and pelvis 3/4/2018.   TECHNIQUE: Abdomen and pelvis
were scanned utilizing a multidetector helical   scanner from the lung base to 
the pubic symphysis without administration of IV   contrast. Absence of intraven
ous contrast decreases sensitivity for detection   of focal lesions and vascular
pathology. Coronal and sagittal reformations were   obtained. Renal stone roland
col was performed.                IV CONTRAST: None.               ORAL CONTRAST
: Water               RADIATION DOSE: Total DLP: 560.77 mGy*cm                Es
timated effective dose: (DLP x 0.015 x size factor) mSv               COMPLICATI
ONS: None      FINDINGS:      LINES and TUBES: Right percutaneous nephrostomy tu
be, unchanged position.      LOWER THORAX:  2 mm right lower lobe calcified gran
uloma, unchanged.      HEPATOBILIARY: Mild diffuse low-attenuation of the hepati
c parenchyma   suggesting mild steatosis.  No focal hepatic lesions. No biliary 
ductal   dilation.       GALLBLADDER: Calcified gallstone.  No wall thickening. 
    SPLEEN: No splenomegaly.       PANCREAS: No focal masses or ductal dilatati
on.        ADRENALS: No adrenal nodules          KIDNEYS/URETERS: Interval devel
opment of mild to moderate left-sided   hydronephrosis. Mild to moderate left ur
eteral dilatation all the way to the   level just proximal to the UVJ. 2 calculi
were previously present in the lower   pole of the left kidney; the smallest one
is no longer visualized, possibly   passed. 5.8 x 5.8 mm calculus in the lower 
pole of the left kidney appears   slightly bulkier, previously measuring 4 mm.  
   GI TRACT: No abnormal distention, wall thickening, or evidence of bowel   o
bstruction.       Appendix is normal.      REPRODUCTIVE ORGANS: The uterus is sm
all. No adnexal masses. Likely radiation   seeds in the cervix.      BLADDER: A 
small contracted bladder is again observed. Redemonstration of a   cystic struct
ure in the pelvis suggestive of a small bladder, which is   unchanged in appeara
nce. Radiation seeds again observed in the uterine cervix.      LYMPH NODES: No 
lymphadenopathy.      VESSELS: There is mild atherosclerotic disease in the aort
a and major arterial   branches.      PERITONEUM / RETROPERITONEUM: No free air 
or fluid.      BONES: Lower thoracic DISH.      SOFT TISSUES: Unremarkable.     
   IMPRESSION:    1.  Interval development of mild to moderate left-sided hydro
nephrosis which   may be related to a recently passed calculus or less likely du
e to distal   ureteral stricture. Otherwise unchanged exam.      Signed by: Dr. DELL Grimaldo M.D. on 2018 7:28 PM        Dictated By: EMMA VIERA MD, MD  Electronically Signed By: EMMA GRIMALDO MD, MD on 18  Transc
ribed By: LUCINDA on 18       COPY TO:   MARIFER REYNA         
URINE AND [id number] 22:54:00Negative *NA*(16 4:54 PM) [facility]
AND KUCDY8071-43-28 22:54:00Moderate *ABN*(16 4:54 PM)[facility] 
LBDNF3103-44-15 22:54:00Positive *ABN*(16 4:54 PM)[facility] 
NFFMG0829-06-76 22:54:00Large *ABN*(16 4:54 PM) [facility] STOOL
2016 22:54:00>182[facility] YNVNK0838-72-23 22:54:82308QF 
[facility] FHGAE7402-23-72 22:54:0034[facility] STOOL
2016 22:54:00Yellow *NA*(16 4:54 PM) [facility] STOOL
2016 22:54:00Marked *ABN*(16 4:54 PM) [facility] STOOL
2016 22:54:005.0[facility] INDYQ6040-81-28 22:54:001.018 
[facility] GNWLW7470-92-68 22:40:000.6[facility] ESQJT2000-97-90 
22:40:005.8[facility] JZPYZ8261-10-60 22:40:0012 [facility] PANEL
2016 22:40:0012.6MH [facility] OGZRP7259-33-40 22:40:06906KI [facility] UXOYC6425-27-95 22:40:0028MH [facility] CGBPC3048-52-86 22:40:000.3MH 
[facility] TANEW0573-66-21 22:40:0073MH [facility] LBMZX7044-10-12 
22:40:0025[facility] MSXJR9916-46-15 22:40:003.4 [facility] PANEL
2016 22:40:009.2[facility] YZCZR6734-55-44 22:40:009.1[facility] FOVSY5203-12-34 22:40:0026 [facility] KFMII4670-47-94 22:40:04929BA 
[facility] DGQJU9800-65-46 22:40:22304XX [facility] EIXIS5687-94-79 
22:40:004.6MH [facility] LVJTR9989-05-18 22:40:000.91 [facility] PANEL
2016 22:40:0011 [facility] BCHXF2388-24-88 22:40:[facility]
WUSSUWBSIDCRZ6898-70-08 22:40:00Negative *NA*(16 4:40 PM)[facility]
BBTQAYRTZL0288-18-59 22:40:0032.8 CsipcovjiTGFKQSALTK8968-09-09 22:40:0060.0
GmtmjpahwSNAFHRMNLL6777-02-76 22:40:000.2M YqhjskwszSKXZRJMNXU3984-55-07 
22:40:000.2M SbmekocwbAPIVVCNTUZ5577-67-28 22:40:004.6M [facility]
2016 22:40:001.2M RlnvdazevXJCACRVEUU2391-88-03 22:40:001.4[facility]
EFMMBQBHTF7297-43-34 22:40:007.3M AkmulmgfsVUXCCBJWVT0123-76-01 22:40:000.6M 
CuxmfefgkVALDAKQCQR2607-39-24 22:40:004.0 KrnhhhtnzVGSLWWDWPW1878-92-94 
22:40:00Clumped (16 4:40 PM) GzlxmqgosVKTYIGSYMP1133-19-72 22:40:00Normal 
(16 4:40 PM) AlyxksxecTOLBDJEWBH2108-39-44 22:40:008.7[facility]
WLCJBQNVBY5025-13-32 22:40:0012.0[facility] HomeWyzlchtyfBIVBXMKGWY9615-83-87 22:40:004.90
RdgrshudkZFPJICUZRM3042-10-65 22:40:0039.0 JafooooxqEAIKKWMJRC4882-34-30 
22:40:0079.7 VchxtfgufDOGQGIJOQY1722-21-26 22:40:0012.0 [facility]
2016 22:40:0014.8 HrhkpmzzqRSQLISSIMW6790-03-32 22:40:0030.8 [facility]
LHZAQHOXYK2958-57-75 22:40:66328TU RgcknrlceOHXRSMRCOY6424-29-74 22:40:00* 



             Test Item    Value        Reference Range Interpretation Comments

 

             MCH (test code = MCH) 24.5 pg      27.0-31.0                  





[facility]    [place] [address]      Patient Name: 
CRISTINA RICH   MR #: [id number]    : 1965 Age/Sex: 52/F  Acct #: 
[id number] Req #: [id number]  [facility] Physician:     Ordered by: JACQUELINE ARORA MD  
Report #: [id number]   Location: CATH LAB  Room/Bed:     
_________________________________________________________________________
__________________________    Procedure: [id number] IR/SPECIAL PROCEDURE IN CATH 
LAB  Exam Date:                             Exam Time:        REPORT STATUS: Sig
ki    Nephrostomy catheter evaluation and exchange, 2018      Pre-Procedur
e Diagnosis: Cervical cancer status post radiation therapy with   right ureteral
stenosis.   Post-procedure Diagnosis:Cervical cancer status post radiation ther
apy with   right ureteral stenosis      : NIK Patel   Assistant: Non
e   Sedation: None.      Radiation Dose:1.931 cGycm2 (Dose Area Product)   Fluor
oscopy time:0.7 minutes      Estimate blood loss: None Blood administered: None 
 Complications: None   Implants/Grafts: 8-French right nephrostomy   Specimen: 
None         Procedure:   Informed consent was obtained and the patient position
ed prone in the   fluoroscopy suite. A timeout was performed. The indwelling rig
ht nephrostomy   was prepped and draped in standard fashion.      Diagnostic ant
egrade nephrostogram was performed nephrostomy. See findings   below.      The i
ndwelling catheter was severed and removed over a Glidewire given heavy   encrus
tation. Glidewire was exchanged for a Bentson wire with a short KMP   catheter. 
A new 10-French nephrostomy was coiled in the renal pelvis without   complicatio
n. Contrast injection confirmed placement within the renal pelvis.      At the e
nd of the procedure the catheter was connected to gravity drainage and   a steri
le dressing applied. The patient tolerated the procedure well and   without imme
diate complication.      Findings:   Antegrade nephrostogram: Patent catheter. N
o hydronephrosis. Mildly prominent   pelvis. Diffuse right ureteral stenosis wit
h distal occlusion. The catheter was   heavily encrusted with debris.      Impre
ssion:   Successful right 10-French nephrostomy exchange with antegrade nephrost
ogram.         Recommendations:   Routine catheter exchange in 2 months given st
ent encrustation.            This report was generated with voice-recognition te
chnology. Errors in   transcription can occur. Please interpret accordingly and 
contact a radiologist   if there are any questions regarding the report.      Si
gned by: Dr. Best Patel M.D. on 2018 11:55 AM        Dictated By: BEST PATEL MD  Electronically Signed By: BEST PATEL MD on 18 1522  Tra
nscribed By: LUCINDA on 18 1522       COPY TO:   JACQUELINE ARORA MD        CT 
ABDOMEN/PELVIS [place] [address]      Patient Name: CRISTINA RICH   MR #: 
[id number]    : 1965 Age/Sex: 52/F  Acct #: [id number] Req #: [id number]
[id number]  Adm Physician:     Ordered by: TINO MALHOTRA NP  Report #: [id number]
  Location: ER  Room/Bed:     
__________________________________________________________________________
_________________________    Procedure: [id number] CT/CT ABDOMEN/PELVIS WO  Exam 
Date: 18                            Exam Time: 1500       REPORT STATUS: S
igned    EXAM: CT Abdomen and Pelvis WITHOUT contrast     INDICATION: Abdominal 
pain, concerning for renal stone   COMPARISON: CT abdomen and pelvis from 
018 fluoroscopy for nephrostomy   tube change 2018   TECHNIQUE: Abdomen and
pelvis were scanned utilizing a multidetector helical   scanner from the lung b
ase to the pubic symphysis without administration of IV   contrast. Absence of i
ntravenous contrast decreases sensitivity for detection   of focal lesions and v
ascular pathology. Coronal and sagittal reformations were   obtained. Renal ston
e protocol was performed.                IV CONTRAST: None.               ORAL C
ONTRAST: Water               RADIATION DOSE: Total DLP: 550.3 mGy*cm            
   Estimated effective dose: (DLP x 0.015 x size factor) mSv               COMP
LICATIONS: None      FINDINGS:      LINES and TUBES: Right percutaneous nephrost
angelina tube.      LOWER THORAX:  2 mm right lower lobe calcified granuloma.      HE
PATOBILIARY:      No focal hepatic lesions. No biliary ductal dilation.       GA
LLBLADDER: Unchanged peripherally calcified 2 cm gallstone and a few   additiona
l 2 to 3 mm gallstones.  No wall thickening.      SPLEEN: No splenomegaly.      
PANCREAS: No focal masses or ductal dilatation.        ADRENALS: No adrenal nod
ules          KIDNEYS/URETERS:  Interval resolution of the right hydronephrosis 
after   percutaneous nephrostomy tube exchange. Few other foci in the right kidn
ey   likely due to procedure. Previously noted right perinephric fat stranding h
aved   resolved. No cystic or solid mass lesions.  Multiple up to 5 mm left abeba
l   stones remain unchanged. Minimal dilatation of the renal pelves, otherwise, 
no   left hydronephrosis. Unchanged mild left hydroureter.      GI TRACT: No abn
ormal distention, wall thickening, or evidence of bowel   obstruction.       Kelly
endix is normal.      REPRODUCTIVE ORGANS: The uterus is small. No adnexal michael
s. Likely radiation   seeds in the cervix.      BLADDER: The bladder is small an
d irregular, possibly due to postradiation   changes.      LYMPH NODES: No lymph
adenopathy.      VESSELS: Unremarkable.      PERITONEUM / RETROPERITONEUM: No fr
ee air or fluid.      BONES: Unremarkable.      SOFT TISSUES: Unremarkable.     
            IMPRESSION:    1.  Interval resolution of the right hydronephrosis 
after but without new   nephrostomy tube exchange.      2.  Stable left nephroli
thiasis.      3.  No acute abnormalities in the abdomen and pelvis.      Signed 
by: Dr. Georgiana Purcell M.D. on 3/4/2018 4:03 PM        Dictated By: SHAYY PURCELL MD  Electronically Signed By: GEORGIANA quintero 18 1603  Transcribed By: LUCINDA on 18 1603       COPY TO:   TINO MALHOTRA NP        SPECIAL PROCEDURE IN CATH LAB    [place] [address]      Patient 
Name: CRISTINA RICH   MR #: [id number]    : 1965 Age/Sex: 52/F  Acct #:
[id number] Req #: [id number]  Adm Physician: ROYER DOVER MD    Ordered by: 
JACQUELINE ARORA MD  Report #: [id number]   Location: MED/SURG2  Room/Bed: [facility]    
__________________________________________________
_________________________________________________    Procedure: [id number] IR/SPE
CIAL PROCEDURE IN CATH LAB  Exam Date:                             Exam Time:   
    REPORT STATUS: Signed    Nephrostomy catheter evaluation and subsequent exc
hange, 2018      Pre-Procedure Diagnosis: Cervical cancer status post radia
tion therapy with   right ureteral stenosis.   Post-procedure Diagnosis:Cervical
cancer status post radiation therapy with   right ureteral stenosis      Operat
or: NIK Patel   Assistant: None   Sedation: Moderate sedation was provided w
ith intravenous fentanyl and Versed.    Heart rate, rhythm and oxygen saturation
were monitored and real-time by a   dedicated interventional radiology nurse un
donn my direct supervision.  Blood   pressure was monitored in 5 minute increment
s.  1% lidocaine was used for local   anesthesia. Total sedation time: 30 minute
s.      Radiation Dose <1: cGycm2 (Dose Area Product)   Fluoroscopy time:1.2 
minutes      Estimate blood loss: None Blood administered: None   Complications:
None   Implants/Grafts: 10-French right nephrostomy   Specimen: None         
Procedure:   Informed consent was obtained and the patient positioned prone in 
the   fluoroscopy suite. A timeout was performed. The indwelling right 
nephrostomy   was prepped and draped in standard fashion.      Diagnostic 
antegrade nephrostogram was performed given the long indwelling time   of the 
previously placed for ostomy. See findings below.      Attempts to remove the 
indwelling 8-French nephrostomy over a Bentson and   Glidewire were unsuccessful
due to heavy encrustation. The catheter was   subsequently severed and removed 
through an 11-French peel-away sheath. A   Bentson wire was advanced through the
peel-away sheath and exchanged for a   10-French nephrostomy. Contrast injection
confirmed placement within the renal   pelvis.      At the end of the procedure 
the catheter was secured to the skin, connected to   gravity drainage and a 
sterile dressing applied. The patient tolerated the   procedure well and without
immediate complication.      Findings:   Antegrade nephrostogram demonstrated 
mild hydronephrosis of the right   collecting system and a patent, but heavily 
encrusted catheter requiring   removal via peel away sheath. The distal ureter 
is narrowed consistent with   stricture. Aggressive nephrostogram was not 
performed given superimposed   infection.      Impression:   Successful right 
nephrostomy  exchange.   Distal right ureteral stricture      Recommendations:  
Routine catheter exchange in 2 months given heavy encrustation.            This 
report was generated with voice-recognition technology. Errors in   
transcription can occur. Please interpret accordingly and contact a radiologist 
 if there are any questions regarding the report.      Signed by: Dr. Best Patel M.D. on 2018 2:37 PM        Dictated By: BEST PATEL MD  E
lectronically Signed By: BEST PATEL MD on 18  Transcribed By: CHARI PEMBERTON on 18       COPY TO:   JACQUELINE ARORA MD        IR CONSULT    [address] 
[place] [address]      Patient Name: CRISTINA RICH   MR #: [id number]    :  Age/Sex: 52/F  [facility] #: [id number] Req #: [id number]  [facility] Physician: ROYER LAW MD    Ordered by: JENNIFER TERRELL MD  Report #: [id number]   Locatio
n: MED/SURG2  Room/Bed: [facility]    _______________________________________________
____________________________________________________    Procedure: [id number] DX/
IR CONSULT  Exam Date:                             Exam Time:        REPORT STAT
US: Signed    Nephrostomy catheter evaluation and subsequent exchange, 2018
     Pre-Procedure Diagnosis: Cervical cancer status post radiation therapy with
  right ureteral stenosis.   Post-procedure Diagnosis:Cervical cancer status p
ost radiation therapy with   right ureteral stenosis      : OLIVIA Patel   Assistant: None   Sedation: Moderate sedation was provided with intravenous 
fentanyl and Versed.    Heart rate, rhythm and oxygen saturation were monitored 
and real-time by a   dedicated interventional radiology nurse under my direct spangler
pervision.  Blood   pressure was monitored in 5 minute increments.  1% lidocaine
was used for local   anesthesia. Total sedation time: 30 minutes.      Radiation
Dose <1: cGycm2 (Dose Area Product)   Fluoroscopy time:1.2 minutes      Estimate
blood loss: None Blood administered: None   Complications: None   
Implants/Grafts: 10-French right nephrostomy   Specimen: None         Procedure:
  Informed consent was obtained and the patient positioned prone in the   
fluoroscopy suite. A timeout was performed. The indwelling right nephrostomy   
was prepped and draped in standard fashion.      Diagnostic antegrade 
nephrostogram was performed given the long indwelling time   of the previously 
placed for ostomy. See findings below.      Attempts to remove the indwelling 8-
French nephrostomy over a Bentson and   Glidewire were unsuccessful due to heavy
encrustation. The catheter was   subsequently severed and removed through an 11-
French peel-away sheath. A   Bentson wire was advanced through the peel-away 
sheath and exchanged for a   10-French nephrostomy. Contrast injection confirmed
placement within the renal   pelvis.      At the end of the procedure the 
catheter was secured to the skin, connected to   gravity drainage and a sterile 
dressing applied. The patient tolerated the   procedure well and without 
immediate complication.      Findings:   Antegrade nephrostogram demonstrated 
mild hydronephrosis of the right   collecting system and a patent, but heavily 
encrusted catheter requiring   removal via peel away sheath. The distal ureter 
is narrowed consistent with   stricture. Aggressive nephrostogram was not 
performed given superimposed   infection.      Impression:   Successful right 
nephrostomy  exchange.   Distal right ureteral stricture      Recommendations:  
Routine catheter exchange in 2 months given heavy encrustation.            This 
report was generated with voice-recognition technology. Errors in   
transcription can occur. Please interpret accordingly and contact a radiologist 
 if there are any questions regarding the report.      Signed by: Dr. Best Patel M.D. on 2018 2:37 PM        Dictated By: BEST PATEL MD  E
lectronically Signed By: BEST PATEL MD on 18  Transcribed By: CHARI
PEMBERTON on 18       COPY TO:   JENNIFER TERRELL MD        CT ABDOMEN/PELVIS
WO    [place] [address]      Patient Name: CRISTINA RICH   MR #: [id number]    
: 1965 Age/Sex: 52/F  Acct #: [id number] Req #: [id number]  Adm 
Physician:     Ordered by: JENNIFER TERRELL MD  Report #: [id number]   Location: ER
 Room/Bed:     
____________________________________________________________________________
_______________________    Procedure: [id number] CT/CT ABDOMEN/PELVIS WO  Exam Da
te: 18                            Exam Time: 0056       REPORT STATUS: Sig
ki    EXAM: CT ABDOMEN AND PELVIS without IV CONTRAST   ORDER DATE: 2018 1
2:14 AM  Time stamp on Exam: 0058 hours   INDICATION:  Pain in right kidney stat
us post nephrostomy, decreased urine   output   COMPARISON:  CT of the abdomen a
nd pelvis Ree 15, 2017   TECHNIQUE: The abdomen and pelvis were scanned using a
multidetector helical   scanner. Coronal and sagittal reformations were obtaine
d. Renal stone protocol   performed.   IV Contrast: None   Oral Contrast: None  
CTDIvol has been reviewed. It is below the limits set by the Radiation Protocol 
 Committee (RPC).      FINDINGS:   LOWER THORAX: No consolidations      LIVER: 
No masses   BILIARY: Gallstone in an otherwise normal gallbladder. No ductal di
lation.       SPLEEN: Normal   PANCREAS: Normal      ADRENALS: Normal      RIGHT
KIDNEY: Percutaneously nephrostomy tube in place with distal end coiled   within
the pelvis. Severe hydronephrosis. Mild perinephric fat stranding.      LEFT K
IDNEY: Mild hydroureteronephrosis of the left kidney, similar to prior   exam. P
unctate nonobstructing stones in the inferior calyx.       GI TRACT: No wall thi
ckening or obstruction. Normal appendix.      VESSELS:  Minimal atherosclerotic 
changes of the abdominal aorta without   aneurysm.   PERITONEUM/RETROPERITONEUM:
No free air or fluid   LYMPH NODES: No lymphadenopathy      REPRODUCTIVE ORGANS:
The uterus is small. No adnexal masses. Likely radiation   seeds in the cervix. 
 BLADDER: The bladder is small and irregular, possibly due to postradiation   
changes.      SOFT TISSUES: Normal   BONES: No suspicious bone lesions.      IM
PRESSION:   Severe right hydronephrosis with percutaneous nephrostomy tube in pl
ace,   consistent with tube obstruction.      Signed by: Dr. Cristina Main M.D. on 2018 1:28 AM        Dictated By: CRISTINA MAIN MD  Electronically S
igned By: CRISTINA MAIN MD on 18  Transcribed By: LUCINDA on 18       COPY TO:   JENNIFER TERRELL MD        SPECIAL PROCEDURE IN CATH LAB    
[place] [address]      Patient Name: CRISTINA RICH   MR #: [id number]    :  Age/Sex: 52/F  Acct #: [id number] Req #: [id number]  Adm Physician: AJAY DUENAS MD    Ordered by: JACQUELINE ARORA MD  Report #: [id number]   Location: M
ED/SURG3  Room/Bed: [facility]    ___________________________________________________
________________________________________________    Procedure: [id number] IR/SPEC
IAL PROCEDURE IN CATH LAB  Exam Date:                             Exam Time:    
   REPORT STATUS: Signed    Date and Time: 2017      Procedure: Right per
cutaneous nephrostomy catheter exchange      : Dr. Lisa CAMPOS
re-operative diagnosis: Right sided pyelonephritis with indwelling   nephrostomy
.   Post-operative diagnosis: Indwelling nephrostomy      Conscious Sedation: Fe
ntanyl 50 mcg.   The patient's heart rate and pulse oximetry were continuously m
onitored by the   interventional radiology nurse.  Blood pressure was monitored 
at 5 minute   intervals.      Additional Medications: Lidocaine 1% for local ane
sthesia   Fluoroscopy time:  1.0 minutes   Dose-area Product:   22.7 cGycm2.    
 Contrast used: 20 cc Isovue-300   Estimated blood loss: Minimal   Specimens: 10
French nephrostomy catheter, discarded   Implants: New 10 French nephrostomy c
atheter         DISCUSSION:      Informed consent for the procedure was obtained
from the patient and documented   in the medical record after discussion of ris
ks and benefits.      The patient was placed in the prone position on the fluoro
scopic table. The   right flank and existing percutaneous nephrostomy catheter w
ere prepped and   draped in standard sterile fashion. 1% lidocaine was infiltrat
ed into the skin   and subcutaneous tissues along the catheter for local anesthe
sana. The retention   suture was cut. Dilute contrast material was injected throu
gh the catheter,   confirming appropriate positioning within the collecting syst
em. The catheter   was then severed at the hub and a 0.0 3 5-in. wire was advanc
ed through the   catheter under fluoroscopic guidance. The catheter was removed 
over the wire   and a new 10 French locking loop percutaneous the prostate was a
dvanced over   the wire, which was then removed. The locking loop of the cathete
r was   positioned in the renal pelvis with appropriate positioning confirmed by
  injection of dilute contrast material. The catheter was flushed with sterile  
saline and connected to gravity drainage. The catheter was secured to the skin  
with monofilament nylon suture and a sterile dressing was applied.            
FINDINGS:       Mild right hydronephrosis.      IMPRESSION:         Successful 
exchange of a right percutaneous nephrostomy catheter (10 French   locking loop)
under fluoroscopic guidance.      The patient should return to interventional r
adiology in 8 weeks for routine   catheter exchange.      Signed by: Dr. Clau Ngo M.D. on 2017 7:09 AM        Dictated By: CLAU NGO MD  Electro
nically Signed By: CLAU NGO MD on 17  Transcribed By: LUCINDA on
17       COPY TO:   JACQUELINE ARORA MD        CHEST XRAY LINE PLACEMENT   
[place] [address]      Patient Name: CRISTINA RICH   MR #: [id number]    :  Age/Sex: 52/F  Acct #: [id number] Req #: [id number]  Adm Physician: AJAY DUENAS MD    Ordered by: YESENIA SOUSA MD  Report #: [id number]   Location:
[facility]  Room/Bed: [facility]    _________________________________________________
__________________________________________________    Procedure: [id number] DX/
EST XRAY LINE PLACEMENT  Exam Date: 17                            Exam Tate
e: 1440       REPORT STATUS: Signed    PROCEDURE:   CHEST XRAY LINE PLACEMENT   
TECHNIQUE:   Portable AP chest   INDICATION:   PICC placement   COMPARISON:   No
ne.       FINDINGS:   See conclusion.           CONCLUSION:   1. Right PICC term
inating in the high SVC.   2. Low lung volume with left lower lobe medial subseg
mental    atelectasis.   3. Normal heart size and mediastinal contour for techni
que.   4. Crescentic skeleton.        Dictated by:  Best Patel M.D. on 
2017 at 14:58        Electronically approved by:  Best Patel M.D.
on 2017 at    14:58                Dictated By: BEST PATEL MD  Electr
onically Signed By: BEST PATEL MD on 17  Transcribed By: FERNANDO on
17       COPY TO:   EYSENIA SOUSA MD        IR CONSULT    [place] [address]      Patient Name: CRISTINA RICH   MR #: [id number]    : 1965 
Age/Sex: 52/F  Acct #: [id number] Req #: [id number]  Adm Physician: AJAY DUENAS MD    Ordered by: JACQUELINE ARORA MD  Report #: [id number]   Location: 
ED/SURG2  Room/Bed: [facility]    ___________________________________________________
________________________________________________    Procedure: [id number] DX/IR C
ONSULT  Exam Date:                             Exam Time:        REPORT STATUS: 
Signed    Date and Time: 2017      Procedure: Replacement of right percutan
eous nephrostomy catheter      : Dr. Ngo   Assistants: None   
     Pre-operative diagnosis: Dislodged right percutaneous nephrostomy   Post-o
perative diagnosis: Replaced right percutaneous nephrostomy      Conscious Sedat
ion: Versed 1  mg and Fentanyl 100 mcg.   The patient's heart rate and pulse oxi
metry were continuously monitored by the   interventional radiology nurse.  Bloo
d pressure was monitored at 5 minute   intervals.      Additional Medications: L
idocaine 1% for local anesthesia   Fluoroscopy time:  1.3 minutes   Dose-area Pr
oduct:   499.8 cGycm2.      Contrast used: 20 cc Isovue-300   Estimated blood lo
ss: Minimal   Specimens: None   Implants: 10 French locking loop nephrostomy geoff
inage catheter   Blood products administered: None   Complications: No immediate
  Condition at completion of procedure: Stable   Disposition: Returned to floor 
unit         DISCUSSION:   Informed consent was obtained and documented in the 
medical record.      The patient was placed in the prone position on the fluoros
copic table and the   right flank was prepped and draped in standard sterile fas
hion. 1% lidocaine   was infiltrated into the skin and subcutaneous tissues for 
local anesthesia.      Then under fluoroscopic guidance, a 5 French angled terry
ter was gently   advanced through the existing percutaneous nephrostomy tract an
d into the renal   pelvis, with appropriate positioning confirmed by injection o
f dilute contrast   material.      A 0.0 3 5-in. Amplatz wire was then advanced 
through the catheter and into the   proximal right ureter. The catheter was axel
ayo and the tract was dilated. Then   a 10 French locking loop nephrostomy terry
ter was advanced over the wire and   formed in the renal pelvis. Injection of di
lute contrast material confirmed   appropriate positioning. The catheter was flu
shed with sterile saline, secured   to the skin with monofilament nylon suture, 
and connected to gravity drainage.   A sterile dressing was applied. Patient swapna
erated the procedure well without   immediate complication.         FINDINGS:   
Moderate right hydronephrosis.         IMPRESSION:   Successful replacement of a
right percutaneous nephrostomy catheter (10 French   locking loop) through the 
existing subcutaneous tract.      The patient should return to interventional r
adiology for routine catheter   exchange in 3 months.      Signed by: Dr. Clau Ngo M.D. on 10/4/2017 10:12 AM        Dictated By: CLAU NGO MD  Electr
onically Signed By: CLAU NGO MD on 10/04/17 1012  Transcribed By: LUCINDA quintero 10/04/17 1012       COPY TO:   JACQUELINE ARORA MD        SPECIAL PROCEDURE IN CATH 
LAB    [place] [address]      Patient Name: CRISTINA RICH   MR #: [id number]    
: 1965 Age/Sex: 52/F  Acct #: [id number] Req #: [id number]  Adm 
Physician: AJAY GE MD    Ordered by: JACQUELINE ARORA MD  Report #: [id number]  
Location: MED/SURG2  Room/Bed: [facility]    
___________________________________________________
________________________________________________    Procedure: [id number] IR/SPEC
IAL PROCEDURE IN CATH LAB  Exam Date:                             Exam Time:    
   REPORT STATUS: Signed    Date and Time: 2017      Procedure: Replacement
of right percutaneous nephrostomy catheter      : Dr. Ngo   
Assistants: None         Pre-operative diagnosis: Dislodged right percutaneous n
ephrostomy   Post-operative diagnosis: Replaced right percutaneous nephrostomy  
   Conscious Sedation: Versed 1  mg and Fentanyl 100 mcg.   The patient's heart 
rate and pulse oximetry were continuously monitored by the   interventional rad
iology nurse.  Blood pressure was monitored at 5 minute   intervals.      Additi
onal Medications: Lidocaine 1% for local anesthesia   Fluoroscopy time:  1.3 min
utes   Dose-area Product:   499.8 cGycm2.      Contrast used: 20 cc Isovue-300  
Estimated blood loss: Minimal   Specimens: None   Implants: 10 French locking l
oop nephrostomy drainage catheter   Blood products administered: None   Complica
tions: No immediate   Condition at completion of procedure: Stable   Disposition
: Returned to floor unit         DISCUSSION:   Informed consent was obtained and
documented in the medical record.      The patient was placed in the prone posi
tion on the fluoroscopic table and the   right flank was prepped and draped in s
tandard sterile fashion. 1% lidocaine   was infiltrated into the skin and subcut
aneous tissues for local anesthesia.      Then under fluoroscopic guidance, a 5 
French angled catheter was gently   advanced through the existing percutaneous n
ephrostomy tract and into the renal   pelvis, with appropriate positioning confi
rmed by injection of dilute contrast   material.      A 0.0 3 5-in. Amplatz wire
was then advanced through the catheter and into the   proximal right ureter. The
catheter was removed and the tract was dilated. Then   a 10 French locking loop 
nephrostomy catheter was advanced over the wire and   formed in the renal pelv
is. Injection of dilute contrast material confirmed   appropriate positioning. T
he catheter was flushed with sterile saline, secured   to the skin with monofila
ment nylon suture, and connected to gravity drainage.   A sterile dressing was a
pplied. Patient tolerated the procedure well without   immediate complication.  
      FINDINGS:    Moderate right hydronephrosis.         IMPRESSION:   Success
ful replacement of a right percutaneous nephrostomy catheter (10 French   lockin
g loop) through the existing subcutaneous tract.      The patient should return 
to interventional radiology for routine catheter   exchange in 3 months.      Si
gned by: Dr. Clau Ngo M.D. on 10/4/2017 10:12 AM        Dictated By: CLAU NGO MD  Electronically Signed By: CLAU NGO MD on 10/04/17 1012  Transc
ribed By: LUCINDA on 10/04/17 1012       COPY TO:   JACQUELINE ARORA MD        
ABDOMEN-1VIEW (KUB)    [place] [address]      Patient Name: CRISTINA RICH   MR #: 
[id number]    : 1965 Age/Sex: 52/F  Acct #: [id number] Req #: [id number]
[id number]  Adm Physician:     Ordered by: SANDRA JUNIOR MD  Report #: [id number]
  Location: ER  Room/Bed:     
__________________________________________________________________________
_________________________    Procedure: [id number] DX/ABDOMEN-1VIEW (KUB)  Exam D
ate: 17                            Exam Time: 1430       REPORT STATUS: Si
gned    PROCEDURE:   X-RAY ABDOMEN - KUB       COMPARISON:   CT of the abdomen a
nd pelvis from 6/15/2017       INDICATIONS:   right side abdomen       FINDINGS:
         No dilated loops of bowel or abnormal air-fluid levels patterns.  No   
free air underneath the diaphragm.  Visualized portions of the lung    bases are
clear.         A nephrostomy tube is projected over the right hemiabdomen. A 2.5
cm    calcified gallstone is unchanged. No definite calcifications are seen    
overlying the urinary system.  Amorphous calcifications in the right    hemip
dick are indeterminate. There are surgical clips in the pelvis.    Five non-rib
bearing lumbar type vertebral bodies identified.       CONCLUSION:      A nephr
ostomy tube is projected over the right hemiabdomen.   Indeterminate calcificati
ons in the right hemipelvis which could    represent ureteral stones, phlebolith
, or vascular calcifications.       Dictated by:  Vita Moreira M.D. on 
017 at 14:59        Electronically approved by:  Vita Moreira M.D. on 20
17 at 14:59                   Dictated By: VITA MOREIRA MD  Electronically S
igned By: VITA MOREIRA MD on 17 1453  Transcribed By: FERNANDO on 
7 [id number]       COPY TO:   SANDRA JUNIOR MD

## 2020-05-25 NOTE — XMS REPORT
Continuity of Care Document

                             Created on: 2020



CRISTINA RICH

External Reference #: [id number]

: 1965

Sex: Female



Demographics





                          Address                   [address] [place] [address]  [id number]

 

                          Home Phone                [phone]

 

                          Preferred Language        English

 

                          Marital Status            Unknown

 

                          Lutheran Affiliation     Unknown

 

                          Race                      Unknown

 

                          Ethnic Group              Unknown





Author





                          Author                    Deni Nieves [employer]

 CRISTINA Carmona              [employer]

 

                          Address                   Unknown

 

                          Phone                     Unavailable







Care Team Providers





                    Care Team Member Name Role                Phone

 

                    [employer] Unavailable         Un

available



                                    



Problems

                    



                    Problem                         Status                      

   Onset Date       

                          Classification                         Date Reported  

         

                          Comments                         Source               

     

 

                                        Discharge Diagnosis: Compression of late

ral cutaneous femoral nerve of thigh    

                                             2016                                            

  

                                        [facility]                    

 

                    GEN. PAIN                         Active                    

     2016     

                                                                                

 

                                        [facility]                    

 

                                        Discharge Diagnosis: Urinary tract infec

tion, site not specified                

                                             2016                         

            

                    01/10/2016                                                  

[facility]                    

 

                    WEAKNESS                         Active                     

    2016      

                                                                                

  

                                        [facility]                    

 

                          Methicillin resistant Staphylococcus aureus (organism)

                         

Active                                                   Problem                

 

                          2016                         Problem added by Keena gordon Expert.      

                                        [facility]                    



                                                                                
                                                       



Medications

                    



                    Medication                         Details                  

       Route        

                          Status                         Patient Instructions   

          

                          Ordering Provider                         Order Date  

               

                                        Source                    

 

                          Ativan                         1 mg, Route: PO, Drug f

orm: TAB, ONCE, Dosing 

Weight 86.364, kg, Priority: STAT, Start date: 16 23:44:00, Stop date: 
16 23:44:00                                                   No Longer 

Active                                                                          

 

                          2016                         [facility]       

             

 

                          tramadol hydrochloride 50 MG Oral Tablet              

           50 mg = 1 tab, 

PO, BID, X 15 day, # 30 tab, 0 Refill(s)                                        

                    Active                                                      

           

                          2016                         [facility]       

             

 

                          tramadol hydrochloride 50 MG Oral Tablet [Ultram]     

                    1 - 2 

tabs, PO, Q4-6H, PRN as needed for pain, X 7 day, # 12 tab, 0 Refill(s)         
                                             Active                             

    

                                             2016                         

[facility]                    

 

                                        Sulfamethoxazole 800 MG / Trimethoprim 1

60 MG Oral Tablet [Bactrim]             

                          1 tab, PO, BID, # 20 tab, 0 Refill(s)                 

              

                    Active                                                      

 

                          2016                         [facility]       

      

       

 

                          Rocephin                         1 gm, Route: IM, ONCE

, Dosing Weight 84.091, 

kg, Start date: 16 17:48:00, Stop date: 16 17:48:00                 
                                             Inactive                           

            

                                             2016                         

[facility]                    

 

                          Ondansetron                         Notes: (Same as: BARAK banda)   *** MEDICATION 

WASTE *** Product Size:  4 mg Product Wasted:  ___ mg                           

                          Inactive                                              

   

                                             2016                         

[facility]       

             

 

                          Sodium Chloride 0.154 MEQ/ML Injectable Solution      

                   1,000 

mL, 1000 ml/hr, Infuse Over: 1 hr, Route: IV, 1,000, Drug form: INJ, ONCE, 
Priority: STAT, Dosing Weight 84.091 kg, Start date: 16 15:36:00, 
Duration: 1 doses or times, Stop date: 16 15:36:00                        
                                             Inactive                           

                   

                                             2016                         

[facility]    

                

 

                          Saline Flush 0.9%                         Notes: Same 

as: BD Posiflush Sterile  

                                                    Inactive                    

    

                                                                      2016

                   

                                        [facility]                    



                                                                                
                                                                                
                                    



Allergies, Adverse Reactions, Alerts

                    



                    Substance                         Category                  

       Reaction     

                          Severity                         Reaction type        

      

                    Status                         Date Reported                

         

Comments                                Source                    

 

                    diphenhydrAMINE                         Assertion           

                    

                                                    Drug allergy                

        

                    Active                                                      

                   

                                        [facility]                    



                                                        



Immunizations

        



                                        No Data Provided for This Section



                                    



Results





                    Order Name                         Results                  

       Value        

                          Reference Range                         Date          

         

                    Interpretation                         Comments             

            

Source                    

 

                    URINE AND STOOL                         UA Urobilinogen     

<=1.0 mg/dL             

                          0.1 - 1.0                         2016          

              

                                                                      [facility]                 

   

 

                    URINE AND STOOL                         UA Protein          

100 mg/dL                    

                    Negative mg/dL                         2016           

               

                                                    [facility]                

   

 

 

                    URINE AND STOOL                         UA Glucose          

Negative mg/dL               

                          Negative mg/dL                         2016     

                

                                                                      [facility]              

      

 

                    URINE AND STOOL                         UA Ketones          

Negative mg/dL               

                          Negative mg/dL                         2016     

                

                                                                      [facility]              

      

 

                    URINE AND STOOL                         UA Bili             

Negative 

*NA*

                    (16 4:54 PM)                         Negative           

              

2016                                                                      

 

                                        [facility]                    

 

                    URINE AND STOOL                         UA Blood            

Moderate 

*ABN*

                    (16 4:54 PM)                         Negative           

              

2016                                                                      

 

                                        [facility]                    

 

                    URINE AND STOOL                         UA Nitrite          

Positive 

*ABN*

                    (16 4:54 PM)                         Negative           

              

2016                                                                      

 

                                        [facility]                    

 

                    URINE AND STOOL                         UA Leuk Est         

Large 

*ABN*

                    (16 4:54 PM)                         Negative           

              

2016                                                                      

 

                                        [facility]                    

 

                    URINE AND STOOL                         UA Sq Epi           

Few /LPF                      

                    Few /LPF                         2016                 

                 

                                                    [facility]                

    

 

                URINE AND STOOL                         UA WBC          >182    

                     0 - 

5                         2016                                            

                                                    [facility]                

    

 

                URINE AND STOOL                         UA RBC          155     

                    0 - 2

                          2016                                            

 

                                                    [facility]                

    

 

                    URINE AND STOOL                         UA Bacteria         

Many /HPF                   

                    None Seen /HPF                         2016           

              

                                                    [facility]                

  

  

 

                URINE AND STOOL                         UA WBC Cast     34      

                   

<=0 /LPF                         2016                                     

                                                    [facility]                

    

 

                    URINE AND STOOL                         UA Mucus            

Few /LPF                       

                    None Seen /LPF                         2016           

                  

                                                    [facility]                

    

 

                    URINE AND STOOL                         UA Color            

Yellow 

*NA*

                    (16 4:54 PM)                         Yellow             

            

2016                                                                      

 

                                        [facility]                    

 

                    URINE AND STOOL                         UA Turbidity        

Marked 

*ABN*

                    (16 4:54 PM)                         Clear              

           

2016                                                                      

 

                                        [facility]                    

 

                URINE AND STOOL                         UA pH           5.0     

                    5.0 - 

8.0                         2016                                          

                                                    [facility]                

    

 

                    URINE AND STOOL                         UA Spec Grav        

1.018                      

                    <=1.030                         2016                  

                 

                                                    [facility]                

    

 

                CHEM PANEL                         A/G Ratio       0.6          

               0.7 - 

1.6                         2016                                          

                                                    [facility]                

    

 

                CHEM PANEL                         Globulin        5.8          

               2.0 - 

4.0                         2016                                          

                                                    [facility]                

    

 

                CHEM PANEL                         B/C Ratio       12           

              6 - 25  

                          2016                                            

   

                                                    [facility]                

    

 

                CHEM PANEL                         AGAP            12.6         

                10.0 - 20.0

                          2016                                            

 

                                                    [facility]                

    

 

                CHEM PANEL                         Alk Phos        115          

               39 - 136

                          2016                                            

 

                                                    [facility]                

    

 

                CHEM PANEL                         AST             28           

              0 - 37        

                    2016                                                  

   

                                        [facility]                    

 

                CHEM PANEL                         Bili Total      0.3          

               0.2 - 

1.3                         2016                                          

                                                    [facility]                

    

 

                CHEM PANEL                         eGFR            73           

                           

                    2016                                                  

Result 

Comment: The eGFR is calculated using the CKD-EPI formula. In most young, 
healthy individuals the eGFR will be >90 mL/min/1.73m2. The eGFR declines with 
age. An eGFR of 60-89 may be normal in some populations, particularly the 
elderly, for whom the CKD-EPI formula has not been extensively validated. Use of
 the eGFR is not recommended in the following populations:<br/><br/>Individuals 
with unstable creatinine concentrations, including pregnant patients and those 
with serious co-morbid conditions.<br/><br/>Patients with extremes in muscle 
mass or diet. <br/><br/>The data above are obtained from the National Kid[employer] 
Disease Education Program (NKDEP) which additionally recommends that when the 
eGFR is used in patients with extremes of body mass index for purposes of drug 
dosing, the eGFR should be multiplied by the estimated BMI.                     
                                        [facility]                    

 

                CHEM PANEL                         ALT             25           

              0 - 65        

                    2016                                                  

   

                                        [facility]                    

 

                CHEM PANEL                         Albumin Lvl     3.4          

               3.5 -

 5.0                         2016                                         

                                                    [facility]                

    

 

                CHEM PANEL                         Total Protein   9.2          

               6.4

 - 8.4                         2016                                       

                                                    [facility]                

    

 

                CHEM PANEL                         Calcium Lvl     9.1          

               8.5 -

 10.5                         2016                                        

                                                    [facility]                

    

 

                CHEM PANEL                         CO2             26           

              24 - 32       

                    2016                                                  

  

                                        [facility]                    

 

                CHEM PANEL                         Sodium Lvl      140          

               135 - 

145                         2016                                          

                                                    [facility]                

    

 

                CHEM PANEL                         Chloride Lvl    106          

               95 -

 109                         2016                                         

                                                    [facility]                

    

 

                CHEM PANEL                         Potassium Lvl   4.6          

               3.5

 - 5.1                         2016                                       

                                                    [facility]                

    

 

                CHEM PANEL                         Creatinine Lvl  0.91         

                

0.50 - 1.40                         2016                                  

                                                    [facility]                

    

 

                CHEM PANEL                         BUN             11           

              7 - 22        

                    2016                                                  

   

                                        [facility]                    

 

                CHEM PANEL                         Glucose Lvl     118          

               70 - 

99                         2016                                           

                                                    [facility]                

    

 

                    ENDOCRINOLOGY                         S Preg              Ne

gative 

*NA*

                    (16 4:40 PM)                         Negative           

              

2016                                                                      

 

                                        [facility]                    

 

                HEMATOLOGY                         Lymphocytes     32.8         

                20.0

 - 40.0                         2016                                      

                                                    [facility]                

    

 

                HEMATOLOGY                         Segs            60.0         

                45.0 - 75.0

                          2016                                            

 

                                                    [facility]                

    

 

                HEMATOLOGY                         Eosinophils #   0.2          

               0.0

 - 0.5                         2016                                       

                                                    [facility]                

    

 

                HEMATOLOGY                         Basophils #     0.2          

               0.0 -

 0.2                         2016                                         

                                                    [facility]                

    

 

                HEMATOLOGY                         Monocytes       4.6          

               2.0 - 

12.0                         2016                                         

                                                    [facility]                

    

 

                HEMATOLOGY                         Eosinophils     1.2          

               0.0 -

 4.0                         2016                                         

                                                    [facility]                

    

 

                HEMATOLOGY                         Basophils       1.4          

               0.0 - 

1.0                         2016                                          

                                                    [facility]                

    

 

                HEMATOLOGY                         Segs-Bands #    7.3          

               1.5 

- 8.1                         2016                                        

                                                    [facility]                

    

 

                HEMATOLOGY                         Monocytes #     0.6          

               0.0 -

 0.8                         2016                                         

                                                    [facility]                

    

 

                HEMATOLOGY                         Lymphocytes #   4.0          

               1.0

 - 5.5                         2016                                       

                                                    [facility]                

    

 

                    HEMATOLOGY                         Plt Morph           Clump

ed 

                    (16 4:40 PM)                                            

      2016    

                                                                       [facility]                    

 

                    HEMATOLOGY                         RBC Morph           Cinda

l 

                    (16 4:40 PM)                                            

      2016    

                                                                       [facility]                    

 

                HEMATOLOGY                         MPV             8.7          

               7.4 - 10.4   

                          2016                                            

    

                                                    [facility]                

    

 

                HEMATOLOGY                         WBC             12.0         

                3.7 - 10.4  

                          2016                                            

   

                                                    [facility]                         RBC             4.90         

                4.20 - 5.40 

                          2016                                            

  

                                                    [facility]                

    

 

                HEMATOLOGY                         Hct             39.0         

                36.0 - 48.0 

                          2016                                            

  

                                                    [facility]                

    

 

                HEMATOLOGY                         MCV             79.7         

                80.0 - 98.0 

                          2016                                            

  

                                                    [facility]                

    

 

                HEMATOLOGY                         Hgb             12.0         

                12.0 - 16.0 

                          2016                                            

  

                                                    [facility]                

    

 

                HEMATOLOGY                         RDW             14.8         

                11.5 - 14.5 

                          2016                                            

  

                                                    [facility]                

    

 

                HEMATOLOGY                         MCHC            30.8         

                32.0 - 36.0

                          2016                                            

 

                                                    [facility]                

    

 

                HEMATOLOGY                         Platelet        220          

               133 - 

450                         2016                                          

                                                    [facility]                

    

 

                HEMATOLOGY                         MCH             24.5         

                27.0 - 31.0 

                          2016                                            

  

                                                    [facility]                

    



                                                                                
                                                                                
                                                                                
                                                                                
                                                                                
                                                                                
                                                                        



Pathology Reports





                                        No Data Provided for This Section       

             



                            



Diagnostic Reports

            



                                        No Data Provided for This Section       

             



                                                            



Consultation Notes

                    



                                        No Data Provided for This Section       

             



                                                            



Discharge Summaries

                    



                                        No Data Provided for This Section       

             



                                                            



History and Physicals

                    



                                        No Data Provided for This Section       

             



                                                                



Vital Signs

                     



                    Vital Sign                         Value                    

     Date           

                          Comments                         Source               

     

 

                    Weight                         86.364                       

   2016       

                                                    [facility]                

    

 

                    Temperature Oral (F)                         97.9 F         

                

2016                                                   [facility]       

 

            

 

                    Respitory Rate                         18                   

       2016   

                                                    [facility]                

    

 

                    Heart Rate                         91                       

   2016       

                                                    [facility]                

    

 

                    Systolic (mm Hg)                         140                

          2016

                                                    [facility]                

  

  

 

                    Diastolic (mm Hg)                         99                

          2016

                                                    [facility]                

  

  

 

                    Temperature Oral (F)                         98.4 F         

                

2016                                                   [facility]       

 

            

 

                    Respitory Rate                         18                   

       2016   

                                                    [facility]                

    

 

                    Heart Rate                         97                       

   2016       

                                                    [facility]                

    

 

                    Systolic (mm Hg)                         142                

          2016

                                                    [facility]                

  

  

 

                    Diastolic (mm Hg)                         82                

          2016

                                                    [facility]                

  

  

 

                    Systolic (mm Hg)                         147                

          2016

                                                    [facility]                

  

  

 

                    Diastolic (mm Hg)                         93                

          2016

                                                    [facility]                

  

  

 

                    Weight                         84.091                       

   2016       

                                                    [facility]                

    

 

                    BMI Calculated                         36.21                

          2016

                                                    [facility]                

  

  

 

                    Height                         152.4 cm                     

    2016      

                                                    [facility]                

    

 

                    Heart Rate                         76                       

   2016       

                                                    [facility]                

    

 

                    Respitory Rate                         18                   

       2016   

                                                    [facility]                

    

 

                    Temperature Oral (F)                         98.1 F         

                

2016                                                   [facility]       

 

            



                                                                                
                                                                                
                                                                                
                                                                                
                                                        



Encounters

                    



                    Location                         Location Details           

              

Encounter Type                         Encounter Number                         

Reason For Visit                         Attending Provider                     

                    ADM Date                         DC Date                    

     Status     

                                        Source                    

 

                          [facility]                         [id number]

01                   

                                             Parulchristopher Wilson                   

       

2016                                   

                                        [facility]                         [id number]

02                   

                                             Mohan Curtis                   

       

2016                                   

                                        [facility]                    



                                                                                
    



Procedures

        



                                        No Data Provided for This Section



                                                    



Assessment and Plan

                    



                                        No Data Provided for This Section       

             



                                     



Plan of Care





                                        No Data Provided for This Section       

             



                                                                



Social History

                    



                    Social History                         Date                 

        Source      

              

 

                                        Social History TypeResponse

Smoking Status

Never smoker; Exposure to Tobacco Smoke None; Cigarette Smoking Last 365 Days 
No; Reg Smoking Cessation Counseling No

                          2016                         [facility]       

 

            



                                                                    



Family History

                    



                                        No Data Provided for This Section       

             



                                                            



Advance Directives

                    



                                        No Data Provided for This Section       

             



                                                            



Functional Status

                    



                                        No Data Provided for This Section

## 2020-05-25 NOTE — XMS REPORT
Clinical Summary

                             Created on: 2020



Savannah Jackson

External Reference #: [id number]

: 1965

Sex: Female



Demographics





                          Address                   [address] [place]  [address]

 

                          Home Phone                [phone]

 

                          Preferred Language        Unknown

 

                          Marital Status            Single

 

                          Synagogue Affiliation     [employer]

 

                          Race                      Unknown

 

                          Ethnic Group              Unknown





Author





                          Author                    [facility]

ict

 

                          Organization              [facility]

ict

 

                          Address                   Unknown

 

                          Phone                     Unavailable







Support





                Name            Relationship    Address         Phone

 

                    Geneva Jackson      [employer]                [address] [place]  [address]                      [phone]

 

                    Anat Jackson    [employer]                [address] [place]  [address]                      [phone]







Care Team Providers





                    Care Team Member Name Role                Phone

 

                          PCP                       Unavailable







Allergies





                                        Comments



                 Active Allergy  Reactions       Severity        Noted Date 

 

                                        



BECAME ANXIOUS AND FELT STRANGE



                     Diphenhydramine     Other               2009 







Medications





                          End Date                  Status



              Medication   Sig          Dispensed    Refills      Start  



                                         Date  

 

                                                    Active



              oxybutynin (DITROPAN) 5  Take 1 Tab by  90 Tab       1            

  



                     mg tabletIndications:  mouth 3 times       0  



                           Ureteral stricture        daily. For     



                                         bladder     



                                         spasms     

 

                                                    Active



              doxazosin (CARDURA) 2 mg  Take 1 Tab by  90 Tab       1           

   



                     tabletIndications:  mouth at            0  



                           Ureteral stricture        bedtime.     

 

                                                    Active



              famotidine (PEPCID) 20 mg  Take 1 Tab by  30 Tab       3          

  01/10/201  



                     tabletIndications: GERD  mouth 2 times       2  



                           (gastroesophageal reflux  daily.     



                                         disease)      

 

                                                    Active



                     traMADol (ULTRAM) 50 mg  Take 50 mg by       0   



                           tablet                    mouth as     



                                         needed.     

 

                                                    Active



              tamsulosin (FLOMAX) 0.4  Take 1       30 capsule   3              



                     mg extended release  capsule by          2  



                           capsuleIndications:       mouth daily.     



                                         Hydronephrosis      

 

                                                    Active



              flavoxate (URISPAS) 100  Take 1 tablet  30 tablet    0            

  



                     mg tabletIndications:  by mouth 3          2  



                           Hydronephrosis, right     times daily     



                                         as needed     



                                         (dysuria).     

 

                                                    Active



                     acetaminophen (TYLENOL)  Take 650 mg         0   



                           325 mg tablet             by mouth     



                                         every 6 hours     



                                         as needed.     

 

                                                    Active



              cyclobenzaprine  Take 1 tablet  40 tablet    2            10/14/20

1  



                     (FLEXERIL) 10 mg    by mouth 2          3  



                           tabletIndications: Flank  times daily     



                           Pain                      as needed for     



                                         Muscle     



                                         Spasms.     

 

                                                    Active



              HYDROcodone-acetaminophen  Take 1 tablet  30 tablet    0          

    



                     (NORCO)  mg   by mouth            4  



                           tabletIndications:        every 6 hours     



                           Pyelonephritis            as needed for     



                                         Pain.     

 

                                                    Active



                     metFORMIN (GLUCOPHAGE)  Take 500 mg         0   



                           500 mg tablet             by mouth 2     



                                         times daily     



                                         (with meals).     

 

                                                    Active



                     fenofibrate         Take 145 mg         0   



                           nanocrystallized (TRICOR)  by mouth     



                           145 mg tablet             daily.     

 

                                                    Active



                     lisinopril (PRINIVIL,  Take 2.5 mg         0   



                           ZESTRIL) 2.5 mg tablet    by mouth     



                                         daily.     

 

                                                    Active



                     atorvastatin (LIPITOR) 20  Take 20 mg by       0   



                           mg tablet                 mouth at     



                                         bedtime     



                                         nightly.     







Active Problems





 



                           Problem                   Noted Date

 

 



                           BMI 36.0-36.9,adult       2014

 

 



                                         Overview:



                                         Obese

 

 



                           Obese                     2014

 

 



                           Complicated UTI (urinary tract infection)  2014

 

 



                           Hematuria                 2014

 

 



                           Fatigue                   2013

 

 



                           Myalgia                   2013

 

 



                           Fever                     2013

 

 



                           UTI (lower urinary tract infection)  10/31/2012

 

 



                           Hydronephrosis, right     2012

 

 



                           Flank pain                2012

 

 



                           Pyelonephritis            2012

 

 



                           Cough                     2011







Immunizations





  



                     Name                Administration Dates  Next Due

 

  



                           Influenza Vaccine         2013 (Deferred: Patie

nt Refused) 

 

  



                           Pneumoccoccal             2013 (Deferred: Loyd

nt Refused) 







Family History





   



                 Medical History  Relation        Name            Comments

 

   



                           Diabetes                  Father  







   



                 Relation        Name            Status          Comments

 

   



                           Brother                   Alive 

 

   



                           Father                    Alive 

 

   



                           Mother                    Alive 

 

   



                           Sister                    Alive 







Social History





                                        Date



                 Tobacco Use     Types           Packs/Day       Years Used 

 

                                         



                                         Never Smoker    

 

    



                                         Smokeless Tobacco: Never   



                                         Used   







                    Drinks/Week         oz/Week             Comments



                                         Alcohol Use   

 

                                                             



                                         No   







 



                           Sex Assigned at Birth     Date Recorded

 

 



                                         Not on file 







                                        Industry



                           Job Start Date            Occupation 

 

                                        Not on file



                           Not on file               Not on file 







                                        Travel End



                           Travel History            Travel Start 

 





                                         No recent travel history available.







Last Filed Vital Signs

Not on file



Plan of Treatment





   



                 Health Maintenance  Due Date        Last Done       Comments

 

   



                           Cervical Cancer Scrn (3   1984  



                                         Yrs)   

 

   



                     Breast Cancer Scrn  2005 



                                         (Yearly)   

 

   



                           Colorectal Cancer Scrn    2013  



                                         Annual (FIT/FOBT) Age 48   



                                         to 73   

 

   



                           IMM Influenza Seasonal    10/01/2020  



                                         Oct to March (>/= 19 yrs)   







Implants





                    Device Identifier   Shelf Expiration Date Model / Serial / L

ot



                 Implanted       Type            Area            Manufactur   



                                         er   

 

                                        2017          [id number] /

 /

[id number]



                 Implant Gu Stent Black Beauty  Stent           Right:          

Cook   



                     Double Pigtail 7fr X 24cm [id number] -   Kidney(s)           Ur

ological   



                           [id number]                   Inc   



                                         Implanted: Qty: 1 on 2014 by     

 



                                         Mohan Garay ResidentMD      



                                         at [facility]      







Results

Not on fileafter 2019



Insurance





                                        Type



            Payer      Benefit    Subscriber ID  Effective  Phone      Address 



                           Plan /                    Dates   



                                         Group     

 

                                         



            BC/BS      BCBS HMO   xxxxxxxxxxxx  2015-P  [phone]  [address]

X 



                           resent                    [id number] 



                                         [facility] [place] 



                                         [id number] 







     



            Guarantor Name  Account    Relation to  Date of    Phone      Billin

g Address



                     Type                Patient             Birth  

 

     



            Savannah Jackson  Personal/F  Self       1965  [phone]

[address]  [address] 

[facility]               (Home)              [place] [address]



                                         [phone] 



                                         (Work) 







Advance Directives





                          Date Inactivated          Comments



                           Code Status               Date Activated  

 

                          2015  6:47 PM          



                           Full Code                 2015  4:06 AM  







                                                     

 

                          2014  4:20 PM          



                           Full Code                 5/3/2014 11:43 PM  







                                                     

 

                          3/16/2014  1:19 AM         



                           Full Code                 3/15/2014  6:29 AM  







                                                     

 

                          2013  4:29 PM         



                           Full Code                 2013  6:24 AM  







                                                     

 

                          1/10/2012 10:06 PM         



                           Full Code                 2012 10:55 PM

## 2020-05-25 NOTE — XMS REPORT
Continuity of Care Document

                             Created on: 2020



CRISTINA RICH

External Reference #: [id number]

: 1965

Sex: Female



Demographics





                          Address                   [address] [place] [address]  [address]

 

                          Home Phone                [phone]

 

                          Preferred Language        English

 

                          Marital Status            Unknown

 

                          [employer] Affiliation     Unknown

 

                          Race                      Unknown

 

                          Ethnic Group              Unknown





Author





                          Author                    Deni Nieves [employer]

 CRISTINA Carmona              [employer]

 

                          Address                   Unknown

 

                          Phone                     Unavailable







Care Team Providers





                    Care Team Member Name Role                Phone

 

                    [email or web address] Unavailable         Un

available



                                    



Problems

                    



                    Problem                         Status                      

   Onset Date       

                          Classification                         Date Reported  

         

                          Comments                         Source               

     

 

                                        Discharge Diagnosis: Compression of late

ral cutaneous femoral nerve of thigh    

                                             2016                                            

  

                                        [facility]                    

 

                    GEN. PAIN                         Active                    

     2016     

                                                                                

 

                                        [facility]                    

 

                                        Discharge Diagnosis: Urinary tract infec

tion, site not specified                

                                             2016                         

            

                    01/10/2016                                                  

[facility]                    

 

                    WEAKNESS                         Active                     

    2016      

                                                                                

  

                                        [facility]                    

 

                          Methicillin resistant Staphylococcus aureus (organism)

                         

Active                                                   Problem                

 

                          2016                         Problem added by Keena gordon Expert.      

                                        [facility]                    



                                                                                
                                                       



Medications

                    



                    Medication                         Details                  

       Route        

                          Status                         Patient Instructions   

          

                          Ordering Provider                         Order Date  

               

                                        Source                    

 

                          Ativan                         1 mg, Route: PO, Drug f

orm: TAB, ONCE, Dosing 

Weight 86.364, kg, Priority: STAT, Start date: 16 23:44:00, Stop date: 
16 23:44:00                                                   No Longer 

Active                                                                          

 

                          2016                         [facility]       

             

 

                          tramadol hydrochloride 50 MG Oral Tablet              

           50 mg = 1 tab, 

PO, BID, X 15 day, # 30 tab, 0 Refill(s)                                        

                    Active                                                      

           

                          2016                         [facility]       

             

 

                          tramadol hydrochloride 50 MG Oral Tablet [Ultram]     

                    1 - 2 

tabs, PO, Q4-6H, PRN as needed for pain, X 7 day, # 12 tab, 0 Refill(s)         
                                             Active                             

    

                                             2016                         

[facility]                    

 

                                        Sulfamethoxazole 800 MG / Trimethoprim 1

60 MG Oral Tablet [Bactrim]             

                          1 tab, PO, BID, # 20 tab, 0 Refill(s)                 

              

                    Active                                                      

 

                          2016                         [facility]       

      

       

 

                          Rocephin                         1 gm, Route: IM, ONCE

, Dosing Weight 84.091, 

kg, Start date: 16 17:48:00, Stop date: 16 17:48:00                 
                                             Inactive                           

            

                                             2016                         

[facility]                    

 

                          Ondansetron                         Notes: (Same as: BARAK banda)   *** MEDICATION 

WASTE *** Product Size:  4 mg Product Wasted:  ___ mg                           

                          Inactive                                              

   

                                             2016                         

[facility]       

             

 

                          Sodium Chloride 0.154 MEQ/ML Injectable Solution      

                   1,000 

mL, 1000 ml/hr, Infuse Over: 1 hr, Route: IV, 1,000, Drug form: INJ, ONCE, 
Priority: STAT, Dosing Weight 84.091 kg, Start date: 16 15:36:00, 
Duration: 1 doses or times, Stop date: 16 15:36:00                        
                                             Inactive                           

                   

                                             2016                         

[facility]    

                

 

                          Saline Flush 0.9%                         Notes: Same 

as: BD Posiflush Sterile  

                                                    Inactive                    

    

                                                                      2016

                   

                                        [facility]                    



                                                                                
                                                                                
                                    



Allergies, Adverse Reactions, Alerts

                    



                    Substance                         Category                  

       Reaction     

                          Severity                         Reaction type        

      

                    Status                         Date Reported                

         

Comments                                Source                    

 

                    diphenhydrAMINE                         Assertion           

                    

                                                    Drug allergy                

        

                    Active                                                      

                   

                                        [facility]                    



                                                        



Immunizations

        



                                        No Data Provided for This Section



                                    



Results





                    Order Name                         Results                  

       Value        

                          Reference Range                         Date          

         

                    Interpretation                         Comments             

            

Source                    

 

                    URINE AND STOOL                         UA Urobilinogen     

<=1.0 mg/dL             

                          0.1 - 1.0                         2016          

              

                                                                      [facility]                 

   

 

                    URINE AND STOOL                         UA Protein          

100 mg/dL                    

                    Negative mg/dL                         2016           

               

                                                    [facility]                

   

 

 

                    URINE AND STOOL                         UA Glucose          

Negative mg/dL               

                          Negative mg/dL                         2016     

                

                                                                      [facility]              

      

 

                    URINE AND STOOL                         UA Ketones          

Negative mg/dL               

                          Negative mg/dL                         2016     

                

                                                                      [facility]              

      

 

                    URINE AND STOOL                         UA Bili             

Negative 

*NA*

                    (16 4:54 PM)                         Negative           

              

2016                                                                      

 

                                        [facility]                    

 

                    URINE AND STOOL                         UA Blood            

Moderate 

*ABN*

                    (16 4:54 PM)                         Negative           

              

2016                                                                      

 

                                        [facility]                    

 

                    URINE AND STOOL                         UA Nitrite          

Positive 

*ABN*

                    (16 4:54 PM)                         Negative           

              

2016                                                                      

 

                                        [facility]                    

 

                    URINE AND STOOL                         UA Leuk Est         

Large 

*ABN*

                    (16 4:54 PM)                         Negative           

              

2016                                                                      

 

                                        [facility]                    

 

                    URINE AND STOOL                         UA Sq Epi           

Few /LPF                      

                    Few /LPF                         2016                 

                 

                                                    [facility]                

    

 

                URINE AND STOOL                         UA WBC          >182    

                     0 - 

5                         2016                                            

                                                    [facility]                

    

 

                URINE AND STOOL                         UA RBC          155     

                    0 - 2

                          2016                                            

 

                                                    [facility]                

    

 

                    URINE AND STOOL                         UA Bacteria         

Many /HPF                   

                    None Seen /HPF                         2016           

              

                                                    [facility]                

  

  

 

                URINE AND STOOL                         UA WBC Cast     34      

                   

<=0 /LPF                         2016                                     

                                                    [facility]                

    

 

                    URINE AND STOOL                         UA Mucus            

Few /LPF                       

                    None Seen /LPF                         2016           

                  

                                                    [facility]                

    

 

                    URINE AND STOOL                         UA Color            

Yellow 

*NA*

                    (16 4:54 PM)                         Yellow             

            

2016                                                                      

 

                                        [facility]                    

 

                    URINE AND STOOL                         UA Turbidity        

Marked 

*ABN*

                    (16 4:54 PM)                         Clear              

           

2016                                                                      

 

                                        [facility]                    

 

                URINE AND STOOL                         UA pH           5.0     

                    5.0 - 

8.0                         2016                                          

                                                    [facility]                

    

 

                    URINE AND STOOL                         UA Spec Grav        

1.018                      

                    <=1.030                         2016                  

                 

                                                    [facility]                

    

 

                CHEM PANEL                         A/G Ratio       0.6          

               0.7 - 

1.6                         2016                                          

                                                    [facility]                

    

 

                CHEM PANEL                         Globulin        5.8          

               2.0 - 

4.0                         2016                                          

                                                    [facility]                

    

 

                CHEM PANEL                         B/C Ratio       12           

              6 - 25  

                          2016                                            

   

                                                    [facility]                

    

 

                CHEM PANEL                         AGAP            12.6         

                10.0 - 20.0

                          2016                                            

 

                                                    [facility]                

    

 

                CHEM PANEL                         Alk Phos        115          

               39 - 136

                          2016                                            

 

                                                    [facility]                

    

 

                CHEM PANEL                         AST             28           

              0 - 37        

                    2016                                                  

   

                                        [facility]                    

 

                CHEM PANEL                         Bili Total      0.3          

               0.2 - 

1.3                         2016                                          

                                                    [facility]                

    

 

                CHEM PANEL                         eGFR            73           

                           

                    2016                                                  

Result 

Comment: The eGFR is calculated using the CKD-EPI formula. In most young, 
healthy individuals the eGFR will be >90 mL/min/1.73m2. The eGFR declines with 
age. An eGFR of 60-89 may be normal in some populations, particularly the 
elderly, for whom the CKD-EPI formula has not been extensively validated. Use of
 the eGFR is not recommended in the following populations:<br/><br/>Individuals 
with unstable creatinine concentrations, including pregnant patients and those 
with serious co-morbid conditions.<br/><br/>Patients with extremes in muscle 
mass or diet. <br/><br/>The data above are obtained from the National Kid[employer] 
Disease Education Program (NKDEP) which additionally recommends that when the 
eGFR is used in patients with extremes of body mass index for purposes of drug 
dosing, the eGFR should be multiplied by the estimated BMI.                     
                                        [facility]                    

 

                CHEM PANEL                         ALT             25           

              0 - 65        

                    2016                                                  

   

                                        [facility]                    

 

                CHEM PANEL                         Albumin Lvl     3.4          

               3.5 -

 5.0                         2016                                         

                                                    [facility]                

    

 

                CHEM PANEL                         Total Protein   9.2          

               6.4

 - 8.4                         2016                                       

                                                    [facility]                

    

 

                CHEM PANEL                         Calcium Lvl     9.1          

               8.5 -

 10.5                         2016                                        

                                                    [facility]                

    

 

                CHEM PANEL                         CO2             26           

              24 - 32       

                    2016                                                  

  

                                        [facility]                    

 

                CHEM PANEL                         Sodium Lvl      140          

               135 - 

145                         2016                                          

                                                    [facility]                

    

 

                CHEM PANEL                         Chloride Lvl    106          

               95 -

 109                         2016                                         

                                                    [facility]                

    

 

                CHEM PANEL                         Potassium Lvl   4.6          

               3.5

 - 5.1                         2016                                       

                                                    [facility]                

    

 

                CHEM PANEL                         Creatinine Lvl  0.91         

                

0.50 - 1.40                         2016                                  

                                                    [facility]                

    

 

                CHEM PANEL                         BUN             11           

              7 - 22        

                    2016                                                  

   

                                        [facility]                    

 

                CHEM PANEL                         Glucose Lvl     118          

               70 - 

99                         2016                                           

                                                    [facility]                

    

 

                    ENDOCRINOLOGY                         S Preg              Ne

gative 

*NA*

                    (16 4:40 PM)                         Negative           

              

2016                                                                      

 

                                        [facility]                    

 

                HEMATOLOGY                         Lymphocytes     32.8         

                20.0

 - 40.0                         2016                                      

                                                    [facility]                

    

 

                HEMATOLOGY                         Segs            60.0         

                45.0 - 75.0

                          2016                                            

 

                                                    [facility]                

    

 

                HEMATOLOGY                         Eosinophils #   0.2          

               0.0

 - 0.5                         2016                                       

                                                    [facility]                

    

 

                HEMATOLOGY                         Basophils #     0.2          

               0.0 -

 0.2                         2016                                         

                                                    [facility]                

    

 

                HEMATOLOGY                         Monocytes       4.6          

               2.0 - 

12.0                         2016                                         

                                                    [facility]                

    

 

                HEMATOLOGY                         Eosinophils     1.2          

               0.0 -

 4.0                         2016                                         

                                                    [facility]                

    

 

                HEMATOLOGY                         Basophils       1.4          

               0.0 - 

1.0                         2016                                          

                                                    [facility]                

    

 

                HEMATOLOGY                         Segs-Bands #    7.3          

               1.5 

- 8.1                         2016                                        

                                                    [facility]                

    

 

                HEMATOLOGY                         Monocytes #     0.6          

               0.0 -

 0.8                         2016                                         

                                                    [facility]                

    

 

                HEMATOLOGY                         Lymphocytes #   4.0          

               1.0

 - 5.5                         2016                                       

                                                    [facility]                

    

 

                    HEMATOLOGY                         Plt Morph           Clump

ed 

                    (16 4:40 PM)                                            

      2016    

                                                                       [facility]                    

 

                    HEMATOLOGY                         RBC Morph           Cinda

l 

                    (16 4:40 PM)                                            

      2016    

                                                                       [facility]                    

 

                HEMATOLOGY                         MPV             8.7          

               7.4 - 10.4   

                          2016                                            

    

                                                    [facility]                

    

 

                HEMATOLOGY                         WBC             12.0         

                3.7 - 10.4  

                          2016                                            

   

                                                    [facility]                         RBC             4.90         

                4.20 - 5.40 

                          2016                                            

  

                                                    [facility]                

    

 

                HEMATOLOGY                         Hct             39.0         

                36.0 - 48.0 

                          2016                                            

  

                                                    [facility]                

    

 

                HEMATOLOGY                         MCV             79.7         

                80.0 - 98.0 

                          2016                                            

  

                                                    [facility]                

    

 

                HEMATOLOGY                         Hgb             12.0         

                12.0 - 16.0 

                          2016                                            

  

                                                    [facility]                

    

 

                HEMATOLOGY                         RDW             14.8         

                11.5 - 14.5 

                          2016                                            

  

                                                    [facility]                

    

 

                HEMATOLOGY                         MCHC            30.8         

                32.0 - 36.0

                          2016                                            

 

                                                    [facility]                

    

 

                HEMATOLOGY                         Platelet        220          

               133 - 

450                         2016                                          

                                                    [facility]                

    

 

                HEMATOLOGY                         MCH             24.5         

                27.0 - 31.0 

                          2016                                            

  

                                                    [facility]                

    



                                                                                
                                                                                
                                                                                
                                                                                
                                                                                
                                                                                
                                                                        



Pathology Reports





                                        No Data Provided for This Section       

             



                            



Diagnostic Reports

            



                                        No Data Provided for This Section       

             



                                                            



Consultation Notes

                    



                                        No Data Provided for This Section       

             



                                                            



Discharge Summaries

                    



                                        No Data Provided for This Section       

             



                                                            



History and Physicals

                    



                                        No Data Provided for This Section       

             



                                                                



Vital Signs

                     



                    Vital Sign                         Value                    

     Date           

                          Comments                         Source               

     

 

                    Weight                         86.364                       

   2016       

                                                    [facility]                

    

 

                    Temperature Oral (F)                         97.9 F         

                

2016                                                   [facility]       

 

            

 

                    Respitory Rate                         18                   

       2016   

                                                    [facility]                

    

 

                    Heart Rate                         91                       

   2016       

                                                    [facility]                

    

 

                    Systolic (mm Hg)                         140                

          2016

                                                    [facility]                

  

  

 

                    Diastolic (mm Hg)                         99                

          2016

                                                    [facility]                

  

  

 

                    Temperature Oral (F)                         98.4 F         

                

2016                                                   [facility]       

 

            

 

                    Respitory Rate                         18                   

       2016   

                                                    [facility]                

    

 

                    Heart Rate                         97                       

   2016       

                                                    [facility]                

    

 

                    Systolic (mm Hg)                         142                

          2016

                                                    [facility]                

  

  

 

                    Diastolic (mm Hg)                         82                

          2016

                                                    [facility]                

  

  

 

                    Systolic (mm Hg)                         147                

          2016

                                                    [facility]                

  

  

 

                    Diastolic (mm Hg)                         93                

          2016

                                                    [facility]                

  

  

 

                    Weight                         84.091                       

   2016       

                                                    [facility]                

    

 

                    BMI Calculated                         36.21                

          2016

                                                    [facility]                

  

  

 

                    Height                         152.4 cm                     

    2016      

                                                    [facility]                

    

 

                    Heart Rate                         76                       

   2016       

                                                    [facility]                

    

 

                    Respitory Rate                         18                   

       2016   

                                                    [facility]                

    

 

                    Temperature Oral (F)                         98.1 F         

                

2016                                                   [facility]       

 

            



                                                                                
                                                                                
                                                                                
                                                                                
                                                        



Encounters

                    



                    Location                         Location Details           

              

Encounter Type                         Encounter Number                         

Reason For Visit                         Attending Provider                     

                    ADM Date                         [place] Date                    

     Status     

                                        Source                    

 

                          [facility]                         [id number]

01                   

                                             Parulchristopher Wilson                   

       

2016                                   

                                        [facility]                         [id number]

02                   

                                             Mohan Curtis                   

       

2016                                   

                                        [facility]                    



                                                                                
    



Procedures

        



                                        No Data Provided for This Section



                                                    



Assessment and Plan

                    



                                        No Data Provided for This Section       

             



                                     



Plan of Care





                                        No Data Provided for This Section       

             



                                                                



Social History

                    



                    Social History                         Date                 

        Source      

              

 

                                        Social History TypeResponse

Smoking Status

Never smoker; Exposure to Tobacco Smoke None; Cigarette Smoking Last 365 Days 
No; Reg Smoking Cessation Counseling No

                          2016                         [facility]       

 

            



                                                                    



Family History

                    



                                        No Data Provided for This Section       

             



                                                            



Advance Directives

                    



                                        No Data Provided for This Section       

             



                                                            



Functional Status

                    



                                        No Data Provided for This Section

## 2020-05-25 NOTE — EMERGENCY DEPARTMENT NOTE
History of Present Illnes


History of Present Illness


Chief Complaint:  Genitourinary


History of Present Illness


This is a 54 year old  female with h/o recurrent complicated uti's, pt 

has a nephrostomy to right kidney, stent to left ureter and indwelling nolasco 

catheter, states for 2 days right flank pain, n/v, and generalized weakness. she

states these are the same symptoms with uti's in the past. dr clarissa darby is pt's

urologist.


Historian:  Patient


Arrival Mode:  Car


Onset (how long ago):  day(s) (2)


Location:  right flank


Quality:  pain, n/v, wekaness


Radiation:  non-radiation


Severity:  mild


Onset quality:  gradual


Duration (how long):  day(s) (2)


Progression:  worsening


Chronicity:  recurrent


Context:  recent illness (recurrent uti's)


Relieving factors:  none


Exacerbating factors:  none


Associated symptoms:  malaise, nausea/vomiting, weakness


Treatments prior to arrival:  none





Past Medical/Family History


Physician Review


I have reviewed the patient's past medical and family history.  Any updates have

been documented here.





Past Medical History


Recent Fever:  No


Clinical Suspicion of Infectio:  No


New/Unexplained Change in Ment:  No


Past Medical History:  Hypertension, Cancer, UTI's, Anxiety, Depression, 

Hyperlipedemia, Chronic Kidney Disease


Other Medical History:  


cervical cancer 





long term nolasco catheter


Past Surgical History:  None


Other Surgery:  


R nephrostomy





kidney stents X2





Social History


Smoking Cessation:  Never Smoker


Alcohol Use:  None


Any Illegal Drug Use:  No





Family History


Family history of heart diseas:  No





Other


Last Tetanus:  OOD





Review of Systems


Review of Systems


Constitutional:  malaise, weakness


EENTM:  no symptoms


Cardiovascular:  no symptoms


Respiratory:  no symptoms


Gastrointestinal:  nausea, vomiting


Genitourinary:  no symptoms, other (right flank pain)


Musculoskeletal:  no symptoms


Neurological:  no symptoms


Psychological:  no symptoms


Endocrine:  no symptoms


Hematological/Lymphatic:  no symptoms


Review of other systems


All other systems reviewed and negative.





Physical Exam


Related Data


Allergies:  


Coded Allergies:  


     meropenem (Verified  Allergy, Unknown, REDNESS, ITCHING, 4/25/20)


Triage Vital Signs





Vital Signs








  Date Time  Temp Pulse Resp B/P (MAP) Pulse Ox O2 Delivery O2 Flow Rate FiO2


 


5/25/20 20:22 99.1 96 17 174/120 97   








Vital signs reviewed:  Yes





Physical Exam


CONSTITUTIONAL





Constitutional:  well-developed, well-nourished


HENT


HENT:  normocephalic, atraumatic, oropharynx clear/moist, nose normal


HENT L/R:  left ext ear normal, right ext ear normal


EYES





Eyes:  PERRL, conjunctivae normal


NECK


Neck:  ROM normal


PULMONARY


Pulmonary:  effort normal, breath sounds normal


CARDIOVASCULAR





Cardiovascular:  regular rhythm, heart sounds normal, capillary refill normal, 

normal rate


GASTROINTESTINAL





Abdominal:  soft, nontender, bowel sounds normal, right CVA tenderness (mild, 

nephrostomy present with mildly cloudy urine in bag)


GENITOURINARY





Genitourinary:  other (indwelling nolasco cath present with cloudy urine in bag)


SKIN


Skin:  warm, dry


MUSCULOSKELETAL





Musculoskeletal:  ROM normal


NEUROLOGICAL





Neurological:  alert, oriented x 3, no gross motor or sensory deficits


PSYCHOLOGICAL


Psychological:  mood/affect normal, judgement normal





Results


Laboratory


Laboratory





Laboratory Tests








Test


 5/25/20


20:35 5/25/20


20:30 5/25/20


20:15


 


Lactic Acid Level


 1.8 mmol/L


(0.5-2.0) 


 





 


Urine Color


 


 Yellow


(YELLOW) 





 


Urine Clarity  Cloudy (CLEAR)  


 


Urine pH  6 (5 - 7)  


 


Urine Specific Gravity


 


 1.030


(1.010-1.025) 





 


Urine Protein


 


 >=300


(NEGATIVE) 





 


Urine Glucose (UA)


 


 Negative


(NEGATIVE) 





 


Urine Ketones  1+ (NEGATIVE)  


 


Urine Blood


 


 Moderate


(NEGATIVE) 





 


Urine Nitrite


 


 Negative


(NEGATIVE) 





 


Urine Bilirubin


 


 Small


(NEGATIVE) 





 


Urine Urobilinogen


 


 0.2 mg/dL (0.2


- 1) 





 


Urine Leukocyte Esterase


 


 Small


(NEGATIVE) 





 


Urine RBC


 


 11-20 /HPF


(0-5) 





 


Urine WBC


 


 11-20 /HPF


(0-5) 





 


Urine Epithelial Cells


 


 None /LPF


(NONE) 





 


Urine Bacteria


 


 Many /HPF


(NONE) 





 


White Blood Count


 


 


 11.46 x10e3/uL


(4.8-10.8)


 


Red Blood Count


 


 


 4.54 x10e6/uL


(3.6-5.1)


 


Hemoglobin


 


 


 11.1 g/dL


(12.0-16.0)


 


Hematocrit


 


 


 36.0 %


(34.2-44.1)


 


Mean Corpuscular Volume


 


 


 79.3 fL


(81-99)


 


Mean Corpuscular Hemoglobin


 


 


 24.4 pg


(28-32)


 


Mean Corpuscular Hemoglobin


Concent 


 


 30.8 g/dL


(31-35)


 


Red Cell Distribution Width


 


 


 14.9 %


(11.7-14.4)


 


Platelet Count


 


 


 362 x10e3/uL


(140-360)


 


Neutrophils (%) (Auto)


 


 


 69.8 %


(38.7-80.0)


 


Lymphocytes (%) (Auto)


 


 


 23.1 %


(18.0-39.1)


 


Monocytes (%) (Auto)


 


 


 5.6  %


(4.4-11.3)


 


Eosinophils (%) (Auto)


 


 


 0.6 %


(0.0-6.0)


 


Basophils (%) (Auto)


 


 


 0.4 %


(0.0-1.0)


 


Neutrophils # (Auto)   8.0 (2.1-6.9) 


 


Lymphocytes # (Auto)   2.7 (1.0-3.2) 


 


Monocytes # (Auto)   0.6 (0.2-0.8) 


 


Eosinophils # (Auto)   0.1 (0.0-0.4) 


 


Basophils # (Auto)   0.1 (0.0-0.1) 


 


Absolute Immature Granulocyte


(auto 


 


 0.06 x10e3/uL


(0-0.1)


 


Prothrombin Time


 


 


 12.7 seconds


(11.9-14.5)


 


Prothromb Time International


Ratio 


 


 0.90 





 


Activated Partial


Thromboplast Time 


 


 28.6 seconds


(23.8-35.5)


 


Sodium Level


 


 


 138 mmol/L


(136-145)


 


Potassium Level


 


 


 3.7 mmol/L


(3.5-5.1)


 


Chloride Level


 


 


 104 mmol/L


()


 


Carbon Dioxide Level


 


 


 20 mmol/L


(22-29)


 


Anion Gap


 


 


 17.7 mmol/L


(8-16)


 


Blood Urea Nitrogen


 


 


 19 mg/dL


(7-26)


 


Creatinine


 


 


 1.40 mg/dL


(0.57-1.11)


 


Estimat Glomerular Filtration


Rate 


 


 39 ML/MIN


(60-)


 


BUN/Creatinine Ratio   14 (6-25) 


 


Glucose Level


 


 


 142 mg/dL


()


 


Calcium Level


 


 


 9.9 mg/dL


(8.4-10.2)


 


Total Bilirubin


 


 


 0.4 mg/dL


(0.2-1.2)


 


Aspartate Amino Transf


(AST/SGOT) 


 


 21 IU/L (5-34) 





 


Alanine Aminotransferase


(ALT/SGPT) 


 


 19 IU/L (0-55) 





 


Alkaline Phosphatase


 


 


 125 IU/L


()


 


Total Protein


 


 


 9.2 g/dL


(6.5-8.1)


 


Albumin


 


 


 3.8 g/dL


(3.5-5.0)


 


Globulin


 


 


 5.4 g/dL


(2.3-3.5)


 


Albumin/Globulin Ratio   0.7 (0.8-2.0) 








Lab results reviewed:  Yes





Critical Care Time


Subsequent provider


I assumed direction of critical care for this patient from another provider of 

my specialty.





Assessment & Plan


Assessment & Plan


Final Impression:  


(1) UTI (urinary tract infection) due to urinary indwelling catheter


(2) Complicated UTI (urinary tract infection)


(3) Nephrostomy complication


(4) Pyelonephritis


Assessment & Plan


pt with recurrent complicated uti's, right nephrostomy,left ureteral stent and 

indwelling nolasco cath with right flank pain, n/v, weakness


ua from nephrostomy and a ua from indwelling nolasco port sent, urine cultures for

both also ordered, cbc, cmp, pt/ptt, lactic acid, blood cultures ordered to eval

for uti, electrolyte abnormality, renal insufficiency, sepsis, leukocytosis 


zofran 4 mg iv ordered





PT WITH INFECTION IN URINE SAMPLES FROM NOLASCO AND NEPHROSTOMY


I SPOKE WITH DR PEMBERTON AND DR DARBY ADMIT TO INPATIENT


Last Vital Signs











  Date Time  Temp Pulse Resp B/P (MAP) Pulse Ox O2 Delivery O2 Flow Rate FiO2


 


5/25/20 20:22 99.1 96 17 174/120 97   








Home Meds


Reported Medications


Hydrocodone Bit/Acetaminophen (NORCO 7.5-325 TABLET) 1 Each Tablet, 1 TAB PO Q8H

PRN for PAIN, #30 TAB


   5/2/20


Cefuroxime Axetil (CEFUROXIME) 500 Mg Tablet, 500 MG PO BID, #20


   5/2/20


Sennosides/Docusate Sodium (SENOKOT-S TABLET) 1 Each Tablet, 1 TAB PO BID, #60 

TAB 1 Refill


   5/2/20


Amlodipine Besylate (NORVASC) 5 Mg Tab, 5 MG PO DAILY, #30 TAB 1 Refill


   5/2/20


Metoprolol Tartrate (LOPRESSOR) 25 Mg Tab, 50 MG PO BID, #60 TAB 1 Refill


   5/2/20


Ondansetron Hcl (ZOFRAN) 8 Mg Tablet, 4 MG SL Q4HR PRN for NAUSEA AND VOMITING, 

#30 1 Refill


   5/2/20


Glipizide (GLIPIZIDE ER) 5 Mg Tab.er.24, 2.5 MG PO DAILY, #30 1 Refill


   5/2/20


Buspirone Hcl (BUSPIRONE HCL) 10 Mg Tablet, 10 MG PO BID PRN for ANXIETY


   6/21/19











TREY OCONNELL MD        May 25, 2020 20:43

## 2020-05-25 NOTE — XMS REPORT
Clinical Summary

                             Created on: 2020



Savannah Jackson

External Reference #: [id number]

: 1965

Sex: Female



Demographics





                          Address                   [address] [place]  [address]

 

                          Home Phone                [phone]

 

                          Preferred Language        Unknown

 

                          Marital Status            Single

 

                          [employer] Affiliation     [employer]

 

                          Race                      Unknown

 

                          Ethnic Group              Unknown





Author





                          Author                    [facility]

ict

 

                          Organization              [facility]

ict

 

                          Address                   Unknown

 

                          Phone                     Unavailable







Support





                Name            Relationship    Address         Phone

 

                    Geneva Jackson      [employer]                [address] [place]  [address]                      [phone]

 

                    Anat Jackson    [employer]                [address] [place]  [address]                      [phone]







Care Team Providers





                    Care Team Member Name Role                Phone

 

                          PCP                       Unavailable







Allergies





                                        Comments



                 Active Allergy  Reactions       Severity        Noted Date 

 

                                        



BECAME ANXIOUS AND FELT STRANGE



                     Diphenhydramine     Other               2009 







Medications





                          End Date                  Status



              Medication   Sig          Dispensed    Refills      Start  



                                         Date  

 

                                                    Active



              oxybutynin (DITROPAN) 5  Take 1 Tab by  90 Tab       1            

  



                     mg tabletIndications:  mouth 3 times       0  



                           Ureteral stricture        daily. For     



                                         bladder     



                                         spasms     

 

                                                    Active



              doxazosin (CARDURA) 2 mg  Take 1 Tab by  90 Tab       1           

   



                     tabletIndications:  mouth at            0  



                           Ureteral stricture        bedtime.     

 

                                                    Active



              famotidine (PEPCID) 20 mg  Take 1 Tab by  30 Tab       3          

  01/10/201  



                     tabletIndications: GERD  mouth 2 times       2  



                           (gastroesophageal reflux  daily.     



                                         disease)      

 

                                                    Active



                     traMADol (ULTRAM) 50 mg  Take 50 mg by       0   



                           tablet                    mouth as     



                                         needed.     

 

                                                    Active



              tamsulosin (FLOMAX) 0.4  Take 1       30 capsule   3              



                     mg extended release  capsule by          2  



                           capsuleIndications:       mouth daily.     



                                         Hydronephrosis      

 

                                                    Active



              flavoxate (URISPAS) 100  Take 1 tablet  30 tablet    0            

  



                     mg tabletIndications:  by mouth 3          2  



                           Hydronephrosis, right     times daily     



                                         as needed     



                                         (dysuria).     

 

                                                    Active



                     acetaminophen (TYLENOL)  Take 650 mg         0   



                           325 mg tablet             by mouth     



                                         every 6 hours     



                                         as needed.     

 

                                                    Active



              cyclobenzaprine  Take 1 tablet  40 tablet    2            10/14/20

1  



                     (FLEXERIL) 10 mg    by mouth 2          3  



                           tabletIndications: Flank  times daily     



                           Pain                      as needed for     



                                         Muscle     



                                         Spasms.     

 

                                                    Active



              HYDROcodone-acetaminophen  Take 1 tablet  30 tablet    0          

    



                     (NORCO)  mg   by mouth            4  



                           tabletIndications:        every 6 hours     



                           Pyelonephritis            as needed for     



                                         Pain.     

 

                                                    Active



                     metFORMIN (GLUCOPHAGE)  Take 500 mg         0   



                           500 mg tablet             by mouth 2     



                                         times daily     



                                         (with meals).     

 

                                                    Active



                     fenofibrate         Take 145 mg         0   



                           nanocrystallized (TRICOR)  by mouth     



                           145 mg tablet             daily.     

 

                                                    Active



                     lisinopril (PRINIVIL,  Take 2.5 mg         0   



                           ZESTRIL) 2.5 mg tablet    by mouth     



                                         daily.     

 

                                                    Active



                     atorvastatin (LIPITOR) 20  Take 20 mg by       0   



                           mg tablet                 mouth at     



                                         bedtime     



                                         nightly.     







Active Problems





 



                           Problem                   Noted Date

 

 



                           BMI 36.0-36.9,adult       2014

 

 



                                         Overview:



                                         Obese

 

 



                           Obese                     2014

 

 



                           Complicated UTI (urinary tract infection)  2014

 

 



                           Hematuria                 2014

 

 



                           Fatigue                   2013

 

 



                           Myalgia                   2013

 

 



                           Fever                     2013

 

 



                           UTI (lower urinary tract infection)  10/31/2012

 

 



                           Hydronephrosis, right     2012

 

 



                           Flank pain                2012

 

 



                           Pyelonephritis            2012

 

 



                           Cough                     2011







Immunizations





  



                     Name                Administration Dates  Next Due

 

  



                           Influenza Vaccine         2013 (Deferred: Patie

nt Refused) 

 

  



                           Pneumoccoccal             2013 (Deferred: Loyd

nt Refused) 







Family History





   



                 Medical History  Relation        Name            Comments

 

   



                           Diabetes                  Father  







   



                 Relation        Name            Status          Comments

 

   



                           Brother                   Alive 

 

   



                           Father                    Alive 

 

   



                           Mother                    Alive 

 

   



                           Sister                    Alive 







Social History





                                        Date



                 Tobacco Use     Types           Packs/Day       Years Used 

 

                                         



                                         Never Smoker    

 

    



                                         Smokeless Tobacco: Never   



                                         Used   







                    Drinks/Week         oz/Week             Comments



                                         Alcohol Use   

 

                                                             



                                         No   







 



                           Sex Assigned at Birth     Date Recorded

 

 



                                         Not on file 







                                        Industry



                           Job Start Date            Occupation 

 

                                        Not on file



                           Not on file               Not on file 







                                        Travel End



                           Travel History            Travel Start 

 





                                         No recent travel history available.







Last Filed Vital Signs

Not on file



Plan of Treatment





   



                 Health Maintenance  Due Date        Last Done       Comments

 

   



                           Cervical Cancer Scrn (3   1984  



                                         Yrs)   

 

   



                     Breast Cancer Scrn  2005 



                                         (Yearly)   

 

   



                           Colorectal Cancer Scrn    2013  



                                         Annual (FIT/FOBT) Age 50   



                                         to 73   

 

   



                           IMM Influenza Seasonal    10/01/2020  



                                         Oct to March (>/= 19 yrs)   







Implants





                    Device Identifier   Shelf Expiration Date Model / Serial / L

ot



                 Implanted       Type            Area            Manufactur   



                                         er   

 

                                        2017          [id number] /

 /

[id number]



                 Implant Gu Stent Black Beauty  Stent           Right:          

Cook   



                     Double Pigtail 7fr X 24cm [id number] -   Kidney(s)           Ur

ological   



                           [id number]                   Inc   



                                         Implanted: Qty: 1 on 2014 by     

 



                                         Mohan Garay ResidentMD      



                                         at [facility]      







Results

Not on fileafter 2019



Insurance





                                        Type



            Payer      Benefit    Subscriber ID  Effective  Phone      Address 



                           Plan /                    Dates   



                                         Group     

 

                                         



            BC/BS      BCBS HMO   xxxxxxxxxxxx  2015-P  [phone]  [address]

X 



                           resent                    [id number] 



                                         [facility] [place] 



                                         [id number] 







     



            Guarantor Name  Account    Relation to  Date of    Phone      Billin

g Address



                     Type                Patient             Birth  

 

     



            Savannah Jackson  Personal/F  Self       1965  [phone]

[address]  [address] 

[facility]               (Home)              [place] [address]



                                         [phone] 



                                         (Work) 







Advance Directives





                          Date Inactivated          Comments



                           Code Status               Date Activated  

 

                          2015  6:47 PM          



                           Full Code                 2015  4:06 AM  







                                                     

 

                          2014  4:20 PM          



                           Full Code                 5/3/2014 11:43 PM  







                                                     

 

                          3/16/2014  1:19 AM         



                           Full Code                 3/15/2014  6:29 AM  







                                                     

 

                          2013  4:29 PM         



                           Full Code                 2013  6:24 AM  







                                                     

 

                          1/10/2012 10:06 PM         



                           Full Code                 2012 10:55 PM

## 2020-05-26 VITALS — SYSTOLIC BLOOD PRESSURE: 127 MMHG | DIASTOLIC BLOOD PRESSURE: 71 MMHG

## 2020-05-26 VITALS — DIASTOLIC BLOOD PRESSURE: 71 MMHG | SYSTOLIC BLOOD PRESSURE: 127 MMHG

## 2020-05-26 VITALS — SYSTOLIC BLOOD PRESSURE: 140 MMHG | DIASTOLIC BLOOD PRESSURE: 91 MMHG

## 2020-05-26 VITALS — SYSTOLIC BLOOD PRESSURE: 151 MMHG | DIASTOLIC BLOOD PRESSURE: 86 MMHG

## 2020-05-26 VITALS — DIASTOLIC BLOOD PRESSURE: 71 MMHG | SYSTOLIC BLOOD PRESSURE: 123 MMHG

## 2020-05-26 VITALS — SYSTOLIC BLOOD PRESSURE: 167 MMHG | DIASTOLIC BLOOD PRESSURE: 82 MMHG

## 2020-05-26 VITALS — DIASTOLIC BLOOD PRESSURE: 73 MMHG | SYSTOLIC BLOOD PRESSURE: 132 MMHG

## 2020-05-26 LAB
ALBUMIN SERPL-MCNC: 3.6 G/DL (ref 3.5–5)
ALBUMIN/GLOB SERPL: 0.7 {RATIO} (ref 0.8–2)
ALP SERPL-CCNC: 120 IU/L (ref 40–150)
ALT SERPL-CCNC: 19 IU/L (ref 0–55)
ANION GAP SERPL CALC-SCNC: 17.6 MMOL/L (ref 8–16)
BASOPHILS # BLD AUTO: 0.1 10*3/UL (ref 0–0.1)
BASOPHILS NFR BLD AUTO: 0.6 % (ref 0–1)
BUN SERPL-MCNC: 19 MG/DL (ref 7–26)
BUN/CREAT SERPL: 15 (ref 6–25)
CALCIUM SERPL-MCNC: 9.6 MG/DL (ref 8.4–10.2)
CHLORIDE SERPL-SCNC: 105 MMOL/L (ref 98–107)
CO2 SERPL-SCNC: 20 MMOL/L (ref 22–29)
DEPRECATED NEUTROPHILS # BLD AUTO: 5.1 10*3/UL (ref 2.1–6.9)
EGFRCR SERPLBLD CKD-EPI 2021: 42 ML/MIN (ref 60–?)
EOSINOPHIL # BLD AUTO: 0.1 10*3/UL (ref 0–0.4)
EOSINOPHIL NFR BLD AUTO: 1.6 % (ref 0–6)
ERYTHROCYTE [DISTWIDTH] IN CORD BLOOD: 14.9 % (ref 11.7–14.4)
GLOBULIN PLAS-MCNC: 4.9 G/DL (ref 2.3–3.5)
GLUCOSE SERPLBLD-MCNC: 140 MG/DL (ref 74–118)
HCT VFR BLD AUTO: 35.7 % (ref 34.2–44.1)
HGB BLD-MCNC: 10.5 G/DL (ref 12–16)
LYMPHOCYTES # BLD: 2 10*3/UL (ref 1–3.2)
LYMPHOCYTES NFR BLD AUTO: 25.7 % (ref 18–39.1)
MCH RBC QN AUTO: 24.6 PG (ref 28–32)
MCHC RBC AUTO-ENTMCNC: 29.4 G/DL (ref 31–35)
MCV RBC AUTO: 83.8 FL (ref 81–99)
MONOCYTES # BLD AUTO: 0.6 10*3/UL (ref 0.2–0.8)
MONOCYTES NFR BLD AUTO: 7 % (ref 4.4–11.3)
NEUTS SEG NFR BLD AUTO: 64.6 % (ref 38.7–80)
PLATELET # BLD AUTO: 284 X10E3/UL (ref 140–360)
POTASSIUM SERPL-SCNC: 3.6 MMOL/L (ref 3.5–5.1)
RBC # BLD AUTO: 4.26 X10E6/UL (ref 3.6–5.1)
SODIUM SERPL-SCNC: 139 MMOL/L (ref 136–145)

## 2020-05-26 RX ADMIN — SODIUM CHLORIDE PRN MG: 900 INJECTION INTRAVENOUS at 06:33

## 2020-05-26 RX ADMIN — Medication PRN MG: at 06:33

## 2020-05-26 RX ADMIN — INSULIN HUMAN SCH UNIT: 100 INJECTION, SOLUTION PARENTERAL at 11:30

## 2020-05-26 RX ADMIN — METOPROLOL TARTRATE SCH MG: 50 TABLET, FILM COATED ORAL at 17:00

## 2020-05-26 RX ADMIN — INSULIN HUMAN SCH UNIT: 100 INJECTION, SOLUTION PARENTERAL at 17:33

## 2020-05-26 RX ADMIN — CEFEPIME SCH MLS/HR: 1 INJECTION, SOLUTION INTRAVENOUS at 14:00

## 2020-05-26 RX ADMIN — AMLODIPINE BESYLATE SCH MG: 5 TABLET ORAL at 08:38

## 2020-05-26 RX ADMIN — CEFEPIME SCH MLS/HR: 1 INJECTION, SOLUTION INTRAVENOUS at 06:33

## 2020-05-26 RX ADMIN — GLIPIZIDE SCH MG: 2.5 TABLET, EXTENDED RELEASE ORAL at 08:38

## 2020-05-26 RX ADMIN — ACETAMINOPHEN AND CODEINE PHOSPHATE PRN EA: 300; 30 TABLET ORAL at 22:35

## 2020-05-26 RX ADMIN — INSULIN HUMAN SCH UNIT: 100 INJECTION, SOLUTION PARENTERAL at 07:30

## 2020-05-26 RX ADMIN — CEFEPIME SCH MLS/HR: 1 INJECTION, SOLUTION INTRAVENOUS at 22:46

## 2020-05-26 RX ADMIN — METOPROLOL TARTRATE SCH MG: 50 TABLET, FILM COATED ORAL at 08:38

## 2020-05-26 RX ADMIN — INSULIN HUMAN SCH UNIT: 100 INJECTION, SOLUTION PARENTERAL at 21:00

## 2020-05-26 RX ADMIN — SODIUM CHLORIDE PRN MG: 900 INJECTION INTRAVENOUS at 21:38

## 2020-05-26 NOTE — HISTORY AND PHYSICAL
PRIMARY CARE PHYSICIAN:  Dr. Satinder Herrmann.

 

CONSULTANT:  Goldy Rachel MD

 

CHIEF COMPLAINT:  Increasing abdominal pain and recurrent infection with nausea and

vomiting. 

 

HISTORY OF PRESENT ILLNESS:  The patient is a 54-year-old female, who has right

nephrostomy tube and left severe pain.  The patient has an indwelling Gomez catheter.

The patient apparently increase in flank pain for the past 2 to 3 days associated with

increasing fever, nausea, vomiting, generalized weakness.  The patient came to the

hospital for evaluation.  Her WBC was 11.5 thousand.  She does have a urinalysis with

wbc's greater than 50, positive leukocyte esterase, cloudy urine, and minimal bacteria.

She did have a Pseudomonas infection in the past.  Cefepime initiated and Dr. Goldy Rachel

consulted.  The patient is having pain and she is getting pain medication at this time. 

 

PAST MEDICAL HISTORY:  The patient recently had nephrostomy tube exchange and stent

exchange on May 1, 2020.  The patient also has a history of cervical cancer, she has had

radiation treatment here.  The patient has chronic kidney disease, anxiety, depression

disorder, dyslipidemia, hypertension. 

 

PAST SURGICAL HISTORY:  She had multiple urological procedures cystoscopy.  Right

nephrostomy.  Long-term Gomez catheter placement. 

 

ALLERGIES:  MEROPENEM.

 

HOME MEDICATIONS:  Norvasc, buspirone, glipizide, metoprolol, and Zofran.

 

PHYSICAL EXAMINATION:

VITAL SIGNS:  T-max 100, blood pressure 174/120, pulse rate 96, and respirations 20. 

GENERAL:  The patient is in pain, but she is not in distress. 

HEENT:  Normocephalic and atraumatic.  Anicteric. 

NECK:  Supple grossly. 

PULMONARY:  Diminished breath sounds. 

CARDIOVASCULAR:  Tachycardia to normal sinus rhythm. 

ABDOMEN:  CVA tenderness on the right abdominal tenderness without any guarding.  It is

soft.  Right nephrostomy tube.  Gomez catheter in place. 

NEUROLOGIC:  No focal deficit.

LABORATORY DATA:  Sodium 138, potassium 3.7, chloride 104, bicarb 20, BUN 19, creatinine

1.4, glucose 142.  Lactic acid is 1.8. 

 

WBC 11.5, hemoglobin 11, hematocrit 36, and platelets 262. 

 

Urinalysis; cloudy urine with lot of bacteria, positive large leukocyte esterase and WBC

greater than 50. 

 

IMPRESSION:  

1. Pyelonephritis.

2. Recurrent urinary tract infection.

3. Flank pain, abdominal pain.

4. Indwelling nephrostomy tube and indwelling Gomez catheter.

 

PLAN:  Continue with antibiotics.  Consultation with Dr. Goldy Rachel.  Check urine

culture.  Pain control.  Insulin sliding scale coverage.  Home medication resumed.  We

will follow up on the patient's status. 

 

 

 

 

______________________________

MD ALEX Knott/MALORIE

D:  05/26/2020 09:05:25

T:  05/26/2020 14:06:49

Job #:  [id number]

## 2020-05-27 VITALS — SYSTOLIC BLOOD PRESSURE: 137 MMHG | DIASTOLIC BLOOD PRESSURE: 72 MMHG

## 2020-05-27 VITALS — SYSTOLIC BLOOD PRESSURE: 104 MMHG | DIASTOLIC BLOOD PRESSURE: 73 MMHG

## 2020-05-27 VITALS — SYSTOLIC BLOOD PRESSURE: 132 MMHG | DIASTOLIC BLOOD PRESSURE: 71 MMHG

## 2020-05-27 VITALS — SYSTOLIC BLOOD PRESSURE: 108 MMHG | DIASTOLIC BLOOD PRESSURE: 59 MMHG

## 2020-05-27 VITALS — SYSTOLIC BLOOD PRESSURE: 120 MMHG | DIASTOLIC BLOOD PRESSURE: 65 MMHG

## 2020-05-27 VITALS — DIASTOLIC BLOOD PRESSURE: 59 MMHG | SYSTOLIC BLOOD PRESSURE: 108 MMHG

## 2020-05-27 RX ADMIN — INSULIN HUMAN SCH UNIT: 100 INJECTION, SOLUTION PARENTERAL at 15:54

## 2020-05-27 RX ADMIN — AMLODIPINE BESYLATE SCH MG: 5 TABLET ORAL at 08:19

## 2020-05-27 RX ADMIN — BUSPIRONE HYDROCHLORIDE PRN MG: 10 TABLET ORAL at 12:50

## 2020-05-27 RX ADMIN — ACETAMINOPHEN AND CODEINE PHOSPHATE PRN EA: 300; 30 TABLET ORAL at 04:50

## 2020-05-27 RX ADMIN — INSULIN HUMAN SCH UNIT: 100 INJECTION, SOLUTION PARENTERAL at 21:20

## 2020-05-27 RX ADMIN — CEFEPIME SCH MLS/HR: 1 INJECTION, SOLUTION INTRAVENOUS at 04:57

## 2020-05-27 RX ADMIN — SODIUM CHLORIDE PRN MG: 900 INJECTION INTRAVENOUS at 11:05

## 2020-05-27 RX ADMIN — ACETAMINOPHEN AND CODEINE PHOSPHATE PRN EA: 300; 30 TABLET ORAL at 21:28

## 2020-05-27 RX ADMIN — GLIPIZIDE SCH MG: 2.5 TABLET, EXTENDED RELEASE ORAL at 08:19

## 2020-05-27 RX ADMIN — METOPROLOL TARTRATE SCH MG: 50 TABLET, FILM COATED ORAL at 17:00

## 2020-05-27 RX ADMIN — METOPROLOL TARTRATE SCH MG: 50 TABLET, FILM COATED ORAL at 08:19

## 2020-05-27 RX ADMIN — FLUCONAZOLE IN SODIUM CHLORIDE SCH MLS/HR: 2 INJECTION, SOLUTION INTRAVENOUS at 12:15

## 2020-05-27 RX ADMIN — INSULIN HUMAN SCH UNIT: 100 INJECTION, SOLUTION PARENTERAL at 12:17

## 2020-05-27 RX ADMIN — BUSPIRONE HYDROCHLORIDE PRN MG: 10 TABLET ORAL at 00:56

## 2020-05-27 RX ADMIN — CEFEPIME SCH MLS/HR: 1 INJECTION, SOLUTION INTRAVENOUS at 21:09

## 2020-05-27 RX ADMIN — ACETAMINOPHEN AND CODEINE PHOSPHATE PRN EA: 300; 30 TABLET ORAL at 11:05

## 2020-05-27 RX ADMIN — INSULIN HUMAN SCH UNIT: 100 INJECTION, SOLUTION PARENTERAL at 07:30

## 2020-05-27 RX ADMIN — CEFEPIME SCH MLS/HR: 1 INJECTION, SOLUTION INTRAVENOUS at 14:15

## 2020-05-27 RX ADMIN — Medication PRN MG: at 14:08

## 2020-05-28 VITALS — DIASTOLIC BLOOD PRESSURE: 79 MMHG | SYSTOLIC BLOOD PRESSURE: 137 MMHG

## 2020-05-28 VITALS — SYSTOLIC BLOOD PRESSURE: 127 MMHG | DIASTOLIC BLOOD PRESSURE: 76 MMHG

## 2020-05-28 VITALS — DIASTOLIC BLOOD PRESSURE: 65 MMHG | SYSTOLIC BLOOD PRESSURE: 113 MMHG

## 2020-05-28 VITALS — SYSTOLIC BLOOD PRESSURE: 113 MMHG | DIASTOLIC BLOOD PRESSURE: 65 MMHG

## 2020-05-28 VITALS — DIASTOLIC BLOOD PRESSURE: 64 MMHG | SYSTOLIC BLOOD PRESSURE: 106 MMHG

## 2020-05-28 VITALS — DIASTOLIC BLOOD PRESSURE: 67 MMHG | SYSTOLIC BLOOD PRESSURE: 112 MMHG

## 2020-05-28 VITALS — DIASTOLIC BLOOD PRESSURE: 71 MMHG | SYSTOLIC BLOOD PRESSURE: 122 MMHG

## 2020-05-28 RX ADMIN — METOPROLOL TARTRATE SCH MG: 50 TABLET, FILM COATED ORAL at 09:20

## 2020-05-28 RX ADMIN — FLUCONAZOLE IN SODIUM CHLORIDE SCH MLS/HR: 2 INJECTION, SOLUTION INTRAVENOUS at 12:42

## 2020-05-28 RX ADMIN — INSULIN HUMAN SCH UNIT: 100 INJECTION, SOLUTION PARENTERAL at 07:30

## 2020-05-28 RX ADMIN — BUSPIRONE HYDROCHLORIDE PRN MG: 10 TABLET ORAL at 16:50

## 2020-05-28 RX ADMIN — CEFEPIME SCH MLS/HR: 1 INJECTION, SOLUTION INTRAVENOUS at 14:45

## 2020-05-28 RX ADMIN — ACETAMINOPHEN AND CODEINE PHOSPHATE PRN EA: 300; 30 TABLET ORAL at 12:42

## 2020-05-28 RX ADMIN — METOPROLOL TARTRATE SCH MG: 50 TABLET, FILM COATED ORAL at 16:50

## 2020-05-28 RX ADMIN — CEFEPIME SCH MLS/HR: 1 INJECTION, SOLUTION INTRAVENOUS at 21:50

## 2020-05-28 RX ADMIN — AMLODIPINE BESYLATE SCH MG: 5 TABLET ORAL at 09:20

## 2020-05-28 RX ADMIN — CEFEPIME SCH MLS/HR: 1 INJECTION, SOLUTION INTRAVENOUS at 05:38

## 2020-05-28 RX ADMIN — GLIPIZIDE SCH MG: 2.5 TABLET, EXTENDED RELEASE ORAL at 09:19

## 2020-05-28 RX ADMIN — INSULIN HUMAN SCH UNIT: 100 INJECTION, SOLUTION PARENTERAL at 11:30

## 2020-05-28 RX ADMIN — ACETAMINOPHEN AND CODEINE PHOSPHATE PRN EA: 300; 30 TABLET ORAL at 22:45

## 2020-05-28 RX ADMIN — INSULIN HUMAN SCH UNIT: 100 INJECTION, SOLUTION PARENTERAL at 16:30

## 2020-05-28 RX ADMIN — INSULIN HUMAN SCH UNIT: 100 INJECTION, SOLUTION PARENTERAL at 21:30

## 2020-05-29 VITALS — DIASTOLIC BLOOD PRESSURE: 75 MMHG | SYSTOLIC BLOOD PRESSURE: 124 MMHG

## 2020-05-29 VITALS — DIASTOLIC BLOOD PRESSURE: 60 MMHG | SYSTOLIC BLOOD PRESSURE: 123 MMHG

## 2020-05-29 VITALS — SYSTOLIC BLOOD PRESSURE: 126 MMHG | DIASTOLIC BLOOD PRESSURE: 66 MMHG

## 2020-05-29 VITALS — SYSTOLIC BLOOD PRESSURE: 124 MMHG | DIASTOLIC BLOOD PRESSURE: 75 MMHG

## 2020-05-29 VITALS — DIASTOLIC BLOOD PRESSURE: 73 MMHG | SYSTOLIC BLOOD PRESSURE: 131 MMHG

## 2020-05-29 VITALS — DIASTOLIC BLOOD PRESSURE: 78 MMHG | SYSTOLIC BLOOD PRESSURE: 139 MMHG

## 2020-05-29 VITALS — SYSTOLIC BLOOD PRESSURE: 139 MMHG | DIASTOLIC BLOOD PRESSURE: 78 MMHG

## 2020-05-29 VITALS — DIASTOLIC BLOOD PRESSURE: 78 MMHG | SYSTOLIC BLOOD PRESSURE: 124 MMHG

## 2020-05-29 LAB
ANION GAP SERPL CALC-SCNC: 12.7 MMOL/L (ref 8–16)
BASOPHILS # BLD AUTO: 0.1 10*3/UL (ref 0–0.1)
BASOPHILS NFR BLD AUTO: 0.6 % (ref 0–1)
BUN SERPL-MCNC: 16 MG/DL (ref 7–26)
BUN/CREAT SERPL: 12 (ref 6–25)
CALCIUM SERPL-MCNC: 9.1 MG/DL (ref 8.4–10.2)
CHLORIDE SERPL-SCNC: 109 MMOL/L (ref 98–107)
CO2 SERPL-SCNC: 21 MMOL/L (ref 22–29)
DEPRECATED NEUTROPHILS # BLD AUTO: 5.8 10*3/UL (ref 2.1–6.9)
EGFRCR SERPLBLD CKD-EPI 2021: 42 ML/MIN (ref 60–?)
EOSINOPHIL # BLD AUTO: 0.3 10*3/UL (ref 0–0.4)
EOSINOPHIL NFR BLD AUTO: 2.6 % (ref 0–6)
ERYTHROCYTE [DISTWIDTH] IN CORD BLOOD: 15 % (ref 11.7–14.4)
GLUCOSE SERPLBLD-MCNC: 110 MG/DL (ref 74–118)
HCT VFR BLD AUTO: 32.5 % (ref 34.2–44.1)
HGB BLD-MCNC: 9.9 G/DL (ref 12–16)
LYMPHOCYTES # BLD: 2.9 10*3/UL (ref 1–3.2)
LYMPHOCYTES NFR BLD AUTO: 29.9 % (ref 18–39.1)
MCH RBC QN AUTO: 24.9 PG (ref 28–32)
MCHC RBC AUTO-ENTMCNC: 30.5 G/DL (ref 31–35)
MCV RBC AUTO: 81.9 FL (ref 81–99)
MONOCYTES # BLD AUTO: 0.7 10*3/UL (ref 0.2–0.8)
MONOCYTES NFR BLD AUTO: 7 % (ref 4.4–11.3)
NEUTS SEG NFR BLD AUTO: 59.5 % (ref 38.7–80)
PLATELET # BLD AUTO: 268 X10E3/UL (ref 140–360)
POTASSIUM SERPL-SCNC: 3.7 MMOL/L (ref 3.5–5.1)
RBC # BLD AUTO: 3.97 X10E6/UL (ref 3.6–5.1)
SODIUM SERPL-SCNC: 139 MMOL/L (ref 136–145)

## 2020-05-29 RX ADMIN — CEFEPIME SCH MLS/HR: 1 INJECTION, SOLUTION INTRAVENOUS at 22:00

## 2020-05-29 RX ADMIN — SODIUM CHLORIDE PRN MG: 900 INJECTION INTRAVENOUS at 11:27

## 2020-05-29 RX ADMIN — INSULIN HUMAN SCH UNIT: 100 INJECTION, SOLUTION PARENTERAL at 11:35

## 2020-05-29 RX ADMIN — INSULIN HUMAN SCH UNIT: 100 INJECTION, SOLUTION PARENTERAL at 16:08

## 2020-05-29 RX ADMIN — AMLODIPINE BESYLATE SCH MG: 5 TABLET ORAL at 08:53

## 2020-05-29 RX ADMIN — ONDANSETRON PRN MG: 4 TABLET, ORALLY DISINTEGRATING ORAL at 15:27

## 2020-05-29 RX ADMIN — INSULIN HUMAN SCH UNIT: 100 INJECTION, SOLUTION PARENTERAL at 07:30

## 2020-05-29 RX ADMIN — INSULIN HUMAN SCH UNIT: 100 INJECTION, SOLUTION PARENTERAL at 11:41

## 2020-05-29 RX ADMIN — FLUCONAZOLE IN SODIUM CHLORIDE SCH MLS/HR: 2 INJECTION, SOLUTION INTRAVENOUS at 11:53

## 2020-05-29 RX ADMIN — GLIPIZIDE SCH MG: 2.5 TABLET, EXTENDED RELEASE ORAL at 08:53

## 2020-05-29 RX ADMIN — INSULIN HUMAN SCH UNIT: 100 INJECTION, SOLUTION PARENTERAL at 21:00

## 2020-05-29 RX ADMIN — ACETAMINOPHEN AND CODEINE PHOSPHATE PRN EA: 300; 30 TABLET ORAL at 13:35

## 2020-05-29 RX ADMIN — ACETAMINOPHEN AND CODEINE PHOSPHATE PRN EA: 300; 30 TABLET ORAL at 09:00

## 2020-05-29 RX ADMIN — CEFEPIME SCH MLS/HR: 1 INJECTION, SOLUTION INTRAVENOUS at 05:11

## 2020-05-29 RX ADMIN — METOPROLOL TARTRATE SCH MG: 50 TABLET, FILM COATED ORAL at 08:53

## 2020-05-29 RX ADMIN — METOPROLOL TARTRATE SCH MG: 50 TABLET, FILM COATED ORAL at 16:11

## 2020-05-29 RX ADMIN — CEFEPIME SCH MLS/HR: 1 INJECTION, SOLUTION INTRAVENOUS at 13:34

## 2020-05-29 RX ADMIN — INSULIN HUMAN SCH UNIT: 100 INJECTION, SOLUTION PARENTERAL at 11:30

## 2020-05-30 VITALS — SYSTOLIC BLOOD PRESSURE: 131 MMHG | DIASTOLIC BLOOD PRESSURE: 82 MMHG

## 2020-05-30 VITALS — SYSTOLIC BLOOD PRESSURE: 119 MMHG | DIASTOLIC BLOOD PRESSURE: 66 MMHG

## 2020-05-30 VITALS — DIASTOLIC BLOOD PRESSURE: 72 MMHG | SYSTOLIC BLOOD PRESSURE: 116 MMHG

## 2020-05-30 VITALS — SYSTOLIC BLOOD PRESSURE: 117 MMHG | DIASTOLIC BLOOD PRESSURE: 58 MMHG

## 2020-05-30 RX ADMIN — GLIPIZIDE SCH MG: 2.5 TABLET, EXTENDED RELEASE ORAL at 09:12

## 2020-05-30 RX ADMIN — AMLODIPINE BESYLATE SCH MG: 5 TABLET ORAL at 09:13

## 2020-05-30 RX ADMIN — METOPROLOL TARTRATE SCH MG: 50 TABLET, FILM COATED ORAL at 09:13

## 2020-05-30 RX ADMIN — CEFEPIME SCH MLS/HR: 1 INJECTION, SOLUTION INTRAVENOUS at 05:46

## 2020-05-30 RX ADMIN — ONDANSETRON PRN MG: 4 TABLET, ORALLY DISINTEGRATING ORAL at 04:47

## 2020-05-30 RX ADMIN — INSULIN HUMAN SCH UNIT: 100 INJECTION, SOLUTION PARENTERAL at 11:30

## 2020-05-30 RX ADMIN — ACETAMINOPHEN AND CODEINE PHOSPHATE PRN EA: 300; 30 TABLET ORAL at 04:47

## 2020-05-30 RX ADMIN — INSULIN HUMAN SCH UNIT: 100 INJECTION, SOLUTION PARENTERAL at 07:30

## 2020-05-30 NOTE — DISCHARGE SUMMARY
PRIMARY CARE PHYSICIAN:  Dr. Satinder Herrmann.

 

CONSULTANT:  Dr. Goldy Rachel.

 

FINAL DIAGNOSES:  

1. Complicated urinary tract infection associated with nephrostomy tube on the right and

left ureteral stent. 

2. Pseudomonas and Enterococcus faecalis infection.

3. Candidiasis infection in the urine with multiple antibiotic treatment.

4. Baseline hypertension, diabetes, and anxiety disorder.

5. Baseline history of radiation and treatment for uterine cancer.

6. Long history of urinary obstruction.

 

SUMMARY:  The patient is a 54-year-old female with history of cervical cancer with

radiation treatment and complicated with the urinary tract system.  The patient had

multiple stent placements in the past, had lot of scarring.  She had a stent placement

to the left ureter and also nephrostomy tube on the right.  There was extensive scarring

of the ureter.  The patient has urinary bladder obstruction as well.  Recurrent

infection with recurrent hospitalization. 

 

At this time, the patient came in with complicated urinary tract infection and early

pyelonephritis.  The patient with fever and leukocytosis.  The patient is doing much

better now.  She is on antibiotics.  The urine culture grew out to be Pseudomonas

candidiasis and also Enterococcus.  It is sensitive to Cipro and Diflucan.  The patient

will go home with 2 weeks of Cipro and Diflucan.  Tylenol No. 3 p.r.n. for pain.

Celebrex 100 mg twice a day with meals for pain.  Protonix 20 mg daily a.m. for stomach

protection.  The patient is otherwise stable.  She will resume all home medication.  She

will follow up with Dr. Goldy Rachel closely for urinary tract system management. 

 

Discussed with the patient at length.

 

 

 

 

______________________________

MD ALEX Knott/MALORIE

D:  05/30/2020 09:10:37

T:  05/30/2020 11:04:23

Job #:  [id number]

## 2020-07-14 LAB
ANION GAP SERPL CALC-SCNC: 13.6 MMOL/L (ref 8–16)
BASOPHILS # BLD AUTO: 0.1 10*3/UL (ref 0–0.1)
BASOPHILS NFR BLD AUTO: 0.7 % (ref 0–1)
BUN SERPL-MCNC: 14 MG/DL (ref 7–26)
BUN/CREAT SERPL: 15 (ref 6–25)
CALCIUM SERPL-MCNC: 9.2 MG/DL (ref 8.4–10.2)
CHLORIDE SERPL-SCNC: 106 MMOL/L (ref 98–107)
CO2 SERPL-SCNC: 24 MMOL/L (ref 22–29)
DEPRECATED INR PLAS: 0.88
DEPRECATED NEUTROPHILS # BLD AUTO: 4.8 10*3/UL (ref 2.1–6.9)
EGFRCR SERPLBLD CKD-EPI 2021: 60 ML/MIN (ref 60–?)
EOSINOPHIL # BLD AUTO: 0.2 10*3/UL (ref 0–0.4)
EOSINOPHIL NFR BLD AUTO: 2 % (ref 0–6)
ERYTHROCYTE [DISTWIDTH] IN CORD BLOOD: 16.3 % (ref 11.7–14.4)
GLUCOSE SERPLBLD-MCNC: 135 MG/DL (ref 74–118)
HCT VFR BLD AUTO: 36.1 % (ref 34.2–44.1)
HGB BLD-MCNC: 10.8 G/DL (ref 12–16)
LYMPHOCYTES # BLD: 2.3 10*3/UL (ref 1–3.2)
LYMPHOCYTES NFR BLD AUTO: 29.5 % (ref 18–39.1)
MCH RBC QN AUTO: 24.8 PG (ref 28–32)
MCHC RBC AUTO-ENTMCNC: 29.9 G/DL (ref 31–35)
MCV RBC AUTO: 82.8 FL (ref 81–99)
MONOCYTES # BLD AUTO: 0.4 10*3/UL (ref 0.2–0.8)
MONOCYTES NFR BLD AUTO: 5.5 % (ref 4.4–11.3)
NEUTS SEG NFR BLD AUTO: 61.9 % (ref 38.7–80)
PLATELET # BLD AUTO: 251 X10E3/UL (ref 140–360)
POTASSIUM SERPL-SCNC: 3.6 MMOL/L (ref 3.5–5.1)
PROTHROMBIN TIME: 12.5 SECONDS (ref 11.9–14.5)
RBC # BLD AUTO: 4.36 X10E6/UL (ref 3.6–5.1)
SODIUM SERPL-SCNC: 140 MMOL/L (ref 136–145)

## 2020-07-17 ENCOUNTER — HOSPITAL ENCOUNTER (OUTPATIENT)
Dept: HOSPITAL 88 - OR | Age: 55
Discharge: HOME | End: 2020-07-17
Attending: UROLOGY
Payer: COMMERCIAL

## 2020-07-17 VITALS — DIASTOLIC BLOOD PRESSURE: 76 MMHG | SYSTOLIC BLOOD PRESSURE: 135 MMHG

## 2020-07-17 DIAGNOSIS — Z46.6: ICD-10-CM

## 2020-07-17 DIAGNOSIS — F41.9: ICD-10-CM

## 2020-07-17 DIAGNOSIS — Z79.84: ICD-10-CM

## 2020-07-17 DIAGNOSIS — I10: ICD-10-CM

## 2020-07-17 DIAGNOSIS — E66.9: ICD-10-CM

## 2020-07-17 DIAGNOSIS — Z92.21: ICD-10-CM

## 2020-07-17 DIAGNOSIS — Z93.6: ICD-10-CM

## 2020-07-17 DIAGNOSIS — Z88.0: ICD-10-CM

## 2020-07-17 DIAGNOSIS — Z11.59: ICD-10-CM

## 2020-07-17 DIAGNOSIS — N81.10: ICD-10-CM

## 2020-07-17 DIAGNOSIS — Z92.3: ICD-10-CM

## 2020-07-17 DIAGNOSIS — N31.9: ICD-10-CM

## 2020-07-17 DIAGNOSIS — Z01.810: ICD-10-CM

## 2020-07-17 DIAGNOSIS — N13.1: Primary | ICD-10-CM

## 2020-07-17 DIAGNOSIS — E11.9: ICD-10-CM

## 2020-07-17 DIAGNOSIS — N26.1: ICD-10-CM

## 2020-07-17 DIAGNOSIS — N81.6: ICD-10-CM

## 2020-07-17 DIAGNOSIS — Z01.812: ICD-10-CM

## 2020-07-17 DIAGNOSIS — Z88.1: ICD-10-CM

## 2020-07-17 DIAGNOSIS — N95.2: ICD-10-CM

## 2020-07-17 DIAGNOSIS — F32.9: ICD-10-CM

## 2020-07-17 DIAGNOSIS — Z85.41: ICD-10-CM

## 2020-07-17 PROCEDURE — 80048 BASIC METABOLIC PNL TOTAL CA: CPT

## 2020-07-17 PROCEDURE — 36415 COLL VENOUS BLD VENIPUNCTURE: CPT

## 2020-07-17 PROCEDURE — 74420 UROGRAPHY RTRGR +-KUB: CPT

## 2020-07-17 PROCEDURE — 82948 REAGENT STRIP/BLOOD GLUCOSE: CPT

## 2020-07-17 PROCEDURE — 85610 PROTHROMBIN TIME: CPT

## 2020-07-17 PROCEDURE — 52341 CYSTO W/URETER STRICTURE TX: CPT

## 2020-07-17 PROCEDURE — 93005 ELECTROCARDIOGRAM TRACING: CPT

## 2020-07-17 PROCEDURE — 85025 COMPLETE CBC W/AUTO DIFF WBC: CPT

## 2020-07-17 PROCEDURE — 85730 THROMBOPLASTIN TIME PARTIAL: CPT

## 2020-07-17 PROCEDURE — 52332 CYSTOSCOPY AND TREATMENT: CPT

## 2020-08-03 NOTE — OPERATIVE REPORT
DATE OF PROCEDURE:  07/17/2020

 

SURGEON:  Goldy Rachel MD

 

PREOPERATIVE DIAGNOSES:  

1. Left ureteral stricture.

2. Left hydronephrosis due to stricture.

3. Two separate left-sided indwelling ureteral stents.

4. Neurogenic bladder.

5. Atrophic vaginitis.

6. Grade 1 cystocele.

7. Grade 1 rectocele.

 

POSTOPERATIVE DIAGNOSES:  

1. Left ureteral stricture.

2. Left hydronephrosis due to stricture.

3. Two separate left-sided indwelling ureteral stents.

4. Neurogenic bladder.

5. Atrophic vaginitis.

6. Grade 1 cystocele.

7. Grade 1 rectocele.

 

OPERATIONS PERFORMED:  

1. Cystourethroscopy with complicated removal of left indwelling ureteral stent

(separate procedure performed with separate scope for the diagnosis of stent). 

2. Left ureteroscopy with dilation of ureteral stricture (separate procedure performed

for the diagnosis of stricture). 

3. Left ureteroscopy with (separate procedure performed for the diagnosis of the stent).

4. Cystourethroscopy with dilation of ureteral stricture (separate procedure performed

for the diagnosis of stricture). 

5. Radiological services for supervision and interpretation of stricture dilation.

6. Cystourethroscopy with insertion of two separate left-sided indwelling ureteral

stents (separate procedure performed to relieve the hydronephrosis). 

7. Interpretation of retrograde ureteropyelography.

8. Supervision of fluoroscopy, no radiologist present.

9. Interpretation of cystography.

10. Pelvic examination under anesthesia.

 

ANESTHESIA:  General.

 

COMPLICATIONS:  None.

 

CLINICAL SUMMARY:  Savannah Jackson is a complicated 54-year-old woman with bilateral

ureteral strictures.  The right one was managed with a nephrostomy.  She has failed

stenting.  She has a double stents on the left hand side to manage the hydronephrosis.

She has complicated infections.  She has a neurogenic bladder, which causes her to be

forced to have a chronic Gomez catheter because she refluxes up with stents and this

caused reflux nephropathy and already has bilateral renal atrophy and insufficiency.

She is brought for the above procedure.  She is aware of the risks of bleeding,

infection, injury to adjacent structures, need for additional procedures and elected to

proceed.  The patient is overdue for her nephrostomy change. 

 

OPERATIVE PROCEDURE IN DETAIL:  Informed consent was verified and Manuel was properly

identified and taken to the operating room, placed on the cystoscopy table in supine

position.  Anesthesia was uneventfully begun.  The patient's Gomez catheter was removed

and her genitalia were prepared and draped in usual sterile fashion.  The cystoscope

sheath with obturator in place was atraumatically inserted into the patient's urethra

and the bladder was drained.  Panendoscopy of the bladder revealed no suspicious mucosal

lesions.  No tumors.  There were radiation changes.  There was blanching and there were

these chronic changes that the patient has been condemned with as a result of

radiotherapy.  New stents were emerging from the left ureteral orifice and there was

slightly encrusted.  Then left ureteral orifice was displaced laterally.  The guidewire

was then placed alongside the stent and guided to the level of the patient's kidney.

The stent was then grasped completely, removed and discarded. 

 

A double-lumen ureteral catheter was then utilized as a coaxial dilator sheath was

advanced over the guidewire to the level of the stricture.  We dilated across the

stricture. 

 

A 2nd guidewire was left in place.  Then with cystoscope and fluoroscopic guidance, two

separate indwelling ureteral stents were then placed to coil the patient's kidney as

well as the patient's bladder.  The retaining sutures were removed. 

 

Interpretation of retrograde ureteropyelography contrast was instilled in a retrograde

fashion on the left-hand side via the double-lumen ureteral catheter.  There was chronic

appearing hydronephrosis with calyceal blunting.  The stents were in good position,

coiled the patient's kidneys as well as the patient's bladder at the end of the case. 

 

Cystoscope was withdrawn.  A Gomez catheter was placed.  Contrast was injected and

performed cystography. 

 

A template for cystography.  The bladder wall was irregular.  The bladder with extremely

small capacity.  There was no right-sided reflux.  There is no left-sided reflux up the

stents. 

 

Pelvic examination under anesthesia revealed atrophic vaginitis with grade 1 cystocele

and grade 1 rectocele.  The patient was uneventfully reversed from anesthesia and taken

to recovery in stable condition.  There were no complications to the procedure.  She

tolerated the procedure well. 

 

PLANS:  Plans will be to have the patient's nephrostomy changed soon and to have her

follow up in 3 to 4 weeks for a Gomez catheter change in the office. 

 

 

 

 

______________________________

Goldy Rachel MD

 

OH/MODL

D:  08/03/2020 04:54:24

T:  08/03/2020 11:09:03

Job #:  [id number]

 

cc:            Dr. Satinder Herrmann

## 2020-08-17 ENCOUNTER — HOSPITAL ENCOUNTER (OUTPATIENT)
Dept: HOSPITAL 88 - DX | Age: 55
End: 2020-08-17
Attending: UROLOGY
Payer: COMMERCIAL

## 2020-08-17 DIAGNOSIS — N13.5: Primary | ICD-10-CM

## 2020-08-17 PROCEDURE — 99153 MOD SED SAME PHYS/QHP EA: CPT

## 2020-08-17 PROCEDURE — 99152 MOD SED SAME PHYS/QHP 5/>YRS: CPT

## 2020-08-17 PROCEDURE — 50387 CHANGE NEPHROURETERAL CATH: CPT

## 2020-08-17 NOTE — DIAGNOSTIC IMAGING REPORT
Procedure: Right percutaneous nephrostomy catheter exchange.                   

                                  

                                                        

History: Routine maintenance.                                                

   

: Salvador Mena M.D. 



Assistant:  NIK Teixeira

                                                                            

Modality: Fluoroscopy.        

               

DOSE REDUCTION: The examination was performed according to departmental

dose-optimization program. 



Fluoro time: 5.2 minutes.



Radiation dose: 48.7 mGy air Kerma.  

                                                                             

Sedation: Intravenous conscious sedation was administered by a dedicated RN

using one milligrams of Versed and 50 mcg of fentanyl IV. Vital signs were

monitored throughout the procedure by a dedicated RN under direct supervision

of Dr. Mena, and remained stable. 



Physician intra-service sedation time was approximately 30 minutes.            





Anesthesia: Lidocaine local infiltration



Medicines: Not applicable



Contrast medium: Isovue 300, 20 cc.



Estimated blood loss:  < 5 cc.         



Technique: 

A discussion of the risks, benefits, and alternatives was carried out with the

patient. A written informed consent was obtained. The patient expressed

understanding and agreed to proceed.  



A universal timeout was performed prior to starting the procedure. The

procedure room personnel used personal protective equipment. The operators used

sterile gowns and gloves.



The patient was laid prone on the procedure table. The right percutaneous

nephrostomy site was prepped with chlorhexidine gluconate and draped in sterile

fashion.



A  radiograph was performed showing the catheter to be in the expected

location. Contrast injection through the catheter confirmed the catheter tip

location in the collecting system.



After local anesthetic infiltration, the retention suture was removed. The

catheter was cut but could not be withdrawn over a wire. The catheter was

encrusted close to the tip. A Glidewire was maneuvered alongside the catheter

into the renal collecting system. The catheter was slowly pulled, the pigtail

unformed, and the catheter removed. It was replaced with an identical catheter.

The catheter position was confirmed with contrast injection. The catheter was

secured to skin with nonabsorbable suture.  The catheter was connected to a

gravity drainage bag. An aseptic dressing was applied.



The patient was transferred to the recovery area and discharged from the

department in stable condition.

                    

Complications: Mild hematuria.    



Findings:

As above.

                                                          

Impression:

Successful fluoroscopic guided 12 Polish right nephrostomy catheter exchange as

described above.



Further management dictated by the clinical scenario. The nephrostomy catheter

should be exchanged in approximately 10 weeks instead of 3 months. The

referring doctor was made aware and agreed. The patient was instructed to

increase the oral fluid intake.                                                

          

   



Thank you for the opportunity to assist in the care of your patient.



Signed by: Salvador Mena MD on 8/17/2020 4:39 PM

## 2020-09-21 ENCOUNTER — HOSPITAL ENCOUNTER (EMERGENCY)
Dept: HOSPITAL 88 - ER | Age: 55
Discharge: HOME | End: 2020-09-21
Payer: COMMERCIAL

## 2020-09-21 VITALS — HEIGHT: 60 IN | WEIGHT: 202 LBS | BODY MASS INDEX: 39.66 KG/M2

## 2020-09-21 VITALS — DIASTOLIC BLOOD PRESSURE: 72 MMHG | SYSTOLIC BLOOD PRESSURE: 142 MMHG

## 2020-09-21 DIAGNOSIS — I10: ICD-10-CM

## 2020-09-21 DIAGNOSIS — T83.518A: Primary | ICD-10-CM

## 2020-09-21 DIAGNOSIS — F41.9: ICD-10-CM

## 2020-09-21 LAB
ALBUMIN SERPL-MCNC: 4.1 G/DL (ref 3.5–5)
ALBUMIN/GLOB SERPL: 1 {RATIO} (ref 0.8–2)
ALP SERPL-CCNC: 111 IU/L (ref 40–150)
ALT SERPL-CCNC: 13 IU/L (ref 0–55)
ANION GAP SERPL CALC-SCNC: 17.3 MMOL/L (ref 8–16)
BACTERIA URNS QL MICRO: (no result) /HPF
BASOPHILS # BLD AUTO: 0 10*3/UL (ref 0–0.1)
BASOPHILS NFR BLD AUTO: 0.5 % (ref 0–1)
BILIRUB UR QL: NEGATIVE
BUN SERPL-MCNC: 14 MG/DL (ref 7–26)
BUN/CREAT SERPL: 12 (ref 6–25)
CALCIUM SERPL-MCNC: 9.2 MG/DL (ref 8.4–10.2)
CHLORIDE SERPL-SCNC: 105 MMOL/L (ref 98–107)
CLARITY UR: (no result)
CO2 SERPL-SCNC: 21 MMOL/L (ref 22–29)
COLOR UR: (no result)
DEPRECATED APTT PLAS QN: 31 SECONDS (ref 23.8–35.5)
DEPRECATED INR PLAS: 0.94
DEPRECATED NEUTROPHILS # BLD AUTO: 6.1 10*3/UL (ref 2.1–6.9)
DEPRECATED RBC URNS MANUAL-ACNC: (no result) /HPF (ref 0–5)
EGFRCR SERPLBLD CKD-EPI 2021: 47 ML/MIN (ref 60–?)
EOSINOPHIL # BLD AUTO: 0.1 10*3/UL (ref 0–0.4)
EOSINOPHIL NFR BLD AUTO: 0.8 % (ref 0–6)
EPI CELLS URNS QL MICRO: (no result) /LPF
ERYTHROCYTE [DISTWIDTH] IN CORD BLOOD: 14.4 % (ref 11.7–14.4)
GLOBULIN PLAS-MCNC: 4.2 G/DL (ref 2.3–3.5)
GLUCOSE SERPLBLD-MCNC: 201 MG/DL (ref 74–118)
HCT VFR BLD AUTO: 34.6 % (ref 34.2–44.1)
HGB BLD-MCNC: 10.6 G/DL (ref 12–16)
KETONES UR QL STRIP.AUTO: NEGATIVE
LEUKOCYTE ESTERASE UR QL STRIP.AUTO: (no result)
LYMPHOCYTES # BLD: 1.3 10*3/UL (ref 1–3.2)
LYMPHOCYTES NFR BLD AUTO: 16.1 % (ref 18–39.1)
MCH RBC QN AUTO: 25.5 PG (ref 28–32)
MCHC RBC AUTO-ENTMCNC: 30.6 G/DL (ref 31–35)
MCV RBC AUTO: 83.4 FL (ref 81–99)
MONOCYTES # BLD AUTO: 0.4 10*3/UL (ref 0.2–0.8)
MONOCYTES NFR BLD AUTO: 5.1 % (ref 4.4–11.3)
NEUTS SEG NFR BLD AUTO: 76.9 % (ref 38.7–80)
NITRITE UR QL STRIP.AUTO: POSITIVE
PLATELET # BLD AUTO: 266 X10E3/UL (ref 140–360)
POTASSIUM SERPL-SCNC: 3.3 MMOL/L (ref 3.5–5.1)
PROT UR QL STRIP.AUTO: >=300
PROTHROMBIN TIME: 13.1 SECONDS (ref 11.9–14.5)
RBC # BLD AUTO: 4.15 X10E6/UL (ref 3.6–5.1)
SODIUM SERPL-SCNC: 140 MMOL/L (ref 136–145)
SP GR UR STRIP: 1.02 (ref 1.01–1.02)
UROBILINOGEN UR STRIP-MCNC: 1 MG/DL (ref 0.2–1)

## 2020-09-21 PROCEDURE — 87040 BLOOD CULTURE FOR BACTERIA: CPT

## 2020-09-21 PROCEDURE — 87086 URINE CULTURE/COLONY COUNT: CPT

## 2020-09-21 PROCEDURE — 85025 COMPLETE CBC W/AUTO DIFF WBC: CPT

## 2020-09-21 PROCEDURE — 87186 SC STD MICRODIL/AGAR DIL: CPT

## 2020-09-21 PROCEDURE — 71045 X-RAY EXAM CHEST 1 VIEW: CPT

## 2020-09-21 PROCEDURE — 80053 COMPREHEN METABOLIC PANEL: CPT

## 2020-09-21 PROCEDURE — 85610 PROTHROMBIN TIME: CPT

## 2020-09-21 PROCEDURE — 99284 EMERGENCY DEPT VISIT MOD MDM: CPT

## 2020-09-21 PROCEDURE — 85730 THROMBOPLASTIN TIME PARTIAL: CPT

## 2020-09-21 PROCEDURE — 81001 URINALYSIS AUTO W/SCOPE: CPT

## 2020-09-21 PROCEDURE — 36415 COLL VENOUS BLD VENIPUNCTURE: CPT

## 2020-09-21 NOTE — EMERGENCY DEPARTMENT NOTE
History of Present Illnes


History of Present Illness


Chief Complaint:  Genitourinary


History of Present Illness


This is a 55 year old  female PATIENT IN FROM HOME WITH COMPLAINTS OF 

SUSPECTED UTI X 3 DAYS; PATIENT WITH RIGHT NEPHROSTOMY TUBE AND NOLASCO CATHETER. 

PATIENT DENIES FEVER, STATES THAT URINE IS FOUL SMELLING AND SHE IS FEELING VERY

FATIGUED.  RATES PAIN 2/10. PATIENT ALERT AND ORIENTED, RESP EVEN AND 

NONLABORED, APPEARS IN NO DISTRESS, AMBULATORY WITHOUT ASSISTANCE.


Historian:  Patient


Arrival Mode:  Car


 Required:  No


Onset (how long ago):  day(s) (3)


Radiation:  Reports non-radiation


Severity:  moderate


Onset quality:  gradual


Timing of current episode:  constant


Chronicity:  recurrent


Context:  Denies recent illness


Relieving factors:  none


Exacerbating factors:  none


Associated symptoms:  Reports denies other symptoms





Past Medical/Family History


Physician Review


I have reviewed the patient's past medical and family history.  Any updates have

been documented here.





Past Medical History


Recent Fever:  No


Clinical Suspicion of Infectio:  Yes


New/Unexplained Change in Ment:  No


Past Medical History:  UTI's, Anxiety, Depression


Other Medical History:  


Cervical Cancer Healed, Nephrostomy tubes,Long  


standing Nolasco indwelling catheter, Ureteral  


stents, HTN


Past Surgical History:  None


Other Surgery:  


Kidney/Ureteral Stent Placements/Replacements   multiple





Social History


Smoking Cessation:  Never Smoker


Counseling Performed:  No


Alcohol Use:  None


Any Illegal Drug Use:  No


TB Exposure/Symptoms:  No


Physically hurt or threatened:  No





Family History


Family history of heart diseas:  No





Other


Last Tetanus:  OOD


Any Pre-Existing Lines (PICC,:  No





Review of Systems


Review of Systems


Constitutional:  Reports no symptoms


EENTM:  Reports no symptoms


Cardiovascular:  Reports no symptoms


Respiratory:  Reports no symptoms


Gastrointestinal:  Reports no symptoms


Genitourinary:  Reports as per HPI


Musculoskeletal:  Reports no symptoms


Integumentary:  Reports no symptoms


Neurological:  Reports no symptoms


Psychological:  Reports no symptoms


Endocrine:  Reports no symptoms


Hematological/Lymphatic:  Reports no symptoms





Physical Exam


Related Data


Allergies:  


Coded Allergies:  


     Penicillins (Verified  Allergy, Unknown, 9/21/20)


     meropenem (Verified  Allergy, Unknown, REDNESS, ITCHING, 9/21/20)


Triage Vital Signs





Vital Signs








  Date Time  Temp Pulse Resp B/P (MAP) Pulse Ox O2 Delivery O2 Flow Rate FiO2


 


9/21/20 10:56 97.5 101 20 174/101 99 Room Air  








Vital signs reviewed:  Yes





Physical Exam


CONSTITUTIONAL





Constitutional:  Present well-developed, Present well-nourished


HENT


HENT:  Present normocephalic, Present atraumatic, Present oropharynx 

clear/moist, Present nose normal


HENT L/R:  Present left ext ear normal, Present right ext ear normal


EYES





Eyes:  Reports PERRL, Reports conjunctivae normal


NECK


Neck:  Present ROM normal


PULMONARY


Pulmonary:  Present effort normal, Present breath sounds normal


CARDIOVASCULAR





Cardiovascular:  Present regular rhythm, Present heart sounds normal, Present 

capillary refill normal, Present normal rate


GASTROINTESTINAL





Abdominal:  Present soft, Present nontender, Present bowel sounds normal


GENITOURINARY





Genitourinary:  Present exam deferred, Present other (RIGHT NEPHROSTOMY SITE C&D

WITHOUT EVID OF INFECTION, DARK CLOUDY URINE IN BAG, NOLASCO IN PLACE WITH CLOUDY 

YELLOW URINE IN BAG)


SKIN


Skin:  Present warm, Present dry


MUSCULOSKELETAL





Musculoskeletal:  Present ROM normal


NEUROLOGICAL





Neurological:  Present alert, Present oriented x 3, Present no gross motor or 

sensory deficits


PSYCHOLOGICAL


Psychological:  Present mood/affect normal, Present judgement normal





Results


Laboratory


Result Diagram:  


9/21/20 1145                                                                    

           9/21/20 1145





Laboratory





Laboratory Tests








Test


 9/21/20


11:45


 


White Blood Count


 7.89 x10e3/uL


(4.8-10.8)


 


Red Blood Count


 4.15 x10e6/uL


(3.6-5.1)


 


Hemoglobin


 10.6 g/dL


(12.0-16.0)


 


Hematocrit


 34.6 %


(34.2-44.1)


 


Mean Corpuscular Volume


 83.4 fL


(81-99)


 


Mean Corpuscular Hemoglobin


 25.5 pg


(28-32)


 


Mean Corpuscular Hemoglobin


Concent 30.6 g/dL


(31-35)


 


Red Cell Distribution Width


 14.4 %


(11.7-14.4)


 


Platelet Count


 266 x10e3/uL


(140-360)


 


Neutrophils (%) (Auto)


 76.9 %


(38.7-80.0)


 


Lymphocytes (%) (Auto)


 16.1 %


(18.0-39.1)


 


Monocytes (%) (Auto)


 5.1  %


(4.4-11.3)


 


Eosinophils (%) (Auto)


 0.8 %


(0.0-6.0)


 


Basophils (%) (Auto)


 0.5 %


(0.0-1.0)


 


Neutrophils # (Auto) 6.1 (2.1-6.9) 


 


Lymphocytes # (Auto) 1.3 (1.0-3.2) 


 


Monocytes # (Auto) 0.4 (0.2-0.8) 


 


Eosinophils # (Auto) 0.1 (0.0-0.4) 


 


Basophils # (Auto) 0.0 (0.0-0.1) 


 


Absolute Immature Granulocyte


(auto 0.05 x10e3/uL


(0-0.1)


 


Prothrombin Time


 13.1 seconds


(11.9-14.5)


 


Prothromb Time International


Ratio 0.94 





 


Activated Partial


Thromboplast Time 31.0 seconds


(23.8-35.5)


 


Urine Color [place] (YELLOW) 


 


Urine Clarity Turbid (CLEAR) 


 


Urine pH 7.5 (5 - 7) 


 


Urine Specific Gravity


 1.025


(1.010-1.025)


 


Urine Protein


 >=300


(NEGATIVE)


 


Urine Glucose (UA)


 Negative


(NEGATIVE)


 


Urine Ketones


 Negative


(NEGATIVE)


 


Urine Blood


 Large


(NEGATIVE)


 


Urine Nitrite


 Positive


(NEGATIVE)


 


Urine Bilirubin


 Negative


(NEGATIVE)


 


Urine Urobilinogen


 1 mg/dL (0.2 -


1)


 


Urine Leukocyte Esterase


 Moderate


(NEGATIVE)


 


Urine RBC


 21-50 /HPF


(0-5)


 


Urine WBC


 11-20 /HPF


(0-5)


 


Urine Epithelial Cells


 Few /LPF


(NONE)


 


Urine Bacteria


 Many /HPF


(NONE)


 


Sodium Level


 140 mmol/L


(136-145)


 


Potassium Level


 3.3 mmol/L


(3.5-5.1)


 


Chloride Level


 105 mmol/L


()


 


Carbon Dioxide Level


 21 mmol/L


(22-29)


 


Anion Gap


 17.3 mmol/L


(8-16)


 


Blood Urea Nitrogen


 14 mg/dL


(7-26)


 


Creatinine


 1.19 mg/dL


(0.57-1.11)


 


Estimat Glomerular Filtration


Rate 47 ML/MIN


(60-)


 


BUN/Creatinine Ratio 12 (6-25) 


 


Glucose Level


 201 mg/dL


()


 


Calcium Level


 9.2 mg/dL


(8.4-10.2)


 


Total Bilirubin


 0.3 mg/dL


(0.2-1.2)


 


Aspartate Amino Transf


(AST/SGOT) 13 IU/L (5-34) 





 


Alanine Aminotransferase


(ALT/SGPT) 13 IU/L (0-55) 





 


Alkaline Phosphatase


 111 IU/L


()


 


Total Protein


 8.3 g/dL


(6.5-8.1)


 


Albumin


 4.1 g/dL


(3.5-5.0)


 


Globulin


 4.2 g/dL


(2.3-3.5)


 


Albumin/Globulin Ratio 1.0 (0.8-2.0) 








Lab results reviewed:  Yes





Imaging


Imaging results reviewed:  Yes





Assessment & Plan


Medical Decision Making


MDM


PT WITH CHRONIC UTI'S HAS NEPHROTOMY AND NOLASCO - UA/CX FROM BOTH, CBC, CHEM, CXR

- R/O UTI, LEUKOCYTOSIS, ELECTROLYTE ABNL





Reassessment


Reassessment


DR ARORA CAME TO ER, DC HOME ON BACTRIM (HE HAD CX/SENS FROM OFFICE), F/U WITH 

HIM IN CLINIC





Assessment & Plan


Final Impression:  


(1) UTI (urinary tract infection) due to urinary indwelling catheter


Depart Disposition:  HOME, SELF-CARE


Last Vital Signs











  Date Time  Temp Pulse Resp B/P (MAP) Pulse Ox O2 Delivery O2 Flow Rate FiO2


 


9/21/20 12:33  89 18 135/80 100 Room Air  


 


9/21/20 10:56 97.5       








Home Meds


Reported Medications


Metoprolol Tartrate (LOPRESSOR) 25 Mg Tab, 50 MG PO BID, #60 TAB 1 Refill


   5/2/20


Glipizide (GLIPIZIDE ER) 5 Mg Tab.er.24, 2.5 MG PO DAILY, #30 1 Refill


   5/2/20


Buspirone Hcl (BUSPIRONE HCL) 10 Mg Tablet, 10 MG PO BID PRN for ANXIETY


   6/21/19


Medications in the ED





Sodium Chloride 1,000 ml @  0 mls/hr Q0M STAT IV  Last administered on 9/21/20at

12:02; Admin Dose 999 MLS/HR;  Start 9/21/20 at 11:05;  Stop 9/21/20 at 11:09;  

Status DC


Morphine Sulfate 4 mg NOW  STAT IV  Last administered on 9/21/20at 12:47; Admin 

Dose 4 MG;  Start 9/21/20 at 12:02;  Stop 9/21/20 at 12:08;  Status DC


Ondansetron HCl 4 mg NOW  STAT IV  Last administered on 9/21/20at 12:47; Admin 

Dose 4 MG;  Start 9/21/20 at 12:02;  Stop 9/21/20 at 12:08;  Status DC











JENNIFER TERRELL MD               Sep 21, 2020 13:12

## 2020-09-21 NOTE — DIAGNOSTIC IMAGING REPORT
EXAMINATION:  CHEST SINGLE (PORTABLE)    



INDICATION: Chest pain, urinary tract infection



COMPARISON: Chest radiograph 4/25/2020

     

FINDINGS:



LINES/TUBES:None



LUNGS:The lungs are well-inflated. No focal consolidation or pulmonary edema.



PLEURA:No pleural effusion or pneumothorax.



MEDIASTINUM:The cardiomediastinal silhouette appears normal in size and shape.



BONES/SOFT TISSUES:No acute osseous injury.



ABDOMEN:No free air under the diaphragm.





IMPRESSION: 

No focal pneumonia or pulmonary edema. 



Signed by: Karely Laguna MD on 9/21/2020 11:31 AM

## 2020-10-20 ENCOUNTER — HOSPITAL ENCOUNTER (EMERGENCY)
Dept: HOSPITAL 88 - ER | Age: 55
Discharge: HOME | End: 2020-10-20
Payer: COMMERCIAL

## 2020-10-20 VITALS — BODY MASS INDEX: 36.91 KG/M2 | WEIGHT: 188 LBS | HEIGHT: 60 IN

## 2020-10-20 VITALS — DIASTOLIC BLOOD PRESSURE: 70 MMHG | SYSTOLIC BLOOD PRESSURE: 122 MMHG

## 2020-10-20 DIAGNOSIS — Z85.41: ICD-10-CM

## 2020-10-20 DIAGNOSIS — I10: ICD-10-CM

## 2020-10-20 DIAGNOSIS — T83.091A: Primary | ICD-10-CM

## 2020-10-20 DIAGNOSIS — F41.9: ICD-10-CM

## 2020-10-20 PROCEDURE — 51700 IRRIGATION OF BLADDER: CPT

## 2020-10-20 PROCEDURE — 99282 EMERGENCY DEPT VISIT SF MDM: CPT

## 2020-10-20 PROCEDURE — 87086 URINE CULTURE/COLONY COUNT: CPT

## 2020-10-20 NOTE — XMS REPORT
Continuity of Care Document

                             Created on: 10/20/2020



CRISTINA RICH

External Reference #: [id number]

: 1965

Sex: Female



Demographics





                          Address                   [address] [place]  [address]

 

                          Home Phone                [phone]

 

                          Preferred Language        English

 

                          Marital Status            Unknown

 

                          Oriental orthodox Affiliation     Unknown

 

                          Race                      Unknown

 

                          Additional Race(s)        White/

Other



 

                          Ethnic Group              /Latin





Author





                          Author                    [facility]

t

 

                          Organization              [facility]

 

                          Address                   [address]

[place]  [address]



 

                          Phone                     Unavailable







Support





                Name            Relationship    Address         Phone

 

                    BALA RICH  ECON                [address] [place]  [address]                      Unavailable

 

                    Geneva Rich    ECON                [address] [place]  [address]                      [phone]

 

                    Bala Rich  ECON                [address] [place]

[place]  [address]                      [phone]







Care Team Providers





                    Care Team Member Name Role                Phone

 

                    NONSTAFF            PCP                 Unavailable

 

                    MITZI TERRELL      Attphys             Unavailable

 

                    HAMPEL,  JACQUELINE        Attphys             Unavailable

 

                    SHADY PEMBERTON        Attphys             Unavailable

 

                    SANDHIRLISSETTE    Attphys             Unavailable

 

                    MISHEL,  MATTHEW     Attphys             Unavailable

 

                    BAR, T RUSSELL       Attphys             Unavailable

 

                    DOVER,  SOUHEIL    Attphys             Unavailable

 

                    YUMIKO NGO      Attphys             Unavailable

 

                    ZOMPA A MERYL     Attphys             Unavailable

 

                    AJAY GE     Attphys             Unavailable

 

                    Grayson Curtis Attphys             [phone]

 

                    FadowoleHanywalsushma Teran Attphys             [phone]

 

                    SHADY PEMBERTON        Admphys             Unavailable

 

                    HAMPEL,  JACQUELINE        Admphys             Unavailable

 

                    MISHEL,  MATTHEW     Admphys             Unavailable

 

                    DOVER,  SOUHEIL    Admphys             Unavailable

 

                    AJAY GE     Admphys             Unavailable







Payers





           Payer Name Policy Type Policy Number Effective Date Expiration Date S

bina

 

           [employer]            [id number] 2019 00:00:00  00:00:00 [facility]

 

           Cdc Review Covid19            [id number]                         [facility]







Problems





           Condition Name Condition Details Condition Category Status     Onset 

Date Resolution

Date            Last Treatment Date Treating Clinician Comments        Source

 

                Complicated urinary tract infection UTI (urinary tract infection

) Problem         Active

           2016 00:00:00                                             [facility]

 

                          GEN. PAIN                                         GEN.

 PAIN                        Active       

                2016                                                      
                                          [facility]                     

Diagnosis Active    2016 00:00:00           2016-09-15 22:11:00           

          [facility]

 

                          WEAKNESS                                          WEAK

NESS                        Active         

              2016                                                        
                                       [facility]                     

Diagnosis Active    2016 00:00:00           2016 16:11:00           

          [facility]

 

        BMI 36.0-36.9,adult BMI 36.0-36.9,adult Disease Active  2014 00:00

:00                  

                          Overview: Obese           [facility]

 

       Obese  Obese  Disease Active 2014 00:00:00                         

    [facility]

 

                          Complicated UTI (urinary tract infection) Complicated 

UTI (urinary tract 

infection) Disease Active  2014 00:00:00                                 [facility]

 

       Hematuria Hematuria Disease Active 2014 00:00:00                   

          [facility]

 

       Fatigue Fatigue Disease Active 2013 00:00:00                       

      [facility]

 

       Myalgia Myalgia Disease Active 2013 00:00:00                       

      [facility]

 

       Fever  Fever  Disease Active 2013 00:00:00                         

    [facility]

 

                UTI (lower urinary tract infection) UTI (lower urinary tract inf

ection) Disease         

Active     2012-10-31 00:00:00                                             [facility]

 

           Hydronephrosis, right Hydronephrosis, right Disease    Active     201

08 00:00:00  

                                                                [facility]

 

       Flank pain Flank pain Disease Active 2012 00:00:00                 

            [facility]

 

       Cough  Cough  Disease Active 2011 00:00:00                         

    [facility]

 

                          Methicillin resistant Staphylococcus aureus (organism)

                         

Methicillin resistant Staphylococcus aureus (organism)                        
Active                                                Problem                   
    2016                        Problem added by Discern Expert.          
              [facility]                     Problem      Active               

                  2016 

06:01:38                                                    [facility]

 

       Colitis Colitis Problem Active                                    [facility]

 

       Hypokalemia Hypokalemia Problem Active                                   

 [facility]

 

       Pyelonephritis Pyelonephritis Problem Active                             

       [facility]

 

       Obstruction of urinary tract Urinary obstruction Problem Active          

                          [facility]

 

       Nephrostomy status Nephrostomy status Problem Active                     

               [facility]

 

       Complication of nephrostomy Nephrostomy complication Problem Active      

                              

[facility]

 

                          Urinary tract infection due to Enterococcus UTI (urina

ry tract infection) due to

Enterococcus Problem Active                                          [facility]

 

                          Urinary tract infection associated with catheterizatio

n of urinary tract UTI 

(urinary tract infection) due to urinary indwelling catheter Problem      Active

                                 

                                                            [facility]

 

       Abdominal pain Abdominal pain Problem Active                             

       [facility]

 

        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

 Active                          

                                                    [facility]

 

                          Discharge Diagnosis: Compression of lateral cutaneous 

femoral nerve of thigh    

                    Discharge Diagnosis: Compression of lateral cutaneous 
femoral nerve of thigh                                                2016                       
                        [facility]                     Problem                

                 2016 

06:00:00     2016 06:01:38 2016 06:01:38                           [facility]

[facility] [place]

 

                          Discharge Diagnosis: Urinary tract infection, site not

 specified                

        Discharge Diagnosis: Urinary tract infection, site not specified        
                                       2016                               
                01/10/2016                                                [facility]                     Problem                   2016 06:00:00 2016

-01-10 06:06:10 

2016-01-10 06:06:10                                         [facility]







Allergies, Adverse Reactions, Alerts





        Allergy Name Allergy Type Status  Severity Reaction(s) Onset Date Inacti

ve Date 

Treating Clinician        Comments                  Source

 

       Penicillin Allergy to substance Active               2020 00:00:00 

                     [facility]

 

        Meropenem Allergy to substance Active          REDNESS, ITCHING  00:00:00          

                                                    [facility]

 

           Diphenhydramine Propensity to adverse reactions to drug Active       

         Other      

2009 00:00:00                                 BECAME ANXIOUS AND FELT STRA

NGE [facility]

 

       diphenhydrAMINE diphenhydrAMINE Active                                   

        [facility]







Family History





           Family Member Diagnosis  Comments   Start Date Stop Date  Source

 

           Natural father Diabetes                                    [facility]

[facility]







Social History





           Social Habit Start Date Stop Date  Quantity   Comments   Source

 

                Alcohol intake  2015 00:00:00 2015 00:00:00 Current 

non-drinker of 

alcohol (finding)                                   [facility]

 

           Sex Assigned At Birth 1965 00:00:00 1965 00:00:00 Female 

               [facility]







                Smoking Status  Start Date      Stop Date       Source

 

                Never smoker                                    [facility]







Medications





             Ordered Medication Name Filled Medication Name Start Date   Stop Da

te    Current 

Medication? Ordering Clinician Indication Dosage     Frequency  Signature (SIG) 

Comments                  Components                Source

 

        Acetaminophen Acetaminophen 2017 09:47:00 2018 00:00:00 No  

                    650      

                Every 4 Hours as needed for Pain And Temperature                

                 [facility]

 

                          Acetaminophen/Codeine Phosphate (Tylenol # 3*) 1 Ea TA

B Acetaminophen/Codeine 

Phosphate (Tylenol # 3*) 1 Ea TAB 2017 09:47:00 2018 00:00:00 No    

                     

           2                     Every 6 Hours as needed for Pain               

        [facility]

 

                Meropenem (Merrem) 500 Mg INJ Meropenem (Merrem) 500 Mg INJ 2017 09:47:00 

2018 00:00:00 No                      500             Q8h@05,13,21        

         [facility]

 

       Lisinopril Lisinopril 2017 12:33:00 2018 00:00:00 No         

          2.5           Daily

                                                            [facility]

 

        Metformin Hcl Metformin Hcl 2017 12:33:00 2017 00:00:00 No  

                    500      

                Twice A Day                                     [facility]

 

       Fluconazole Fluconazole 2017 10:56:00 2017 00:00:00 No       

            200           

Daily                                                       [facility]

 

                    Levofloxacin (Levaquin) 500 Mg TABLET Levofloxacin (Levaquin

) 500 Mg TABLET 

2017 10:56:00 2017 00:00:00 No                   500           Daily

                [facility]

 

                          Sulfamethoxazole/Trimethoprim (Bactrim Ds Tablet) 1 Ea

ch TABLET 

Sulfamethoxazole/Trimethoprim (Bactrim Ds Tablet) 1 Each TABLET 2017 

10:56:00 2017 00:00:00 No                   1             Twice A Day     

          [facility]

 

       Ativan        2016 05:44:00        No                              

   1 mg, Route: PO, Drug form: TAB, ONCE,

Dosing Weight 86.364, kg, Priority: STAT, Start date: 16 23:44:00, Stop 
date: 16 23:44:00                                         [facility]

 

       tramadol hydrochloride 50 MG Oral Tablet        2016 05:43:00      

  Yes                                50 

mg = 1 tab, PO, BID, X 15 day, # 30 tab, 0 Refill(s)                            

             [facility]

 

          tramadol hydrochloride 50 MG Oral Tablet [Ultram]           2016

 01:10:00           Yes                 

                                                            1 - 2 tabs, PO, Q4-6

H, PRN as needed for pain, X 7 day, # 12 tab, 0 

Refill(s)                                                   [facility]

 

                    Sulfamethoxazole 800 MG / Trimethoprim 160 MG Oral Tablet [B

actrim]                     2016

 01:09:00        Yes                                1 tab, PO, BID, # 20 tab, 0 

Refill(s)               [facility]

 

       Rocephin        2016 23:48:00        No                            

     1 gm, Route: IM, ONCE, Dosing Weight

 84.091, kg, Start date: 16 17:48:00, Stop date: 16 17:48:00        

                                 

[facility]

 

       Ondansetron        2016 21:36:00        No                         

        Notes: (Same as: Zofran)   *** 

MEDICATION WASTE *** Product Size:  4 mg Product Wasted:  ___ mg                

                         [facility]

 

        Sodium Chloride 0.154 MEQ/ML Injectable Solution         2016 21:3

6:00         No                      

                                                    1,000 mL, 1000 ml/hr, Infuse

 Over: 1 hr, Route: IV, 1,000, Drug form: INJ, 

ONCE, Priority: STAT, Dosing Weight 84.091 kg, Start date: 16 15:36:00, 
Duration: 1 doses or times, Stop date: 16 15:36:00                        

                 [facility]

 

       Saline Flush 0.9%        2016 21:36:00        No                   

              Notes: Same as: BD 

Posiflush Sterile                                           [facility]

 

       traMADol (ULTRAM) 50 mg tablet        2015 16:46:57        Yes     

             50mg          Take 50 mg

 by mouth as needed.                                         [facility]

 

       acetaminophen (TYLENOL) 325 mg tablet        2015 16:46:57        Y

es                  650mg         

Take 650 mg by mouth every 6 hours as needed.                                   

      [facility]

 

       metFORMIN (GLUCOPHAGE) 500 mg tablet        2015 16:46:57        Ye

s                  500mg         

Take 500 mg by mouth 2 times daily (with meals).                                

         [facility]

 

           fenofibrate nanocrystallized (TRICOR) 145 mg tablet             16:46:57            Yes        

                    145mg     QD        Take 145 mg by mouth daily.             

        [facility]

 

        lisinopril (PRINIVIL, ZESTRIL) 2.5 mg tablet         2015 16:46:57

         Yes                     

2.5mg        QD           Take 2.5 mg by mouth daily.                           

[facility]

 

       atorvastatin (LIPITOR) 20 mg tablet        2015 16:46:57        Yes

                  20mg          Take 

20 mg by mouth at bedtime nightly.                                         [facility]

 

           HYDROcodone-acetaminophen (NORCO)  mg tablet            2014 00:00:00            Yes         

                Pyelonephritis  1{tbl}                          Take 1 tablet by

 mouth every 6 hours as needed for 

Pain.                                                       [facility]

 

          cyclobenzaprine (FLEXERIL) 10 mg tablet           2013-10-14 00:00:00 

          Yes                 Flank Pain

           10mg                  Take 1 tablet by mouth 2 times daily as needed 

for Muscle Spasms.                       

[facility]

 

          flavoxate (URISPAS) 100 mg tablet           2012 00:00:00       

    Yes                 Hydronephrosis, 

right      100mg                 Take 1 tablet by mouth 3 times daily as needed 

(dysuria).                       

[facility]

 

           tamsulosin (FLOMAX) 0.4 mg extended release capsule             00:00:00            Yes        

          Hydronephrosis .4mg      QD        Take 1 capsule by mouth daily.     

                [facility]

 

          famotidine (PEPCID) 20 mg tablet           2012-01-10 00:00:00        

   Yes                 GERD 

(gastroesophageal reflux disease) 20mg            Q.5D            Take 1 Tab by 

mouth 2 times daily.  

                                                    [facility]

 

          oxybutynin (DITROPAN) 5 mg tablet           2010 00:00:00       

    Yes                 Ureteral 

stricture  5mg                   Take 1 Tab by mouth 3 times daily. For bladder 

spasms                       [facility]

 

          doxazosin (CARDURA) 2 mg tablet           2010 00:00:00         

  Yes                 Ureteral stricture

           2mg                   Take 1 Tab by mouth at bedtime.                

       [facility]

 

      Buspirone Hcl Buspirone Hcl             Yes               10          Twic

e A Day as needed for Anxiety  

                                                    [facility]

 

                    Glipizide (Glipizide Er) 5 Mg TAB.ER.24 Glipizide (Glipizide

 Er) 5 Mg TAB.ER.24 

              Yes                  2.5           Daily                [facility]

 

                          Metoprolol Tartrate (Lopressor) 25 Mg TAB Metoprolol T

artrate (Lopressor) 25 Mg 

TAB               Yes               50          Twice A Day             [facility]

 

                Amlodipine Besylate (Norvasc) 5 Mg TAB Amlodipine Besylate (Norv

asc) 5 Mg TAB                 

2020 00:00:00 No                      5               Daily               

    [facility]

 

                Ondansetron Hcl (Zofran) 8 Mg TABLET Ondansetron Hcl (Zofran) 8 

Mg TABLET                 

2020 00:00:00 No                            4                   Every 4 Ho

urs as needed for Nausea And Vomiting  

                                                    [facility]

 

                          Cefuroxime Axetil (Cefuroxime) 500 Mg TABLET Cefuroxim

e Axetil (Cefuroxime) 500 

Mg TABLET       2020 00:00:00 No                500         Twice A Day   

          [facility]

 

                          Hydrocodone Bit/Acetaminophen (Norco 7.5-325 Tablet) 1

 Each TABLET Hydrocodone 

Bit/Acetaminophen (Norco 7.5-325 Tablet) 1 Each TABLET                 2020

6 00:00:00 No              

                    1                   Every 8 Hours as needed for Pain        

             [facility]

 

                          Sennosides/Docusate Sodium (Senokot-S Tablet) 1 Each T

ABLET Sennosides/Docusate 

Sodium (Senokot-S Tablet) 1 Each TABLET         2020 00:00:00 No          

            1               Twice A

 Day                                                        [facility]

 

                          Acetaminophen With Codeine (Tylenol With Codeine #3 Ta

blet) 1 Each TABLET 

Acetaminophen With Codeine (Tylenol With Codeine #3 Tablet) 1 Each TABLET       

                    

2020 00:00:00 No                      1               As Needed as needed 

for Moderate Pain (4-6)                 

[facility]

 

      Metoprolol Tartrate Metoprolol Tartrate       2020 00:00:00 No      

          25          Daily 

                                                    [facility]

 

     Fluconazole Fluconazole      2020 00:00:00 No             100       D

aily           [facility]

 

                Levofloxacin (Levaquin) 500 Mg TABLET Levofloxacin (Levaquin) 50

0 Mg TABLET                 

2020 00:00:00 No                      250             Daily               

    [facility]

 

                Linezolid (Zyvox) 600 Mg TABLET Linezolid (Zyvox) 600 Mg TABLET 

                2020 

00:00:00 No                      600             Twice A Day                 [facility]

 

                Cephalexin (Keflex) 250 Mg CAPSULE Cephalexin (Keflex) 250 Mg CA

PSULE                 

2020 00:00:00 No                                      Twice A Day         

        [facility]

 

     Chrofloxacin Chrofloxacin      2020 00:00:00 No             25       

 Twice A Day           [facility]

 

     Lisinopril Lisinopril      2019 00:00:00 No             5         Nay

ly           [facility]

 

     Dicyclomine Hcl Dicyclomine Hcl      2019 00:00:00 No             20 

       Daily           [facility]

 

                Docusate Sodium (Colace) 100 Mg CAP Docusate Sodium (Colace) 100

 Mg CAP                 

2019 00:00:00 No                      100             Twice A Day         

        [facility]

 

                Ondansetron Hcl (Zofran*) 4 Mg TABLET Ondansetron Hcl (Zofran*) 

4 Mg TABLET                 

2019 00:00:00 No                      4               as needed for Nausea

 And Vomiting                 [facility]

 

       Buspirone Hcl Buspirone Hcl        2019 00:00:00 No                

   10            Twice A Day as 

needed for Anxiety                                          [facility]

 

                    Ciprofloxacin Hcl (Cipro) 500 Mg TABLET Ciprofloxacin Hcl (C

ipro) 500 Mg TABLET 

       2019 00:00:00 No                   500           Every 12 Hours    

           [facility]

 

                          Acetaminophen With Codeine (Tylenol With Codeine #3 Ta

blet) 1 Each TABLET 

Acetaminophen With Codeine (Tylenol With Codeine #3 Tablet) 1 Each TABLET       

                    

2019-06-10 00:00:00 No                      1               Every 6 Hours as nee

ded for Pain                 [facility]

 

                Fluconazole (Diflucan) 200 Mg TABLET Fluconazole (Diflucan) 200 

Mg TABLET                 

2019-06-10 00:00:00 No                      200             Daily               

    [facility]

 

       Ondansetron Hcl Ondansetron Hcl        2019-06-10 00:00:00 No            

       4             Every 4 Hours 

as needed for Nausea                                         [facility]

 

                          Cephalexin Monohydrate (Keflex) 500 Mg CAPSULE Cephale

nancy Monohydrate (Keflex) 

500 Mg CAPSULE       2019 00:00:00 No                500         Every 12 

Hours             [facility]

 

     Lisinopril Lisinopril      2019 00:00:00 No             5         Nay

ly           [facility]

 

       Ondansetron Ondansetron        2019 00:00:00 No                   4

             Every 4 Hours as needed

 for Nausea                                                 [facility]

 

                    Ciprofloxacin Hcl (Cipro) 500 Mg TABLET Ciprofloxacin Hcl (C

ipro) 500 Mg TABLET 

       2019-02-15 00:00:00 No                   500           Every 12 Hours    

           [facility]

 

     Meropenem Meropenem      2018 00:00:00 No             1         Every

 8 Hours           [facility]

 

                    Ciprofloxacin Hcl (Cipro) 500 Mg TABLET Ciprofloxacin Hcl (C

ipro) 500 Mg TABLET 

       2018 00:00:00 No                   250           Twice A Day       

        [facility]

 

                Fluconazole (Diflucan) 100 Mg TABLET Fluconazole (Diflucan) 100 

Mg TABLET                 

2018 00:00:00 No                                                          

    [facility]

 

                    Ciprofloxacin Hcl (Cipro) 500 Mg TABLET Ciprofloxacin Hcl (C

ipro) 500 Mg TABLET 

       2017 00:00:00 No                   250           Every 12 Hours    

           [facility]

 

                Fluconazole (Diflucan) 100 Mg TABLET Fluconazole (Diflucan) 100 

Mg TABLET                 

2017 00:00:00 No                                      Daily               

    [facility]

 

                Metronidazole (Flagyl) 250 Mg TABLET Metronidazole (Flagyl) 250 

Mg TABLET                 

2017 00:00:00 No                      250             Every 6 Hours       

          [facility]

 

                          Cranberry/Vit C/L. Sporogenes (Cranberry Tablet) 1 Eac

h TABLET Cranberry/Vit 

C/L. Sporogenes (Cranberry Tablet) 1 Each TABLET            2017-06-15 00:00:00 

No                               

1000                      Twice A Day                            [facility]

 

       Atorvastatin Calcium Atorvastatin Calcium        2017 00:00:00 No  

                 20            

Daily                                                       [facility]

 

                Cefdinir (Omnicef) 300 Mg CAPSULE Cefdinir (Omnicef) 300 Mg CAPS

ULE                 2017

 00:00:00 No                                      Twice A Day                 [facility]

[facility]

 

                          Cyanocobalamin (Vitamin B-12) (Vitamin B-12) 1,000 Mcg

 TAB.SUBL Cyanocobalamin 

(Vitamin B-12) (Vitamin B-12) 1,000 Mcg TAB.SUBL           2017 00:00:00 N

o                                      

                                                                [facility]

 

                          Fenofibrate Nanocrystallized (Fenofibrate) 145 Mg TABL

ET Fenofibrate 

Nanocrystallized (Fenofibrate) 145 Mg TABLET         2017 00:00:00 No     

                                 

Daily                                                       [facility]

 

     Lisinopril Lisinopril      2017 00:00:00 No             25        Nay

ly           [facility]

 

                          Amoxicillin/Potassium Clav (Amox Tr-K Clv 875-125 Mg T

ab) 1 Each TABLET 

Amoxicillin/Potassium Clav (Amox Tr-K Clv 875-125 Mg Tab) 1 Each TABLET         

                  

2016 00:00:00 No                      1               Twice A Day         

        [facility]

 

       Atorvastatin Calcium Atorvastatin Calcium        2016 00:00:00 No  

                 20            

Bedtime                                                     [facility]

 

                          Cholecalciferol (Vitamin D3) (Vitamin D3) 50,000 Unit 

CAPSULE Cholecalciferol 

(Vitamin D3) (Vitamin D3) 50,000 Unit CAPSULE         2016 00:00:00 No    

                  1               

On Call                                                     [facility]

 

                          Cyanocobalamin (Vitamin B-12) 1,000 Mcg TAB Cyanocobal

amin (Vitamin B-12) 1,000 

Mcg TAB       2016 00:00:00 No                1000        Daily           

  [facility]

 

                          Fenofibrate Nanocrystallized (Fenofibrate) 145 Mg TABL

ET Fenofibrate 

Nanocrystallized (Fenofibrate) 145 Mg TABLET         2016 00:00:00 No     

                 145             

Daily                                                       [facility]

 

     Lisinopril Lisinopril      2016 00:00:00 No             2.5       Nay

ly           [facility]

 

     Metformin Hcl Metformin Hcl      2016 00:00:00 No             500    

   Twice A Day           

[facility]

 

     Trazodone Hcl Trazodone Hcl      2016 00:00:00 No             100    

   Bedtime           [facility]

 

       Atorvastatin Calcium Atorvastatin Calcium        2016-10-07 00:00:00 No  

                 20            

Today At 9:00PM                                             [facility]

 

                Cefdinir (Omnicef) 300 Mg CAPSULE Cefdinir (Omnicef) 300 Mg CAPS

ULE                 2016-10-07

 00:00:00 No                      300             Twice A Day                 [facility]

[facility]

 

                          Cyanocobalamin (Vitamin B-12) 1,000 Mcg TAB Cyanocobal

amin (Vitamin B-12) 1,000 

Mcg TAB       2016-10-07 00:00:00 No                1000        Daily           

  [facility]

 

                          Fenofibrate Nanocrystallized (Fenofibrate) 145 Mg TABL

ET Fenofibrate 

Nanocrystallized (Fenofibrate) 145 Mg TABLET         2016-10-07 00:00:00 No     

                 1               

Daily                                                       [facility]

 

                Fluconazole (Diflucan) 100 Mg TABLET Fluconazole (Diflucan) 100 

Mg TABLET                 

2016-10-07 00:00:00 No                      100             Daily               

    [facility]

 

                          Lisinopril (Prinavil / Zestril) 20 Mg TABLET Lisinopri

l (Prinavil / Zestril) 20 

Mg TABLET       2016-10-07 00:00:00 No                25          Daily         

    [facility]

 

     Metformin Hcl Metformin Hcl      2016 00:00:00 No             500    

   Twice A Day           

[facility]

 

                Tramadol Hcl (Ultram) 50 Mg TABLET Tramadol Hcl (Ultram) 50 Mg T

ABLET                 

2016 00:00:00 No                      50              Twice A Day         

        [facility]







Vital Signs





             Vital Name   Observation Time Observation Value Comments     Source

 

             Body Temperature 2020 13:33:00 98.7 [degF]               [facility]

 

             Weight       2020 10:56:00 202 [lb_av]               [facility]

 

             BMI (Body Mass Index) 2020 10:56:00 39.4 kg/m2               

 [facility]

 

             Body Temperature 2020 11:31:00 98.2 [degF]               [facility]

 

             Weight       2020 01:05:00 202 [lb_av]               [facility]

 

             BMI (Body Mass Index) 2020 01:05:00 39.4 kg/m2               

 [facility]

 

             Body Temperature 2020 08:25:00 98.5 [degF]               [facility]

 

             Weight       2020 15:24:00 198 [lb_av]               [facility]

 

             BMI (Body Mass Index) 2020 15:24:00 38.7 kg/m2               

 [facility]

 

             [facility]       2016 02:31:00                           [facility]

 [place]

 

             Temperature Oral (F) 2016 02:31:00 97.9 F                    

[facility]

 

             Respitory Rate 2016 02:31:00                           [facility] [place]

 

             Heart Rate   2016 02:31:00                           [facility]

 [place]

 

             Systolic (mm Hg) 2016 02:31:00                           [facility]

[facility] [place]

 

             Diastolic (mm Hg) 2016 02:31:00                           [facility]

 

             Temperature Oral (F) 2016 01:28:00 98.4 F                    

[facility]

 

             Respitory Rate 2016 01:28:00                           [facility] [place]

 

             Heart Rate   2016 01:28:00                           [facility]

 [place]

 

             Systolic (mm Hg) 2016 01:28:00                           [facility]

[facility] [place]

 

             Diastolic (mm Hg) 2016 01:28:00                           [facility] [place]

 

             Systolic (mm Hg) 2016 21:32:00                           [facility]

[facility] [place]

 

             Diastolic (mm Hg) 2016 21:32:00                           [facility] [place]

 

             Weight       2016 21:32:00                           [facility]

 [place]

 

             BMI Calculated 2016 21:32:00                           [facility] [place]

 

             Height       2016 21:32:00 152.4 cm                  [facility]

 [place]

 

             Heart Rate   2016 21:32:00                           [facility]

 [place]

 

             Respitory Rate 2016 21:32:00                           [facility] [place]

 

             Temperature Oral (F) 2016 21:32:00 98.1 F                    

[facility]







Procedures





                Procedure       Date / Time Performed Performing Clinician [facility]

e

 

                CYSTOSCOPY AND TREATMENT 2020 00:00:00                 [facility]

 

                CYSTO W/URETER STRICTURE TX 2020 00:00:00                 

[facility]

 

                REMOVAL OF INTRALUMINAL DEVICE FROM URETER, ENDO 2020 00:0

0:00                 [facility]

 

                DILATION OF LEFT URETER WITH INTRALUMINAL DEVICE, ENDO 2020-04-3

0 00:00:00                 [facility]

 

                FLUOROSCOPY KIDNEY, URETER, BLADDER, L W L OSM CONTRAST  00:00:00                 

[facility]

 

                DILATION OF URETHRA, ENDO 2020 00:00:00                 [facility]

[facility]

 

                Computed tomography of brain without radiopaque contrast 2020 00:00:00                 

[facility]

 

                CT of abdomen and pelvis without contrast 2020 00:00:00   

              [facility]

 

                CHANGE DRAINAGE DEVICE IN KIDNEY, EXTERNAL APPROACH 2019 0

0:00:00                 [facility]

 

                CYSTOSCOPY AND TREATMENT 2019 00:00:00                 [facility]

 

                CYSTO W/URETER STRICTURE TX 2019 00:00:00                 

[facility]







Plan of Care





             Planned Activity Planned Date Details      Comments     Source

 

                    Future Scheduled Test 2020-10-01 00:00:00 IMM Influenza Seas

onal Oct to March 

(>/= 19 yrs) [code = IMM Influenza Seasonal Oct to March (>/= 19 yrs)]          

                 [facility] Scheduled Test 2013 00:00:00 Screening for isabelle

gnant neoplasm of 

colon (procedure) [code = 392882143]                           [facility] Scheduled Test 2005 00:00:00 Breast Cancer Scrn

 (Yearly) [code = 

Breast Cancer Scrn (Yearly)]                           [facility] Scheduled Test 1984 00:00:00 Screening for isabelle

gnant neoplasm of 

cervix (procedure) [code = 744060773]                           [facility]

 

             Instructions              [facility]              [facility]

 

             Instructions              Urinary Tract Infection - Women          

    [facility]







Encounters





             Start Date/Time End Date/Time Encounter Type Admission Type Attendi

[facility]   Care Department Encounter ID    Source

 

           2020 11:57:00 2020 13:46:00 Departed Emergency Room      

      JENNIFER TERRELL 

[facility]              [facility] [id number]        [facility]

[facility] 

[facility]

 

          2020 15:13:00 2020 15:13:00 Registered Clinic 3         PONCE

MPEL, JACQUELINE [facility]    

[facility] [id number]              [facility]

 [facility] 

[facility]

 

          2020 10:39:00 2020 10:39:00 Registered Surgical Day Care  

                   [facility]    

[facility] [id number]              [facility]

 [facility]

 

          2020 21:44:00 2020 12:09:00 Discharged Inpatient          

           [facility]    [facility] [id number]              [facility]

 

           2020 13:17:00 2020 10:40:00 Discharged Inpatient 1       

   SHADY PEMBERTON 

[facility]              [facility] [id number]        [facility]

[facility] 

[facility]

 

             2020 11:21:00 2020 20:46:00 Departed Emergency Room 1  

          LISSETTE SCOTT          [facility]          [facility] [id number]    

[facility]

 

           2019 08:38:00 2020 11:48:00 Discharged Inpatient 1       

   SHADY PEMBERTON 

[facility]              [facility] [id number]        [facility]

[facility] 

[facility]

 

             2019 10:39:00 2019 10:39:00 Registered Surgical Day Car

e 3            JACQUELINE ARORA             [facility]          [facility] [id number]    

[facility] 

[facility]

 

          2019 20:48:00 2019 00:40:00 Departed Emergency Room       

              [facility]    [facility] [id number]              [facility]

 

          2019 09:50:00 2019 12:01:00 Departed Emergency Room       

              [facility]    [facility] [id number]              [facility]

 

          2019-10-24 08:28:00 2019-10-24 08:28:00 Registered Clinic 3         PONCE

MPEL, [facility]    

[facility] [id number]              [facility]

 [facility]

 

          2019 05:33:00 2019 06:27:00 Departed Emergency Room       

              [facility]    [facility] [id number]              [facility]

 

          2019 12:06:00 2019 13:42:00 Departed Emergency Room       

              [facility]    [facility] [id number]              [facility]

 

             2019 06:19:00 2019 06:19:00 Registered Surgical Day Car

e 3            ULYSSES 

[facility]          [facility] [id number]    [facility] 

[facility]

 

           2019 18:21:00 2019 12:48:00 Discharged Inpatient 1       

   JENNIFER TERRELL 

[facility]              [facility] [id number]        [facility]

[facility] 

[facility]

 

             2019 09:33:00 2019 09:33:00 Registered Surgical Day Car

e 3            ULYSSES 

[facility]          [id number]    [facility] 

[facility]

 

          2019 10:15:00 2019 11:33:00 Departed Emergency Room       

              [facility]                    [id number]              [facility]

 

          2019-06-10 14:11:00 2019-06-10 19:19:00 Departed Emergency Room       

              [facility]                    [id number]              [facility]

 

           2019 13:14:00 2019 19:30:00 Departed Emergency Room 1    

      JENNIFER TERRELL 

[facility]              [id number]        [facility]

 

           2019 14:04:00 2019 16:20:00 Discharged Inpatient 1       

   SHADY PEMBERTON 

[facility]              [id number]        [facility]

 

             2019 08:50:00 2019 08:50:00 Registered Surgical Day Car

e JACQUELINE DUNBAR             [facility]          [id number]    [facility]

 

          2019 15:25:00 2019 17:05:00 Departed Emergency Room       

              [facility]                    [id number]              [facility]

 

             2019 12:32:00 2019 16:55:00 Departed Emergency Room 1  

          LISSETTE GATES

                [facility]          [id number]    [facility]

 

          2019 12:46:00 2019 16:46:00 Departed Emergency Room       

              [facility]                    [id number]              [facility]

 

        2018 09:35:00 2018 16:22:00 Discharged Inpatient            

     [facility]  

[id number]                            [facility]

 

          2018 12:28:00 2018 12:55:00 Departed Emergency Room       

              [facility]                    [id number]              [facility]

 

           2018 16:30:00 2018 21:52:00 Departed Emergency Room 1    

      JENNIFER TERRELL 

[facility]              [id number]        [facility]

 

           2018 14:41:00 2018 18:30:00 Discharged Inpatient 1       

   MATTHEW FLORENTINO 

[facility]              [id number]        [facility]

 

           2018-10-22 20:49:00 2018-10-26 16:53:00 Discharged Inpatient 1       

   DREW DINERO 

[facility]              [id number]        [facility]

 

          2018-10-06 20:27:00 2018-10-06 21:25:00 Departed Emergency Room       

              [facility]                    [id number]              [facility]

 

          2018 15:08:00 2018 15:08:00 Registered Clinic 3         JACQUELINE ENCARNACION [facility]                    [id number]              [facility]

 

           2018-08-10 01:04:00 2018-08-15 18:35:00 Discharged Inpatient 1       

   LAVONNE DOVERIL 

[facility]              [id number]        [facility]

 

          2018 13:10:00 2018 18:25:00 Departed Emergency Room       

              [facility]                    [id number]              [facility]

 

          2018 02:07:00 2018 02:38:00 Departed Emergency Room       

              [facility]                    [id number]              [facility]

 

          2018 12:59:00 2018 13:01:00 Departed Emergency Room       

              [facility]                    [id number]              [facility]

 

          2018 07:21:00 2018 07:21:00 Registered Surgical Day Care  

                   [facility]                    [id number]              [facility]

 

             2018 11:08:00 2018 11:08:00 Registered Surgical Day Car

e CLAU BROWN           [facility]          [id number]    [facility]

 

             2018 13:23:00 2018 17:28:00 Departed Emergency Room ER 

          MERYL STEVE

                [facility]          [id number]    [facility]

 

           2018 06:30:00 2018 14:25:00 Discharged Inpatient ER      

   ROYER DOVER 

[facility]              [id number]        [facility]

 

           2017 20:18:00 2017 14:28:00 Discharged Inpatient ER      

   AJAY GE 

[facility]              [id number]        [facility]

 

           2017 07:17:00 2017 17:05:00 Discharged Inpatient ER      

   AJAY GE 

[facility]              [id number]        [facility]

 

        2017 18:56:00 2017 17:00:00 Discharged Inpatient            

     [facility]  

[id number]                            [facility]

 

        2017-06-15 15:58:00 2017 12:09:00 Discharged Inpatient            

     [facility]  

[id number]                            [facility]

 

          2017 08:01:00 2017 08:01:00 Registered Surgical Day Care  

                   [facility]                    [id number]              [facility]

 

           2016 20:28:00 2016 23:43:00 Outpatient            DARSHAN Curtis 

[place] [facility]                [id number]         

 

             2016 15:13:00 2016 19:33:00 Outpatient                Parul Padilla      [facility]            [id number]     







Results





           Test Description Test Time  Test Comments Results    Result Comments 

Source

 

                    Blood leukocytes automated count (number/volume) 2020 

11:45:00   

 

                                        Test Item

 

             White Blood Count (test code = 6690-2) 7.89         4.8-10.8       

            





[facility] 
(number/volume)2020 11:45:00* 



             Test Item    Value        Reference Range Interpretation Comments

 

             Red Blood Count (test code = 789-8) 4.15         3.6-5.1           

         





[facility] measurement 
(moles/volume)2020 11:45:00* 



             Test Item    Value        Reference Range Interpretation Comments

 

             Hemoglobin (test code = 18383-9) 10.6         12.0-16.0            

      





[facility] CenterAutomated blood hematocrit (volume 
fraction)2020 11:45:00* 



             Test Item    Value        Reference Range Interpretation Comments

 

             Hematocrit (test code = 4544-3) 34.6         34.2-44.1             

     





[facility] CenterAutomated erythrocyte mean corpuscular 
czcwgi1104-18-28 11:45:00* 



             Test Item    Value        Reference Range Interpretation Comments

 

             Mean Corpuscular Volume (test code = 787-2) 83.4         81-99     

                 





[facility] CenterAutomated erythrocyte mean corpuscular 
hemoglobin (mass per erythrocyte)2020 11:45:00* 



             Test Item    Value        Reference Range Interpretation Comments

 

             Mean Corpuscular Hemoglobin (test code = 785-6) 25.5         28-32 

                     





[facility] CenterAutomated erythrocyte mean corpuscular 
hemoglobin concentration measurement (mass/volume)2020 11:45:00* 



             Test Item    Value        Reference Range Interpretation Comments

 

             Mean Corpuscular Hemoglobin Concent (test code = 786-4) 30.6       

  31-35                      





[facility] CenterRDW KiaCv-Guh1009-81-21 11:45:00* 



             Test Item    Value        Reference Range Interpretation Comments

 

             Red Cell Distribution Width (test code = 94870-9) 14.4         11.7

-14.4                  





[facility] CenterAutomated blood platelet count 
(count/volume)2020 11:45:00* 



             Test Item    Value        Reference Range Interpretation Comments

 

             Platelet Count (test code = 777-3) 266          140-360            

        





[facility] CenterAutCannon [facility] Hospitaled blood segmented neutrophil 
count as percentage of total trjxrykukt3219-40-89 11:45:00* 



             Test Item    Value        Reference Range Interpretation Comments

 

             Neutrophils (%) (Auto) (test code = 29843-7) 76.9         38.7-80.0

                  





[facility] CenterAutomated blood lymphocyte count as 
percentage ot total llxwpzenfk2727-72-71 11:45:00* 



             Test Item    Value        Reference Range Interpretation Comments

 

             Lymphocytes (%) (Auto) (test code = 736-9) 16.1         18.0-39.1  

                





[facility] CenterAutomated blood monocyte count as 
percentage of total uylhxdhkil3787-73-98 11:45:00* 



             Test Item    Value        Reference Range Interpretation Comments

 

             Monocytes (%) (Auto) (test code = 5905-5) 5.1          4.4-11.3    

               





[facility] CenterAutomated blood eosinophil count as 
percentage of total dubflwsfip9874-16-05 11:45:00* 



             Test Item    Value        Reference Range Interpretation Comments

 

             Eosinophils (%) (Auto) (test code = 713-8) 0.8          0.0-6.0    

                





[facility] CenterAutomated blood basophil count as 
percentage of total paabnxxjgz1826-30-90 11:45:00* 



             Test Item    Value        Reference Range Interpretation Comments

 

             Basophils (%) (Auto) (test code = 706-2) 0.5          0.0-1.0      

              





[facility] CenterFluoroscopic procedure less than one hour
guahrxba3978-91-49 11:45:00* 



             Test Item    Value        Reference Range Interpretation Comments

 

             IM GRANULOCYTES % (test code = IM GRANULOCYTES %) 0.6          0.0-

1.0                    





[facility] CenterAutomated blood neutrophil count
2020 11:45:00* 



             Test Item    Value        Reference Range Interpretation Comments

 

             Neutrophils # (Auto) (test code = 751-8) 6.1          2.1-6.9      

              





[facility] CenterBlood lymphocytes count (number/volume)
2020 11:45:00* 



             Test Item    Value        Reference Range Interpretation Comments

 

             Lymphocytes # (Auto) (test code = 71798-8) 1.3          1.0-3.2    

                





[facility] CenterBlood monocytes automated count 
(number/volume)2020 11:45:00* 



             Test Item    Value        Reference Range Interpretation Comments

 

             Monocytes # (Auto) (test code = 742-7) 0.4          0.2-0.8        

            





[facility] CenterAutomated blood eosinophil count
2020 11:45:00* 



             Test Item    Value        Reference Range Interpretation Comments

 

             Eosinophils # (Auto) (test code = 711-2) 0.1          0.0-0.4      

              





[facility] CenterAutomated blood basophil count 
(count/volume)2020 11:45:00* 



             Test Item    Value        Reference Range Interpretation Comments

 

             Basophils # (Auto) (test code = 704-7) 0.0          0.0-0.1        

            





[facility] CenterFluoroscopic procedure less than one hour
wmryhvsn0746-46-43 11:45:00* 



             Test Item    Value        Reference Range Interpretation Comments

 

                                        Absolute Immature Granulocyte (auto (lloyd

t code = Absolute Immature Granulocyte 

(auto)          0.05            0-0.1                            





[facility] CenterProthrombin time (PT) in platelet poor 
plasma by coagulation bdrtb6706-74-59 11:45:00* 



             Test Item    Value        Reference Range Interpretation Comments

 

             Prothrombin Time (test code = 5902-2) 13.1         11.9-14.5       

           





[facility] CenterINR in Platelet poor plasma by 
Coagulation sjvqr6866-44-51 11:45:00* 



             Test Item    Value        Reference Range Interpretation Comments

 

             Prothromb Time International Ratio (test code = 6301-6) 0.94       

                             





Oral Anticoagulant Therapy INR Values:1. Low Intensity Therapy        1.5 - 2.02
. Moderate Intensity Therapy   2.0 - 3.03. High Intensity Therapy(1)    2.5 - 3.
54. High Intensity Therapy(2)    3.0 - 4.05. Panic Value INR              > 5.0
[facility] CenterActivated partial thromboplastin time 
(aPTT) in platelet poor plasma by coagulation ckelz9358-44-39 11:45:00* 



             Test Item    Value        Reference Range Interpretation Comments

 

             Activated Partial Thromboplast Time (test code = 75131-0) 31.0     

    23.8-35.5                  





[facility] CenterUrine color hqgkyqtznlqwe8103-21-42 
11:45:00* 



             Test Item    Value        Reference Range Interpretation Comments

 

             Urine Color (test code = 5778-6) LISA        YELLOW               

      





[facility] CenterUrine rlvqmxf5089-62-00 11:45:00* 



             Test Item    Value        Reference Range Interpretation Comments

 

             Urine Clarity (test code = 70998-8) TURBID       CLEAR             

         





[facility] by Test [facility]
2020 11:45:00* 



             Test Item    Value        Reference Range Interpretation Comments

 

             Urine Specific Gravity (test code = 5811-5) 1.025        1.010-1.02

5                





[facility] CenterUrine pH measurement by automated test 
kdgws3096-92-11 11:45:00* 



             Test Item    Value        Reference Range Interpretation Comments

 

             Urine pH (test code = 97936-5) 7.5          5-7                    

    





[facility] CenterUrine leukocyte esterase detection by 
dshgieqh5512-80-51 11:45:00* 



             Test Item    Value        Reference Range Interpretation Comments

 

             Urine Leukocyte Esterase (test code = 5799-2) MODERATE     NEGATIVE

                   





[facility] CenterUrine nitrite slbadnuys0417-84-33 
11:45:00* 



             Test Item    Value        Reference Range Interpretation Comments

 

             Urine Nitrite (test code = 08731-4) POSITIVE     NEGATIVE          

         





[facility] CenterUrine protein measurement by test strip 
(mass/volume)2020 11:45:00* 



             Test Item    Value        Reference Range Interpretation Comments

 

             Urine Protein (test code = 5804-0) >=300        NEGATIVE           

        





[facility] CenterUrine glucose tytvbxgld5548-52-45 
11:45:00* 



             Test Item    Value        Reference Range Interpretation Comments

 

             Urine Glucose (UA) (test code = 2349-9) NEGATIVE     NEGATIVE      

             





[facility] CenterUrine ketones detection by automated test
vtnik5641-56-67 11:45:00* 



             Test Item    Value        Reference Range Interpretation Comments

 

             Urine Ketones (test code = 21226-2) NEGATIVE     NEGATIVE          

         





[facility] CenterUrine urobilinogen measurement by test 
strip (mass/volume)2020 11:45:00* 



             Test Item    Value        Reference Range Interpretation Comments

 

             Urine Urobilinogen (test code = 41456-4) 1            0.2-1        

              





[facility] CenterUrine total bilirubin measurement 
(mass/volume)2020 11:45:00* 



             Test Item    Value        Reference Range Interpretation Comments

 

             Urine Bilirubin (test code = 1978-6) NEGATIVE     NEGATIVE         

          





[facility] CenterUrine erythrocytes zrvamvcay0628-88-01 
11:45:00* 



             Test Item    Value        Reference Range Interpretation Comments

 

             Urine Blood (test code = 92559-1) LARGE        NEGATIVE            

       





[facility] CenterAutomated urine sediment leukocyte count 
by microscopy (number/high power field)2020 11:45:00* 



             Test Item    Value        Reference Range Interpretation Comments

 

             Urine WBC (test code = 5821-4) -20        0-5                    

    





[facility] CenterErythrocytes detection in urine sediment 
by light layfrkjbxw3840-00-16 11:45:00* 



             Test Item    Value        Reference Range Interpretation Comments

 

             Urine RBC (test code = 92389-9) 21-50        0-5                   

     





[facility] CenterBacteria detection in urine sediment by 
light ishwizvtee9681-78-00 11:45:00* 



             Test Item    Value        Reference Range Interpretation Comments

 

             Urine Bacteria (test code = 29573-2) MANY         NONE             

          





[facility] CenterEpithelial cells detection in urine 
sediment by light axobbcftvi8304-52-19 11:45:00* 



             Test Item    Value        Reference Range Interpretation Comments

 

             Urine Epithelial Cells (test code = 20037-0) FEW          NONE     

                  





[facility] Houstonerum or plasma sodium measurement 
(moles/volume)2020 11:45:00* 



             Test Item    Value        Reference Range Interpretation Comments

 

             Sodium Level (test code = 2951-2) 140          136-145             

       





[facility]er[facility] or plasma potassium measurement 
(moles/volume)2020 11:45:00* 



             Test Item    Value        Reference Range Interpretation Comments

 

             Potassium Level (test code = 2823-3) 3.3          3.5-5.1          

          





[facility]er[facility] or plasma chloride measurement 
(moles/volume)2020 11:45:00* 



             Test Item    Value        Reference Range Interpretation Comments

 

             Chloride Level (test code = 2075-0) 105                      

         





[facility] Houstoner[facility] or plasma carbon dioxide, total 
measurement (moles/volume)2020 11:45:00* 



             Test Item    Value        Reference Range Interpretation Comments

 

             Carbon Dioxide Level (test code = 2028-9) 21           22-29       

               





[facility] Houstonerum or plasma anion qpm0356-11-79 
11:45:00* 



             Test Item    Value        Reference Range Interpretation Comments

 

             Anion Gap (test code = 33037-3) 17.3         8-16                  

     





[facility]er[facility] or plasma urea nitrogen measurement
(mass/volume)2020 11:45:00* 



             Test Item    Value        Reference Range Interpretation Comments

 

             Blood Urea Nitrogen (test code = 3094-0) 14           7-26         

              





[facility] or plasma creatinine measurement 
(mass/volume)2020 11:45:00* 



             Test Item    Value        Reference Range Interpretation Comments

 

             Creatinine (test code = 2160-0) 1.19         0.57-1.11             

     





[facility] Houstoner[facility] or plasma urea nitrogen/creatinine 
mass eqpdw7956-28-87 11:45:00* 



             Test Item    Value        Reference Range Interpretation Comments

 

             BUN/Creatinine Ratio (test code = 3097-3) 12           6-25        

               





[facility] CenterEstimated glomerular filtration rate 
(GFR) ojbxtyqarmpyk0513-53-03 11:45:00* 



             Test Item    Value        Reference Range Interpretation Comments

 

             Estimat Glomerular Filtration Rate (test code = 087030198) 47      

     >60                        





Ranges were taken from the National Kidney Disease Education Program and the [facility] Kidney Foundation literature.Reference ranges:60 or greater: Ynjorz29-41 (
for 3 consecutive months): Chronic kidney disease 15 or less: Kidney failureVal Verde Regional Medical CenterGlucose ogrlgnefyxv4956-46-51 11:45:00* 



             Test Item    Value        Reference Range Interpretation Comments

 

             Glucose Level (test code = VQR1380) 201                      

         





[facility]er[facility] or plasma calcium measurement 
(mass/volume)2020 11:45:00* 



             Test Item    Value        Reference Range Interpretation Comments

 

             Calcium Level (test code = 58904-2) 9.2          8.4-10.2          

         





[facility] or plasma total bilirubin 
measurement (mass/volume)2020 11:45:00* 



             Test Item    Value        Reference Range Interpretation Comments

 

             Total Bilirubin (test code = 1975-2) 0.3          0.2-1.2          

          





[facility] CenterFluoroscopic procedure less than one hour
qmavlbyk4989-64-45 11:45:00* 



             Test Item    Value        Reference Range Interpretation Comments

 

                                        Aspartate Amino Transf (AST/SGOT) (test 

code = Aspartate Amino Transf 

(AST/SGOT))     13              5-34                             





[facility] Houstonerum or plasma alanine aminotransferase 
measurement (enzymatic activity/volume)2020 11:45:00* 



             Test Item    Value        Reference Range Interpretation Comments

 

             Alanine Aminotransferase (ALT/SGPT) (test code = 1742-6) 13        

   0-55                       





[facility]er[facility] or plasma protein measurement 
(mass/volume)2020 11:45:00* 



             Test Item    Value        Reference Range Interpretation Comments

 

             Total Protein (test code = 2885-2) 8.3          6.5-8.1            

        





[facility] or plasma albumin measurement 
(mass/volume)2020 11:45:00* 



             Test Item    Value        Reference Range Interpretation Comments

 

             Albumin (test code = 1751-7) 4.1          3.5-5.0                  

  





[facility] CenterPlasma globulin measurement (mass/volume)
2020 11:45:00* 



             Test Item    Value        Reference Range Interpretation Comments

 

             Globulin (test code = 62069-8) 4.2          2.3-3.5                

    





[facility]er[facility] or plasma albumin/globulin mass 
wgnqy4928-78-82 11:45:00* 



             Test Item    Value        Reference Range Interpretation Comments

 

             Albumin/Globulin Ratio (test code = 1759-0) 1.0          0.8-2.0   

                 





[facility] or plasma alkaline phosphatase 
measurement (enzymatic activity/volume)2020 11:45:00* 



             Test Item    Value        Reference Range Interpretation Comments

 

             Alkaline Phosphatase (test code = 6768-6) 111                

               





[facility] CenterCHE[facility]INGLE [facility])2020 
11:30:00                                                                        
             [place] [address]      Patient Name: CRISTINA RICH                                MR #: [id number]                  : 
1965                                   Age/Sex: 55/F  Acct #: [id number]
                             Req #: [id number]  Adm Physician:                  
                                   Ordered by: JENNIFER TERRELL MD                
        Report #: [id number]        Location: ER                                 
    Room/Bed:                   
_____________________________________________________
______________________________________________    Procedure: [id number] DX/CHEST 
SINGLE (PORTABLE)  Exam Date: 20                            Exam Time: 112
0                                              REPORT STATUS: Signed    EXAMINAT
ION:  CHEST SINGLE (PORTABLE)          INDICATION: Chest pain, urinary tract inf
ection      COMPARISON: Chest radiograph 2020           FINDINGS:      LINE
S/TUBES:None      LUNGS:The lungs are well-inflated. No focal consolidation or p
ulmonary edema.      PLEURA:No pleural effusion or pneumothorax.      MEDIASTINU
M:The cardiomediastinal silhouette appears normal in size and shape.      BONES/
SOFT TISSUES:No acute osseous injury.      ABDOMEN:No free air under the diaphra
gm.         IMPRESSION:    No focal pneumonia or pulmonary edema.       Signed b
y: Radha Florez MD on 2020 11:31 AM        Dictated By: RADHA FLOREZ MD  Elect
ronically Signed By: RADHA FLOREZ MD on 20  Transcribed By: LUCINDA on 
20 113       COPY TO:   JENNIFER TERRELL MD         NU LAMJUEIY4910-32-51 
16:35:00                                                                        
             [place] [address]      Patient Name: CRISTINA RICH                                MR #: [id number]                  : 
1965                                   Age/Sex: 54/F  Acct #: [id number]
                             Req #: [id number]  Adm Physician:                  
                                   Ordered by: JACQUELINE ARORA MD                   
     Report #: [id number]        Location: DX                                    
 Room/Bed:                   
________________________________________________________
___________________________________________    Procedure: [id number] IR/PCNU EXCH
KWAME  Exam Date:                             Exam Time:                         
                     REPORT STATUS: Signed    Procedure: Right percutaneous nep
hrostomy catheter exchange.                                                     
                                                                History: Routine
maintenance.                                                         Primary O
perator: Salvador Kramer M.D.       Assistant:  NIK Teixeira                      
                                                           Modality: Fluoroscop
y.                             DOSE REDUCTION: The examination was performed acc
ording to departmental   dose-optimization program.       Fluoro time: 5.2 minut
es.      Radiation dose: 48.7 mGy air Kerma.                                    
                                                Sedation: Intravenous conscious 
sedation was administered by a dedicated RN   using one milligrams of Versed and
50 mcg of fentanyl IV. Vital signs were   monitored throughout the procedure by 
a dedicated RN under direct supervision   of Dr. Kramer, and remained stable.  
    Physician intra-service sedation time was approximately 30 minutes.         
           Anesthesia: Lidocaine local infiltration      Medicines: Not appl
icable      Contrast medium: Isovue 300, 20 cc.      Estimated blood loss:  < 5 
cc.               Technique:    A discussion of the risks, benefits, and alte
rnatives was carried out with the   patient. A written informed consent was obta
ined. The patient expressed   understanding and agreed to proceed.        A univ
ersal timeout was performed prior to starting the procedure. The   procedure corby
m personnel used personal protective equipment. The operators used   sterile stephany
ns and gloves.      The patient was laid prone on the procedure table. The right
percutaneous   nephrostomy site was prepped with chlorhexidine gluconate and dr harmon in sterile   fashion.      A  radiograph was performed showing the cat
heter to be in the expected   location. Contrast injection through the catheter 
confirmed the catheter tip   location in the collecting system.      After local
anesthetic infiltration, the retention suture was removed. The   catheter was c
ut but could not be withdrawn over a wire. The catheter was   encrusted close to
the tip. A Glidewire was maneuvered alongside the catheter   into the renal col
lecting system. The catheter was slowly pulled, the pigtail   unformed, and the 
catheter removed. It was replaced with an identical catheter.   The catheter pos
ition was confirmed with contrast injection. The catheter was   secured to skin 
with nonabsorbable suture.  The catheter was connected to a   gravity drainage b
ag. An aseptic dressing was applied.      The patient [facility] and discharged from the   department in stable condition.            
             Complications: Mild hematuria.          Findings:   As above.      
                                                         Impression:   Success
ful fluoroscopic guided 12 French right nephrostomy catheter exchange as   descr
ibed above.      Further management dictated by the clinical scenario. The nephr
ostomy catheter   should be exchanged in approximately 10 weeks instead of 3 mon
ths. The   referring doctor was made aware and agreed. The patient was instructe
d to   increase the oral fluid intake.                                          
                              Thank you for the opportunity to assist in the ca
re of your patient.      Signed by: Salvador Kramer MD on 2020 4:39 PM      
 Dictated By: SALVADOR KRAMER MD  Electronically Signed By: SALVADOR KRAMER MD 
on 20  Transcribed By: LUCINDA on 20       COPY TO:   JACQUELINE SZYMANSKI MD         Capillary blood glucose measurement by glucometer 
(mass/volume)2020 10:54:00* 



             Test Item    Value        Reference Range Interpretation Comments

 

             Bedside Glucose (test code = 06154-2) 159                    

           





Meter ID: [id number] [facility]Flu[facility] 
procedure less than one hour cllkxuwh1085-46-73 11:55:00* 



             Test Item    Value        Reference Range Interpretation Comments

 

             Coronavirus (PCR) (test code = Coronavirus (PCR)) NOT DETECTED NOTD

ETECTED                





[employer] Aptima SARS-CoV-2 assay is a nucleic amplification test intended for the
qualitative detection of RNA from SARS-CoV-2 from nasopharyngeal (NP) specimens.
It is used under Emergency Use Authorization (EUA) by FDA.A positive result is 
indicative of the presence of SARS-CoV-2 RNA. Clinical correlation with patient 
history and other diagnostic information is necessary to determine patient infe
ction status.A negative (Not Detected) result does not preclude SARS-CoV-2 infec
tion. Clinical Correlation with patient history and other diagnostic information
should be used in patient management decisions.Invalid: Unable to generate a va
lid result on this specimen. Please submit a new specimen for reprat testing oc 
clinically indicated.Tesing performed by:[facility] [id number] [place] [address] [id number], Timmy Hair MD, PhD
[facility] CenterCapillary blood glucose measurement by 
glucometer (mass/volume)2020 20:13:00* 



             Test Item    Value        Reference Range Interpretation Comments

 

             Bedside Glucose (test code = 00051-6) 124                    

           





Meter ID: [id number] [facility] SystemBlood leukocytes 
automated count (number/volume)2020 04:50:00* 



             Test Item    Value        Reference Range Interpretation Comments

 

             White Blood Count (test code = 6690-2) 9.74         4.8-10.8       

            





[facility] CenterBlood erythrocytes automated count 
(number/volume)2020 04:50:00* 



             Test Item    Value        Reference Range Interpretation Comments

 

             Red Blood Count (test code = 789-8) 3.97         3.6-5.1           

         





[facility] CenterBlood hemoglobin measurement 
(moles/volume)2020 04:50:00* 



             Test Item    Value        Reference Range Interpretation Comments

 

             Hemoglobin (test code = 91755-1) 9.9          12.0-16.0            

      





[facility] CenterAutomated blood hematocrit (volume 
fraction)2020 04:50:00* 



             Test Item    Value        Reference Range Interpretation Comments

 

             Hematocrit (test code = 4544-3) 32.5         34.2-44.1             

     





[facility] CenterAutomated erythrocyte mean corpuscular 
yjifmk8982-90-43 04:50:00* 



             Test Item    Value        Reference Range Interpretation Comments

 

             Mean Corpuscular Volume (test code = 787-2) 81.9         81-99     

                 





[facility] CenterAutomated erythrocyte mean corpuscular 
hemoglobin (mass per erythrocyte)2020 04:50:00* 



             Test Item    Value        Reference Range Interpretation Comments

 

             Mean Corpuscular Hemoglobin (test code = 785-6) 24.9         28-32 

                     





[facility] CenterAutomated erythrocyte mean corpuscular 
hemoglobin concentration measurement (mass/volume)2020 04:50:00* 



             Test Item    Value        Reference Range Interpretation Comments

 

             Mean Corpuscular Hemoglobin Concent (test code = 786-4) 30.5       

  31-35                      





[facility] CenterRDW VwhLr-Wyx6837-52-29 04:50:00* 



             Test Item    Value        Reference Range Interpretation Comments

 

             Red Cell Distribution Width (test code = 39383-6) 15.0         11.7

-14.4                  





[facility] CenterAutomated blood platelet count 
(count/volume)2020 04:50:00* 



             Test Item    Value        Reference Range Interpretation Comments

 

             Platelet Count (test code = 777-3) 268          140-360            

        





[facility] blood segmented neutrophil 
count as percentage of total araoezqzco2597-00-30 04:50:00* 



             Test Item    Value        Reference Range Interpretation Comments

 

             Neutrophils (%) (Auto) (test code = 02045-0) 59.5         38.7-80.0

                  





[facility] CenterAutomated blood lymphocyte count as 
percentage ot total xgemohuspi1401-73-70 04:50:00* 



             Test Item    Value        Reference Range Interpretation Comments

 

             Lymphocytes (%) (Auto) (test code = 736-9) 29.9         18.0-39.1  

                





[facility] CenterAutomated blood monocyte count as 
percentage of total duantnbwin3920-14-19 04:50:00* 



             Test Item    Value        Reference Range Interpretation Comments

 

             Monocytes (%) (Auto) (test code = 5905-5) 7.0          4.4-11.3    

               





[facility] CenterAutomated blood eosinophil count as 
percentage of total dpqxovkise0185-17-46 04:50:00* 



             Test Item    Value        Reference Range Interpretation Comments

 

             Eosinophils (%) (Auto) (test code = 713-8) 2.6          0.0-6.0    

                





[facility] CenterAutCannon [facility] Hospitaled blood basophil count as 
percentage of total xpomsqwuja4724-91-96 04:50:00* 



             Test Item    Value        Reference Range Interpretation Comments

 

             Basophils (%) (Auto) (test code = 706-2) 0.6          0.0-1.0      

              





[facility] CenterFluoroscopic procedure less than one hour
ndzdgiar3953-86-86 04:50:00* 



             Test Item    Value        Reference Range Interpretation Comments

 

             IM GRANULOCYTES % (test code = IM GRANULOCYTES %) 0.4          0.0-

1.0                    





[facility] CenterAutC[facility] Hospitaled blood neutrophil count
2020 04:50:00* 



             Test Item    Value        Reference Range Interpretation Comments

 

             Neutrophils # (Auto) (test code = 751-8) 5.8          2.1-6.9      

              





[facility] CenterBlood lymphocytes count (number/volume)
2020 04:50:00* 



             Test Item    Value        Reference Range Interpretation Comments

 

             Lymphocytes # (Auto) (test code = 64750-1) 2.9          1.0-3.2    

                





[facility] CenterBlood monocytes automated count 
(number/volume)2020 04:50:00* 



             Test Item    Value        Reference Range Interpretation Comments

 

             Monocytes # (Auto) (test code = 742-7) 0.7          0.2-0.8        

            





[facility]
2020 04:50:00* 



             Test Item    Value        Reference Range Interpretation Comments

 

             Eosinophils # (Auto) (test code = 711-2) 0.3          0.0-0.4      

              





[facility] CenterAutomated blood basophil count 
(count/volume)2020 04:50:00* 



             Test Item    Value        Reference Range Interpretation Comments

 

             Basophils # (Auto) (test code = 704-7) 0.1          0.0-0.1        

            





[facility] CenterFluoroscopic procedure less than one hour
zigjclzh3286-49-93 04:50:00* 



             Test Item    Value        Reference Range Interpretation Comments

 

                                        Absolute Immature Granulocyte (auto (lloyd

t code = Absolute Immature Granulocyte 

(auto)          0.04            0-0.1                            





[facility] sodium [facility] 
(moles/volume)2020 04:50:00* 



             Test Item    Value        Reference Range Interpretation Comments

 

             Sodium Level (test code = 2951-2) 139          136-145             

       





[facility] or plasma potassium measurement 
(moles/volume)2020 04:50:00* 



             Test Item    Value        Reference Range Interpretation Comments

 

             Potassium Level (test code = 2823-3) 3.7          3.5-5.1          

          





[facility]er[facility] or plasma chloride measurement 
(moles/volume)2020 04:50:00* 



             Test Item    Value        Reference Range Interpretation Comments

 

             Chloride Level (test code = 2075-0) 109                      

         





[facility]er[facility] or plasma carbon dioxide, total 
measurement (moles/volume)2020 04:50:00* 



             Test Item    Value        Reference Range Interpretation Comments

 

             Carbon Dioxide Level (test code = 2028-9) 21           22-29       

               





[facility] Houstonerum or plasma anion ubu8504-03-68 
04:50:00* 



             Test Item    Value        Reference Range Interpretation Comments

 

             Anion Gap (test code = 33037-3) 12.7         8-16                  

     





[facility] or plasma urea nitrogen measurement
(mass/volume)2020 04:50:00* 



             Test Item    Value        Reference Range Interpretation Comments

 

             Blood Urea Nitrogen (test code = 3094-0) 16           7-26         

              





[facility] or plasma creatinine measurement 
(mass/volume)2020 04:50:00* 



             Test Item    Value        Reference Range Interpretation Comments

 

             Creatinine (test code = 2160-0) 1.31         0.57-1.11             

     





[facility] or plasma urea nitrogen/creatinine 
mass taopc9607-21-18 04:50:00* 



             Test Item    Value        Reference Range Interpretation Comments

 

             BUN/Creatinine Ratio (test code = 3097-3) 12           6-25        

               





[facility] CenterEstimated glomerular filtration rate 
(GFR) httikqhjzesku9302-46-37 04:50:00* 



             Test Item    Value        Reference Range Interpretation Comments

 

             Estimat Glomerular Filtration Rate (test code = 435582318) 42      

     >60                        





Ranges were taken from the National Kidney Disease Education Program and the [facility] Kidney Foundation literature.Reference ranges:60 or greater: Doljlb92-81 (
for 3 consecutive months): Chronic kidney disease 15 or less: Kidney failureVal Verde Regional Medical CenterGlucose nglzfryvomw6224-39-71 04:50:00* 



             Test Item    Value        Reference Range Interpretation Comments

 

             Glucose Level (test code = VGY8108) 110                      

         





[facility] or plasma calcium measurement 
(mass/volume)2020 04:50:00* 



             Test Item    Value        Reference Range Interpretation Comments

 

             Calcium Level (test code = 02092-1) 9.1          8.4-10.2          

         





[facility] or plasma total bilirubin 
measurement (mass/volume)2020 04:45:00* 



             Test Item    Value        Reference Range Interpretation Comments

 

             Total Bilirubin (test code = 1975-2) 0.3          0.2-1.2          

          





[facility] CenterFluoroscopic procedure less than one hour
bjclixdb3645-93-82 04:45:00* 



             Test Item    Value        Reference Range Interpretation Comments

 

                                        Aspartate Amino Transf (AST/SGOT) (test 

code = Aspartate Amino Transf 

(AST/SGOT))     19              5-34                             





[facility]er[facility] or plasma alanine aminotransferase 
measurement (enzymatic activity/volume)2020 04:45:00* 



             Test Item    Value        Reference Range Interpretation Comments

 

             Alanine Aminotransferase (ALT/SGPT) (test code = 1742-6) 19        

   0-55                       





[facility] or plasma protein measurement 
(mass/volume)2020 04:45:00* 



             Test Item    Value        Reference Range Interpretation Comments

 

             Total Protein (test code = 2885-2) 8.5          6.5-8.1            

        





[facility] or plasma albumin measurement 
(mass/volume)2020 04:45:00* 



             Test Item    Value        Reference Range Interpretation Comments

 

             Albumin (test code = 1751-7) 3.6          3.5-5.0                  

  





[facility] CenterPlasma globulin measurement (mass/volume)
2020 04:45:00* 



             Test Item    Value        Reference Range Interpretation Comments

 

             Globulin (test code = 72432-1) 4.9          2.3-3.5                

    





[facility]er[facility] or plasma albumin/globulin mass 
fegal9551-60-42 04:45:00* 



             Test Item    Value        Reference Range Interpretation Comments

 

             Albumin/Globulin Ratio (test code = 1759-0) 0.7          0.8-2.0   

                 





[facility] or plasma alkaline phosphatase 
measurement (enzymatic activity/volume)2020 04:45:00* 



             Test Item    Value        Reference Range Interpretation Comments

 

             Alkaline Phosphatase (test code = 6768-6) 120                

               





[facility] CenterFluoroscopic procedure less than one hour
livrlctw1020-71-70 22:35:00* 



             Test Item    Value        Reference Range Interpretation Comments

 

             Coronavirus (PCR) (test code = Coronavirus (PCR)) NOT DETECTED NOTD

ETECTED                





SARS-COV-2 (COVID19), HIGHRISK, RT-PCRNegative results do not preclude SARS-CoV-
2 infection and should not be used as the sole basis for patient management deci
sions. Negative results must be combined with clinical observations, patient his
tory, and epidemiological information. Optimum specimen types and timing for pea
k viral levels during infections caused by SARS-CoV-2 have not been determined. 
Collection of multiple specimens ot types of specimens may be necessary to detec
t virus. Improper specimen collection and handling, sequence variability under p
rimers/probes, or organism present below the limit of detection may lead to fals
e negative results. Positive and negative predictive values of testing are highl
y dependent on prevalance. False negative test results are more likely when prev
alence is high.The expected result is negative (not detected).The SARS-CoV-2 lloyd
t is intended for the qualitative detection of nucleic acid from SARS-CoV-2 in n
asopharyngeal and oropharyngeal swab samples from patients who meet COVID-19 cli
nical and or epidemiological criteria. For lower respiratory tract specimens, th
e assay is submitted for authoriztion by FDA under an Emergency Use Authorizatio
n (EUA). Testing methodology is real time RT-PCR. If received as separate collec
tion devices, nasopharygeal and oropharyngeal specimens are combined for analysi
s. Additional specimens may be split to a separate accession for analysi and rep
orting as this test includes a single unit of service.Test results must be corre
lated with clinical presentation and evaluated in the context of other laborator
y and epidemiologic data. Test performance can be affected because the epidemiol
ogy and clinical spectrum of infection caused by SARS-CoV-2 is not fully known. 
For example, the optimum types of specimens to collect and when during the cours
e of infection these specimens are most likely to contain detectable viral RNA m
ay not be known.This test has not been Food and Drug Administration (FDA) cleare
d or approved and has been authorized by FDA under an Emergency Use Authorizatio
n (EUA). The test is only authorized for the duration of the declaration that ci
rcumstances exist justifying the authorization of emergency use of in vitro diag
nostic tests for detection and/or diagnosis of SARS-CoV-2 under section 564(b) o
f the Act, 21 U.S.C. section 360bbb-3(b)(1), unless the authorization is termina
hugh or revoked sooner. [facility] are certified under the C
linical Laboratory Improvement Amendments of 1988 (CLIA), 42 U.S.C. section 263a
, to perform high complexity tests.Testing performed by [facility]
[address] [phone] Director: Perez Ricci M.D.CLIA # [id number] [facility]
Fluoroscopic procedure less than one hour wezrsigj6404-75-69 20:35:00* 



             Test Item    Value        Reference Range Interpretation Comments

 

             Lactic Acid Level (test code = Lactic Acid Level) 1.8          0.5-

2.0                    





[facility] CenterFluoroscopic procedure less than one hour
quplpjea4741-55-71 20:35:00* 



             Test Item    Value        Reference Range Interpretation Comments

 

             Lactic Acid Level (test code = Lactic Acid Level) 1.8          0.5-

2.0                    





[facility] CenterUrine color qixxdvqbgwpjd3588-01-07 
20:30:00* 



             Test Item    Value        Reference Range Interpretation Comments

 

             Urine Color (test code = 5778-6) OTHER        YELLOW               

      





PINKVal Verde Regional Medical CenterUrine euiehpn3615-27-18 20:30:00* 



             Test Item    Value        Reference Range Interpretation Comments

 

             Urine Clarity (test code = 75147-3) CLOUDY       CLEAR             

         





[facility] Houstonpecific gravity of Urine by Test strip
2020 20:30:00* 



             Test Item    Value        Reference Range Interpretation Comments

 

             Urine Specific Gravity (test code = 5811-5) 1.025        1.010-1.02

5                





[facility] CenterUrine pH measurement by automated test 
pcxlr5525-16-91 20:30:00* 



             Test Item    Value        Reference Range Interpretation Comments

 

             Urine pH (test code = 53899-7) 8.5          5-7                    

    





[facility] CenterUrine leukocyte esterase detection by 
dcsvamkl8967-34-81 20:30:00* 



             Test Item    Value        Reference Range Interpretation Comments

 

             Urine Leukocyte Esterase (test code = 5799-2) LARGE        NEGATIVE

                   





[facility] CenterUrine nitrite fmghppqjg6807-89-16 
20:30:00* 



             Test Item    Value        Reference Range Interpretation Comments

 

             Urine Nitrite (test code = 37410-5) NEGATIVE     NEGATIVE          

         





[facility] CenterUrine protein measurement by test strip 
(mass/volume)2020 20:30:00* 



             Test Item    Value        Reference Range Interpretation Comments

 

             Urine Protein (test code = 5804-0) >=300        NEGATIVE           

        





[facility] CenterUrine glucose wycahiput0402-86-16 
20:30:00* 



             Test Item    Value        Reference Range Interpretation Comments

 

             Urine Glucose (UA) (test code = 2349-9) NEGATIVE     NEGATIVE      

             





[facility] CenterUrine ketones detection by automated test
hswkl0714-03-75 20:30:00* 



             Test Item    Value        Reference Range Interpretation Comments

 

             Urine Ketones (test code = 82187-9) NEGATIVE     NEGATIVE          

         





[facility] CenterUrine urobilinogen measurement by test 
strip (mass/volume)2020 20:30:00* 



             Test Item    Value        Reference Range Interpretation Comments

 

             Urine Urobilinogen (test code = 18531-2) 0.2          0.2-1        

              





[facility] CenterUrine total bilirubin measurement 
(mass/volume)2020 20:30:00* 



             Test Item    Value        Reference Range Interpretation Comments

 

             Urine Bilirubin (test code = 1978-6) NEGATIVE     NEGATIVE         

          





[facility] CenterUrine erythrocytes wajmfmdhx9493-86-40 
20:30:00* 



             Test Item    Value        Reference Range Interpretation Comments

 

             Urine Blood (test code = 26339-7) LARGE        NEGATIVE            

       





[facility] CenterAutomated urine sediment leukocyte count 
by microscopy (number/high power field)2020 20:30:00* 



             Test Item    Value        Reference Range Interpretation Comments

 

             Urine WBC (test code = 5821-4) 11-20        0-5                    

    





[facility] CenterErythrocytes detection in urine sediment 
by light hoadokwtfb0147-88-06 20:30:00* 



             Test Item    Value        Reference Range Interpretation Comments

 

             Urine RBC (test code = 48546-5) 11-20        0-5                   

     





[facility] CenterBacteria detection in urine sediment by 
light ycwixuzloe6861-11-08 20:30:00* 



             Test Item    Value        Reference Range Interpretation Comments

 

             Urine Bacteria (test code = 28518-9) MANY         NONE             

          





[facility] CenterEpithelial cells detection in urine 
sediment by light dbmupnvgoi7836-13-28 20:30:00* 



             Test Item    Value        Reference Range Interpretation Comments

 

             Urine Epithelial Cells (test code = 70703-9) NONE         NONE     

                  





[facility] CenterBlood kldmkuz4754-69-32 20:30:00* 



             Test Item    Value        Reference Range Interpretation Comments

 

             Blood Culture (test code = 64272771) NO GROWTH AFTER 72 HOURS      

                      





[facility] CenterBacterial urine ctvrrls2604-90-85 
20:30:00* 



             Test Item    Value        Reference Range Interpretation Comments

 

             Urine Culture (test code = 630-4) CANDIDA ALBICANS                 

           





[facility] CenterBacterial urine ymfdsqf1290-48-23 
20:30:00* 



             Test Item    Value        Reference Range Interpretation Comments

 

             Urine Culture (test code = 630-4) PSEUDO FLUORESCENS/PUTIDA        

                    





[facility] CenterBlood wocfjdd3493-38-06 20:30:00* 



             Test Item    Value        Reference Range Interpretation Comments

 

             Blood Culture (test code = 06371341) NO GROWTH AFTER 5 DAYS, FINAL 

REPORT                            





[facility] CenterBacterial urine tsswcbp7090-73-38 
20:30:00* 



             Test Item    Value        Reference Range Interpretation Comments

 

             Urine Culture (test code = 630-4) CANDIDA ALBICANS                 

           





[facility] CenterBacterial urine isncfhw1654-43-67 
20:30:00* 



             Test Item    Value        Reference Range Interpretation Comments

 

             Urine Culture (test code = 630-4) PSEUDO FLUORESCENS/PUTIDA        

                    





[facility] CenterProthrombin time (PT) in platelet poor 
plasma by coagulation sttmu8800-67-93 20:15:00* 



             Test Item    Value        Reference Range Interpretation Comments

 

             Prothrombin Time (test code = 5902-2) 12.7         11.9-14.5       

           





[facility] CenterINR in Platelet poor plasma by 
Coagulation dkddp6547-88-82 20:15:00* 



             Test Item    Value        Reference Range Interpretation Comments

 

             Prothromb Time International Ratio (test code = 6301-6) 0.90       

                             





Oral Anticoagulant Therapy INR Values:1. Low Intensity Therapy        1.5 - 2.02
. Moderate Intensity Therapy   2.0 - 3.03. High Intensity Therapy(1)    2.5 - 3.
54. High Intensity Therapy(2)    3.0 - 4.05. Panic Value INR              > 5.0
[facility] CenterActivated partial thromboplastin time 
(aPTT) in platelet poor plasma by coagulation xhnsj8185-00-31 20:15:00* 



             Test Item    Value        Reference Range Interpretation Comments

 

             Activated Partial Thromboplast Time (test code = 87828-7) 28.6     

    23.8-35.5                  





[facility] automated count 
(number/volume)2020 04:45:00* 



             Test Item    Value        Reference Range Interpretation Comments

 

             White Blood Count (test code = 6690-2) 10.45        4.8-10.8       

            





[facility] CenterBlood erythrocytes automated count 
(number/volume)2020 04:45:00* 



             Test Item    Value        Reference Range Interpretation Comments

 

             Red Blood Count (test code = 789-8) 3.91         3.6-5.1           

         





[facility] CenterBlood hemoglobin measurement 
(moles/volume)2020 04:45:00* 



             Test Item    Value        Reference Range Interpretation Comments

 

             Hemoglobin (test code = 24654-0) 10.0         12.0-16.0            

      





[facility] CenterAutomated blood hematocrit (volume 
fraction)2020 04:45:00* 



             Test Item    Value        Reference Range Interpretation Comments

 

             Hematocrit (test code = 4544-3) 32.4         34.2-44.1             

     





[facility] CenterAutomated erythrocyte mean corpuscular 
memxfw7317-52-25 04:45:00* 



             Test Item    Value        Reference Range Interpretation Comments

 

             Mean Corpuscular Volume (test code = 787-2) 82.9         81-99     

                 





[facility] CenterAutomated erythrocyte mean corpuscular 
hemoglobin (mass per erythrocyte)2020 04:45:00* 



             Test Item    Value        Reference Range Interpretation Comments

 

             Mean Corpuscular Hemoglobin (test code = 785-6) 25.6         28-32 

                     





[facility] CenterAutomated erythrocyte mean corpuscular 
hemoglobin concentration measurement (mass/volume)2020 04:45:00* 



             Test Item    Value        Reference Range Interpretation Comments

 

             Mean Corpuscular Hemoglobin Concent (test code = 786-4) 30.9       

  31-35                      





[facility] CenterRDW KguTv-Vpg7598-90-02 04:45:00* 



             Test Item    Value        Reference Range Interpretation Comments

 

             Red Cell Distribution Width (test code = 63666-9) 14.7         11.7

-14.4                  





[facility] CenterAutomated blood platelet count 
(count/volume)2020 04:45:00* 



             Test Item    Value        Reference Range Interpretation Comments

 

             Platelet Count (test code = 777-3) 272          140-360            

        





[facility] CenterAutC[facility] Hospitaled blood segmented neutrophil 
count as percentage of total apkwutjizt3711-35-96 04:45:00* 



             Test Item    Value        Reference Range Interpretation Comments

 

             Neutrophils (%) (Auto) (test code = 69932-8) 54.2         38.7-80.0

                  





[facility] CenterAutomated blood lymphocyte count as 
percentage ot total uepvrytvjx7331-09-24 04:45:00* 



             Test Item    Value        Reference Range Interpretation Comments

 

             Lymphocytes (%) (Auto) (test code = 736-9) 35.0         18.0-39.1  

                





[facility] CenterAutomated blood monocyte count as 
percentage of total ajjzpouqlv7963-97-54 04:45:00* 



             Test Item    Value        Reference Range Interpretation Comments

 

             Monocytes (%) (Auto) (test code = 5905-5) 7.1          4.4-11.3    

               





[facility] CenterAutomated blood eosinophil count as 
percentage of total bzverjhlbs8577-74-02 04:45:00* 



             Test Item    Value        Reference Range Interpretation Comments

 

             Eosinophils (%) (Auto) (test code = 713-8) 2.5          0.0-6.0    

                





[facility] CenterAutomated blood basophil count as 
percentage of total tkbhovqzjd2564-63-57 04:45:00* 



             Test Item    Value        Reference Range Interpretation Comments

 

             Basophils (%) (Auto) (test code = 706-2) 0.4          0.0-1.0      

              





[facility] CenterFluoroscopic procedure less than one hour
dggsxywq6190-12-07 04:45:00* 



             Test Item    Value        Reference Range Interpretation Comments

 

             IM GRANULOCYTES % (test code = IM GRANULOCYTES %) 0.8          0.0-

1.0                    





[facility] CenterAutomated blood neutrophil count
2020 04:45:00* 



             Test Item    Value        Reference Range Interpretation Comments

 

             Neutrophils # (Auto) (test code = 751-8) 5.7          2.1-6.9      

              





[facility] CenterBlood lymphocytes count (number/volume)
2020 04:45:00* 



             Test Item    Value        Reference Range Interpretation Comments

 

             Lymphocytes # (Auto) (test code = 08792-2) 3.7          1.0-3.2    

                





[facility] CenterBlood monocytes automated count 
(number/volume)2020 04:45:00* 



             Test Item    Value        Reference Range Interpretation Comments

 

             Monocytes # (Auto) (test code = 742-7) 0.7          0.2-0.8        

            





[facility] CenterAutomated blood eosinophil count
2020 04:45:00* 



             Test Item    Value        Reference Range Interpretation Comments

 

             Eosinophils # (Auto) (test code = 711-2) 0.3          0.0-0.4      

              





[facility] CenterAutomated blood basophil count 
(count/volume)2020 04:45:00* 



             Test Item    Value        Reference Range Interpretation Comments

 

             Basophils # (Auto) (test code = 704-7) 0.0          0.0-0.1        

            





[facility] CenterFluoroscopic procedure less than one hour
svwrjdby6623-53-75 04:45:00* 



             Test Item    Value        Reference Range Interpretation Comments

 

                                        Absolute Immature Granulocyte (auto (lloyd

t code = Absolute Immature Granulocyte 

(auto)          0.08            0-0.1                            





[facility] CenterCapillary blood glucose measurement by 
glucometer (mass/volume)2020 19:25:00* 



             Test Item    Value        Reference Range Interpretation Comments

 

             Bedside Glucose (test code = 97579-6) 187                    

           





Meter ID: [id number] Children's Hospital Houstonerum or plasma 
sodium measurement (moles/volume)2020 05:20:00* 



             Test Item    Value        Reference Range Interpretation Comments

 

             Sodium Level (test code = 2951-2) 138          136-145             

       





[facility] Houstonerum or plasma potassium measurement 
(moles/volume)2020 05:20:00* 



             Test Item    Value        Reference Range Interpretation Comments

 

             Potassium Level (test code = 2823-3) 3.6          3.5-5.1          

          





[facility] Houstonerum or plasma chloride measurement 
(moles/volume)2020 05:20:00* 



             Test Item    Value        Reference Range Interpretation Comments

 

             Chloride Level (test code = 2075-0) 109                      

         





[facility] Houstonerum or plasma carbon dioxide, total 
measurement (moles/volume)2020 05:20:00* 



             Test Item    Value        Reference Range Interpretation Comments

 

             Carbon Dioxide Level (test code = 2028-9) 23           22-29       

               





[facility] Houstonerum or plasma anion khp6407-42-47 
05:20:00* 



             Test Item    Value        Reference Range Interpretation Comments

 

             Anion Gap (test code = 33037-3) 9.6          8-16                  

     





[facility] Houstonerum or plasma urea nitrogen measurement
(mass/volume)2020 05:20:00* 



             Test Item    Value        Reference Range Interpretation Comments

 

             Blood Urea Nitrogen (test code = 3094-0) 19           7-26         

              





[facility] Houstonerum or plasma creatinine measurement 
(mass/volume)2020 05:20:00* 



             Test Item    Value        Reference Range Interpretation Comments

 

             Creatinine (test code = 2160-0) 1.27         0.57-1.11             

     





[facility] Houstonerum or plasma urea nitrogen/creatinine 
mass mglli8631-64-57 05:20:00* 



             Test Item    Value        Reference Range Interpretation Comments

 

             BUN/Creatinine Ratio (test code = 3097-3) 15           6-25        

               





[facility] CenterEstimated glomerular filtration rate 
(GFR) elbmrvavajons2263-00-73 05:20:00* 



             Test Item    Value        Reference Range Interpretation Comments

 

             Estimat Glomerular Filtration Rate (test code = 214169040) 44      

     >60                        





Ranges were taken from the National Kidney Disease Education Program and the [facility] Kidney Foundation literature.Reference ranges:60 or greater: Uoxxie91-83 (
for 3 consecutive months): Chronic kidney disease 15 or less: Kidney failureVal Verde Regional Medical CenterGlucose siesayaqfrh8587-09-69 05:20:00* 



             Test Item    Value        Reference Range Interpretation Comments

 

             Glucose Level (test code = EVA6258) 163                      

         





[facility] Houstonerum or plasma calcium measurement 
(mass/volume)2020 05:20:00* 



             Test Item    Value        Reference Range Interpretation Comments

 

             Calcium Level (test code = 30415-5) 8.6          8.4-10.2          

         





[facility] CenterRETROGRADE ASNHLLZEB2020-70-84 14:31:00  
                                                                                
  [facility]                        [address] [address]      Patient Name: CRISTINA RICH     
                          MR #: [id number]                  : 1965     
                             Age/Sex: 54/F  Acct #: [id number]                
             Req #: [id number]  [facility] Physician: SHADY PEMBERTON MD                    
                   Ordered by: JACQUELINE ARORA MD                         Report #: 
[id number]        Location: MED/SURG                                Room/Bed: 
[facility]             ________________________________________________________
___________________________________________    Procedure: [id number] DX/RETROGRAD
E PYELOGRAM  Exam Date: 20                            Exam Time: 714     
                                        REPORT STATUS: Signed    OR Fluoroscopy:
     IMPRESSION: Fluoroscopy service provided in the OR. Interpretation not   
requested.      Signed by: Salvador Kramer MD on 2020 2:31 PM        Dictate
d By: SALVADOR KRAMER MD  Electronically Signed By: SALVADOR KRAMER MD on  1431  Transcribed By: LUCINDA on 20 1431       COPY TO:   JACQUELINE ARORA MD
        Bacterial urine tzsjwnw4303-34-38 07:40:00* 



             Test Item    Value        Reference Range Interpretation Comments

 

             Urine Culture (test code = 630-4) CANDIDA ALBICANS                 

           





[facility] CenterPhosphorus abkbfrrzzag5778-31-23 05:05:00
  * 



             Test Item    Value        Reference Range Interpretation Comments

 

             Phosphorus Level (test code = XGQ9657) 2.7          2.3-4.7        

            





[facility] Houstonerum or plasma magnesium measurement 
(mass/volume)2020 05:05:00* 



             Test Item    Value        Reference Range Interpretation Comments

 

             Magnesium Level (test code = 95521-3) 1.9          1.3-2.1         

           





[facility] CenterPhosphorus baywjimtlji1410-59-89 05:05:00
  * 



             Test Item    Value        Reference Range Interpretation Comments

 

             Phosphorus Level (test code = ZIT7479) 2.7          2.3-4.7        

            





[facility] Houstonerum or plasma magnesium measurement 
(mass/volume)2020 05:05:00* 



             Test Item    Value        Reference Range Interpretation Comments

 

             Magnesium Level (test code = 33125-6) 1.9          1.3-2.1         

           





[facility] CenterPhosphorus htmsetxioxw5462-07-21 05:05:00
  * 



             Test Item    Value        Reference Range Interpretation Comments

 

             Phosphorus Level (test code = XEY5451) 2.7          2.3-4.7        

            





[facility] Houstonerum or plasma magnesium measurement 
(mass/volume)2020 05:05:00* 



             Test Item    Value        Reference Range Interpretation Comments

 

             Magnesium Level (test code = 06177-8) 1.9          1.3-2.1         

           





[facility] CenterFluoroscopic procedure less than one hour
peelijcs6244-04-30 09:50:00* 



             Test Item    Value        Reference Range Interpretation Comments

 

             Coronavirus (PCR) (test code = Coronavirus (PCR)) NOT DETECTED NOTD

ETECTED                





SARS-COV-2 (COVID19), HIGHRISK, RT-PCRNegative results do not preclude SARS-CoV-
2 infection and should not be used as the sole basis for patient management deci
sions. Negative results must be combined with clinical observations, patient his
tory, and epidemiological information. Optimum specimen types and timing for pea
k viral levels during infections caused by SARS-CoV-2 have not been determined. 
Collection of multiple specimens ot types of specimens may be necessary to detec
t virus. Improper specimen collection and handling, sequence variability under p
rimers/probes, or organism present below the limit of detection may lead to fals
e negative results. Positive and negative predictive values of testing are highl
y dependent on prevalance. False negative test results are more likely when prev
alence is high.The expected result is negative (not detected).The SARS-CoV-2 lloyd
t is intended for the qualitative detection of nucleic acid from SARS-CoV-2 in n
asopharyngeal and oropharyngeal swab samples from patients who meet COVID-19 cli
nical and or epidemiological criteria. For lower respiratory tract specimens, th
e assay is submitted for authoriztion by FDA under an Emergency Use Authorizatio
n (EUA). Testing methodology is real time RT-PCR. If received as separate collec
tion devices, nasopharygeal and oropharyngeal specimens are combined for analysi
s. Additional specimens may be split to a separate accession for analysi and rep
orting as this test includes a single unit of service.Test results must be corre
lated with clinical presentation and evaluated in the context of other laborator
y and epidemiologic data. Test performance can be affected because the epidemiol
ogy and clinical spectrum of infection caused by SARS-CoV-2 is not fully known. 
For example, the optimum types of specimens to collect and when during the cours
e of infection these specimens are most likely to contain detectable viral RNA m
ay not be known.This test has not been Food and Drug Administration (FDA) cleare
d or approved and has been authorized by FDA under an Emergency Use Authorizatio
n (EUA). The test is only authorized for the duration of the declaration that ci
rcumstances exist justifying the authorization of emergency use of in vitro diag
nostic tests for detection and/or diagnosis of SARS-CoV-2 under section 564(b) o
f the Act, 21 U.S.C. section 360bbb-3(b)(1), unless the authorization is termina
hugh or revoked sooner. [facility] are certified under the C
linical Laboratory Improvement Amendments of 1988 (CLIA), 42 U.S.C. section 263a
, to perform high complexity tests.Specimen sent to [facility] and testing performed by [facility] [address] [place] [address] [phone] Director: Perez Ricci M.D.CLIA # [id number]
[id number] [facility] SystemNEPH[facility]/URET W IMG/INJ-EXISTING
2020 15:57:00                                                             
                        [address] [address]      Patient Name: 
CRISTINA RICH                                MR #: [id number]            
     : 1965                                   Age/Sex: 54/F  Acct #: 
[id number]                              Req #: [id number]  Adm Physician: 
SHADY PEMBERTON MD                                        Ordered by: JACQUELINE ARORA MD 
                       Report #: [id number]        Location: MED/SURG            
                   Room/Bed: [facility]             
________________________________________________________
___________________________________________    Procedure: [id number] IR/NEPHRO/UR
ET W IMG/INJ-EXISTING  Exam Date:                             Exam Time:        
                                      REPORT STATUS: Signed    Right frontal ca
theter exchange/upsize.                                                         
                                                                  History: Malf
unctioning indwelling right nephrostomy catheter. Exchange and   upsize requeste
d by the referring provider.                                                    
                                                                          Modal
ity: Fluoroscopy                                                                
                   Sedation: Versed 2 mg and fentanyl 100 mcg was given intrave
nously for   conscious sedation.  Vital signs were monitored throughout the proc
edure by a   nurse, and remained stable. Physician intra-service time was 20.   
                                                                                
     Fluoroscopy Time: 3.0 min.   Reference Air Kerma (Ka, r): 27.4 mGy.        
                                     Approach:  The indwelling 10 French neph
rostomy catheter      Estimated blood loss:  < 5 cc.      Specimen: None.       
: Kirk Mcfarlane MD.      Assistant: None.      
Technique/findings:    Informed written consent was obtained. Discussion of 
risks, benefits, and   alternatives were made with the patient. The patient 
expressed understanding   and agreed to proceed.  A universal timeout was 
performed prior to starting the   procedure.  All elements maximal sterile b
arrier technique was utilized for   this procedure, including utilization of phan
rile scrub solution for skin prep,   a large sterile sheet to cover the areas of
the patient that were not prepped,   and hand hygiene, mask, head covering, and 
sterile gown for performing   radiologist and scrub technologist.      Contrast 
is injected via the indwelling right nephrostomy catheter   demonstrating intra
luminal position within the right collecting system.   Catheter noted to be retr
acted within a right calyx.      The existing catheter was cut. A 0.035 wire cou
ld not be advanced through the   catheter given occluded end hole. Subsequent, a
5 French Kumpe catheter and   0.035 angled Glidewire was used to obtain access 
into the right collecting   system along side the indwelling nephrostomy cathete
r. The Glidewire was   exchanged for a 0.035 Bentson wire. The indwelling nephro
stomy catheter was   removed. A new 12 French and a prosthetic catheter was adva
nced with the   pigtail formed within the right renal pelvis. Contrast injection
confirmed   position. The catheter was fixed to the skin with silk suture. A st
erile   dressing was applied.      The patient tolerated the procedure without i
mmediate complication.                                                          
                     Impression:                                               
Successful fluoroscopic guided right nephrostomy catheter exchange/upsize with  
moderate sedation as detailed above.                          Signed by: Dr. Ashley Rangel MD on 2020 4:03 PM        Dictated By: KIRK MCFARLANE MD  Electro
nically Signed By: KIRK MCFARLANE MD on 20  Transcribed By: LUCINDA on 
20       COPY TO:   JACQUELINE ARORA MD         IR [id number] 
15:57:00                                                                        
             [address] [address]      Patient Name: CRISTINA RICH                                MR #: [id number]                  : 
1965                                   Age/Sex: 54/F  Acct #: [id number]
                             Req #: [id number]  Adm Physician: SHADY PEMBERTON MD    
                                   Ordered by: JACQUELINE ARORA MD                   
     Report #: [id number]        Location: MED/SURG                              
 Room/Bed: [facility]             
________________________________________________________
___________________________________________    Procedure: [id number] DX/IR CONSUL
T  Exam Date:                             Exam Time:                            
                  REPORT STATUS: Signed    Right frontal catheter exchange/upsi
ze.                                                                             
                                              History: Malfunctioning indwelling
right nephrostomy catheter. Exchange and   upsize requested by the referring leilani stephen.                                                                        
                                                      Modality: Fluoroscopy     
                                                                              
Sedation: Versed 2 mg and fentanyl 100 mcg was given intravenously for   conscio
us sedation.  Vital signs were monitored throughout the procedure by a   nurse, 
and remained stable. Physician intra-service time was 20.                       
                                                                  Fluoroscopy T
alexi: 3.0 min.   Reference Air Kerma (Ka, r): 27.4 mGy.                          
                   Approach:  The indwelling 10 French nephrostomy catheter     
Estimated blood loss:  < 5 cc.      Specimen: None.        : 
Kirk Mcfarlane MD.      Assistant: None.      Technique/findings:    Informed 
written consent was obtained. Discussion of risks, benefits, and   alternatives 
were made with the patient. The patient expressed understanding   and agreed to 
proceed.  A universal timeout was performed prior to starting the   procedure.  
All elements maximal sterile barrier technique was utilized for   this 
procedure, including utilization of sterile scrub solution for skin prep,   a 
large sterile sheet to cover the areas of the patient that were not prepped,   
and hand hygiene, mask, head covering, and sterile gown for performing   
radiologist and scrub technologist.      Contrast is injected via the indwelling
right nephrostomy catheter   demonstrating intraluminal position within the 
right collecting system.   Catheter noted to be retracted within a right calyx. 
    The existing catheter was cut. A 0.035 wire could not be advanced through 
the   catheter given occluded end hole. Subsequent, a 5 French Kumpe catheter 
and   0.035 angled Glidewire was used to obtain access into the right collecting
  system along side the indwelling nephrostomy catheter. The Glidewire was   
exchanged for a 0.035 Bentson wire. The indwelling nephrostomy catheter was   
removed. A new 12 French and a prosthetic catheter was advanced with the   
pigtail formed within the right renal pelvis. Contrast injection confirmed   
position. The catheter was fixed to the skin with silk suture. A sterile   
dressing was applied.      The patient tolerated the procedure without immediate
complication.                                                                   
            Impression:                                               Successful
fluoroscopic guided right nephrostomy catheter exchange/upsize with   moderate 
sedation as detailed above.                          Signed by: Dr. Kirk Mcfarlane MD on 2020 4:03 PM        Dictated By: KIRK MCFARLANE MD  Electro
nically Signed By: KIRK MCFARLANE MD on 20 1603  Transcribed By: LUCINDA on 
20 1603       COPY TO:   JACQUELINE ARORA MD         Fluoroscopic procedure less
than one hour incumwvs1749-82-00 04:40:00* 



             Test Item    Value        Reference Range Interpretation Comments

 

             Hemoglobin A1c Percent (test code = Hemoglobin A1c Percent) 6.9    

      4.0-7.0                    





[facility] Center[facility]
ldcqspsp1423-04-40 04:40:00* 



             Test Item    Value        Reference Range Interpretation Comments

 

             Hemoglobin A1c Percent (test code = Hemoglobin A1c Percent) 6.9    

      4.0-7.0                    





[facility] Center[facility] less than one hour
dqxsotul8227-76-21 04:40:00* 



             Test Item    Value        Reference Range Interpretation Comments

 

             Hemoglobin A1c Percent (test code = Hemoglobin A1c Percent) 6.9    

      4.0-7.0                    





[facility]irubin 
[facility] (mass/volume)2020 05:10:00* 



             Test Item    Value        Reference Range Interpretation Comments

 

             Total Bilirubin (test code = 1975-2) 0.2          0.2-1.2          

          





[facility] CenterFluor[facility] procedure less than one hour
wzhikkar0568-35-70 05:10:00* 



             Test Item    Value        Reference Range Interpretation Comments

 

                                        Aspartate Amino Transf (AST/SGOT) (test 

code = Aspartate Amino Transf 

(AST/SGOT))     9               5-34                             





[facility] alanine aminotransferase 
measurement (enzymatic activity/volume)2020 05:10:00* 



             Test Item    Value        Reference Range Interpretation Comments

 

             Alanine Aminotransferase (ALT/SGPT) (test code = 1742-6) 12        

   0-55                       





[facility] protein measurement 
(mass/volume)2020 05:10:00* 



             Test Item    Value        Reference Range Interpretation Comments

 

             Total Protein (test code = 2885-2) 7.3          6.5-8.1            

        





[facility] albumin measurement 
(mass/volume)2020 05:10:00* 



             Test Item    Value        Reference Range Interpretation Comments

 

             Albumin (test code = 1751-7) 2.8          3.5-5.0                  

  





[facility] CenterPlasma globulin measurement (mass/volume)
2020 05:10:00* 



             Test Item    Value        Reference Range Interpretation Comments

 

             Globulin (test code = 21513-4) 4.5          2.3-3.5                

    





[facility] albumin/globulin mass 
wbgtl4678-42-95 05:10:00* 



             Test Item    Value        Reference Range Interpretation Comments

 

             Albumin/Globulin Ratio (test code = 1759-0) 0.6          0.8-2.0   

                 





[facility] alkaline phosphatase 
measurement (enzymatic activity/volume)2020 05:10:00* 



             Test Item    Value        Reference Range Interpretation Comments

 

             Alkaline Phosphatase (test code = 6768-6) 90                 

               





[facility] [id number] 17:22:00  
                                                                                
  [facility]                        [address] [address]      Patient Name: CRISTINA RICH     
                             MR #: [id number]                     :                                    Age/Sex: 54/F  Acct #: [id number]   
                          Req #: [id number]  Adm Physician: SHADY PEMBERTON MD       
                                Ordered by: SHADY PEMBERTON MD                      
     Report #: [id number]        Location: MED/SURG                              
 Room/Bed: [facility]               _____________________________________________
______________________________________________________    Procedure: [id number] C
T/CT ABDOMEN/PELVIS WO  Exam Date: 20                            Exam Time
: 1700                                              REPORT STATUS: Signed    CT 
Abdomen and Pelvis without contrast      INDICATION:  UTI/abdominal pain, ureter
al stents and percutaneous nephrostomies    ABD PAIN    2020    170      TE
CHNIQUE: Thin collimation axial images obtained from the diaphragm to the   leve
l of the pubic symphysis without nonionic intravenous contrast.       Dose reduc
tion techniques used: Automated exposure control, adjustment of the   mAs and/or
kVp according to patient size, standardized low-dose protocol,   and/or iterati
ve reconstruction technique.      RADIATION DOSE:        Total DLP: 634.06 mGy*c
m        Estimated effective dose: DLP x 0.015 x size factor mSv        CTDIvol 
has been reviewed. It is below the limits set by the Radiation   Protocol Commit
anastacio (RPC).      COMPARISON: CT abdomen/pelvis 2019.       ABDOMEN FINDINGS:
     Lung Bases: Calcified granuloma in the lateral right lower lobe is stable. 
 Small amount of posterior layering atelectasis. The visualized portion of the  
mediastinum is normal.      Liver: Normal in attenuation without mass.      G
allbladder: Present with a calcified gallstone measuring 2 cm near the neck.    
No ductal dilatation.      Pancreas: Normal attenuation without mass.      Splee
n: Normal size without mass.      Adrenal Glands: No evidence for mass.      Kid
neys:    Right: Percutaneous the approximate is in the posterior aspect of the  
collecting system. There is marked hydronephrosis. Trace perinephric   inflamma
tion. No calculus in the collecting system.  No cortical mass      Left:  There 
are 2 stents in the collecting system are redemonstrated that   extend into the 
urinary bladder. The renal pelvis remains dilated. No evidence   of calculus.  M
ild renal atrophy. No cortical mass      Lymph Nodes: Prominent left periaortic 
lymph nodes are stable and likely   reactive.      Aorta:  Normal in diameter wi
th scattered calcifications.      PELVIS FINDINGS:       Bowel:    Stomach:  Nor
mal.   Small Bowel: Normal in caliber with normal wall thickness.   Large Bowel:
Normal in caliber with normal wall thickness.   Appendix: Normal.      Bladder: 
Collapsed around a Gomez catheter. Coarse calcifications at the right   UVJ are 
redemonstrated. There is a small amount of encrustation surrounding a   loop of 
the ureteral stent.      Ureters: The right ureter is distended to the iliac ar
osiris bifurcation. There   are 1-2 linear hyperdensities along the course of the 
mid/distal ureter   suggestive of calculi. The ureter distal to this is collapse
d.      The proximal left ureter is distended just past the UPJ. There are no   
calcifications along the course of the stent.      The uterus is atrophic. No ad
nexal mass.      Peritoneum/retroperitoneum: No free fluid or fluid collection. 
    Bones: Mild degenerative changes of the lower lumbar spine. Mild to moderate
  degenerative changes of the lower thoracic spine. Diffuse demineralization of 
 the sacrum is redemonstrated. No associated fracture.      IMPRESSION:      1. 
New right hydroureteronephrosis, possibly due to at least two calculi in   the 
mid/distal right ureter. Percutaneous nephrostomy is in appropriate   position. 
    2.  Persistent left hydronephrosis despite the presence of 2 stents in the 
left   ureter. One loop in the pelvis appears to have an encrustation.      3. 
Other findings as described above are stable.      Signed by: Dr. Austin vargas MD on 2020 5:55 PM        Dictated By: AUSTIN RIVERO MD  Electro
nically Signed By: AUSTIN RIVERO MD on 20  Transcribed By: JAVED QUINTERO on 20       COPY TO:   SHADY PEMBERTON MD         CT BRAIN [id number] 
15:26:00                                                                        
             [address] [address]      Patient Name: CRISTINA RICH                                   MR #: [id number]                     
: 1965                                   Age/Sex: 54/F  Acct #: 
[id number]                              Req #: [id number]  Adm Physician: 
SHADY PEMBERTON MD                                        Ordered by: JENNIFER TERRELL MD                            Report #: [id number]        Location: MED/SURG     
                          Room/Bed: [facility]               
__________________________________________
_________________________________________________________    Procedure: [id number]
5 CT/CT BRAIN WO  Exam Date: 20                            Exam Time: 1435
                                             REPORT STATUS: Signed    Examinati
on: CT BRAIN WO CONTRAST      History: Acute headache.   Comparison studies:None
     Technique:   Axial images were obtained from the skull base to the vertex. 
 Coronal and sagittal images reconstructed from the axial data.   Dose modulat
ion, iterative reconstruction, and/or weight based adjustment of   the mA/kV was
utilized to reduce the radiation dose to as low as reasonably   achievable.    
Intravenous contrast: None      Findings:      Scalp: No abnormalities.   Bones:
No fractures, blastic or lytic lesions.      Brain sulci: Appropriate for age.  
Ventricles: Normal in size and configuration. No hydrocephalus.      Extra-axial
space:   No abnormalities.      Parenchyma:    No abnormal densities.    No m
asses, hemorrhage, or acute or chronic cortical based vascular insults..      Se
llar/suprasellar region: No abnormalities.   Craniocervical junction: Patent for
amen magnum. No Chiari one malformation.      Incidental findings:    None.     
Impression:       No intracranial abnormalities.      Signed by: Dr. Mame scott M.D. on 2020 3:31 PM        Dictated By: MAME ALLEN  Electronically Signed By: MAME BADILLO MD on 20 1531  Trans
cribed By: LUCINDA on 20 1531       COPY TO:   JENNIFER TERRELL MD         
CHEST SINGLE (PORTABLE)2020 15:17:00                                      
                                               [address] [place] [address]
     Patient Name: CRISTINA RICH                                   MR #: 
[id number]                     : 1965                                  
Age/Sex: 54/F  Acct #: [id number]                              Req #: [id number]
[id number]  Adm Physician: SHADY PEMBERTON MD                                        
Ordered by: JENNIFER TERRELL MD                            Report #: [id number]    
   Location: MED/SURG                                Room/Bed: [facility]            
  __________________________________________
_________________________________________________________    Procedure: [id number]
6 DX/CHEST SINGLE (PORTABLE)  Exam Date: 20                            Bernardo
yumiko Time: 1430                                              REPORT STATUS: Signed 
  EXAMINATION:  CHEST SINGLE (PORTABLE)         COMPARISON: Chest x-ray 20
18      INDICATION: Fever, pain, weakness     LOW GRADE FEVER, SOMNOLENCE, LIKEL
Y UTI    2020        DISCUSSION:   Frontal view of the chest obtaine
d at 1439 hours.      HEART AND MEDIASTINUM:  The heart is normal in size. The a
ortic arch is   tortuous but stable        LINES:  None.      LUNGS:  The lungs 
are well inflated and clear. Calcified granuloma in the right   lung base is sta
ble. No pneumonia or pulmonary edema.      PLEURA:  No pleural effusion or pneum
othorax. Stable mild eventration of the   right diaphragm.      BONES AND SOFT T
ISSUES: Degenerative changes of the right AC joint area No   focal osseous lesio
n. The soft tissues are normal.         IMPRESSION:    Stable chest. No acute ca
rdiopulmonary disease.      Signed by: Dr. Austin Rivero MD on 2020 3:
19 PM        Dictated By: AUSTIN RIVERO MD  Electronically Signed By: DEMETRIA RIVERO MD on 20  Transcribed By: LUCINDA on 20       
COPY TO:   JENNIFER TERRELL MD         Blood vqytqtv5607-57-63 12:11:00* 



             Test Item    Value        Reference Range Interpretation Comments

 

             Blood Culture (test code = 61953222) NO GROWTH AFTER 5 DAYS, FINAL 

REPORT                            





[facility] CenterProthrombin time (PT) in platelet poor 
plasma by coagulation zftfm3849-48-84 11:56:00* 



             Test Item    Value        Reference Range Interpretation Comments

 

             Prothrombin Time (test code = 5902-2) 13.7         11.9-14.5       

           





[facility] CenterINR in Platelet poor plasma by 
Coagulation nvpgq8464-94-14 11:56:00* 



             Test Item    Value        Reference Range Interpretation Comments

 

             Prothromb Time International Ratio (test code = 6301-6) 0.99       

                             





Oral Anticoagulant Therapy INR Values:1. Low Intensity Therapy        1.5 - 2.02
. Moderate Intensity Therapy   2.0 - 3.03. High Intensity Therapy(1)    2.5 - 3.
54. High Intensity Therapy(2)    3.0 - 4.05. Panic Value INR              > 5.0
[facility] CenterActivated partial thromboplastin time 
(aPTT) in platelet poor plasma by coagulation xqzhz9669-83-26 11:56:00* 



             Test Item    Value        Reference Range Interpretation Comments

 

             Activated Partial Thromboplast Time (test code = 46811-8) 33.1     

    23.8-35.5                  





[facility] creatine kinase 
measurement (enzymatic activity/volume)2020 11:56:00* 



             Test Item    Value        Reference Range Interpretation Comments

 

             Creatine Kinase (test code = 2157-6) 28                      

          





[facility]er[facility] or plasma creatine kinase MB 
measurement (mass/volume)2020 11:56:00* 



             Test Item    Value        Reference Range Interpretation Comments

 

             Creatine Kinase MB (test code = 10657-5) 0.40         0-5.0        

              





[facility] CenterTroponin I measurement by highly 
sensitive enzyme kayemulyyoo2593-75-61 11:56:00* 



             Test Item    Value        Reference Range Interpretation Comments

 

             Troponin I (test code = 17860-2) < 0.001      0-0.300              

      





[facility] or plasma creatine kinase 
measurement (enzymatic activity/volume)2020 11:56:00* 



             Test Item    Value        Reference Range Interpretation Comments

 

             Creatine Kinase (test code = 2157-6) 28                      

          





[facility] or plasma creatine kinase MB 
measurement (mass/volume)2020 11:56:00* 



             Test Item    Value        Reference Range Interpretation Comments

 

             Creatine Kinase MB (test code = 75728-1) 0.40         0-5.0        

              





[facility] CenterTroponin I measurement by highly 
sensitive enzyme orowqilqiov1516-80-85 11:56:00* 



             Test Item    Value        Reference Range Interpretation Comments

 

             Troponin I (test code = 90014-8) < 0.001      0-0.300              

      





[facility]er[facility] or plasma creatine kinase 
measurement (enzymatic activity/volume)2020 11:56:00* 



             Test Item    Value        Reference Range Interpretation Comments

 

             Creatine Kinase (test code = 2157-6) 28                      

          





[facility]er[facility] or plasma creatine kinase MB 
measurement (mass/volume)2020 11:56:00* 



             Test Item    Value        Reference Range Interpretation Comments

 

             Creatine Kinase MB (test code = 58623-2) 0.40         0-5.0        

              





[facility] CenterTroponin I measurement by highly 
sensitive enzyme yuxwlcisuxj1842-78-21 11:56:00* 



             Test Item    Value        Reference Range Interpretation Comments

 

             Troponin I (test code = 78771-2) < 0.001      0-0.300              

      





[facility] CenterUrine color aarwhyuejpipy0696-77-81 
11:55:00* 



             Test Item    Value        Reference Range Interpretation Comments

 

             Urine Color (test code = 5778-6) YELLOW       YELLOW               

      





[facility] CenterUrine igpeymu1600-61-13 11:55:00* 



             Test Item    Value        Reference Range Interpretation Comments

 

             Urine Clarity (test code = 50770-7) HAZY         CLEAR             

         





[facility] Houstonpecific gravity of Urine by Test strip
2020 11:55:00* 



             Test Item    Value        Reference Range Interpretation Comments

 

             Urine Specific Gravity (test code = 5811-5) 1.020        1.010-1.02

5                





[facility] CenterUrine pH measurement by automated test 
hzxbu6897-70-08 11:55:00* 



             Test Item    Value        Reference Range Interpretation Comments

 

             Urine pH (test code = 96494-7) 8            5-7                    

    





[facility] CenterUrine leukocyte esterase detection by 
qrsargwa1874-22-60 11:55:00* 



             Test Item    Value        Reference Range Interpretation Comments

 

             Urine Leukocyte Esterase (test code = 5799-2) LARGE        NEGATIVE

                   





[facility] CenterUrine nitrite xbkeelmjs0511-53-39 
11:55:00* 



             Test Item    Value        Reference Range Interpretation Comments

 

             Urine Nitrite (test code = 49199-6) NEGATIVE     NEGATIVE          

         





[facility] CenterUrine protein measurement by test strip 
(mass/volume)2020 11:55:00* 



             Test Item    Value        Reference Range Interpretation Comments

 

             Urine Protein (test code = 5804-0) >=300        NEGATIVE           

        





[facility] CenterUrine glucose iqxiibqnt4258-50-96 
11:55:00* 



             Test Item    Value        Reference Range Interpretation Comments

 

             Urine Glucose (UA) (test code = 2349-9) NEGATIVE     NEGATIVE      

             





[facility] CenterUrine ketones detection by automated test
kjdpy0721-95-39 11:55:00* 



             Test Item    Value        Reference Range Interpretation Comments

 

             Urine Ketones (test code = 76426-3) NEGATIVE     NEGATIVE          

         





[facility] CenterUrine urobilinogen measurement by test 
strip (mass/volume)2020 11:55:00* 



             Test Item    Value        Reference Range Interpretation Comments

 

             Urine Urobilinogen (test code = 58105-6) 0.2          0.2-1        

              





[facility] CenterUrine total bilirubin measurement 
(mass/volume)2020 11:55:00* 



             Test Item    Value        Reference Range Interpretation Comments

 

             Urine Bilirubin (test code = 1978-6) NEGATIVE     NEGATIVE         

          





[facility] CenterUrine erythrocytes ktnqkjiyi8037-34-72 
11:55:00* 



             Test Item    Value        Reference Range Interpretation Comments

 

             Urine Blood (test code = 20579-2) 3+           NEGATIVE            

       





[facility] CenterAutomated urine sediment leukocyte count 
by microscopy (number/high power field)2020 11:55:00* 



             Test Item    Value        Reference Range Interpretation Comments

 

             Urine WBC (test code = 5821-4) >50          0-5                    

    





[facility] CenterErythrocytes detection in urine sediment 
by light wihpvzlebq4113-54-45 11:55:00* 



             Test Item    Value        Reference Range Interpretation Comments

 

             Urine RBC (test code = 37988-1) >50          0-5                   

     





[facility] CenterBacteria detection in urine sediment by 
light wcdqqpsjgv3482-04-88 11:55:00* 



             Test Item    Value        Reference Range Interpretation Comments

 

             Urine Bacteria (test code = 72605-4) MODERATE     NONE             

          





[facility] CenterEpithelial cells detection in urine 
sediment by light niwiinrsmy6236-49-77 11:55:00* 



             Test Item    Value        Reference Range Interpretation Comments

 

             Urine Epithelial Cells (test code = 65261-5) FEW          NONE     

                  





[facility] CenterCoarse granular casts detection in urine 
sediment by light pqmcfezili8292-40-88 11:55:00* 



             Test Item    Value        Reference Range Interpretation Comments

 

             Urine Coarse Granular Casts (test code = 53669-4) 1-5          >0  

                       





[facility] CenterMucus detection in urine sediment by 
light zdjjfhzsvn7625-66-95 11:55:00* 



             Test Item    Value        Reference Range Interpretation Comments

 

             Urine Mucus (test code = 8247-9) MODERATE     RARE                 

      





[facility] CenterCoarse granular casts detection in urine 
sediment by light cnncoyolkt4102-57-08 11:55:00* 



             Test Item    Value        Reference Range Interpretation Comments

 

             Urine Coarse Granular Casts (test code = 05356-5) 1-5          >0  

                       





[facility] CenterMucus detection in urine sediment by 
light dhguwhpdbe6950-50-93 11:55:00* 



             Test Item    Value        Reference Range Interpretation Comments

 

             Urine Mucus (test code = 8247-9) MODERATE     RARE                 

      





[facility] CenterCoarse granular casts detection in urine 
sediment by light hdqrmmjply1554-13-16 11:55:00* 



             Test Item    Value        Reference Range Interpretation Comments

 

             Urine Coarse Granular Casts (test code = 80819-9) 1-5          >0  

                       





[facility] CenterMucus detection in urine sediment by 
light gktnhappom0664-33-64 11:55:00* 



             Test Item    Value        Reference Range Interpretation Comments

 

             Urine Mucus (test code = 8247-9) MODERATE     RARE                 

      





[facility] Houstonodium Fgsvs4159-70-73 20:54:00* 



             Test Item    Value        Reference Range Interpretation Comments

 

             Sodium Level (test code = 2951-2) 141          136-145             

       





[facility] CenterPotassium Vyqed2290-61-49 20:54:00* 



             Test Item    Value        Reference Range Interpretation Comments

 

             Potassium Level (test code = 2823-3) 3.9          3.5-5.1          

          





[facility] CenterChloride Wsrkp7284-04-27 20:54:00* 



             Test Item    Value        Reference Range Interpretation Comments

 

             Chloride Level (test code = 2075-0) 105                      

         





[facility] CenterCarbon Dioxide Sazrq2146-04-68 20:54:00* 



             Test Item    Value        Reference Range Interpretation Comments

 

             Carbon Dioxide Level (test code = 2028-9) 25           22-29       

               





[facility] CenterAnion Uji3665-61-02 20:54:00* 



             Test Item    Value        Reference Range Interpretation Comments

 

             Anion Gap (test code = 33037-3) 14.9         8-16                  

     





[facility] CenterBlood Urea Qvouupuo9105-71-36 20:54:00* 



             Test Item    Value        Reference Range Interpretation Comments

 

             Blood Urea Nitrogen (test code = 3094-0) 17           7-26         

              





[facility] CenterCreatinine2020-01-16 20:54:00* 



             Test Item    Value        Reference Range Interpretation Comments

 

             Creatinine (test code = 2160-0) 0.99         0.57-1.11             

     





[facility] CenterBUN/Creatinine Ndtyc2170-34-27 20:54:00* 



             Test Item    Value        Reference Range Interpretation Comments

 

             BUN/Creatinine Ratio (test code = 3097-3) 17           6-25        

               





[facility] CenterEstimat Glomerular Filtration Rate
2020 20:54:00* 



             Test Item    Value        Reference Range Interpretation Comments

 

             Estimat Glomerular Filtration Rate (test code = 758168263) 58      

     >60          L             





Ranges were taken from the National Kidney Disease Education Program and the Michelle
[facility] literature.Reference ranges:60 or greater: Mdmqku85-43 (
for 3 consecutive months): Chronic kidney disease 15 or less: Kidney failureVal [facility]de [facility] Medical CenterGlucose Hwcjp7839-71-44 20:54:00* 



             Test Item    Value        Reference Range Interpretation Comments

 

             Glucose Level (test code = EVH5179) 117                      

         





[facility] CenterCalcium Aghih1965-93-11 20:54:00* 



             Test Item    Value        Reference Range Interpretation Comments

 

             Calcium Level (test code = 39311-5) 9.6          8.4-10.2          

         





[facility] CenterTotal Rlmgqfurj1826-00-88 20:54:00* 



             Test Item    Value        Reference Range Interpretation Comments

 

             Total Bilirubin (test code = 1975-2) 0.2          0.2-1.2          

          





[facility] CenterAspartate Amino Transf (AST/SGOT)
2020 20:54:00* 



             Test Item    Value        Reference Range Interpretation Comments

 

                                        Aspartate Amino Transf (AST/SGOT) (test 

code = Aspartate Amino Transf 

(AST/SGOT))     30              5-34                             





[facility] CenterAlanine Aminotransferase (ALT/SGPT)
2020 20:54:00* 



             Test Item    Value        Reference Range Interpretation Comments

 

             Alanine Aminotransferase (ALT/SGPT) (test code = 1742-6) 35        

   0-55                       





[facility] CenterTotal Mvagwmh7350-58-14 20:54:00* 



             Test Item    Value        Reference Range Interpretation Comments

 

             Total Protein (test code = 2885-2) 7.8          6.5-8.1            

        





[facility] CenterAlbumin2020-01-16 20:54:00* 



             Test Item    Value        Reference Range Interpretation Comments

 

             Albumin (test code = 1751-7) 3.8          3.5-5.0                  

  





[facility] CenterGlobulin2020-01-16 20:54:00* 



             Test Item    Value        Reference Range Interpretation Comments

 

             Globulin (test code = 68107-6) 4.0          2.3-3.5      H         

    





[facility] CenterAlbumin/Globulin Lemwp2478-81-02 20:54:00
  * 



             Test Item    Value        Reference Range Interpretation Comments

 

             Albumin/Globulin Ratio (test code = 1759-0) 1.0          0.8-2.0   

                 





[facility] CenterAlkaline Jexmjymwttd8364-17-57 20:54:00* 



             Test Item    Value        Reference Range Interpretation Comments

 

             Alkaline Phosphatase (test code = 6768-6) 134                

               





[facility] 19:48:00   
                                                                                
 [facility]                        [address] [address]      Patient Name: CRISTINA RICH     
                             MR #: [id number]                     :                                    Age/Sex: 54/F  Acct #: [id number]   
                          Req #: [id number]  Adm Physician:                     
                                Ordered by: TINO MALHOTRA NP                 
          Report #: [id number]        Location: ER                               
      Room/Bed:                     ________________________________________
___________________________________________________________    Procedure: [id number]
[id number] DX/ABDOMEN-1VIEW (KUB)  Exam Date: 20                            Exam 
Time:                                               REPORT STATUS: Signed   
   Exam: Abdominal film       Clinical History: Pain      Comparison: 
9. 2019      DISCUSSION:       2 left-sided ureteral stents stable i
n position.      Right percutaneous nephrostomy tube stable in position.      No
nobstructive bowel gas pattern.      IMPRESSION:      Stable appearance of the l
eft ureteral stents and right percutaneous   nephrostomy tube            Signed 
by: Dr. Deb Del Castillo M.D. on 2020 7:49 PM        Dictated By: DEB DEL CASTILLO MD, MD  Electronically Signed By: DEB DEL CASTILLO MD, MD on 20  Transcri
bed By: LUCINDA on 20       COPY TO:   TINO MALHOTRA NP         
Platelet Qsqjijjz0434-52-70 18:29:00* 



             Test Item    Value        Reference Range Interpretation Comments

 

             Platelet Estimate (test code = 58353-4) SLIGHTLY DECREASED         

                   





[facility] CenterPlatelet Morphology Zxoekwu4697-95-54 
18:29:00* 



             Test Item    Value        Reference Range Interpretation Comments

 

             Platelet Morphology Comment (test code = 92586-0) NORMAL           

                       





[facility] CenterHypochromasia2020-01-16 18:29:00* 



             Test Item    Value        Reference Range Interpretation Comments

 

             Hypochromasia (test code = 728-6) MODERATE                         

       





[facility] CenterPoikilocytosis2020-01-16 18:29:00* 



             Test Item    Value        Reference Range Interpretation Comments

 

             Poikilocytosis (test code = 779-9) MODERATE                        

        





[facility] CenterAnisocytosis2020-01-16 18:29:00* 



             Test Item    Value        Reference Range Interpretation Comments

 

             Anisocytosis (test code = 702-1) SLIGHT                            

      





[facility] CenterRed Cell Morphology Tiphcsy9662-70-99 
18:29:00* 



             Test Item    Value        Reference Range Interpretation Comments

 

             Red Cell Morphology Comment (test code = 6742-1) NORMAL            

                      





[facility] CenterPlatelet Embis1986-15-27 18:28:00* 



             Test Item    Value        Reference Range Interpretation Comments

 

             Platelet Count (test code = 777-3) 114          140-360      L     

        





[facility] CenterUrine BIA3622-22-29 18:08:00* 



             Test Item    Value        Reference Range Interpretation Comments

 

             Urine WBC (test code = 5821-4) 6-10         0-5          H         

    





[facility] CenterUrine SKA1180-62-19 18:08:00* 



             Test Item    Value        Reference Range Interpretation Comments

 

             Urine RBC (test code = 72455-5) >50          0-5          H        

     





[facility] CenterUrine Iyrylrtw9899-14-56 18:08:00* 



             Test Item    Value        Reference Range Interpretation Comments

 

             Urine Bacteria (test code = 25882-5) MODERATE     NONE         H   

          





[facility] CenterUrine Epithelial Ajcgj7995-00-62 18:08:00
  * 



             Test Item    Value        Reference Range Interpretation Comments

 

             Urine Epithelial Cells (test code = 83826-1) FEW          NONE     

                  





[facility] CenterUrine Igzgv4057-84-03 18:08:00* 



             Test Item    Value        Reference Range Interpretation Comments

 

             Urine Mucus (test code = 8247-9) MODERATE     RARE         H       

      





[facility] CenterUrine Ztyuo7967-54-54 17:51:00* 



             Test Item    Value        Reference Range Interpretation Comments

 

             Urine Color (test code = 5778-6) YELLOW       YELLOW               

      





[facility] CenterUrine Bntajxr2479-86-40 17:51:00* 



             Test Item    Value        Reference Range Interpretation Comments

 

             Urine Clarity (test code = 50466-2) CLEAR        CLEAR             

         





[facility] Specific Sjeactf2846-77-04 17:51:00
  * 



             Test Item    Value        Reference Range Interpretation Comments

 

             Urine Specific Gravity (test code = 5811-5) >=1.030      1.010-1.02

5                





[facility] CenterUrine wH6029-66-99 17:51:00* 



             Test Item    Value        Reference Range Interpretation Comments

 

             Urine pH (test code = 22572-3) 6            5-7                    

    





[facility] Leukocyte Lfeoawqw5087-58-20 
17:51:00* 



             Test Item    Value        Reference Range Interpretation Comments

 

             Urine Leukocyte Esterase (test code = 5799-2) SMALL        NEGATIVE

                   





[facility] Vokrdku0408-93-98 17:51:00* 



             Test Item    Value        Reference Range Interpretation Comments

 

             Urine Nitrite (test code = 39022-7) NEGATIVE     NEGATIVE          

         





[facility] Ysslsjr0840-86-40 17:51:00* 



             Test Item    Value        Reference Range Interpretation Comments

 

             Urine Protein (test code = 5804-0) 3+           NEGATIVE     H     

        





[facility] Glucose (UA)2020 17:51:00* 



             Test Item    Value        Reference Range Interpretation Comments

 

             Urine Glucose (UA) (test code = 2349-9) NEGATIVE     NEGATIVE      

             





[facility] Amsiibu9185-49-28 17:51:00* 



             Test Item    Value        Reference Range Interpretation Comments

 

             Urine Ketones (test code = 93746-2) NEGATIVE     NEGATIVE          

         





[facility] Mgpfimjlbshj1900-37-88 17:51:00* 



             Test Item    Value        Reference Range Interpretation Comments

 

             Urine Urobilinogen (test code = 80116-8) 0.2          0.2-1        

              





[facility] CenterUrine Icqajspqe3908-45-58 17:51:00* 



             Test Item    Value        Reference Range Interpretation Comments

 

             Urine Bilirubin (test code = 1978-6) NEGATIVE     NEGATIVE         

          





[facility] CenterUrine Xhyql6470-22-52 17:51:00* 



             Test Item    Value        Reference Range Interpretation Comments

 

             Urine Blood (test code = 94709-8) 3+           NEGATIVE            

       





[facility] CenterWhite Blood Bhmjf1775-08-22 17:44:00* 



             Test Item    Value        Reference Range Interpretation Comments

 

             White Blood Count (test code = 6690-2) 7.26         4.8-10.8       

            





[facility] CenterRed Blood Yqwke4025-54-63 17:44:00* 



             Test Item    Value        Reference Range Interpretation Comments

 

             Red Blood Count (test code = 789-8) 4.09         3.6-5.1           

         





[facility] CenterHemoglobin2020-01-16 17:44:00* 



             Test Item    Value        Reference Range Interpretation Comments

 

             Hemoglobin (test code = 56436-1) 10.6         12.0-16.0    L       

      





[facility] CenterHematocrit2020-01-16 17:44:00* 



             Test Item    Value        Reference Range Interpretation Comments

 

             Hematocrit (test code = 4544-3) 33.1         34.2-44.1    L        

     





[facility] CenterMean Corpuscular Gkhoiu6611-42-62 
17:44:00* 



             Test Item    Value        Reference Range Interpretation Comments

 

             Mean Corpuscular Volume (test code = 787-2) 80.9         81-99     

   L             





[facility] CenterMean Corpuscular Dvnrdlunvg9196-20-12 
17:44:00* 



             Test Item    Value        Reference Range Interpretation Comments

 

             Mean Corpuscular Hemoglobin (test code = 785-6) 25.9         28-32 

       L             





[facility] CenterMean Corpuscular Hemoglobin Concent
2020 17:44:00* 



             Test Item    Value        Reference Range Interpretation Comments

 

             Mean Corpuscular Hemoglobin Concent (test code = 786-4) 32.0       

  31-35                      





[facility] CenterRed Cell Distribution Kasgl0011-91-35 
17:44:00* 



             Test Item    Value        Reference Range Interpretation Comments

 

             Red Cell Distribution Width (test code = 38951-0) 15.4         11.7

-14.4    H             





[facility] CenterNeutrophils (%) (Auto)2020 17:44:00
  * 



             Test Item    Value        Reference Range Interpretation Comments

 

             Neutrophils (%) (Auto) (test code = 02486-4) 51.4         38.7-80.0

                  





[facility] CenterLymphocytes (%) (Auto)2020 17:44:00
  * 



             Test Item    Value        Reference Range Interpretation Comments

 

             Lymphocytes (%) (Auto) (test code = 736-9) 38.0         18.0-39.1  

                





[facility] CenterMonocytes (%) (Auto)2020 17:44:00* 



             Test Item    Value        Reference Range Interpretation Comments

 

             Monocytes (%) (Auto) (test code = 5905-5) 8.0          4.4-11.3    

               





[facility] CenterEosinophils (%) (Auto)2020 17:44:00
  * 



             Test Item    Value        Reference Range Interpretation Comments

 

             Eosinophils (%) (Auto) (test code = 713-8) 1.7          0.0-6.0    

                





[facility] CenterBasophils (%) (Auto)2020 17:44:00* 



             Test Item    Value        Reference Range Interpretation Comments

 

             Basophils (%) (Auto) (test code = 706-2) 0.6          0.0-1.0      

              





[facility] CenterIM GRANULOCYTES %2020 17:44:00* 



             Test Item    Value        Reference Range Interpretation Comments

 

             IM GRANULOCYTES % (test code = IM GRANULOCYTES %) 0.3          0.0-

1.0                    





[facility] CenterNeutrophils # (Auto)2020 17:44:00* 



             Test Item    Value        Reference Range Interpretation Comments

 

             Neutrophils # (Auto) (test code = 751-8) 3.7          2.1-6.9      

              





[facility] CenterLymphocytes # (Auto)2020 17:44:00* 



             Test Item    Value        Reference Range Interpretation Comments

 

             Lymphocytes # (Auto) (test code = 43283-1) 2.8          1.0-3.2    

                





[facility] CenterMonocytes # (Auto)2020 17:44:00* 



             Test Item    Value        Reference Range Interpretation Comments

 

             Monocytes # (Auto) (test code = 742-7) 0.6          0.2-0.8        

            





[facility] CenterEosinophils # (Auto)2020 17:44:00* 



             Test Item    Value        Reference Range Interpretation Comments

 

             Eosinophils # (Auto) (test code = 711-2) 0.1          0.0-0.4      

              





[facility] CenterBasophils # (Auto)2020 17:44:00* 



             Test Item    Value        Reference Range Interpretation Comments

 

             Basophils # (Auto) (test code = 704-7) 0.0          0.0-0.1        

            





[facility] CenterAbsolute Immature Granulocyte (auto
2020 17:44:00* 



             Test Item    Value        Reference Range Interpretation Comments

 

                                        Absolute Immature Granulocyte (auto (lloyd

t code = Absolute Immature Granulocyte 

(auto)          0.02            0-0.1                            





[facility] CenterBlood platelets count by estimate 
(number/volume)2020 16:28:00* 



             Test Item    Value        Reference Range Interpretation Comments

 

             Platelet Estimate (test code = 89170-6) SLIGHTLY DECREASED         

                   





[facility] CenterPlatelet oresibrilt6856-72-11 16:28:00* 



             Test Item    Value        Reference Range Interpretation Comments

 

             Platelet Morphology Comment (test code = 26077-9) NORMAL           

                       





[facility]Bl[facility]ochrom[facility] by light 
uvbnwpvwrl0755-01-03 16:28:00* 



             Test Item    Value        Reference Range Interpretation Comments

 

             Hypochromasia (test code = 728-6) MODERATE                         

       





[facility] CenterBlM [facility] poikilocytosis detection by light 
cednsxtrcl8378-53-87 16:28:00* 



             Test Item    Value        Reference Range Interpretation Comments

 

             Poikilocytosis (test code = 779-9) MODERATE                        

        





[facility] CenterBlood anisocytosis detection by light 
wmbshjzuxm1043-48-06 16:28:00* 



             Test Item    Value        Reference Range Interpretation Comments

 

             Anisocytosis (test code = 702-1) SLIGHT                            

      





[facility] CenterRBC ddngutnecm4140-12-09 16:28:00* 



             Test Item    Value        Reference Range Interpretation Comments

 

             Red Cell Morphology Comment (test code = 6742-1) NORMAL            

                      





[facility] CenterBl[facility] platelets count by estimate 
(number/volume)2020 16:28:00* 



             Test Item    Value        Reference Range Interpretation Comments

 

             Platelet Estimate (test code = 90104-4) SLIGHTLY DECREASED         

                   





[facility] CenterPlatelet woordemnhz3459-63-63 16:28:00* 



             Test Item    Value        Reference Range Interpretation Comments

 

             Platelet Morphology Comment (test code = 86238-1) NORMAL           

                       





[facility] hypochromia detection by light 
hhptsvmbxh5450-93-12 16:28:00* 



             Test Item    Value        Reference Range Interpretation Comments

 

             Hypochromasia (test code = 728-6) MODERATE                         

       





[facility] poikilocytosis detection by light 
brjdlaxezt7258-60-25 16:28:00* 



             Test Item    Value        Reference Range Interpretation Comments

 

             Poikilocytosis (test code = 779-9) MODERATE                        

        





[facility] anisocytosis detection by light 
zkvobngolh2279-23-15 16:28:00* 



             Test Item    Value        Reference Range Interpretation Comments

 

             Anisocytosis (test code = 702-1) SLIGHT                            

      





[facility] xccttqzzne9759-86-61 16:28:00* 



             Test Item    Value        Reference Range Interpretation Comments

 

             Red Cell Morphology Comment (test code = 6742-1) NORMAL            

                      





[facility] platelets count by estimate 
(number/volume)2020 16:28:00* 



             Test Item    Value        Reference Range Interpretation Comments

 

             Platelet Estimate (test code = 40426-3) SLIGHTLY DECREASED         

                   





[facility] foktowpczg5950-93-04 16:28:00* 



             Test Item    Value        Reference Range Interpretation Comments

 

             Platelet Morphology Comment (test code = 67846-3) NORMAL           

                       





[facility] hypochromia detection by light 
nmliqzicwu4435-49-78 16:28:00* 



             Test Item    Value        Reference Range Interpretation Comments

 

             Hypochromasia (test code = 728-6) MODERATE                         

       





[facility] poikilocytosis detection by light 
gbuqkhwalv5309-33-91 16:28:00* 



             Test Item    Value        Reference Range Interpretation Comments

 

             Poikilocytosis (test code = 779-9) MODERATE                        

        





[facility] anisocytosis detection by light 
zdykwoclbv0847-78-21 16:28:00* 



             Test Item    Value        Reference Range Interpretation Comments

 

             Anisocytosis (test code = 702-1) SLIGHT                            

      





[facility] xybcxzuqml7240-98-48 16:28:00* 



             Test Item    Value        Reference Range Interpretation Comments

 

             Red Cell Morphology Comment (test code = 6742-1) NORMAL            

                      





[facility] Hosxslm7733-90-12 11:30:00* 



             Test Item    Value        Reference Range Interpretation Comments

 

             Bedside Glucose (test code = 83212-1) 131                 H  

           





Meter ID: [id number] Center Hospital Culture
2020 10:27:00* 



             Test Item    Value        Reference Range Interpretation Comments

 

             Blood Culture (test code = 88360080) NO GROWTH AFTER 5 DAYS, FINAL 

REPORT                            





[facility] Hoteddv8620-13-00 06:39:00* 



             Test Item    Value        Reference Range Interpretation Comments

 

             Bedside Glucose (test code = 13942-1) 132                 H  

           





Meter ID: [id number] [facility] Medical CenterUrine Culture
2020 14:29:00* 



             Test Item    Value        Reference Range Interpretation Comments

 

             Urine Culture (test code = 630-4) No Result Data Provided          

                  





[facility] Djoggtj5770-92-43 14:29:00* 



             Test Item    Value        Reference Range Interpretation Comments

 

             Urine Culture (test code = 630-4) No Result Data Provided          

                  





[facility] Rawqmnt4588-82-80 10:27:00* 



             Test Item    Value        Reference Range Interpretation Comments

 

                          Blood Culture (test code = 48110182)                  

           NO GROWTH AFTER

72 HOURS                                                     





[facility] Nkpyjqt5818-06-43 10:06:00* 



             Test Item    Value        Reference Range Interpretation Comments

 

             Urine Culture (test code = 630-4) No Result Data Provided          

                  





[facility] CenterUrine Bbprrvd5341-75-28 10:06:00* 



             Test Item    Value        Reference Range Interpretation Comments

 

             Urine Culture (test code = 630-4) No Result Data Provided          

                  





[facility] CenterIR CBJMDYC7525-28-29 15:02:00            
                                                                         [place] [address]      Patient Name: CRISTINA RICH     
                             MR #: [id number]                     :                                    Age/Sex: 54/F  Acct #: [id number]   
                          Req #: [id number]  [facility] Physician: SHADY PEMBERTON MD       
                                Ordered by: JACQUELINE ARORA MD                      
     Report #: [id number]        Location: MED/[facility]                             
 Room/Bed: [facility]               _____________________________________________
______________________________________________________    Procedure:  D
X/IR CONSULT  Exam Date:                             Exam Time:                 
                             REPORT STATUS: Signed    PROCEDURE: Genitourinary 
catheter exchange      Procedural Personnel   Attending physician(s): Radha Florez MD   Fellow physician(s): None   Resident physician(s): None   Advanced practi
ce provider(s): None      Pre-procedure diagnosis: Right ureteral obstruction   
Post-procedure diagnosis: Same   Indication: Routine scheduled exchange   Additi
onal clinical history: None      Complications: No immediate complications.     
IMPRESSION:      Successful routine exchange of right nephrostomy tube for new 
10Fr nephrostomy   tube.      Plan:    Routine exchange in 8 weeks.   __________
_____________________________________________________      PROCEDURE SUMMARY   -
Target organ: Unilateral native kidney   - Antegrade nephrostogram(s) via the e
xisting access   - Nephrostomy tube exchange   - Additional procedure(s): None  
   PROCEDURE DETAILS:      Pre-procedure   Consent: Informed consent for the pr
ocedure including risks, benefits and   alternatives was obtained and time-out w
as performed prior to the procedure.   Preparation: The site was prepared and dr
aped using maximal sterile barrier   technique including cutaneous antisepsis.  
   Anesthesia/sedation   Level of anesthesia/sedation: Moderate sedation (consc
ious sedation) 1mg   Versed, 50mcg Fentanyl   Anesthesia/sedation administered b
y: Independent trained observer under   attending supervision with continuous mo
nitoring of the patient?s level of   consciousness and physiologic status   Tota
l intra-service sedation time (minutes): 30      Right genitourinary catheter ex
change   Local anesthesia was administered. Initial nephrostogram was performed.
A wire   was placed through the existing tube and it was removed. The new tube 
was   advanced over the wire and position was confirmed with contrast injection.
  Pre-existing genitourinary catheter: 10Fr Uresil   Genitourinary catheter(s) 
placed: 10Fr Uresil    Findings: Locking loop in renal pelvis.   External cathet
er securement: Non-absorbable suture       Additional genitourinary system inter
vention   Genitourinary intervention: None   Location of intervention: Not appli
cable   Device used: Not applicable   Description of intervention: Not applicabl
e   Post-intervention findings: Not applicable      Contrast   Contrast agent: I
sovue 300   Contrast volume (mL): 10      Radiation Dose   Fluoroscopy time (min
utes): 1.0     Reference air kerma (mGy): 8.7       Additional Details   Additio
nal description of procedure: None   Equipment details: None   Specimens removed
: None   Estimated blood loss (mL): Less than 10   Standardized report: SIR_Alphonse
theterExchange_v3      Attestation   Signer name: Radha Florez MD   I attest that
I was present for the entire procedure. I reviewed the stored   images and agree
with the report as written.               Signed by: Radha Florez MD on 
019 3:05 PM        Dictated By: RADHA FLOREZ MD  Electronically Signed By: RADHA FLOREZ MD on 19 1500  Transcribed By: LUCINDA on 19 150       COPY TO: 
 JACQUELINE ARORA MD         NEPHRO/URET W IMG/INJ-[id number] 15:02:00        
                                                                             [place] [address]      Patient Name: CRISTINA RICH     
                             MR #: [id number]                     :                                    Age/Sex: 54/F  Acct #: [id number]   
                          Req #: [id number]  Adm Physician: SHADY PEMBERTON MD       
                                Ordered by: SHADY PEMBERTON MD                      
     Report #: [id number]        Location: MED/[facility]3                             
 Room/Bed: [facility]               _____________________________________________
______________________________________________________    Procedure: [id number] I
R/NEPHRO/URET W IMG/INJ-EXISTING  Exam Date:                             Exam Ti
me:                                               REPORT STATUS: Signed    PROCE
DURE: Genitourinary catheter exchange      Procedural Personnel   Attending lex walker(s): Radha Florez MD   Fellow physician(s): None   Resident physician(s): Anne-Marie bourgeois   Advanced practice provider(s): None      Pre-procedure diagnosis: Right ure
teral obstruction   Post-procedure diagnosis: Same   Indication: Routine schedul
ed exchange   Additional clinical history: None      Complications: No immediate
complications.      IMPRESSION:      Successful routine exchange of right nephr
ostomy tube for new 10Fr nephrostomy   tube.      Plan:    Routine exchange in 8
weeks.   _______________________________________________________________      P
ROCEDURE SUMMARY   - Target organ: Unilateral native kidney   - Antegrade nephro
stogram(s) via the existing access   - Nephrostomy tube exchange   - Additional 
procedure(s): None      PROCEDURE DETAILS:      Pre-procedure   Consent: Informe
d consent for the procedure including risks, benefits and   alternatives was obt
ained and time-out was performed prior to the procedure.   Preparation: The site
was prepared and draped using maximal sterile barrier   technique including cut
aneous antisepsis.      Anesthesia/sedation   Level of anesthesia/sedation: Mode
rate sedation (conscious sedation) 1mg   Versed, 50mcg Fentanyl   Anesthesia/sed
ation administered by: Independent trained observer under   attending supervisio
n with continuous monitoring of the patient?s level of   consciousness and physi
ologic status   Total intra-service sedation time (minutes): 30      Right genit
ourinary catheter exchange   Local anesthesia was administered. Initial nephrost
ogram was performed. A wire   was placed through the existing tube and it was re
moved. The new tube was   advanced over the wire and position was confirmed with
contrast injection.   Pre-existing genitourinary catheter: 10Fr Uresil   Genito
urinary catheter(s) placed: 10Fr Uresil    Findings: Locking loop in renal pelvi
s.   External catheter securement: Non-absorbable suture       Additional genito
urinary system intervention   Genitourinary intervention: None   Location of int
ervention: Not applicable   Device used: Not applicable   Description of interve
ntion: Not applicable   Post-intervention findings: Not applicable      Contrast
  Contrast agent: Isovue 300   Contrast volume (mL): 10      Radiation Dose   F
luoroscopy time (minutes): 1.0     Reference air kerma (mGy): 8.7       Addition
al Details   Additional description of procedure: None   Equipment details: None
  Specimens removed: None   Estimated blood loss (mL): Less than 10   Standardi
zed report: SIR_GUCatheterExchange_v3      Attestation   Signer name: Radha Florez MD   I attest that I was present for the entire procedure. I reviewed the stor
ed   images and agree with the report as written.               Signed by: Faahd Florez MD on 2019 3:05 PM        Dictated By: RADHA FLOREZ MD  Electronicall
y Signed By: RADHA FLOREZ MD on 19 1505  Transcribed By: LUCINDA on 19
1505       COPY TO:   SHADY PEMBERTON MD         Prothrombin Zagx3295-82-02 09:45:00
  * 



             Test Item    Value        Reference Range Interpretation Comments

 

             Prothrombin Time (test code = 5902-2) 12.5         11.9-14.5       

           





[facility] CenterProthromb Time International Ratio
2019 09:45:00* 



             Test Item    Value        Reference Range Interpretation Comments

 

             Prothromb Time International Ratio (test code = 6301-6) 0.89       

                             





Oral Anticoagulant Therapy INR Values:1. Low Intensity Therapy        1.5 - 2.02
. Moderate Intensity Therapy   2.0 - 3.03. High Intensity Therapy(1)    2.5 - 3.
54. High Intensity Therapy(2)    3.0 - 4.05. Panic Value INR              > 5.0
[facility] CenterProthrombin Qajp5882-02-52 09:45:00* 



             Test Item    Value        Reference Range Interpretation Comments

 

             Prothrombin Time (test code = 5902-2) 12.5         11.9-14.5       

           





[facility] CenterProthromb Time International Ratio
2019 09:45:00* 



             Test Item    Value        Reference Range Interpretation Comments

 

             Prothromb Time International Ratio (test code = 6301-6) 0.89       

                             





Oral Anticoagulant Therapy INR Values:1. Low Intensity Therapy        1.5 - 2.02
. Moderate Intensity Therapy   2.0 - 3.03. High Intensity Therapy(1)    2.5 - 3.
54. High Intensity Therapy(2)    3.0 - 4.05. Panic Value INR              > 5.0
[facility] Houstonodium Fajme3474-45-25 07:13:00* 



             Test Item    Value        Reference Range Interpretation Comments

 

             Sodium Level (test code = 2951-2) 137          136-145             

       





[facility] CenterPotassium Cgxuc9310-16-00 07:13:00* 



             Test Item    Value        Reference Range Interpretation Comments

 

             Potassium Level (test code = 2823-3) 4.0          3.5-5.1          

          





[facility] CenterChloride Ljcqc5837-67-39 07:13:00* 



             Test Item    Value        Reference Range Interpretation Comments

 

             Chloride Level (test code = 2075-0) 105                      

         





[facility] CenterCarbon Dioxide Lvglb8166-61-22 07:13:00* 



             Test Item    Value        Reference Range Interpretation Comments

 

             Carbon Dioxide Level (test code = 2028-9) 21           22-29       

 L             





[facility] CenterAnion Hjf1499-35-13 07:13:00* 



             Test Item    Value        Reference Range Interpretation Comments

 

             Anion Gap (test code = 33037-3) 15.0         8-16                  

     





[facility] CenterBlood Urea Xkegtwat6054-96-15 07:13:00* 



             Test Item    Value        Reference Range Interpretation Comments

 

             Blood Urea Nitrogen (test code = 3094-0) 12           7-26         

              





[facility] CenterCreatinine2019-12-30 07:13:00* 



             Test Item    Value        Reference Range Interpretation Comments

 

             Creatinine (test code = 2160-0) 0.83         0.57-1.11             

     





[facility] CenterBUN/Creatinine Ebyjv1462-13-52 07:13:00* 



             Test Item    Value        Reference Range Interpretation Comments

 

             BUN/Creatinine Ratio (test code = 3097-3) 14           6-25        

               





[facility] CenterEstimat Glomerular Filtration Rate
2019 07:13:00* 



             Test Item    Value        Reference Range Interpretation Comments

 

             Estimat Glomerular Filtration Rate (test code = 223417993) > 60    

     >60                        





Ranges were taken from the National Kidney Disease Education Program and the [facility] literature.Reference ranges:60 or greater: Besdfu94-29 (
for 3 consecutive months): Chronic kidney disease 15 or less: Kidney failureVal Verde Regional Medical CenterGlucose Nqduc5589-44-76 07:13:00* 



             Test Item    Value        Reference Range Interpretation Comments

 

             Glucose Level (test code = WHU5977) 136                 H    

         





[facility] CenterCalcium Zhobx8562-40-56 07:13:00* 



             Test Item    Value        Reference Range Interpretation Comments

 

             Calcium Level (test code = 70520-2) 9.0          8.4-10.2          

         





[facility] CenterTotal Otkqbdfed3943-18-98 07:13:00* 



             Test Item    Value        Reference Range Interpretation Comments

 

             Total Bilirubin (test code = 1975-2) 0.4          0.2-1.2          

          





[facility] CenterAspartate Amino Transf (AST/SGOT)
2019 07:13:00* 



             Test Item    Value        Reference Range Interpretation Comments

 

                                        Aspartate Amino Transf (AST/SGOT) (test 

code = Aspartate Amino Transf 

(AST/SGOT))     25              5-34                             





[facility] CenterAlanine Aminotransferase (ALT/SGPT)
2019 07:13:00* 



             Test Item    Value        Reference Range Interpretation Comments

 

             Alanine Aminotransferase (ALT/SGPT) (test code = 1742-6) 21        

   0-55                       





[facility] CenterTotal Rnecwqc5107-06-62 07:13:00* 



             Test Item    Value        Reference Range Interpretation Comments

 

             Total Protein (test code = 2885-2) 7.2          6.5-8.1            

        





[facility] CenterAlbumin2019-12-30 07:13:00* 



             Test Item    Value        Reference Range Interpretation Comments

 

             Albumin (test code = 1751-7) 2.8          3.5-5.0      L           

  





[facility] CenterGlobulin2019-12-30 07:13:00* 



             Test Item    Value        Reference Range Interpretation Comments

 

             Globulin (test code = 30125-1) 4.4          2.3-3.5      H         

    





[facility] CenterAlbumin/Globulin Vcxhk8925-15-95 07:13:00
  * 



             Test Item    Value        Reference Range Interpretation Comments

 

             Albumin/Globulin Ratio (test code = 1759-0) 0.6          0.8-2.0   

   L             





[facility] CenterAlkaline Qisfwchvfdl5846-46-84 07:13:00* 



             Test Item    Value        Reference Range Interpretation Comments

 

             Alkaline Phosphatase (test code = 6768-6) 105                

               





[facility] CenterWhite Blood Ynftr1729-95-76 06:46:00* 



             Test Item    Value        Reference Range Interpretation Comments

 

             White Blood Count (test code = 6690-2) 6.80         4.8-10.8       

            





[facility] CenterRed Blood Uvcjc0970-51-83 06:46:00* 



             Test Item    Value        Reference Range Interpretation Comments

 

             Red Blood Count (test code = 789-8) 3.75         3.6-5.1           

         





[facility] CenterHemoglobin2019-12-30 06:46:00* 



             Test Item    Value        Reference Range Interpretation Comments

 

             Hemoglobin (test code = 08928-9) 9.5          12.0-16.0    L       

      





[facility] CenterHematocrit2019-12-30 06:46:00* 



             Test Item    Value        Reference Range Interpretation Comments

 

             Hematocrit (test code = 4544-3) 31.9         34.2-44.1    L        

     





[facility] CenterMean Corpuscular Jesetn0220-22-74 
06:46:00* 



             Test Item    Value        Reference Range Interpretation Comments

 

             Mean Corpuscular Volume (test code = 787-2) 85.1         81-99     

                 





[facility] CenterMean Corpuscular Javuuktofi7193-64-24 
06:46:00* 



             Test Item    Value        Reference Range Interpretation Comments

 

             Mean Corpuscular Hemoglobin (test code = 785-6) 25.3         28-32 

       L             





[facility] CenterMean Corpuscular Hemoglobin Concent
2019 06:46:00* 



             Test Item    Value        Reference Range Interpretation Comments

 

             Mean Corpuscular Hemoglobin Concent (test code = 786-4) 29.8       

  31-35        L             





[facility] CenterRed Cell Distribution Nulop3077-67-91 
06:46:00* 



             Test Item    Value        Reference Range Interpretation Comments

 

             Red Cell Distribution Width (test code = 58838-2) 15.5         11.7

-14.4    H             





[facility] CenterPlatelet Ojygd6277-56-29 06:46:00* 



             Test Item    Value        Reference Range Interpretation Comments

 

             Platelet Count (test code = 777-3) 225          140-360            

        





[facility] CenterNeutrophils (%) (Auto)2019 06:46:00
  * 



             Test Item    Value        Reference Range Interpretation Comments

 

             Neutrophils (%) (Auto) (test code = 91034-3) 60.9         38.7-80.0

                  





[facility] CenterLymphocytes (%) (Auto)2019 06:46:00
  * 



             Test Item    Value        Reference Range Interpretation Comments

 

             Lymphocytes (%) (Auto) (test code = 736-9) 26.3         18.0-39.1  

                





[facility] CenterMonocytes (%) (Auto)2019 06:46:00* 



             Test Item    Value        Reference Range Interpretation Comments

 

             Monocytes (%) (Auto) (test code = 5905-5) 8.8          4.4-11.3    

               





[facility] CenterEosinophils (%) (Auto)2019 06:46:00
  * 



             Test Item    Value        Reference Range Interpretation Comments

 

             Eosinophils (%) (Auto) (test code = 713-8) 3.5          0.0-6.0    

                





[facility] CenterBasophils (%) (Auto)2019 06:46:00* 



             Test Item    Value        Reference Range Interpretation Comments

 

             Basophils (%) (Auto) (test code = 706-2) 0.4          0.0-1.0      

              





[facility] CenterIM GRANULOCYTES %2019 06:46:00* 



             Test Item    Value        Reference Range Interpretation Comments

 

             IM GRANULOCYTES % (test code = IM GRANULOCYTES %) 0.1          0.0-

1.0                    





[facility] CenterNeutrophils # (Auto)2019 06:46:00* 



             Test Item    Value        Reference Range Interpretation Comments

 

             Neutrophils # (Auto) (test code = 751-8) 4.1          2.1-6.9      

              





[facility] CenterLymphocytes # (Auto)2019 06:46:00* 



             Test Item    Value        Reference Range Interpretation Comments

 

             Lymphocytes # (Auto) (test code = 14635-9) 1.8          1.0-3.2    

                





[facility] CenterMonocytes # (Auto)2019 06:46:00* 



             Test Item    Value        Reference Range Interpretation Comments

 

             Monocytes # (Auto) (test code = 742-7) 0.6          0.2-0.8        

            





[facility] CenterEosinophils # (Auto)2019 06:46:00* 



             Test Item    Value        Reference Range Interpretation Comments

 

             Eosinophils # (Auto) (test code = 711-2) 0.2          0.0-0.4      

              





[facility] CenterBasophils # (Auto)2019 06:46:00* 



             Test Item    Value        Reference Range Interpretation Comments

 

             Basophils # (Auto) (test code = 704-7) 0.0          0.0-0.1        

            





[facility] CenterAbsolute Immature Granulocyte (auto
2019 06:46:00* 



             Test Item    Value        Reference Range Interpretation Comments

 

                                        Absolute Immature Granulocyte (auto (lloyd

t code = Absolute Immature Granulocyte 

(auto)          0.01            0-0.1                            





[facility] CenterUrine [id number] 10:00:00* 



             Test Item    Value        Reference Range Interpretation Comments

 

             Urine WBC (test code = 5821-4) 11-20        0-5          H         

    





[facility] CenterUrine [id number] 10:00:00* 



             Test Item    Value        Reference Range Interpretation Comments

 

             Urine RBC (test code = 94068-7) 11-20        0-5          H        

     





[facility] CenterUrine Kzkozjuq6684-76-63 10:00:00* 



             Test Item    Value        Reference Range Interpretation Comments

 

             Urine Bacteria (test code = 92567-9) MODERATE     NONE         H   

          





[facility] CenterUrine Epithelial Umdax5437-21-62 10:00:00
  * 



             Test Item    Value        Reference Range Interpretation Comments

 

             Urine Epithelial Cells (test code = 00145-2) FEW          NONE     

                  





[facility] CenterUrine Caelq6945-34-69 09:27:00* 



             Test Item    Value        Reference Range Interpretation Comments

 

             Urine Color (test code = 5778-6) YELLOW       YELLOW               

      





[facility] CenterUrine Reiijut7349-49-21 09:27:00* 



             Test Item    Value        Reference Range Interpretation Comments

 

             Urine Clarity (test code = 12380-7) SL CLOUDY    CLEAR             

         





[facility] Specific Lkjrsgs6879-03-48 09:27:00
  * 



             Test Item    Value        Reference Range Interpretation Comments

 

             Urine Specific Gravity (test code = 5811-5) 1.025        1.010-1.02

5                





[facility] CenterUrine [id number] 09:27:00* 



             Test Item    Value        Reference Range Interpretation Comments

 

             Urine pH (test code = 36049-7) 7            5-7                    

    





[facility] Leukocyte Ayaapcdd8294-32-90 
09:27:00* 



             Test Item    Value        Reference Range Interpretation Comments

 

             Urine Leukocyte Esterase (test code = 50207-5) SMALL        NEGATIV

E                   





[facility] Awkjfnn3965-95-26 09:27:00* 



             Test Item    Value        Reference Range Interpretation Comments

 

             Urine Nitrite (test code = 92432-5) POSITIVE     NEGATIVE          

         





[facility] Bjpbkue3171-31-66 09:27:00* 



             Test Item    Value        Reference Range Interpretation Comments

 

             Urine Protein (test code = 57735-3) 2+           NEGATIVE     H    

         





[facility] Glucose (UA)2019 09:27:00* 



             Test Item    Value        Reference Range Interpretation Comments

 

             Urine Glucose (UA) (test code = 41817-6) NEGATIVE     NEGATIVE     

              





[facility] Ivjpxvo7183-17-58 09:27:00* 



             Test Item    Value        Reference Range Interpretation Comments

 

             Urine Ketones (test code = 09815-9) NEGATIVE     NEGATIVE          

         





[facility] Hjrzmlyhvvca7601-82-22 09:27:00* 



             Test Item    Value        Reference Range Interpretation Comments

 

             Urine Urobilinogen (test code = 73446-3) 0.2          0.2-1        

              





[facility] Slrxyadfr0546-13-93 09:27:00* 



             Test Item    Value        Reference Range Interpretation Comments

 

             Urine Bilirubin (test code = 1977-8) NEGATIVE     NEGATIVE         

          





[facility] Vhavl7946-20-73 09:27:00* 



             Test Item    Value        Reference Range Interpretation Comments

 

             Urine Blood (test code = 35528-1) MODERATE     NEGATIVE            

       





[facility] SystemABDOMEN-1VIEW (KUB)2019 08:49:00   
                                                                                
 [facility]                        [address] [address]      Patient Name: CRISTINA RICH     
                             MR #: [id number]                     :                                    Age/Sex: 54/F  Acct #: [id number]   
                          Req #: [id number]  Adm Physician:                     
                                Ordered by: JENNIFER TERRELL MD                   
        Report #: [id number]        Location: ER                                 
    Room/Bed:                     __________________________________________
_________________________________________________________    Procedure: 1229-001
0 DX/ABDOMEN-1VIEW (KUB)  Exam Date: 19                            Exam Ti
me: 0835                                              REPORT STATUS: Signed     
 Exam: Abdominal film       Clinical History: Pain      Comparison: 2019      DISCUSSION:       2 left-sided ureteral stents stable in position.    
 Right percutaneous nephrostomy tube stable in position.      Nonobstructive b
owel gas pattern.      IMPRESSION:      Stable appearance of the left ureteral s
tents and right percutaneous   nephrostomy tube                        Signed by
: Dr. Andrew Palisch, M.D. on 2019 8:52 AM        Dictated By: ANDREW R PA
LISCH MD  Electronically Signed By: ANDREW R PALISCH MD on 19  Transc
ribed By: LUCINDA on 19       COPY TO:   JENNIFER TERRELL MD         
Bacterial urine hppgxmf5337-88-88 07:10:00* 



             Test Item    Value        Reference Range Interpretation Comments

 

             Urine Culture (test code = 630-4) PSEUDOMONAS AERUGINOSA           

                 





[facility] Vkauyal9801-09-00 08:04:00* 



             Test Item    Value        Reference Range Interpretation Comments

 

             Urine Culture (test code = 630-4) No Result Data Provided          

                  





[facility] Zuywmlm4259-46-11 08:04:00* 



             Test Item    Value        Reference Range Interpretation Comments

 

             Urine Culture (test code = 630-4) No Result Data Provided          

                  





[facility] 16[facility]47[facility]00   
                                                                                
 [address] [address]      Patient Name: CRISTINA RICH     
                             MR #: [id number]                     :                                    Age/Sex: 54/F  Acct #: [id number]   
                          Req #: [id number]  Adm Physician:                     
                                Ordered by: JACQUELINE ARORA MD                      
     Report #: [id number]        Location: OR                                    
 Room/Bed:                     _____________________________________________
______________________________________________________    Procedure: [id number] D
X/ABDOMEN-1VIEW (KUB)  Exam Date: 19                            Exam Time:
1632                                              REPORT STATUS: Signed    Abdo
men, one view      Clinical indication: Preoperative evaluation, hydronephrosis 
    Comparison: 2019 and 2019      Findings:   A right percutaneous n
ephrostomy catheter is in place. Two parallel left   double-J stent are in place
. The bowel gas pattern is nonobstructive. No acute   osseous abdomen bodies.   
  Impression:   Unchanged appearance of right nephrostomy catheter and left ure
teral stents.      Signed by: Stephen E Dreyer, MD on 2019 4:50 PM        D
ictated By: STEPHEN E DREYER MD  Electronically Signed By: STEPHEN E DREYER MD o
n 19  Transcribed By: LUCINDA on 19       COPY TO:   JACQUELINE SMITH MD         Urine LYU3852-04-81 00:56:00* 



             Test Item    Value        Reference Range Interpretation Comments

 

             Urine WBC (test code = 5821-4) >50          0-5          H         

    





[facility] HJB6512-04-42 00:56:00* 



             Test Item    Value        Reference Range Interpretation Comments

 

             Urine RBC (test code = 36691-7) >50          0-5          H        

     





[facility] Ykmzprff6129-15-82 00:56:00* 



             Test Item    Value        Reference Range Interpretation Comments

 

             Urine Bacteria (test code = 16449-1) MODERATE     NONE         H   

          





[facility] Epithelial Crovl6323-33-28 00:56:00
  * 



             Test Item    Value        Reference Range Interpretation Comments

 

             Urine Epithelial Cells (test code = 23344-1) FEW          NONE     

                  





[facility] Dtdmv2385-89-80 00:53:00* 



             Test Item    Value        Reference Range Interpretation Comments

 

             Urine Color (test code = 5778-6) STRAW        YELLOW               

      





[facility] Clwcfln9448-01-19 00:53:00* 



             Test Item    Value        Reference Range Interpretation Comments

 

             Urine Clarity (test code = 24901-0) CLOUDY       CLEAR        H    

         





[facility] Specific Gqgcwkz2647-13-24 00:53:00
  * 



             Test Item    Value        Reference Range Interpretation Comments

 

             Urine Specific Gravity (test code = 5811-5) 1.010        1.010-1.02

5                





[facility] sX4558-19-20 00:53:00* 



             Test Item    Value        Reference Range Interpretation Comments

 

             Urine pH (test code = 48994-0) 7            5-7                    

    





[facility] Leukocyte Tssvfnoj3886-89-50 
00:53:00* 



             Test Item    Value        Reference Range Interpretation Comments

 

             Urine Leukocyte Esterase (test code = 03408-0) LARGE        NEGATIV

E                   





[facility] Pqjudzc1826-67-88 00:53:00* 



             Test Item    Value        Reference Range Interpretation Comments

 

             Urine Nitrite (test code = 96369-4) NEGATIVE     NEGATIVE          

         





[facility] Eiaetgl2986-09-34 00:53:00* 



             Test Item    Value        Reference Range Interpretation Comments

 

             Urine Protein (test code = 57735-3) 2+           NEGATIVE     H    

         





[facility] Glucose [facility]UA)2019 00:53:00* 



             Test Item    Value        Reference Range Interpretation Comments

 

             Urine Glucose (UA) (test code = 94094-4) NEGATIVE     NEGATIVE     

              





[facility] Uyprmcf5053-30-15 00:53:00* 



             Test Item    Value        Reference Range Interpretation Comments

 

             Urine Ketones (test code = 45751-0) NEGATIVE     NEGATIVE          

         





[facility] Xmejyoxqjjmd7655-34-84 00:53:00* 



             Test Item    Value        Reference Range Interpretation Comments

 

             Urine Urobilinogen (test code = 72084-2) 0.2          0.2-1        

              





[facility] Lcmtxlwzo4157-41-31 00:53:00* 



             Test Item    Value        Reference Range Interpretation Comments

 

             Urine Bilirubin (test code = 1977-8) NEGATIVE     NEGATIVE         

          





[facility] Utkbt0372-28-41 00:53:00* 



             Test Item    Value        Reference Range Interpretation Comments

 

             Urine Blood (test code = 49180-6) 3+           NEGATIVE            

       





[facility] zueeiyi8595-04-91 
23:00:00* 



             Test Item    Value        Reference Range Interpretation Comments

 

             Urine Culture (test code = 630-4) ENTEROCOCCUS FAECIUM             

               





[facility] uebptgt2186-33-04 
23:00:00* 



             Test Item    Value        Reference Range Interpretation Comments

 

             Urine Culture (test code = 630-4) ENTEROCOCCUS FAECIUM             

               





[facility] lvgfnss2506-30-21 
23:00:00* 



             Test Item    Value        Reference Range Interpretation Comments

 

             Urine Culture (test code = 630-4) ENTEROCOCCUS FAECIUM             

               





[facility] Gmqlqjj8568-56-06 07:20:00* 



             Test Item    Value        Reference Range Interpretation Comments

 

             Urine Culture (test code = 630-4) No Result Data Provided          

                  





[facility] Tpbtnpl6830-58-30 07:20:00* 



             Test Item    Value        Reference Range Interpretation Comments

 

             Urine Culture (test code = 630-4) No Result Data Provided          

                  





[facility] Xragjax7277-78-76 07:20:00* 



             Test Item    Value        Reference Range Interpretation Comments

 

             Urine Culture (test code = 630-4) No Result Data Provided          

                  





[facility] GFS9333-80-45 12:57:00* 



             Test Item    Value        Reference Range Interpretation Comments

 

             Urine WBC (test code = 5821-4) >50          0-5          H         

    





[facility] PEU6763-07-79 12:57:00* 



             Test Item    Value        Reference Range Interpretation Comments

 

             Urine RBC (test code = 86507-6) >50          0-5          H        

     





[facility] Xqamdqed5679-50-11 12:57:00* 



             Test Item    Value        Reference Range Interpretation Comments

 

             Urine Bacteria (test code = 54173-5) MANY         NONE         H   

          





[facility] Epithelial Omygg8720-19-27 12:57:00
  * 



             Test Item    Value        Reference Range Interpretation Comments

 

             Urine Epithelial Cells (test code = 06391-6) MANY         NONE     

                  





[facility] Amorphous Vnatshpo9193-11-80 
12:57:00* 



             Test Item    Value        Reference Range Interpretation Comments

 

             Urine Amorphous Sediment (test code = 8246-1) FEW          FEW     

                   





[facility] Eifpi4910-62-73 12:57:00* 



             Test Item    Value        Reference Range Interpretation Comments

 

             Urine Yeast (test code = 72998-9) MANY         NONE         H      

       





[facility] Amorphous Dfjzxule5209-25-54 
12:57:00* 



             Test Item    Value        Reference Range Interpretation Comments

 

             Urine Amorphous Sediment (test code = 8246-1) FEW          FEW     

                   





[facility] Oesgm6438-30-15 12:57:00* 



             Test Item    Value        Reference Range Interpretation Comments

 

             Urine Yeast (test code = 30242-2) MANY         NONE         H      

       





[facility] Amorphous Lmrwhgzy1366-18-35 
12:57:00* 



             Test Item    Value        Reference Range Interpretation Comments

 

             Urine Amorphous Sediment (test code = 8246-1) FEW          FEW     

                   





[facility] Qgrlj9013-25-95 12:57:00* 



             Test Item    Value        Reference Range Interpretation Comments

 

             Urine Yeast (test code = 92136-0) MANY         NONE         H      

       





[facility] Amorphous Uoinrzsx1383-62-49 
12:57:00* 



             Test Item    Value        Reference Range Interpretation Comments

 

             Urine Amorphous Sediment (test code = 8246-1) FEW          FEW     

                   





[facility] Grsbr1392-95-76 12:57:00* 



             Test Item    Value        Reference Range Interpretation Comments

 

             Urine Yeast (test code = 41986-0) MANY         NONE         H      

       





[facility] Daazp9887-18-56 12:48:00* 



             Test Item    Value        Reference Range Interpretation Comments

 

             Urine Color (test code = 5778-6) YELLOW       YELLOW               

      





[facility] Ejbltwv7738-37-01 12:48:00* 



             Test Item    Value        Reference Range Interpretation Comments

 

             Urine Clarity (test code = 93389-0) CLOUDY       CLEAR        H    

         





[facility] Specific Geichrh8947-14-21 12:48:00
  * 



             Test Item    Value        Reference Range Interpretation Comments

 

             Urine Specific Gravity (test code = 5811-5) 1.010        1.010-1.02

5                





[facility] fT0767-45-15 12:48:00* 



             Test Item    Value        Reference Range Interpretation Comments

 

             Urine pH (test code = 56765-9) 7            5-7                    

    





[facility] Leukocyte Xilmvhla3910-63-53 
12:48:00* 



             Test Item    Value        Reference Range Interpretation Comments

 

             Urine Leukocyte Esterase (test code = 82534-7) LARGE        NEGATIV

E                   





[facility] Kwjseru6700-35-03 12:48:00* 



             Test Item    Value        Reference Range Interpretation Comments

 

             Urine Nitrite (test code = 91433-0) NEGATIVE     NEGATIVE          

         





[facility] Xfmlrej6422-90-12 12:48:00* 



             Test Item    Value        Reference Range Interpretation Comments

 

             Urine Protein (test code = 57735-3) 2+           NEGATIVE     H    

         





[facility] Glucose (UA)2019 12:48:00* 



             Test Item    Value        Reference Range Interpretation Comments

 

             Urine Glucose (UA) (test code = 39199-8) 1+           NEGATIVE     

H             





[facility] Xvjpdse1589-89-60 12:48:00* 



             Test Item    Value        Reference Range Interpretation Comments

 

             Urine Ketones (test code = 36720-3) NEGATIVE     NEGATIVE          

         





[facility] Miwkvhljcfas8306-90-20 12:48:00* 



             Test Item    Value        Reference Range Interpretation Comments

 

             Urine Urobilinogen (test code = 70714-9) 0.2          0.2-1        

              





[facility] Koxpzzmfb6105-70-71 12:48:00* 



             Test Item    Value        Reference Range Interpretation Comments

 

             Urine Bilirubin (test code = 1977-8) NEGATIVE     NEGATIVE         

          





[facility] Ooscz7918-31-85 12:48:00* 



             Test Item    Value        Reference Range Interpretation Comments

 

             Urine Blood (test code = 90818-0) 3+           NEGATIVE            

       





[facility] Center[facility] sediment detection in urine 
sediment by light azxlsgpsew9948-50-85 11:33:00* 



             Test Item    Value        Reference Range Interpretation Comments

 

             Urine Amorphous Sediment (test code = 8246-1) FEW          FEW     

                   





[facility] CenterYeast detection in urine sediment by 
light cczxshofla1299-12-38 11:33:00* 



             Test Item    Value        Reference Range Interpretation Comments

 

             Urine Yeast (test code = 84081-9) MANY         NONE                

       





[facility] urine ayiqety6455-06-45 
11:33:00* 



             Test Item    Value        Reference Range Interpretation Comments

 

             Urine Culture (test code = 630-4) ENTEROCOCCUS FAECIUM             

               





[facility] Center[facility] sediment detection in urine 
sediment by light bobxfovkol4271-43-93 11:33:00* 



             Test Item    Value        Reference Range Interpretation Comments

 

             Urine Amorphous Sediment (test code = 8246-1) FEW          FEW     

                   





[facility] Center[facility] detection in urine sediment by 
light egcaryixnc2062-28-38 11:33:00* 



             Test Item    Value        Reference Range Interpretation Comments

 

             Urine Yeast (test code = 59906-7) MANY         NONE                

       





[facility] urine elkutfq2582-07-31 
11:33:00* 



             Test Item    Value        Reference Range Interpretation Comments

 

             Urine Culture (test code = 630-4) ENTEROCOCCUS FAECIUM             

               





[facility]2019-10-24 
14:16:00                                                                        
             [address] [address]      Patient Name: CRISTINA RICH                                   MR #: [id number]                     
: 1965                                   Age/Sex: 54/F  Acct #: 
[id number]                              Req #: [id number]  [facility] Physician:     
                                                Ordered by: JACQUELINE ARORA MD      
                     Report #: [id number]        Location: DX                    
                 Room/Bed:                     
_____________________________________________
______________________________________________________    Procedure:  I
R/NEPHRO/URET W IMG/INJ-EXISTING  Exam Date:                             Exam Ti
me:                                               REPORT STATUS: Signed    PROCE
DURE: Genitourinary catheter exchange      Procedural Personnel   Attending phys
ician(s): Radha Florez MD   Fellow physician(s): None   Resident physician(s): No
ne   Advanced practice provider(s): None      Pre-procedure diagnosis: Ureteral 
obstruction   Post-procedure diagnosis: Same   Indication: Routine scheduled exc
hange   Additional clinical history: None      Complications: Nephrostomy tube w
as heavily encrusted and calcified and   required advanced maneuvers to remove a
nd exchange.       IMPRESSION:      Successful exchange of right nephrostomy tub
e. Nephrostomy tube was heavily   encrusted and calcified and required advanced 
maneuvers to remove and exchange.         Plan:    Future exchanges should be do
ne at no longer than 6-8 week intervals.   _____________________________________
__________________________      PROCEDURE SUMMARY   - Target organ: Right native
kidney   - Antegrade nephrostogram(s) via the existing access   - Nephrostomy t
ube exchange   - Additional procedure(s): None      PROCEDURE DETAILS:      Pre-
procedure   Consent: Informed consent for the procedure including risks, benefit
s and   alternatives was obtained and time-out was performed prior to the proced
ure.   Preparation: The site was prepared and draped using maximal sterile barri
er   technique including cutaneous antisepsis.      Anesthesia/sedation   Level 
of anesthesia/sedation: Minimal sedation 50mcg Fentanyl   Anesthesia/sedation ad
ministered by: Independent trained observer under   attending supervision with c
ontinuous monitoring of the patient?s level of   consciousness and physiologic s
tatus      Right genitourinary catheter exchange   Local anesthesia was administ
ered. Initial nephrostogram was performed. A wire   was placed via the existing 
tube and the tube was very encrusted. Wire could   not be passed all the way thr
ough the tube and the loop would not unform. A   12Fr peel-away sheath was advan
melanie over the nephrostomy tube and the tube was   retracted through the sheath an
d removed. A Kumpe catheter and guidewire were   then advanced through the sheat
h and the sheath removed. A new 10Fr nephrostomy   tube was advanced over the wi
re and position was confirmed with contrast   injection.   Pre-existing genitour
inary catheter: 10Fr Uresil   Genitourinary catheter(s) placed: 10Fr Uresil    F
indings: Loop formed in renal pelvis.   External catheter securement: Non-absorb
able suture       Additional genitourinary system intervention   Genitourinary i
ntervention: None   Location of intervention: Not applicable   Device used: Not 
applicable   Description of intervention: Not applicable   Post-intervention fin
dings: Not applicable      Contrast   Contrast agent: Isovue 370   Contrast volu
me (mL): 10      Radiation Dose   Fluoroscopy time (minutes): 13.24     Referenc
e air kerma (mGy): 134       Additional Details   Additional description of proc
edure: None   Equipment details: None   Specimens removed: None   Estimated bloo
d loss (mL): Less than 10   Standardized report: [email or web address]      
Attestation   Signer name: Radha Florez MD   I attest that I was present for the 
entire procedure. I reviewed the stored   images and agree with the report as wr
itten.               Signed by: Radha Florez MD on 10/24/2019 2:21 PM        Dict
ated By: RADHA FLOREZ MD  Electronically Signed By: RADHA FLOREZ MD on 10/24/19 142
 Transcribed By: LUCINDA on 10/24/19 142       COPY TO:   JACQUELINE ARORA MD        
[facility]2019 07:49:00                                          
                                           [facility]    
                   [address] [address]    
 Patient Name: CRISTINA RICH                                   MR #: 
[id number]                     : 1965                                  
Age/Sex: 53/F  Acct #: [id number]                              Req #: [id number]
[id number]  Adm Physician:                                                      
Ordered by: JACQUELINE ARORA MD                            Report #: [id number]       
Location: OR                                      Room/Bed:                     
_____________________________________________
______________________________________________________    Procedure: [id number] D
X/ABDOMEN-1VIEW (KUB)  Exam Date: 19                            Exam Time:
725                                              REPORT STATUS: Signed       E
xam: Abdominal film       Clinical History: Urolithiasis, stents      Comparison
: Abdominal radiograph dated 2019, CT of the abdomen and pelvis   dated       DISCUSSION:    Right percutaneous nephrostomy and dual parallel left 
internal ureteral stents   are again noted.    There is unchanged 5 mm calcific 
density overlying the region of the right   ureterovesicular junction.      Charlie
l gas pattern is nonobstructive. Regional skeletal structures are intact.       
A calcified gallstone is again identified projecting of the right upper   quadra
nt.      IMPRESSION:      Right percutaneous nephrostomy catheter and parallel l
eft internal ureteral   stents are unchanged in position. Unchanged 5 mm calcifi
c density in the   expected region of the right ureterovesicular junction.      
Signed by: Stephen E Dreyer, MD on 2019 7:56 AM        Dictated By: STEPHEN
E DREYER MD  Electronically Signed By: STEPHEN E DREYER MD on 19 0756  Tr
anscribed By: LUCINDA on 19 0756       COPY TO:   JACQUELINE ARORA MD         
Bedside Afehjox1302-55-33 19:41:00* 



             Test Item    Value        Reference Range Interpretation Comments

 

             Bedside Glucose (test code = 08091-0) 184                 H  

           





Meter ID: [id number] [facility] Glucose
2019 19:41:00* 



             Test Item    Value        Reference Range Interpretation Comments

 

             Bedside Glucose (test code = 19393-9) 184                 H  

           





Meter ID: [id number] [facility] Glucose
2019 19:41:00* 



             Test Item    Value        Reference Range Interpretation Comments

 

             Bedside Glucose (test code = 37218-3) 184                 H  

           





Meter ID: [id number] [facility] Glucose
2019 19:41:00* 



             Test Item    Value        Reference Range Interpretation Comments

 

             Bedside Glucose (test code = 78858-9) 184                 H  

           





Meter ID: [id number] [facility] Glucose
2019 19:41:00* 



             Test Item    Value        Reference Range Interpretation Comments

 

             Bedside Glucose (test code = 99669-1) 184                 H  

           





Meter ID: [id number] LakeThyroid Stimulating 
Hormone (TSH)2019 07:33:00* 



             Test Item    Value        Reference Range Interpretation Comments

 

             Thyroid Stimulating Hormone (TSH) (test code = 37309-7) 2.374      

  0.350-4.940                





[facility]
2019 07:33:00* 



             Test Item    Value        Reference Range Interpretation Comments

 

             Thyroid Stimulating Hormone (TSH) (test code = 20777-4) 2.374      

  0.350-4.940                





[facility] Center[facility]
2019 07:33:00* 



             Test Item    Value        Reference Range Interpretation Comments

 

             Thyroid Stimulating Hormone (TSH) (test code = 52503-7) 2.374      

  0.350-4.940                





[facility] Center[facility])
2019 07:33:00* 



             Test Item    Value        Reference Range Interpretation Comments

 

             Thyroid Stimulating Hormone (TSH) (test code = 33549-6) 2.374      

  0.350-4.940                





[facility] Center[facility])
2019 07:33:00* 



             Test Item    Value        Reference Range Interpretation Comments

 

             Thyroid Stimulating Hormone (TSH) (test code = 33260-9) 2.374      

  0.350-4.940                





[facility] Center[facility]
2019 07:33:00* 



             Test Item    Value        Reference Range Interpretation Comments

 

             Thyroid Stimulating Hormone (TSH) (test code = 97758-7) 2.374      

  0.350-4.940                





[facility] Center[facility]
2019 07:33:00* 



             Test Item    Value        Reference Range Interpretation Comments

 

             Thyroid Stimulating Hormone (TSH) (test code = 00369-3) 2.374      

  0.350-4.940                





[facility] CenterHemoglobin A1c Jkznjwe7177-80-12 07:21:00
  * 



             Test Item    Value        Reference Range Interpretation Comments

 

             Hemoglobin A1c Percent (test code = Hemoglobin A1c Percent) 6.7    

      4.0-7.0                    





[facility] CenterHemoglobin A1c Yabmnqy1116-14-27 07:21:00
  * 



             Test Item    Value        Reference Range Interpretation Comments

 

             Hemoglobin A1c Percent (test code = Hemoglobin A1c Percent) 6.7    

      4.0-7.0                    





[facility] CenterHemoglobin A1c Ggiysnl2897-74-27 07:21:00
  * 



             Test Item    Value        Reference Range Interpretation Comments

 

             Hemoglobin A1c Percent (test code = Hemoglobin A1c Percent) 6.7    

      4.0-7.0                    





[facility] CenterHemoglobin A1c Crwtnlv6606-77-17 07:21:00
  * 



             Test Item    Value        Reference Range Interpretation Comments

 

             Hemoglobin A1c Percent (test code = Hemoglobin A1c Percent) 6.7    

      4.0-7.0                    





[facility] CenterHemoglobin A1c Vaqfycw6909-98-93 07:21:00
  * 



             Test Item    Value        Reference Range Interpretation Comments

 

             Hemoglobin A1c Percent (test code = Hemoglobin A1c Percent) 6.7    

      4.0-7.0                    





[facility] CenterHemoglobin A1c Kqfebav5533-00-09 07:21:00
  * 



             Test Item    Value        Reference Range Interpretation Comments

 

             Hemoglobin A1c Percent (test code = Hemoglobin A1c Percent) 6.7    

      4.0-7.0                    





[facility] CenterHemoglobin A1c Cottmfe2677-92-61 07:21:00
  * 



             Test Item    Value        Reference Range Interpretation Comments

 

             Hemoglobin A1c Percent (test code = Hemoglobin A1c Percent) 6.7    

      4.0-7.0                    





[facility] Etkah1540-09-59 06:29:00* 



             Test Item    Value        Reference Range Interpretation Comments

 

             Sodium Level (test code = 2951-2) 140          136-145             

       





[facility] CenterPotassium Dsiyx0206-55-86 06:29:00* 



             Test Item    Value        Reference Range Interpretation Comments

 

             Potassium Level (test code = 2823-3) 3.1          3.5-5.1      L   

          





[facility] CenterChloride Mfvip8294-29-99 06:29:00* 



             Test Item    Value        Reference Range Interpretation Comments

 

             Chloride Level (test code = 2075-0) 106                      

         





[facility] CenterCarbon Dioxide Ntzbx9234-23-32 06:29:00* 



             Test Item    Value        Reference Range Interpretation Comments

 

             Carbon Dioxide Level (test code = 2028-9) 23           22-29       

               





[facility] CenterAnion Oip0973-98-73 06:29:00* 



             Test Item    Value        Reference Range Interpretation Comments

 

             Anion Gap (test code = 33037-3) 14.1         8-16                  

     





[facility] CenterBlood Urea Fuiiwhre8470-69-09 06:29:00* 



             Test Item    Value        Reference Range Interpretation Comments

 

             Blood Urea Nitrogen (test code = 3094-0) 11           7-26         

              





[facility] CenterCreatinine2019-08-04 06:29:00* 



             Test Item    Value        Reference Range Interpretation Comments

 

             Creatinine (test code = 2160-0) 1.14         0.57-1.11    H        

     





[facility] CenterBUN/Creatinine Jjytr9462-20-90 06:29:00* 



             Test Item    Value        Reference Range Interpretation Comments

 

             BUN/Creatinine Ratio (test code = 3097-3) 10           6-25        

               





[facility] CenterEstimat Glomerular Filtration Rate
2019 06:29:00* 



             Test Item    Value        Reference Range Interpretation Comments

 

             Estimat Glomerular Filtration Rate (test code = 349881139) 49      

     >60          L             





Ranges were taken from the National Kidney Disease Education Program and the [facility] literature.Reference ranges:60 or greater: Tgbosu30-26 (
for 3 consecutive months): Chronic kidney disease 15 or less: Kidney failureVal [facility] Medical CenterGlucose Fqfnr5371-09-26 06:29:00* 



             Test Item    Value        Reference Range Interpretation Comments

 

             Glucose Level (test code = JOX3268) 172                 H    

         





[facility] CenterCalcium Dxmcc8335-69-99 06:29:00* 



             Test Item    Value        Reference Range Interpretation Comments

 

             Calcium Level (test code = 13237-0) 9.2          8.4-10.2          

         





[facility]odium Rocct1318-57-85 06:29:00* 



             Test Item    Value        Reference Range Interpretation Comments

 

             Sodium Level (test code = 2951-2) 140          136-145             

       





[facility] CenterPotassium Ewcse3206-65-59 06:29:00* 



             Test Item    Value        Reference Range Interpretation Comments

 

             Potassium Level (test code = 2823-3) 3.1          3.5-5.1      L   

          





[facility] CenterChloride Nvsog0922-77-94 06:29:00* 



             Test Item    Value        Reference Range Interpretation Comments

 

             Chloride Level (test code = 2075-0) 106                      

         





[facility] CenterCarbon Dioxide Tzyey3453-45-74 06:29:00* 



             Test Item    Value        Reference Range Interpretation Comments

 

             Carbon Dioxide Level (test code = 2028-9) 23           22-29       

               





[facility] CenterAnion Tnq5960-13-70 06:29:00* 



             Test Item    Value        Reference Range Interpretation Comments

 

             Anion Gap (test code = 33037-3) 14.1         8-16                  

     





[facility] CenterBlood Urea Hvhhnuvl3236-86-91 06:29:00* 



             Test Item    Value        Reference Range Interpretation Comments

 

             Blood Urea Nitrogen (test code = 3094-0) 11           7-26         

              





[facility] CenterCreatinine2019-08-04 06:29:00* 



             Test Item    Value        Reference Range Interpretation Comments

 

             Creatinine (test code = 2160-0) 1.14         0.57-1.11    H        

     





[facility] CenterBUN/Creatinine Hecit4949-93-00 06:29:00* 



             Test Item    Value        Reference Range Interpretation Comments

 

             BUN/Creatinine Ratio (test code = 3097-3) 10           6-25        

               





[facility] CenterEstimat Glomerular Filtration Rate
2019 06:29:00* 



             Test Item    Value        Reference Range Interpretation Comments

 

             Estimat Glomerular Filtration Rate (test code = 340748059) 49      

     >60          L             





Ranges were taken from the National Kidney Disease Education Program and the [facility] literature.Reference ranges:60 or greater: Precwu85-16 (
for 3 consecutive months): Chronic kidney disease 15 or less: Kidney failureVal [facility]de Regional Medical CenterGlucose Rbxfp5241-39-56 06:29:00* 



             Test Item    Value        Reference Range Interpretation Comments

 

             Glucose Level (test code = CFK4473) 172                 H    

         





[facility] CenterCalcium Clyek3904-41-01 06:29:00* 



             Test Item    Value        Reference Range Interpretation Comments

 

             Calcium Level (test code = 89422-2) 9.2          8.4-10.2          

         





[facility]odium Vxmig1491-62-12 06:29:00* 



             Test Item    Value        Reference Range Interpretation Comments

 

             Sodium Level (test code = 2951-2) 140          136-145             

       





[facility] CenterPotassium Jkonj9633-17-63 06:29:00* 



             Test Item    Value        Reference Range Interpretation Comments

 

             Potassium Level (test code = 2823-3) 3.1          3.5-5.1      L   

          





[facility] CenterChloride Jveoc7206-20-79 06:29:00* 



             Test Item    Value        Reference Range Interpretation Comments

 

             Chloride Level (test code = 2075-0) 106                      

         





[facility] CenterCarbon Dioxide Nxvgq5215-99-90 06:29:00* 



             Test Item    Value        Reference Range Interpretation Comments

 

             Carbon Dioxide Level (test code = 2028-9) 23           22-29       

               





[facility] CenterAnion Oyv7259-16-13 06:29:00* 



             Test Item    Value        Reference Range Interpretation Comments

 

             Anion Gap (test code = 33037-3) 14.1         8-16                  

     





[facility] CenterBlood Urea Bnufgpzn8475-14-81 06:29:00* 



             Test Item    Value        Reference Range Interpretation Comments

 

             Blood Urea Nitrogen (test code = 3094-0) 11           7-26         

              





[facility] CenterCreatinine2019-08-04 06:29:00* 



             Test Item    Value        Reference Range Interpretation Comments

 

             Creatinine (test code = 2160-0) 1.14         0.57-1.11    H        

     





[facility] CenterBUN/Creatinine Tuhbr0270-26-56 06:29:00* 



             Test Item    Value        Reference Range Interpretation Comments

 

             BUN/Creatinine Ratio (test code = 3097-3) 10           6-25        

               





[facility] CenterEstimat Glomerular Filtration Rate
2019 06:29:00* 



             Test Item    Value        Reference Range Interpretation Comments

 

             Estimat Glomerular Filtration Rate (test code = 992686293) 49      

     >60          L             





Ranges were taken from the National Kidney Disease Education Program and the [facility] Kidney Foundation literature.Reference ranges:60 or greater: Yeyxzk62-55 (
for 3 consecutive months): Chronic kidney disease 15 or less: Kidney failureVal [facility]de Regional Medical CenterGlucose Prleu4176-57-69 06:29:00* 



             Test Item    Value        Reference Range Interpretation Comments

 

             Glucose Level (test code = FLZ3057) 172                 H    

         





[facility] CenterCalcium Honlc1474-53-97 06:29:00* 



             Test Item    Value        Reference Range Interpretation Comments

 

             Calcium Level (test code = 62419-8) 9.2          8.4-10.2          

         





[facility]odium Hydul9500-94-98 06:29:00* 



             Test Item    Value        Reference Range Interpretation Comments

 

             Sodium Level (test code = 2951-2) 140          136-145             

       





[facility] CenterPotassium Lzird9939-79-66 06:29:00* 



             Test Item    Value        Reference Range Interpretation Comments

 

             Potassium Level (test code = 2823-3) 3.1          3.5-5.1      L   

          





[facility] CenterChloride Sentz9925-05-65 06:29:00* 



             Test Item    Value        Reference Range Interpretation Comments

 

             Chloride Level (test code = 2075-0) 106                      

         





[facility] CenterCarbon Dioxide Ttpyo1299-45-53 06:29:00* 



             Test Item    Value        Reference Range Interpretation Comments

 

             Carbon Dioxide Level (test code = 2028-9) 23           22-29       

               





[facility] CenterAnion Igj2808-89-12 06:29:00* 



             Test Item    Value        Reference Range Interpretation Comments

 

             Anion Gap (test code = 33037-3) 14.1         8-16                  

     





[facility] CenterBlood Urea Ydamquev0981-35-14 06:29:00* 



             Test Item    Value        Reference Range Interpretation Comments

 

             Blood Urea Nitrogen (test code = 3094-0) 11           7-26         

              





[facility] CenterCreatinine2019-08-04 06:29:00* 



             Test Item    Value        Reference Range Interpretation Comments

 

             Creatinine (test code = 2160-0) 1.14         0.57-1.11    H        

     





[facility] CenterBUN/Creatinine Fblqc1730-62-55 06:29:00* 



             Test Item    Value        Reference Range Interpretation Comments

 

             BUN/Creatinine Ratio (test code = 3097-3) 10           6-        

               





[facility] CenterEstimat Glomerular Filtration Rate
2019 06:29:00* 



             Test Item    Value        Reference Range Interpretation Comments

 

             Estimat Glomerular Filtration Rate (test code = 648944542) 49      

     >60          L             





Ranges were taken from the National Kidney Disease Education Program and the [facility] Kidney Foundation literature.Reference ranges:60 or greater: Uuivvq97-77 (
for 3 consecutive months): Chronic kidney disease 15 or less: Kidney failureVal [facility]de Regional Medical CenterGlucose Wrlov7226-00-08 06:29:00* 



             Test Item    Value        Reference Range Interpretation Comments

 

             Glucose Level (test code = WIM1397) 172                 H    

         





[facility] CenterCalcium Riaqv4066-18-47 06:29:00* 



             Test Item    Value        Reference Range Interpretation Comments

 

             Calcium Level (test code = 50810-6) 9.2          8.4-10.2          

         





[facility]odium Hwjgj4342-44-87 06:29:00* 



             Test Item    Value        Reference Range Interpretation Comments

 

             Sodium Level (test code = 2951-2) 140          136-145             

       





[facility] CenterPotassium Arlwj5617-17-24 06:29:00* 



             Test Item    Value        Reference Range Interpretation Comments

 

             Potassium Level (test code = 2823-3) 3.1          3.5-5.1      L   

          





[facility] CenterChloride Cvtiq8661-75-52 06:29:00* 



             Test Item    Value        Reference Range Interpretation Comments

 

             Chloride Level (test code = 2075-0) 106                      

         





[facility] CenterCarbon Dioxide Iypou9846-98-08 06:29:00* 



             Test Item    Value        Reference Range Interpretation Comments

 

             Carbon Dioxide Level (test code = 2028-9) 23           22-29       

               





[facility] CenterAnion Fpv5207-13-04 06:29:00* 



             Test Item    Value        Reference Range Interpretation Comments

 

             Anion Gap (test code = 33037-3) 14.1         8-16                  

     





[facility] CenterBlood Urea Fgvsrozp7833-39-56 06:29:00* 



             Test Item    Value        Reference Range Interpretation Comments

 

             Blood Urea Nitrogen (test code = 3094-0) 11           7-26         

              





[facility] CenterCreatinine2019-08-04 06:29:00* 



             Test Item    Value        Reference Range Interpretation Comments

 

             Creatinine (test code = 2160-0) 1.14         0.57-1.11    H        

     





[facility] CenterBUN/Creatinine Xamdz7993-74-50 06:29:00* 



             Test Item    Value        Reference Range Interpretation Comments

 

             BUN/Creatinine Ratio (test code = 3097-3) 10           6-        

               





[facility] CenterEstimat Glomerular Filtration Rate
2019 06:29:00* 



             Test Item    Value        Reference Range Interpretation Comments

 

             Estimat Glomerular Filtration Rate (test code = 768294898) 49      

     >60          L             





Ranges were taken from the National Kidney Disease Education Program and the Michelle
[facility] literature.Reference ranges:60 or greater: Xnrxsm48-95 (
for 3 consecutive months): Chronic kidney disease 15 or less: Kidney failureVal Verde Regional Medical CenterGlucose Gufwn4204-44-39 06:29:00* 



             Test Item    Value        Reference Range Interpretation Comments

 

             Glucose Level (test code = YGJ8756) 172                 H    

         





[facility] CenterCalcium Xorhf7943-87-62 06:29:00* 



             Test Item    Value        Reference Range Interpretation Comments

 

             Calcium Level (test code = 02289-2) 9.2          8.4-10.2          

         





[facility] CenterWhite Blood Heocs2053-98-36 06:26:00* 



             Test Item    Value        Reference Range Interpretation Comments

 

             White Blood Count (test code = 6690-2) 9.37         4.8-10.8       

            





[facility] CenterRed Blood Zkzwy7108-44-47 06:26:00* 



             Test Item    Value        Reference Range Interpretation Comments

 

             Red Blood Count (test code = 789-8) 3.74         3.6-5.1           

         





[facility] CenterHemoglobin2019-08-04 06:26:00* 



             Test Item    Value        Reference Range Interpretation Comments

 

             Hemoglobin (test code = 86619-2) 9.3          12.0-16.0    L       

      





[facility] CenterHematocrit2019-08-04 06:26:00* 



             Test Item    Value        Reference Range Interpretation Comments

 

             Hematocrit (test code = 4544-3) 30.4         34.2-44.1    L        

     





[facility] CenterMean Corpuscular Ugbbzy4853-95-73 
06:26:00* 



             Test Item    Value        Reference Range Interpretation Comments

 

             Mean Corpuscular Volume (test code = 787-2) 81.3         81-99     

                 





[facility] CenterMean Corpuscular Pekttnaleo7941-29-75 
06:26:00* 



             Test Item    Value        Reference Range Interpretation Comments

 

             Mean Corpuscular Hemoglobin (test code = 785-6) 24.9         28-32 

       L             





[facility] CenterMean Corpuscular Hemoglobin Concent
2019 06:26:00* 



             Test Item    Value        Reference Range Interpretation Comments

 

             Mean Corpuscular Hemoglobin Concent (test code = 786-4) 30.6       

  31-35        L             





[facility] CenterRed Cell Distribution Wdsyv0375-01-64 
06:26:00* 



             Test Item    Value        Reference Range Interpretation Comments

 

             Red Cell Distribution Width (test code = 07858-4) 15.9         11.7

-14.4    H             





[facility] CenterPlatelet Ytqva6812-36-01 06:26:00* 



             Test Item    Value        Reference Range Interpretation Comments

 

             Platelet Count (test code = 777-3) 279          140-360            

        





[facility] CenterNeutrophils (%) (Auto)2019 06:26:00
  * 



             Test Item    Value        Reference Range Interpretation Comments

 

             Neutrophils (%) (Auto) (test code = 93190-4) 73.2         38.7-80.0

                  





[facility] CenterLymphocytes (%) (Auto)2019 06:26:00
  * 



             Test Item    Value        Reference Range Interpretation Comments

 

             Lymphocytes (%) (Auto) (test code = 736-9) 19.2         18.0-39.1  

                





[facility] CenterMonocytes (%) (Auto)2019 06:26:00* 



             Test Item    Value        Reference Range Interpretation Comments

 

             Monocytes (%) (Auto) (test code = 5905-5) 5.2          4.4-11.3    

               





[facility] CenterEosinophils (%) (Auto)2019 06:26:00
  * 



             Test Item    Value        Reference Range Interpretation Comments

 

             Eosinophils (%) (Auto) (test code = 713-8) 1.8          0.0-6.0    

                





[facility] CenterBasophils (%) (Auto)2019 06:26:00* 



             Test Item    Value        Reference Range Interpretation Comments

 

             Basophils (%) (Auto) (test code = 706-2) 0.3          0.0-1.0      

              





[facility] CenterIM GRANULOCYTES %2019 06:26:00* 



             Test Item    Value        Reference Range Interpretation Comments

 

             IM GRANULOCYTES % (test code = IM GRANULOCYTES %) 0.3          0.0-

1.0                    





[facility] CenterNeutrophils # (Auto)2019 06:26:00* 



             Test Item    Value        Reference Range Interpretation Comments

 

             Neutrophils # (Auto) (test code = 751-8) 6.9          2.1-6.9      

              





[facility] CenterLymphocytes # (Auto)2019 06:26:00* 



             Test Item    Value        Reference Range Interpretation Comments

 

             Lymphocytes # (Auto) (test code = 07033-3) 1.8          1.0-3.2    

                





[facility] CenterMonocytes # (Auto)2019 06:26:00* 



             Test Item    Value        Reference Range Interpretation Comments

 

             Monocytes # (Auto) (test code = 742-7) 0.5          0.2-0.8        

            





[facility] [facility]:26:00* 



             Test Item    Value        Reference Range Interpretation Comments

 

             Eosinophils # (Auto) (test code = 711-2) 0.2          0.0-0.4      

              





[facility] 



             Test Item    Value        Reference Range Interpretation Comments

 

             Basophils # (Auto) (test code = 704-7) 0.0          0.0-0.1        

            





[facility] CenterAbsolute [facility]ature Granulocyte (auto
2019 06:26:00* 



             Test Item    Value        Reference Range Interpretation Comments

 

                                        Absolute Immature Granulocyte (auto (lloyd

t code = Absolute Immature Granulocyte 

(auto)          0.03            0-0.1                            





[facility] CenterWhite [facility] [id number] 06:26:00* 



             Test Item    Value        Reference Range Interpretation Comments

 

             White Blood Count (test code = 6690-2) 9.37         4.8-10.8       

            





[facility] CenterRed [facility] [id number] 06:26:00* 



             Test Item    Value        Reference Range Interpretation Comments

 

             Red Blood Count (test code = 789-8) 3.74         3.6-5.1           

         





[facility] CenterHemoglobin2019-[facility] 06:26:00* 



             Test Item    Value        Reference Range Interpretation Comments

 

             Hemoglobin (test code = 09757-8) 9.3          12.0-16.0    L       

      





[facility] CenterHematocrit2019-08-04 06:26:00* 



             Test Item    Value        Reference Range Interpretation Comments

 

             Hematocrit (test code = 4544-3) 30.4         34.2-44.1    L        

     





[facility] CenterMean Corpuscular [id number] 
06:26:00* 



             Test Item    Value        Reference Range Interpretation Comments

 

             Mean Corpuscular Volume (test code = 787-2) 81.3         81-99     

                 





[facility] CenterMean Corpuscular Meapjhyquy1408-54-82 
06:26:00* 



             Test Item    Value        Reference Range Interpretation Comments

 

             Mean Corpuscular Hemoglobin (test code = 785-6) 24.9         28-32 

       L             





[facility] CenterMean Corpuscular Hemoglobin Concent
2019 06:26:00* 



             Test Item    Value        Reference Range Interpretation Comments

 

             Mean Corpuscular Hemoglobin Concent (test code = 786-4) 30.6       

  31-35        L             





[facility] CenterRed Cell Distribution [id number] 
06:26:00* 



             Test Item    Value        Reference Range Interpretation Comments

 

             Red Cell Distribution Width (test code = 00345-2) 15.9         11.7

-14.4    H             





[facility] CenterPlatelet [id number] 06:26:00* 



             Test Item    Value        Reference Range Interpretation Comments

 

             Platelet Count (test code = 777-3) 279          140-360            

        





[facility] CenterNeutrophils (%) (Auto)2019 06:26:00
  * 



             Test Item    Value        Reference Range Interpretation Comments

 

             Neutrophils (%) (Auto) (test code = 58963-0) 73.2         38.7-80.0

                  





[facility] CenterLymphocytes (%) (Auto)2019 06:26:00
  * 



             Test Item    Value        Reference Range Interpretation Comments

 

             Lymphocytes (%) (Auto) (test code = 736-9) 19.2         18.0-39.1  

                





[facility] CenterMonocytes (%) (Auto)2019 06:26:00* 



             Test Item    Value        Reference Range Interpretation Comments

 

             Monocytes (%) (Auto) (test code = 5905-5) 5.2          4.4-11.3    

               





[facility] CenterEosinophils (%) (Auto)2019 06:26:00
  * 



             Test Item    Value        Reference Range Interpretation Comments

 

             Eosinophils (%) (Auto) (test code = 713-8) 1.8          0.0-6.0    

                





[facility] CenterBasophils (%) (Auto)2019 06:26:00* 



             Test Item    Value        Reference Range Interpretation Comments

 

             Basophils (%) (Auto) (test code = 706-2) 0.3          0.0-1.0      

              





[facility] CenterIM GRANULOCYTES %2019 06:26:00* 



             Test Item    Value        Reference Range Interpretation Comments

 

             IM GRANULOCYTES % (test code = IM GRANULOCYTES %) 0.3          0.0-

1.0                    





[facility] CenterNeutrophils # (Auto)2019 06:26:00* 



             Test Item    Value        Reference Range Interpretation Comments

 

             Neutrophils # (Auto) (test code = 751-8) 6.9          2.1-6.9      

              





[facility] CenterLymphocytes # (Auto)2019 06:26:00* 



             Test Item    Value        Reference Range Interpretation Comments

 

             Lymphocytes # (Auto) (test code = 67863-6) 1.8          1.0-3.2    

                





[facility] CenterMonocytes # (Auto)2019 06:26:00* 



             Test Item    Value        Reference Range Interpretation Comments

 

             Monocytes # (Auto) (test code = 742-7) 0.5          0.2-0.8        

            





[facility] CenterEosinophils [facility] ([facility] 06:26:00* 



             Test Item    Value        Reference Range Interpretation Comments

 

             Eosinophils # (Auto) (test code = 711-2) 0.2          0.0-0.4      

              





[facility] CenterBasophils # (Auto)2019 06:26:00* 



             Test Item    Value        Reference Range Interpretation Comments

 

             Basophils # (Auto) (test code = 704-7) 0.0          0.0-0.1        

            





[facility] CenterAbsolute Immature Granulocyte (auto
2019 06:26:00* 



             Test Item    Value        Reference Range Interpretation Comments

 

                                        Absolute Immature Granulocyte (auto (lloyd

t code = Absolute Immature Granulocyte 

(auto)          0.03            0-0.1                            





[facility] CenterWhite Blood [id number] 06:26:00* 



             Test Item    Value        Reference Range Interpretation Comments

 

             White Blood Count (test code = 6690-2) 9.37         4.8-10.8       

            





[facility] CenterRed Blood [id number] 06:26:00* 



             Test Item    Value        Reference Range Interpretation Comments

 

             Red Blood Count (test code = 789-8) 3.74         3.6-5.1           

         





[facility] CenterHemoglobin2019-08-[facility] 06:26:00* 



             Test Item    Value        Reference Range Interpretation Comments

 

             Hemoglobin (test code = 35103-2) 9.3          12.0-16.0    L       

      





[facility] CenterHematocrit2019-08-04 06:26:00* 



             Test Item    Value        Reference Range Interpretation Comments

 

             Hematocrit (test code = 4544-3) 30.4         34.2-44.1    L        

     





[facility] CenterMean Corpuscular Cbkyxz3210-10-21 
06:26:00* 



             Test Item    Value        Reference Range Interpretation Comments

 

             Mean Corpuscular Volume (test code = 787-2) 81.3         81-99     

                 





[facility] CenterMean Corpuscular Fuaalfxxzt7872-26-49 
06:26:00* 



             Test Item    Value        Reference Range Interpretation Comments

 

             Mean Corpuscular Hemoglobin (test code = 785-6) 24.9         28-32 

       L             





[facility]
2019 06:26:00* 



             Test Item    Value        Reference Range Interpretation Comments

 

             Mean Corpuscular Hemoglobin Concent (test code = 786-4) 30.6       

  31-35        L             





[facility] CenterRed Cell Distribution [id number] 
06:26:00* 



             Test Item    Value        Reference Range Interpretation Comments

 

             Red Cell Distribution Width (test code = 18017-9) 15.9         11.7

-14.4    H             





[facility] CenterPlatelet Lsojp4458-66-93 06:26:00* 



             Test Item    Value        Reference Range Interpretation Comments

 

             Platelet Count (test code = 777-3) 279          140-360            

        





[facility] CenterNeutrophils (%) (Auto)2019 06:26:00
  * 



             Test Item    Value        Reference Range Interpretation Comments

 

             Neutrophils (%) (Auto) (test code = 56216-8) 73.2         38.7-80.0

                  





[facility] CenterLymphocytes (%) (Auto)2019 06:26:00
  * 



             Test Item    Value        Reference Range Interpretation Comments

 

             Lymphocytes (%) (Auto) (test code = 736-9) 19.2         18.0-39.1  

                





[facility] CenterMonocytes (%) (Auto)2019 06:26:00* 



             Test Item    Value        Reference Range Interpretation Comments

 

             Monocytes (%) (Auto) (test code = 5905-5) 5.2          4.4-11.3    

               





[facility] CenterEosinophils (%) (Auto)2019 06:26:00
  * 



             Test Item    Value        Reference Range Interpretation Comments

 

             Eosinophils (%) (Auto) (test code = 713-8) 1.8          0.0-6.0    

                





[facility] CenterBasophils (%) (Auto)2019 06:26:00* 



             Test Item    Value        Reference Range Interpretation Comments

 

             Basophils (%) (Auto) (test code = 706-2) 0.3          0.0-1.0      

              





[facility] CenterIM GRANULOCYTES %2019 06:26:00* 



             Test Item    Value        Reference Range Interpretation Comments

 

             IM GRANULOCYTES % (test code = IM GRANULOCYTES %) 0.3          0.0-

1.0                    





[facility] CenterNeutrophils # (Auto)2019 06:26:00* 



             Test Item    Value        Reference Range Interpretation Comments

 

             Neutrophils # (Auto) (test code = 751-8) 6.9          2.1-6.9      

              





[facility] CenterLymphocytes # (Auto)2019 06:26:00* 



             Test Item    Value        Reference Range Interpretation Comments

 

             Lymphocytes # (Auto) (test code = 72079-0) 1.8          1.0-3.2    

                





[facility] CenterMonocytes # (Auto)2019 06:26:00* 



             Test Item    Value        Reference Range Interpretation Comments

 

             Monocytes # (Auto) (test code = 742-7) 0.5          0.2-0.8        

            





[facility] CenterEosinophils # (Auto)[facility] 06:26:00* 



             Test Item    Value        Reference Range Interpretation Comments

 

             Eosinophils # (Auto) (test code = 711-2) 0.2          0.0-0.4      

              





[facility] CenterBasophils # (Auto)2019 06:26:00* 



             Test Item    Value        Reference Range Interpretation Comments

 

             Basophils # (Auto) (test code = 704-7) 0.0          0.0-0.1        

            





[facility]
2019 06:26:00* 



             Test Item    Value        Reference Range Interpretation Comments

 

                                        Absolute Immature Granulocyte (auto (lloyd

t code = Absolute Immature Granulocyte 

(auto)          0.03            0-0.1                            





[facility] CenterWhite Blood [id number] 06:26:00* 



             Test Item    Value        Reference Range Interpretation Comments

 

             White Blood Count (test code = 6690-2) 9.37         4.8-10.8       

            





[facility] CenterRed Blood [id number] 06:26:00* 



             Test Item    Value        Reference Range Interpretation Comments

 

             Red Blood Count (test code = 789-8) 3.74         3.6-5.1           

         





[facility] CenterHemoglobin2019-[facility] 06:26:00* 



             Test Item    Value        Reference Range Interpretation Comments

 

             Hemoglobin (test code = 82265-8) 9.3          12.0-16.0    L       

      





[facility] CenterHematocrit2019-08-04 06:26:00* 



             Test Item    Value        Reference Range Interpretation Comments

 

             Hematocrit (test code = 4544-3) 30.4         34.2-44.1    L        

     





[facility] CenterMean Corpuscular [id number] 
06:26:00* 



             Test Item    Value        Reference Range Interpretation Comments

 

             Mean Corpuscular Volume (test code = 787-2) 81.3         81-99     

                 





[facility] CenterMean Corpuscular [id number] 
06:26:00* 



             Test Item    Value        Reference Range Interpretation Comments

 

             Mean Corpuscular Hemoglobin (test code = 785-6) 24.9         28-32 

       L             





[facility] CenterMean Corpuscular Hemoglobin Concent
2019 06:26:00* 



             Test Item    Value        Reference Range Interpretation Comments

 

             Mean Corpuscular Hemoglobin Concent (test code = 786-4) 30.6       

  31-35        L             





[facility] CenterRed Cell Distribution [id number] 
06:26:00* 



             Test Item    Value        Reference Range Interpretation Comments

 

             Red Cell Distribution Width (test code = 26213-9) 15.9         11.7

-14.4    H             





[facility] CenterPlatelet [id number] 06:26:00* 



             Test Item    Value        Reference Range Interpretation Comments

 

             Platelet Count (test code = 777-3) 279          140-360            

        





[facility] CenterNeutrophils (%) (Auto)2019 06:26:00
  * 



             Test Item    Value        Reference Range Interpretation Comments

 

             Neutrophils (%) (Auto) (test code = 50156-0) 73.2         38.7-80.0

                  





[facility] CenterLymphocytes (%) (Auto)2019 06:26:00
  * 



             Test Item    Value        Reference Range Interpretation Comments

 

             Lymphocytes (%) (Auto) (test code = 736-9) 19.2         18.0-39.1  

                





[facility] CenterMonocytes (%) (Auto)2019 06:26:00* 



             Test Item    Value        Reference Range Interpretation Comments

 

             Monocytes (%) (Auto) (test code = 5905-5) 5.2          4.4-11.3    

               





[facility] CenterEosinophils (%) (Auto)2019 06:26:00
  * 



             Test Item    Value        Reference Range Interpretation Comments

 

             Eosinophils (%) (Auto) (test code = 713-8) 1.8          0.0-6.0    

                





[facility] CenterBasophils (%) (Auto)2019 06:26:00* 



             Test Item    Value        Reference Range Interpretation Comments

 

             Basophils (%) (Auto) (test code = 706-2) 0.3          0.0-1.0      

              





[facility] CenterIM GRANULOCYTES %2019 06:26:00* 



             Test Item    Value        Reference Range Interpretation Comments

 

             IM GRANULOCYTES % (test code = IM GRANULOCYTES %) 0.3          0.0-

1.0                    





[facility] CenterNeutrophils # (Auto)2019 06:26:00* 



             Test Item    Value        Reference Range Interpretation Comments

 

             Neutrophils # (Auto) (test code = 751-8) 6.9          2.1-6.9      

              





[facility] CenterLymphocytes # (Auto)2019 06:26:00* 



             Test Item    Value        Reference Range Interpretation Comments

 

             Lymphocytes # (Auto) (test code = 68944-4) 1.8          1.0-3.2    

                





[facility] CenterMonocytes # (Auto)2019 06:26:00* 



             Test Item    Value        Reference Range Interpretation Comments

 

             Monocytes # (Auto) (test code = 742-7) 0.5          0.2-0.8        

            





[facility] CenterEosinophils # (Auto)2019 06:26:00* 



             Test Item    Value        Reference Range Interpretation Comments

 

             Eosinophils # (Auto) (test code = 711-2) 0.2          0.0-0.4      

              





[facility] CenterBasophils # (Auto)2019 06:26:00* 



             Test Item    Value        Reference Range Interpretation Comments

 

             Basophils # (Auto) (test code = 704-7) 0.0          0.0-0.1        

            





[facility] CenterAbsolute Immature Granulocyte (auto
2019 06:26:00* 



             Test Item    Value        Reference Range Interpretation Comments

 

                                        Absolute Immature Granulocyte (auto (lloyd

t code = Absolute Immature Granulocyte 

(auto)          0.03            0-0.1                            





[facility] CenterWhite Blood [id number] 06:26:00* 



             Test Item    Value        Reference Range Interpretation Comments

 

             White Blood Count (test code = 6690-2) 9.37         4.8-10.8       

            





[facility] CenterRed Blood [id number] 06:26:00* 



             Test Item    Value        Reference Range Interpretation Comments

 

             Red Blood Count (test code = 789-8) 3.74         3.6-5.1           

         





[facility] CenterHemoglobin2019-08-[facility] 06:26:00* 



             Test Item    Value        Reference Range Interpretation Comments

 

             Hemoglobin (test code = 81981-6) 9.3          12.0-16.0    L       

      





[facility] CenterHematocrit2019-08-04 06:26:00* 



             Test Item    Value        Reference Range Interpretation Comments

 

             Hematocrit (test code = 4544-3) 30.4         34.2-44.1    L        

     





[facility] CenterMean Corpuscular [id number] 
06:26:00* 



             Test Item    Value        Reference Range Interpretation Comments

 

             Mean Corpuscular Volume (test code = 787-2) 81.3         81-99     

                 





[facility] CenterMean Corpuscular [id number] 
06:26:00* 



             Test Item    Value        Reference Range Interpretation Comments

 

             Mean Corpuscular Hemoglobin (test code = 785-6) 24.9         28-32 

       L             





[facility] CenterMean Corpuscular Hemoglobin Concent
2019 06:26:00* 



             Test Item    Value        Reference Range Interpretation Comments

 

             Mean Corpuscular Hemoglobin Concent (test code = 786-4) 30.6       

  31-35        L             





[facility] CenterRed Cell Distribution [id number] 
06:26:00* 



             Test Item    Value        Reference Range Interpretation Comments

 

             Red Cell Distribution Width (test code = 79083-6) 15.9         11.7

-14.4    H             





[facility] CenterPlatelet [id number] 06:26:00* 



             Test Item    Value        Reference Range Interpretation Comments

 

             Platelet Count (test code = 777-3) 279          140-360            

        





[facility] CenterNeutrophils (%) (Auto)2019 06:26:00
  * 



             Test Item    Value        Reference Range Interpretation Comments

 

             Neutrophils (%) (Auto) (test code = 14409-0) 73.2         38.7-80.0

                  





[facility] CenterLymphocytes (%) (Auto)2019 06:26:00
  * 



             Test Item    Value        Reference Range Interpretation Comments

 

             Lymphocytes (%) (Auto) (test code = 736-9) 19.2         18.0-39.1  

                





[facility] CenterMonocytes (%) (Auto)2019 06:26:00* 



             Test Item    Value        Reference Range Interpretation Comments

 

             Monocytes (%) (Auto) (test code = 5905-5) 5.2          4.4-11.3    

               





[facility] CenterEosinophils (%) (Auto)2019 06:26:00
  * 



             Test Item    Value        Reference Range Interpretation Comments

 

             Eosinophils (%) (Auto) (test code = 713-8) 1.8          0.0-6.0    

                





[facility] CenterBasophils (%) (Auto)2019 06:26:00* 



             Test Item    Value        Reference Range Interpretation Comments

 

             Basophils (%) (Auto) (test code = 706-2) 0.3          0.0-1.0      

              





[facility] CenterIM GRANULOCYTES %2019 06:26:00* 



             Test Item    Value        Reference Range Interpretation Comments

 

             IM GRANULOCYTES % (test code = IM GRANULOCYTES %) 0.3          0.0-

1.0                    





[facility] CenterNeutrophils # (Auto)2019 06:26:00* 



             Test Item    Value        Reference Range Interpretation Comments

 

             Neutrophils # (Auto) (test code = 751-8) 6.9          2.1-6.9      

              





[facility] CenterLymphocytes # (Auto)2019 06:26:00* 



             Test Item    Value        Reference Range Interpretation Comments

 

             Lymphocytes # (Auto) (test code = 66082-8) 1.8          1.0-3.2    

                





[facility] CenterMonocytes # (Auto)2019 06:26:00* 



             Test Item    Value        Reference Range Interpretation Comments

 

             Monocytes # (Auto) (test code = 742-7) 0.5          0.2-0.8        

            





[facility] CenterEosinophils # (Auto)2019 06:26:00* 



             Test Item    Value        Reference Range Interpretation Comments

 

             Eosinophils # (Auto) (test code = 711-2) 0.2          0.0-0.4      

              





[facility] CenterBasophils # (Auto)2019 06:26:00* 



             Test Item    Value        Reference Range Interpretation Comments

 

             Basophils # (Auto) (test code = 704-7) 0.0          0.0-0.1        

            





[facility] CenterAbsolute Immature Granulocyte (auto
2019 06:26:00* 



             Test Item    Value        Reference Range Interpretation Comments

 

                                        Absolute Immature Granulocyte (auto (lloyd

t code = Absolute Immature Granulocyte 

(auto)          0.03            0-0.1                            





[facility] 
by detection limit <= 0.005 miu/l (units/volume)2019 05:15:00* 



             Test Item    Value        Reference Range Interpretation Comments

 

             Thyroid Stimulating Hormone (TSH) (test code = 16034-7) 2.374      

  0.350-4.940                





[facility] [id number] 09:10:00* 



             Test Item    Value        Reference Range Interpretation Comments

 

             Urine Culture (test code = 630-4) Organism: STAPHYLOCOCCUS AUREUS  

                          





[facility] [id number] 09:10:00* 



             Test Item    Value        Reference Range Interpretation Comments

 

             Urine Culture (test code = 630-4) Organism: STAPHYLOCOCCUS AUREUS  

                          





[facility] [id number] 09:10:00* 



             Test Item    Value        Reference Range Interpretation Comments

 

             Urine Culture (test code = 630-4) Organism: STAPHYLOCOCCUS AUREUS  

                          





[facility] [id number] 09:10:00* 



             Test Item    Value        Reference Range Interpretation Comments

 

             Urine Culture (test code = 630-4) No Result Data Provided          

                  





[facility] Alxxhpq7944-89-37 09:10:00* 



             Test Item    Value        Reference Range Interpretation Comments

 

             Urine Culture (test code = 630-4) No Result Data Provided          

                  





[facility] Mlehtxtut3287-02-29 06:09:00* 



             Test Item    Value        Reference Range Interpretation Comments

 

             Total Bilirubin (test code = 1975-2) 0.2          0.2-1.2          

          





[facility] Center[facility]f [facility]SGOT)
2019 06:09:00* 



             Test Item    Value        Reference Range Interpretation Comments

 

                                        Aspartate Amino Transf (AST/SGOT) (test 

code = Aspartate Amino Transf 

(AST/SGOT))     8               5-34                             





[facility] CenterAl[facility]
2019 06:09:00* 



             Test Item    Value        Reference Range Interpretation Comments

 

             Alanine Aminotransferase (ALT/SGPT) (test code = 1742-6) 12        

   0-55                       





[facility] Ktxsdmm9923-64-52 06:09:00* 



             Test Item    Value        Reference Range Interpretation Comments

 

             Total Protein (test code = 2885-2) 7.5          6.5-8.1            

        





[facility]09[facility]* 



             Test Item    Value        Reference Range Interpretation Comments

 

             Albumin (test code = 1751-7) 2.9          3.5-5.0      L           

  





[facility]09[facility]* 



             Test Item    Value        Reference Range Interpretation Comments

 

             Globulin (test code = 29582-3) 4.6          2.3-3.5      H         

    





[facility] CenterAlbum[facility]/Globulin Tmzwa2529-84-59 06:09:00
  * 



             Test Item    Value        Reference Range Interpretation Comments

 

             Albumin/Globulin Ratio (test code = 1759-0) 0.6          0.8-2.0   

   L             





[facility] CenterAlkaline Eiblrhxfexv2974-26-55 06:09:00* 



             Test Item    Value        Reference Range Interpretation Comments

 

             Alkaline Phosphatase (test code = 6768-6) 95                 

               





[facility] Lrveodfye1187-35-53 06:09:00* 



             Test Item    Value        Reference Range Interpretation Comments

 

             Total Bilirubin (test code = 1975-2) 0.2          0.2-1.2          

          





[facility] CenterAsp[facility]ate [facility]o Transf (AST/SGOT)
2019 06:09:00* 



             Test Item    Value        Reference Range Interpretation Comments

 

                                        Aspartate Amino Transf (AST/SGOT) (test 

code = Aspartate Amino Transf 

(AST/SGOT))     8               5-34                             





[facility] CenterAlanine Aminotransferase (ALT/SGPT)
2019 06:09:00* 



             Test Item    Value        Reference Range Interpretation Comments

 

             Alanine Aminotransferase (ALT/SGPT) (test code = 1742-6) 12        

   0-55                       





[facility] Sbunjpb9385-94-98 06:09:00* 



             Test Item    Value        Reference Range Interpretation Comments

 

             Total Protein (test code = 2885-2) 7.5          6.5-8.1            

        





[facility] CenterAlbumin2019-08-[facility] 06:09:00* 



             Test Item    Value        Reference Range Interpretation Comments

 

             Albumin (test code = 1751-7) 2.9          3.5-5.0      L           

  





[facility] CenterGlobulin2019-08-[facility] 06:09:00* 



             Test Item    Value        Reference Range Interpretation Comments

 

             Globulin (test code = 76181-9) 4.6          2.3-3.5      H         

    





[facility] CenterAlbum[facility]/Globulin Hpwbs5991-94-11 06:09:00
  * 



             Test Item    Value        Reference Range Interpretation Comments

 

             Albumin/Globulin Ratio (test code = 1759-0) 0.6          0.8-2.0   

   L             





[facility] CenterAlkaline Zzyalzfvhfw5829-39-81 06:09:00* 



             Test Item    Value        Reference Range Interpretation Comments

 

             Alkaline Phosphatase (test code = 6768-6) 95                 

               





[facility] CenterTotal Mnilquznc0294-77-59 06:09:00* 



             Test Item    Value        Reference Range Interpretation Comments

 

             Total Bilirubin (test code = 1975-2) 0.2          0.2-1.2          

          





[facility] CenterAspartate Amino Transf (AST/SGOT)
2019 06:09:00* 



             Test Item    Value        Reference Range Interpretation Comments

 

                                        Aspartate Amino Transf (AST/SGOT) (test 

code = Aspartate Amino Transf 

(AST/SGOT))     8               5-34                             





[facility] CenterAlanine Aminotransferase (ALT/SGPT)
2019 06:09:00* 



             Test Item    Value        Reference Range Interpretation Comments

 

             Alanine Aminotransferase (ALT/SGPT) (test code = 1742-6) 12        

   0-55                       





[facility] Oosuchg1661-69-86 06:09:00* 



             Test Item    Value        Reference Range Interpretation Comments

 

             Total Protein (test code = 2885-2) 7.5          6.5-8.1            

        





[facility] CenterAlbumin2019-08-[facility] 06:09:00* 



             Test Item    Value        Reference Range Interpretation Comments

 

             Albumin (test code = 1751-7) 2.9          3.5-5.0      L           

  





[facility] CenterGlobulin2019-08-[facility] 06:09:00* 



             Test Item    Value        Reference Range Interpretation Comments

 

             Globulin (test code = 96953-0) 4.6          2.3-3.5      H         

    





[facility] CenterAlbumin/Globulin Jyljm4572-30-96 06:09:00
  * 



             Test Item    Value        Reference Range Interpretation Comments

 

             Albumin/Globulin Ratio (test code = 1759-0) 0.6          0.8-2.0   

   L             





[facility] CenterAlkaline Ponuddgoxvr1270-35-58 06:09:00* 



             Test Item    Value        Reference Range Interpretation Comments

 

             Alkaline Phosphatase (test code = 6768-6) 95                 

               





[facility] Reeljelom5012-01-00 06:09:00* 



             Test Item    Value        Reference Range Interpretation Comments

 

             Total Bilirubin (test code = 1975-2) 0.2          0.2-1.2          

          





[facility] CenterAspartate Amino Transf (AST/SGOT)
2019 06:09:00* 



             Test Item    Value        Reference Range Interpretation Comments

 

                                        Aspartate Amino Transf (AST/SGOT) (test 

code = Aspartate Amino Transf 

(AST/SGOT))     8               5-34                             





[facility] CenterAlanine Aminotransferase (ALT/SGPT)
2019 06:09:00* 



             Test Item    Value        Reference Range Interpretation Comments

 

             Alanine Aminotransferase (ALT/SGPT) (test code = 1742-6) 12        

   0-55                       





[facility] Vnqkvoq0084-11-37 06:09:00* 



             Test Item    Value        Reference Range Interpretation Comments

 

             Total Protein (test code = 2885-2) 7.5          6.5-8.1            

        





[facility] CenterAlbumin2019-08-[facility] 06:09:00* 



             Test Item    Value        Reference Range Interpretation Comments

 

             Albumin (test code = 1751-7) 2.9          3.5-5.0      L           

  





[facility] CenterGlobulin2019-08-[facility] 06:09:00* 



             Test Item    Value        Reference Range Interpretation Comments

 

             Globulin (test code = 88363-3) 4.6          2.3-3.5      H         

    





[facility] CenterAlbumin/Globulin Ieyny8038-78-49 06:09:00
  * 



             Test Item    Value        Reference Range Interpretation Comments

 

             Albumin/Globulin Ratio (test code = 1759-0) 0.6          0.8-2.0   

   L             





[facility] CenterAlkaline Tevlhwxrdrv2747-61-32 06:09:00* 



             Test Item    Value        Reference Range Interpretation Comments

 

             Alkaline Phosphatase (test code = 6768-6) 95                 

               





[facility] CenterTotal Txvgqlppa8201-68-46 06:09:00* 



             Test Item    Value        Reference Range Interpretation Comments

 

             Total Bilirubin (test code = 1975-2) 0.2          0.2-1.2          

          





[facility] CenterAspartate Amino Transf (AST/SGOT)
2019 06:09:00* 



             Test Item    Value        Reference Range Interpretation Comments

 

                                        Aspartate Amino Transf (AST/SGOT) (test 

code = Aspartate Amino Transf 

(AST/SGOT))     8               5-34                             





[facility] CenterAlanine Aminotransferase (ALT/SGPT)
2019 06:09:00* 



             Test Item    Value        Reference Range Interpretation Comments

 

             Alanine Aminotransferase (ALT/SGPT) (test code = 1742-6) 12        

   0-55                       





[facility] CenterTotal Umisjmy3914-32-37 06:09:00* 



             Test Item    Value        Reference Range Interpretation Comments

 

             Total Protein (test code = 2885-2) 7.5          6.5-8.1            

        





[facility] CenterAlbumin2019-08-[facility] 06:09:00* 



             Test Item    Value        Reference Range Interpretation Comments

 

             Albumin (test code = 1751-7) 2.9          3.5-5.0      L           

  





[facility] CenterGl[facility]ulin2019-08-[facility] 06:09:00* 



             Test Item    Value        Reference Range Interpretation Comments

 

             Globulin (test code = 66259-1) 4.6          2.3-3.5      H         

    





[facility] CenterAlbumin/Globulin Bzvst4596-19-30 06:09:00
  * 



             Test Item    Value        Reference Range Interpretation Comments

 

             Albumin/Globulin Ratio (test code = 1759-0) 0.6          0.8-2.0   

   L             





[facility] CenterAlkaline Jlgjnlfztny1869-01-04 06:09:00* 



             Test Item    Value        Reference Range Interpretation Comments

 

             Alkaline Phosphatase (test code = 6768-6) 95                 

               





[facility] CenterU[facility] VEO6733-03-98 19:25:00* 



             Test Item    Value        Reference Range Interpretation Comments

 

             Urine WBC (test code = 5821-4) 11-20        0-5          H         

    





[facility] CenterU[facility] OMH8734-69-09 19:25:00* 



             Test Item    Value        Reference Range Interpretation Comments

 

             Urine RBC (test code = 95101-3) 6-10         0-5          H        

     





[facility] CenterU[facility] Jggydlzq1863-77-23 19:25:00* 



             Test Item    Value        Reference Range Interpretation Comments

 

             Urine Bacteria (test code = 58678-7) MANY         NONE         H   

          





[facility] CenterU[facility] Epithelial Oagpb5963-90-34 19:25:00
  * 



             Test Item    Value        Reference Range Interpretation Comments

 

             Urine Epithelial Cells (test code = 20872-9) NONE         NONE     

                  





[facility] CenterU[facility] Amorphous Qemhpgcv7409-41-20 
19:25:00* 



             Test Item    Value        Reference Range Interpretation Comments

 

             Urine Amorphous Sediment (test code = 8246-1) MODERATE     FEW     

     H             





[facility] EUM6075-96-45 19:25:00* 



             Test Item    Value        Reference Range Interpretation Comments

 

             Urine WBC (test code = 5821-4) 11-20        0-5          H         

    





[facility] RNK4517-46-09 19:25:00* 



             Test Item    Value        Reference Range Interpretation Comments

 

             Urine RBC (test code = 80617-9) 6-10         0-5          H        

     





[facility] Bburiedu1072-89-82 19:25:00* 



             Test Item    Value        Reference Range Interpretation Comments

 

             Urine Bacteria (test code = 50826-2) MANY         NONE         H   

          





[facility] CenterU[facility] Epithelial Nrlen6998-17-29 19:25:00
  * 



             Test Item    Value        Reference Range Interpretation Comments

 

             Urine Epithelial Cells (test code = 00178-1) NONE         NONE     

                  





[facility] Amorphous Ewnvayby5116-86-13 
19:25:00* 



             Test Item    Value        Reference Range Interpretation Comments

 

             Urine Amorphous Sediment (test code = 8246-1) MODERATE     FEW     

     H             





[facility] ZWE3665-01-99 19:25:00* 



             Test Item    Value        Reference Range Interpretation Comments

 

             Urine WBC (test code = 5821-4) 11-20        0-5          H         

    





[facility] FIN3957-67-86 19:25:00* 



             Test Item    Value        Reference Range Interpretation Comments

 

             Urine RBC (test code = 58379-6) 6-10         0-5          H        

     





[facility] Oygyeapu4567-37-89 19:25:00* 



             Test Item    Value        Reference Range Interpretation Comments

 

             Urine Bacteria (test code = 36061-7) MANY         NONE         H   

          





[facility] CenterU[facility] Epithelial Kjjmt1534-61-94 19:25:00
  * 



             Test Item    Value        Reference Range Interpretation Comments

 

             Urine Epithelial Cells (test code = 73794-1) NONE         NONE     

                  





[facility] CenterU[facility] Amorphous Flryamye2898-46-61 
19:25:00* 



             Test Item    Value        Reference Range Interpretation Comments

 

             Urine Amorphous Sediment (test code = 8246-1) MODERATE     FEW     

     H             





[facility] CenterU[facility] Orxyh3497-80-32 19:22:00* 



             Test Item    Value        Reference Range Interpretation Comments

 

             Urine Color (test code = 5778-6) OTHER        YELLOW               

      





[facility] Yvckz4845-53-85 19:22:00* 



             Test Item    Value        Reference Range Interpretation Comments

 

             Urine Color (test code = 5778-6) OTHER        YELLOW               

      





[facility] CenterU[facility] Ljive2458-54-16 19:22:00* 



             Test Item    Value        Reference Range Interpretation Comments

 

             Urine Color (test code = 5778-6) OTHER        YELLOW               

      





[facility] CenterUrine Rtlfauv0072-74-79 19:16:00* 



             Test Item    Value        Reference Range Interpretation Comments

 

             Urine Clarity (test code = 10200-0) SL CLOUDY    CLEAR             

         





[facility] CenterU[facility] Specific Tdoizln2172-04-87 19:16:00
  * 



             Test Item    Value        Reference Range Interpretation Comments

 

             Urine Specific Gravity (test code = 5811-5) 1.010        1.010-1.02

5                





[facility] Center[facility] tC4499-16-04 19:16:00* 



             Test Item    Value        Reference Range Interpretation Comments

 

             Urine pH (test code = 57307-6) 6            5-7                    

    





[facility] Center[facility] Leukocyte Rqkjqoni5367-15-48 
19:16:00* 



             Test Item    Value        Reference Range Interpretation Comments

 

             Urine Leukocyte Esterase (test code = 99256-3) LARGE        NEGATIV

E                   





[facility] CenterU[facility] Qcepzdc9412-44-85 19:16:00* 



             Test Item    Value        Reference Range Interpretation Comments

 

             Urine Nitrite (test code = 66828-9) NEGATIVE     NEGATIVE          

         





[facility] Center[facility] Ulvfxxo0799-63-19 19:16:00* 



             Test Item    Value        Reference Range Interpretation Comments

 

             Urine Protein (test code = 57735-3) NEGATIVE     NEGATIVE          

         





[facility] CenterU[facility] Glucose (UA)2019 19:16:00* 



             Test Item    Value        Reference Range Interpretation Comments

 

             Urine Glucose (UA) (test code = 61720-6) NEGATIVE     NEGATIVE     

              





[facility] Center[facility] Asyxtsm2146-28-94 19:16:00* 



             Test Item    Value        Reference Range Interpretation Comments

 

             Urine Ketones (test code = 77517-1) NEGATIVE     NEGATIVE          

         





[facility] Center[facility] Bcujpnftadzu0848-40-55 19:16:00* 



             Test Item    Value        Reference Range Interpretation Comments

 

             Urine Urobilinogen (test code = 56804-7) 0.2          0.2-1        

              





[facility] Center[facility] Nfgpwcjyh4661-52-69 19:16:00* 



             Test Item    Value        Reference Range Interpretation Comments

 

             Urine Bilirubin (test code = 1977-8) NEGATIVE     NEGATIVE         

          





[facility] Center[facility] Ydwwf9319-26-11 19:16:00* 



             Test Item    Value        Reference Range Interpretation Comments

 

             Urine Blood (test code = 83153-8) 3+           NEGATIVE            

       





[facility] Center[facility] Owbpsqd4956-55-41 19:16:00* 



             Test Item    Value        Reference Range Interpretation Comments

 

             Urine Clarity (test code = 73668-7) SL CLOUDY    CLEAR             

         





[facility] CenterU[facility] Specific Thiiptn6143-18-07 19:16:00
  * 



             Test Item    Value        Reference Range Interpretation Comments

 

             Urine Specific Gravity (test code = 5811-5) 1.010        1.010-1.02

5                





[facility] Center[facility] bK0583-73-22 19:16:00* 



             Test Item    Value        Reference Range Interpretation Comments

 

             Urine pH (test code = 74248-0) 6            5-7                    

    





[facility] Center[facility] Leukocyte Pafzvzty6950-77-32 
19:16:00* 



             Test Item    Value        Reference Range Interpretation Comments

 

             Urine Leukocyte Esterase (test code = 32056-8) LARGE        NEGATIV

E                   





[facility] Dqbkkkn7022-27-91 19:16:00* 



             Test Item    Value        Reference Range Interpretation Comments

 

             Urine Nitrite (test code = 76518-9) NEGATIVE     NEGATIVE          

         





[facility] Akiphsp5156-19-43 19:16:00* 



             Test Item    Value        Reference Range Interpretation Comments

 

             Urine Protein (test code = 57735-3) NEGATIVE     NEGATIVE          

         





[facility] Center[facility] Glucose (UA)2019 19:16:00* 



             Test Item    Value        Reference Range Interpretation Comments

 

             Urine Glucose (UA) (test code = 42319-5) NEGATIVE     NEGATIVE     

              





[facility] Center[facility] Mzoysxs7581-05-82 19:16:00* 



             Test Item    Value        Reference Range Interpretation Comments

 

             Urine Ketones (test code = 25231-1) NEGATIVE     NEGATIVE          

         





[facility] Subyazvtbyog2295-71-05 19:16:00* 



             Test Item    Value        Reference Range Interpretation Comments

 

             Urine Urobilinogen (test code = 35412-2) 0.2          0.2-1        

              





[facility] Bewfgegkn2543-19-06 19:16:00* 



             Test Item    Value        Reference Range Interpretation Comments

 

             Urine Bilirubin (test code = 1977-8) NEGATIVE     NEGATIVE         

          





[facility] Tyrrx4611-87-53 19:16:00* 



             Test Item    Value        Reference Range Interpretation Comments

 

             Urine Blood (test code = 60907-9) 3+           NEGATIVE            

       





[facility] Jjejtkt3076-56-43 19:16:00* 



             Test Item    Value        Reference Range Interpretation Comments

 

             Urine Clarity (test code = 69571-2) SL CLOUDY    CLEAR             

         





[facility] Specific Mnsshwf9145-00-82 19:16:00
  * 



             Test Item    Value        Reference Range Interpretation Comments

 

             Urine Specific Gravity (test code = 5811-5) 1.010        1.010-1.02

5                





[facility] nB0357-04-12 19:16:00* 



             Test Item    Value        Reference Range Interpretation Comments

 

             Urine pH (test code = 38618-5) 6            5-7                    

    





[facility] Center[facility] Leukocyte Gbkkspun8839-37-50 
19:16:00* 



             Test Item    Value        Reference Range Interpretation Comments

 

             Urine Leukocyte Esterase (test code = 18875-6) LARGE        NEGATIV

E                   





[facility] Gzrfotn4614-05-46 19:16:00* 



             Test Item    Value        Reference Range Interpretation Comments

 

             Urine Nitrite (test code = 40927-9) NEGATIVE     NEGATIVE          

         





[facility] Ddkiuyp0724-07-56 19:16:00* 



             Test Item    Value        Reference Range Interpretation Comments

 

             Urine Protein (test code = 57735-3) NEGATIVE     NEGATIVE          

         





[facility] Glucose [facility]UA)2019 19:16:00* 



             Test Item    Value        Reference Range Interpretation Comments

 

             Urine Glucose (UA) (test code = 17797-4) NEGATIVE     NEGATIVE     

              





[facility] Pwvhgww9587-00-45 19:16:00* 



             Test Item    Value        Reference Range Interpretation Comments

 

             Urine Ketones (test code = 09857-3) NEGATIVE     NEGATIVE          

         





[facility] Umgduruwqchd4928-39-44 19:16:00* 



             Test Item    Value        Reference Range Interpretation Comments

 

             Urine Urobilinogen (test code = 23618-4) 0.2          0.2-1        

              





[facility] Tfegspwun6144-93-72 19:16:00* 



             Test Item    Value        Reference Range Interpretation Comments

 

             Urine Bilirubin (test code = 1977-8) NEGATIVE     NEGATIVE         

          





[facility] Lubxm1892-98-06 19:16:00* 



             Test Item    Value        Reference Range Interpretation Comments

 

             Urine Blood (test code = 89794-8) 3+           NEGATIVE            

       





[facility]AB[facility] 18:34:00   
                                                                                
 [place] [address]      Patient Name: CRISTINA RICH     
                             MR #: [id number]                     :                                    Age/Sex: 53/F  Acct #: [id number]   
                          Req #: [id number]  Adm Physician:                     
                                Ordered by: TINO MALHOTRA NP                 
          Report #: [id number]        Location: ER                               
      Room/Bed:                     ________________________________________
___________________________________________________________    Procedure: [id number]
054 DX/ABDOMEN-1VIEW (KUB)  Exam Date:                             Exam Time:   
                                           REPORT STATUS: Signed    RADIOGRAPH(
S) OF THE ABDOMEN AND PELVIS, 2 view(s)          HISTORY:  Abdominal pain, right
kidney drain      COMPARISON:  CT of the abdomen and pelvis 2019. Abd
ominal pelvic   radiographs 2019.      FINDINGS:   No specific evidenc
e of obstruction or ileus.     Unchanged appearance of the right nephrostomy tub
e.   Unchanged appearance of the 2 left-sided ureteral stents.   Unchanged multi
ple metallic surgical clips within the pelvis.   Presumed Gomez catheter, the ti
p of the catheter is not definitively visualized   in the region of the urinary 
bladder.   The bones are partially obscured by stool and overlying bowel gas.   
Cholelithiasis.      IMPRESSION:    1.  Nonobstructive bowel gas pattern.   2.  
Unchanged appearance of the right nephrostomy tube and left ureteral   stents.  
3.  Cholelithiasis.      Signed by: Dr. Vamsi Guerin D.O., M.M.M. on 2019 6
:37 PM        Dictated By: VAMSI GUERIN DO  Electronically Signed By: VAMSI GUERIN DO on 19  Transcribed By: LUCINDA on 19       COPY TO:   
TINO MALHOTRA NP         SPECIAL PROCEDURE IN CATH [id number] 14:18:00     
                                                                                
[place] [address]      Patient Name: CRISTINA RICH     
                             MR #: [id number]                     :                                    Age/Sex: 53/F  Acct #: [id number]   
                          Req #: [id number]  Adm Physician:                     
                                Ordered by: JACQUELINE ARORA MD                      
     Report #: [id number]        Location: CATH LAB                              
 Room/Bed:                     _____________________________________________
______________________________________________________    Procedure: [id number] I
R/SPECIAL PROCEDURE IN CATH LAB  Exam Date:                             Exam Tate
e:                                               REPORT STATUS: Signed    PROCED
URE:   CHG PERC TUBE/DRN CATH/CON/S I       COMPARISON:   Prior nephrostomy tube
change 2019       Preprocedure diagnosis: right ureteral obstruction, chron
ic indwelling    nephrostomy   Post procedure diagnosis: Right ureteral obstruct
ion, chronic    indwelling nephrostomy.       Sedation/anesthesia: Intravenous f
entanyl and Versed. Refer to nursing    record. The patient's heart rate and pul
se oximetry were continuously    monitored by the interventional radiology nurse
. Blood pressure was    monitored at 5 minute intervals.       Additional medica
tions: Lidocaine 1% for local anesthesia.       Total fluoroscopy time: One alfonso
te   Dose-area product:  267.5 cGycm2       Contrast used: 10 cc Isovue-300   Es
timated blood loss: Minimal   Blood products administered: None   Specimens: 10 
French percutaneous nephrostomy catheter, discarded   Implants/grafts: 10 French
percutaneous nephrostomy catheter       Condition at completion: Stable   Dispo
sition: Catheter lab holding area for recovery   Complications: No immediate    
  Procedure in detail:   Informed consent was obtained and documented in the me
dical record    after discussion of risks and benefits.       The patient was pl
aced in the prone position on the angiographic table.    The right flank and exi
sting percutaneous nephrostomy catheter were    prepped and draped in standard s
terile fashion. 1% lidocaine was    infiltrated into the skin and subcutaneous t
issues along the existing    catheter for local anesthesia.       Injection of d
ilute contrast material through the catheter confirmed    appropriate position w
ithin the collecting system. The catheter was    severed and a 0.035 inch stiff 
Glidewire was advanced through the    catheter and coiled within the renal pelvi
s. The catheter was then    exchanged over-the-wire for a new 10 French locking 
loop percutaneous    nephrostomy catheter. The wire was removed and the locking 
loop was    formed in the renal pelvis. Appropriate positioning was confirmed by
   injection of dilute contrast material. The catheter was then flushed    with 
sterile saline, secured to the skin with monofilament nylon    suture, and conn
ected to gravity drainage. A sterile dressing was    applied. The patient tolera
hugh the procedure well without immediate    complication.           CONCLUSION: 
        Successful exchange of a 10 French locking loop right percutaneous    n
ephrostomy catheter under fluoroscopic guidance.       The patient should return
to interventional radiology for routine    catheter exchange in 3 months if the 
catheter is still needed at that    time.    Dictated by:  Clau Ngo M.D. on 
2019 at 14:18        Electronically approved by:  Clau Ngo M.D. on 2019 at 14:18                Dictated By: CLAU NGO MD  Electronically Katerina
d By: CLAU NGO MD on 19  Transcribed By: FERNANDO on 198 
     COPY TO:   JACQUELINE ARORA MD         Human Chorionic Gonadotropin, formerly [facility]
2019 11:36:00* 



             Test Item    Value        Reference Range Interpretation Comments

 

             Human Chorionic Gonadotropin, Qual (test code = 2118-8) NEGATIVE   

  NEGATIVE                   





[facility] Chorionic Gonadotropin, formerly [facility]
2019 11:36:00* 



             Test Item    Value        Reference Range Interpretation Comments

 

             Human Chorionic Gonadotropin, Qual (test code = 8-8) NEGATIVE   

  NEGATIVE                   





[facility] Chorionic Gonadotropin, formerly [facility]
2019 11:36:00* 



             Test Item    Value        Reference Range Interpretation Comments

 

             Human Chorionic Gonadotropin, Qual (test code = 8-8) NEGATIVE   

  NEGATIVE                   





[facility] Chorionic Gonadotropin, formerly [facility]
2019 11:36:00* 



             Test Item    Value        Reference Range Interpretation Comments

 

             Human Chorionic Gonadotropin, Qual (test code = 2118-8) NEGATIVE   

  NEGATIVE                   





[facility] Chorionic Gonadotropin, formerly [facility]
2019 11:36:00* 



             Test Item    Value        Reference Range Interpretation Comments

 

             Human Chorionic Gonadotropin, Qual (test code = 2118-8) NEGATIVE   

  NEGATIVE                   





[facility] Chorionic Gonadotropin, formerly [facility]
2019 11:36:00* 



             Test Item    Value        Reference Range Interpretation Comments

 

             Human Chorionic Gonadotropin, Qual (test code = 8-8) NEGATIVE   

  NEGATIVE                   





[facility] Chorionic Gonadotropin, formerly [facility]
2019 11:36:00* 



             Test Item    Value        Reference Range Interpretation Comments

 

             Human Chorionic Gonadotropin, Qual (test code = 2118-8) NEGATIVE   

  NEGATIVE                   





[facility] [id number] 14:16:00* 



             Test Item    Value        Reference Range Interpretation Comments

 

             Urine Culture (test code = 630-4) Organism: ENTEROCOCCUS FAECIUM-VR

E                            





[facility] Cxwqlsy9371-20-84 13:57:00* 



             Test Item    Value        Reference Range Interpretation Comments

 

             Urine Culture (test code = 630-4) Organism: ENTEROCOCCUS FAECIUM-VR

E                            





[facility] Ueotesw4122-16-83 13:57:00* 



             Test Item    Value        Reference Range Interpretation Comments

 

             Urine Culture (test code = 630-4) Organism: ENTEROCOCCUS FAECIUM-VR

E                            





[facility] Xhvqmzr8032-11-31 13:57:00* 



             Test Item    Value        Reference Range Interpretation Comments

 

             Urine Culture (test code = 630-4) Organism: ENTEROCOCCUS FAECIUM-VR

E                            





[facility] Zxqlzmt0473-63-32 13:57:00* 



             Test Item    Value        Reference Range Interpretation Comments

 

             Urine Culture (test code = 630-4) Organism: ENTEROCOCCUS FAECIUM-VR

E                            





[facility] Ehzskvw2909-03-35 13:57:00* 



             Test Item    Value        Reference Range Interpretation Comments

 

             Urine Culture (test code = 630-4) No Result Data Provided          

                  





[facility] Qcjpjbj9381-29-93 13:57:00* 



             Test Item    Value        Reference Range Interpretation Comments

 

             Urine Culture (test code = 630-4) No Result Data Provided          

                  





[facility] WBC2019-06-10 17:43:00* 



             Test Item    Value        Reference Range Interpretation Comments

 

             Urine WBC (test code = 5821-4) 11-20        0-5          H         

    





[facility] RBC2019-06-10 17:43:00* 



             Test Item    Value        Reference Range Interpretation Comments

 

             Urine RBC (test code = 67987-2) 6-10         0-5          H        

     





[facility] Bacteria2019-06-10 17:43:00* 



             Test Item    Value        Reference Range Interpretation Comments

 

             Urine Bacteria (test code = 66481-2) MODERATE     NONE         H   

          





[facility] Epithelial Cells2019-06-10 17:43:00
  * 



             Test Item    Value        Reference Range Interpretation Comments

 

             Urine Epithelial Cells (test code = 39190-9) NONE         NONE     

                  





[facility] Amorphous Sediment2019-06-10 
17:43:00* 



             Test Item    Value        Reference Range Interpretation Comments

 

             Urine Amorphous Sediment (test code = 8246-1) MODERATE     FEW     

     H             





[facility] WBC2019-06-10 17:43:00* 



             Test Item    Value        Reference Range Interpretation Comments

 

             Urine WBC (test code = 5821-4) 11-20        0-5          H         

    





[facility] 



             Test Item    Value        Reference Range Interpretation Comments

 

             Urine RBC (test code = 78139-1) 6-10         0-5          H        

     





[facility]43:00* 



             Test Item    Value        Reference Range Interpretation Comments

 

             Urine Bacteria (test code = 05561-0) MODERATE     NONE         H   

          





[facility] 17:43:00
  * 



             Test Item    Value        Reference Range Interpretation Comments

 

             Urine Epithelial Cells (test code = 79568-8) NONE         NONE     

                  





[facility] Amorphous Sediment2019-06-10 
17:43:00* 



             Test Item    Value        Reference Range Interpretation Comments

 

             Urine Amorphous Sediment (test code = 8246-1) MODERATE     FEW     

     H             





[facility] 



             Test Item    Value        Reference Range Interpretation Comments

 

             Urine Color (test code = 5778-6) YELLOW       YELLOW               

      





[facility] Clxhnfp1566-91-26 17:29:00* 



             Test Item    Value        Reference Range Interpretation Comments

 

             Urine Clarity (test code = 13424-6) CLEAR        CLEAR             

         





[facility] 17:29:00
  * 



             Test Item    Value        Reference Range Interpretation Comments

 

             Urine Specific Gravity (test code = 5811-5) <=1.005      1.010-1.02

5                





[facility] 17[facility]29:00* 



             Test Item    Value        Reference Range Interpretation Comments

 

             Urine pH (test code = 24497-4) 7.5          5-7                    

    





[facility]uk[facility] Esterase2019-06-10 
17:29:00* 



             Test Item    Value        Reference Range Interpretation Comments

 

             Urine Leukocyte Esterase (test code = 18347-4) LARGE        NEGATIV

E                   





[facility] 17:29:00* 



             Test Item    Value        Reference Range Interpretation Comments

 

             Urine Nitrite (test code = 50448-1) NEGATIVE     NEGATIVE          

         





[facility] 17:29:00* 



             Test Item    Value        Reference Range Interpretation Comments

 

             Urine Protein (test code = 57735-3) 1+           NEGATIVE     H    

         





[facility]2019-06-10 17:29:00* 



             Test Item    Value        Reference Range Interpretation Comments

 

             Urine Glucose (UA) (test code = 68578-6) NEGATIVE     NEGATIVE     

              





[facility] Roroeve3987-44-11 17:29:00* 



             Test Item    Value        Reference Range Interpretation Comments

 

             Urine Ketones (test code = 74699-7) NEGATIVE     NEGATIVE          

         





[facility] Urobilinogen2019-06-10 17:29:00* 



             Test Item    Value        Reference Range Interpretation Comments

 

             Urine Urobilinogen (test code = 41979-2) 0.2          0.2-1        

              





[facility] Bilirubin2019-06-10 17:29:00* 



             Test Item    Value        Reference Range Interpretation Comments

 

             Urine Bilirubin (test code = 1977-8) NEGATIVE     NEGATIVE         

          





[facility] Blood2019-06-10 17:29:00* 



             Test Item    Value        Reference Range Interpretation Comments

 

             Urine Blood (test code = 66111-1) 3+           NEGATIVE            

       





[facility] 17[facility]29:00* 



             Test Item    Value        Reference Range Interpretation Comments

 

             Urine Color (test code = 5778-6) YELLOW       YELLOW               

      





[facility] Szmsrra2904-11-96 17:29:00* 



             Test Item    Value        Reference Range Interpretation Comments

 

             Urine Clarity (test code = 63382-9) CLEAR        CLEAR             

         





[facility]-10 17:29:00
  * 



             Test Item    Value        Reference Range Interpretation Comments

 

             Urine Specific Gravity (test code = 5811-5) <=1.005      1.010-1.02

5                





[facility] 17:29:00* 



             Test Item    Value        Reference Range Interpretation Comments

 

             Urine pH (test code = 45485-5) 7.5          5-7                    

    





[facility] Leukocyte Esterase2019-06-10 
17:29:00* 



             Test Item    Value        Reference Range Interpretation Comments

 

             Urine Leukocyte Esterase (test code = 16979-2) LARGE        NEGATIV

E                   





[facility] Nitrite[facility] 17:29:00* 



             Test Item    Value        Reference Range Interpretation Comments

 

             Urine Nitrite (test code = 41601-9) NEGATIVE     NEGATIVE          

         





[facility] 17:29:00* 



             Test Item    Value        Reference Range Interpretation Comments

 

             Urine Protein (test code = 57735-3) 1+           NEGATIVE     H    

         





[facility] Glucose [facility])2019-06-10 17:29:00* 



             Test Item    Value        Reference Range Interpretation Comments

 

             Urine Glucose (UA) (test code = 38464-7) NEGATIVE     NEGATIVE     

              





[facility] Pucwuzw5489-26-09 17:29:00* 



             Test Item    Value        Reference Range Interpretation Comments

 

             Urine Ketones (test code = 25804-8) NEGATIVE     NEGATIVE          

         





[facility] Urobilinogen2019-06-10 17:29:00* 



             Test Item    Value        Reference Range Interpretation Comments

 

             Urine Urobilinogen (test code = 79100-5) 0.2          0.2-1        

              





[facility] Bilirubin2019-06-10 17:29:00* 



             Test Item    Value        Reference Range Interpretation Comments

 

             Urine Bilirubin (test code = 1977-8) NEGATIVE     NEGATIVE         

          





[facility] 17:29:00* 



             Test Item    Value        Reference Range Interpretation Comments

 

             Urine Blood (test code = 16980-0) 3+           NEGATIVE            

       





[facility] Hyaline Casts2019-06-10 17:10:00* 



             Test Item    Value        Reference Range Interpretation Comments

 

             Urine Hyaline Casts (test code = 79971-3) 0-1          0-1         

               





[facility] Hyaline [facility]2019-[facility]-10 17:10:00* 



             Test Item    Value        Reference Range Interpretation Comments

 

             Urine Hyaline Casts (test code = 90184-0) 0-1          0-1         

               





[facility] Hyaline Casts2019-06-10 17:10:00* 



             Test Item    Value        Reference Range Interpretation Comments

 

             Urine Hyaline Casts (test code = 76438-2) 0-1          0-1         

               





[facility] Hyaline Casts2019-06-10 17:10:00* 



             Test Item    Value        Reference Range Interpretation Comments

 

             Urine Hyaline Casts (test code = 25554-6) 0-1          0-1         

               





[facility] Hyaline [facility]asts2019-06-10 17:10:00* 



             Test Item    Value        Reference Range Interpretation Comments

 

             Urine Hyaline Casts (test code = 39944-3) 0-1          0-1         

               





[facility] Hyaline Casts2019-06-10 17:10:00* 



             Test Item    Value        Reference Range Interpretation Comments

 

             Urine Hyaline Casts (test code = 07707-6) 0-1          0-1         

               





[facility] Hyaline [facility]asts2019-06-10 17:10:00* 



             Test Item    Value        Reference Range Interpretation Comments

 

             Urine Hyaline Casts (test code = 01544-8) 0-1          0-1         

               





[facility] Hyaline Casts2019-06-10 17:10:00* 



             Test Item    Value        Reference Range Interpretation Comments

 

             Urine Hyaline Casts (test code = 92844-2) 0-1          0-1         

               





[facility] Hyaline Casts2019-06-10 17:10:00* 



             Test Item    Value        Reference Range Interpretation Comments

 

             Urine Hyaline Casts (test code = 61982-3) 0-1          0-1         

               





[facility] 



             Test Item    Value        Reference Range Interpretation Comments

 

             Sodium Level (test code = 2951-2) 134          136-145      L      

       





[facility] 16:53:00* 



             Test Item    Value        Reference Range Interpretation Comments

 

             Potassium Level (test code = 2823-3) 4.6          3.5-5.1          

          





[facility] Center[facility] Level2019-06-10 16:53:00* 



             Test Item    Value        Reference Range Interpretation Comments

 

             Chloride Level (test code = 2075-0) 105                      

         





[facility] Center[facility] Dioxide Level2019-06-10 16:53:00* 



             Test Item    Value        Reference Range Interpretation Comments

 

             Carbon Dioxide Level (test code = 2028-9) 21           22-29       

 L             





[facility] Center[facility]2019-[facility] 16:53:00* 



             Test Item    Value        Reference Range Interpretation Comments

 

             Anion Gap (test code = 33037-3) 12.6         8-16                  

     





[facility] Nitrogen2019-06-10 16:53:00* 



             Test Item    Value        Reference Range Interpretation Comments

 

             Blood Urea Nitrogen (test code = 3094-0) 18           7-26         

              





[facility] Center[facility]inine2019-06-[facility] 16:53:00* 



             Test Item    Value        Reference Range Interpretation Comments

 

             Creatinine (test code = 2160-0) 1.32         0.57-1.11    H        

     





[facility] Center[facility]/Creatinine Ratio2019-06-10 16:53:00* 



             Test Item    Value        Reference Range Interpretation Comments

 

             BUN/Creatinine Ratio (test code = 3097-3) 14           6-        

               





[facility] CenterEstimat Glomerular Filtration Rate
2019-06-10 16:53:00* 



             Test Item    Value        Reference Range Interpretation Comments

 

             Estimat Glomerular Filtration Rate (test code = 388101366) 42      

     >60          L             





Ranges were taken from the National Kidney Disease Education Program and the [facility] [employer] literature.Reference ranges:60 or greater: Eirnft42-59 (
for 3 consecutive months): Chronic kidney disease 15 or less: Kidney failureVal Verde Regional Medical CenterGlucose Level2019-06-10 16:53:00* 



             Test Item    Value        Reference Range Interpretation Comments

 

             Glucose Level (test code = NAM2996) 147                 H    

         





[facility] CenterCalcium Level2019-06-10 16:53:00* 



             Test Item    Value        Reference Range Interpretation Comments

 

             Calcium Level (test code = 39100-9) 9.8          8.4-10.2          

         





[facility] 16:53:00* 



             Test Item    Value        Reference Range Interpretation Comments

 

             Sodium Level (test code = 2951-2) 134          136-145      L      

       





[facility] Center[facility]assium Level2019-06-10 16:53:00* 



             Test Item    Value        Reference Range Interpretation Comments

 

             Potassium Level (test code = 2823-3) 4.6          3.5-5.1          

          





[facility] CenterChloride Level2019-06-10 16:53:00* 



             Test Item    Value        Reference Range Interpretation Comments

 

             Chloride Level (test code = 2075-0) 105                      

         





[facility] CenterCarbon Dioxide Level2019-06-10 16:53:00* 



             Test Item    Value        Reference Range Interpretation Comments

 

             Carbon Dioxide Level (test code = 2028-9) 21           22-29       

 L             





[facility] CenterAnion Gap2019-06-10 16:53:00* 



             Test Item    Value        Reference Range Interpretation Comments

 

             Anion Gap (test code = 33037-3) 12.6         8-16                  

     





[facility] [facility] CenterBlood Urea Nitrogen2019-[facility]-10 16:53:00* 



             Test Item    Value        Reference Range Interpretation Comments

 

             Blood Urea Nitrogen (test code = 3094-0) 18           7-26         

              





[facility] CenterCreatinine2019-[facility] 16:53:00* 



             Test Item    Value        Reference Range Interpretation Comments

 

             Creatinine (test code = 2160-0) 1.32         0.57-1.11    H        

     





[facility] CenterBUN/Creatinine Ratio2019-06-10 16:53:00* 



             Test Item    Value        Reference Range Interpretation Comments

 

             BUN/Creatinine Ratio (test code = 3097-3) 14           6-25        

               





[facility] CenterEstimat Glomerular Filtration Rate
2019-06-10 16:53:00* 



             Test Item    Value        Reference Range Interpretation Comments

 

             Estimat Glomerular Filtration Rate (test code = 619177206) 42      

     >60          L             





Ranges were taken from the National Kidney Disease Education Program and the Michelle
[facility] literature.Reference ranges:60 or greater: [id number] (
for 3 consecutive months): Chronic kidney disease 15 or less: Kidney failureVal [facility] CenterGlucose Level2019-06-10 16:53:00* 



             Test Item    Value        Reference Range Interpretation Comments

 

             Glucose Level (test code = HTH5804) 147                 H    

         





[facility] CenterCalcium Level2019-06-10 16:53:00* 



             Test Item    Value        Reference Range Interpretation Comments

 

             Calcium Level (test code = 40554-7) 9.8          8.4-10.2          

         





[facility] CenterWhite Blood Count2019-06-10 16:46:00* 



             Test Item    Value        Reference Range Interpretation Comments

 

             White Blood Count (test code = 6690-2) 8.52         4.8-10.8       

            





[facility] CenterRed Blood Count2019-06-10 16:46:00* 



             Test Item    Value        Reference Range Interpretation Comments

 

             Red Blood Count (test code = 789-8) 4.04         3.6-5.1           

         





[facility] CenterHemoglobin2019-06-10 16:46:00* 



             Test Item    Value        Reference Range Interpretation Comments

 

             Hemoglobin (test code = 74057-0) 10.2         12.0-16.0    L       

      





[facility] CenterHematocrit2019-06-10 16:46:00* 



             Test Item    Value        Reference Range Interpretation Comments

 

             Hematocrit (test code = 4544-3) 33.7         34.2-44.1    L        

     





[facility] CenterMean Corpuscular Volume2019-06-10 
16:46:00* 



             Test Item    Value        Reference Range Interpretation Comments

 

             Mean Corpuscular Volume (test code = 787-2) 83.4         81-99     

                 





[facility] CenterMean Corpuscular Hemoglobin2019-06-10 
16:46:00* 



             Test Item    Value        Reference Range Interpretation Comments

 

             Mean Corpuscular Hemoglobin (test code = 785-6) 25.2         28-32 

       L             





[facility] CenterMean Corpuscular Hemoglobin Concent
2019-06-10 16:46:00* 



             Test Item    Value        Reference Range Interpretation Comments

 

             Mean Corpuscular Hemoglobin Concent (test code = 786-4) 30.3       

  31-35        L             





[facility] CenterRed Cell Distribution Width2019-06-10 
16:46:00* 



             Test Item    Value        Reference Range Interpretation Comments

 

             Red Cell Distribution Width (test code = 73829-4) 16.1         11.7

-14.4    H             





[facility] CenterPlatelet Count2019-06-10 16:46:00* 



             Test Item    Value        Reference Range Interpretation Comments

 

             Platelet Count (test code = 777-3) 276          140-360            

        





[facility] CenterNeutrophils (%) (Auto)2019-06-10 16:46:00
  * 



             Test Item    Value        Reference Range Interpretation Comments

 

             Neutrophils (%) (Auto) (test code = 37473-0) 68.2         38.7-80.0

                  





[facility] CenterLymphocytes (%) (Auto)2019-06-10 16:46:00
  * 



             Test Item    Value        Reference Range Interpretation Comments

 

             Lymphocytes (%) (Auto) (test code = 736-9) 22.7         18.0-39.1  

                





[facility] CenterMonocytes (%) (Auto)2019-06-10 16:46:00* 



             Test Item    Value        Reference Range Interpretation Comments

 

             Monocytes (%) (Auto) (test code = 5905-5) 5.4          4.4-11.3    

               





[facility] CenterEosinophils (%) (Auto)2019-06-10 16:46:00
  * 



             Test Item    Value        Reference Range Interpretation Comments

 

             Eosinophils (%) (Auto) (test code = 713-8) 2.3          0.0-6.0    

                





[facility] CenterBasophils (%) (Auto)2019-06-10 16:46:00* 



             Test Item    Value        Reference Range Interpretation Comments

 

             Basophils (%) (Auto) (test code = 706-2) 0.8          0.0-1.0      

              





[facility] CenterIM GRANULOCYTES %2019-06-10 16:46:00* 



             Test Item    Value        Reference Range Interpretation Comments

 

             IM GRANULOCYTES % (test code = IM GRANULOCYTES %) 0.6          0.0-

1.0                    





[facility] CenterNeutrophils # (Auto)2019-06-10 16:46:00* 



             Test Item    Value        Reference Range Interpretation Comments

 

             Neutrophils # (Auto) (test code = 751-8) 5.8          2.1-6.9      

              





[facility] CenterLymphocytes # (Auto)2019-06-10 16:46:00* 



             Test Item    Value        Reference Range Interpretation Comments

 

             Lymphocytes # (Auto) (test code = 14393-6) 1.9          1.0-3.2    

                





[facility] CenterMonocytes # (Auto)2019-06-10 16:46:00* 



             Test Item    Value        Reference Range Interpretation Comments

 

             Monocytes # (Auto) (test code = 742-7) 0.5          0.2-0.8        

            





[facility] CenterEosinophils # (Auto)2019-06-10 16:46:00* 



             Test Item    Value        Reference Range Interpretation Comments

 

             Eosinophils # (Auto) (test code = 711-2) 0.2          0.0-0.4      

              





[facility] CenterBasophils # (Auto)2019-06-10 16:46:00* 



             Test Item    Value        Reference Range Interpretation Comments

 

             Basophils # (Auto) (test code = 704-7) 0.1          0.0-0.1        

            





[facility] CenterAbsolute Immature Granulocyte (auto
2019-06-10 16:46:00* 



             Test Item    Value        Reference Range Interpretation Comments

 

                                        Absolute Immature Granulocyte (auto (lloyd

t code = Absolute Immature Granulocyte 

(auto)          0.05            0-0.1                            





[facility] CenterWhite Blood Count2019-06-10 16:46:00* 



             Test Item    Value        Reference Range Interpretation Comments

 

             White Blood Count (test code = 6690-2) 8.52         4.8-10.8       

            





[facility] CenterRed Blood Count2019-06-10 16:46:00* 



             Test Item    Value        Reference Range Interpretation Comments

 

             Red Blood Count (test code = 789-8) 4.04         3.6-5.1           

         





[facility] CenterHemoglobin2019-06-10 16:46:00* 



             Test Item    Value        Reference Range Interpretation Comments

 

             Hemoglobin (test code = 94092-4) 10.2         12.0-16.0    L       

      





[facility] CenterHematocrit2019-06-10 16:46:00* 



             Test Item    Value        Reference Range Interpretation Comments

 

             Hematocrit (test code = 4544-3) 33.7         34.2-44.1    L        

     





[facility] CenterMean Corpuscular Volume2019-06-10 
16:46:00* 



             Test Item    Value        Reference Range Interpretation Comments

 

             Mean Corpuscular Volume (test code = 787-2) 83.4         81-99     

                 





[facility] CenterMean Corpuscular Hemoglobin2019-06-10 
16:46:00* 



             Test Item    Value        Reference Range Interpretation Comments

 

             Mean Corpuscular Hemoglobin (test code = 785-6) 25.2         28-32 

       L             





[facility] CenterMean Corpuscular Hemoglobin Concent
2019-06-10 16:46:00* 



             Test Item    Value        Reference Range Interpretation Comments

 

             Mean Corpuscular Hemoglobin Concent (test code = 786-4) 30.3       

  31-35        L             





[facility] CenterRed Cell Distribution Width2019-06-10 
16:46:00* 



             Test Item    Value        Reference Range Interpretation Comments

 

             Red Cell Distribution Width (test code = 75465-5) 16.1         11.7

-14.4    H             





[facility] CenterPlatelet Count2019-06-10 16:46:00* 



             Test Item    Value        Reference Range Interpretation Comments

 

             Platelet Count (test code = 777-3) 276          140-360            

        





[facility] CenterNeutrophils (%) (Auto)2019-06-10 16:46:00
  * 



             Test Item    Value        Reference Range Interpretation Comments

 

             Neutrophils (%) (Auto) (test code = 52943-3) 68.2         38.7-80.0

                  





[facility] CenterLymphocytes (%) (Auto)2019-06-10 16:46:00
  * 



             Test Item    Value        Reference Range Interpretation Comments

 

             Lymphocytes (%) (Auto) (test code = 736-9) 22.7         18.0-39.1  

                





[facility] CenterMonocytes (%) (Auto)2019-06-10 16:46:00* 



             Test Item    Value        Reference Range Interpretation Comments

 

             Monocytes (%) (Auto) (test code = 5905-5) 5.4          4.4-11.3    

               





[facility] CenterEosinophils (%) (Auto)2019-06-10 16:46:00
  * 



             Test Item    Value        Reference Range Interpretation Comments

 

             Eosinophils (%) (Auto) (test code = 713-8) 2.3          0.0-6.0    

                





[facility] CenterBasophils (%) (Auto)2019-06-10 16:46:00* 



             Test Item    Value        Reference Range Interpretation Comments

 

             Basophils (%) (Auto) (test code = 706-2) 0.8          0.0-1.0      

              





[facility] CenterIM GRANULOCYTES %2019-06-10 16:46:00* 



             Test Item    Value        Reference Range Interpretation Comments

 

             IM GRANULOCYTES % (test code = IM GRANULOCYTES %) 0.6          0.0-

1.0                    





[facility] CenterNeutrophils # (Auto)2019-06-10 16:46:00* 



             Test Item    Value        Reference Range Interpretation Comments

 

             Neutrophils # (Auto) (test code = 751-8) 5.8          2.1-6.9      

              





[facility] CenterLymphocytes # (Auto)2019-06-10 16:46:00* 



             Test Item    Value        Reference Range Interpretation Comments

 

             Lymphocytes # (Auto) (test code = 89241-3) 1.9          1.0-3.2    

                





[facility] CenterMonocytes # (Auto)2019-06-10 16:46:00* 



             Test Item    Value        Reference Range Interpretation Comments

 

             Monocytes # (Auto) (test code = 742-7) 0.5          0.2-0.8        

            





[facility] CenterEosinophils # (Auto)2019-06-10 16:46:00* 



             Test Item    Value        Reference Range Interpretation Comments

 

             Eosinophils # (Auto) (test code = 711-2) 0.2          0.0-0.4      

              





[facility] CenterBasophils # (Auto)2019-06-10 16:46:00* 



             Test Item    Value        Reference Range Interpretation Comments

 

             Basophils # (Auto) (test code = 704-7) 0.1          0.0-0.1        

            





[facility] CenterAbsolute Immature Granulocyte (auto
2019-06-10 16:46:00* 



             Test Item    Value        Reference Range Interpretation Comments

 

                                        Absolute Immature Granulocyte (auto (lloyd

t code = Absolute Immature Granulocyte 

(auto)          0.05            0-0.1                            





[facility] CenterUrine Sufsopx1143-44-50 08:24:00* 



             Test Item    Value        Reference Range Interpretation Comments

 

             Urine Culture (test code = 630-4) Organism: ENTEROCOCCUS FAECIUM   

                         





[facility] CenterUrine Pwipzts1704-92-42 08:24:00* 



             Test Item    Value        Reference Range Interpretation Comments

 

             Urine Culture (test code = 630-4) Organism: ENTEROCOCCUS FAECIUM   

                         





[facility] CenterUrine Iibhlvl1708-75-03 08:24:00* 



             Test Item    Value        Reference Range Interpretation Comments

 

             Urine Culture (test code = 630-4) Organism: ENTEROCOCCUS FAECIUM   

                         





[facility] CenterUrine Luqkgzy8348-20-15 08:24:00* 



             Test Item    Value        Reference Range Interpretation Comments

 

             Urine Culture (test code = 630-4) Organism: ENTEROCOCCUS FAECIUM   

                         





[facility] Qafaash3923-58-02 08:24:00* 



             Test Item    Value        Reference Range Interpretation Comments

 

             Urine Culture (test code = 630-4) Organism: ENTEROCOCCUS FAECIUM   

                         





[facility] Xwdlkih6135-64-67 08:24:00* 



             Test Item    Value        Reference Range Interpretation Comments

 

             Urine Culture (test code = 630-4) No Result Data Provided          

                  





[facility] Lrmzpst6008-38-83 08:24:00* 



             Test Item    Value        Reference Range Interpretation Comments

 

             Urine Culture (test code = 630-4) No Result Data Provided          

                  





[facility] Xxjer0696-30-42 18:38:00* 



             Test Item    Value        Reference Range Interpretation Comments

 

             Urine Color (test code = 5778-6) RED          YELLOW               

      





[facility] CenterUrine Umazcku6686-68-15 18:38:00* 



             Test Item    Value        Reference Range Interpretation Comments

 

             Urine Clarity (test code = 58158-9) CLOUDY       CLEAR        H    

         





[facility] CenterUrine Specific Qzgvcwb8959-12-87 18:38:00
  * 



             Test Item    Value        Reference Range Interpretation Comments

 

             Urine Specific Gravity (test code = 5811-5) 1.010        1.010-1.02

5                





[facility] CenterUrine [id number] 18:38:00* 



             Test Item    Value        Reference Range Interpretation Comments

 

             Urine pH (test code = 46798-8) 7            5-7                    

    





[facility] CenterUrine Leukocyte Jpjynmjj1675-92-68 
18:38:00* 



             Test Item    Value        Reference Range Interpretation Comments

 

             Urine Leukocyte Esterase (test code = 5799-2) 1+           NEGATIVE

     H             





[facility] Ozuwnob3792-51-15 18:38:00* 



             Test Item    Value        Reference Range Interpretation Comments

 

             Urine Nitrite (test code = 16686-1) NEGATIVE     NEGATIVE          

         





[facility] CenterUrine Yxpsent2508-48-23 18:38:00* 



             Test Item    Value        Reference Range Interpretation Comments

 

             Urine Protein (test code = 5804-0) 3+           NEGATIVE     H     

        





[facility] CenterUrine Glucose (UA)2019 18:38:00* 



             Test Item    Value        Reference Range Interpretation Comments

 

             Urine Glucose (UA) (test code = 2349-9) NEGATIVE     NEGATIVE      

             





[facility] CenterUrine Daejlel0008-51-15 18:38:00* 



             Test Item    Value        Reference Range Interpretation Comments

 

             Urine Ketones (test code = 50742-6) NEGATIVE     NEGATIVE          

         





[facility] CenterUrine Kawknpeyvmbx6207-24-30 18:38:00* 



             Test Item    Value        Reference Range Interpretation Comments

 

             Urine Urobilinogen (test code = 72212-6) 0.2          0.2-1        

              





[facility] CenterUrine Ehgqnnsmr2940-41-47 18:38:00* 



             Test Item    Value        Reference Range Interpretation Comments

 

             Urine Bilirubin (test code = 1978-6) NEGATIVE     NEGATIVE         

          





[facility] CenterUrine Lzmqe1952-22-14 18:38:00* 



             Test Item    Value        Reference Range Interpretation Comments

 

             Urine Blood (test code = 16821-4) 4+           NEGATIVE     H      

       





[facility] CenterUrine [id number] 18:38:00* 



             Test Item    Value        Reference Range Interpretation Comments

 

             Urine WBC (test code = 5821-4) >50          0-5          H         

    





[facility] CenterUrine [id number] 18:38:00* 



             Test Item    Value        Reference Range Interpretation Comments

 

             Urine RBC (test code = 48063-7) >50          0-5          H        

     





[facility] CenterUrine Pektnkrv8200-79-27 18:38:00* 



             Test Item    Value        Reference Range Interpretation Comments

 

             Urine Bacteria (test code = 81848-2) MANY         NONE         H   

          





[facility] CenterUrine Epithelial Lpvam6434-69-99 18:38:00
  * 



             Test Item    Value        Reference Range Interpretation Comments

 

             Urine Epithelial Cells (test code = 42238-4) MODERATE     NONE     

                  





[facility] CenterCT ABDOMEN/PELVIS [id number] 17:33:00  
                                                                                
  [place] [address]      Patient Name: CRISTINA RICH            
                      MR #: [id number]                     : 1965      
                            Age/Sex: 53/F  Acct #: [id number]                 
            Req #: [id number]  Adm Physician:                                   
                  Ordered by: JENNIFER TERRELL MD                            
Report #: [id number]        Location: ER                                      
Room/Bed:                     _________________________________________________
__________________________________________________    Procedure: [id number] CT/CT
ABDOMEN/PELVIS WO  Exam Date: 19                            Exam Time: 16
                                              REPORT STATUS: Signed    EXAM: C
T Abdomen and Pelvis WITHOUT contrast     INDICATION:          CT CYSTOGRAM    2
[id number]    1626    Y          COMPARISON: Nephrostomy tube x-ray 3/29/2019   LUIS FERNANDO
HNIQUE: Abdomen and pelvis were scanned utilizing a multidetector helical   scan
ner from the lung base to the pubic symphysis without administration of IV   con
trast. Absence of intravenous contrast decreases sensitivity for detection   of 
focal lesions and vascular pathology. Coronal and sagittal reformations were   o
btained. Routine protocol was performed.  Contrast injected through Gomez   time
s 2.               IV CONTRAST: None.               ORAL CONTRAST: Water        
      RADIATION DOSE: Total DLP: 889.2 mGy*cm                Estimated effective
dose: (DLP x 0.015 x size factor) mSv               COMPLICATIONS: None      F
INDINGS:      LINES and TUBES: Right percutaneous nephrostomy tube. Left interna
l ureteral   stent with proximal aspect in the left renal pelvis and distal sten
t within the   urinary bladder.      LOWER THORAX:  Unremarkable      HEPATOBILI
PROMISE:      No focal hepatic lesions. No biliary ductal dilation.       GALLBLADDE
R: Cholelithiasis.       SPLEEN: No splenomegaly.       PANCREAS: No focal michael
s or ductal dilatation.        ADRENALS: No adrenal nodules          KIDNEYS/URE
TERS:  Stable position of the right buttock without intravenous   nephrostomy tu
be with decompressed right kidney. No new calcified stones in the   right kidney
. The right ureter is decompressed without calcified stones.   Moderate left hyd
roureteronephrosis has not improved despite with stent   placement. Persistent 3
mm calcified stone in the inferior pole of the left   kidney. No visualized new 
when stones along the left ureter on limited   evaluation or remaining left kid
marcela. Mild left perinephric fat stranding   without fluid collections.      GI TR
ACT: No abnormal distention, wall thickening, or evidence of bowel   obstruction
.       Appendix is normal.      PELVIC ORGANS/BLADDER: The urinary bladder is d
ecompressed with 4 Lake catheter   in place. No calcified stones in the urinary 
bladder. Hysterectomy.      LYMPH NODES: No lymphadenopathy.      VESSELS: Unrem
arkable.      PERITONEUM / RETROPERITONEUM: No free air or fluid.      BONES: Un
remarkable.      SOFT TISSUES: Unremarkable.                  IMPRESSION:    1. 
Unchanged right nephrostomy tube with decompressed right kidney. No   calcified 
stones in the right kidney or right ureter.      2.  Persistent moderate left h
ydroureteronephrosis despite ureteral stent in   adequate position. Unchanged le
ft nephrolithiasis. Recommend revision of the   left ureteral stent.      3.  Spangler
boptimal cystogram with only a small amount of contrast in the urinary   bladder
, which persist decompressed with Gomez catheter in place.      Signed by: Dr. DIANE Purcell M.D. on 2019 5:43 PM        Dictated By: GEORGIANA PURCELL MD  Electronically Signed By: GEORGIANA PURCELL MD on 1743  Transcribed By: LUCINDA on 19       COPY TO:   JENNIFER TERRELL MD         Sodium Dipxf1003-06-58 16:54:00* 



             Test Item    Value        Reference Range Interpretation Comments

 

             Sodium Level (test code = 2951-2) 137          136-145             

       





[facility] CenterPotassium Ndndn3755-80-57 16:54:00* 



             Test Item    Value        Reference Range Interpretation Comments

 

             Potassium Level (test code = 2823-3) 3.9          3.5-5.1          

          





[facility] CenterChloride Tclhp4953-83-36 16:54:00* 



             Test Item    Value        Reference Range Interpretation Comments

 

             Chloride Level (test code = 2075-0) 104                      

         





[facility] CenterCarbon Dioxide Jntbo5734-91-97 16:54:00* 



             Test Item    Value        Reference Range Interpretation Comments

 

             Carbon Dioxide Level (test code = 8-9) 23           22-29       

               





[facility] CenterAnion Jal3132-46-09 16:54:00* 



             Test Item    Value        Reference Range Interpretation Comments

 

             Anion Gap (test code = 33037-3) 13.9         8-16                  

     





[facility] CenterBlood Urea Okktbogy3144-03-57 16:54:00* 



             Test Item    Value        Reference Range Interpretation Comments

 

             Blood Urea Nitrogen (test code = 3094-0) 10           7-26         

              





[facility] CenterCreatinine2019-04-20 16:54:00* 



             Test Item    Value        Reference Range Interpretation Comments

 

             Creatinine (test code = 2160-0) 1.29         0.57-1.11    H        

     





[facility] CenterBUN/Creatinine Kkxne3031-31-88 16:54:00* 



             Test Item    Value        Reference Range Interpretation Comments

 

             BUN/Creatinine Ratio (test code = 3097-3) 8            6-        

               





[facility] CenterEstimat Glomerular Filtration Rate
2019 16:54:00* 



             Test Item    Value        Reference Range Interpretation Comments

 

             Estimat Glomerular Filtration Rate (test code = 256997801) 43      

     >60          L             





Ranges were taken from the National Kidney Disease Education Program and the Michelle
[facility] literature.Reference ranges:60 or greater: Psyldc22-60 (
for 3 consecutive months): Chronic kidney disease 15 or less: Kidney failureVal [facility] Regional Medical CenterGlucose Ylzyw4588-27-10 16:54:00* 



             Test Item    Value        Reference Range Interpretation Comments

 

             Glucose Level (test code = OGX8178) 125                 H    

         





[facility] CenterCalcium Wluja0747-08-85 16:54:00* 



             Test Item    Value        Reference Range Interpretation Comments

 

             Calcium Level (test code = 87641-6) 9.8          8.4-10.2          

         





[facility] CenterTotal Blringtuz3478-78-97 16:54:00* 



             Test Item    Value        Reference Range Interpretation Comments

 

             Total Bilirubin (test code = 1975-2) 0.6          0.2-1.2          

          





[facility] CenterAspartate Amino Transf (AST/SGOT)
2019 16:54:00* 



             Test Item    Value        Reference Range Interpretation Comments

 

                                        Aspartate Amino Transf (AST/SGOT) (test 

code = Aspartate Amino Transf 

(AST/SGOT))     22              5-34                             





[facility] CenterAlanine Aminotransferase (ALT/SGPT)
2019 16:54:00* 



             Test Item    Value        Reference Range Interpretation Comments

 

             Alanine Aminotransferase (ALT/SGPT) (test code = 1742-6) 25        

   0-55                       





[facility] CenterTotal Htmvgnf3193-74-04 16:54:00* 



             Test Item    Value        Reference Range Interpretation Comments

 

             Total Protein (test code = 2885-2) 8.4          6.5-8.1      H     

        





[facility] CenterAlbumin2019-04-20 16:54:00* 



             Test Item    Value        Reference Range Interpretation Comments

 

             Albumin (test code = 1751-7) 3.4          3.5-5.0      L           

  





[facility] CenterGlobulin2019-04-20 16:54:00* 



             Test Item    Value        Reference Range Interpretation Comments

 

             Globulin (test code = 81802-1) 5.0          2.3-3.5      H         

    





[facility] CenterAlbumin/Globulin Byxek8218-58-26 16:54:00
  * 



             Test Item    Value        Reference Range Interpretation Comments

 

             Albumin/Globulin Ratio (test code = 1759-0) 0.7          0.8-2.0   

   L             





[facility] CenterAlkaline Ratrsjnqfme4683-50-69 16:54:00* 



             Test Item    Value        Reference Range Interpretation Comments

 

             Alkaline Phosphatase (test code = 6768-6) 108                

               





[facility] CenterTotal Oepsvfvtr0176-80-80 16:54:00* 



             Test Item    Value        Reference Range Interpretation Comments

 

             Total Bilirubin (test code = 1975-2) 0.6          0.2-1.2          

          





[facility] CenterAspartate Amino Transf (AST/SGOT)
2019 16:54:00* 



             Test Item    Value        Reference Range Interpretation Comments

 

                                        Aspartate Amino Transf (AST/SGOT) (test 

code = Aspartate Amino Transf 

(AST/SGOT))     22              5-34                             





[facility] CenterAlanine Aminotransferase (ALT/SGPT)
2019 16:54:00* 



             Test Item    Value        Reference Range Interpretation Comments

 

             Alanine Aminotransferase (ALT/SGPT) (test code = 1742-6) 25        

   0-55                       





[facility] CenterTotal Qdfhxhd0767-41-70 16:54:00* 



             Test Item    Value        Reference Range Interpretation Comments

 

             Total Protein (test code = 2885-2) 8.4          6.5-8.1      H     

        





[facility] CenterAlbumin2019-04-20 16:54:00* 



             Test Item    Value        Reference Range Interpretation Comments

 

             Albumin (test code = 1751-7) 3.4          3.5-5.0      L           

  





[facility] CenterGlobulin2019-04-20 16:54:00* 



             Test Item    Value        Reference Range Interpretation Comments

 

             Globulin (test code = 65043-7) 5.0          2.3-3.5      H         

    





[facility] CenterAlbumin/Globulin Wjadc6385-03-73 16:54:00
  * 



             Test Item    Value        Reference Range Interpretation Comments

 

             Albumin/Globulin Ratio (test code = 1759-0) 0.7          0.8-2.0   

   L             





[facility] CenterAlkaline Hafztrqbrrh5128-56-44 16:54:00* 



             Test Item    Value        Reference Range Interpretation Comments

 

             Alkaline Phosphatase (test code = 6768-6) 108                

               





[facility] CenterTotal Cgpsopnwv0922-93-19 16:54:00* 



             Test Item    Value        Reference Range Interpretation Comments

 

             Total Bilirubin (test code = 1975-2) 0.6          0.2-1.2          

          





[facility] CenterAspartate Amino Transf (AST/SGOT)
2019 16:54:00* 



             Test Item    Value        Reference Range Interpretation Comments

 

                                        Aspartate Amino Transf (AST/SGOT) (test 

code = Aspartate Amino Transf 

(AST/SGOT))     22              5-34                             





[facility] CenterAlanine Aminotransferase (ALT/SGPT)
2019 16:54:00* 



             Test Item    Value        Reference Range Interpretation Comments

 

             Alanine Aminotransferase (ALT/SGPT) (test code = 1742-6) 25        

   0-55                       





[facility] CenterTotal Idwleef9818-77-68 16:54:00* 



             Test Item    Value        Reference Range Interpretation Comments

 

             Total Protein (test code = 2885-2) 8.4          6.5-8.1      H     

        





[facility] CenterAlbumin2019-04-20 16:54:00* 



             Test Item    Value        Reference Range Interpretation Comments

 

             Albumin (test code = 1751-7) 3.4          3.5-5.0      L           

  





[facility] CenterGlobulin2019-04-20 16:54:00* 



             Test Item    Value        Reference Range Interpretation Comments

 

             Globulin (test code = 32564-4) 5.0          2.3-3.5      H         

    





[facility] CenterAlbumin/Globulin Lbioh2700-43-87 16:54:00
  * 



             Test Item    Value        Reference Range Interpretation Comments

 

             Albumin/Globulin Ratio (test code = 1759-0) 0.7          0.8-2.0   

   L             





[facility] CenterAlkaline Ocosyclwveg7434-01-87 16:54:00* 



             Test Item    Value        Reference Range Interpretation Comments

 

             Alkaline Phosphatase (test code = 6768-6) 108                

               





[facility] CenterWhite Blood Bslvf0773-48-56 16:29:00* 



             Test Item    Value        Reference Range Interpretation Comments

 

             White Blood Count (test code = 6690-2) 11.44        4.8-10.8     H 

            





[facility] CenterRed Blood Zqsto1505-79-40 16:29:00* 



             Test Item    Value        Reference Range Interpretation Comments

 

             Red Blood Count (test code = 789-8) 4.15         3.6-5.1           

         





[facility] CenterHemoglobin2019-04-20 16:29:00* 



             Test Item    Value        Reference Range Interpretation Comments

 

             Hemoglobin (test code = 91636-1) 10.7         12.0-16.0    L       

      





[facility] CenterHematocrit2019-04-20 16:29:00* 



             Test Item    Value        Reference Range Interpretation Comments

 

             Hematocrit (test code = 4544-3) 34.4         34.2-44.1             

     





[facility] CenterMean Corpuscular Ceysqg6397-81-13 
16:29:00* 



             Test Item    Value        Reference Range Interpretation Comments

 

             Mean Corpuscular Volume (test code = 787-2) 82.9         81-99     

                 





[facility] CenterMean Corpuscular Tknmbjurpm2075-14-38 
16:29:00* 



             Test Item    Value        Reference Range Interpretation Comments

 

             Mean Corpuscular Hemoglobin (test code = 785-6) 25.8         28-32 

       L             





[facility] CenterMean Corpuscular Hemoglobin Concent
2019 16:29:00* 



             Test Item    Value        Reference Range Interpretation Comments

 

             Mean Corpuscular Hemoglobin Concent (test code = 786-4) 31.1       

  31-35                      





[facility] CenterRed Cell Distribution Ownea2370-36-22 
16:29:00* 



             Test Item    Value        Reference Range Interpretation Comments

 

             Red Cell Distribution Width (test code = 23492-4) 14.8         11.7

-14.4    H             





[facility] CenterPlatelet Ipszh5782-98-35 16:29:00* 



             Test Item    Value        Reference Range Interpretation Comments

 

             Platelet Count (test code = 777-3) 305          140-360            

        





[facility] CenterNeutrophils (%) (Auto)2019 16:29:00
  * 



             Test Item    Value        Reference Range Interpretation Comments

 

             Neutrophils (%) (Auto) (test code = 90015-2) 82.6         38.7-80.0

    H             





[facility] CenterLymphocytes (%) (Auto)2019 16:29:00
  * 



             Test Item    Value        Reference Range Interpretation Comments

 

             Lymphocytes (%) (Auto) (test code = 736-9) 11.8         18.0-39.1  

  L             





[facility] CenterMonocytes (%) (Auto)2019 16:29:00* 



             Test Item    Value        Reference Range Interpretation Comments

 

             Monocytes (%) (Auto) (test code = 5905-5) 4.6          4.4-11.3    

               





[facility] CenterEosinophils (%) (Auto)2019 16:29:00
  * 



             Test Item    Value        Reference Range Interpretation Comments

 

             Eosinophils (%) (Auto) (test code = 713-8) 0.3          0.0-6.0    

                





[facility] CenterBasophils (%) (Auto)2019 16:29:00* 



             Test Item    Value        Reference Range Interpretation Comments

 

             Basophils (%) (Auto) (test code = 706-2) 0.3          0.0-1.0      

              





[facility] CenterIM GRANULOCYTES %2019 16:29:00* 



             Test Item    Value        Reference Range Interpretation Comments

 

             IM GRANULOCYTES % (test code = IM GRANULOCYTES %) 0.4          0.0-

1.0                    





[facility] CenterNeutrophils # (Auto)2019 16:29:00* 



             Test Item    Value        Reference Range Interpretation Comments

 

             Neutrophils # (Auto) (test code = 751-8) 9.4          2.1-6.9      

H             





[facility] CenterLymphocytes # (Auto)2019 16:29:00* 



             Test Item    Value        Reference Range Interpretation Comments

 

             Lymphocytes # (Auto) (test code = 39187-2) 1.4          1.0-3.2    

                





[facility] CenterMonocytes # (Auto)2019 16:29:00* 



             Test Item    Value        Reference Range Interpretation Comments

 

             Monocytes # (Auto) (test code = 742-7) 0.5          0.2-0.8        

            





[facility] CenterEosinophils # (Auto)2019 16:29:00* 



             Test Item    Value        Reference Range Interpretation Comments

 

             Eosinophils # (Auto) (test code = 711-2) 0.0          0.0-0.4      

              





[facility] CenterBasophils # (Auto)2019 16:29:00* 



             Test Item    Value        Reference Range Interpretation Comments

 

             Basophils # (Auto) (test code = 704-7) 0.0          0.0-0.1        

            





[facility] CenterAbsolute Immature Granulocyte (auto
2019 16:29:00* 



             Test Item    Value        Reference Range Interpretation Comments

 

                                        Absolute Immature Granulocyte (auto (lloyd

t code = Absolute Immature Granulocyte 

(auto)          0.05            0-0.1                            





[facility] WOL9484-32-04 
12:30:00                                                                        
             [place] [address]      Patient Name: CRISTINA RICH
                                  MR #: [id number]                     : 
1965                                   Age/Sex: 53/F  Acct #: [id number]
                             Req #: [id number]  Adm Physician: SHADY PEMBERTON MD    
                                   Ordered by: JACQUELINE ARORA MD                   
        Report #: [id number]        Location: [facility]                          
    Room/Bed: [facility]               
____________________________________________________
_______________________________________________    Procedure: [id number] WILLIAM/HOMERO AGUILAR PROCEDURE IN CATH LAB  Exam Date:                             Exam Time:     
                                         REPORT STATUS: Signed    Procedure: Ri
ght nephrostomy catheter exchange with fluoroscopic guidance      Comparison: Ri
ght nephrostomy exchange 2018.       : Naomi Martinez MD      Sedation
/Medications: Conscious sedation. IV Fentanyl and Versed per nursing   administr
ation records.  The patient's vital signs, including pulse oximetry,   were cont
inuously monitored by the interventional radiology nurse.      Fluoroscopy time:
0.6 minutes.   Dose area product: 77.1 cGycm2      Blood loss: 0 cc   Specimens:
8 Fr right nephrostomy catheter   Implants: 10 Fr right nephrostomy catheter    
 Technique/Findings:   Informed consent for the procedure was obtained from the 
patient after   discussion of risks and benefits. The right back was prepped and
draped in the   usual fashion.      1% lidocaine was administered into the skin 
and subcutaneous tissues of the   right back for local anesthesia.       Con
trast was injected demonstrating catheter was in place with mild   hydronephrosi
s. Some encrustation patient was noted at the tip of the existing   catheter. Spangler
bsequently, a stiff Glidewire was advanced through the catheter and   the cathet
er was exchanged for a new 10 French nephrostomy catheter. Contrast   injection 
confirmed satisfactory positioning. Catheter was secured with suture   and overl
gabino sterile dressing. There were no immediate complications.      Impression:  
Fluoroscopic guided right nephrostomy catheter exchange as above.      Signed b
y: Dr. Naomi Martinez MD on 2019 12:34 PM        Dictated By: NAOMI MARTINEZ MD  El
ectronically Signed By: NAOMI MARTINEZ MD on 19 1234  Transcribed By: LUCINDA 
on 19 1234       COPY TO:   JACQUELINE ARORA MD         IR WYFXNLG6150-27-13 
12:30:00                                                                        
             [place] [address]      Patient Name: CRISTINA RICH
                                  MR #: [id number]                     : 
1965                                   Age/Sex: 53/F  Acct #: [id number]
                             Req #: [id number]  Adm Physician: SHADY PEMBERTNO MD    
                                   Ordered by: JACQUELINE ARORA MD                   
        Report #: [id number]        Location: [facility]                          
    Room/Bed: [facility]               
____________________________________________________
_______________________________________________    Procedure: [id number] DX/IR CO
NSULT  Exam Date:                             Exam Time:                        
                      REPORT STATUS: Signed    Procedure: Right nephrostomy cat
heter exchange with fluoroscopic guidance      Comparison: Right nephrostomy exc
hange 2018.       : Naomi Martinez MD      Sedation/Medications: Consc
ious sedation. IV Fentanyl and Versed per nursing   administration records.  The
patient's vital signs, including pulse oximetry,   were continuously monitored 
by the interventional radiology nurse.      Fluoroscopy time: 0.6 minutes.   Dos
e area product: 77.1 cGycm2      Blood loss: 0 cc   Specimens: 8 Fr right nephro
stomy catheter   Implants: 10 Fr right nephrostomy catheter      Technique/Findi
ngs:   Informed consent for the procedure was obtained from the patient after   
discussion of risks and benefits. The right back was prepped and draped in the  
usual fashion.      1% lidocaine was administered into the skin and subcutaneous
tissues of the   right back for local anesthesia.       Contrast was injected 
demonstrating catheter was in place with mild   hydronephrosis. Some encrustatio
n patient was noted at the tip of the existing   catheter. Subsequently, a stiff
Glidewire was advanced through the catheter and   the catheter was exchanged for
a new 10 French nephrostomy catheter. Contrast   injection confirmed satisfact
ory positioning. Catheter was secured with suture   and overlying sterile dressi
ng. There were no immediate complications.      Impression:   Fluoroscopic guide
d right nephrostomy catheter exchange as above.      Signed by: Dr. Naomi Martinez MD on 2019 12:34 PM        Dictated By: NAOMI MARTINEZ MD  Electronically Signed
By: NAOMI MARTINEZ MD on 19 1234  Transcribed By: LUCINDA on 19 1234    
  COPY TO:   JACQUELINE ARORA MD         Blood Higmdfs7414-23-60 12:15:00* 



             Test Item    Value        Reference Range Interpretation Comments

 

             Blood Culture (test code = 52530888) NO GROWTH AFTER 5 DAYS, FINAL 

REPORT                            





[facility] Qpkwmkh2691-07-19 12:15:00* 



             Test Item    Value        Reference Range Interpretation Comments

 

             Blood Culture (test code = 60063748) NO GROWTH AFTER 5 DAYS, FINAL 

REPORT                            





[facility] Akgabti2606-48-21 12:15:00* 



             Test Item    Value        Reference Range Interpretation Comments

 

             Blood Culture (test code = 30845566) NO GROWTH AFTER 5 DAYS, FINAL 

REPORT                            





[facility] Pvjwabk0765-92-47 12:15:00* 



             Test Item    Value        Reference Range Interpretation Comments

 

             Blood Culture (test code = 89463112) NO GROWTH AFTER 5 DAYS, FINAL 

REPORT                            





[facility] Oipyvqm5085-61-07 12:15:00* 



             Test Item    Value        Reference Range Interpretation Comments

 

             Blood Culture (test code = 28862927) NO GROWTH AFTER 5 DAYS, FINAL 

REPORT                            





[facility] Mqpqtde8503-04-58 12:15:00* 



             Test Item    Value        Reference Range Interpretation Comments

 

             Blood Culture (test code = 44530668) NO GROWTH AFTER 5 DAYS, FINAL 

REPORT                            





[facility] Bdewyns9294-71-08 12:15:00* 



             Test Item    Value        Reference Range Interpretation Comments

 

             Blood Culture (test code = 51036333) NO GROWTH AFTER 5 DAYS, FINAL 

REPORT                            





[facility] Udznznm6709-71-37 12:15:00* 



             Test Item    Value        Reference Range Interpretation Comments

 

             Blood Culture (test code = 40083697) NO GROWTH AFTER 5 DAYS, FINAL 

REPORT                            





[facility]odium Yiugi2416-41-23 06:03:00* 



             Test Item    Value        Reference Range Interpretation Comments

 

             Sodium Level (test code = 2951-2) 141          136-145             

       





[facility] Ldngl0858-65-77 06:03:00* 



             Test Item    Value        Reference Range Interpretation Comments

 

             Potassium Level (test code = 2823-3) 3.3          3.5-5.1      L   

          





[facility] CenterChloride Dqitd6611-13-11 06:03:00* 



             Test Item    Value        Reference Range Interpretation Comments

 

             Chloride Level (test code = 2075-0) 108                 H    

         





[facility] CenterCarbon Dioxide Crhfj3818-32-20 06:03:00* 



             Test Item    Value        Reference Range Interpretation Comments

 

             Carbon Dioxide Level (test code = 2028-9) 25           22-29       

               





[facility] CenterAnion Hfs8145-32-67 06:03:00* 



             Test Item    Value        Reference Range Interpretation Comments

 

             Anion Gap (test code = 33037-3) 11.3         8-16                  

     





[facility] CenterBlood Urea Kcrmovnl1627-38-95 06:03:00* 



             Test Item    Value        Reference Range Interpretation Comments

 

             Blood Urea Nitrogen (test code = 3094-0) 9            7-26         

              





[facility] CenterCreatinine2019-04-01 06:03:00* 



             Test Item    Value        Reference Range Interpretation Comments

 

             Creatinine (test code = 2160-0) 1.01         0.57-1.11             

     





[facility] CenterBUN/Creatinine Kqqmn2215-38-71 06:03:00* 



             Test Item    Value        Reference Range Interpretation Comments

 

             BUN/Creatinine Ratio (test code = 3097-3) 9            6-25        

               





[facility] CenterEstimat Glomerular Filtration Rate
2019 06:03:00* 



             Test Item    Value        Reference Range Interpretation Comments

 

             Estimat Glomerular Filtration Rate (test code = 275927108) 57      

     >60          L             





Ranges were taken from the National Kidney Disease Education Program and the Michelle
[facility] literature.Reference ranges:60 or greater: Xbwdzi19-87 (
for 3 consecutive months): Chronic kidney disease 15 or less: Kidney failureVal Verde Regional Medical CenterGlucose Cixfd5121-08-83 06:03:00* 



             Test Item    Value        Reference Range Interpretation Comments

 

             Glucose Level (test code = COL3401) 127                 H    

         





[facility] CenterCalcium Wawgz8705-17-83 06:03:00* 



             Test Item    Value        Reference Range Interpretation Comments

 

             Calcium Level (test code = 31682-1) 9.5          8.4-10.2          

         





[facility] CenterWhite Blood Lbnwc6099-63-20 05:50:00* 



             Test Item    Value        Reference Range Interpretation Comments

 

             White Blood Count (test code = 6690-2) 7.55         4.8-10.8       

            





[facility] CenterRed Blood Swubw3729-97-57 05:50:00* 



             Test Item    Value        Reference Range Interpretation Comments

 

             Red Blood Count (test code = 789-8) 3.99         3.6-5.1           

         





[facility]50[facility]* 



             Test Item    Value        Reference Range Interpretation Comments

 

             Hemoglobin (test code = 86730-1) 10.2         12.0-16.0    L       

      





[facility] CenterHematocrit2019-[facility] 05:50:00* 



             Test Item    Value        Reference Range Interpretation Comments

 

             Hematocrit (test code = 4544-3) 32.7         34.2-44.1    L        

     





[facility] CenterMean Corpuscular Qbgesh6842-63-53 
05:50:00* 



             Test Item    Value        Reference Range Interpretation Comments

 

             Mean Corpuscular Volume (test code = 787-2) 82.0         81-99     

                 





[facility] CenterMean Corpuscular Wwtjatrnjs0125-35-95 
05:50:00* 



             Test Item    Value        Reference Range Interpretation Comments

 

             Mean Corpuscular Hemoglobin (test code = 785-6) 25.6         28-32 

       L             





[facility] CenterMean Corpuscular Hemoglobin Concent
2019 05:50:00* 



             Test Item    Value        Reference Range Interpretation Comments

 

             Mean Corpuscular Hemoglobin Concent (test code = 786-4) 31.2       

  31-35                      





[facility] CenterRed Cell Distribution Ohmwl6827-65-86 
05:50:00* 



             Test Item    Value        Reference Range Interpretation Comments

 

             Red Cell Distribution Width (test code = 85954-5) 13.6         11.7

-14.4                  





[facility] CenterPlatelet Uojqg7482-17-23 05:50:00* 



             Test Item    Value        Reference Range Interpretation Comments

 

             Platelet Count (test code = 777-3) 296          140-360            

        





[facility] CenterNeutrophils (%) (Auto)2019 05:50:00
  * 



             Test Item    Value        Reference Range Interpretation Comments

 

             Neutrophils (%) (Auto) (test code = 83879-2) 62.3         38.7-80.0

                  





[facility] CenterLymphocytes (%) (Auto)2019 05:50:00
  * 



             Test Item    Value        Reference Range Interpretation Comments

 

             Lymphocytes (%) (Auto) (test code = 736-9) 28.6         18.0-39.1  

                





[facility] CenterMonocytes (%) (Auto)2019 05:50:00* 



             Test Item    Value        Reference Range Interpretation Comments

 

             Monocytes (%) (Auto) (test code = 5905-5) 5.8          4.4-11.3    

               





[facility] CenterEosinophils (%) (Auto)2019 05:50:00
  * 



             Test Item    Value        Reference Range Interpretation Comments

 

             Eosinophils (%) (Auto) (test code = 713-8) 2.5          0.0-6.0    

                





[facility] CenterBasophils (%) (Auto)2019 05:50:00* 



             Test Item    Value        Reference Range Interpretation Comments

 

             Basophils (%) (Auto) (test code = 706-2) 0.5          0.0-1.0      

              





[facility] CenterIM GRANULOCYTES %2019 05:50:00* 



             Test Item    Value        Reference Range Interpretation Comments

 

             IM GRANULOCYTES % (test code = IM GRANULOCYTES %) 0.3          0.0-

1.0                    





[facility] CenterNeutrophils # (Auto)2019 05:50:00* 



             Test Item    Value        Reference Range Interpretation Comments

 

             Neutrophils # (Auto) (test code = 751-8) 4.7          2.1-6.9      

              





[facility] CenterLymphocytes # (Auto)2019 05:50:00* 



             Test Item    Value        Reference Range Interpretation Comments

 

             Lymphocytes # (Auto) (test code = 78879-3) 2.2          1.0-3.2    

                





[facility] CenterMonocytes # (Auto)2019 05:50:00* 



             Test Item    Value        Reference Range Interpretation Comments

 

             Monocytes # (Auto) (test code = 742-7) 0.4          0.2-0.8        

            





[facility] CenterEosinophils # (Auto)2019 05:50:00* 



             Test Item    Value        Reference Range Interpretation Comments

 

             Eosinophils # (Auto) (test code = 711-2) 0.2          0.0-0.4      

              





[facility] CenterBasophils # (Auto)2019 05:50:00* 



             Test Item    Value        Reference Range Interpretation Comments

 

             Basophils # (Auto) (test code = 704-7) 0.0          0.0-0.1        

            





[facility] CenterAbsolute Immature Granulocyte (auto
2019 05:50:00* 



             Test Item    Value        Reference Range Interpretation Comments

 

                                        Absolute Immature Granulocyte (auto (lloyd

t code = Absolute Immature Granulocyte 

(auto)          0.02            0-0.1                            





[facility] Axipgcn7919-19-80 16:34:00* 



             Test Item    Value        Reference Range Interpretation Comments

 

             Bedside Glucose (test code = 55867-9) 141                 H  

           





Meter ID: [id number] [facility] Medical Center Glucose
2019 16:34:00* 



             Test Item    Value        Reference Range Interpretation Comments

 

             Bedside Glucose (test code = 12025-7) 141                 H  

           





Meter ID: [id number] Regional Medical Center Glucose
2019 16:34:00* 



             Test Item    Value        Reference Range Interpretation Comments

 

             Bedside Glucose (test code = 11522-2) 141                 H  

           





Meter ID: [id number] Regional Medical Center Glucose
2019 16:34:00* 



             Test Item    Value        Reference Range Interpretation Comments

 

             Bedside Glucose (test code = 17708-4) 141                 H  

           





Meter ID: [id number] [facility]de Regional Medical CenterBlood Culture
2019 12:15:00* 



             Test Item    Value        Reference Range Interpretation Comments

 

                          Blood Culture (test code = 48715311)                  

           NO GROWTH AFTER

72 HOURS                                                     





[facility] CenterUrine Bawfvrp6709-53-35 12:03:00* 



             Test Item    Value        Reference Range Interpretation Comments

 

             Urine Culture (test code = 630-4) Organism: PSEUDOMONAS AERUGINOSA 

                           





[facility]2019-03-30 12:03:00* 



             Test Item    Value        Reference Range Interpretation Comments

 

             Urine Culture (test code = 630-4) Organism: PSEUDOMONAS AERUGINOSA 

                           





[facility]2019-03-29 09:30:00* 



             Test Item    Value        Reference Range Interpretation Comments

 

             Urine Culture (test code = 630-4) Organism: GRAM NEGATIVE BACILLUS 

                           





[facility]2019-03-29 09:30:00* 



             Test Item    Value        Reference Range Interpretation Comments

 

             Urine Culture (test code = 630-4) Organism: GRAM NEGATIVE BACILLUS 

                           





[facility]2019-03-29 09:30:00* 



             Test Item    Value        Reference Range Interpretation Comments

 

             Urine Culture (test code = 630-4) Organism: GRAM NEGATIVE BACILLUS 

                           





[facility]2019-03-29 09:30:00* 



             Test Item    Value        Reference Range Interpretation Comments

 

             Urine Culture (test code = 630-4) Organism: GRAM NEGATIVE BACILLUS 

                           





[facility]2019-03-29 09:30:00* 



             Test Item    Value        Reference Range Interpretation Comments

 

             Urine Culture (test code = 630-4) Organism: GRAM NEGATIVE BACILLUS 

                           





[facility]2019-03-29 09:30:00* 



             Test Item    Value        Reference Range Interpretation Comments

 

             Urine Culture (test code = 630-4) Organism: GRAM NEGATIVE BACILLUS 

                           





[facility]2019-03-29 09:30:00* 



             Test Item    Value        Reference Range Interpretation Comments

 

             Urine Culture (test code = 630-4) Organism: GRAM NEGATIVE BACILLUS 

                           





[facility]2019-03-29 09:30:00* 



             Test Item    Value        Reference Range Interpretation Comments

 

             Urine Culture (test code = 630-4) No Result Data Provided          

                  





[facility]2019-03-29 09:30:00* 



             Test Item    Value        Reference Range Interpretation Comments

 

             Urine Culture (test code = 630-4) No Result Data Provided          

                  





[facility]2019-03-29 09:30:00* 



             Test Item    Value        Reference Range Interpretation Comments

 

             Urine Culture (test code = 630-4) No Result Data Provided          

                  





[facility] CenterUrine Kappziy5667-72-49 09:30:00* 



             Test Item    Value        Reference Range Interpretation Comments

 

             Urine Culture (test code = 630-4) No Result Data Provided          

                  





[facility] Chorionic Gonadotropin, Qual
2019 09:05:00* 



             Test Item    Value        Reference Range Interpretation Comments

 

             Human Chorionic Gonadotropin, Qual (test code = 2118-8) NEGATIVE   

  NEGATIVE                   





[facility] Chorionic Gonadotropin, Qual
2019 09:05:00* 



             Test Item    Value        Reference Range Interpretation Comments

 

             Human Chorionic Gonadotropin, Qual (test code = 2118-8) NEGATIVE   

  NEGATIVE                   





[facility] Chorionic Gonadotropin, Qual
2019 09:05:00* 



             Test Item    Value        Reference Range Interpretation Comments

 

             Human Chorionic Gonadotropin, Qual (test code = 2118-8) NEGATIVE   

  NEGATIVE                   





[facility] Chorionic Gonadotropin, Qual
2019 09:05:00* 



             Test Item    Value        Reference Range Interpretation Comments

 

             Human Chorionic Gonadotropin, Qual (test code = 2118-8) NEGATIVE   

  NEGATIVE                   





[facility] CenterTo[facility] Aeogbfrdc7778-84-22 06:06:00* 



             Test Item    Value        Reference Range Interpretation Comments

 

             Total Bilirubin (test code = 1975-2) 0.3          0.2-1.2          

          





[facility] CenterAspartate Amino Transf (AST/SGOT)
2019 06:06:00* 



             Test Item    Value        Reference Range Interpretation Comments

 

                                        Aspartate Amino Transf (AST/SGOT) (test 

code = Aspartate Amino Transf 

(AST/SGOT))     14              5-34                             





[facility] CenterAlanine Aminotransferase (ALT/SGPT)
2019 06:06:00* 



             Test Item    Value        Reference Range Interpretation Comments

 

             Alanine Aminotransferase (ALT/SGPT) (test code = 1742-6) 13        

   0-55                       





[facility] Lczbzio6165-74-20 06[facility]06:[facility]* 



             Test Item    Value        Reference Range Interpretation Comments

 

             Total Protein (test code = 2885-2) 7.3          6.5-8.1            

        





[facility] CenterAlbumin2019-03-29 06:06:00* 



             Test Item    Value        Reference Range Interpretation Comments

 

             Albumin (test code = 1751-7) 2.7          3.5-5.0      L           

  





[facility] CenterGlobulin2019-03-29 06:06:00* 



             Test Item    Value        Reference Range Interpretation Comments

 

             Globulin (test code = 88848-5) 4.6          2.3-3.5      H         

    





[facility] CenterAlbumin/Globulin Evink8997-10-74 06:06:00
  * 



             Test Item    Value        Reference Range Interpretation Comments

 

             Albumin/Globulin Ratio (test code = 1759-0) 0.6          0.8-2.0   

   L             





[facility] CenterAlkaline Ayjtvbpzhdb0954-97-95 06:06:00* 



             Test Item    Value        Reference Range Interpretation Comments

 

             Alkaline Phosphatase (test code = 6768-6) 87                 

               





[facility] CenterUrine WWB8416-32-39 13:02:00* 



             Test Item    Value        Reference Range Interpretation Comments

 

             Urine WBC (test code = 5821-4) 21-50        0-5          H         

    





[facility] CenterUrine RVO0453-07-85 13:02:00* 



             Test Item    Value        Reference Range Interpretation Comments

 

             Urine RBC (test code = 80574-6) 6-10         0-5          H        

     





[facility] CenterUrine Gzbjlerl8387-05-51 13:02:00* 



             Test Item    Value        Reference Range Interpretation Comments

 

             Urine Bacteria (test code = 19476-8) FEW          NONE             

          





[facility] CenterUrine Epithelial Vlzev3846-19-79 13:02:00
  * 



             Test Item    Value        Reference Range Interpretation Comments

 

             Urine Epithelial Cells (test code = 00023-6) NONE         NONE     

                  





[facility] CenterABDOMEN-[facility]VIEW ([facility]UB)2019 12:55:00   
                                                                                
 [place] [address]      Patient Name: CRISTINA RICH            
                      MR #: [id number]                     : 1965      
                            Age/Sex: 53/F  Acct #: [id number]                 
            Req #: [id number]  Adm Physician:                                   
                  Ordered by: LISSETTE GATES MD                            
Report #: [id number]        Location: ER                                      
Room/Bed:                     _______________________________________________
____________________________________________________    Procedure: [id number] DX/
ABDOMEN-1VIEW (KUB)  Exam Date: 19                            Exam Time: 
215                                              REPORT STATUS: Signed       Exa
m: GIGI.      Clinical History: Nephrostomy placement.      Comparison: 2019
. CT scan 2019.      Findings: Right percutaneous nephrostomy and parallel 
left internal ureteral   stents are again noted.  Pelvic surgical clips are unch
anged. No calcifications   project over the renal shadows or expected ureteral c
ourses. Calcification in   the dependent portion of the dilated left collecting 
system seen on the   comparison CT is not appreciated by plain radiography.     
Bowel gas pattern is nonobstructive. Regional skeletal structures are intact.   
Calcified gallstone projects over the right upper quadrant.      IMPRESSION:    
 Right percutaneous nephrostomy catheter and parallel left internal ureteral   
stents are again seen. Left renal calculus seen on the comparison CT is not   i
dentified by plain radiography.      Signed by: Dr. Deb Del Castillo M.D. on 3/28/20
19 12:58 PM        Dictated By: DEB DEL CASTILLO MD, MD  Electronically Signed By: DARSHAN DEL CASTILLO MD, MD on 19 1258  Transcribed By: LUCINDA on 19 1258   
   COPY TO:   LISSETTE GATES MD         Urine Plrys1086-51-40 12:47:00* 



             Test Item    Value        Reference Range Interpretation Comments

 

             Urine Color (test code = 5778-6) YELLOW       YELLOW               

      





[facility] CenterUrine Pmpknou1746-10-47 12:47:00* 



             Test Item    Value        Reference Range Interpretation Comments

 

             Urine Clarity (test code = 20259-9) CLOUDY       CLEAR        H    

         





[facility] CenterUrine Specific Moehepd9918-61-51 12:47:00
  * 



             Test Item    Value        Reference Range Interpretation Comments

 

             Urine Specific Gravity (test code = 5811-5) 1.010        1.010-1.02

5                





[facility] CenterUrine tR9081-75-82 12:47:00* 



             Test Item    Value        Reference Range Interpretation Comments

 

             Urine pH (test code = 34852-8) 7            5-7                    

    





[facility] CenterUrine Leukocyte Luxaehor0399-00-68 
12:47:00* 



             Test Item    Value        Reference Range Interpretation Comments

 

             Urine Leukocyte Esterase (test code = 5799-2) 1+           NEGATIVE

     H             





[facility] Afbfdaw2064-05-67 12:47:00* 



             Test Item    Value        Reference Range Interpretation Comments

 

             Urine Nitrite (test code = 55520-4) POSITIVE     NEGATIVE     H    

         





[facility] CenterUrine Nmicgeu0697-15-45 12:47:00* 



             Test Item    Value        Reference Range Interpretation Comments

 

             Urine Protein (test code = 5804-0) 2+           NEGATIVE     H     

        





[facility] Glucose (UA)2019 12:47:00* 



             Test Item    Value        Reference Range Interpretation Comments

 

             Urine Glucose (UA) (test code = 2349-9) NEGATIVE     NEGATIVE      

             





[facility] CenterUrine Rciekgo9885-44-94 12:47:00* 



             Test Item    Value        Reference Range Interpretation Comments

 

             Urine Ketones (test code = 59153-0) NEGATIVE     NEGATIVE          

         





[facility] Mcqmtiwywcvv8354-04-40 12:47:00* 



             Test Item    Value        Reference Range Interpretation Comments

 

             Urine Urobilinogen (test code = 23599-4) 0.2          0.2-1        

              





[facility] CenterUrine Hznlwvtxw4295-72-45 12:47:00* 



             Test Item    Value        Reference Range Interpretation Comments

 

             Urine Bilirubin (test code = 1978-6) NEGATIVE     NEGATIVE         

          





[facility] Ecpal7359-10-90 12:47:00* 



             Test Item    Value        Reference Range Interpretation Comments

 

             Urine Blood (test code = 83565-7) 3+           NEGATIVE     H      

       





[facility] Htzhwer5852-05-98 13:56:00* 



             Test Item    Value        Reference Range Interpretation Comments

 

             Urine Culture (test code = 630-4) Organism: PSEUDO FLUORESCENS/PUTI

DA                            





[facility] Fxgaoai4032-35-13 13:56:00* 



             Test Item    Value        Reference Range Interpretation Comments

 

             Urine Culture (test code = 630-4) Organism: PSEUDO FLUORESCENS/PUTI

DA                            





[facility] Tyzdksb2581-49-79 13:56:00* 



             Test Item    Value        Reference Range Interpretation Comments

 

             Urine Culture (test code = 630-4) Organism: PSEUDO FLUORESCENS/PUTI

DA                            





[facility] SystemAB[facility] 14:44:00   
                                                                                
 [facility]                        [address] [address]      Patient Name: CRISTINA RICH     
                             MR #: [id number]                     :                                    Age/Sex: 53/F  Acct #: [id number]   
                          Req #: [id number]  Adm Physician:                     
                                Ordered by: JACQUELINE ARORA MD                      
     Report #: [id number]        Location: OR                                    
 Room/Bed:                     _____________________________________________
______________________________________________________    Procedure: [id number] D
X/ABDOMEN-1VIEW (KUB)  Exam Date: 19                            Exam Time:
1315                                              REPORT STATUS: Signed       E
xam: Abdominal film       Clinical History: Nephrolithiasis      Comparison: CT 
abdomen and pelvis 2019      DISCUSSION: Right percutaneous nephrostomy and
parallel left internal ureteral   stents are again noted. Positions are unchang
ed relative to  tomogram from   comparison CT. Pelvic surgical clips are un
changed. No calcifications project   over the renal shadows or expected ureteral
courses. Calcification in the   dependent portion of the dilated left collecting
system seen on the comparison   CT is not appreciated by plain radiography.     
Bowel gas pattern is nonobstructive. Regional skeletal structures are intact.   
Calcified gallstone projects over the right upper quadrant.      IMPRESSION:    
 Right percutaneous nephrostomy catheter and parallel left internal ureteral   
stents are unchanged in position. Left renal calculus seen on the comparison CT 
 is not identified by plain radiography.               Signed by: Dr. Clau Ngo M.D. on 2019 2:47 PM        Dictated By: CLAU NGO MD  Electro
nically Signed By: CLAU NGO MD on 19  Transcribed By: LUCINDA on
19       COPY TO:   JACQUELINE ARORA MD         Sodium Kgmsg3297-96-43 
15:26:00* 



             Test Item    Value        Reference Range Interpretation Comments

 

             Sodium Level (test code = 2951-2) 136          136-145             

       





[facility] CenterPotassium Gvxox1750-01-91 15:26:00* 



             Test Item    Value        Reference Range Interpretation Comments

 

             Potassium Level (test code = 2823-3) 3.5          3.5-5.1          

          





[facility] CenterChloride Jnkzh1878-55-63 15:26:00* 



             Test Item    Value        Reference Range Interpretation Comments

 

             Chloride Level (test code = 2075-0) 102                      

         





[facility] CenterCarbon Dioxide Cgqrs0137-37-04 15:26:00* 



             Test Item    Value        Reference Range Interpretation Comments

 

             Carbon Dioxide Level (test code = 2028-9) 22           22-29       

               





[facility] CenterAnion Qvr3456-54-31 15:26:00* 



             Test Item    Value        Reference Range Interpretation Comments

 

             Anion Gap (test code = 33037-3) 15.5         8-16                  

     





[facility] CenterBlood Urea Ltvqfckd5148-16-24 15:26:00* 



             Test Item    Value        Reference Range Interpretation Comments

 

             Blood Urea Nitrogen (test code = 3094-0) 20           7-26         

              





[facility] CenterCreatinine2019-01-30 15:26:00* 



             Test Item    Value        Reference Range Interpretation Comments

 

             Creatinine (test code = 2160-0) 1.14         0.57-1.11    H        

     





[facility] CenterBUN/Creatinine Zwjrq7079-88-42 15:26:00* 



             Test Item    Value        Reference Range Interpretation Comments

 

             BUN/Creatinine Ratio (test code = 3097-3) 18           6-25        

               





[facility] CenterEstimat Glomerular Filtration Rate
2019 15:26:00* 



             Test Item    Value        Reference Range Interpretation Comments

 

             Estimat Glomerular Filtration Rate (test code = 413243393) 49      

     >60          L             





Ranges were taken from the National Kidney Disease Education Program and the Michelle
[facility] literature.Reference ranges:60 or greater: Mgcadl39-92 (
for 3 consecutive months): Chronic kidney disease 15 or less: Kidney failureVal [facility] CenterGlucose Bgtpd4965-59-76 15:26:00* 



             Test Item    Value        Reference Range Interpretation Comments

 

             Glucose Level (test code = QYH3174) 162                 H    

         





[facility] CenterCalcium Gkoiq3797-14-83 15:26:00* 



             Test Item    Value        Reference Range Interpretation Comments

 

             Calcium Level (test code = 66587-4) 9.3          8.4-10.2          

         





[facility] CenterTotal Kmyukehcc7520-97-23 15:26:00* 



             Test Item    Value        Reference Range Interpretation Comments

 

             Total Bilirubin (test code = 1975-2) 0.5          0.2-1.2          

          





[facility] CenterAspartate Amino Transf (AST/SGOT)
2019 15:26:00* 



             Test Item    Value        Reference Range Interpretation Comments

 

                                        Aspartate Amino Transf (AST/SGOT) (test 

code = Aspartate Amino Transf 

(AST/SGOT))     16              5-34                             





[facility] CenterAlanine Aminotransferase (ALT/SGPT)
2019 15:26:00* 



             Test Item    Value        Reference Range Interpretation Comments

 

             Alanine Aminotransferase (ALT/SGPT) (test code = 1742-6) 20        

   0-55                       





[facility] CenterTotal Gueptbc3262-60-10 15:26:00* 



             Test Item    Value        Reference Range Interpretation Comments

 

             Total Protein (test code = 2885-2) 7.7          6.5-8.1            

        





[facility] CenterAlbumin2019-01-30 15:26:00* 



             Test Item    Value        Reference Range Interpretation Comments

 

             Albumin (test code = 1751-7) 3.4          3.5-5.0      L           

  





[facility] CenterGlobulin2019-01-30 15:26:00* 



             Test Item    Value        Reference Range Interpretation Comments

 

             Globulin (test code = 57484-9) 4.3          2.3-3.5      H         

    





[facility] CenterAlbumin/Globulin Ebrer9567-71-80 15:26:00
  * 



             Test Item    Value        Reference Range Interpretation Comments

 

             Albumin/Globulin Ratio (test code = 1759-0) 0.8          0.8-2.0   

                 





[facility] CenterAlkaline Hfelbfmpkgd7430-07-47 15:26:00* 



             Test Item    Value        Reference Range Interpretation Comments

 

             Alkaline Phosphatase (test code = 6768-6) 98                 

               





[facility]odium Ugrhs1304-18-96 15:26:00* 



             Test Item    Value        Reference Range Interpretation Comments

 

             Sodium Level (test code = 2951-2) 136          136-145             

       





[facility] CenterPotassium Mmkpa1638-29-63 15:26:00* 



             Test Item    Value        Reference Range Interpretation Comments

 

             Potassium Level (test code = 2823-3) 3.5          3.5-5.1          

          





[facility] CenterChloride Cpoxz1444-85-27 15:26:00* 



             Test Item    Value        Reference Range Interpretation Comments

 

             Chloride Level (test code = 2075-0) 102                      

         





[facility] CenterCarbon Dioxide Rzxuv6208-43-62 15:26:00* 



             Test Item    Value        Reference Range Interpretation Comments

 

             Carbon Dioxide Level (test code = 2028-9) 22           22-29       

               





[facility] CenterAnion Voo2269-66-33 15:26:00* 



             Test Item    Value        Reference Range Interpretation Comments

 

             Anion Gap (test code = 33037-3) 15.5         8-16                  

     





[facility] CenterBlood Urea Xvckhrar3084-09-23 15:26:00* 



             Test Item    Value        Reference Range Interpretation Comments

 

             Blood Urea Nitrogen (test code = 3094-0) 20           7-26         

              





[facility] CenterCreatinine2019-01-30 15:26:00* 



             Test Item    Value        Reference Range Interpretation Comments

 

             Creatinine (test code = 2160-0) 1.14         0.57-1.11    H        

     





[facility] CenterBUN/Creatinine Melft3746-59-51 15:26:00* 



             Test Item    Value        Reference Range Interpretation Comments

 

             BUN/Creatinine Ratio (test code = 3097-3) 18           6-25        

               





[facility] CenterEstimat Glomerular Filtration Rate
2019 15:26:00* 



             Test Item    Value        Reference Range Interpretation Comments

 

             Estimat Glomerular Filtration Rate (test code = 745292037) 49      

     >60          L             





Ranges were taken from the National Kidney Disease Education Program and the Michelle
[facility] literature.Reference ranges:60 or greater: Tdfmfc90-55 (
for 3 consecutive months): Chronic kidney disease 15 or less: Kidney failureVal [facility]de Regional Medical CenterGlucose Uvwox2879-56-43 15:26:00* 



             Test Item    Value        Reference Range Interpretation Comments

 

             Glucose Level (test code = UAA1221) 162                 H    

         





[facility] CenterCalcium Dcokz8908-43-15 15:26:00* 



             Test Item    Value        Reference Range Interpretation Comments

 

             Calcium Level (test code = 15435-7) 9.3          8.4-10.2          

         





[facility] CenterTotal Fhyhmcmie8933-64-04 15:26:00* 



             Test Item    Value        Reference Range Interpretation Comments

 

             Total Bilirubin (test code = 1975-2) 0.5          0.2-1.2          

          





[facility] CenterAspartate Amino Transf (AST/SGOT)
2019 15:26:00* 



             Test Item    Value        Reference Range Interpretation Comments

 

                                        Aspartate Amino Transf (AST/SGOT) (test 

code = Aspartate Amino Transf 

(AST/SGOT))     16              5-34                             





[facility] CenterAlanine Aminotransferase (ALT/SGPT)
2019 15:26:00* 



             Test Item    Value        Reference Range Interpretation Comments

 

             Alanine Aminotransferase (ALT/SGPT) (test code = 1742-6) 20        

   0-55                       





[facility] CenterTotal Yibazev4096-53-06 15:26:00* 



             Test Item    Value        Reference Range Interpretation Comments

 

             Total Protein (test code = 2885-2) 7.7          6.5-8.1            

        





[facility] CenterAlbumin2019-01-30 15:26:00* 



             Test Item    Value        Reference Range Interpretation Comments

 

             Albumin (test code = 1751-7) 3.4          3.5-5.0      L           

  





[facility] CenterGlobulin2019-01-30 15:26:00* 



             Test Item    Value        Reference Range Interpretation Comments

 

             Globulin (test code = 13052-1) 4.3          2.3-3.5      H         

    





[facility] CenterAlbumin/Globulin Tcbjg6093-65-83 15:26:00
  * 



             Test Item    Value        Reference Range Interpretation Comments

 

             Albumin/Globulin Ratio (test code = 1759-0) 0.8          0.8-2.0   

                 





[facility] CenterAlkaline Gldmsacfhvi9710-33-68 15:26:00* 



             Test Item    Value        Reference Range Interpretation Comments

 

             Alkaline Phosphatase (test code = 6768-6) 98                 

               





[facility] CenterUrine AFS0614-48-97 15:23:00* 



             Test Item    Value        Reference Range Interpretation Comments

 

             Urine WBC (test code = 5821-4) 11-20        0-5          H         

    





[facility] CenterUrine AQM1492-03-09 15:23:00* 



             Test Item    Value        Reference Range Interpretation Comments

 

             Urine RBC (test code = 60143-2) 11-20        0-5          H        

     





[facility] CenterUrine Ysduvwxu9688-77-09 15:23:00* 



             Test Item    Value        Reference Range Interpretation Comments

 

             Urine Bacteria (test code = 57526-7) MANY         NONE         H   

          





[facility] CenterUrine Epithelial Veefp3787-22-07 15:23:00
  * 



             Test Item    Value        Reference Range Interpretation Comments

 

             Urine Epithelial Cells (test code = 32366-4) NONE         NONE     

                  





[facility] CenterUrine WUF6017-72-50 15:23:00* 



             Test Item    Value        Reference Range Interpretation Comments

 

             Urine WBC (test code = 5821-4) 11-20        0-5          H         

    





[facility] CenterUrine XMM4328-46-53 15:23:00* 



             Test Item    Value        Reference Range Interpretation Comments

 

             Urine RBC (test code = 49666-7) 11-20        0-5          H        

     





[facility] CenterUrine Tqagsuqb8012-18-16 15:23:00* 



             Test Item    Value        Reference Range Interpretation Comments

 

             Urine Bacteria (test code = 31040-0) MANY         NONE         H   

          





[facility] CenterUrine Epithelial Mbhtq9352-19-78 15:23:00
  * 



             Test Item    Value        Reference Range Interpretation Comments

 

             Urine Epithelial Cells (test code = 27835-7) NONE         NONE     

                  





[facility] CenterUrine Etnxs4531-75-14 15:15:00* 



             Test Item    Value        Reference Range Interpretation Comments

 

             Urine Color (test code = 5778-6) STRAW        YELLOW               

      





[facility] CenterUrine Pmrgpya1652-81-80 15:15:00* 



             Test Item    Value        Reference Range Interpretation Comments

 

             Urine Clarity (test code = 62508-3) CLOUDY       CLEAR        H    

         





[facility] CenterUrine Specific Hmzrwat3817-55-49 15:15:00
  * 



             Test Item    Value        Reference Range Interpretation Comments

 

             Urine Specific Gravity (test code = 5811-5) 1.025        1.010-1.02

5                





[facility] CenterUrine uU5038-20-09 15:15:00* 



             Test Item    Value        Reference Range Interpretation Comments

 

             Urine pH (test code = 39960-4) 6            5-7                    

    





[facility] CenterUrine Leukocyte Lyvsrkpi1338-95-94 
15:15:00* 



             Test Item    Value        Reference Range Interpretation Comments

 

             Urine Leukocyte Esterase (test code = 5799-2) 2+           NEGATIVE

     H             





[facility] CenterUrine Vibieba8359-64-87 15:15:00* 



             Test Item    Value        Reference Range Interpretation Comments

 

             Urine Nitrite (test code = 21228-0) NEGATIVE     NEGATIVE          

         





[facility] CenterUrine Dlqduyl9307-49-00 15:15:00* 



             Test Item    Value        Reference Range Interpretation Comments

 

             Urine Protein (test code = 5804-0) 2+           NEGATIVE     H     

        





[facility] CenterUrine Glucose (UA)2019 15:15:00* 



             Test Item    Value        Reference Range Interpretation Comments

 

             Urine Glucose (UA) (test code = 2349-9) NEGATIVE     NEGATIVE      

             





[facility] CenterUrine Mysmcgx8649-91-47 15:15:00* 



             Test Item    Value        Reference Range Interpretation Comments

 

             Urine Ketones (test code = 97339-6) NEGATIVE     NEGATIVE          

         





[facility] CenterUrine Djqrtfikesez3755-94-86 15:15:00* 



             Test Item    Value        Reference Range Interpretation Comments

 

             Urine Urobilinogen (test code = 72110-1) 0.2          0.2-1        

              





[facility] CenterUrine Siqugmpzv2853-50-90 15:15:00* 



             Test Item    Value        Reference Range Interpretation Comments

 

             Urine Bilirubin (test code = 1978-6) NEGATIVE     NEGATIVE         

          





[facility] CenterUrine Wkero3754-02-22 15:15:00* 



             Test Item    Value        Reference Range Interpretation Comments

 

             Urine Blood (test code = 93837-4) 4+           NEGATIVE     H      

       





[facility] CenterUrine Gpbjy4837-12-03 15:15:00* 



             Test Item    Value        Reference Range Interpretation Comments

 

             Urine Color (test code = 5778-6) STRAW        YELLOW               

      





[facility] CenterUrine Yngdxzh3521-98-86 15:15:00* 



             Test Item    Value        Reference Range Interpretation Comments

 

             Urine Clarity (test code = 74918-7) CLOUDY       CLEAR        H    

         





[facility] CenterUrine Specific Aqxyznt4927-73-54 15:15:00
  * 



             Test Item    Value        Reference Range Interpretation Comments

 

             Urine Specific Gravity (test code = 5811-5) 1.025        1.010-1.02

5                





[facility] CenterUrine [id number] 15:15:00* 



             Test Item    Value        Reference Range Interpretation Comments

 

             Urine pH (test code = 78282-3) 6            5-7                    

    





[facility] CenterUrine Leukocyte Bhlbdmtc7246-74-24 
15:15:00* 



             Test Item    Value        Reference Range Interpretation Comments

 

             Urine Leukocyte Esterase (test code = 5799-2) 2+           NEGATIVE

     H             





[facility] CenterUrine Bocmuaw9249-65-83 15:15:00* 



             Test Item    Value        Reference Range Interpretation Comments

 

             Urine Nitrite (test code = 66682-6) NEGATIVE     NEGATIVE          

         





[facility] CenterUrine Afryqyg9864-32-78 15:15:00* 



             Test Item    Value        Reference Range Interpretation Comments

 

             Urine Protein (test code = 5804-0) 2+           NEGATIVE     H     

        





[facility] CenterUrine Glucose (UA)2019 15:15:00* 



             Test Item    Value        Reference Range Interpretation Comments

 

             Urine Glucose (UA) (test code = 2349-9) NEGATIVE     NEGATIVE      

             





[facility] CenterUrine Pdbidjw3348-83-59 15:15:00* 



             Test Item    Value        Reference Range Interpretation Comments

 

             Urine Ketones (test code = 35596-6) NEGATIVE     NEGATIVE          

         





[facility] CenterUrine Twfymvkrmcin0567-80-77 15:15:00* 



             Test Item    Value        Reference Range Interpretation Comments

 

             Urine Urobilinogen (test code = 73444-5) 0.2          0.2-1        

              





[facility] CenterUrine Njmhgcaqs8171-42-25 15:15:00* 



             Test Item    Value        Reference Range Interpretation Comments

 

             Urine Bilirubin (test code = 1978-6) NEGATIVE     NEGATIVE         

          





[facility] CenterUrine Qcqqd5109-90-20 15:15:00* 



             Test Item    Value        Reference Range Interpretation Comments

 

             Urine Blood (test code = 01194-6) 4+           NEGATIVE     H      

       





[facility] CenterWhite Blood Cnxin2914-36-90 15:12:00* 



             Test Item    Value        Reference Range Interpretation Comments

 

             White Blood Count (test code = 6690-2) 10.96        4.8-10.8     H 

            





[facility] CenterRed Blood Foqil5184-41-78 15:12:00* 



             Test Item    Value        Reference Range Interpretation Comments

 

             Red Blood Count (test code = 789-8) 4.03         3.6-5.1           

         





[facility] CenterHemoglobin2019-01-30 15:12:00* 



             Test Item    Value        Reference Range Interpretation Comments

 

             Hemoglobin (test code = 59894-6) 10.2         12.0-16.0    L       

      





[facility] CenterHematocrit2019-01-30 15:12:00* 



             Test Item    Value        Reference Range Interpretation Comments

 

             Hematocrit (test code = 4544-3) 32.6         34.2-44.1    L        

     





[facility] CenterMean Corpuscular Xcsllo9255-75-08 
15:12:00* 



             Test Item    Value        Reference Range Interpretation Comments

 

             Mean Corpuscular Volume (test code = 787-2) 80.9         81-99     

   L             





[facility] CenterMean Corpuscular Vteckchrzn5471-92-38 
15:12:00* 



             Test Item    Value        Reference Range Interpretation Comments

 

             Mean Corpuscular Hemoglobin (test code = 785-6) 25.3         28-32 

       L             





[facility] CenterMean Corpuscular Hemoglobin Concent
2019 15:12:00* 



             Test Item    Value        Reference Range Interpretation Comments

 

             Mean Corpuscular Hemoglobin Concent (test code = 786-4) 31.3       

  31-35                      





[facility] CenterRed Cell Distribution Dqxfl7421-25-78 
15:12:00* 



             Test Item    Value        Reference Range Interpretation Comments

 

             Red Cell Distribution Width (test code = 25352-9) 16.8         11.7

-14.4    H             





[facility] CenterPlatelet Cxagw0700-87-85 15:12:00* 



             Test Item    Value        Reference Range Interpretation Comments

 

             Platelet Count (test code = 777-3) 289          140-360            

        





[facility] CenterNeutrophils (%) (Auto)2019 15:12:00
  * 



             Test Item    Value        Reference Range Interpretation Comments

 

             Neutrophils (%) (Auto) (test code = 97842-5) 76.2         38.7-80.0

                  





[facility] CenterLymphocytes (%) (Auto)2019 15:12:00
  * 



             Test Item    Value        Reference Range Interpretation Comments

 

             Lymphocytes (%) (Auto) (test code = 736-9) 16.4         18.0-39.1  

  L             





[facility] CenterMonocytes (%) (Auto)2019 15:12:00* 



             Test Item    Value        Reference Range Interpretation Comments

 

             Monocytes (%) (Auto) (test code = 5905-5) 5.6          4.4-11.3    

               





[facility] CenterEosinophils (%) (Auto)2019 15:12:00
  * 



             Test Item    Value        Reference Range Interpretation Comments

 

             Eosinophils (%) (Auto) (test code = 713-8) 1.0          0.0-6.0    

                





[facility] CenterBasophils (%) (Auto)2019 15:12:00* 



             Test Item    Value        Reference Range Interpretation Comments

 

             Basophils (%) (Auto) (test code = 706-2) 0.5          0.0-1.0      

              





[facility] CenterIM GRANULOCYTES %2019 15:12:00* 



             Test Item    Value        Reference Range Interpretation Comments

 

             IM GRANULOCYTES % (test code = IM GRANULOCYTES %) 0.3          0.0-

1.0                    





[facility] CenterNeutrophils # (Auto)2019 15:12:00* 



             Test Item    Value        Reference Range Interpretation Comments

 

             Neutrophils # (Auto) (test code = 751-8) 8.4          2.1-6.9      

H             





[facility] CenterLymphocytes # (Auto)2019 15:12:00* 



             Test Item    Value        Reference Range Interpretation Comments

 

             Lymphocytes # (Auto) (test code = 45235-4) 1.8          1.0-3.2    

                





[facility] CenterMonocytes # (Auto)2019 15:12:00* 



             Test Item    Value        Reference Range Interpretation Comments

 

             Monocytes # (Auto) (test code = 742-7) 0.6          0.2-0.8        

            





[facility] CenterEosinophils # (Auto)2019 15:12:00* 



             Test Item    Value        Reference Range Interpretation Comments

 

             Eosinophils # (Auto) (test code = 711-2) 0.1          0.0-0.4      

              





[facility] CenterBasophils # (Auto)2019 15:12:00* 



             Test Item    Value        Reference Range Interpretation Comments

 

             Basophils # (Auto) (test code = 704-7) 0.1          0.0-0.1        

            





[facility] CenterAbsolute Immature Granulocyte (auto
2019 15:12:00* 



             Test Item    Value        Reference Range Interpretation Comments

 

                                        Absolute Immature Granulocyte (auto (lloyd

t code = Absolute Immature Granulocyte 

(auto)          0.03            0-0.1                            





[facility] CenterWhite Blood Wkodf7491-33-83 15:12:00* 



             Test Item    Value        Reference Range Interpretation Comments

 

             White Blood Count (test code = 6690-2) 10.96        4.8-10.8     H 

            





[facility] CenterRed Blood Dqxle9034-68-12 15:12:00* 



             Test Item    Value        Reference Range Interpretation Comments

 

             Red Blood Count (test code = 789-8) 4.03         3.6-5.1           

         





[facility] CenterHemoglobin2019-01-30 15:12:00* 



             Test Item    Value        Reference Range Interpretation Comments

 

             Hemoglobin (test code = 79439-3) 10.2         12.0-16.0    L       

      





[facility] CenterHematocrit2019-01-30 15:12:00* 



             Test Item    Value        Reference Range Interpretation Comments

 

             Hematocrit (test code = 4544-3) 32.6         34.2-44.1    L        

     





[facility] CenterMean Corpuscular Dqibrd2346-16-98 
15:12:00* 



             Test Item    Value        Reference Range Interpretation Comments

 

             Mean Corpuscular Volume (test code = 787-2) 80.9         81-99     

   L             





[facility] CenterMean Corpuscular Muazkdbggs2953-05-64 
15:12:00* 



             Test Item    Value        Reference Range Interpretation Comments

 

             Mean Corpuscular Hemoglobin (test code = 785-6) 25.3         28-32 

       L             





[facility] CenterMean Corpuscular Hemoglobin Concent
2019 15:12:00* 



             Test Item    Value        Reference Range Interpretation Comments

 

             Mean Corpuscular Hemoglobin Concent (test code = 786-4) 31.3       

  31-35                      





[facility] CenterRed Cell Distribution Isksm0337-03-37 
15:12:00* 



             Test Item    Value        Reference Range Interpretation Comments

 

             Red Cell Distribution Width (test code = 19720-3) 16.8         11.7

-14.4    H             





[facility] CenterPlatelet Imqxd6860-97-48 15:12:00* 



             Test Item    Value        Reference Range Interpretation Comments

 

             Platelet Count (test code = 777-3) 289          140-360            

        





[facility] CenterNeutrophils (%) (Auto)2019 15:12:00
  * 



             Test Item    Value        Reference Range Interpretation Comments

 

             Neutrophils (%) (Auto) (test code = 25189-5) 76.2         38.7-80.0

                  





[facility] CenterLymphocytes (%) (Auto)2019 15:12:00
  * 



             Test Item    Value        Reference Range Interpretation Comments

 

             Lymphocytes (%) (Auto) (test code = 736-9) 16.4         18.0-39.1  

  L             





[facility] 15:12:00* 



             Test Item    Value        Reference Range Interpretation Comments

 

             Monocytes (%) (Auto) (test code = 5905-5) 5.6          4.4-11.3    

               





[facility] CenterE[facility] (Auto)[facility] 15:12:00
  * 



             Test Item    Value        Reference Range Interpretation Comments

 

             Eosinophils (%) (Auto) (test code = 713-8) 1.0          0.0-6.0    

                





[facility] CenterBasophils (%) (Auto)2019 15:12:00* 



             Test Item    Value        Reference Range Interpretation Comments

 

             Basophils (%) (Auto) (test code = 706-2) 0.5          0.0-1.0      

              





[facility] CenterIM GRANULOCYTES %2019 15:12:00* 



             Test Item    Value        Reference Range Interpretation Comments

 

             IM GRANULOCYTES % (test code = IM GRANULOCYTES %) 0.3          0.0-

1.0                    





[facility] CenterNeutrophils # (Auto)2019 15:12:00* 



             Test Item    Value        Reference Range Interpretation Comments

 

             Neutrophils # (Auto) (test code = 751-8) 8.4          2.1-6.9      

H             





[facility] CenterLymphocytes # (Auto)2019 15:12:00* 



             Test Item    Value        Reference Range Interpretation Comments

 

             Lymphocytes # (Auto) (test code = 00427-9) 1.8          1.0-3.2    

                





[facility] CenterMonocytes # (Auto)2019 15:12:00* 



             Test Item    Value        Reference Range Interpretation Comments

 

             Monocytes # (Auto) (test code = 742-7) 0.6          0.2-0.8        

            





[facility] CenterEosinophils # (Auto)2019 15:12:00* 



             Test Item    Value        Reference Range Interpretation Comments

 

             Eosinophils # (Auto) (test code = 711-2) 0.1          0.0-0.4      

              





[facility] CenterBasophils # (Auto)2019 15:12:00* 



             Test Item    Value        Reference Range Interpretation Comments

 

             Basophils # (Auto) (test code = 704-7) 0.1          0.0-0.1        

            





[facility] CenterAbsolute Immature Granulocyte (auto
2019 15:12:00* 



             Test Item    Value        Reference Range Interpretation Comments

 

                                        Absolute Immature Granulocyte (auto (lloyd

t code = Absolute Immature Granulocyte 

(auto)          0.03            0-0.1                            





[facility] CenterCT ABDOMEN/PELVIS [id number] 13:57:00  
                                                                                
  [facility]                        [address] [place], [place] [id number]      Patient Name: CRISTINA RICH     
                             MR #: [id number]                     :                                    Age/Sex: 53/F  [facility] #: [id number]   
                          Req #: [id number]  Adm Physician:                     
                                Ordered by: LISSETTE GATES MD                 
          Report #: [id number]        Location: ER                               
      Room/Bed:                     ________________________________________
___________________________________________________________    Procedure: [id number]
009 CT/CT ABDOMEN/PELVIS WO  Exam Date: 19                            Exam
Time: 1320                                              REPORT STATUS: Signed   
EXAMINATION: CT of the abdomen and pelvis without contrast.       TECHNIQUE: S
piral CT images of the abdomen and pelvis were performed from the   lung bases t
o the lesser trochanters.  No intravenous contrast was given per   renal stone p
rotocol.  Coronal and sagittal reformatted images were obtained.      COMPARISON
:  None.      CLINICAL HISTORY:Nephrostomy tubes, catheters obstruction         
 DISCUSSION: ABSENCE OF INTRAVENOUS CONTRAST DECREASES SENSITIVITY FOR DETECTION
  OF FOCAL LESIONS AND VASCULAR PATHOLOGY.      ABDOMEN/PELVIS:      LOWER THO
RAX: Unremarkable.       HEPATOBILIARY:No focal hepatic lesions. Calcified galls
tone.      SPLEEN: No splenomegaly.      PANCREAS: No focal masses or ductal dil
atation.      ADRENALS: No adrenal nodules.      KIDNEYS/URETERS: Right percutan
eous nephrostomy tube. No obstruction.       2 left ureteral stents extending fr
om the renal pelvis to the bladder. Left   hydronephrosis. In the inferior pole 
3 small calculi measuring up to 3 mm.      PELVIC ORGANS/BLADDER: Gomez catheter
. Bladder is decompressed. Hysterectomy.      PERITONEUM/RETROPERITONEUM: No rizwan
e air or fluid.      LYMPH NODES: No intra-abdominal,retroperitoneal, pelvic or 
inguinal   lymphadenopathy.      VESSELS: Vascular calcifications.      GI TRACT
: No distention or wall thickening.      BONES AND SOFT TISSUES: No bony destruc
tive lesions.  No soft tissue   abnormalities.        IMPRESSION:       Left hyd
ronephrosis despite 2 left renal stents present.      Small nonobstructing left 
inferior pole renal calculi measuring up to 3 mm      Right percutaneous nephros
edison tube. No obstruction.      Gomez catheter within the decompressed bladder. 
    Cholelithiasis.      Signed by: Dr. Andrew Palisch, M.D. on 2019 2:10 
PM        Dictated By: ANDREW R PALISCH MD  Electronically Signed By: ANDREW R P
ALISCH MD on 19 1410  Transcribed By: LUCINDA on 19 1410       COPY 
TO:   LISSETTE GATES MD        Urine Culture2019-01-15 13:22:00* 



             Test Item    Value        Reference Range Interpretation Comments

 

             Urine Culture (test code = 630-4) Organism: PSEUDO FLUORESCENS/PUTI

DA                            





[facility] CenterUrine Culture2019-01-[facility] 13:22:00* 



             Test Item    Value        Reference Range Interpretation Comments

 

             Urine Culture (test code = 630-4) Organism: PSEUDO FLUORESCENS/PUTI

DA                            





[facility] CenterUrine Culture2019-01-15 13:22:00* 



             Test Item    Value        Reference Range Interpretation Comments

 

             Urine Culture (test code = 630-4) Organism: PSEUDO FLUORESCENS/PUTI

DA                            





[facility] CenterUrine Culture2019-01-15 13:22:00* 



             Test Item    Value        Reference Range Interpretation Comments

 

             Urine Culture (test code = 630-4) Organism: PSEUDO FLUORESCENS/PUTI

DA                            





[facility] CenterUrine Culture2019-01-15 13:22:00* 



             Test Item    Value        Reference Range Interpretation Comments

 

             Urine Culture (test code = 630-4) Organism: PSEUDO FLUORESCENS/PUTI

DA                            





[facility] CenterUrine [id number] 15:12:00* 



             Test Item    Value        Reference Range Interpretation Comments

 

             Urine WBC (test code = 5821-4) 21-50        0-5          H         

    





[facility] CenterUrine [id number] 15:12:00* 



             Test Item    Value        Reference Range Interpretation Comments

 

             Urine RBC (test code = 53430-6) 11-20        0-5          H        

     





[facility] CenterUrine Fscpdcfh7445-74-58 15:12:00* 



             Test Item    Value        Reference Range Interpretation Comments

 

             Urine Bacteria (test code = 50834-3) FEW          NONE             

          





[facility] CenterUrine Epithelial Zmxkd6817-63-91 15:12:00
  * 



             Test Item    Value        Reference Range Interpretation Comments

 

             Urine Epithelial Cells (test code = 54569-2) RARE         NONE     

                  





[facility] CenterUrine Moffk4024-93-68 15:00:00* 



             Test Item    Value        Reference Range Interpretation Comments

 

             Urine Color (test code = 5778-6) YELLOW       YELLOW               

      





[facility] CenterUrine Neqdcrd3274-47-64 15:00:00* 



             Test Item    Value        Reference Range Interpretation Comments

 

             Urine Clarity (test code = 91032-6) HAZY         CLEAR             

         





[facility] CenterUrine Specific Pqqghgx0131-09-34 15:00:00
  * 



             Test Item    Value        Reference Range Interpretation Comments

 

             Urine Specific Gravity (test code = 5811-5) 1.030        1.010-1.02

5  H             





[facility] CenterUrine [id number] 15:00:00* 



             Test Item    Value        Reference Range Interpretation Comments

 

             Urine pH (test code = 15676-4) 6            5-7                    

    





[facility] CenterUrine Leukocyte Iotficxe8538-14-84 
15:00:00* 



             Test Item    Value        Reference Range Interpretation Comments

 

             Urine Leukocyte Esterase (test code = 5799-2) 2+           NEGATIVE

     H             





[facility] Cfnuktp3388-71-91 15:00:00* 



             Test Item    Value        Reference Range Interpretation Comments

 

             Urine Nitrite (test code = 24454-1) NEGATIVE     NEGATIVE          

         





[facility] CenterUrine Klwlefa2500-34-33 15:00:00* 



             Test Item    Value        Reference Range Interpretation Comments

 

             Urine Protein (test code = 5804-0) 2+           NEGATIVE     H     

        





[facility] CenterUrine Glucose (UA)2019 15:00:00* 



             Test Item    Value        Reference Range Interpretation Comments

 

             Urine Glucose (UA) (test code = 2349-9) NEGATIVE     NEGATIVE      

             





[facility] CenterUrine Szrqkfb6039-06-63 15:00:00* 



             Test Item    Value        Reference Range Interpretation Comments

 

             Urine Ketones (test code = 93433-5) NEGATIVE     NEGATIVE          

         





[facility] CenterUrine Hxqfroshndnd5987-98-64 15:00:00* 



             Test Item    Value        Reference Range Interpretation Comments

 

             Urine Urobilinogen (test code = 75593-5) 0.2          0.2-1        

              





[facility] Lynarsiwe5919-85-70 15:00:00* 



             Test Item    Value        Reference Range Interpretation Comments

 

             Urine Bilirubin (test code = 1978-6) NEGATIVE     NEGATIVE         

          





[facility] Mcuqn1149-56-78 15:00:00* 



             Test Item    Value        Reference Range Interpretation Comments

 

             Urine Blood (test code = 67274-1) 3+           NEGATIVE     H      

       





[facility] Omdkhds3558-70-01 07:47:00* 



             Test Item    Value        Reference Range Interpretation Comments

 

             Urine Culture (test code = 630-4) Organism: CANDIDA ALBICANS       

                     





[facility] Edgnoxk5500-32-23 07:47:00* 



             Test Item    Value        Reference Range Interpretation Comments

 

             Urine Culture (test code = 630-4) Organism: CANDIDA ALBICANS       

                     





[facility] Jrldbjk3902-49-67 07:47:00* 



             Test Item    Value        Reference Range Interpretation Comments

 

             Urine Culture (test code = 630-4) Organism: CANDIDA ALBICANS       

                     





[facility] Rpbmjxc1494-25-34 07:47:00* 



             Test Item    Value        Reference Range Interpretation Comments

 

             Urine Culture (test code = 630-4) Organism: CANDIDA ALBICANS       

                     





[facility]odium Vbfuo5135-34-85 06:39:00* 



             Test Item    Value        Reference Range Interpretation Comments

 

             Sodium Level (test code = 2951-2) 138          136-145             

       





[facility] CenterPotassium Zloty5677-48-29 06:39:00* 



             Test Item    Value        Reference Range Interpretation Comments

 

             Potassium Level (test code = 2823-3) 4.2          3.5-5.1          

          





[facility] CenterChloride Bvxej2976-37-98 06:39:00* 



             Test Item    Value        Reference Range Interpretation Comments

 

             Chloride Level (test code = 2075-0) 105                      

         





[facility] CenterCarbon Dioxide Jfmal0743-22-34 06:39:00* 



             Test Item    Value        Reference Range Interpretation Comments

 

             Carbon Dioxide Level (test code = 2028-9) 26           22-29       

               





[facility] CenterAnion Oir9185-03-39 06:39:00* 



             Test Item    Value        Reference Range Interpretation Comments

 

             Anion Gap (test code = 33037-3) 11.2         8-16                  

     





[facility] CenterBlood Urea Abzhearx0870-15-40 06:39:00* 



             Test Item    Value        Reference Range Interpretation Comments

 

             Blood Urea Nitrogen (test code = 3094-0) 16           7-26         

              





[facility] CenterCreatinine2018-12-04 06:39:00* 



             Test Item    Value        Reference Range Interpretation Comments

 

             Creatinine (test code = 2160-0) 1.18         0.57-1.11    H        

     





[facility] CenterBUN/Creatinine Ywmxo7775-46-02 06:39:00* 



             Test Item    Value        Reference Range Interpretation Comments

 

             BUN/Creatinine Ratio (test code = 3097-3) 14           6-        

               





[facility] CenterEstimat Glomerular Filtration Rate
2018 06:39:00* 



             Test Item    Value        Reference Range Interpretation Comments

 

             Estimat Glomerular Filtration Rate (test code = 849930576) 48      

     >60          L             





Ranges were taken from the National Kidney Disease Education Program and the Michelle
[facility] literature.Reference ranges:60 or greater: Cepsni05-39 (
for 3 consecutive months): Chronic kidney disease 15 or less: Kidney failureVal [facility]de Regional Medical CenterGlucose Rhaue6629-06-55 06:39:00* 



             Test Item    Value        Reference Range Interpretation Comments

 

             Glucose Level (test code = ZHJ4048) 246                 H    

         





[facility] CenterCalcium Ajwui4534-14-11 06:39:00* 



             Test Item    Value        Reference Range Interpretation Comments

 

             Calcium Level (test code = 15513-9) 9.4          8.4-10.2          

         





[facility] Houstonodium Vufpn8130-94-22 06:39:00* 



             Test Item    Value        Reference Range Interpretation Comments

 

             Sodium Level (test code = 2951-2) 138          136-145             

       





[facility] CenterPotassium Sfnhu0969-11-39 06:39:00* 



             Test Item    Value        Reference Range Interpretation Comments

 

             Potassium Level (test code = 2823-3) 4.2          3.5-5.1          

          





[facility] CenterChloride Xjtlf7968-23-03 06:39:00* 



             Test Item    Value        Reference Range Interpretation Comments

 

             Chloride Level (test code = 2075-0) 105                      

         





[facility] CenterCarbon Dioxide Hfnnk6788-76-89 06:39:00* 



             Test Item    Value        Reference Range Interpretation Comments

 

             Carbon Dioxide Level (test code = 2028-9) 26           22-29       

               





[facility] CenterAnion Pnt4556-09-40 06:39:00* 



             Test Item    Value        Reference Range Interpretation Comments

 

             Anion Gap (test code = 33037-3) 11.2         8-16                  

     





[facility] CenterBlood Urea Ckuncjzd5567-80-44 06:39:00* 



             Test Item    Value        Reference Range Interpretation Comments

 

             Blood Urea Nitrogen (test code = 3094-0) 16           7-26         

              





[facility] CenterCreatinine2018-12-04 06:39:00* 



             Test Item    Value        Reference Range Interpretation Comments

 

             Creatinine (test code = 2160-0) 1.18         0.57-1.11    H        

     





[facility] CenterBUN/Creatinine Amkiq7223-37-39 06:39:00* 



             Test Item    Value        Reference Range Interpretation Comments

 

             BUN/Creatinine Ratio (test code = 3097-3) 14           6-        

               





[facility] CenterEstimat Glomerular Filtration Rate
2018 06:39:00* 



             Test Item    Value        Reference Range Interpretation Comments

 

             Estimat Glomerular Filtration Rate (test code = 406792341) 48      

     >60          L             





Ranges were taken from the National Kidney Disease Education Program and the Michelle
[facility] literature.Reference ranges:60 or greater: Lczgac13-41 (
for 3 consecutive months): Chronic kidney disease 15 or less: Kidney failureVal [facility]de Regional Medical CenterGlucose Pghxp5828-98-78 06:39:00* 



             Test Item    Value        Reference Range Interpretation Comments

 

             Glucose Level (test code = XQZ3012) 246                 H    

         





[facility] CenterCalcium Rqvwf1234-92-74 06:39:00* 



             Test Item    Value        Reference Range Interpretation Comments

 

             Calcium Level (test code = 06170-1) 9.4          8.4-10.2          

         





[facility] CenterWhite Blood Fcggw0615-30-76 06:35:00* 



             Test Item    Value        Reference Range Interpretation Comments

 

             White Blood Count (test code = 6690-2) 12.70        4.8-10.8     H 

            





[facility] CenterRed Blood Aungh0359-57-61 06:35:00* 



             Test Item    Value        Reference Range Interpretation Comments

 

             Red Blood Count (test code = 789-8) 3.76         3.6-5.1           

         





[facility] CenterHemoglobin2018-12-04 06:35:00* 



             Test Item    Value        Reference Range Interpretation Comments

 

             Hemoglobin (test code = 11934-6) 9.4          12.0-16.0    L       

      





[facility] CenterHematocrit2018-12-04 06:35:00* 



             Test Item    Value        Reference Range Interpretation Comments

 

             Hematocrit (test code = 4544-3) 30.2         34.2-44.1    L        

     





[facility] CenterMean Corpuscular Edyevl7414-48-34 
06:35:00* 



             Test Item    Value        Reference Range Interpretation Comments

 

             Mean Corpuscular Volume (test code = 787-2) 80.3         81-99     

   L             





[facility] CenterMean Corpuscular Ovgmhcikao5699-43-99 
06:35:00* 



             Test Item    Value        Reference Range Interpretation Comments

 

             Mean Corpuscular Hemoglobin (test code = 785-6) 25.0         28-32 

       L             





[facility] CenterMean Corpuscular Hemoglobin Concent
2018 06:35:00* 



             Test Item    Value        Reference Range Interpretation Comments

 

             Mean Corpuscular Hemoglobin Concent (test code = 786-4) 31.1       

  31-35                      





[facility] CenterRed Cell Distribution Bndfq8948-18-03 
06:35:00* 



             Test Item    Value        Reference Range Interpretation Comments

 

             Red Cell Distribution Width (test code = 40848-9) 15.5         11.7

-14.4    H             





[facility] CenterPlatelet Yqlpe3235-39-00 06:35:00* 



             Test Item    Value        Reference Range Interpretation Comments

 

             Platelet Count (test code = 777-3) 336          140-360            

        





[facility] CenterNeutrophils (%) (Auto)2018 06:35:00
  * 



             Test Item    Value        Reference Range Interpretation Comments

 

             Neutrophils (%) (Auto) (test code = 75174-8) 80.0         38.7-80.0

                  





[facility] CenterLymphocytes (%) (Auto)2018 06:35:00
  * 



             Test Item    Value        Reference Range Interpretation Comments

 

             Lymphocytes (%) (Auto) (test code = 736-9) 13.9         18.0-39.1  

  L             





[facility] CenterMonocytes (%) (Auto)2018 06:35:00* 



             Test Item    Value        Reference Range Interpretation Comments

 

             Monocytes (%) (Auto) (test code = 5905-5) 4.7          4.4-11.3    

               





[facility] CenterEosinophils (%) (Auto)2018 06:35:00
  * 



             Test Item    Value        Reference Range Interpretation Comments

 

             Eosinophils (%) (Auto) (test code = 713-8) 0.1          0.0-6.0    

                





[facility] CenterBasophils (%) (Auto)2018 06:35:00* 



             Test Item    Value        Reference Range Interpretation Comments

 

             Basophils (%) (Auto) (test code = 706-2) 0.3          0.0-1.0      

              





[facility] CenterIM GRANULOCYTES %2018 06:35:00* 



             Test Item    Value        Reference Range Interpretation Comments

 

             IM GRANULOCYTES % (test code = IM GRANULOCYTES %) 1.0          0.0-

1.0                    





[facility] CenterNeutrophils # (Auto)2018 06:35:00* 



             Test Item    Value        Reference Range Interpretation Comments

 

             Neutrophils # (Auto) (test code = 751-8) 10.2         2.1-6.9      

H             





[facility] CenterLymphocytes # (Auto)2018 06:35:00* 



             Test Item    Value        Reference Range Interpretation Comments

 

             Lymphocytes # (Auto) (test code = 73125-1) 1.8          1.0-3.2    

                





[facility] CenterMonocytes # (Auto)2018 06:35:00* 



             Test Item    Value        Reference Range Interpretation Comments

 

             Monocytes # (Auto) (test code = 742-7) 0.6          0.2-0.8        

            





[facility] CenterEosinophils # (Auto)2018 06:35:00* 



             Test Item    Value        Reference Range Interpretation Comments

 

             Eosinophils # (Auto) (test code = 711-2) 0.0          0.0-0.4      

              





[facility] CenterBasophils # (Auto)2018 06:35:00* 



             Test Item    Value        Reference Range Interpretation Comments

 

             Basophils # (Auto) (test code = 704-7) 0.0          0.0-0.1        

            





[facility] CenterAbsolute Immature Granulocyte (auto
2018 06:35:00* 



             Test Item    Value        Reference Range Interpretation Comments

 

                                        Absolute Immature Granulocyte (auto (lloyd

t code = Absolute Immature Granulocyte 

(auto)          0.13            0-0.1           H                





[facility] CenterWhite Blood Jemln4806-44-03 06:35:00* 



             Test Item    Value        Reference Range Interpretation Comments

 

             White Blood Count (test code = 6690-2) 12.70        4.8-10.8     H 

            





[facility] CenterRed Blood Xssdf3081-29-20 06:35:00* 



             Test Item    Value        Reference Range Interpretation Comments

 

             Red Blood Count (test code = 789-8) 3.76         3.6-5.1           

         





[facility]* 



             Test Item    Value        Reference Range Interpretation Comments

 

             Hemoglobin (test code = 19592-0) 9.4          12.0-16.0    L       

      





[facility]rit[facility] 06:35:00* 



             Test Item    Value        Reference Range Interpretation Comments

 

             Hematocrit (test code = 4544-3) 30.2         34.2-44.1    L        

     





[facility] CenterMean Corpuscular Cnvoes2668-24-23 
06:35:00* 



             Test Item    Value        Reference Range Interpretation Comments

 

             Mean Corpuscular Volume (test code = 787-2) 80.3         81-99     

   L             





[facility] CenterMean Corpuscular Hpakrqekbg9211-20-46 
06:35:00* 



             Test Item    Value        Reference Range Interpretation Comments

 

             Mean Corpuscular Hemoglobin (test code = 785-6) 25.0         28-32 

       L             





[facility] CenterMean Corpuscular Hemoglobin Concent
2018 06:35:00* 



             Test Item    Value        Reference Range Interpretation Comments

 

             Mean Corpuscular Hemoglobin Concent (test code = 786-4) 31.1       

  31-35                      





[facility] CenterRed Cell Distribution Keeof7561-16-40 
06:35:00* 



             Test Item    Value        Reference Range Interpretation Comments

 

             Red Cell Distribution Width (test code = 60766-0) 15.5         11.7

-14.4    H             





[facility] CenterPlatelet Fuchc9265-62-52 06:35:00* 



             Test Item    Value        Reference Range Interpretation Comments

 

             Platelet Count (test code = 777-3) 336          140-360            

        





[facility] CenterNeutrophils (%) (Auto)2018 06:35:00
  * 



             Test Item    Value        Reference Range Interpretation Comments

 

             Neutrophils (%) (Auto) (test code = 14589-4) 80.0         38.7-80.0

                  





[facility] CenterLymphocytes (%) (Auto)2018 06:35:00
  * 



             Test Item    Value        Reference Range Interpretation Comments

 

             Lymphocytes (%) (Auto) (test code = 736-9) 13.9         18.0-39.1  

  L             





[facility] CenterMonocytes (%) (Auto)2018 06:35:00* 



             Test Item    Value        Reference Range Interpretation Comments

 

             Monocytes (%) (Auto) (test code = 5905-5) 4.7          4.4-11.3    

               





[facility] CenterEosinophils (%) (Auto)2018 06:35:00
  * 



             Test Item    Value        Reference Range Interpretation Comments

 

             Eosinophils (%) (Auto) (test code = 713-8) 0.1          0.0-6.0    

                





[facility] CenterBasophils (%) (Auto)2018 06:35:00* 



             Test Item    Value        Reference Range Interpretation Comments

 

             Basophils (%) (Auto) (test code = 706-2) 0.3          0.0-1.0      

              





[facility] CenterIM GRANULOCYTES %2018 06:35:00* 



             Test Item    Value        Reference Range Interpretation Comments

 

             IM GRANULOCYTES % (test code = IM GRANULOCYTES %) 1.0          0.0-

1.0                    





[facility] CenterNeutrophils # (Auto)2018 06:35:00* 



             Test Item    Value        Reference Range Interpretation Comments

 

             Neutrophils # (Auto) (test code = 751-8) 10.2         2.1-6.9      

H             





[facility] CenterLymphocytes # (Auto)2018 06:35:00* 



             Test Item    Value        Reference Range Interpretation Comments

 

             Lymphocytes # (Auto) (test code = 23672-5) 1.8          1.0-3.2    

                





[facility] CenterMonocytes # (Auto)2018 06:35:00* 



             Test Item    Value        Reference Range Interpretation Comments

 

             Monocytes # (Auto) (test code = 742-7) 0.6          0.2-0.8        

            





[facility] CenterEosinophils # (Auto)2018 06:35:00* 



             Test Item    Value        Reference Range Interpretation Comments

 

             Eosinophils # (Auto) (test code = 711-2) 0.0          0.0-0.4      

              





[facility] CenterBasophils # (Auto)2018 06:35:00* 



             Test Item    Value        Reference Range Interpretation Comments

 

             Basophils # (Auto) (test code = 704-7) 0.0          0.0-0.1        

            





[facility] CenterAbsolute Immature Granulocyte (auto
2018 06:35:00* 



             Test Item    Value        Reference Range Interpretation Comments

 

                                        Absolute Immature Granulocyte (auto (lloyd

t code = Absolute Immature Granulocyte 

(auto)          0.13            0-0.1           H                





[facility] CenterTotal Nyzdztjof5215-87-87 20:34:00* 



             Test Item    Value        Reference Range Interpretation Comments

 

             Total Bilirubin (test code = 1975-2) 0.2          0.2-1.2          

          





[facility] CenterAspartate Amino Transf (AST/SGOT)
2018 20:34:00* 



             Test Item    Value        Reference Range Interpretation Comments

 

                                        Aspartate Amino Transf (AST/SGOT) (test 

code = Aspartate Amino Transf 

(AST/SGOT))     28              5-34                             





[facility] CenterAlanine Aminotransferase (ALT/SGPT)
2018 20:34:00* 



             Test Item    Value        Reference Range Interpretation Comments

 

             Alanine Aminotransferase (ALT/SGPT) (test code = 1742-6) 25        

   0-55                       





[facility] CenterTotal Knlvwou3397-04-94 20:34:00* 



             Test Item    Value        Reference Range Interpretation Comments

 

             Total Protein (test code = 2885-2) 9.4          6.5-8.1      H     

        





[facility] CenterAlbumin2018-11-30 20:34:00* 



             Test Item    Value        Reference Range Interpretation Comments

 

             Albumin (test code = 1751-7) 3.4          3.5-5.0      L           

  





[facility] CenterGlobulin2018-11-30 20:34:00* 



             Test Item    Value        Reference Range Interpretation Comments

 

             Globulin (test code = 61639-1) 6.0          2.3-3.5      H         

    





[facility] CenterAlbumin/Globulin Jzjjz4272-20-07 20:34:00
  * 



             Test Item    Value        Reference Range Interpretation Comments

 

             Albumin/Globulin Ratio (test code = 1759-0) 0.6          0.8-2.0   

   L             





[facility] CenterAlkaline Cqukeoxumdc0777-95-48 20:34:00* 



             Test Item    Value        Reference Range Interpretation Comments

 

             Alkaline Phosphatase (test code = 6768-6) 120                

               





[facility] CenterTotal Nhptqciiz1281-18-32 20:34:00* 



             Test Item    Value        Reference Range Interpretation Comments

 

             Total Bilirubin (test code = 1975-2) 0.2          0.2-1.2          

          





[facility] CenterAspartate Amino Transf (AST/SGOT)
2018 20:34:00* 



             Test Item    Value        Reference Range Interpretation Comments

 

                                        Aspartate Amino Transf (AST/SGOT) (test 

code = Aspartate Amino Transf 

(AST/SGOT))     28              5-34                             





[facility] CenterAlanine Aminotransferase (ALT/SGPT)
2018 20:34:00* 



             Test Item    Value        Reference Range Interpretation Comments

 

             Alanine Aminotransferase (ALT/SGPT) (test code = 1742-6) 25        

   0-55                       





[facility] CenterTo[facility] Bzqdvga2067-48-31 20:34:00* 



             Test Item    Value        Reference Range Interpretation Comments

 

             Total Protein (test code = 2885-2) 9.4          6.5-8.1      H     

        





[facility] CenterAlbumin2018-11-30 20:34:00* 



             Test Item    Value        Reference Range Interpretation Comments

 

             Albumin (test code = 1751-7) 3.4          3.5-5.0      L           

  





[facility] CenterGlobulin2018-11-30 20:34:00* 



             Test Item    Value        Reference Range Interpretation Comments

 

             Globulin (test code = 22649-7) 6.0          2.3-3.5      H         

    





[facility] CenterAlbumin/Globulin Qvdra2333-90-35 20:34:00
  * 



             Test Item    Value        Reference Range Interpretation Comments

 

             Albumin/Globulin Ratio (test code = 1759-0) 0.6          0.8-2.0   

   L             





[facility] CenterAlkaline Xyetpsicjwf6030-15-35 20:34:00* 



             Test Item    Value        Reference Range Interpretation Comments

 

             Alkaline Phosphatase (test code = 6768-6) 120                

               





[facility] CenterUrine Fsyxe6276-95-86 20:33:00* 



             Test Item    Value        Reference Range Interpretation Comments

 

             Urine Color (test code = 5778-6) YELLOW       YELLOW               

      





[facility] CenterUrine Ispmbmm1162-67-50 20:33:00* 



             Test Item    Value        Reference Range Interpretation Comments

 

             Urine Clarity (test code = 78159-0) HAZY         CLEAR             

         





[facility] CenterUrine Specific Vyvpuwd5075-63-51 20:33:00
  * 



             Test Item    Value        Reference Range Interpretation Comments

 

             Urine Specific Gravity (test code = 5811-5) 1.010        1.010-1.02

5                





[facility] CenterUrine pR3227-19-81 20:33:00* 



             Test Item    Value        Reference Range Interpretation Comments

 

             Urine pH (test code = 20516-0) 7            5-7                    

    





[facility] CenterUrine Leukocyte Yxhyugti5753-60-55 
20:33:00* 



             Test Item    Value        Reference Range Interpretation Comments

 

             Urine Leukocyte Esterase (test code = 5799-2) 2+           NEGATIVE

     H             





[facility] CenterUrine Moqcdiw0310-66-09 20:33:00* 



             Test Item    Value        Reference Range Interpretation Comments

 

             Urine Nitrite (test code = 19792-7) NEGATIVE     NEGATIVE          

         





[facility] CenterUrine Rfmpajs2998-69-09 20:33:00* 



             Test Item    Value        Reference Range Interpretation Comments

 

             Urine Protein (test code = 5804-0) 2+           NEGATIVE     H     

        





[facility] CenterUrine Glucose (UA)2018 20:33:00* 



             Test Item    Value        Reference Range Interpretation Comments

 

             Urine Glucose (UA) (test code = 2349-9) NEGATIVE     NEGATIVE      

             





[facility] CenterUrine Paqunwn0949-79-92 20:33:00* 



             Test Item    Value        Reference Range Interpretation Comments

 

             Urine Ketones (test code = 39222-6) NEGATIVE     NEGATIVE          

         





[facility] CenterUrine Dteahcjthjgn4672-41-48 20:33:00* 



             Test Item    Value        Reference Range Interpretation Comments

 

             Urine Urobilinogen (test code = 83705-1) 0.2          0.2-1        

              





[facility] CenterUrine Yuopqrraw4251-35-20 20:33:00* 



             Test Item    Value        Reference Range Interpretation Comments

 

             Urine Bilirubin (test code = 1978-6) NEGATIVE     NEGATIVE         

          





[facility] CenterUrine Usmlq1922-55-93 20:33:00* 



             Test Item    Value        Reference Range Interpretation Comments

 

             Urine Blood (test code = 64823-5) 2+           NEGATIVE     H      

       





[facility] CenterUrine EGV7804-30-42 20:33:00* 



             Test Item    Value        Reference Range Interpretation Comments

 

             Urine WBC (test code = 5821-4) >50          0-5          H         

    





[facility] CenterUrine LCG0136-59-41 20:33:00* 



             Test Item    Value        Reference Range Interpretation Comments

 

             Urine RBC (test code = 93378-1) 11-20        0-5          H        

     





[facility] CenterUrine Dizzcssl1982-21-96 20:33:00* 



             Test Item    Value        Reference Range Interpretation Comments

 

             Urine Bacteria (test code = 99644-9) MANY         NONE         H   

          





[facility] CenterUrine Epithelial Oiyyb1300-03-37 20:33:00
  * 



             Test Item    Value        Reference Range Interpretation Comments

 

             Urine Epithelial Cells (test code = 66691-8) NONE         NONE     

                  





[facility] CenterCHEST [facility]INGLE (NOT [facility]ABLE)2018 
18:00:00                                                                        
             [facility]                        [address] [place] [address]      Patient Name: CRISTINA RICH                                   MR #: [id number]                     
: 1965                                   Age/Sex: 53/F  Acct #: 
[id number]                              Req #: [id number]  Adm Physician:     
                                                Ordered by: JENNIFER TERRELL MD   
                        Report #: [id number]        Location: ER                 
                    Room/Bed:                     
__________________________________________
_________________________________________________________    Procedure: [id number]
6 DX/CHEST SINGLE (NOT PORTABLE)  Exam Date: 18                           
Exam Time: 1750                                              REPORT STATUS: Sig
ki    EXAM: CHEST SINGLE (NOT PORTABLE), AP 1 view   INDICATION: PIC line leaki
ng   COMPARISON: None      FINDINGS:   LINES/TUBES: There is a left midline cath
eter with tip projected over the   axilla.      LUNGS: No consolidations or don
a.       PLEURA: No effusions or pneumothorax.      HEART AND MEDIASTINUM: Cinda
l size and contour.      BONES AND SOFT TISSUES: No acute findings.       IMPRES
WILFRED:   There is a left midline catheter with tip projected over the axilla.   N
o priors are available to determine original location.            Signed by: Dr. Cristina Main M.D. on 2018 6:03 PM        Dictated By: CRISTINA MAIN MD  Electronically Signed By: CRISTINA MAIN MD on 18  Transcribed By:
LUCINDA on 18       COPY TO:   JENNIFER TERRELL MD        NEPHROSTOMY 
CATHETER CLJYLHMK1223-43-70 07:05:00                                            
                                         [place] [address]      
Patient Name: CRISTINA RICH                                   MR #: 
[id number]                     : 1965                                  
Age/Sex: 53/F  Acct #: [id number]                              Req #: [id number]
[id number]  Adm Physician: MATTHEW FLORENTINO MD                                      
Ordered by: JACQUELINE ARORA MD                            Report #: [id number]       
Location: MED/SURG2                               Room/Bed: [facility]               
____________________________________________
_______________________________________________________    Procedure: [id number] 
DX/NEPHROSTOMY CATHETER EXCHANGE  Exam Date:                             Exam Ti
me:                                               REPORT STATUS: Signed    Proce
dure: Right nephrostomy catheter exchange      Medications: Versed 2 mg intraven
ous, Fentanyl 100 mcg intravenous.  The   patient's vital signs, including pulse
oximetry, were continuously monitored by   the interventional radiology nurse.  
   Fluoroscopy time: 1.9 minutes.   Dose area product: 1.76 cGycm2         Pro
cedure in detail:      Informed consent for the procedure was obtained from the 
patient after   discussion of risks and benefits. The right back was prepped and
draped in the   usual fashion.      1% lidocaine was administered into the skin 
and subcutaneous tissues of the   right back for local anesthesia. Contrast was 
injected showing proximal   ureteral obstruction. A    0.035  " Amplatz superst
iff wire was placed through the catheter. A new 10   French all-purpose drainage
catheter was then placed into the renal pelvis.   Contrast injection confirms a
ppropriate position.  Catheter was secured to the   skin with a sterile dressing
.      Impression:      Successful right percutaneous nephrostomy catheter excha
nge.      Signed by: Dr. Shayla Krause DO on 2018 7:11 AM        Dictated 
By: SHAYLA KRAUSE DO  Electronically Signed By: SHAYLA KRAUSE DO on 18 071
1  Transcribed By: LUCINDA on 18 0711       COPY TO:   JACQUELINE ARORA MD      
 Blood Khfesih8696-79-36 16:51:00* 



             Test Item    Value        Reference Range Interpretation Comments

 

             Blood Culture (test code = 46239906) NO GROWTH AFTER 5 DAYS, FINAL 

REPORT                            





[facility] CenterBlood Ktpjskg9377-06-54 16:51:00* 



             Test Item    Value        Reference Range Interpretation Comments

 

             Blood Culture (test code = 32285854) NO GROWTH AFTER 5 DAYS, FINAL 

REPORT                            





[facility] Etbsjiz5792-68-63 16:51:00* 



             Test Item    Value        Reference Range Interpretation Comments

 

             Blood Culture (test code = 66615418) NO GROWTH AFTER 5 DAYS, FINAL 

REPORT                            





[facility]2018-11-06 16:51:00* 



             Test Item    Value        Reference Range Interpretation Comments

 

             Blood Culture (test code = 80265885) NO GROWTH AFTER 5 DAYS, FINAL 

REPORT                            





[facility]2018-11-05 13:21:00* 



             Test Item    Value        Reference Range Interpretation Comments

 

             Urine Culture (test code = 630-4) Organism: ESCHERICHIA COLI-ESBL  

                          





[facility]2018-11-05 13:21:00* 



             Test Item    Value        Reference Range Interpretation Comments

 

             Urine Culture (test code = 630-4) Organism: ESCHERICHIA COLI-ESBL  

                          





[facility]2018-11-05 13:21:00* 



             Test Item    Value        Reference Range Interpretation Comments

 

             Urine Culture (test code = 630-4) Organism: ESCHERICHIA COLI-ESBL  

                          





[facility]2018-11-05 13:21:00* 



             Test Item    Value        Reference Range Interpretation Comments

 

             Urine Culture (test code = 630-4) Organism: ESCHERICHIA COLI-ESBL  

                          





[facility]2018-11-02 19:12:00* 



             Test Item    Value        Reference Range Interpretation Comments

 

             Magnesium Level (test code = 81373-6) 1.7          1.3-2.1         

           





[facility] Basrw9355-22-59 19:12:00* 



             Test Item    Value        Reference Range Interpretation Comments

 

             Magnesium Level (test code = 62586-0) 1.7          1.3-2.1         

           





[facility] Etahj9499-97-09 19:12:00* 



             Test Item    Value        Reference Range Interpretation Comments

 

             Magnesium Level (test code = 11881-5) 1.7          1.3-2.1         

           





[facility]2018-11-02 19:12:00* 



             Test Item    Value        Reference Range Interpretation Comments

 

             Magnesium Level (test code = 01539-0) 1.7          1.3-2.1         

           





[facility]2018-11-02 19:12:00* 



             Test Item    Value        Reference Range Interpretation Comments

 

             Magnesium Level (test code = 67861-7) 1.7          1.3-2.1         

           





[facility]2018-11-02 19:12:00* 



             Test Item    Value        Reference Range Interpretation Comments

 

             Magnesium Level (test code = 81792-4) 1.7          1.3-2.1         

           





[facility]2018-11-02 19:12:00* 



             Test Item    Value        Reference Range Interpretation Comments

 

             Magnesium Level (test code = 13509-4) 1.7          1.3-2.1         

           





[facility]2018-11-02 19:12:00* 



             Test Item    Value        Reference Range Interpretation Comments

 

             Magnesium Level (test code = 94006-4) 1.7          1.3-2.1         

           





[facility]2018-11-02 19:12:00* 



             Test Item    Value        Reference Range Interpretation Comments

 

             Magnesium Level (test code = 40550-9) 1.7          1.3-2.1         

           





[facility] Acid Tjbol1120-61-73 17:57:00* 



             Test Item    Value        Reference Range Interpretation Comments

 

             Lactic Acid Level (test code = Lactic Acid Level) 17.4         4.5-

19.8                   





[facility] Izbhx0904-72-33 17:57:00* 



             Test Item    Value        Reference Range Interpretation Comments

 

             Lactic Acid Level (test code = Lactic Acid Level) 17.4         4.5-

19.8                   





[facility] Lahxo9557-98-38 17:57:00* 



             Test Item    Value        Reference Range Interpretation Comments

 

             Lactic Acid Level (test code = Lactic Acid Level) 17.4         4.5-

19.8                   





[facility] Acid Vacec5187-55-79 17:57:00* 



             Test Item    Value        Reference Range Interpretation Comments

 

             Lactic Acid Level (test code = Lactic Acid Level) 17.4         4.5-

19.8                   





[facility] Xqzzj9458-12-98 17:57:00* 



             Test Item    Value        Reference Range Interpretation Comments

 

             Lactic Acid Level (test code = Lactic Acid Level) 17.4         4.5-

19.8                   





[facility] Siaay0813-04-46 17:57:00* 



             Test Item    Value        Reference Range Interpretation Comments

 

             Lactic Acid Level (test code = Lactic Acid Level) 17.4         4.5-

19.8                   





[facility] Fbcpv4289-32-96 17:57:00* 



             Test Item    Value        Reference Range Interpretation Comments

 

             Lactic Acid Level (test code = Lactic Acid Level) 17.4         4.5-

19.8                   





[facility] Kwxyv2739-87-41 17:57:00* 



             Test Item    Value        Reference Range Interpretation Comments

 

             Lactic Acid Level (test code = Lactic Acid Level) 17.4         4.5-

19.8                   





[facility] Bpjes7908-70-72 17:57:00* 



             Test Item    Value        Reference Range Interpretation Comments

 

             Lactic Acid Level (test code = Lactic Acid Level) 17.4         4.5-

19.8                   





[facility]2018-11-01 14:26:00* 



             Test Item    Value        Reference Range Interpretation Comments

 

             Urine Yeast (test code = 45263-6) FEW          [facility]  14:26:00* 



             Test Item    Value        Reference Range Interpretation Comments

 

             Urine Yeast (test code = 86252-9) FEW          [facility]  14:26:00* 



             Test Item    Value        Reference Range Interpretation Comments

 

             Urine Yeast (test code = 09153-3) FEW          [facility]  14:26:00* 



             Test Item    Value        Reference Range Interpretation Comments

 

             Urine Yeast (test code = 20877-5) FEW          [facility]  14:26:00* 



             Test Item    Value        Reference Range Interpretation Comments

 

             Urine Yeast (test code = 61725-2) FEW          [facility]  14:26:00* 



             Test Item    Value        Reference Range Interpretation Comments

 

             Urine Yeast (test code = 05015-9) FEW          NONE         [id number] 



             Test Item    Value        Reference Range Interpretation Comments

 

             Urine Yeast (test code = 94098-9) FEW          NONE         [facility] [id number] 14:26:00* 



             Test Item    Value        Reference Range Interpretation Comments

 

             Urine Yeast (test code = 04899-1) FEW          NONE         [facility]  [facility] 



             Test Item    Value        Reference Range Interpretation Comments

 

             Urine Yeast (test code = 64766-6) FEW          NONE         [facility] [id number] 12:56:00  
                                                                                
  [facility]                        [address] [place] [address]      Patient Name: CRISTINA RICH     
                             MR #: [id number]                     :                                    Age/Sex: 53/F  Acct #: [id number]   
                          Req #: [id number]  Adm Physician:                     
                                Ordered by: SHARIF BRITO MD               
            Report #: [id number]        Location: ER                             
        Room/Bed:                     ______________________________________
_____________________________________________________________    Procedure: 1101
-0019 CT/CT ABDOMEN/PELVIS WO  Exam Date: 18                            Ex
am Time: 1230                                              REPORT STATUS: Signed
   EXAM: CT Abdomen and Pelvis WITHOUT contrast     INDICATION: Left flank pain.
Previous kidney stone with stent.   COMPARISON: 10/22/2018   TECHNIQUE: Abdomen 
and pelvis were scanned utilizing a multidetector helical   scanner from the l
yoav base to the pubic symphysis without administration of IV   contrast. Absence
of intravenous contrast decreases sensitivity for detection   of focal lesions 
and vascular pathology. Coronal and sagittal reformations were   obtained. Routi
ne protocol was performed.                IV CONTRAST: None.               ORAL 
CONTRAST: Water               RADIATION DOSE: Total DLP: 797.04 mGy*cm          
     Estimated effective dose: (DLP x 0.015 x size factor) mSv               CO
MPLICATIONS: None      FINDINGS:      LINES and TUBES: Right nephrostomy tube wi
th distal tip coiled within the right   renal pelvis. Left double-J ureteral phan
nt in adequate position. Catheter   within the bladder.      LOWER THORAX:  Calc
ified granuloma in the posterior right lung base.      HEPATOBILIARY:   No focal
hepatic lesions. No biliary ductal dilation.       GALLBLADDER: Peripherally ca
lcified gallstone in the lumen of the gallbladder.   No wall thickening.      SP
CONSTANTINO: No splenomegaly.       PANCREAS: No focal masses or ductal dilatation.    
   ADRENALS: No adrenal nodules.      KIDNEYS/URETERS: 7.0 mm stone within the 
lower pole of the left kidney. This   appears to be fragmented when compared wit
h the prior exam. Minimal right   hydronephrosis improved since the prior exam. 
Mild left hydronephrosis improved   when compared with the prior exam.       Unc
hanged left perinephric stranding may reflect pyelonephritis. Unchanged left   p
eriureteral fat stranding.      GI TRACT: No abnormal distention, wall thickenin
g, or evidence of bowel   obstruction.            PELVIC ORGANS/BLADDER: Radiati
on seeds again observed in the uterine cervix.   Urinary bladder is decompressed
by a Gomez catheter.      LYMPH NODES: No lymphadenopathy.      VESSELS: Unrema
rkable.      PERITONEUM / RETROPERITONEUM: No free air or fluid.      BONES: The
re are degenerative changes in the lumbar spine.      SOFT TISSUES: Unremarkable
.        IMPRESSION:       1.  Unchanged left perinephric stranding may reflect 
pyelonephritis. Minimal   left hydroureteronephrosis is improved when compared w
ith the prior exam. The   previously seen 9.0 mm stone in the lower pole of the 
left kidney appears to be   fragmented and slightly smaller.   2. Minimal right 
hydroureteronephrosis improved when compared with the prior   exam. Signed by: ALEJANDRO Del Castillo M.D. on 2018 1:09 PM        Dictated By: DEB ALLEN  Electronically Signed By: DEB DEL CASTILLO MD, MD on 18 5208  Transcribed B
y: LUCINDA on 18 2062       COPY TO:   SHARIF BRITO MD        Urine 
Culture2018-10-25 14:55:00* 



             Test Item    Value        Reference Range Interpretation Comments

 

             Urine Culture (test code = 630-4) Organism: PSEUDOMONAS AERUGINOSA 

                           





[facility]2018-10-25 14:55:00* 



             Test Item    Value        Reference Range Interpretation Comments

 

             Urine Culture (test code = 630-4) Organism: PSEUDOMONAS AERUGINOSA 

                           





[facility]2018-10-25 14:55:00* 



             Test Item    Value        Reference Range Interpretation Comments

 

             Urine Culture (test code = 630-4) Organism: PSEUDOMONAS AERUGINOSA 

                           





[facility]2018-10-25 14:55:00* 



             Test Item    Value        Reference Range Interpretation Comments

 

             Urine Culture (test code = 630-4) Organism: PSEUDOMONAS AERUGINOSA 

                           





[facility]2018-10-25 14:52:00* 



             Test Item    Value        Reference Range Interpretation Comments

 

             Urine Culture (test code = 630-4) Organism: PSEUDOMONAS AERUGINOSA 

                           





[facility]2018-10-25 14:52:00* 



             Test Item    Value        Reference Range Interpretation Comments

 

             Urine Culture (test code = 630-4) Organism: PSEUDOMONAS AERUGINOSA 

                           





[facility]2018-10-25 14:52:00* 



             Test Item    Value        Reference Range Interpretation Comments

 

             Urine Culture (test code = 630-4) Organism: PSEUDOMONAS AERUGINOSA 

                           





[facility]2018-10-25 14:52:00* 



             Test Item    Value        Reference Range Interpretation Comments

 

             Urine Culture (test code = 630-4) Organism: PSEUDOMONAS AERUGINOSA 

                           





[facility]2018-10-25 14:52:00* 



             Test Item    Value        Reference Range Interpretation Comments

 

             Urine Culture (test code = 630-4) Organism: PSEUDOMONAS AERUGINOSA 

                           





[facility]2018-10-25 14:52:00* 



             Test Item    Value        Reference Range Interpretation Comments

 

             Urine Culture (test code = 630-4) Organism: PSEUDOMONAS AERUGINOSA 

                           





[facility]2018-10-25 14:52:00* 



             Test Item    Value        Reference Range Interpretation Comments

 

             Urine Culture (test code = 630-4) Organism: PSEUDOMONAS AERUGINOSA 

                           





[facility]2018-10-25 14:52:00* 



             Test Item    Value        Reference Range Interpretation Comments

 

             Urine Culture (test code = 630-4) Organism: PSEUDOMONAS AERUGINOSA 

                           





[facility] 



             Test Item    Value        Reference Range Interpretation Comments

 

             Urine Culture (test code = 630-4) Organism: PSEUDOMONAS AERUGINOSA 

                           





[facility]* 



             Test Item    Value        Reference Range Interpretation Comments

 

             Amylase Level (test code = 1798-8) 46                        

        





[facility] 



             Test Item    Value        Reference Range Interpretation Comments

 

             Lipase (test code = 3040-3)                       

 





[facility]16[facility] 



             Test Item    Value        Reference Range Interpretation Comments

 

             Amylase Level (test code = 1798-8) 46                        

        





[facility] 



             Test Item    Value        Reference Range Interpretation Comments

 

             Lipase (test code = 3040-3)                       

 





[facility]:16[facility] 



             Test Item    Value        Reference Range Interpretation Comments

 

             Amylase Level (test code = 1798-8) 46                        

        





[facility]* 



             Test Item    Value        Reference Range Interpretation Comments

 

             Lipase (test code = 3040-3)                       

 





[facility] 19:16:[facility]* 



             Test Item    Value        Reference Range Interpretation Comments

 

             Amylase Level (test code = 1798-8) 46                        

        





[facility] 



             Test Item    Value        Reference Range Interpretation Comments

 

             Lipase (test code = 3040-3)                       

 





[facility] 19:16:00* 



             Test Item    Value        Reference Range Interpretation Comments

 

             Amylase Level (test code = 1798-8) 46                        

        





[facility] 19:[facility]* 



             Test Item    Value        Reference Range Interpretation Comments

 

             Lipase (test code = 3040-3)                       

 





[facility] 19:16:[facility]* 



             Test Item    Value        Reference Range Interpretation Comments

 

             Amylase Level (test code = 1798-8) 46           125             

        





[facility] 19:16[facility]* 



             Test Item    Value        Reference Range Interpretation Comments

 

             Lipase (test code = 3040-3)                       

 





[facility] 19:16:00* 



             Test Item    Value        Reference Range Interpretation Comments

 

             Amylase Level (test code = 1798-8) 46           125             

        





[facility] 19:16[facility]* 



             Test Item    Value        Reference Range Interpretation Comments

 

             Lipase (test code = 3040-3)                       

 





[facility] 19:16:00* 



             Test Item    Value        Reference Range Interpretation Comments

 

             Amylase Level (test code = 1798-8) 46                        

        





[facility]* 



             Test Item    Value        Reference Range Interpretation Comments

 

             Lipase (test code = 3040-3)                       

 





[facility] 19:16:[facility]* 



             Test Item    Value        Reference Range Interpretation Comments

 

             Amylase Level (test code = 1798-8) 46                        

        





[facility] 19:16:[facility]* 



             Test Item    Value        Reference Range Interpretation Comments

 

             Lipase (test code = 3040-3)                       

 





[facility]2018-10-22 18:26:00  
                                                                                
  [facility]                        [address] [place] [address]      Patient Name: CRITSINA RICH     
                             MR #: [id number]                     :                                    Age/Sex: 53/F  Acct #: [id number]   
                          Req #: [id number]  Adm Physician:                     
                                Ordered by: IGNACIO MARQUES NP               
            Report #: [id number]        Location: ER                             
        Room/Bed:                     ______________________________________
_____________________________________________________________    Procedure: 1022
-0022 CT/CT ABDOMEN/PELVIS WO  Exam Date:                             Exam Time:
                                              REPORT STATUS: Signed    EXAM: CT 
Abdomen and Pelvis WITHOUT contrast     INDICATION: Left-sided abdominal pain. 
Kidney stones? Cervical cancer.   COMPARISON: .   TECHNIQUE: Abdomen and pe
lvis were scanned utilizing a multidetector helical   scanner from the lung base
to the pubic symphysis without administration of IV   contrast. Absence of intr
avenous contrast decreases sensitivity for detection   of focal lesions and vasc
ular pathology. Coronal and sagittal reformations were   obtained. Routine roland
col was performed.                IV CONTRAST: None.               ORAL CONTRAST
: Water               RADIATION DOSE: Total DLP: 567.26 mGy*cm                Es
timated effective dose: (DLP x 0.015 x size factor) mSv               COMPLICATI
ONS: None      FINDINGS:      LINES and TUBES: Right nephrostomy tube with dista
l tip coiled within the right   renal pelvis. Interval placement of a left doubl
e-J ureteral stent in adequate   position. Pole catheter within the bladder.    
 LOWER THORAX:  Costophrenic granuloma in the posterior right lung base.      H
EPATOBILIARY:   No focal hepatic lesions. No biliary ductal dilation.       GALL
BLADDER: No stomach and observed. No wall thickening.      SPLEEN: No splenomega
ly.       PANCREAS: No focal masses or ductal dilatation.        ADRENALS: No ad
renal nodules.      KIDNEYS/URETERS:  9 mm top within the lower pole of the left
kidney. Mild right   hydronephrosis new since the prior examinations the presen
ce of a nephrostomy   tube. Mild to moderate left hydronephrosis appears unchang
ed. New left   perinephric stranding may reflect pyelonephritis. Left periureter
al fat   stranding.      GI TRACT: No abnormal distention, wall thickening, or e
vidence of bowel   obstruction.   Appendix is normal.      PELVIC ORGANS/BLADDER
: Radiation seeds again observed in the uterine cervix.   Urinary bladder is dec
ompressed by a Gomez catheter.      LYMPH NODES: No lymphadenopathy.      VESSEL
S: Unremarkable.      PERITONEUM / RETROPERITONEUM: No free air or fluid.      B
ONES: There are degenerative changes in the lumbar spine.      SOFT TISSUES: Unr
emarkable.        IMPRESSION:       1.  New left perinephric stranding may refle
ct pyelonephritis.  Mild to   moderate left hydroureteronephrosis is stable.   2
. Interval development of right hydroureteronephrosis in spite of presence of   
a nephrostomy tube. Please correlate with nephrostomy output.      Signed by: Dr Vannessa Grimaldo M.D. on 10/22/2018 6:39 PM        Dictated By: EMMA DELGADILLO MD, MD  Electronically Signed By: EMMA GRIMALDO MD, MD on 10/22/18 1839  Tr
anscribed By: LUCINDA on 10/22/18 1839       COPY TO:   IGNACIO MARQUES NP    
   RENAL SCAN W/FLOW   [id number] 19:56:00    [place] [address]      Patient 
Name: CRISTINA RICH   MR #: [id number]    : 1965 Age/Sex: 53/F  Acct #:
[id number] Req #: [id number]  Adm Physician:     Ordered by: JACQUELINE ARORA MD  
Report #: [id number]   Location: NM  Room/Bed:     
_______________________________________________________________________________
____________________    Procedure: [id number] NM/RENAL SCAN W/FLOW   FUNCTION  Ex
am Date: 18                            Exam Time: 1510       REPORT STATUS
: Signed       Renal Scan       Reason for exam: 53 F with vesicoureteral reflux
.      Radiopharmaceutical: Tc-99m MAG3 11 mCi      Report:  After administratio
n of the radiopharmaceutical, dynamic images of the   kidneys were obtained thro
[facility] 40 minutes.        LEFT KIDNEY:  Perfusion is prompt.  The left kidney has a
reniform shape with   irregular contours and mild thinning of the renal cortex, 
more apparent in the   upper pole.  Extraction of tracer from the blood pool is 
mildly decreased.   Clearance of tracer from the renal parenchyma is prompt. The
pelvicaliceal   system is very mildly dilated. Some increase in pooling of tra
cer is seen   within the pelvicaliceal system. Drainage of tracer from the pelvi
caliceal   system is adequate.  No stasis of tracer is seen in the left ureter. 
    RIGHT KIDNEY: A PERCUTANEOUS NEPHROSTOMY CATHETER IN THE RIGHT KIDNEY IS OP
EN   DURING THE STUDY.  Perfusion to the right kidney is prompt. The kidney has 
a   distorted reniform shape with moderate thinning of the renal cortex and   ir
regular contours.  The kidney is smaller than the left kidney. Extraction of   t
racer from the blood pool is mildly decreased. Clearance of tracer from the   re
nal parenchyma is prompt. The pelvicaliceal system does not appear dilated   alt
sean calices in the upper pole are slightly prominent.  Tracer drains   freely 
in the PCN catheter so the renal pelvis is not adequately evaluated.   There is 
no significant increase in pooling of tracer within the pelvicaliceal   system. 
Drainage of tracer from the pelvicaliceal study cannot be assessed   because of 
the open PCN tube. No tracer is seen in the right ureter.      DIFFERENTIAL SANTOSH
AL FUNCTION: Left kidney 62% and right kidney 38% (normal   43-57%.      Impress
ion:        1.  The left kidney shows evidence of mild medical renal disease and
some   scarring.  Mild hydronephrosis is present.  Obstruction is not suspected.
     2. The right kidney shows evidence of mild medical renal disease.  The ki
dney   shows significant scarring and this accounts for the decreased differenti
al   function of 38%.  Hydronephrosis cannot be assessed because an open PCN is 
 draining the pelvicaliceal system throughout the study.                  Signed
by: Dr. Riddhi Mcdermott M.D. on 2018 8:12 PM        Dictated By: RIDDHI MCDERMOTT MD  Electronically Signed By: RIDDHI MCDERMOTT MD on 18  Transcribed By: 
LUCINDA on 18       COPY TO:   JACQUELINE ARORA MD        IR CONSULT
2018 07:24:00    [place] [address]      Patient Name: CRISITNA RICH   MR #: 
[id number]    : 1965 Age/Sex: 52/F  [id number] Req #: [id number]
[id number]  Adm Physician: ROYER DOVER MD    Ordered by: JACQUELINE ARORA MD  Report 
#: [id number]   Location: MED/SURG3  Room/Bed: [facility]    
__________________________________________________
_________________________________________________    Procedure:  DX/IR 
CONSULT  Exam Date:                             Exam Time:        REPORT STATUS:
Signed    Nephrostomy catheter evaluation and fluoroscopic exchange      Pre-Pr
ocedure Diagnosis: Cervical cancer status post radiation therapy with   right ur
eteral stenosis.   Post-procedure Diagnosis:Cervical cancer status post radiatio
n therapy with   right ureteral stenosis      : Naomi Martinez MD   Assista
nt: None   Sedation: Local 5 cc of 1% subcutaneous lidocaine      Radiation Dose
:235  cGycm2 (Dose Area Product)   Fluoroscopy time 1.1  minutes      Estimate b
lood loss: None    Blood administered: None   Complications: No immediate compli
cations   Implants/Grafts: 10 Fr UreSil    Specimen: None      Procedure:   Info
rmed consent was obtained and the patient positioned prone in the   fluoroscopy 
suite. A timeout was performed. The indwelling right nephrostomy   was prepped a
nd draped in standard fashion.      Diagnostic antegrade nephrostogram was perfo
[facility] nephrostomy. See findings   below.      An Amplatz wire was placed to secur
e access. The existing catheter was removed   demonstrating cloudy fluid and enc
rustation. A new 10 Fr nephrostomy catheter   was coiled in the renal pelvis wit
h fluoroscopic contrast injection confirming   position.       At the end of the
procedure the catheter was connected to gravity drainage and   a sterile dressi
ng applied. The patient tolerated the procedure well and   without immediate com
plication.      Findings:   Antegrade nephrostogram: Patent catheter. No hydrone
phrosis.  Diffuse right   ureteral stenosis with distal occlusion, as before. Th
e catheter was encrusted   with debris with cloudy urine.       Impression:   Ri
ght nephrostomy catheter exchange with fluoroscopic guidance as above.      Surya
mmendations:   Routine catheter exchange in 8-10 weeks.       Signed by: Dr. Shell Martinez MD on 2018 7:29 AM        Dictated By: NAOMI MARTINEZ MD  Electronical
ly Signed By: NAOMI MARTINEZ MD on 18 1326  Transcribed By: LUCINDA on 
8 1326       COPY TO:   JACQUELINE ARORA MD        SPECIAL PROCEDURE IN CATH LAB
2018 07:24:00    [place] [address]      Patient Name: CRISTINA RICH   MR #: 
[id number]    : 1965 Age/Sex: 52/F  Acct #: [id number] Req #: [id number]
[id number]  Adm Physician: ROYER DOVER MD    Ordered by: JACQUELINE ARORA MD  Report 
#: [id number]   [facility]: [facility]  Room/Bed: [facility]    
__________________________________________________
_________________________________________________    Procedure: [id number] IR/SPE
CIAL PROCEDURE IN CATH LAB  Exam Date:                             Exam Time:   
    REPORT STATUS: Signed    Nephrostomy catheter evaluation and fluoroscopic e
xchange      Pre-Procedure Diagnosis: Cervical cancer status post radiation ther
apy with   right ureteral stenosis.   Post-procedure Diagnosis:Cervical cancer s
tatus post radiation therapy with   right ureteral stenosis      : Leonor Martinez MD   Assistant: None   Sedation: Local 5 cc of 1% subcutaneous lidocaine 
    Radiation Dose:235  cGycm2 (Dose Area Product)   Fluoroscopy time 1.1  alfonso
lloyd      Estimate blood loss: None    Blood administered: None   Complications: 
No immediate complications   Implants/Grafts: 10 Fr UreSil    Specimen: None    
 Procedure:   Informed consent was obtained and the patient positioned prone in 
the   fluoroscopy suite. A timeout was performed. The indwelling right nephrost
angelina   was prepped and draped in standard fashion.      Diagnostic antegrade neph
rostogram was performed nephrostomy. See findings   below.      An Amplatz wire 
was placed to secure access. The existing catheter was removed   demonstrating c
loudy fluid and encrustation. A new 10 Fr nephrostomy catheter   was coiled in t
he renal pelvis with fluoroscopic contrast injection confirming   position.     
 At the end of the procedure the catheter was connected to gravity drainage and 
 a sterile dressing applied. The patient tolerated the procedure well and   wi
thout immediate complication.      Findings:   Antegrade nephrostogram: Patent c
atheter. No hydronephrosis.  Diffuse right   ureteral stenosis with distal occlu
wilfred, as before. The catheter was encrusted   with debris with cloudy urine.    
  Impression:   Right nephrostomy catheter exchange with fluoroscopic guidance 
as above.      Recommendations:   Routine catheter exchange in 8-10 weeks.      
Signed by: Dr. Naomi Martinez MD on 2018 7:29 AM        Dictated By: NAOMI MOHAMUD MD  Electronically Signed By: NAOMI MARTINEZ MD on 18 1326  Transcribed By:
LUCINDA on 18 1326       COPY TO:   JACQUELINE ARORA MD        Bacterial urine 
rcbslig4884-86-51 06:08:00* 



             Test Item    Value        Reference Range Interpretation Comments

 

             Urine Culture (test code = 630-4) Organism: KLEBSIELLA PNEUMONIAE-E

[facility] icwvbse6569-58-93 
06:08:00* 



             Test Item    Value        Reference Range Interpretation Comments

 

             Urine Culture (test code = 630-4) Organism: KLEBSIELLA PNEUMONIAE-E

SBL                            





[facility] [id number] 
11:46:00    [facility]   [address] [address]      Patient Name: CRISTINA RICH   MR #: 
[id number]    : 1965 Age/Sex: 52/F  Acct #: [id number] Req #: [id number]
[id number]  Adm Physician: ROYER DOVER MD    Ordered by: JACQUELINE ARORA MD  Report 
#: [id number]   Location: MED/SURG3  Room/Bed: [facility]    
__________________________________________________
_________________________________________________    Procedure: [id number] DX/BAKARI
ST XRAY LINE PLACEMENT  Exam Date:                             Exam Time:       
REPORT STATUS: Signed    PROCEDURE:   A single AP view of the chest.       COMP
ARISON: None.       INDICATIONS:   PICC LINE PLACEMENT           FINDINGS:   Birgit
es/tubes:  Right sided PICC terminating in the expected location of    the SVC. 
      Lungs: Low lung volumes. There is no evidence of pneumonia or pulmonary   
edema. Mild patchy bibasilar atelectasis.        Pleura:  There is no pleural 
effusion or pneumothorax.       Heart and mediastinum:  The cardiomediastinal si
lhouette is    unremarkable.       Bones:  No acute bony abnormality.       IMPR
ESSION:    Right sided PICC terminating in the expected location of the SVC. No 
  evidence of pneumothorax.            Dictated by:  NAOMI MARTINEZ M.D. on 
018 at 11:46        Electronically approved by:  NAOMI MARTINEZ M.D. on 2018 a
t 11:46                Dictated By: NAOMI MARTINEZ MD  Electronically Signed By: SHELL MARTINEZ MD on 18 1146  Transcribed By: FERNANDO on 18 1146       COPY T
O:   JACQUELINE ARORA MD        Platelet [id number] 07:05:00* 



             Test Item    Value        Reference Range Interpretation Comments

 

             Platelet Estimate (test code = 86098-1) ADEQUATE                   

             





[facility] Center[facility] Ydtlcpd7584-58-38 
07:05:00* 



             Test Item    Value        Reference Range Interpretation Comments

 

             Platelet Morphology Comment (test code = 20511-6) NORMAL           

                       





NO CLUMPS. 07 on 18 by Maribel Doyle [facility]
Rjveltsgutgzf8445-64-58 07:05:00* 



             Test Item    Value        Reference Range Interpretation Comments

 

             Hypochromasia (test code = 728-6) SLIGHT                           

       





[facility] Center[facility] Kvasnuz3065-08-33 
07:05:00* 



             Test Item    Value        Reference Range Interpretation Comments

 

             Red Cell Morphology Comment (test code = 6742-1) NORMAL            

                      





[facility] CenterPlatelet Nvefwcvl2477-65-99 07:05:00* 



             Test Item    Value        Reference Range Interpretation Comments

 

             Platelet Estimate (test code = 67020-7) ADEQUATE                   

             





[facility] CenterPl[facility] Morphology Ekhzvhx3402-72-71 
07:05:00* 



             Test Item    Value        Reference Range Interpretation Comments

 

             Platelet Morphology Comment (test code = 83147-4) NORMAL           

                       





NO CLUMPS. 07 on 18 by [facility]
Jwnfnfvdofixu0760-55-80 07:05:00* 



             Test Item    Value        Reference Range Interpretation Comments

 

             Hypochromasia (test code = 728-6) SLIGHT                           

       





[facility] Center[facility] Morph[facility] Oywjrfa3103-35-85 
07:05:00* 



             Test Item    Value        Reference Range Interpretation Comments

 

             Red Cell Morphology Comment (test code = 6742-1) NORMAL            

                      





[facility] CenterPlatelet Lfergqav3788-54-59 07:05:00* 



             Test Item    Value        Reference Range Interpretation Comments

 

             Platelet Estimate (test code = 59680-0) ADEQUATE                   

             





[facility] CenterPl[facility] Morphology Frjsdae8797-22-82 
07:05:00* 



             Test Item    Value        Reference Range Interpretation Comments

 

             Platelet Morphology Comment (test code = 04789-4) NORMAL           

                       





NO CLUMPS. 705 on 18 by [facility]
Nregaiusyeqco8200-20-27 07:05:00* 



             Test Item    Value        Reference Range Interpretation Comments

 

             Hypochromasia (test code = 728-6) SLIGHT                           

       





[facility] CenterRed [facility] Morph[facility] Qnkkebm7872-27-67 
07:05:00* 



             Test Item    Value        Reference Range Interpretation Comments

 

             Red Cell Morphology Comment (test code = 6742-1) NORMAL            

                      





[facility] CenterPlatelet Sqsouljh3146-48-30 07:05:00* 



             Test Item    Value        Reference Range Interpretation Comments

 

             Platelet Estimate (test code = 68667-5) ADEQUATE                   

             





[facility] CenterPl[facility] Morphology Wkxrgkb7837-08-48 
07:05:00* 



             Test Item    Value        Reference Range Interpretation Comments

 

             Platelet Morphology Comment (test code = 55414-5) NORMAL           

                       





NO CLUMPS. 07 on 18 by [facility]
Wirclichwmoxn9793-70-07 07:05:00* 



             Test Item    Value        Reference Range Interpretation Comments

 

             Hypochromasia (test code = 728-6) SLIGHT                           

       





[facility] Center[facility] Morph[facility] Jgzvdat4597-68-35 
07:05:00* 



             Test Item    Value        Reference Range Interpretation Comments

 

             Red Cell Morphology Comment (test code = 6742-1) NORMAL            

                      





[facility] CenterPlatelet Jfvtycdz8808-00-43 07:05:00* 



             Test Item    Value        Reference Range Interpretation Comments

 

             Platelet Estimate (test code = 11585-4) ADEQUATE                   

             





[facility] CenterPl[facility] Morphology Ypdjsaf7671-83-09 
07:05:00* 



             Test Item    Value        Reference Range Interpretation Comments

 

             Platelet Morphology Comment (test code = 06975-1) NORMAL           

                       





NO CLUMPS. 07 on 18 by [facility]
Melgzhfpamziv5171-16-09 07:05:00* 



             Test Item    Value        Reference Range Interpretation Comments

 

             Hypochromasia (test code = 728-6) SLIGHT                           

       





[facility] CenterRed [facility] Muqwhmj5968-63-17 
07:05:00* 



             Test Item    Value        Reference Range Interpretation Comments

 

             Red Cell Morphology Comment (test code = 6742-1) NORMAL            

                      





[facility] CenterPl[facility]let Zfhnjuwe6126-06-30 07:05:00* 



             Test Item    Value        Reference Range Interpretation Comments

 

             Platelet Estimate (test code = 28664-1) ADEQUATE                   

             





[facility] CenterPl[facility] Morphology Fblhpdq2249-49-52 
07:05:00* 



             Test Item    Value        Reference Range Interpretation Comments

 

             Platelet Morphology Comment (test code = 97929-1) NORMAL           

                       





NO CLUMPS. 07 on 18 by [facility]
Afrolotjykswz0065-96-91 07:05:00* 



             Test Item    Value        Reference Range Interpretation Comments

 

             Hypochromasia (test code = 728-6) SLIGHT                           

       





[facility] Center[facility] Morph[facility] Kkglgbq1568-04-33 
07:05:00* 



             Test Item    Value        Reference Range Interpretation Comments

 

             Red Cell Morphology Comment (test code = 6742-1) NORMAL            

                      





[facility] CenterPl[facility] Sjumsecx5009-39-28 07:05:00* 



             Test Item    Value        Reference Range Interpretation Comments

 

             Platelet Estimate (test code = 77984-5) ADEQUATE                   

             





[facility] CenterPl[facility] Morphology Tfrrpyv1674-34-49 
07:05:00* 



             Test Item    Value        Reference Range Interpretation Comments

 

             Platelet Morphology Comment (test code = 28415-3) NORMAL           

                       





NO CLUMPS. 07 on 18 by [facility]
Eygrmqgjkqpry5265-16-95 07:05:00* 



             Test Item    Value        Reference Range Interpretation Comments

 

             Hypochromasia (test code = 728-6) SLIGHT                           

       





[facility] CenterRed [facility] Morphology Cyybyhg6115-64-77 
07:05:00* 



             Test Item    Value        Reference Range Interpretation Comments

 

             Red Cell Morphology Comment (test code = 6742-1) NORMAL            

                      





[facility] CenterPlatelet Fqrjmmgi6949-06-87 07:05:00* 



             Test Item    Value        Reference Range Interpretation Comments

 

             Platelet Estimate (test code = 61782-4) ADEQUATE                   

             





[facility] CenterPlateValor Health Morphology Eweapim0895-63-32 
07:05:00* 



             Test Item    Value        Reference Range Interpretation Comments

 

             Platelet Morphology Comment (test code = 09748-2) NORMAL           

                       





NO CLUMPS. 07 on 18 by [facility]
Eehohakqrsvut9674-22-76 07:05:00* 



             Test Item    Value        Reference Range Interpretation Comments

 

             Hypochromasia (test code = 728-6) SLIGHT                           

       





[facility] CenterRed [facility] Morphology Psihfud6784-44-88 
07:05:00* 



             Test Item    Value        Reference Range Interpretation Comments

 

             Red Cell Morphology Comment (test code = 6742-1) NORMAL            

                      





[facility] CenterPlatelet Eqtrizqy2075-54-72 07:05:00* 



             Test Item    Value        Reference Range Interpretation Comments

 

             Platelet Estimate (test code = 09292-3) ADEQUATE                   

             





[facility] CenterPlateValor Health Morphology Pddcvwb4504-56-58 
07:05:00* 



             Test Item    Value        Reference Range Interpretation Comments

 

             Platelet Morphology Comment (test code = 26014-6) NORMAL           

                       





NO CLUMPS. 0705 on 18 by Maribel Doyle [facility]
Mhdepeawjozru5718-61-17 07:05:00* 



             Test Item    Value        Reference Range Interpretation Comments

 

             Hypochromasia (test code = 728-6) SLIGHT                           

       





[facility] CenterRed [facility] Morphology Awdyouk9401-90-92 
07:05:00* 



             Test Item    Value        Reference Range Interpretation Comments

 

             Red Cell Morphology Comment (test code = 6742-1) NORMAL            

                      





[facility] CenterBacterial urine mmqkbkk5648-75-99 
06:42:00* 



             Test Item    Value        Reference Range Interpretation Comments

 

             Urine Culture (test code = 630-4) Organism: KLEBSIELLA PNEUMONIAE-E

SBL#2                            





[facility] CenterBacteria urine baxdqif1044-33-36 
06:42:00* 



             Test Item    Value        Reference Range Interpretation Comments

 

             Urine Culture (test code = 630-4) Organism: KLEBSIELLA PNEUMONIAE-E

SBL#2                            





[facility] CenterWhite Blood Sumju2526-34-06 06:24:00* 



             Test Item    Value        Reference Range Interpretation Comments

 

             White Blood Count (test code = 6690-2) 8.38         4.8-10.8       

            





[facility] CenterRed Blood Bzphd5051-20-65 06:24:00* 



             Test Item    Value        Reference Range Interpretation Comments

 

             Red Blood Count (test code = 789-8) 3.76         3.6-5.1           

         





[facility] CenterHemoglobin2018-08-13 06:24:00* 



             Test Item    Value        Reference Range Interpretation Comments

 

             Hemoglobin (test code = 24039-6) 9.6          12.0-16.0    L       

      





[facility] CenterHematocrit2018-08-13 06:24:00* 



             Test Item    Value        Reference Range Interpretation Comments

 

             Hematocrit (test code = 4544-3) 31.3         34.2-44.1    L        

     





[facility] CenterMean Corpuscular Nqjoky7309-18-11 
06:24:00* 



             Test Item    Value        Reference Range Interpretation Comments

 

             Mean Corpuscular Volume (test code = 787-2) 83.2         81-99     

                 





[facility] CenterMean Corpuscular Kfsmixosbz6144-87-04 
06:24:00* 



             Test Item    Value        Reference Range Interpretation Comments

 

             Mean Corpuscular Hemoglobin (test code = 785-6) 25.5         28-32 

       L             





[facility] CenterMean Corpuscular Hemoglobin Concent
2018 06:24:00* 



             Test Item    Value        Reference Range Interpretation Comments

 

             Mean Corpuscular Hemoglobin Concent (test code = 786-4) 30.7       

  31-35        L             





[facility] CenterRed Cell Distribution Htjtf3601-03-94 
06:24:00* 



             Test Item    Value        Reference Range Interpretation Comments

 

             Red Cell Distribution Width (test code = 61947-2) 15.0         11.7

-14.4    H             





[facility] CenterPlatelet Ruhkt5067-22-44 06:24:00* 



             Test Item    Value        Reference Range Interpretation Comments

 

             Platelet Count (test code = 777-3) 302          140-360            

        





[facility] CenterNeutrophils (%) (Auto)2018 06:24:00
  * 



             Test Item    Value        Reference Range Interpretation Comments

 

             Neutrophils (%) (Auto) (test code = 29017-9) 58.2         38.7-80.0

                  





[facility] CenterLymphocytes (%) (Auto)2018 06:24:00
  * 



             Test Item    Value        Reference Range Interpretation Comments

 

             Lymphocytes (%) (Auto) (test code = 736-9) 31.7         18.0-39.1  

                





[facility] CenterMonocytes (%) (Auto)2018 06:24:00* 



             Test Item    Value        Reference Range Interpretation Comments

 

             Monocytes (%) (Auto) (test code = 5905-5) 7.0          4.4-11.3    

               





[facility] CenterEosinophils (%) (Auto)2018 06:24:00
  * 



             Test Item    Value        Reference Range Interpretation Comments

 

             Eosinophils (%) (Auto) (test code = 713-8) 1.9          0.0-6.0    

                





[facility] CenterBasophils (%) (Auto)2018 06:24:00* 



             Test Item    Value        Reference Range Interpretation Comments

 

             Basophils (%) (Auto) (test code = 706-2) 0.6          0.0-1.0      

              





[facility] CenterIM GRANULOCYTES %2018 06:24:00* 



             Test Item    Value        Reference Range Interpretation Comments

 

             IM GRANULOCYTES % (test code = IM GRANULOCYTES %) 0.6          0.0-

1.0                    





[facility] CenterNeutrophils # (Auto)2018 06:24:00* 



             Test Item    Value        Reference Range Interpretation Comments

 

             Neutrophils # (Auto) (test code = 751-8) 4.9          2.1-6.9      

              





[facility] CenterLymphocytes # (Auto)2018 06:24:00* 



             Test Item    Value        Reference Range Interpretation Comments

 

             Lymphocytes # (Auto) (test code = 45100-4) 2.7          1.0-3.2    

                





[facility] CenterMonocytes # (Auto)2018 06:24:00* 



             Test Item    Value        Reference Range Interpretation Comments

 

             Monocytes # (Auto) (test code = 742-7) 0.6          0.2-0.8        

            





[facility] CenterEosinophils # (Auto)2018 06:24:00* 



             Test Item    Value        Reference Range Interpretation Comments

 

             Eosinophils # (Auto) (test code = 711-2) 0.2          0.0-0.4      

              





[facility] CenterBasophils # (Auto)2018 06:24:00* 



             Test Item    Value        Reference Range Interpretation Comments

 

             Basophils # (Auto) (test code = 704-7) 0.1          0.0-0.1        

            





[facility] CenterAbsolute Immature Granulocyte (auto
2018 06:24:00* 



             Test Item    Value        Reference Range Interpretation Comments

 

                                        Absolute Immature Granulocyte (auto (lloyd

t code = Absolute Immature Granulocyte 

(auto)          0.05            0-0.1                            





[facility] CenterWhite Blood Ylpzb7470-66-83 06:24:00* 



             Test Item    Value        Reference Range Interpretation Comments

 

             White Blood Count (test code = 6690-2) 8.38         4.8-10.8       

            





[facility] CenterRed Blood Ylggf5835-29-48 06:24:00* 



             Test Item    Value        Reference Range Interpretation Comments

 

             Red Blood Count (test code = 789-8) 3.76         3.6-5.1           

         





[facility] CenterHemoglobin2018-08-13 06:24:00* 



             Test Item    Value        Reference Range Interpretation Comments

 

             Hemoglobin (test code = 20803-3) 9.6          12.0-16.0    L       

      





[facility] CenterHematocrit2018-08-13 06:24:00* 



             Test Item    Value        Reference Range Interpretation Comments

 

             Hematocrit (test code = 4544-3) 31.3         34.2-44.1    L        

     





[facility] CenterMean Corpuscular Gmecof2075-70-05 
06:24:00* 



             Test Item    Value        Reference Range Interpretation Comments

 

             Mean Corpuscular Volume (test code = 787-2) 83.2         81-99     

                 





[facility] CenterMean Corpuscular Xhsgclscei3970-80-37 
06:24:00* 



             Test Item    Value        Reference Range Interpretation Comments

 

             Mean Corpuscular Hemoglobin (test code = 785-6) 25.5         28-32 

       L             





[facility] CenterMean Corpuscular Hemoglobin Concent
2018 06:24:00* 



             Test Item    Value        Reference Range Interpretation Comments

 

             Mean Corpuscular Hemoglobin Concent (test code = 786-4) 30.7       

  31-35        L             





[facility] CenterRed Cell Distribution Aufco6401-24-77 
06:24:00* 



             Test Item    Value        Reference Range Interpretation Comments

 

             Red Cell Distribution Width (test code = 79480-5) 15.0         11.7

-14.4    H             





[facility] CenterPlatelet Kpjlk2528-39-99 06:24:00* 



             Test Item    Value        Reference Range Interpretation Comments

 

             Platelet Count (test code = 777-3) 302          140-360            

        





[facility] CenterNeutrophils (%) (Auto)2018 06:24:00
  * 



             Test Item    Value        Reference Range Interpretation Comments

 

             Neutrophils (%) (Auto) (test code = 68684-2) 58.2         38.7-80.0

                  





[facility] CenterLymphocytes (%) (Auto)2018 06:24:00
  * 



             Test Item    Value        Reference Range Interpretation Comments

 

             Lymphocytes (%) (Auto) (test code = 736-9) 31.7         18.0-39.1  

                





[facility] CenterMonocytes (%) (Auto)2018 06:24:00* 



             Test Item    Value        Reference Range Interpretation Comments

 

             Monocytes (%) (Auto) (test code = 5905-5) 7.0          4.4-11.3    

               





[facility] CenterEosinophils (%) (Auto)2018 06:24:00
  * 



             Test Item    Value        Reference Range Interpretation Comments

 

             Eosinophils (%) (Auto) (test code = 713-8) 1.9          0.0-6.0    

                





[facility] CenterBasophils (%) (Auto)2018 06:24:00* 



             Test Item    Value        Reference Range Interpretation Comments

 

             Basophils (%) (Auto) (test code = 706-2) 0.6          0.0-1.0      

              





[facility] CenterIM GRANULOCYTES %2018 06:24:00* 



             Test Item    Value        Reference Range Interpretation Comments

 

             IM GRANULOCYTES % (test code = IM GRANULOCYTES %) 0.6          0.0-

1.0                    





[facility] CenterNeutrophils # (Auto)2018 06:24:00* 



             Test Item    Value        Reference Range Interpretation Comments

 

             Neutrophils # (Auto) (test code = 751-8) 4.9          2.1-6.9      

              





[facility] CenterLymphocytes # (Auto)2018 06:24:00* 



             Test Item    Value        Reference Range Interpretation Comments

 

             Lymphocytes # (Auto) (test code = 37775-2) 2.7          1.0-3.2    

                





[facility] CenterMonocytes # (Auto)2018 06:24:00* 



             Test Item    Value        Reference Range Interpretation Comments

 

             Monocytes # (Auto) (test code = 742-7) 0.6          0.2-0.8        

            





[facility] CenterEosinophils # (Auto)2018 06:24:00* 



             Test Item    Value        Reference Range Interpretation Comments

 

             Eosinophils # (Auto) (test code = 711-2) 0.2          0.0-0.4      

              





[facility] CenterBasophils # (Auto)2018 06:24:00* 



             Test Item    Value        Reference Range Interpretation Comments

 

             Basophils # (Auto) (test code = 704-7) 0.1          0.0-0.1        

            





[facility] CenterAbsolute Immature Granulocyte (auto
2018 06:24:00* 



             Test Item    Value        Reference Range Interpretation Comments

 

                                        Absolute Immature Granulocyte (auto (lloyd

t code = Absolute Immature Granulocyte 

(auto)          0.05            0-0.1                            





[facility] Qxzjd8609-41-46 06:13:00* 



             Test Item    Value        Reference Range Interpretation Comments

 

             Sodium Level (test code = 2951-2) 141          136-145             

       





[facility] Center[facility] Xicue4877-55-63 06:13:00* 



             Test Item    Value        Reference Range Interpretation Comments

 

             Potassium Level (test code = 2823-3) 3.7          3.5-5.1          

          





[facility] CenterChloride Xpdnl9479-27-81 06:13:00* 



             Test Item    Value        Reference Range Interpretation Comments

 

             Chloride Level (test code = 2075-0) 107                      

         





[facility] CenterCarbon Dioxide Evgxw6693-95-18 06:13:00* 



             Test Item    Value        Reference Range Interpretation Comments

 

             Carbon Dioxide Level (test code = 2028-9) 23           22-29       

               





[facility] CenterAnion Msb6447-80-15 06:13:00* 



             Test Item    Value        Reference Range Interpretation Comments

 

             Anion Gap (test code = 33037-3) 14.7         8-16                  

     





[facility] CenterBlood Urea Akiagzun7091-36-53 06:13:00* 



             Test Item    Value        Reference Range Interpretation Comments

 

             Blood Urea Nitrogen (test code = 3094-0) 11           7-26         

              





[facility] CenterCreatinine2018-08-13 06:13:00* 



             Test Item    Value        Reference Range Interpretation Comments

 

             Creatinine (test code = 2160-0) 0.73         0.57-1.11             

     





[facility] CenterBUN/Creatinine Ayfge9421-41-19 06:13:00* 



             Test Item    Value        Reference Range Interpretation Comments

 

             BUN/Creatinine Ratio (test code = 3097-3) 15           6-25        

               





[facility] CenterEstimat Glomerular Filtration Rate
2018 06:13:00* 



             Test Item    Value        Reference Range Interpretation Comments

 

             Estimat Glomerular Filtration Rate (test code = 84093-6) 60-       

   >60                        





Ranges were taken from the National Kidney Disease Education Program and the [facility] literature.Reference ranges:60 or greater: Uvhfdt69-97 (
for 3 consecutive months): Chronic kidney disease 15 or less: Kidney failureVal [facility]de Regional Medical CenterGlucose Zufjt3739-34-19 06:13:00* 



             Test Item    Value        Reference Range Interpretation Comments

 

             Glucose Level (test code = CTC1409) 118                      

         





[facility] CenterCalcium Pfrra5018-94-28 06:13:00* 



             Test Item    Value        Reference Range Interpretation Comments

 

             Calcium Level (test code = 30720-8) 8.7          8.4-10.2          

         





[facility]odium Pjcvx8605-57-16 06:13:00* 



             Test Item    Value        Reference Range Interpretation Comments

 

             Sodium Level (test code = 2951-2) 141          136-145             

       





[facility] CenterPotassium Zxrhf3856-06-94 06:13:00* 



             Test Item    Value        Reference Range Interpretation Comments

 

             Potassium Level (test code = 2823-3) 3.7          3.5-5.1          

          





[facility] CenterChloride Fhlol8965-15-12 06:13:00* 



             Test Item    Value        Reference Range Interpretation Comments

 

             Chloride Level (test code = 2075-0) 107                      

         





[facility] CenterCarbon Dioxide Kbidx6477-26-70 06:13:00* 



             Test Item    Value        Reference Range Interpretation Comments

 

             Carbon Dioxide Level (test code = 2028-9) 23           22-29       

               





[facility] CenterAnion Die6841-25-41 06:13:00* 



             Test Item    Value        Reference Range Interpretation Comments

 

             Anion Gap (test code = 33037-3) 14.7         8-16                  

     





[facility] CenterBlood Urea Uorakxvu0421-88-00 06:13:00* 



             Test Item    Value        Reference Range Interpretation Comments

 

             Blood Urea Nitrogen (test code = 3094-0) 11           7-26         

              





[facility] CenterCreatinine2018-08-13 06:13:00* 



             Test Item    Value        Reference Range Interpretation Comments

 

             Creatinine (test code = 2160-0) 0.73         0.57-1.11             

     





[facility] CenterBUN/Creatinine Vxweu3417-02-03 06:13:00* 



             Test Item    Value        Reference Range Interpretation Comments

 

             BUN/Creatinine Ratio (test code = 3097-3) 15           6-25        

               





[facility] CenterEstimat Glomerular Filtration Rate
2018 06:13:00* 



             Test Item    Value        Reference Range Interpretation Comments

 

             Estimat Glomerular Filtration Rate (test code = 139504444) 60-     

     >60                        





Ranges were taken from the National Kidney Disease Education Program and the [facility] Kidney Foundation literature.Reference ranges:60 or greater: Sutfci10-82 (
for 3 consecutive months): Chronic kidney disease 15 or less: Kidney failureVal [facility]de Regional Medical CenterGlucose Mdgzk1204-28-81 06:13:00* 



             Test Item    Value        Reference Range Interpretation Comments

 

             Glucose Level (test code = ZGY2427) 118                      

         





[facility] CenterCalcium Pfvfa5684-68-12 06:13:00* 



             Test Item    Value        Reference Range Interpretation Comments

 

             Calcium Level (test code = 95731-5) 8.7          8.4-10.2          

         





[facility] CenterABDOMEN-1VIEW (KUB)2018-08-10 07:34:00   
[facility]   [address] [place] [address]      Patient Name: CRISTINA RICH   MR #: [id number]    :  Age/Sex: 52/F  Acct #: [id number] Req #: [id number]  Adm Physician: ROYER LAW MD    Ordered by: JACQUELINE ARORA MD  Report #: [id number]   Location: 
[facility]  Room/Bed: [place] [address]    __________________________________________________
_________________________________________________    Procedure: [id number] DX/ABD
OMEN-1VIEW (KUB)  Exam Date: 08/10/18                            Exam Time: 710
      REPORT STATUS: Signed    PROCEDURE:   X-RAY ABDOMEN - KUB       COMPARISO
N:   CT Abdomen/Pelvis 3/4/18.       INDICATIONS:   PREOPERATIVE XRAY FOR ESWL  
     FINDINGS:      Right sided PCN is present. A 5 mm and 2 mm calcification p
rojects over    the left kidney. No calcifications over the expected location of
the    ureters. Surgical clips in the pelvis.        There is a non-obstructed 
bowel-gas pattern. There are no acute osseous    abnormalities.        CONCLUSIO
N:      Right sided PCN in similar position.        Left sided renal stones kristi
uring up to 5 mm, better characterized on    CT from 3/4/18. No evidence of ston
es overlying the ureters.       Dictated by:  NAOMI MARTINEZ M.D. on 8/10/2018 at 7
:34        Electronically approved by:  NAOMI MARTINEZ M.D. on 8/10/2018 at 7:34   
            Dictated By: NAOMI MARTINEZ MD  Electronically Signed By: NAOMI MARTINEZ MD 
on 08/10/18 0734  Transcribed By: FERNANDO on 08/10/18 0734       COPY TO:   JACQUELINE SMITH MD        Urine Ajxty4760-93-52 23:08:00* 



             Test Item    Value        Reference Range Interpretation Comments

 

             Urine Color (test code = 5778-6) YELLOW       YELLOW               

      





[facility] CenterUrine Jlbqkcp8010-22-99 23:08:00* 



             Test Item    Value        Reference Range Interpretation Comments

 

             Urine Clarity (test code = 13662-5) CLOUDY       CLEAR        H    

         





[facility] CenterUrine Specific Kjymzbo5413-11-74 23:08:00
  * 



             Test Item    Value        Reference Range Interpretation Comments

 

             Urine Specific Gravity (test code = 5811-5) 1.020        1.010-1.02

5                





[facility] CenterUrine rI8362-54-53 23:08:00* 



             Test Item    Value        Reference Range Interpretation Comments

 

             Urine pH (test code = 57826-4) 6            5-7                    

    





[facility] CenterUrine Leukocyte Ajnzvnii7867-09-96 
23:08:00* 



             Test Item    Value        Reference Range Interpretation Comments

 

             Urine Leukocyte Esterase (test code = 5799-2) 1+           NEGATIVE

     H             





[facility] CenterUrine Lkndomi1901-29-20 23:08:00* 



             Test Item    Value        Reference Range Interpretation Comments

 

             Urine Nitrite (test code = 89418-1) POSITIVE     NEGATIVE     H    

         





[facility] CenterUrine Ypjhird9011-65-82 23:08:00* 



             Test Item    Value        Reference Range Interpretation Comments

 

             Urine Protein (test code = 5804-0) 2+           NEGATIVE     H     

        





[facility] CenterUrine Glucose (UA)2018 23:08:00* 



             Test Item    Value        Reference Range Interpretation Comments

 

             Urine Glucose (UA) (test code = 2349-9) 1+           NEGATIVE     H

             





[facility] CenterUrine Rthwpnx1724-86-11 23:08:00* 



             Test Item    Value        Reference Range Interpretation Comments

 

             Urine Ketones (test code = 60544-7) NEGATIVE     NEGATIVE          

         





[facility] Qmptnbwvdica4260-08-20 23:08:00* 



             Test Item    Value        Reference Range Interpretation Comments

 

             Urine Urobilinogen (test code = 70362-0) 0.2          0.2-1        

              





[facility] CenterUrine Xemkgnegw9390-86-06 23:08:00* 



             Test Item    Value        Reference Range Interpretation Comments

 

             Urine Bilirubin (test code = 1978-6) NEGATIVE     NEGATIVE         

          





[facility] CenterU[facility] Yfsfs5938-29-62 23:08:00* 



             Test Item    Value        Reference Range Interpretation Comments

 

             Urine Blood (test code = 05858-6) 3+           NEGATIVE     H      

       





[facility] CenterUrine EEF7506-74-20 23:08:00* 



             Test Item    Value        Reference Range Interpretation Comments

 

             Urine WBC (test code = 5821-4) 50-          0-5          H         

    





[facility] CenterUrine CJP8100-99-18 23:08:00* 



             Test Item    Value        Reference Range Interpretation Comments

 

             Urine RBC (test code = 84071-9) 0-5          0-5                   

     





[facility] CenterUrine Jblssihg3817-83-81 23:08:00* 



             Test Item    Value        Reference Range Interpretation Comments

 

             Urine Bacteria (test code = 28342-9) MODERATE     NONE         H   

          





[facility] CenterUrine Epithelial Zmyzb1986-84-13 23:08:00
  * 



             Test Item    Value        Reference Range Interpretation Comments

 

             Urine Epithelial Cells (test code = 09773-8) RARE         NONE     

                  





[facility] CenterUrine Puzhx8179-42-90 23:08:00* 



             Test Item    Value        Reference Range Interpretation Comments

 

             Urine Color (test code = 5778-6) YELLOW       YELLOW               

      





[facility] CenterUrine Hxvlqtg9310-48-03 23:08:00* 



             Test Item    Value        Reference Range Interpretation Comments

 

             Urine Clarity (test code = 74727-5) CLOUDY       CLEAR        H    

         





[facility] CenterU[facility] Specific Uxbiucu9096-80-69 23:08:00
  * 



             Test Item    Value        Reference Range Interpretation Comments

 

             Urine Specific Gravity (test code = 5811-5) 1.020        1.010-1.02

5                





[facility] CenterU[facility] fS1769-33-45 23:08:00* 



             Test Item    Value        Reference Range Interpretation Comments

 

             Urine pH (test code = 05726-9) 6            5-7                    

    





[facility] CenterU[facility] Leukocyte Zxwoornf4544-82-18 
23:08:00* 



             Test Item    Value        Reference Range Interpretation Comments

 

             Urine Leukocyte Esterase (test code = 5799-2) 1+           NEGATIVE

     [facility] Ybacunk8019-40-37 23:08:00* 



             Test Item    Value        Reference Range Interpretation Comments

 

             Urine Nitrite (test code = 80088-1) POSITIVE     NEGATIVE     [facility] Knvaovf8067-52-99 23:08:00* 



             Test Item    Value        Reference Range Interpretation Comments

 

             Urine Protein (test code = 5804-0) 2+           NEGATIVE     H     

        





[facility] CenterU[facility] Glucose (UA)2018 23:08:00* 



             Test Item    Value        Reference Range Interpretation Comments

 

             Urine Glucose (UA) (test code = 2349-9) 1+           NEGATIVE     [facility] Zfepzsi7249-62-29 23:08:00* 



             Test Item    Value        Reference Range Interpretation Comments

 

             Urine Ketones (test code = 64692-7) NEGATIVE     NEGATIVE          

         





[facility] Center[facility] Mmghcepccmhh8257-68-87 23:08:00* 



             Test Item    Value        Reference Range Interpretation Comments

 

             Urine Urobilinogen (test code = 24281-4) 0.2          0.2-1        

              





[facility] CenterU[facility] Uenwholvi5439-50-05 23:08:00* 



             Test Item    Value        Reference Range Interpretation Comments

 

             Urine Bilirubin (test code = 1978-6) NEGATIVE     NEGATIVE         

          





[facility] CenterU[facility] Vmxof9671-96-33 23:08:00* 



             Test Item    Value        Reference Range Interpretation Comments

 

             Urine Blood (test code = 62808-3) 3+           NEGATIVE     H      

       





[facility] CenterU[facility] DGZ9214-34-16 23:08:00* 



             Test Item    Value        Reference Range Interpretation Comments

 

             Urine WBC (test code = 5821-4) 50-          0-5          H         

    





[facility] CenterU[facility] ZVY4166-38-71 23:08:00* 



             Test Item    Value        Reference Range Interpretation Comments

 

             Urine RBC (test code = 91997-3) 0-5          0-5                   

     





[facility] CenterU[facility] Bixpiflt1422-20-67 23:08:00* 



             Test Item    Value        Reference Range Interpretation Comments

 

             Urine Bacteria (test code = 42030-2) MODERATE     NONE         H   

          





[facility] CenterU[facility] Epithelial Wtykb7590-78-65 23:08:00
  * 



             Test Item    Value        Reference Range Interpretation Comments

 

             Urine Epithelial Cells (test code = 15936-1) RARE         NONE     

                  





[facility] CenterCT [facility]/PELVIS ZM2372-19-89 19:09:00  
 [facility]   [address] [address]      Patient Name: CRISTINA RICH   MR #: [id number]    :  Age/Sex: 52/F  Acct #: [id number] Req #: [id number]  Adm Physician:  
  Ordered by: MARIFER REYNA NP  Report #: [id number]   Location: ER  Room/Be
d:     _________________________________________________________________________
__________________________    Procedure: [id number] CT/CT ABDOMEN/PELVIS WO  Exam
Date: 18                            Exam Time: 1830       REPORT STATUS: 
Signed    EXAM: CT Abdomen and Pelvis WITHOUT contrast     INDICATION: UTI. Feve
r.   COMPARISON: CT abdomen and pelvis 3/4/2018.   TECHNIQUE: Abdomen and pelvis
were scanned utilizing a multidetector helical   scanner from the lung base to 
the pubic symphysis without administration of IV   contrast. Absence of intraven
ous contrast decreases sensitivity for detection   of focal lesions and vascular
pathology. Coronal and sagittal reformations were   obtained. Renal stone roland
col was performed.                IV CONTRAST: None.               ORAL CONTRAST
: Water               RADIATION DOSE: Total DLP: 560.77 mGy*cm                Es
timated effective dose: (DLP x 0.015 x size factor) mSv               COMPLICATI
ONS: None      FINDINGS:      LINES and TUBES: Right percutaneous nephrostomy tu
be, unchanged position.      LOWER THORAX:  2 mm right lower lobe calcified gran
uloma, unchanged.      HEPATOBILIARY: Mild diffuse low-attenuation of the hepati
c parenchyma   suggesting mild steatosis.  No focal hepatic lesions. No biliary 
ductal   dilation.       GALLBLADDER: Calcified gallstone.  No wall thickening. 
    SPLEEN: No splenomegaly.       PANCREAS: No focal masses or ductal dilatati
on.        ADRENALS: No adrenal nodules          KIDNEYS/URETERS: Interval devel
opment of mild to moderate left-sided   hydronephrosis. Mild to moderate left ur
eteral dilatation all the way to the   level just proximal to the UVJ. 2 calculi
were previously present in the lower   pole of the left kidney; the smallest one
is no longer visualized, possibly   passed. 5.8 x 5.8 mm calculus in the lower 
pole of the left kidney appears   slightly bulkier, previously measuring 4 mm.  
   GI TRACT: No abnormal distention, wall thickening, or evidence of bowel   o
bstruction.       Appendix is normal.      REPRODUCTIVE ORGANS: The uterus is sm
all. No adnexal masses. Likely radiation   seeds in the cervix.      BLADDER: A 
small contracted bladder is again observed. Redemonstration of a   cystic struct
ure in the pelvis suggestive of a small bladder, which is   unchanged in appeara
nce. Radiation seeds again observed in the uterine cervix.      LYMPH NODES: No 
lymphadenopathy.      VESSELS: There is mild atherosclerotic disease in the aort
a and major arterial   branches.      PERITONEUM / RETROPERITONEUM: No free air 
or fluid.      BONES: Lower thoracic DISH.      SOFT TISSUES: Unremarkable.     
   IMPRESSION:    1.  Interval development of mild to moderate left-sided hydro
nephrosis which   may be related to a recently passed calculus or less likely du
e to distal   ureteral stricture. Otherwise unchanged exam.      Signed by: Dr. DELL Grimaldo M.D. on 2018 7:28 PM        Dictated By: EMMA VIERA MD, MD  Electronically Signed By: EMMA GRIMALDO MD, MD on 18  Transc
ribed By: LUCINDA on 18       COPY TO:   MARIFER REYNA NP        
Human Chorionic Gonadotropin, Khuk5208-93-59 18:41:00* 



             Test Item    Value        Reference Range Interpretation Comments

 

             Human Chorionic Gonadotropin, Qual (test code = 2118-8) NEGATIVE   

  NEGATIVE                   





[facility] Chorionic Gonadotropin, formerly [facility]
2018 18:41:00* 



             Test Item    Value        Reference Range Interpretation Comments

 

             Human Chorionic Gonadotropin, Qual (test code = 2118-8) NEGATIVE   

  NEGATIVE                   





[facility] Chorionic Gonadotropin, [facility]
2018 18:41:00* 



             Test Item    Value        Reference Range Interpretation Comments

 

             Human Chorionic Gonadotropin, Qual (test code = 2118-8) NEGATIVE   

  NEGATIVE                   





[facility] Chorionic Gonadotropin, [facility]
2018 18:41:00* 



             Test Item    Value        Reference Range Interpretation Comments

 

             Human Chorionic Gonadotropin, Qual (test code = 2118-8) NEGATIVE   

  NEGATIVE                   





[facility] Chorionic Gonadotropin, Qual
2018 18:41:00* 



             Test Item    Value        Reference Range Interpretation Comments

 

             Human Chorionic Gonadotropin, Qual (test code = 2118-8) NEGATIVE   

  NEGATIVE                   





[facility] Chorionic Gonadotropin, Qual
2018 18:41:00* 



             Test Item    Value        Reference Range Interpretation Comments

 

             Human Chorionic Gonadotropin, Qual (test code = 2118-8) NEGATIVE   

  NEGATIVE                   





[facility] Rwojm6300-61-24 16:31:00* 



             Test Item    Value        Reference Range Interpretation Comments

 

             Urine Mucus (test code = 8247-9) FEW          RARE         H       

      





[facility] Sritn2592-11-92 16:31:00* 



             Test Item    Value        Reference Range Interpretation Comments

 

             Urine Mucus (test code = 8247-9) FEW          RARE         H       

      





[facility] Ijsqg1457-34-83 16:31:00* 



             Test Item    Value        Reference Range Interpretation Comments

 

             Urine Mucus (test code = 8247-9) [facility] Ntiru3658-56-89 16:31:00* 



             Test Item    Value        Reference Range Interpretation Comments

 

             Urine Mucus (test code = 8247-9) [facility] Kdstm0620-76-91 16:31:00* 



             Test Item    Value        Reference Range Interpretation Comments

 

             Urine Mucus (test code = 8247-9) [facility] Pdsir3580-91-18 16:31:00* 



             Test Item    Value        Reference Range Interpretation Comments

 

             Urine Mucus (test code = 8247-9) [facility]2018-08-09 16:31:00* 



             Test Item    Value        Reference Range Interpretation Comments

 

             Urine Mucus (test code = 8247-9) [facility] Duxlx2176-59-49 16:31:00* 



             Test Item    Value        Reference Range Interpretation Comments

 

             Urine Mucus (test code = 8247-9) [facility] Tjzfr5781-97-75 16:31:00* 



             Test Item    Value        Reference Range Interpretation Comments

 

             Urine Mucus (test code = 8247-9) [facility] Opjyawu3530-08-84 08:01:00* 



             Test Item    Value        Reference Range Interpretation Comments

 

             Urine Culture (test code = 630-4) Organism: ESCHERICHIA COLI       

                     





[facility] Oemevly4588-23-65 08:01:00* 



             Test Item    Value        Reference Range Interpretation Comments

 

             Urine Culture (test code = 630-4) Organism: ESCHERICHIA COLI       

                     





[facility] CZE1700-89-87 16:50:00* 



             Test Item    Value        Reference Range Interpretation Comments

 

             Urine WBC (test code = 5821-4) 50-          0-5          H         

    





[facility] XKG9040-75-91 16:50:00* 



             Test Item    Value        Reference Range Interpretation Comments

 

             Urine RBC (test code = 58298-3) 11-20        0-5          H        

     





[facility] Uoambbzl1093-22-61 16:50:00* 



             Test Item    Value        Reference Range Interpretation Comments

 

             Urine Bacteria (test code = 93396-5) MODERATE     NONE         H   

          





[facility] Epithelial Gkuhr4755-88-57 16:50:00
  * 



             Test Item    Value        Reference Range Interpretation Comments

 

             Urine Epithelial Cells (test code = 90640-1) NONE         NONE     

                  





[facility] White Blood Cell Wthhv6598-66-87 
16:50:00* 



             Test Item    Value        Reference Range Interpretation Comments

 

             Urine White Blood Cell Casts (test code = 89087-0) 1-5          >0 

          H             





[facility] Qsnfa0389-53-51 
16:50:00* 



             Test Item    Value        Reference Range Interpretation Comments

 

             Urine White Blood Cell Casts (test code = 90698-4) 1-5          >0 

          H             





[facility] Qnsmd6456-97-11 
16:50:00* 



             Test Item    Value        Reference Range Interpretation Comments

 

             Urine White Blood Cell Casts (test code = 46350-3) 1-5          >0 

          H             





[facility] Yeqmr3647-36-79 
16:50:00* 



             Test Item    Value        Reference Range Interpretation Comments

 

             Urine White Blood Cell Casts (test code = 24810-3) 1-5          >0 

          H             





[facility] Ivwwr4115-90-68 
16:50:00* 



             Test Item    Value        Reference Range Interpretation Comments

 

             Urine White Blood Cell Casts (test code = 71662-6) 1-5          >0 

          H             





[facility] Pjisl1081-39-65 
16:50:00* 



             Test Item    Value        Reference Range Interpretation Comments

 

             Urine White Blood Cell Casts (test code = 00192-1) 1-5          >0 

          H             





[facility] Center[facility] Cell Sgggy8014-36-57 
16:50:00* 



             Test Item    Value        Reference Range Interpretation Comments

 

             Urine White Blood Cell Casts (test code = 71750-6) 1-5          >0 

          H             





[facility] Center[facility] Cell Mntdr0080-68-45 
16:50:00* 



             Test Item    Value        Reference Range Interpretation Comments

 

             Urine White Blood Cell Casts (test code = 55766-4) 1-5          >0 

          H             





[facility] Center[facility] Iagur2688-38-92 
16:50:00* 



             Test Item    Value        Reference Range Interpretation Comments

 

             Urine White Blood Cell Casts (test code = 72727-8) 1-5          >0 

          H             





[facility] Obvtn0978-65-43 16:38:00* 



             Test Item    Value        Reference Range Interpretation Comments

 

             Urine Color (test code = 5778-6) YELLOW       YELLOW               

      





[facility] Lhswbfl8935-97-99 16:38:00* 



             Test Item    Value        Reference Range Interpretation Comments

 

             Urine Clarity (test code = 17442-6) TURBID       CLEAR        H    

         





[facility] Center[facility] Specific Eoaldnw5675-62-04 16:38:00
  * 



             Test Item    Value        Reference Range Interpretation Comments

 

             Urine Specific Gravity (test code = 5811-5) 1.030        1.010-1.02

5  H             





[facility] CenterU[facility] cK8505-21-61 16:38:00* 



             Test Item    Value        Reference Range Interpretation Comments

 

             Urine pH (test code = 08698-3) 6            5-7                    

    





[facility] Center[facility] Leukocyte Czmnufqc5811-89-81 
16:38:00* 



             Test Item    Value        Reference Range Interpretation Comments

 

             Urine Leukocyte Esterase (test code = 5799-2) 2+           NEGATIVE

     H             





[facility] Center[facility] Lseizoq0277-74-15 16:38:00* 



             Test Item    Value        Reference Range Interpretation Comments

 

             Urine Nitrite (test code = 37235-3) POSITIVE     NEGATIVE     H    

         





[facility] Center[facility] Pxcjsft3029-42-22 16:38:00* 



             Test Item    Value        Reference Range Interpretation Comments

 

             Urine Protein (test code = 5804-0) 3+           NEGATIVE     H     

        





[facility] CenterU[facility] Glucose (UA)2018 16:38:00* 



             Test Item    Value        Reference Range Interpretation Comments

 

             Urine Glucose (UA) (test code = 2349-9) NEGATIVE     NEGATIVE      

             





[facility] CenterU[facility] Dqbkvam4642-46-67 16:38:00* 



             Test Item    Value        Reference Range Interpretation Comments

 

             Urine Ketones (test code = 78587-7) NEGATIVE     NEGATIVE          

         





[facility] Center[facility] Zquxbwmnthwx7199-71-93 16:38:00* 



             Test Item    Value        Reference Range Interpretation Comments

 

             Urine Urobilinogen (test code = 35093-7) 0.2          0.2-1        

              





[facility] Center[facility] Ipppbsmxs3926-22-37 16:38:00* 



             Test Item    Value        Reference Range Interpretation Comments

 

             Urine Bilirubin (test code = 1978-6) NEGATIVE     NEGATIVE         

          





[facility] CenterU[facility] Wpnaj9405-71-12 16:38:00* 



             Test Item    Value        Reference Range Interpretation Comments

 

             Urine Blood (test code = 49581-2) 3+           NEGATIVE     H      

       





[facility] Gyltr5147-04-41 14:37:00* 



             Test Item    Value        Reference Range Interpretation Comments

 

             Sodium Level (test code = 2951-2) 139          136-145             

       





[facility] CenterPotassium Nseen0906-64-67 14:37:00* 



             Test Item    Value        Reference Range Interpretation Comments

 

             Potassium Level (test code = 2823-3) 3.9          3.5-5.1          

          





[facility] CenterChloride Qpfoj2333-72-20 14:37:00* 



             Test Item    Value        Reference Range Interpretation Comments

 

             Chloride Level (test code = 2075-0) 105                      

         





[facility] CenterCarbon Dioxide Oscxt5450-11-25 14:37:00* 



             Test Item    Value        Reference Range Interpretation Comments

 

             Carbon Dioxide Level (test code = 2028-9) 23           22-29       

               





[facility] CenterAnion Uod9207-23-89 14:37:00* 



             Test Item    Value        Reference Range Interpretation Comments

 

             Anion Gap (test code = 33037-3) 14.9         8-16                  

     





[facility] CenterBlood Urea Hvgujmce3053-15-78 14:37:00* 



             Test Item    Value        Reference Range Interpretation Comments

 

             Blood Urea Nitrogen (test code = 3094-0) 13           7-26         

              





[facility] CenterCreatinine2018-07-02 14:37:00* 



             Test Item    Value        Reference Range Interpretation Comments

 

             Creatinine (test code = 2160-0) 0.85         0.57-1.11             

     





[facility] CenterBUN/Creatinine Rkjbj7804-51-01 14:37:00* 



             Test Item    Value        Reference Range Interpretation Comments

 

             BUN/Creatinine Ratio (test code = 3097-3) 15           6-25        

               





[facility] CenterEstimat Glomerular Filtration Rate
2018 14:37:00* 



             Test Item    Value        Reference Range Interpretation Comments

 

             Estimat Glomerular Filtration Rate (test code = 81842-5) 60-       

   >60                        





Ranges were taken from the National Kidney Disease Education Program and the [facility] Foundation literature.Reference ranges:60 or greater: Ayzurz49-66 (
for 3 consecutive months): Chronic kidney disease 15 or less: Kidney failureVal [facility] CenterGlucose Guqtx1305-53-27 14:37:00* 



             Test Item    Value        Reference Range Interpretation Comments

 

             Glucose Level (test code = BQI1787) 191                 H    

         





[facility] CenterCalcium Byugv6734-30-00 14:37:00* 



             Test Item    Value        Reference Range Interpretation Comments

 

             Calcium Level (test code = 13263-0) 9.7          8.4-10.2          

         





[facility] CenterTotal Gvibwjtov2325-53-28 14:37:00* 



             Test Item    Value        Reference Range Interpretation Comments

 

             Total Bilirubin (test code = 1975-2) 0.5          0.2-1.2          

          





[facility] CenterAspartate Amino Transf (AST/SGOT)
2018 14:37:00* 



             Test Item    Value        Reference Range Interpretation Comments

 

                                        Aspartate Amino Transf (AST/SGOT) (test 

code = Aspartate Amino Transf 

(AST/SGOT))     20              5-34                             





[facility] CenterAlanine Aminotransferase (ALT/SGPT)
2018 14:37:00* 



             Test Item    Value        Reference Range Interpretation Comments

 

             Alanine Aminotransferase (ALT/SGPT) (test code = 1742-6) 19        

   0-55                       





[facility] CenterTotal Bybexiz6902-67-22 14:37:00* 



             Test Item    Value        Reference Range Interpretation Comments

 

             Total Protein (test code = 2885-2) 8.2          6.5-8.1      H     

        





[facility] CenterAlbumin2018-07-02 14:37:00* 



             Test Item    Value        Reference Range Interpretation Comments

 

             Albumin (test code = 1751-7) 3.3          3.5-5.0      L           

  





[facility] CenterGlobulin2018-07-02 14:37:00* 



             Test Item    Value        Reference Range Interpretation Comments

 

             Globulin (test code = 36914-6) 4.9          2.3-3.5      H         

    





[facility] CenterAlbumin/Globulin Duusd1268-45-95 14:37:00
  * 



             Test Item    Value        Reference Range Interpretation Comments

 

             Albumin/Globulin Ratio (test code = 1759-0) 0.7          0.8-2.0   

   L             





[facility] CenterAlkaline Rqudbqgggmp4677-24-35 14:37:00* 



             Test Item    Value        Reference Range Interpretation Comments

 

             Alkaline Phosphatase (test code = 6768-6) 88                 

               





[facility] CenterAmylase Clogv1488-37-75 14:37:00* 



             Test Item    Value        Reference Range Interpretation Comments

 

             Amylase Level (test code = 1798-8) 51                        

        





[facility] CenterLipase2018-07-02 14:37:00* 



             Test Item    Value        Reference Range Interpretation Comments

 

             Lipase (test code = 3040-3) 27           8-78                      

 





[facility] CenterTotal Deigfyvai5635-60-76 14:37:00* 



             Test Item    Value        Reference Range Interpretation Comments

 

             Total Bilirubin (test code = 1975-2) 0.5          0.2-1.2          

          





[facility] CenterAspartate Amino Transf (AST/SGOT)
2018 14:37:00* 



             Test Item    Value        Reference Range Interpretation Comments

 

                                        Aspartate Amino Transf (AST/SGOT) (test 

code = Aspartate Amino Transf 

(AST/SGOT))     20              5-34                             





[facility] CenterAlanine Aminotransferase (ALT/SGPT)
2018 14:37:00* 



             Test Item    Value        Reference Range Interpretation Comments

 

             Alanine Aminotransferase (ALT/SGPT) (test code = 1742-6) 19        

   0-55                       





[facility] CenterTotal Fggouum6234-85-14 14:37:00* 



             Test Item    Value        Reference Range Interpretation Comments

 

             Total Protein (test code = 2885-2) 8.2          6.5-8.1      H     

        





[facility] CenterAlbumin2018-07-02 14:37:00* 



             Test Item    Value        Reference Range Interpretation Comments

 

             Albumin (test code = 1751-7) 3.3          3.5-5.0      L           

  





[facility] CenterGlobulin2018-07-02 14:37:00* 



             Test Item    Value        Reference Range Interpretation Comments

 

             Globulin (test code = 70110-5) 4.9          2.3-3.5      H         

    





[facility] CenterAlbumin/Globulin Quxxj3130-00-85 14:37:00
  * 



             Test Item    Value        Reference Range Interpretation Comments

 

             Albumin/Globulin Ratio (test code = 1759-0) 0.7          0.8-2.0   

   L             





[facility] CenterAlkaline Bnpxbffdwcm2924-97-75 14:37:00* 



             Test Item    Value        Reference Range Interpretation Comments

 

             Alkaline Phosphatase (test code = 6768-6) 88                 

               





[facility] CenterAmylase Ohpmx1968-21-43 14:37:00* 



             Test Item    Value        Reference Range Interpretation Comments

 

             Amylase Level (test code = 1798-8) 51                        

        





[facility] CenterLipase2018-07-02 14:37:00* 



             Test Item    Value        Reference Range Interpretation Comments

 

             Lipase (test code = 3040-3) 27           8-78                      

 





[facility] CenterTotal Ztuhhlirz4140-69-55 14:37:00* 



             Test Item    Value        Reference Range Interpretation Comments

 

             Total Bilirubin (test code = 1975-2) 0.5          0.2-1.2          

          





[facility] CenterAspartate Amino Transf (AST/SGOT)
2018 14:37:00* 



             Test Item    Value        Reference Range Interpretation Comments

 

                                        Aspartate Amino Transf (AST/SGOT) (test 

code = Aspartate Amino Transf 

(AST/SGOT))     20              5-34                             





[facility] CenterAlanine Aminotransferase (ALT/SGPT)
2018 14:37:00* 



             Test Item    Value        Reference Range Interpretation Comments

 

             Alanine Aminotransferase (ALT/SGPT) (test code = 1742-6) 19        

   0-55                       





[facility] CenterTotal Yhbtsyr2308-25-25 14:37:00* 



             Test Item    Value        Reference Range Interpretation Comments

 

             Total Protein (test code = 2885-2) 8.2          6.5-8.1      H     

        





[facility] CenterAlbumin2018-07-02 14:37:00* 



             Test Item    Value        Reference Range Interpretation Comments

 

             Albumin (test code = 1751-7) 3.3          3.5-5.0      L           

  





[facility] CenterGlobulin2018-07-02 14:37:00* 



             Test Item    Value        Reference Range Interpretation Comments

 

             Globulin (test code = 13931-5) 4.9          2.3-3.5      H         

    





[facility] CenterAlbumin/Globulin Zkdmz1657-58-45 14:37:00
  * 



             Test Item    Value        Reference Range Interpretation Comments

 

             Albumin/Globulin Ratio (test code = 1759-0) 0.7          0.8-2.0   

   L             





[facility] CenterAlkaline Cdpzsxtjqwz2320-44-87 14:37:00* 



             Test Item    Value        Reference Range Interpretation Comments

 

             Alkaline Phosphatase (test code = 6768-6) 88                 

               





[facility] CenterAmylase Lgwlg9684-97-80 14:37:00* 



             Test Item    Value        Reference Range Interpretation Comments

 

             Amylase Level (test code = 1798-8) 51                        

        





[facility] CenterLipase2018-07-02 14:37:00* 



             Test Item    Value        Reference Range Interpretation Comments

 

             Lipase (test code = 3040-3) 27           8-78                      

 





[facility] CenterUrine Amorphous Bcedkqeu1938-22-17 
14:31:00* 



             Test Item    Value        Reference Range Interpretation Comments

 

             Urine Amorphous Sediment (test code = 8246-1) MODERATE     FEW     

     H             





[facility] CenterUrine Amorphous Smimqhls9847-69-18 
14:31:00* 



             Test Item    Value        Reference Range Interpretation Comments

 

             Urine Amorphous Sediment (test code = 8246-1) MODERATE     FEW     

     H             





[facility] CenterUrine Amorphous Fpsfoehg2996-48-10 
14:31:00* 



             Test Item    Value        Reference Range Interpretation Comments

 

             Urine Amorphous Sediment (test code = 8246-1) MODERATE     FEW     

     H             





[facility] CenterUrine Amorphous Dyacsgmy6067-18-22 
14:31:00* 



             Test Item    Value        Reference Range Interpretation Comments

 

             Urine Amorphous Sediment (test code = 8246-1) MODERATE     FEW     

     H             





[facility] CenterUrine Amorphous Wuoxceht7431-36-81 
14:31:00* 



             Test Item    Value        Reference Range Interpretation Comments

 

             Urine Amorphous Sediment (test code = 8246-1) MODERATE     FEW     

     H             





[facility] CenterUrine Amorphous Lvmzwfse4002-12-07 
14:31:00* 



             Test Item    Value        Reference Range Interpretation Comments

 

             Urine Amorphous Sediment (test code = 8246-1) MODERATE     FEW     

     H             





[facility] Ovgjycqo4462-27-59 
14:31:00* 



             Test Item    Value        Reference Range Interpretation Comments

 

             Urine Amorphous Sediment (test code = 8246-1) MODERATE     FEW     

     H             





[facility] CenterUrine Amorphous Rlicfppj5532-26-57 
14:31:00* 



             Test Item    Value        Reference Range Interpretation Comments

 

             Urine Amorphous Sediment (test code = 8246-1) MODERATE     FEW     

     H             





[facility] SystemU[facility] Amorphous Xkftfuzy6115-91-40 
14:31:00* 



             Test Item    Value        Reference Range Interpretation Comments

 

             Urine Amorphous Sediment (test code = 8246-1) MODERATE     FEW     

     H             





[facility] CenterWhite Blood Uahjg0815-09-33 14:17:00* 



             Test Item    Value        Reference Range Interpretation Comments

 

             White Blood Count (test code = 6690-2) 7.92         4.8-10.8       

            





[facility] CenterRed Blood Hxjlh8231-85-84 14:17:00* 



             Test Item    Value        Reference Range Interpretation Comments

 

             Red Blood Count (test code = 789-8) 4.05         3.6-5.1           

         





[facility] CenterHemoglobin2018-07-[facility] 14:17:00* 



             Test Item    Value        Reference Range Interpretation Comments

 

             Hemoglobin (test code = 45235-7) 10.4         12.0-16.0    L       

      





[facility] CenterHematocrit2018-07-02 14:17:00* 



             Test Item    Value        Reference Range Interpretation Comments

 

             Hematocrit (test code = 4544-3) 33.0         34.2-44.1    L        

     





[facility] CenterMean Corpuscular Zeqcjk1439-24-92 
14:17:00* 



             Test Item    Value        Reference Range Interpretation Comments

 

             Mean Corpuscular Volume (test code = 787-2) 81.5         81-99     

                 





[facility] CenterMean Corpuscular Kwvooinqts4459-74-95 
14:17:00* 



             Test Item    Value        Reference Range Interpretation Comments

 

             Mean Corpuscular Hemoglobin (test code = 785-6) 25.7         28-32 

       L             





[facility] CenterMean Corpuscular Hemoglobin Concent
2018 14:17:00* 



             Test Item    Value        Reference Range Interpretation Comments

 

             Mean Corpuscular Hemoglobin Concent (test code = 786-4) 31.5       

  31-35                      





[facility] CenterRed Cell Distribution Svhrl9920-21-92 
14:17:00* 



             Test Item    Value        Reference Range Interpretation Comments

 

             Red Cell Distribution Width (test code = 63804-5) 15.7         11.7

-14.4    H             





[facility] CenterPlatelet Urube0748-52-60 14:17:00* 



             Test Item    Value        Reference Range Interpretation Comments

 

             Platelet Count (test code = 777-3) 334          140-360            

        





[facility] CenterNeutrophils (%) (Auto)2018 14:17:00
  * 



             Test Item    Value        Reference Range Interpretation Comments

 

             Neutrophils (%) (Auto) (test code = 04826-7) 65.1         38.7-80.0

                  





[facility] CenterLymphocytes (%) (Auto)2018 14:17:00
  * 



             Test Item    Value        Reference Range Interpretation Comments

 

             Lymphocytes (%) (Auto) (test code = 736-9) 27.3         18.0-39.1  

                





[facility] CenterMonocytes (%) (Auto)2018 14:17:00* 



             Test Item    Value        Reference Range Interpretation Comments

 

             Monocytes (%) (Auto) (test code = 5905-5) 4.9          4.4-11.3    

               





[facility] CenterEosinophils (%) (Auto)2018 14:17:00
  * 



             Test Item    Value        Reference Range Interpretation Comments

 

             Eosinophils (%) (Auto) (test code = 713-8) 1.8          0.0-6.0    

                





[facility] CenterBasophils (%) (Auto)2018 14:17:00* 



             Test Item    Value        Reference Range Interpretation Comments

 

             Basophils (%) (Auto) (test code = 706-2) 0.6          0.0-1.0      

              





[facility] CenterIM GRANULOCYTES %2018 14:17:00* 



             Test Item    Value        Reference Range Interpretation Comments

 

             IM GRANULOCYTES % (test code = IM GRANULOCYTES %) 0.3          0.0-

1.0                    





[facility] CenterNeutrophils # (Auto)2018 14:17:00* 



             Test Item    Value        Reference Range Interpretation Comments

 

             Neutrophils # (Auto) (test code = 751-8) 5.2          2.1-6.9      

              





[facility] CenterLymphocytes # (Auto)2018 14:17:00* 



             Test Item    Value        Reference Range Interpretation Comments

 

             Lymphocytes # (Auto) (test code = 80056-5) 2.2          1.0-3.2    

                





[facility] CenterMonocytes # (Auto)2018 14:17:00* 



             Test Item    Value        Reference Range Interpretation Comments

 

             Monocytes # (Auto) (test code = 742-7) 0.4          0.2-0.8        

            





[facility] CenterEosinophils # (Auto)2018 14:17:00* 



             Test Item    Value        Reference Range Interpretation Comments

 

             Eosinophils # (Auto) (test code = 711-2) 0.1          0.0-0.4      

              





[facility] CenterBasophils # (Auto)2018 14:17:00* 



             Test Item    Value        Reference Range Interpretation Comments

 

             Basophils # (Auto) (test code = 704-7) 0.1          0.0-0.1        

            





[facility] CenterAbsolute Immature Granulocyte (auto
2018 14:17:00* 



             Test Item    Value        Reference Range Interpretation Comments

 

                                        Absolute Immature Granulocyte (auto (lloyd

t code = Absolute Immature Granulocyte 

(auto)          0.02            0-0.1                            





[facility] CenterUrine ZTH8879-03-32 15:41:00* 



             Test Item    Value        Reference Range Interpretation Comments

 

             Urine WBC (test code = 5821-4) 50-          0-5          H         

    





[facility] SGY4107-93-08 15:41:00* 



             Test Item    Value        Reference Range Interpretation Comments

 

             Urine RBC (test code = 55737-4) 11-20        0-5          H        

     





[facility] Mtqljnse7197-82-02 15:41:00* 



             Test Item    Value        Reference Range Interpretation Comments

 

             Urine Bacteria (test code = 59917-1) MANY         NONE         H   

          





[facility] Epithelial Oryxv9312-86-18 15:41:00
  * 



             Test Item    Value        Reference Range Interpretation Comments

 

             Urine Epithelial Cells (test code = 05810-4) FEW          NONE     

                  





[facility] [id number] 15:41:00* 



             Test Item    Value        Reference Range Interpretation Comments

 

             Urine WBC (test code = 5821-4) 50-          0-5          H         

    





[facility] ZEN4575-84-63 15:41:00* 



             Test Item    Value        Reference Range Interpretation Comments

 

             Urine RBC (test code = 73733-9) 11-20        0-5          H        

     





[facility] Ymxapqsu9151-04-37 15:41:00* 



             Test Item    Value        Reference Range Interpretation Comments

 

             Urine Bacteria (test code = 14730-9) MANY         NONE         H   

          





[facility] Center[facility] Epithelial Oqinm8969-70-44 15:41:00
  * 



             Test Item    Value        Reference Range Interpretation Comments

 

             Urine Epithelial Cells (test code = 78650-1) FEW          NONE     

                  





[facility] TTU0864-34-72 15:41:00* 



             Test Item    Value        Reference Range Interpretation Comments

 

             Urine WBC (test code = 5821-4) 50-          0-5          H         

    





[facility] EYH5849-90-66 15:41:00* 



             Test Item    Value        Reference Range Interpretation Comments

 

             Urine RBC (test code = 39052-1) 11-20        0-5          H        

     





[facility] Qztrwwax7574-17-24 15:41:00* 



             Test Item    Value        Reference Range Interpretation Comments

 

             Urine Bacteria (test code = 48354-8) MANY         NONE         H   

          





[facility] Center[facility] Epithelial Moeez6801-03-48 15:41:00
  * 



             Test Item    Value        Reference Range Interpretation Comments

 

             Urine Epithelial Cells (test code = 02304-7) FEW          NONE     

                  





[facility] Iysxa5598-85-25 15:33:00* 



             Test Item    Value        Reference Range Interpretation Comments

 

             Sodium Level (test code = 2951-2) 137          136-145             

       





[facility] CenterPot[facility] Ktxab2386-51-54 15:33:00* 



             Test Item    Value        Reference Range Interpretation Comments

 

             Potassium Level (test code = 2823-3) 3.3          3.5-5.1      L   

          





[facility] CenterChloride Jtpis3496-06-15 15:33:00* 



             Test Item    Value        Reference Range Interpretation Comments

 

             Chloride Level (test code = 2075-0) 99                       

         





[facility] CenterCarbon Dioxide Fjuzd8640-44-60 15:33:00* 



             Test Item    Value        Reference Range Interpretation Comments

 

             Carbon Dioxide Level (test code = 2028-9) 25           22-29       

               





[facility] CenterAnion Qwz1219-92-09 15:33:00* 



             Test Item    Value        Reference Range Interpretation Comments

 

             Anion Gap (test code = 33037-3) 16.3         8-16         H        

     





[facility] CenterBlood Urea Ankpojua8674-64-36 15:33:00* 



             Test Item    Value        Reference Range Interpretation Comments

 

             Blood Urea Nitrogen (test code = 3094-0) 17           7-26         

              





[facility] CenterCreatinine2018-03-[facility] 15:33:00* 



             Test Item    Value        Reference Range Interpretation Comments

 

             Creatinine (test code = 2160-0) 0.99         0.57-1.11             

     





[facility] CenterBUN/Creatinine Tcbqg9428-22-61 15:33:00* 



             Test Item    Value        Reference Range Interpretation Comments

 

             BUN/Creatinine Ratio (test code = 3097-3) 17           6-        

               





[facility] CenterEstimat Glomerular Filtration Rate
2018 15:33:00* 



             Test Item    Value        Reference Range Interpretation Comments

 

             Estimat Glomerular Filtration Rate (test code = 12477-0) 58        

   >60          L             





Ranges were taken from the National Kidney Disease Education Program and the Michelle
[facility] literature.Reference ranges:60 or greater: [id number] (
for 3 consecutive months): Chronic kidney disease 15 or less: Kidney failureVal [facility] Medical CenterGlucose Lsfaj7713-71-15 15:33:00* 



             Test Item    Value        Reference Range Interpretation Comments

 

             Glucose Level (test code = QDX1641) 138                 H    

         





[facility] CenterCalcium Wlzcv8802-82-08 15:33:00* 



             Test Item    Value        Reference Range Interpretation Comments

 

             Calcium Level (test code = 18937-7) 9.3          8.4-10.2          

         





[facility] CenterTotal Yzekumieu4207-10-94 15:33:00* 



             Test Item    Value        Reference Range Interpretation Comments

 

             Total Bilirubin (test code = 1975-2) 0.8          0.2-1.2          

          





[facility] CenterAspartate Amino Transf (AST/SGOT)
2018 15:33:00* 



             Test Item    Value        Reference Range Interpretation Comments

 

                                        Aspartate Amino Transf (AST/SGOT) (test 

code = Aspartate Amino Transf 

(AST/SGOT))     23              5-34                             





[facility] CenterAlanine Aminotransferase (ALT/SGPT)
2018 15:33:00* 



             Test Item    Value        Reference Range Interpretation Comments

 

             Alanine Aminotransferase (ALT/SGPT) (test code = 1742-6) 33        

   0-55                       





[facility] Cgezhlj6447[facility]45 [facility]* 



             Test Item    Value        Reference Range Interpretation Comments

 

             Total Protein (test code = 2885-2) 8.5          6.5-8.1      H     

        





[facility] CenterAlbumin2018-03-04 15:33:00* 



             Test Item    Value        Reference Range Interpretation Comments

 

             Albumin (test code = 1751-7) 3.3          3.5-5.0      L           

  





[facility] CenterGlobulin2018-03-04 15:33:00* 



             Test Item    Value        Reference Range Interpretation Comments

 

             Globulin (test code = 84593-2) 5.2          2.3-3.5      H         

    





[facility] CenterAlbumin/Globulin Wjyjq8331-52-55 15:33:00
  * 



             Test Item    Value        Reference Range Interpretation Comments

 

             Albumin/Globulin Ratio (test code = 1759-0) 0.6          0.8-2.0   

   L             





[facility] CenterAlkaline Zrrwftrxusk1498-41-91 15:33:00* 



             Test Item    Value        Reference Range Interpretation Comments

 

             Alkaline Phosphatase (test code = 6768-6) 92                 

               





[facility]odium Cffpl3383-54-11 15:33:00* 



             Test Item    Value        Reference Range Interpretation Comments

 

             Sodium Level (test code = 2951-2) 137          136-145             

       





[facility] CenterPotassium Fddzf0014-10-43 15:33:00* 



             Test Item    Value        Reference Range Interpretation Comments

 

             Potassium Level (test code = 2823-3) 3.3          3.5-5.1      L   

          





[facility] CenterChloride Mkjjl1176-61-54 15:33:00* 



             Test Item    Value        Reference Range Interpretation Comments

 

             Chloride Level (test code = 2075-0) 99                       

         





[facility] CenterCarbon Dioxide Dotxx6158-06-23 15:33:00* 



             Test Item    Value        Reference Range Interpretation Comments

 

             Carbon Dioxide Level (test code = 2028-9) 25           22-29       

               





[facility] CenterAnion Agy5316-06-58 15:33:00* 



             Test Item    Value        Reference Range Interpretation Comments

 

             Anion Gap (test code = 33037-3) 16.3         8-16         H        

     





[facility] CenterBlood Urea Ftcuwlmk3559-65-25 15:33:00* 



             Test Item    Value        Reference Range Interpretation Comments

 

             Blood Urea Nitrogen (test code = 3094-0) 17           7-26         

              





[facility] CenterCreatinine2018-03-04 15:33:00* 



             Test Item    Value        Reference Range Interpretation Comments

 

             Creatinine (test code = 2160-0) 0.99         0.57-1.11             

     





[facility] CenterBUN/Creatinine Yfqsb9828-45-51 15:33:00* 



             Test Item    Value        Reference Range Interpretation Comments

 

             BUN/Creatinine Ratio (test code = 3097-3) 17           6-25        

               





[facility] CenterEstimat Glomerular Filtration Rate
2018 15:33:00* 



             Test Item    Value        Reference Range Interpretation Comments

 

             Estimat Glomerular Filtration Rate (test code = 80816-4) 58        

   >60          L             





Ranges were taken from the National Kidney Disease Education Program and the Michelle
[facility] literature.Reference ranges:60 or greater: [id number] (
for 3 consecutive months): Chronic kidney disease 15 or less: Kidney failureVal [facility]de Regional Medical CenterGlucose Mypxy8257-52-48 15:33:00* 



             Test Item    Value        Reference Range Interpretation Comments

 

             Glucose Level (test code = GZB3903) 138                 H    

         





[facility] CenterCalcium Fdoaz0912-69-31 15:33:00* 



             Test Item    Value        Reference Range Interpretation Comments

 

             Calcium Level (test code = 07339-8) 9.3          8.4-10.2          

         





[facility] CenterTotal Klsyytudf7662-08-54 15:33:00* 



             Test Item    Value        Reference Range Interpretation Comments

 

             Total Bilirubin (test code = 1975-2) 0.8          0.2-1.2          

          





[facility] CenterAspartate Amino Transf (AST/SGOT)
2018 15:33:00* 



             Test Item    Value        Reference Range Interpretation Comments

 

                                        Aspartate Amino Transf (AST/SGOT) (test 

code = Aspartate Amino Transf 

(AST/SGOT))     23              5-34                             





[facility] CenterAlanine Aminotransferase (ALT/SGPT)
2018 15:33:00* 



             Test Item    Value        Reference Range Interpretation Comments

 

             Alanine Aminotransferase (ALT/SGPT) (test code = 1742-6) 33        

   0-55                       





[facility] CenterTotal Yvgjyxr7509-60-49 15:33:00* 



             Test Item    Value        Reference Range Interpretation Comments

 

             Total Protein (test code = 2885-2) 8.5          6.5-8.1      H     

        





[facility] CenterAlbumin2018-03-04 15:33:00* 



             Test Item    Value        Reference Range Interpretation Comments

 

             Albumin (test code = 1751-7) 3.3          3.5-5.0      L           

  





[facility] CenterGlobulin2018-03-04 15:33:00* 



             Test Item    Value        Reference Range Interpretation Comments

 

             Globulin (test code = 11259-7) 5.2          2.3-3.5      H         

    





[facility] CenterAlbumin/Globulin Qokqf4053-77-57 15:33:00
  * 



             Test Item    Value        Reference Range Interpretation Comments

 

             Albumin/Globulin Ratio (test code = 1759-0) 0.6          0.8-2.0   

   L             





[facility] CenterAlkaline Kkzxtzrzowo8816-39-60 15:33:00* 



             Test Item    Value        Reference Range Interpretation Comments

 

             Alkaline Phosphatase (test code = 6768-6) 92                 

               





[facility] Inrzx0877-65-97 15:33:00* 



             Test Item    Value        Reference Range Interpretation Comments

 

             Sodium Level (test code = 2951-2) 137          136-145             

       





[facility] CenterPotassium Vdmfx8056-59-50 15:33:00* 



             Test Item    Value        Reference Range Interpretation Comments

 

             Potassium Level (test code = 2823-3) 3.3          3.5-5.1      L   

          





[facility] CenterChloride Fuxvm4729-70-54 15:33:00* 



             Test Item    Value        Reference Range Interpretation Comments

 

             Chloride Level (test code = 2075-0) 99                       

         





[facility] CenterCarbon Dioxide Agihq6373-67-73 15:33:00* 



             Test Item    Value        Reference Range Interpretation Comments

 

             Carbon Dioxide Level (test code = 2028-9) 25           22-29       

               





[facility] CenterAnion Qgm9880-88-18 15:33:00* 



             Test Item    Value        Reference Range Interpretation Comments

 

             Anion Gap (test code = 33037-3) 16.3         8-16         H        

     





[facility] CenterBlood Urea Ndpyppdh4284-01-84 15:33:00* 



             Test Item    Value        Reference Range Interpretation Comments

 

             Blood Urea Nitrogen (test code = 3094-0) 17           7-26         

              





[facility] CenterCreatinine2018-03-04 15:33:00* 



             Test Item    Value        Reference Range Interpretation Comments

 

             Creatinine (test code = 2160-0) 0.99         0.57-1.11             

     





[facility] CenterBUN/Creatinine Zwlcp5991-24-47 15:33:00* 



             Test Item    Value        Reference Range Interpretation Comments

 

             BUN/Creatinine Ratio (test code = 3097-3) 17           6-25        

               





[facility] CenterEstimat Glomerular Filtration Rate
2018 15:33:00* 



             Test Item    Value        Reference Range Interpretation Comments

 

             Estimat Glomerular Filtration Rate (test code = 13921-2) 58        

   >60          L             





Ranges were taken from the National Kidney Disease Education Program and the Michelle
[facility] literature.Reference ranges:60 or greater: Xresum88-77 (
for 3 consecutive months): Chronic kidney disease 15 or less: Kidney failureVal [facility]de Regional Medical CenterGlucose Nfdub3111-71-25 15:33:00* 



             Test Item    Value        Reference Range Interpretation Comments

 

             Glucose Level (test code = MCZ4469) 138                 H    

         





[facility] CenterCalcium Xsvmf8718-20-45 15:33:00* 



             Test Item    Value        Reference Range Interpretation Comments

 

             Calcium Level (test code = 40589-9) 9.3          8.4-10.2          

         





[facility] CenterTotal Gurpzmgmr1692-39-40 15:33:00* 



             Test Item    Value        Reference Range Interpretation Comments

 

             Total Bilirubin (test code = 1975-2) 0.8          0.2-1.2          

          





[facility] CenterAspartate Amino Transf (AST/SGOT)
2018 15:33:00* 



             Test Item    Value        Reference Range Interpretation Comments

 

                                        Aspartate Amino Transf (AST/SGOT) (test 

code = Aspartate Amino Transf 

(AST/SGOT))     23              5-34                             





[facility] CenterAlanine Aminotransferase (ALT/SGPT)
2018 15:33:00* 



             Test Item    Value        Reference Range Interpretation Comments

 

             Alanine Aminotransferase (ALT/SGPT) (test code = 1742-6) 33        

   0-55                       





[facility] CenterTotal Qfpneia4670-86-48 15:33:00* 



             Test Item    Value        Reference Range Interpretation Comments

 

             Total Protein (test code = 2885-2) 8.5          6.5-8.1      H     

        





[facility] CenterAlbumin2018-03-04 15:33:00* 



             Test Item    Value        Reference Range Interpretation Comments

 

             Albumin (test code = 1751-7) 3.3          3.5-5.0      L           

  





[facility] CenterGlobulin2018-03-04 15:33:00* 



             Test Item    Value        Reference Range Interpretation Comments

 

             Globulin (test code = 28947-0) 5.2          2.3-3.5      H         

    





[facility] CenterAlbumin/Globulin Fitob9706-09-04 15:33:00
  * 



             Test Item    Value        Reference Range Interpretation Comments

 

             Albumin/Globulin Ratio (test code = 1759-0) 0.6          0.8-2.0   

   L             





[facility] CenterAlkaline Mmlrxgvjlog8889-10-51 15:33:00* 



             Test Item    Value        Reference Range Interpretation Comments

 

             Alkaline Phosphatase (test code = 6768-6) 92                 

               





[facility] CenterUrine Pregnancy Lmte9593-75-64 15:23:00* 



             Test Item    Value        Reference Range Interpretation Comments

 

             Urine Pregnancy Test (test code = 2106-3) NEGATIVE     NEGATIVE    

               





[facility] CenterU[facility] Pregnancy Xoyo5519-65-33 15:23:00* 



             Test Item    Value        Reference Range Interpretation Comments

 

             Urine Pregnancy Test (test code = 2106-3) NEGATIVE     NEGATIVE    

               





[facility] CenterU[facility] Pregnancy Zqau6061-64-19 15:23:00* 



             Test Item    Value        Reference Range Interpretation Comments

 

             Urine Pregnancy Test (test code = 2106-3) NEGATIVE     NEGATIVE    

               





[facility] CenterU[facility] Pregnancy Kfvz5746-64-80 15:23:00* 



             Test Item    Value        Reference Range Interpretation Comments

 

             Urine Pregnancy Test (test code = 2106-3) NEGATIVE     NEGATIVE    

               





[facility] CenterU[facility] Pregnancy Zdoy6087-62-49 15:23:00* 



             Test Item    Value        Reference Range Interpretation Comments

 

             Urine Pregnancy Test (test code = 2106-3) NEGATIVE     NEGATIVE    

               





[facility] CenterU[facility] Pregnancy Mpxm5226-04-64 15:23:00* 



             Test Item    Value        Reference Range Interpretation Comments

 

             Urine Pregnancy Test (test code = 2106-3) NEGATIVE     NEGATIVE    

               





[facility] CenterU[facility] Pregnancy Duej6587-64-07 15:23:00* 



             Test Item    Value        Reference Range Interpretation Comments

 

             Urine Pregnancy Test (test code = 2106-3) NEGATIVE     NEGATIVE    

               





[facility] CenterU[facility] Tnoho2461-93-27 15:21:00* 



             Test Item    Value        Reference Range Interpretation Comments

 

             Urine Color (test code = 5778-6) YELLOW       YELLOW               

      





[facility] CenterU[facility] Bipddqu7185-88-44 15:21:00* 



             Test Item    Value        Reference Range Interpretation Comments

 

             Urine Clarity (test code = 60500-0) CLOUDY       CLEAR        H    

         





[facility] CenterU[facility] Specific Kwhcjgz5918-42-80 15:21:00
  * 



             Test Item    Value        Reference Range Interpretation Comments

 

             Urine Specific Gravity (test code = 5811-5) 1.010        1.010-1.02

5                





[facility] CenterU[facility] tB6186-22-28 15:21:00* 



             Test Item    Value        Reference Range Interpretation Comments

 

             Urine pH (test code = 27755-8) 6            5-7                    

    





[facility] CenterU[facility] Leukocyte Nunynuot7910-31-22 
15:21:00* 



             Test Item    Value        Reference Range Interpretation Comments

 

             Urine Leukocyte Esterase (test code = 5799-2) 2+           NEGATIVE

     H             





[facility] CenterU[facility] Atiehig0416-20-26 15:21:00* 



             Test Item    Value        Reference Range Interpretation Comments

 

             Urine Nitrite (test code = 74134-8) NEGATIVE     NEGATIVE          

         





[facility] CenterU[facility] Atabpmu3287-24-95 15:21:00* 



             Test Item    Value        Reference Range Interpretation Comments

 

             Urine Protein (test code = 5804-0) 2+           NEGATIVE     H     

        





[facility] CenterU[facility] Glucose (UA)2018 15:21:00* 



             Test Item    Value        Reference Range Interpretation Comments

 

             Urine Glucose (UA) (test code = 2349-9) NEGATIVE     NEGATIVE      

             





[facility] CenterU[facility] Twaozxa8333-96-99 15:21:00* 



             Test Item    Value        Reference Range Interpretation Comments

 

             Urine Ketones (test code = 78691-8) NEGATIVE     NEGATIVE          

         





[facility] Wsbinhwzpmjn3573-56-56 15:21:00* 



             Test Item    Value        Reference Range Interpretation Comments

 

             Urine Urobilinogen (test code = 32101-4) 1            0.2-1        

              





[facility] CenterU[facility] Nnxbrzehg1477-33-81 15:21:00* 



             Test Item    Value        Reference Range Interpretation Comments

 

             Urine Bilirubin (test code = 1978-6) 1+           NEGATIVE     H   

          





[facility] Prguy4835-67-66 15:21:00* 



             Test Item    Value        Reference Range Interpretation Comments

 

             Urine Blood (test code = 34886-7) 4+           NEGATIVE     H      

       





[facility] CenterU[facility] Luqiv7835-15-81 15:21:00* 



             Test Item    Value        Reference Range Interpretation Comments

 

             Urine Color (test code = 5778-6) YELLOW       YELLOW               

      





[facility] CenterU[facility] Zttjjhy7578-16-25 15:21:00* 



             Test Item    Value        Reference Range Interpretation Comments

 

             Urine Clarity (test code = 51579-6) CLOUDY       CLEAR        H    

         





[facility] CenterU[facility] Specific Kvxfdpe1071-77-20 15:21:00
  * 



             Test Item    Value        Reference Range Interpretation Comments

 

             Urine Specific Gravity (test code = 5811-5) 1.010        1.010-1.02

5                





[facility] CenterU[facility] nQ9712-94-21 15:21:00* 



             Test Item    Value        Reference Range Interpretation Comments

 

             Urine pH (test code = 61872-7) 6            5-7                    

    





[facility] Leukocyte [id number] 
15:21:00* 



             Test Item    Value        Reference Range Interpretation Comments

 

             Urine Leukocyte Esterase (test code = 5799-2) 2+           NEGATIVE

     H             





[facility] Djfokiq1241-13-94 15:21:00* 



             Test Item    Value        Reference Range Interpretation Comments

 

             Urine Nitrite (test code = 23313-2) NEGATIVE     NEGATIVE          

         





[facility] Pmamtkd3091-99-44 15:21:00* 



             Test Item    Value        Reference Range Interpretation Comments

 

             Urine Protein (test code = 5804-0) 2+           NEGATIVE     H     

        





[facility] Glucose [facility])2018 15:21:00* 



             Test Item    Value        Reference Range Interpretation Comments

 

             Urine Glucose (UA) (test code = 2349-9) NEGATIVE     NEGATIVE      

             





[facility] Uqtpkom2398-24-38 15:21:00* 



             Test Item    Value        Reference Range Interpretation Comments

 

             Urine Ketones (test code = 73485-0) NEGATIVE     NEGATIVE          

         





[facility] Jnydrvvejgpq0958-85-65 15:21:00* 



             Test Item    Value        Reference Range Interpretation Comments

 

             Urine Urobilinogen (test code = 37263-5) 1            0.2-1        

              





[facility] Rrpweejlo4760-32-40 15:21:00* 



             Test Item    Value        Reference Range Interpretation Comments

 

             Urine Bilirubin (test code = 1978-6) 1+           NEGATIVE     H   

          





[facility] Rvizz5866-95-58 15:21:00* 



             Test Item    Value        Reference Range Interpretation Comments

 

             Urine Blood (test code = 08035-9) 4+           NEGATIVE     H      

       





[facility] Yusnm5039-02-18 15:21:00* 



             Test Item    Value        Reference Range Interpretation Comments

 

             Urine Color (test code = 5778-6) YELLOW       YELLOW               

      





[facility] Gfiqwan1686-12-17 15:21:00* 



             Test Item    Value        Reference Range Interpretation Comments

 

             Urine Clarity (test code = 45562-5) CLOUDY       CLEAR        H    

         





[facility] Center[facility] Specific Rapoaxy3168-37-56 15:21:00
  * 



             Test Item    Value        Reference Range Interpretation Comments

 

             Urine Specific Gravity (test code = 5811-5) 1.010        1.010-1.02

5                





[facility] Center[facility] [id number] 15:21:00* 



             Test Item    Value        Reference Range Interpretation Comments

 

             Urine pH (test code = 27946-5) 6            5-7                    

    





[facility] Leukocyte Noxhtkgg6618-03-60 
15:21:00* 



             Test Item    Value        Reference Range Interpretation Comments

 

             Urine Leukocyte Esterase (test code = 5799-2) 2+           NEGATIVE

     H             





[facility] Thvklnq2697-19-77 15:21:00* 



             Test Item    Value        Reference Range Interpretation Comments

 

             Urine Nitrite (test code = 22117-3) NEGATIVE     NEGATIVE          

         





[facility] Qowqpux0846-78-29 15:21:00* 



             Test Item    Value        Reference Range Interpretation Comments

 

             Urine Protein (test code = 5804-0) 2+           NEGATIVE     H     

        





[facility] Glucose (UA)2018 15:21:00* 



             Test Item    Value        Reference Range Interpretation Comments

 

             Urine Glucose (UA) (test code = 2349-9) NEGATIVE     NEGATIVE      

             





[facility] Agomowz1595-56-03 15:21:00* 



             Test Item    Value        Reference Range Interpretation Comments

 

             Urine Ketones (test code = 14114-2) NEGATIVE     NEGATIVE          

         





[facility] Pxzszpzrasgo5711-33-62 15:21:00* 



             Test Item    Value        Reference Range Interpretation Comments

 

             Urine Urobilinogen (test code = 90452-7) 1            0.2-1        

              





[facility] Center[facility] Mjinaapfr6114-30-40 15:21:00* 



             Test Item    Value        Reference Range Interpretation Comments

 

             Urine Bilirubin (test code = 1978-6) 1+           NEGATIVE     H   

          





[facility] Bcecv2777-35-98 15:21:00* 



             Test Item    Value        Reference Range Interpretation Comments

 

             Urine Blood (test code = 34775-4) 4+           NEGATIVE     H      

       





[facility] CenterWhite Blood Sefiu4197-72-28 14:58:00* 



             Test Item    Value        Reference Range Interpretation Comments

 

             White Blood Count (test code = 6690-2) 11.11        4.8-10.8     H 

            





[facility] CenterRed [facility] Sfejl9420-82-44 14:58:00* 



             Test Item    Value        Reference Range Interpretation Comments

 

             Red Blood Count (test code = 789-8) 4.39         3.6-5.1           

         





[facility] CenterHemoglobin2018-[facility] 14:58:00* 



             Test Item    Value        Reference Range Interpretation Comments

 

             Hemoglobin (test code = 37100-5) 11.2         12.0-16.0    L       

      





[facility] CenterHematocrit2018-03-04 14:58:00* 



             Test Item    Value        Reference Range Interpretation Comments

 

             Hematocrit (test code = 4544-3) 34.9         34.2-44.1             

     





[facility] CenterMean Corpuscular Qdogno6358-19-86 
14:58:00* 



             Test Item    Value        Reference Range Interpretation Comments

 

             Mean Corpuscular Volume (test code = 787-2) 79.5         81-99     

   L             





[facility] CenterMean Corpuscular Ebezorjxam2151-64-96 
14:58:00* 



             Test Item    Value        Reference Range Interpretation Comments

 

             Mean Corpuscular Hemoglobin (test code = 785-6) 25.5         28-32 

       L             





[facility] CenterMean Corpuscular Hemoglobin Concent
2018 14:58:00* 



             Test Item    Value        Reference Range Interpretation Comments

 

             Mean Corpuscular Hemoglobin Concent (test code = 786-4) 32.1       

  31-35                      





[facility] CenterRed Cell Distribution Zypse5021-18-67 
14:58:00* 



             Test Item    Value        Reference Range Interpretation Comments

 

             Red Cell Distribution Width (test code = 37473-6) 15.2         11.7

-14.4    H             





[facility] CenterPlatelet Qtfzx9755-41-79 14:58:00* 



             Test Item    Value        Reference Range Interpretation Comments

 

             Platelet Count (test code = 777-3) 249          140-360            

        





[facility] CenterNeutrophils (%) (Auto)2018 14:58:00
  * 



             Test Item    Value        Reference Range Interpretation Comments

 

             Neutrophils (%) (Auto) (test code = 03634-7) 73.3         38.7-80.0

                  





[facility] CenterLymphocytes (%) (Auto)2018 14:58:00
  * 



             Test Item    Value        Reference Range Interpretation Comments

 

             Lymphocytes (%) (Auto) (test code = 736-9) 16.7         18.0-39.1  

  L             





[facility] CenterMonocytes (%) (Auto)2018 14:58:00* 



             Test Item    Value        Reference Range Interpretation Comments

 

             Monocytes (%) (Auto) (test code = 5905-5) 8.8          4.4-11.3    

               





[facility] CenterEosinophils (%) (Auto)2018 14:58:00
  * 



             Test Item    Value        Reference Range Interpretation Comments

 

             Eosinophils (%) (Auto) (test code = 713-8) 0.5          0.0-6.0    

                





[facility] CenterBasophils (%) (Auto)2018 14:58:00* 



             Test Item    Value        Reference Range Interpretation Comments

 

             Basophils (%) (Auto) (test code = 706-2) 0.3          0.0-1.0      

              





[facility] CenterIM GRANULOCYTES %2018 14:58:00* 



             Test Item    Value        Reference Range Interpretation Comments

 

             IM GRANULOCYTES % (test code = IM GRANULOCYTES %) 0.4          0.0-

1.0                    





[facility] CenterNeutrophils # (Auto)2018 14:58:00* 



             Test Item    Value        Reference Range Interpretation Comments

 

             Neutrophils # (Auto) (test code = 751-8) 8.2          2.1-6.9      

H             





[facility] CenterLymphocytes # (Auto)2018 14:58:00* 



             Test Item    Value        Reference Range Interpretation Comments

 

             Lymphocytes # (Auto) (test code = 11280-9) 1.9          1.0-3.2    

                





[facility] CenterMonocytes # (Auto)2018 14:58:00* 



             Test Item    Value        Reference Range Interpretation Comments

 

             Monocytes # (Auto) (test code = 742-7) 1.0          0.2-0.8      H 

            





[facility] CenterEosinophils # (Auto)2018 14:58:00* 



             Test Item    Value        Reference Range Interpretation Comments

 

             Eosinophils # (Auto) (test code = 711-2) 0.1          0.0-0.4      

              





[facility] CenterBasophils # (Auto)2018 14:58:00* 



             Test Item    Value        Reference Range Interpretation Comments

 

             Basophils # (Auto) (test code = 704-7) 0.0          0.0-0.1        

            





[facility] CenterAbsolute Immature Granulocyte (auto
2018 14:58:00* 



             Test Item    Value        Reference Range Interpretation Comments

 

                                        Absolute Immature Granulocyte (auto (lloyd

t code = Absolute Immature Granulocyte 

(auto)          0.04            0-0.1                            





[facility] CenterWhite Blood Pttbf3062-79-43 14:58:00* 



             Test Item    Value        Reference Range Interpretation Comments

 

             White Blood Count (test code = 6690-2) 11.11        4.8-10.8     H 

            





[facility] CenterRed Blood Jrcni3229-12-09 14:58:00* 



             Test Item    Value        Reference Range Interpretation Comments

 

             Red Blood Count (test code = 789-8) 4.39         3.6-5.1           

         





[facility] CenterHemoglobin2018-03-04 14:58:00* 



             Test Item    Value        Reference Range Interpretation Comments

 

             Hemoglobin (test code = 87572-3) 11.2         12.0-16.0    L       

      





[facility] CenterHematocrit2018-03-04 14:58:00* 



             Test Item    Value        Reference Range Interpretation Comments

 

             Hematocrit (test code = 4544-3) 34.9         34.2-44.1             

     





[facility] CenterMean Corpuscular Nvhqhw1648-31-76 
14:58:00* 



             Test Item    Value        Reference Range Interpretation Comments

 

             Mean Corpuscular Volume (test code = 787-2) 79.5         81-99     

   L             





[facility] CenterMean Corpuscular Tcokjtpgny3860-76-90 
14:58:00* 



             Test Item    Value        Reference Range Interpretation Comments

 

             Mean Corpuscular Hemoglobin (test code = 785-6) 25.5         28-32 

       L             





[facility] CenterMean Corpuscular Hemoglobin Concent
2018 14:58:00* 



             Test Item    Value        Reference Range Interpretation Comments

 

             Mean Corpuscular Hemoglobin Concent (test code = 786-4) 32.1       

  31-35                      





[facility] CenterRed Cell Distribution Ohpxg7851-42-42 
14:58:00* 



             Test Item    Value        Reference Range Interpretation Comments

 

             Red Cell Distribution Width (test code = 82143-5) 15.2         11.7

-14.4    H             





[facility] CenterPlatelet Mrulx6768-67-32 14:58:00* 



             Test Item    Value        Reference Range Interpretation Comments

 

             Platelet Count (test code = 777-3) 249          140-360            

        





[facility] CenterNeutrophils (%) (Auto)2018 14:58:00
  * 



             Test Item    Value        Reference Range Interpretation Comments

 

             Neutrophils (%) (Auto) (test code = 07914-0) 73.3         38.7-80.0

                  





[facility] CenterLymphocytes (%) (Auto)2018 14:58:00
  * 



             Test Item    Value        Reference Range Interpretation Comments

 

             Lymphocytes (%) (Auto) (test code = 736-9) 16.7         18.0-39.1  

  L             





[facility] CenterMonocytes (%) (Auto)2018 14:58:00* 



             Test Item    Value        Reference Range Interpretation Comments

 

             Monocytes (%) (Auto) (test code = 5905-5) 8.8          4.4-11.3    

               





[facility] CenterEosinophils (%) (Auto)2018 14:58:00
  * 



             Test Item    Value        Reference Range Interpretation Comments

 

             Eosinophils (%) (Auto) (test code = 713-8) 0.5          0.0-6.0    

                





[facility] CenterBasophils (%) (Auto)2018 14:58:00* 



             Test Item    Value        Reference Range Interpretation Comments

 

             Basophils (%) (Auto) (test code = 706-2) 0.3          0.0-1.0      

              





[facility] CenterIM GRANULOCYTES %2018 14:58:00* 



             Test Item    Value        Reference Range Interpretation Comments

 

             IM GRANULOCYTES % (test code = IM GRANULOCYTES %) 0.4          0.0-

1.0                    





[facility] CenterNeutrophils # (Auto)2018 14:58:00* 



             Test Item    Value        Reference Range Interpretation Comments

 

             Neutrophils # (Auto) (test code = 751-8) 8.2          2.1-6.9      

H             





[facility] CenterLymphocytes # (Auto)2018 14:58:00* 



             Test Item    Value        Reference Range Interpretation Comments

 

             Lymphocytes # (Auto) (test code = 31325-9) 1.9          1.0-3.2    

                





[facility] CenterMonocytes # (Auto)2018 14:58:00* 



             Test Item    Value        Reference Range Interpretation Comments

 

             Monocytes # (Auto) (test code = 742-7) 1.0          0.2-0.8      H 

            





[facility] CenterEosinophils # (Auto)2018 14:58:00* 



             Test Item    Value        Reference Range Interpretation Comments

 

             Eosinophils # (Auto) (test code = 711-2) 0.1          0.0-0.4      

              





[facility] CenterBasophils # (Auto)2018 14:58:00* 



             Test Item    Value        Reference Range Interpretation Comments

 

             Basophils # (Auto) (test code = 704-7) 0.0          0.0-0.1        

            





[facility] CenterAbsolute Immature Granulocyte (auto
2018 14:58:00* 



             Test Item    Value        Reference Range Interpretation Comments

 

                                        Absolute Immature Granulocyte (auto (lloyd

t code = Absolute Immature Granulocyte 

(auto)          0.04            0-0.1                            





[facility] CenterWhite Blood Anbea2486-29-75 14:58:00* 



             Test Item    Value        Reference Range Interpretation Comments

 

             White Blood Count (test code = 6690-2) 11.11        4.8-10.8     H 

            





[facility] CenterRed Blood Mdvpy7954-48-58 14:58:00* 



             Test Item    Value        Reference Range Interpretation Comments

 

             Red Blood Count (test code = 789-8) 4.39         3.6-5.1           

         





[facility] CenterHemoglobin2018-03-04 14:58:00* 



             Test Item    Value        Reference Range Interpretation Comments

 

             Hemoglobin (test code = 53435-8) 11.2         12.0-16.0    L       

      





[facility] CenterHematocrit2018-03-04 14:58:00* 



             Test Item    Value        Reference Range Interpretation Comments

 

             Hematocrit (test code = 4544-3) 34.9         34.2-44.1             

     





[facility] CenterMean Corpuscular Mmfeui1912-36-85 
14:58:00* 



             Test Item    Value        Reference Range Interpretation Comments

 

             Mean Corpuscular Volume (test code = 787-2) 79.5         81-99     

   L             





[facility] CenterMean Corpuscular Nveahscodd3352-58-89 
14:58:00* 



             Test Item    Value        Reference Range Interpretation Comments

 

             Mean Corpuscular Hemoglobin (test code = 785-6) 25.5         28-32 

       L             





[facility] CenterMean Corpuscular Hemoglobin Concent
2018 14:58:00* 



             Test Item    Value        Reference Range Interpretation Comments

 

             Mean Corpuscular Hemoglobin Concent (test code = 786-4) 32.1       

  31-35                      





[facility] CenterRed Cell Distribution Uawjf0541-66-23 
14:58:00* 



             Test Item    Value        Reference Range Interpretation Comments

 

             Red Cell Distribution Width (test code = 71493-3) 15.2         11.7

-14.4    H             





[facility] CenterPlatelet Jxpev9567-03-63 14:58:00* 



             Test Item    Value        Reference Range Interpretation Comments

 

             Platelet Count (test code = 777-3) 249          140-360            

        





[facility] CenterNeutrophils (%) (Auto)2018 14:58:00
  * 



             Test Item    Value        Reference Range Interpretation Comments

 

             Neutrophils (%) (Auto) (test code = 88049-6) 73.3         38.7-80.0

                  





[facility] CenterLymphocytes (%) (Auto)2018 14:58:00
  * 



             Test Item    Value        Reference Range Interpretation Comments

 

             Lymphocytes (%) (Auto) (test code = 736-9) 16.7         18.0-39.1  

  L             





[facility] CenterMonocytes (%) (Auto)2018 14:58:00* 



             Test Item    Value        Reference Range Interpretation Comments

 

             Monocytes (%) (Auto) (test code = 5905-5) 8.8          4.4-11.3    

               





[facility] CenterEosinophils (%) (Auto)2018 14:58:00
  * 



             Test Item    Value        Reference Range Interpretation Comments

 

             Eosinophils (%) (Auto) (test code = 713-8) 0.5          0.0-6.0    

                





[facility] CenterBasophils (%) (Auto)2018 14:58:00* 



             Test Item    Value        Reference Range Interpretation Comments

 

             Basophils (%) (Auto) (test code = 706-2) 0.3          0.0-1.0      

              





[facility] CenterIM GRANULOCYTES %2018 14:58:00* 



             Test Item    Value        Reference Range Interpretation Comments

 

             IM GRANULOCYTES % (test code = IM GRANULOCYTES %) 0.4          0.0-

1.0                    





[facility] CenterNeutrophils # (Auto)2018 14:58:00* 



             Test Item    Value        Reference Range Interpretation Comments

 

             Neutrophils # (Auto) (test code = 751-8) 8.2          2.1-6.9      

H             





[facility] CenterLymphocytes # (Auto)2018 14:58:00* 



             Test Item    Value        Reference Range Interpretation Comments

 

             Lymphocytes # (Auto) (test code = 24837-4) 1.9          1.0-3.2    

                





[facility] CenterMonocytes # (Auto)2018 14:58:00* 



             Test Item    Value        Reference Range Interpretation Comments

 

             Monocytes # (Auto) (test code = 742-7) 1.0          0.2-0.8      H 

            





[facility] CenterEosinophils # (Auto)2018 14:58:00* 



             Test Item    Value        Reference Range Interpretation Comments

 

             Eosinophils # (Auto) (test code = 711-2) 0.1          0.0-0.4      

              





[facility] CenterBasophils # (Auto)2018 14:58:00* 



             Test Item    Value        Reference Range Interpretation Comments

 

             Basophils # (Auto) (test code = 704-7) 0.0          0.0-0.1        

            





[facility] CenterAbsolute Immature Granulocyte (auto
2018 14:58:00* 



             Test Item    Value        Reference Range Interpretation Comments

 

                                        Absolute Immature Granulocyte (auto (lloyd

t code = Absolute Immature Granulocyte 

(auto)          0.04            0-0.1                            





[facility]2018-02-04 06:21:00* 



             Test Item    Value        Reference Range Interpretation Comments

 

             Urine Culture (test code = 630-4) Organism: CANDIDA ALBICANS       

                     





[facility]2018-02-04 06:21:00* 



             Test Item    Value        Reference Range Interpretation Comments

 

             Urine Culture (test code = 630-4) Organism: CANDIDA ALBICANS       

                     





[facility]2018-02-04 06:21:00* 



             Test Item    Value        Reference Range Interpretation Comments

 

             Urine Culture (test code = 630-4) Organism: CANDIDA ALBICANS       

                     





[facility]2018-02-04 06:21:00* 



             Test Item    Value        Reference Range Interpretation Comments

 

             Urine Culture (test code = 630-4) Organism: CANDIDA ALBICANS       

                     





[facility]2018-02-04 06:13:00* 



             Test Item    Value        Reference Range Interpretation Comments

 

             Urine Culture (test code = 630-4) Organism: CANDIDA ALBICANS       

                     





[facility]2018-02-04 06:13:00* 



             Test Item    Value        Reference Range Interpretation Comments

 

             Urine Culture (test code = 630-4) Organism: CANDIDA ALBICANS       

                     





[facility]2018-02-04 06:13:00* 



             Test Item    Value        Reference Range Interpretation Comments

 

             Urine Culture (test code = 630-4) Organism: CANDIDA ALBICANS       

                     





[facility]2018-02-04 06:13:00* 



             Test Item    Value        Reference Range Interpretation Comments

 

             Urine Culture (test code = 630-4) Organism: CANDIDA ALBICANS       

                     





[facility]2018-02-04 06:13:00* 



             Test Item    Value        Reference Range Interpretation Comments

 

             Urine Culture (test code = 630-4) Organism: CANDIDA ALBICANS       

                     





[facility]2018-02-04 06:13:00* 



             Test Item    Value        Reference Range Interpretation Comments

 

             Urine Culture (test code = 630-4) Organism: CANDIDA ALBICANS       

                     





[facility] Qptay3469-24-82 07:24:00* 



             Test Item    Value        Reference Range Interpretation Comments

 

             Sodium Level (test code = 2951-2) 142          136-145             

       





[facility] CenterPotassium Ogfwq5676-52-57 07:24:00* 



             Test Item    Value        Reference Range Interpretation Comments

 

             Potassium Level (test code = 2823-3) 3.1          3.5-5.1      L   

          





[facility] CenterChloride Oaxlp5656-11-21 07:24:00* 



             Test Item    Value        Reference Range Interpretation Comments

 

             Chloride Level (test code = 2075-0) 108                 H    

         





[facility] CenterCarbon Dioxide Zycac9817-43-74 07:24:00* 



             Test Item    Value        Reference Range Interpretation Comments

 

             Carbon Dioxide Level (test code = 2028-9) 27           22-29       

               





[facility] CenterAnion Dgw6528-63-30 07:24:00* 



             Test Item    Value        Reference Range Interpretation Comments

 

             Anion Gap (test code = 33037-3) 10.1         8-16                  

     





[facility] CenterBlood Urea Jlguhmkk8268-44-62 07:24:00* 



             Test Item    Value        Reference Range Interpretation Comments

 

             Blood Urea Nitrogen (test code = 3094-0) 7            7-26         

              





[facility] CenterCreatinine2018-02-[facility] 07:24:00* 



             Test Item    Value        Reference Range Interpretation Comments

 

             Creatinine (test code = 2160-0) 0.68         0.57-1.11             

     





[facility] CenterBUN/Creatinine Aetya4976-98-56 07:24:00* 



             Test Item    Value        Reference Range Interpretation Comments

 

             BUN/Creatinine Ratio (test code = 3097-3) 10           6-25        

               





[facility] CenterEstimat Glomerular Filtration Rate
2018 07:24:00* 



             Test Item    Value        Reference Range Interpretation Comments

 

             Estimat Glomerular Filtration Rate (test code = 32769-6) 60-       

   >60                        





Ranges were taken from the National Kidney Disease Education Program and the Michelle
[facility] literature.Reference ranges:60 or greater: Wxhfzm34-46 (
for 3 consecutive months): Chronic kidney disease 15 or less: Kidney failureVal [facility] CenterGlucose Gywyg9459-64-61 07:24:00* 



             Test Item    Value        Reference Range Interpretation Comments

 

             Glucose Level (test code = FDB1831) 127                 H    

         





[facility] CenterCalcium Ufgze0711-50-25 07:24:00* 



             Test Item    Value        Reference Range Interpretation Comments

 

             Calcium Level (test code = 29089-5) 8.8          8.4-10.2          

         





[facility] CenterUrine Adnaedy7994-47-64 06:48:00* 



             Test Item    Value        Reference Range Interpretation Comments

 

             Urine Culture (test code = 630-4) Organism: YEAST SPECIES          

                  





[facility] CenterWhite Blood Qpvyr2156-14-77 06:45:00* 



             Test Item    Value        Reference Range Interpretation Comments

 

             White Blood Count (test code = 6690-2) 7.03         4.8-10.8       

            





[facility] CenterRed Blood Xhpgq2424-23-31 06:45:00* 



             Test Item    Value        Reference Range Interpretation Comments

 

             Red Blood Count (test code = 789-8) 3.86         3.6-5.1           

         





[facility] CenterHemoglobin2018-02-03 06:45:00* 



             Test Item    Value        Reference Range Interpretation Comments

 

             Hemoglobin (test code = 10037-5) 10.0         12.0-16.0    L       

      





[facility] CenterHematocrit2018-02-03 06:45:00* 



             Test Item    Value        Reference Range Interpretation Comments

 

             Hematocrit (test code = 4544-3) 32.0         34.2-44.1    L        

     





[facility] CenterMean Corpuscular Swyafw4461-17-52 
06:45:00* 



             Test Item    Value        Reference Range Interpretation Comments

 

             Mean Corpuscular Volume (test code = 787-2) 82.9         81-99     

                 





[facility] CenterMean Corpuscular Vxovewwbda0642-86-02 
06:45:00* 



             Test Item    Value        Reference Range Interpretation Comments

 

             Mean Corpuscular Hemoglobin (test code = 785-6) 25.9         28-32 

       L             





[facility] CenterMean Corpuscular Hemoglobin Concent
2018 06:45:00* 



             Test Item    Value        Reference Range Interpretation Comments

 

             Mean Corpuscular Hemoglobin Concent (test code = 786-4) 31.3       

  31-35                      





[facility] CenterRed Cell Distribution Crikx6068-77-15 
06:45:00* 



             Test Item    Value        Reference Range Interpretation Comments

 

             Red Cell Distribution Width (test code = 96093-7) 15.3         11.7

-14.4    H             





[facility] CenterPlatelet Sijsb2946-32-75 06:45:00* 



             Test Item    Value        Reference Range Interpretation Comments

 

             Platelet Count (test code = 777-3) 238          140-360            

        





[facility] CenterNeutrophils (%) (Auto)2018 06:45:00
  * 



             Test Item    Value        Reference Range Interpretation Comments

 

             Neutrophils (%) (Auto) (test code = 82658-5) 61.4         38.7-80.0

                  





[facility] CenterLymphocytes (%) (Auto)2018 06:45:00
  * 



             Test Item    Value        Reference Range Interpretation Comments

 

             Lymphocytes (%) (Auto) (test code = 736-9) 29.0         18.0-39.1  

                





[facility] CenterMonocytes (%) (Auto)2018 06:45:00* 



             Test Item    Value        Reference Range Interpretation Comments

 

             Monocytes (%) (Auto) (test code = 5905-5) 7.3          4.4-11.3    

               





[facility] CenterEosinophils (%) (Auto)2018 06:45:00
  * 



             Test Item    Value        Reference Range Interpretation Comments

 

             Eosinophils (%) (Auto) (test code = 713-8) 1.6          0.0-6.0    

                





[facility] CenterBasophils (%) (Auto)2018 06:45:00* 



             Test Item    Value        Reference Range Interpretation Comments

 

             Basophils (%) (Auto) (test code = 706-2) 0.3          0.0-1.0      

              





[facility] CenterIM GRANULOCYTES %2018 06:45:00* 



             Test Item    Value        Reference Range Interpretation Comments

 

             IM GRANULOCYTES % (test code = IM GRANULOCYTES %) 0.4          0.0-

1.0                    





[facility] CenterNeutrophils # (Auto)2018 06:45:00* 



             Test Item    Value        Reference Range Interpretation Comments

 

             Neutrophils # (Auto) (test code = 751-8) 4.3          2.1-6.9      

              





[facility] CenterLymphocytes # (Auto)2018 06:45:00* 



             Test Item    Value        Reference Range Interpretation Comments

 

             Lymphocytes # (Auto) (test code = 60424-2) 2.0          1.0-3.2    

                





[facility] CenterMonocytes # (Auto)2018 06:45:00* 



             Test Item    Value        Reference Range Interpretation Comments

 

             Monocytes # (Auto) (test code = 742-7) 0.5          0.2-0.8        

            





[facility] CenterEosinophils # (Auto)2018 06:45:00* 



             Test Item    Value        Reference Range Interpretation Comments

 

             Eosinophils # (Auto) (test code = 711-2) 0.1          0.0-0.4      

              





[facility] CenterBasophils # (Auto)2018 06:45:00* 



             Test Item    Value        Reference Range Interpretation Comments

 

             Basophils # (Auto) (test code = 704-7) 0.0          0.0-0.1        

            





[facility] CenterAbsolute Immature Granulocyte (auto
2018 06:45:00* 



             Test Item    Value        Reference Range Interpretation Comments

 

                                        Absolute Immature Granulocyte (auto (lloyd

t code = Absolute Immature Granulocyte 

(auto)          0.03            0-0.1                            





[facility] CenterTotal Fkmolrxvo9648-77-36 06:49:00* 



             Test Item    Value        Reference Range Interpretation Comments

 

             Total Bilirubin (test code = 1975-2) 0.6          0.2-1.2          

          





[facility] CenterAspartate Amino Transf (AST/SGOT)
2018 06:49:00* 



             Test Item    Value        Reference Range Interpretation Comments

 

                                        Aspartate Amino Transf (AST/SGOT) (test 

code = Aspartate Amino Transf 

(AST/SGOT))     18              5-34                             





[facility] CenterAlanine Aminotransferase (ALT/SGPT)
2018 06:49:00* 



             Test Item    Value        Reference Range Interpretation Comments

 

             Alanine Aminotransferase (ALT/SGPT) (test code = 1742-6) 28        

   0-55                       





[facility] CenterTotal Lbfvyff9713-15-80 06:49:00* 



             Test Item    Value        Reference Range Interpretation Comments

 

             Total Protein (test code = 2885-2) 6.9          6.5-8.1            

        





[facility] CenterAlbumin2018-02-01 06:49:00* 



             Test Item    Value        Reference Range Interpretation Comments

 

             Albumin (test code = 1751-7) 2.8          3.5-5.0      L           

  





[facility] CenterGlobulin2018-02-01 06:49:00* 



             Test Item    Value        Reference Range Interpretation Comments

 

             Globulin (test code = 80354-7) 4.1          2.3-3.5      H         

    





[facility] CenterAlbumin/Globulin Ztenn6014-30-53 06:49:00
  * 



             Test Item    Value        Reference Range Interpretation Comments

 

             Albumin/Globulin Ratio (test code = 1759-0) 0.7          0.8-2.0   

   L             





[facility] CenterAlkaline Nmsytwujiea2672-10-54 06:49:00* 



             Test Item    Value        Reference Range Interpretation Comments

 

             Alkaline Phosphatase (test code = 6768-6) 82                 

               





[facility] CenterUrine QML3554-84-72 04:16:00* 



             Test Item    Value        Reference Range Interpretation Comments

 

             Urine WBC (test code = 5821-4) 50-          0-5          H         

    





[facility] CenterUrine AYO2135-56-45 04:16:00* 



             Test Item    Value        Reference Range Interpretation Comments

 

             Urine RBC (test code = 08331-5) 50-          0-5          H        

     





[facility] CenterUrine Bgvftrrk7545-98-80 04:16:00* 



             Test Item    Value        Reference Range Interpretation Comments

 

             Urine Bacteria (test code = 88102-7) MODERATE     NONE         H   

          





[facility] CenterUrine Epithelial Hkwdt5904-47-53 04:16:00
  * 



             Test Item    Value        Reference Range Interpretation Comments

 

             Urine Epithelial Cells (test code = 60148-3) RARE         NONE     

                  





[facility] CenterUrine Lzmew3148-22-59 04:08:00* 



             Test Item    Value        Reference Range Interpretation Comments

 

             Urine Color (test code = 5778-6) YELLOW       YELLOW               

      





[facility] CenterUrine Rfvtzjo9186-64-47 04:08:00* 



             Test Item    Value        Reference Range Interpretation Comments

 

             Urine Clarity (test code = 64971-5) CLOUDY       CLEAR        H    

         





[facility] CenterUrine Specific Qioejst2829-85-24 04:08:00
  * 



             Test Item    Value        Reference Range Interpretation Comments

 

             Urine Specific Gravity (test code = 5811-5) 1.010        1.010-1.02

5                





[facility] CenterUrine gH0906-89-80 04:08:00* 



             Test Item    Value        Reference Range Interpretation Comments

 

             Urine pH (test code = 25876-6) 6            5-7                    

    





[facility] CenterUrine Leukocyte Tijlrfzy7697-34-07 
04:08:00* 



             Test Item    Value        Reference Range Interpretation Comments

 

             Urine Leukocyte Esterase (test code = 5799-2) 2+           NEGATIVE

     H             





[facility] Bamhlhf3232-52-21 04:08:00* 



             Test Item    Value        Reference Range Interpretation Comments

 

             Urine Nitrite (test code = 02182-3) NEGATIVE     NEGATIVE          

         





[facility] Mnxerbp8108-38-00 04:08:00* 



             Test Item    Value        Reference Range Interpretation Comments

 

             Urine Protein (test code = 5804-0) 2+           NEGATIVE     H     

        





[facility] Glucose (UA)2018 04:08:00* 



             Test Item    Value        Reference Range Interpretation Comments

 

             Urine Glucose (UA) (test code = 2349-9) NEGATIVE     NEGATIVE      

             





[facility] Cmwpiym0484-36-01 04:08:00* 



             Test Item    Value        Reference Range Interpretation Comments

 

             Urine Ketones (test code = 31142-8) NEGATIVE     NEGATIVE          

         





[facility] CenterUrine Dtviernphfip3549-10-46 04:08:00* 



             Test Item    Value        Reference Range Interpretation Comments

 

             Urine Urobilinogen (test code = 35087-4) 0.2          0.2-1        

              





[facility] CenterUrine Tgiizbbxj8432-12-24 04:08:00* 



             Test Item    Value        Reference Range Interpretation Comments

 

             Urine Bilirubin (test code = 1978-6) NEGATIVE     NEGATIVE         

          





[facility] Lottl7020-78-58 04:08:00* 



             Test Item    Value        Reference Range Interpretation Comments

 

             Urine Blood (test code = 54516-6) 4+           NEGATIVE     H      

       





[facility] Iflcl5284-29-34 02:24:00* 



             Test Item    Value        Reference Range Interpretation Comments

 

             Magnesium Level (test code = 22516-0) 1.8          1.3-2.1         

           





[facility] Swweu5798-58-16 02:24:00* 



             Test Item    Value        Reference Range Interpretation Comments

 

             Magnesium Level (test code = 90324-1) 1.8          1.3-2.1         

           





[facility]2018-01-31 02:24:00* 



             Test Item    Value        Reference Range Interpretation Comments

 

             Magnesium Level (test code = 94275-4) 1.8          1.3-2.1         

           





[facility]2018-01-31 02:24:00* 



             Test Item    Value        Reference Range Interpretation Comments

 

             Magnesium Level (test code = 39622-9) 1.8          1.3-2.1         

           





[facility] Rscax3596-31-70 02:24:00* 



             Test Item    Value        Reference Range Interpretation Comments

 

             Magnesium Level (test code = 75411-3) 1.8          1.3-2.1         

           





[facility] Ffzxb5315-65-40 02:24:00* 



             Test Item    Value        Reference Range Interpretation Comments

 

             Magnesium Level (test code = 38009-9) 1.8          1.3-2.1         

           





[facility] Cfgnp7506-34-04 02:24:00* 



             Test Item    Value        Reference Range Interpretation Comments

 

             Magnesium Level (test code = 97790-2) 1.8          1.3-2.1         

           





[facility] CenterUrine Calcium Oxalate Hnmrlpnq4297-05-56 
01:52:00* 



             Test Item    Value        Reference Range Interpretation Comments

 

             Urine Calcium Oxalate Crystals (test code = 5774-5) RARE         FE

W                        





[facility] CenterUrine Amorphous Pcmtvtpi4523-01-63 
01:52:00* 



             Test Item    Value        Reference Range Interpretation Comments

 

             Urine Amorphous Sediment (test code = 8246-1) FEW          FEW     

                   





[facility] CenterUrine Calcium Oxalate Wuncolgl9850-20-70 
01:52:00* 



             Test Item    Value        Reference Range Interpretation Comments

 

             Urine Calcium Oxalate Crystals (test code = 5774-5) RARE         FE

W                        





[facility] CenterUrine Amorphous Tkyrfyho1794-76-10 
01:52:00* 



             Test Item    Value        Reference Range Interpretation Comments

 

             Urine Amorphous Sediment (test code = 8246-1) FEW          [facility] Psjxdgcp7577-01-28 
01:52:00* 



             Test Item    Value        Reference Range Interpretation Comments

 

             Urine Calcium Oxalate Crystals (test code = 5774-5) RARE         FE

W                        





[facility] CenterU[facility] Amorphous Irreemct1367-35-90 
01:52:00* 



             Test Item    Value        Reference Range Interpretation Comments

 

             Urine Amorphous Sediment (test code = 8246-1) FEW          [facility] Smyhahxw6466-03-50 
01:52:00* 



             Test Item    Value        Reference Range Interpretation Comments

 

             Urine Calcium Oxalate Crystals (test code = 5774-5) RARE         FE

W                        





[facility] CenterU[facility] Amorphous Krxpvczp9961-45-65 
01:52:00* 



             Test Item    Value        Reference Range Interpretation Comments

 

             Urine Amorphous Sediment (test code = 8246-1) FEW          [facility] Gitzpsqc0007-38-03 
01:52:00* 



             Test Item    Value        Reference Range Interpretation Comments

 

             Urine Calcium Oxalate Crystals (test code = 5774-5) RARE         FE

W                        





[facility] CenterU[facility] Calcium Oxalate Mgpemvra7283-21-82 
01:52:00* 



             Test Item    Value        Reference Range Interpretation Comments

 

             Urine Calcium Oxalate Crystals (test code = 5774-5) RARE         FE

W                        





[facility] CenterU[facility] Calcium Oxalate Yyncohck1502-77-74 
01:52:00* 



             Test Item    Value        Reference Range Interpretation Comments

 

             Urine Calcium Oxalate Crystals (test code = 5774-5) RARE         FE

W                        





[facility] CenterBlood Lbsnunv1328-83-21 17:29:00* 



             Test Item    Value        Reference Range Interpretation Comments

 

             Blood Culture (test code = 01043416) NO GROWTH AFTER 5 DAYS, FINAL 

REPORT                            





[facility] Fmvsklo5330-38-74 17:29:00* 



             Test Item    Value        Reference Range Interpretation Comments

 

             Blood Culture (test code = 99968545) NO GROWTH AFTER 5 DAYS, FINAL 

REPORT                            





[facility] CenterBlood Mrjplsf6627-17-88 17:29:00* 



             Test Item    Value        Reference Range Interpretation Comments

 

             Blood Culture (test code = 61310146) NO GROWTH AFTER 5 DAYS, FINAL 

REPORT                            





[facility] Rppvzqs9158-07-64 17:29:00* 



             Test Item    Value        Reference Range Interpretation Comments

 

             Blood Culture (test code = 99698250) NO GROWTH AFTER 5 DAYS, FINAL 

REPORT                            





[facility] Tfmmkbh2498-82-19 17:29:00* 



             Test Item    Value        Reference Range Interpretation Comments

 

             Blood Culture (test code = 27772819) NO GROWTH AFTER 5 DAYS, FINAL 

REPORT                            





[facility] Cixcwal2122-70-64 17:29:00* 



             Test Item    Value        Reference Range Interpretation Comments

 

             Blood Culture (test code = 04158986) NO GROWTH AFTER 5 DAYS, FINAL 

REPORT                            





[facility] Zyprakj9395-84-80 20:10:00* 



             Test Item    Value        Reference Range Interpretation Comments

 

             Bedside Glucose (test code = 75518-4) 216                 H  

           





Meter ID: [id number] Regional Medical Center Glucose
2017 20:10:00* 



             Test Item    Value        Reference Range Interpretation Comments

 

             Bedside Glucose (test code = 71963-5) 216                 H  

           





Meter ID: [id number] Regional Medical Center Glucose
2017 20:10:00* 



             Test Item    Value        Reference Range Interpretation Comments

 

             Bedside Glucose (test code = 23866-9) 216                 H  

           





Meter ID: [id number] Regional Medical Center Glucose
2017 20:10:00* 



             Test Item    Value        Reference Range Interpretation Comments

 

             Bedside Glucose (test code = 47282-3) 216                 H  

           





Meter ID: [id number] Regional Medical Center Glucose
2017 20:10:00* 



             Test Item    Value        Reference Range Interpretation Comments

 

             Bedside Glucose (test code = 38028-0) 216                 H  

           





Meter ID: [id number] Regional Medical Center Glucose
2017 20:10:00* 



             Test Item    Value        Reference Range Interpretation Comments

 

             Bedside Glucose (test code = 26171-5) 216                 H  

           





Meter ID: [id number] [facility] Chorionic 
Gonadotropin, Mwkar7323-50-91 08:13:00* 



             Test Item    Value        Reference Range Interpretation Comments

 

             Human Chorionic Gonadotropin, Quant (test code = 40921-9) 1.95     

    0-10                       





[facility] Chorionic Gonadotropin, Quant
2017 08:13:00* 



             Test Item    Value        Reference Range Interpretation Comments

 

             Human Chorionic Gonadotropin, Quant (test code = 20850-9) 1.95     

    0-10                       





[facility] Chorionic Gonadotropin, Quant
2017 08:13:00* 



             Test Item    Value        Reference Range Interpretation Comments

 

             Human Chorionic Gonadotropin, Quant (test code = 37548-7) 1.95     

    0-10                       





[facility] Chorionic Gonadotropin, Quant
2017 08:13:00* 



             Test Item    Value        Reference Range Interpretation Comments

 

             Human Chorionic Gonadotropin, Quant (test code = 49828-0) 1.95     

    0-10                       





[facility] Chorionic Gonadotropin, Quant
2017 08:13:00* 



             Test Item    Value        Reference Range Interpretation Comments

 

             Human Chorionic Gonadotropin, Quant (test code = 29007-6) 1.95     

    0-10                       





[facility] Chorionic Gonadotropin, Quant
2017 08:13:00* 



             Test Item    Value        Reference Range Interpretation Comments

 

             Human Chorionic Gonadotropin, Quant (test code = 73928-3) 1.95     

    0-10                       





[facility] CenterProthrombin Lzxq3462-64-51 10:04:00* 



             Test Item    Value        Reference Range Interpretation Comments

 

             Prothrombin Time (test code = 5902-2) 12.8         11.9-14.5       

           





[facility] Center[facility] International Ratio
2017 10:04:00* 



             Test Item    Value        Reference Range Interpretation Comments

 

             Prothromb Time International Ratio (test code = 6301-6) 0.92       

                             





Oral Anticoagulant Therapy INR Values:1. Low Intensity Therapy        1.5 - 2.02
. Moderate Intensity Therapy   2.0 - 3.03. High Intensity Therapy(1)    2.5 - 3.
54. High Intensity Therapy(2)    3.0 - 4.05. Panic Value INR              > 5.0
[facility] Center[facility] [id number] 10:04:00* 



             Test Item    Value        Reference Range Interpretation Comments

 

             Prothrombin Time (test code = 5902-2) 12.8         11.9-14.5       

           





[facility] Center[facility] International Ratio
2017 10:04:00* 



             Test Item    Value        Reference Range Interpretation Comments

 

             Prothromb Time International Ratio (test code = 6301-6) 0.92       

                             





Oral Anticoagulant Therapy INR Values:1. Low Intensity Therapy        1.5 - 2.02
. Moderate Intensity Therapy   2.0 - 3.03. High Intensity Therapy(1)    2.5 - 3.
54. High Intensity Therapy(2)    3.0 - 4.05. Panic Value INR              > 5.0
[facility] Ztos4585-59-36 10:04:00* 



             Test Item    Value        Reference Range Interpretation Comments

 

             Prothrombin Time (test code = 5902-2) 12.8         11.9-14.5       

           





[facility] Center[facility] International Ratio
2017 10:04:00* 



             Test Item    Value        Reference Range Interpretation Comments

 

             Prothromb Time International Ratio (test code = 6301-6) 0.92       

                             





Oral Anticoagulant Therapy INR Values:1. Low Intensity Therapy        1.5 - 2.02
. Moderate Intensity Therapy   2.0 - 3.03. High Intensity Therapy(1)    2.5 - 3.
54. High Intensity Therapy(2)    3.0 - 4.05. Panic Value INR              > 5.0
[facility] CenterProthrombin Mccn5590-04-63 10:04:00* 



             Test Item    Value        Reference Range Interpretation Comments

 

             Prothrombin Time (test code = 5902-2) 12.8         11.9-14.5       

           





[facility] Center[facility] International Ratio
2017 10:04:00* 



             Test Item    Value        Reference Range Interpretation Comments

 

             Prothromb Time International Ratio (test code = 6301-6) 0.92       

                             





Oral Anticoagulant Therapy INR Values:1. Low Intensity Therapy        1.5 - 2.02
. Moderate Intensity Therapy   2.0 - 3.03. High Intensity Therapy(1)    2.5 - 3.
54. High Intensity Therapy(2)    3.0 - 4.05. Panic Value INR              > 5.0
[facility] CenterPro[facility] Rvul5570-09-01 10:04:00* 



             Test Item    Value        Reference Range Interpretation Comments

 

             Prothrombin Time (test code = 5902-2) 12.8         11.9-14.5       

           





[facility] Center[facility] International Ratio
2017 10:04:00* 



             Test Item    Value        Reference Range Interpretation Comments

 

             Prothromb Time International Ratio (test code = 6301-6) 0.92       

                             





Oral Anticoagulant Therapy INR Values:1. Low Intensity Therapy        1.5 - 2.02
. Moderate Intensity Therapy   2.0 - 3.03. High Intensity Therapy(1)    2.5 - 3.
54. High Intensity Therapy(2)    3.0 - 4.05. Panic Value INR              > 5.0
[facility] CenterProthrSt[facility] Uwoh8875-03-27 10:04:00* 



             Test Item    Value        Reference Range Interpretation Comments

 

             Prothrombin Time (test code = 5902-2) 12.8         11.9-14.5       

           





[facility] CenterPro[facility] International Ratio
2017 10:04:00* 



             Test Item    Value        Reference Range Interpretation Comments

 

             Prothromb Time International Ratio (test code = 6301-6) 0.92       

                             





Oral Anticoagulant Therapy INR Values:1. Low Intensity Therapy        1.5 - 2.02
. Moderate Intensity Therapy   2.0 - 3.03. High Intensity Therapy(1)    2.5 - 3.
54. High Intensity Therapy(2)    3.0 - 4.05. Panic Value INR              > 5.0
[facility] CenterUrine Xtbzlym8199-48-77 07:41:00* 



             Test Item    Value        Reference Range Interpretation Comments

 

             Urine Culture (test code = 630-4) Organism: KLEBSIELLA PNEUMONIAE-E

L                            





[facility] Center[facility] Cgnbuoz2784-26-48 07:41:00* 



             Test Item    Value        Reference Range Interpretation Comments

 

             Urine Culture (test code = 630-4) Organism: KLEBSIELLA PNEUMONIAE-E

L                            





[facility] Center[facility] Vhhcnen0460-14-23 07:41:00* 



             Test Item    Value        Reference Range Interpretation Comments

 

             Urine Culture (test code = 630-4) Organism: KLEBSIELLA PNEUMONIAE-E

[facility] Gznwqvb3499-06-54 07:41:00* 



             Test Item    Value        Reference Range Interpretation Comments

 

             Urine Culture (test code = 630-4) Organism: KLEBSIELLA PNEUMONIAE-E

[facility] Ikizicn0689-85-24 07:41:00* 



             Test Item    Value        Reference Range Interpretation Comments

 

             Urine Culture (test code = 630-4) Organism: KLEBSIELLA PNEUMONIAE-E

[facility] Renal Epithelial Keqzv0481-78-66 
16:15:00* 



             Test Item    Value        Reference Range Interpretation Comments

 

             Urine Renal Epithelial Cells (test code = 52069-5) [facility]          NON

E         H             





[facility] Renal Epithelial Lsqae0703-60-15 
16:15:00* 



             Test Item    Value        Reference Range Interpretation Comments

 

             Urine Renal Epithelial Cells (test code = 26804-0) [facility]          NON

E         H             





[facility] Renal Epithelial Nqaln1339-89-49 
16:15:00* 



             Test Item    Value        Reference Range Interpretation Comments

 

             Urine Renal Epithelial Cells (test code = 71503-6) [facility]          NON

E         H             





[facility] Renal Epithelial Mkmkg5567-55-29 
16:15:00* 



             Test Item    Value        Reference Range Interpretation Comments

 

             Urine Renal Epithelial Cells (test code = 90003-6) [facility]          NON

E         H             





[facility] Renal Epithelial Iwttc6559-81-65 
16:15:00* 



             Test Item    Value        Reference Range Interpretation Comments

 

             Urine Renal Epithelial Cells (test code = 60285-0) [facility]          NON

E         H             





[facility] Renal Epithelial Tbfuq2121-09-48 
16:15:00* 



             Test Item    Value        Reference Range Interpretation Comments

 

             Urine Renal Epithelial Cells (test code = 65598-3) [facility]          NON

E         H             





[facility] Ynqyabw2244-56-29 06:03:00* 



             Test Item    Value        Reference Range Interpretation Comments

 

             Urine Culture (test code = 630-4) Organism: CANDIDA ALBICANS       

                     





[facility] CenterPhosph[facility] Grxoi5461-76-23 07:09:00* 



             Test Item    Value        Reference Range Interpretation Comments

 

             Phosphorus Level (test code = YPS3800) 3.2          2.3-4.7        

            





[facility] Ompqr5495-35-38 07:09:00* 



             Test Item    Value        Reference Range Interpretation Comments

 

             Phosphorus Level (test code = JGS5172) 3.2          2.3-4.7        

            





[facility] CenterPhosph[facility] Adkzw7894-33-79 07:09:00* 



             Test Item    Value        Reference Range Interpretation Comments

 

             Phosphorus Level (test code = HPD8168) 3.2          2.3-4.7        

            





[facility] CenterPhosph[facility] Teizk0944-15-24 07:09:00* 



             Test Item    Value        Reference Range Interpretation Comments

 

             Phosphorus Level (test code = WMY1665) 3.2          2.3-4.7        

            





[facility] CenterPhosph[facility] Tlvzl9614-40-66 07:09:00* 



             Test Item    Value        Reference Range Interpretation Comments

 

             Phosphorus Level (test code = DEB3033) 3.2          2.3-4.7        

            





[facility] CenterActivated Partial Thromboplast Time
2017 01:54:00* 



             Test Item    Value        Reference Range Interpretation Comments

 

             Activated Partial Thromboplast Time (test code = 06305-7) 30.6     

    23.8-35.5                  





[facility] CenterAct[facility] Partial Thromboplast Time
2017 01:54:00* 



             Test Item    Value        Reference Range Interpretation Comments

 

             Activated Partial Thromboplast Time (test code = 50101-3) 30.6     

    23.8-35.5                  





[facility] CenterAct[facility] Partial Thromboplast Time
2017 01:54:00* 



             Test Item    Value        Reference Range Interpretation Comments

 

             Activated Partial Thromboplast Time (test code = 59481-0) 30.6     

    23.8-35.5                  





[facility] CenterAct[facility] Partial Thromboplast Time
2017 01:54:00* 



             Test Item    Value        Reference Range Interpretation Comments

 

             Activated Partial Thromboplast Time (test code = 61680-4) 30.6     

    23.8-35.5                  





[facility] CenterAct[facility] Partial Thromboplast Time
2017 01:54:00* 



             Test Item    Value        Reference Range Interpretation Comments

 

             Activated Partial Thromboplast Time (test code = 00284-6) 30.6     

    23.8-35.5                  





[facility] CenterVitamin B12 Bgogz9596-22-46 14:56:00* 



             Test Item    Value        Reference Range Interpretation Comments

 

             Vitamin B12 Level (test code = 99536-5) 254          213-816       

             





[facility] CenterFolate2017-09-[facility] 14:56:00* 



             Test Item    Value        Reference Range Interpretation Comments

 

             Folate (test code = 2284-8) 14.2         7.0-15.4                  

 





[facility] CenterVitamin B12 Zhxuz8892-44-09 14:56:00* 



             Test Item    Value        Reference Range Interpretation Comments

 

             Vitamin B12 Level (test code = 05264-8) 254          213-816       

             





[facility] CenterFolate2017-09-[facility] 14:56:00* 



             Test Item    Value        Reference Range Interpretation Comments

 

             Folate (test code = 2284-8) 14.2         7.0-15.4                  

 





[facility] CenterVitamin B12 Htvui2561-37-61 14:56:00* 



             Test Item    Value        Reference Range Interpretation Comments

 

             Vitamin B12 Level (test code = 22498-6) 254          213-816       

             





[facility] CenterF[facility]2017-09-[facility] 14:56:00* 



             Test Item    Value        Reference Range Interpretation Comments

 

             Folate (test code = 2284-8) 14.2         7.0-15.4                  

 





[facility] CenterVitamin B12 Nfrug2160-86-00 14:56:00* 



             Test Item    Value        Reference Range Interpretation Comments

 

             Vitamin B12 Level (test code = 67092-9) 254          213-816       

             





[facility] CenterF[facility]2017-09-[facility] 14:56:00* 



             Test Item    Value        Reference Range Interpretation Comments

 

             Folate (test code = 2284-8) 14.2         7.0-15.4                  

 





[facility] CenterVitamin B12 Aoylv8693-16-82 14:56:00* 



             Test Item    Value        Reference Range Interpretation Comments

 

             Vitamin B12 Level (test code = 43266-3) 254          213-816       

             





[facility] CenterFolate2017-09-[facility] 14:56:00* 



             Test Item    Value        Reference Range Interpretation Comments

 

             Folate (test code = 2284-8) 14.2         7.0-15.4                  

 





[facility] CenterFerritin2017-09-26 13:58:00* 



             Test Item    Value        Reference Range Interpretation Comments

 

             Ferritin (test code = 2276-4) 120.23       4..00             

   





[facility] CenterFerritin2017-09-26 13:58:00* 



             Test Item    Value        Reference Range Interpretation Comments

 

             Ferritin (test code = 2276-4) 120.23       4..00             

   





[facility] CenterFerritin2017-09-26 13:58:00* 



             Test Item    Value        Reference Range Interpretation Comments

 

             Ferritin (test code = 2276-4) 120.23       4..00             

   





[facility] CenterFerritin2017-09-26 13:58:00* 



             Test Item    Value        Reference Range Interpretation Comments

 

             Ferritin (test code = 2276-4) 120.23       4..00             

   





[facility] CenterFerritin2017-09-[facility] 13:58:00* 



             Test Item    Value        Reference Range Interpretation Comments

 

             Ferritin (test code = 2276-4) 120.23       4..00             

   





[facility]2017-09-26 13:31:00* 



             Test Item    Value        Reference Range Interpretation Comments

 

             Iron Level (test code = 2498-4) 18                  L        

     





[facility] CenterTotal Iron Binding Uusxxlfe5216-22-78 
13:31:00* 



             Test Item    Value        Reference Range Interpretation Comments

 

             Total Iron Binding Capacity (test code = 2500-7) 269          261-4

78                    





[facility] CenterPercent Iron Rwzvghzuze0357-45-01 
13:31:00* 



             Test Item    Value        Reference Range Interpretation Comments

 

             Percent Iron Saturation (test code = 2502-3) 7            15-50    

    L             





[facility] CenterTransferrin2017-09-[facility] 13:31:00* 



             Test Item    Value        Reference Range Interpretation Comments

 

             Transferrin (test code = 3034-6) 192          180-382              

      





[facility]2017-09-26 13:31:00* 



             Test Item    Value        Reference Range Interpretation Comments

 

             Iron Level (test code = 2498-4) 18                  L        

     





[facility] CenterTotal Iron Binding Aenkaqky9674-55-08 
13:31:00* 



             Test Item    Value        Reference Range Interpretation Comments

 

             Total Iron Binding Capacity (test code = 2500-7) 269          261-4

78                    





[facility] Iron Bjrqxchmsk9034-95-14 
13:31:00* 



             Test Item    Value        Reference Range Interpretation Comments

 

             Percent Iron Saturation (test code = 2502-3) 7            15-50    

    L             





[facility] CenterTransferrin2017-09-26 13:31:00* 



             Test Item    Value        Reference Range Interpretation Comments

 

             Transferrin (test code = 3034-6) 192          180-382              

      





[facility]2017-09-26 13:31:00* 



             Test Item    Value        Reference Range Interpretation Comments

 

             Iron Level (test code = 2498-4) 18                  L        

     





[facility] Iron Binding Jcwxxpmi8079-35-13 
13:31:00* 



             Test Item    Value        Reference Range Interpretation Comments

 

             Total Iron Binding Capacity (test code = 2500-7) 269          261-4

78                    





[facility] Iron Vubqaaxcrs8637-70-07 
13:31:00* 



             Test Item    Value        Reference Range Interpretation Comments

 

             Percent Iron Saturation (test code = 2502-3) 7            15-50    

    L             





[facility]2017-09-26 13:31:00* 



             Test Item    Value        Reference Range Interpretation Comments

 

             Transferrin (test code = 3034-6) 192          180-382              

      





[facility]2017-09-26 13:31:00* 



             Test Item    Value        Reference Range Interpretation Comments

 

             Iron Level (test code = 2498-4) 18                  L        

     





[facility] Iron Binding Jnmfwbbh7992-79-90 
13:31:00* 



             Test Item    Value        Reference Range Interpretation Comments

 

             Total Iron Binding Capacity (test code = 2500-7) 269          261-4

78                    





[facility] Iron Zvgxgkjevz3868-94-94 
13:31:00* 



             Test Item    Value        Reference Range Interpretation Comments

 

             Percent Iron Saturation (test code = 2502-3) 7            15-50    

    L             





[facility]2017-09-26 13:31:00* 



             Test Item    Value        Reference Range Interpretation Comments

 

             Transferrin (test code = 3034-6) 192          180-382              

      





[facility]2017-09-26 13:31:00* 



             Test Item    Value        Reference Range Interpretation Comments

 

             Iron Level (test code = 2498-4) 18                  L        

     





[facility] CenterTotal Iron Binding Ekcmbriu9640-55-47 
13:31:00* 



             Test Item    Value        Reference Range Interpretation Comments

 

             Total Iron Binding Capacity (test code = 2500-7) 269          261-4

78                    





[facility] CenterPercent Iron Vqogbwtvqt5742-58-06 
13:31:00* 



             Test Item    Value        Reference Range Interpretation Comments

 

             Percent Iron Saturation (test code = 2502-3) 7            15-50    

    L             





[facility] CenterTransferrin2017-09-[facility] 13:31:00* 



             Test Item    Value        Reference Range Interpretation Comments

 

             Transferrin (test code = 3034-6) 192          180-382              

      





[facility] CenterCreatine Kinase [id number] 13:11:00* 



             Test Item    Value        Reference Range Interpretation Comments

 

             Creatine Kinase MB (test code = 61412-0) 0.20         0.00-5.00    

              





[place] 29098017-09-25 13:11:00* 



             Test Item    Value        Reference Range Interpretation Comments

 

             Troponin I (test code = 34138-6) -0.001       0-0.300              

      





[facility] CenterCreatine Kinase [id number] 13:11:00* 



             Test Item    Value        Reference Range Interpretation Comments

 

             Creatine Kinase MB (test code = 08253-6) 0.20         0.00-5.00    

              





[facility] CenterTrAngela Ville [id number] 13:11:00* 



             Test Item    Value        Reference Range Interpretation Comments

 

             Troponin I (test code = MHC2101) -0.001       0-0.300              

      





[facility] CenterCreatine Kinase [id number] 13:11:00* 



             Test Item    Value        Reference Range Interpretation Comments

 

             Creatine Kinase MB (test code = 08966-7) 0.20         0.00-5.00    

              





[place] 29098017-09-25 13:11:00* 



             Test Item    Value        Reference Range Interpretation Comments

 

             Troponin I (test code = HBL0065) -0.001       0-0.300              

      





[facility] CenterCreatine Kinase [id number] 13:11:00* 



             Test Item    Value        Reference Range Interpretation Comments

 

             Creatine Kinase MB (test code = 73375-2) 0.20         0.00-5.00    

              





[place] 29098017-09-25 13:11:00* 



             Test Item    Value        Reference Range Interpretation Comments

 

             Troponin I (test code = TCU2330) -0.001       0-0.300              

      





[facility] CenterCreatine Kinase [id number] 13:11:00* 



             Test Item    Value        Reference Range Interpretation Comments

 

             Creatine Kinase MB (test code = 94824-2) 0.20         0.00-5.00    

              





[place] 29098017-09-25 13:11:00* 



             Test Item    Value        Reference Range Interpretation Comments

 

             Troponin I (test code = XFI4408) -0.001       0-0.300              

      





[facility] CenterCreD[facility] Pzxdhn8665-66-18 12:13:00* 



             Test Item    Value        Reference Range Interpretation Comments

 

             Creatine Kinase (test code = 2157-6) 28                  L   

          





[facility] CenterCreatine Vernei6453-83-70 12:13:00* 



             Test Item    Value        Reference Range Interpretation Comments

 

             Creatine Kinase (test code = 2157-6) 28                  L   

          





[facility] CenterCreatine Mersvv4610-17-58 12:13:00* 



             Test Item    Value        Reference Range Interpretation Comments

 

             Creatine Kinase (test code = 2157-6) 28                  L   

          





[facility] CenterCreatine Baicck9116-76-69 12:13:00* 



             Test Item    Value        Reference Range Interpretation Comments

 

             Creatine Kinase (test code = 2157-6) 28                  L   

          





[facility] CenterCreatine Ozqbcd0676-81-67 12:13:00* 



             Test Item    Value        Reference Range Interpretation Comments

 

             Creatine Kinase (test code = 2157-6) 28                  L   

          





[facility] CenterBacterial urine acxksjt1252-30-27 
15:16:00* 



             Test Item    Value        Reference Range Interpretation Comments

 

             Urine Culture (test code = 630-4) Organism: ESCHERICHIA COLI-ESBL  

                          





[facility] CenterBacterial urine lhdespv1983-39-52 
15:16:00* 



             Test Item    Value        Reference Range Interpretation Comments

 

             Urine Culture (test code = 630-4) Organism: ESCHERICHIA COLI-ESBL  

                          





[facility] CenterBacterial urine xmcrzam8394-46-47 
15:16:00* 



             Test Item    Value        Reference Range Interpretation Comments

 

             Urine Culture (test code = 630-4) Organism: ESCHERICHIA COLI-ESBL  

                          





[facility] CenterUrine Knurlic2500-00-02 16:14:00* 



             Test Item    Value        Reference Range Interpretation Comments

 

             Urine Culture (test code = 630-4) Organism: ESCHERICHIA COLI-ESBL  

                          





[facility] CenterU[facility] Ognfvis9174-54-53 16:14:00* 



             Test Item    Value        Reference Range Interpretation Comments

 

             Urine Culture (test code = 630-4) Organism: ESCHERICHIA COLI-ESBL  

                          





[facility] CenterUrine Rzirarl3969-21-98 16:14:00* 



             Test Item    Value        Reference Range Interpretation Comments

 

             Urine Culture (test code = 630-4) Organism: ESCHERICHIA COLI-ESBL  

                          





[facility] Center[facility]ig[facility] Tkdrt4612-57-06 07:19:00* 



             Test Item    Value        Reference Range Interpretation Comments

 

             Triglycerides Level (test code = 2571-8) 164          0-149        

H             





[facility] Jgtbb4099-59-96 07:19:00* 



             Test Item    Value        Reference Range Interpretation Comments

 

             Cholesterol Level (test code = 2093-3) 187          0-199          

            





Less than 200 mg/dL       Low Mbjh569 - 239 mg/dL           Borderline Imxn910 m
g/dl and greater     High Risk                        [facility] CenterLDL Avbkcesgicm7589-25-94 07:19:00* 



             Test Item    Value        Reference Range Interpretation Comments

 

             LDL Cholesterol (test code = 05026-5) 106                    

           





[facility] CenterHDL Kcilwgsosgj4455-55-72 07:19:00* 



             Test Item    Value        Reference Range Interpretation Comments

 

             HDL Cholesterol (test code = 2085-9) 48           40-60            

          





[facility] Center[facility]/HDL Nkicu1271-86-55 07:19:00
  * 



             Test Item    Value        Reference Range Interpretation Comments

 

             Cholesterol/HDL Ratio (test code = 9830-1) 3.9          3.0-3.6    

  H             





[facility] CenterTriglycerides Vjikd8077-81-72 07:19:00* 



             Test Item    Value        Reference Range Interpretation Comments

 

             Triglycerides Level (test code = 2571-8) 164          0-149        

H             





[facility] Gszqq8037-16-07 07:19:00* 



             Test Item    Value        Reference Range Interpretation Comments

 

             Cholesterol Level (test code = 2093-3) 187          0-199          

            





Less than 200 mg/dL       Low Cgvo592 - 239 mg/dL           Borderline Yjqp462 m
g/dl and greater     High Risk                        [facility] CenterLDL Tsjtofuhhbj2687-63-79 07:19:00* 



             Test Item    Value        Reference Range Interpretation Comments

 

             LDL Cholesterol (test code = 42247-9) 106                    

           





[facility] Evuvlccbokj7497-78-29 07:19:00* 



             Test Item    Value        Reference Range Interpretation Comments

 

             HDL Cholesterol (test code = 2085-9) 48           40-60            

          





[facility]/HDL Qgadz6654-95-20 07:19:00
  * 



             Test Item    Value        Reference Range Interpretation Comments

 

             Cholesterol/HDL Ratio (test code = 9830-1) 3.9          3.0-3.6    

  H             





[facility] CenterTriglycerides Blkal7158-15-65 07:19:00* 



             Test Item    Value        Reference Range Interpretation Comments

 

             Triglycerides Level (test code = 2571-8) 164          0-149        

H             





[facility] CenterCholesterol Sksdy9600-16-07 07:19:00* 



             Test Item    Value        Reference Range Interpretation Comments

 

             Cholesterol Level (test code = 2093-3) 187          0-199          

            





Less than 200 mg/dL       Low Tvjz752 - 239 mg/dL           Borderline Gksa991 m
g/dl and greater     High Risk                        [facility] CenterLDL Tnucgvpsipu9523-12-81 07:19:00* 



             Test Item    Value        Reference Range Interpretation Comments

 

             LDL Cholesterol (test code = 40244-9) 106                    

           





[facility] Ambzoqmhrzi4090-65-57 07:19:00* 



             Test Item    Value        Reference Range Interpretation Comments

 

             HDL Cholesterol (test code = 2085-9) 48           40-60            

          





[facility] CenterCh[facility]/HDL Ijxpd9163-46-49 07:19:00
  * 



             Test Item    Value        Reference Range Interpretation Comments

 

             Cholesterol/HDL Ratio (test code = 9830-1) 3.9          3.0-3.6    

  H             





[facility] CenterHemoglobin A1c Lusjzzl8758-44-16 07:10:00
  * 



             Test Item    Value        Reference Range Interpretation Comments

 

             Hemoglobin A1c Percent (test code = Hemoglobin A1c Percent) 6.5    

      4.0-7.0                    





[facility] CenterHemoglobin A1c Igctplp7539-92-79 07:10:00
  * 



             Test Item    Value        Reference Range Interpretation Comments

 

             Hemoglobin A1c Percent (test code = Hemoglobin A1c Percent) 6.5    

      4.0-7.0                    





[facility] CenterHemoglobin A1c Rinsfzy5861-20-92 07:10:00
  * 



             Test Item    Value        Reference Range Interpretation Comments

 

             Hemoglobin A1c Percent (test code = Hemoglobin A1c Percent) 6.5    

      4.0-7.0                    





[facility] CenterLactic Acid Nylhs5289-66-70 18:48:00* 



             Test Item    Value        Reference Range Interpretation Comments

 

             Lactic Acid Level (test code = Lactic Acid Level) 18.1         4.5-

19.8                   





[facility] CenterLactic Acid Ujmuz6123-40-16 18:48:00* 



             Test Item    Value        Reference Range Interpretation Comments

 

             Lactic Acid Level (test code = Lactic Acid Level) 18.1         4.5-

19.8                   





[facility] CenterLactic Acid Tmfic9398-18-37 18:48:00* 



             Test Item    Value        Reference Range Interpretation Comments

 

             Lactic Acid Level (test code = Lactic Acid Level) 18.1         4.5-

19.8                   





[facility] Uhhxuyrgsgfh0248-55-64 12:17:00* 



             Test Item    Value        Reference Range Interpretation Comments

 

             Stool Calprotectin (test code = 38445-3) -16          0-120        

              





Concentration     Interpretation   Follow-Up<16 - 50 ug/g     Normal           
None>50 -120 ug/g     Borderline       Re-evaluate in 4-6 weeks    >120 ug/g    
Abnormal         Repeat as clinically                                    
indicatedPerformed at:   - [facility] [address]  
[id number] Director: Oscar Patel MD, Phone:  [phone] [facility] Houstontool Wyyaldbkdscf0559-52-34 12:17:00* 



             Test Item    Value        Reference Range Interpretation Comments

 

             Stool Calprotectin (test code = 38445-3) -16          0-120        

              





Concentration     Interpretation   Follow-Up<16 - 50 ug/g     Normal           
None>50 -120 ug/g     Borderline       Re-evaluate in 4-6 weeks    >120 ug/g    
Abnormal         Repeat as clinically                                    
indicatedPerformed at:  [address]  
[id number] Director: Oscar Patel MD, Phone:  [phone] Verde Regional Medical CenterClostridium Difficile Toxin A & -19-92 14:20:00* 



             Test Item    Value        Reference Range Interpretation Comments

 

             Clostridium Difficile Toxin A & B (test code = 378112092) NEGATIVE 

    NEGATIVE                   





Testing on stool aspirate specimens is outside  claims since specime
n type not validated on this assay.[facility]
Clostridium Difficile Toxin A & -89-22 14:20:00* 



             Test Item    Value        Reference Range Interpretation Comments

 

             Clostridium Difficile Toxin A & B (test code = 269664342) NEGATIVE 

    NEGATIVE                   





Testing on stool aspirate specimens is outside  claims since specime
n type not validated on this assay.[facility] 
Lactoferrin (LAB)2017 21:42:00* 



             Test Item    Value        Reference Range Interpretation Comments

 

             Stool Lactoferrin (LAB) (test code = 43632-4) NEGATIVE     NEGATIVE

                   





Testing on stool aspirate specimens is outside  claims since specime
n type not validated on this assay.[facility] 
Lactoferrin (LAB)2017 21:42:00* 



             Test Item    Value        Reference Range Interpretation Comments

 

             Stool Lactoferrin (LAB) (test code = 56127-1) NEGATIVE     NEGATIVE

                   





Testing on stool aspirate specimens is outside  claims since specime
n type not validated on this assay.[facility] CenterLipase
2017-06-15 16:31:00* 



             Test Item    Value        Reference Range Interpretation Comments

 

             Lipase (test code = 3040-3) 23           8-78                      

 





[facility] CenterLipase2017-06-15 16:31:00* 



             Test Item    Value        Reference Range Interpretation Comments

 

             Lipase (test code = 3040-3) 23           8-78                      

 





[facility] CenterUrine Mucus2017-06-15 12:39:00* 



             Test Item    Value        Reference Range Interpretation Comments

 

             Urine Mucus (test code = 8247-9) MODERATE     RARE         H       

      





[facility] CenterUrine Mucus2017-06-15 12:39:00* 



             Test Item    Value        Reference Range Interpretation Comments

 

             Urine Mucus (test code = 8247-9) MODERATE     RARE         H       

      





[facility] CenterURINE AND MHVFF5890-25-87 22:54:00
Negative *NA*(16 4:54 PM)[facility] DIYVM6854-01-04 22:54:00
Moderate *ABN*(16 4:54 PM)[facility] DEFKK1660-26-76 22:54:00
Positive *ABN*(16 4:54 PM)[facility] MRKRS3063-00-80 22:54:00
Large *ABN*(16 4:54 PM)[facility] VAKOY0152-63-44 22:54:00>
182[facility] HRTQN9314-95-38 22:54:89901Okmqcrpa HermannURINE 
AND SYULH6585-35-69 22:54:0034[facility] GCYTO6490-43-15 22:54:00
Yellow *NA*(16 4:54 PM)[facility] CASES9584-97-51 22:54:00
Marked *ABN*(16 4:54 PM)[facility] YHFTU3547-59-68 22:54:00
5.0[facility] MSDJI9988-00-13 22:54:001.018[facility]annCHEM 
DFDGP9315-77-16 22:40:000.6[facility]annCHEM MNXLN1334-90-43 22:40:005.8
[facility]annCHEM CESEF4870-50-22 22:40:0012Mem[facility]annCHEM PANEL
2016 22:40:0012.6[facility]annCHEM WWYUT9636-79-65 22:40:54721Naphwvml 
HermannCHEM KQPZJ3998-70-76 22:40:0028[facility]annCHEM UPBQU4536-25-28 
22:40:000.3[facility]annCHEM LYIEZ8519-91-65 22:40:0073[facility]annCHEM 
SGRNY9255-31-49 22:40:0025[facility]annCHEM LCCFB0042-64-40 22:40:003.4
[facility]annCHEM CIFPW8646-93-33 22:40:009.2[facility]annCHEM PANEL
2016 22:40:009.1[facility]annCHEM CGHZW4124-59-43 22:40:0026[facility] 
HermannCHEM JGYZL8012-28-17 22:40:08996Miohcius HermannCHEM WIDVG4571-19-57 
22:40:41521Ndmuprjs HermannCHEM XIAPY6597-63-95 22:40:004.6[facility]annCHEM 
HWPCC6470-91-04 22:40:000.91[facility]annCHEM RZVUI6233-51-30 22:40:0011
[facility]annCHEM GDKCO2202-73-23 22:40:98312Tnbglsvg HermannENDOCRINOLOGY
2016 22:40:00Negative *NA*(16 4:40 PM)[facility]annHEMATOLOGY
2016 22:40:0032.8[facility] BbulxafNPEUSMKGYK8692-12-29 22:40:0060.0[facility]
YahkmjpJPSSXTXYUJ4643-82-48 22:40:000.2[facility] MbzrtacOTHIQCNGXR5518-34-13 
22:40:000.2[facility] XrdompbUSYBERXTAI3213-92-83 22:40:004.6[facility] Ezequiel
SONIXRAQFG6374-01-12 22:40:001.2[facility] MoljijkGVNCBCKZLL4742-29-67 22:40:001.4
[facility] FwvyexcFBQYUKVEPR8728-73-25 22:40:007.3[facility]annHEMATOLOGY
2016 22:40:000.6[facility] QtzsvbpTLIAVMVZMF8006-85-32 22:40:004.0[facility] 
UitybzlYLGLNDZMLV7640-56-92 22:40:00Clumped (16 4:40 PM)[facility]
BNBZREXZYV9947-60-27 22:40:00Normal (16 4:40 PM)[facility]annHEMATOLOGY
2016 22:40:008.7[facility] SjtsflqIRYQFXWKNE7187-00-68 22:40:0012.0[facility] 
KlyqcckWIJDIIBCJK6468-56-23 22:40:004.90[facility] UjdhxtyEIAATWUJTP1015-13-77 
22:40:0039.0[facility] HdidwubKPAXQJTBHB2823-41-62 22:40:0079.7[facility]
YOTFTYABHQ8069-89-01 22:40:0012.0[facility] XgdbmksJHIHFASTJE6721-33-25 22:40:00
14.8[facility] UxtowtwGTEAGDVLPM2395-78-78 22:40:0030.8[facility]annHEMATOLOGY
2016 22:40:10522Jkgcvzbz JxrghyqHGLZKEAYHC3239-88-19 22:40:00* 



             Test Item    Value        Reference Range Interpretation Comments

 

             MCH (test code = MCH) 24.5 pg      27.0-31.0                  





[facility]    [place] [address]      Patient 
Name: CRISTINA RICH   MR #: [id number]    : 1965 Age/Sex: 52/F  Acct #:
[id number] Req #: [id number]  Adm Physician:     Ordered by: JACQUELINE ARORA MD  
Report #: [id number]   Location: CATH LAB  Room/Bed:     
_________________________________________________________________________
__________________________    Procedure: [id number] IR/SPECIAL PROCEDURE IN CATH 
LAB  Exam Date:                             Exam Time:        REPORT STATUS: Sig
ki    Nephrostomy catheter evaluation and exchange, 2018      Pre-Procedur
e Diagnosis: Cervical cancer status post radiation therapy with   right ureteral
stenosis.   Post-procedure Diagnosis:Cervical cancer status post radiation ther
apy with   right ureteral stenosis      : NIK Patel   Assistant: Non
e   Sedation: None.      Radiation Dose:1.931 cGycm2 (Dose Area Product)   Fluor
oscopy time:0.7 minutes      Estimate blood loss: None Blood administered: None 
 Complications: None   Implants/Grafts: 8-French right nephrostomy   Specimen: 
None         Procedure:   Informed consent was obtained and the patient position
ed prone in the   fluoroscopy suite. A timeout was performed. The indwelling rig
ht nephrostomy   was prepped and draped in standard fashion.      Diagnostic ant
egrade nephrostogram was performed nephrostomy. See findings   below.      The i
ndwelling catheter was severed and removed over a Glidewire given heavy   encrus
tation. Glidewire was exchanged for a KissMyAdsson wire with a short KMP   catheter. 
A new 10-French nephrostomy was coiled in the renal pelvis without   complicatio
n. Contrast injection confirmed placement within the renal pelvis.      At the e
nd of the procedure the catheter was connected to gravity drainage and   a steri
le dressing applied. The patient tolerated the procedure well and   without imme
diate complication.      Findings:   Antegrade nephrostogram: Patent catheter. N
o hydronephrosis. Mildly prominent   pelvis. Diffuse right ureteral stenosis wit
h distal occlusion. The catheter was   heavily encrusted with debris.      Impre
ssion:   Successful right 10-French nephrostomy exchange with antegrade nephrost
ogram.         Recommendations:   Routine catheter exchange in 2 months given st
ent encrustation.            This report was generated with voice-recognition te
[facility] Hospital[facility] Corporation. Errors in   transcription can occur. Please interpret accordingly and 
contact a radiologist   if there are any questions regarding the report.      Si
gned by: Dr. Best Patel M.D. on 2018 11:55 AM        Dictated By: BEST PATEL MD  Electronically Signed By: BEST PATEL MD on 18 1522  Tra
nscribed By: LUCINDA on 18 1522       COPY TO:   JACQUELINE ARORA MD        CT 
ABDOMEN/PELVIS WO    [facility]   [address] [address]      Patient Name: CRISTINA RICH   MR #: 
[id number]    : 1965 Age/Sex: 52/F  Acct #: [id number] Req #: [id number]
[id number]  Adm Physician:     Ordered by: TINO MALHOTRA NP  Report #: [id number]
  Location: ER  Room/Bed:     
__________________________________________________________________________
_________________________    Procedure: [id number] CT/CT ABDOMEN/PELVIS WO  Exam 
Date: 18                            Exam Time: 1500       REPORT STATUS: S
igned    EXAM: CT Abdomen and Pelvis WITHOUT contrast     INDICATION: Abdominal 
pain, concerning for renal stone   COMPARISON: CT abdomen and pelvis from 
018 fluoroscopy for nephrostomy   tube change 2018   TECHNIQUE: Abdomen and
pelvis were scanned utilizing a multidetector helical   scanner from the lung b
ase to the pubic symphysis without administration of IV   contrast. Absence of i
ntravenous contrast decreases sensitivity for detection   of focal lesions and v
ascular pathology. Coronal and sagittal reformations were   obtained. Renal ston
e protocol was performed.                IV CONTRAST: None.               ORAL C
ONTRAST: Water               RADIATION DOSE: Total DLP: 550.3 mGy*cm            
   Estimated effective dose: (DLP x 0.015 x size factor) mSv               COMP
LICATIONS: None      FINDINGS:      LINES and TUBES: Right percutaneous nephrost
angelina tube.      LOWER THORAX:  2 mm right lower lobe calcified granuloma.      HE
PATOBILIARY:      No focal hepatic lesions. No biliary ductal dilation.       GA
LLBLADDER: Unchanged peripherally calcified 2 cm gallstone and a few   additiona
l 2 to 3 mm gallstones.  No wall thickening.      SPLEEN: No splenomegaly.      
PANCREAS: No focal masses or ductal dilatation.        ADRENALS: No adrenal nod
ules          KIDNEYS/URETERS:  Interval resolution of the right hydronephrosis 
after   percutaneous nephrostomy tube exchange. Few other foci in the right kidn
ey   likely due to procedure. Previously noted right perinephric fat stranding h
aved   resolved. No cystic or solid mass lesions.  Multiple up to 5 mm left abeba
l   stones remain unchanged. Minimal dilatation of the renal pelves, otherwise, 
no   left hydronephrosis. Unchanged mild left hydroureter.      GI TRACT: No abn
ormal distention, wall thickening, or evidence of bowel   obstruction.       Kelly
endix is normal.      REPRODUCTIVE ORGANS: The uterus is small. No adnexal michael
s. Likely radiation   seeds in the cervix.      BLADDER: The bladder is small an
d irregular, possibly due to postradiation   changes.      LYMPH NODES: No lymph
adenopathy.      VESSELS: Unremarkable.      PERITONEUM / RETROPERITONEUM: No fr
ee air or fluid.      BONES: Unremarkable.      SOFT TISSUES: Unremarkable.     
            IMPRESSION:    1.  Interval resolution of the right hydronephrosis 
after but without new   nephrostomy tube exchange.      2.  Stable left nephroli
thiasis.      3.  No acute abnormalities in the abdomen and pelvis.      Signed 
by: Dr. Georgiana Purcell M.D. on 3/4/2018 4:03 PM        Dictated By: SHAYY PURCELL MD  Electronically Signed By: GEORGIANA quintero 18 1603  Transcribed By: LUCINDA on 18 1603       COPY TO:   TINO MALHOTRA NP        SPECIAL PROCEDURE IN CATH LAB    [place] [address]      Patient 
Name: CRISTINA RICH   MR #: [id number]    : 1965 Age/Sex: 52/F  Acct #:
[id number] Req #: [id number]  Adm Physician: ROYER DOVER MD    Ordered by: 
JACQUELINE ARORA MD  Report #: [id number]   Location: MED/SURG2  Room/Bed: [facility]    
__________________________________________________
_________________________________________________    Procedure: [id number] IR/SPE
CIAL PROCEDURE IN CATH LAB  Exam Date:                             Exam Time:   
    REPORT STATUS: Signed    Nephrostomy catheter evaluation and subsequent exc
daniele, 2018      Pre-Procedure Diagnosis: Cervical cancer status post radia
tion therapy with   right ureteral stenosis.   Post-procedure Diagnosis:Cervical
cancer status post radiation therapy with   right ureteral stenosis      Operat
or: NIK Patel   Assistant: None   Sedation: Moderate sedation was provided w
ith intravenous fentanyl and Versed.    Heart rate, rhythm and oxygen saturation
were monitored and real-time by a   dedicated interventional radiology nurse un
donn my direct supervision.  Blood   pressure was monitored in 5 minute increment
s.  1% lidocaine was used for local   anesthesia. Total sedation time: 30 minute
s.      Radiation Dose <1: cGycm2 (Dose Area Product)   Fluoroscopy time:1.2 
minutes      Estimate blood loss: None Blood administered: None   Complications:
None   Implants/Grafts: 10-French right nephrostomy   Specimen: None         
Procedure:   Informed consent was obtained and the patient positioned prone in 
the   fluoroscopy suite. A timeout was performed. The indwelling right 
nephrostomy   was prepped and draped in standard fashion.      Diagnostic 
antegrade nephrostogram was performed given the long indwelling time   of the 
previously placed for ostomy. See findings below.      Attempts to remove the 
indwelling 8-French nephrostomy over a Bentson and   Glidewire were unsuccessful
due to heavy encrustation. The catheter was   subsequently severed and removed 
through an 11-French peel-away sheath. A   Bentson wire was advanced through the
peel-away sheath and exchanged for a   10-French nephrostomy. Contrast injection
confirmed placement within the renal   pelvis.      At the end of the procedure 
the catheter was secured to the skin, connected to   gravity drainage and a 
sterile dressing applied. The patient tolerated the   procedure well and without
immediate complication.      Findings:   Antegrade nephrostogram demonstrated 
mild hydronephrosis of the right   collecting system and a patent, but heavily 
encrusted catheter requiring   removal via peel away sheath. The distal ureter 
is narrowed consistent with   stricture. Aggressive nephrostogram was not 
performed given superimposed   infection.      Impression:   Successful right 
nephrostomy  exchange.   Distal right ureteral stricture      Recommendations:  
Routine catheter exchange in 2 months given heavy encrustation.            This 
report was generated with voice-recognition technology. Errors in   
transcription can occur. Please interpret accordingly and contact a radiologist 
 if there are any questions regarding the report.      Signed by: Dr. Best Patel M.D. on 2018 2:37 PM        Dictated By: BEST PATEL MD  E
lectronically Signed By: BEST PATEL MD on 18  Transcribed By: COM
PEMBERTON on 18       COPY TO:   JACQUELINE ARORA MD        IR CONSULT    [place] [address]      Patient Name: CRISTINA RICH   MR #: [id number]    :  Age/Sex: 52/F  Acct #: [id number] Req #: [id number]  [facility] Physician: ROYER LAW MD    Ordered by: JENNIFER TERRELL MD  Report #: [id number]   Locatio
n: MED/SURG2  Room/Bed: [facility]    _______________________________________________
____________________________________________________    Procedure: [id number] DX/
IR CONSULT  Exam Date:                             Exam Time:        REPORT STAT
US: Signed    Nephrostomy catheter evaluation and subsequent exchange, 2018
     Pre-Procedure Diagnosis: Cervical cancer status post radiation therapy with
  right ureteral stenosis.   Post-procedure Diagnosis:Cervical cancer status p
ost radiation therapy with   right ureteral stenosis      : OLIVIA Patel   Assistant: None   Sedation: Moderate sedation was provided with intravenous 
fentanyl and Versed.    Heart rate, rhythm and oxygen saturation were monitored 
and real-time by a   dedicated interventional radiology nurse under my direct spangler
pervision.  Blood   pressure was monitored in 5 minute increments.  1% lidocaine
was used for local   anesthesia. Total sedation time: 30 minutes.      Radiation
Dose <1: cGycm2 (Dose Area Product)   Fluoroscopy time:1.2 minutes      Estimate
blood loss: None Blood administered: None   Complications: None   
Implants/Grafts: 10-French right nephrostomy   Specimen: None         Procedure:
  Informed consent was obtained and the patient positioned prone in the   
fluoroscopy suite. A timeout was performed. The indwelling right nephrostomy   
was prepped and draped in standard fashion.      Diagnostic antegrade 
nephrostogram was performed given the long indwelling time   of the previously 
placed for ostomy. See findings below.      Attempts to remove the indwelling 8-
French nephrostomy over a Bentson and   Glidewire were unsuccessful due to heavy
encrustation. The catheter was   subsequently severed and removed through an 11-
French peel-away sheath. A   Bentson wire was advanced through the peel-away 
sheath and exchanged for a   10-French nephrostomy. Contrast injection confirmed
placement within the renal   pelvis.      At the end of the procedure the 
catheter was secured to the skin, connected to   gravity drainage and a sterile 
dressing applied. The patient tolerated the   procedure well and without 
immediate complication.      Findings:   Antegrade nephrostogram demonstrated 
mild hydronephrosis of the right   collecting system and a patent, but heavily 
encrusted catheter requiring   removal via peel away sheath. The distal ureter 
is narrowed consistent with   stricture. Aggressive nephrostogram was not 
performed given superimposed   infection.      Impression:   Successful right 
nephrostomy  exchange.   Distal right ureteral stricture      Recommendations:  
Routine catheter exchange in 2 months given heavy encrustation.            This 
report was generated with voice-recognition technology. Errors in   
transcription can occur. Please interpret accordingly and contact a radiologist 
 if there are any questions regarding the report.      Signed by: Dr. Best Patel M.D. on 2018 2:37 PM        Dictated By: BEST PATEL MD  E
lectronically Signed By: BEST PATEL MD on 18  Transcribed By: CHARI PEMBERTON on 18       COPY TO:   JENNIFER TERRELL MD        CT ABDOMEN/PELVIS
[place] [address]      Patient Name: CRISTINA RICH   MR #: [id number]    
: 1965 Age/Sex: 52/F  Acct #: [id number] Req #: [id number]  Adm 
Physician:     Ordered by: JENNIFER TERRELL MD  Report #: [id number]   Location: ER
 Room/Bed:     
____________________________________________________________________________
_______________________    Procedure: [id number] CT/CT ABDOMEN/PELVIS WO  Exam Da
te: 18                            Exam Time: 005       REPORT STATUS: Sig
ki    EXAM: CT ABDOMEN AND PELVIS without IV CONTRAST   ORDER DATE: 2018 1
2:14 AM  Time stamp on Exam: 0058 hours   INDICATION:  Pain in right kidney stat
us post nephrostomy, decreased urine   output   COMPARISON:  CT of the abdomen a
nd pelvis Ree 15, 2017   TECHNIQUE: The abdomen and pelvis were scanned using a
multidetector helical   scanner. Coronal and sagittal reformations were obtaine
d. Renal stone protocol   performed.   IV Contrast: None   Oral Contrast: None  
CTDIvol has been reviewed. It is below the limits set by the Radiation Protocol 
 Committee (RPC).      FINDINGS:   LOWER THORAX: No consolidations      LIVER: 
No masses   BILIARY: Gallstone in an otherwise normal gallbladder. No ductal di
lation.       SPLEEN: Normal   PANCREAS: Normal      ADRENALS: Normal      RIGHT
KIDNEY: Percutaneously nephrostomy tube in place with distal end coiled   within
the pelvis. Severe hydronephrosis. Mild perinephric fat stranding.      LEFT K
IDNEY: Mild hydroureteronephrosis of the left kidney, similar to prior   exam. P
unctate nonobstructing stones in the inferior calyx.       GI TRACT: No wall thi
ckening or obstruction. Normal appendix.      VESSELS:  Minimal atherosclerotic 
changes of the abdominal aorta without   aneurysm.   PERITONEUM/RETROPERITONEUM:
No free air or fluid   LYMPH NODES: No lymphadenopathy      REPRODUCTIVE ORGANS:
The uterus is small. No adnexal masses. Likely radiation   seeds in the cervix. 
 BLADDER: The bladder is small and irregular, possibly due to postradiation   
changes.      SOFT TISSUES: Normal   BONES: No suspicious bone lesions.      IM
PRESSION:   Severe right hydronephrosis with percutaneous nephrostomy tube in pl
ace,   consistent with tube obstruction.      Signed by: Dr. Cristina Main M.D. on 2018 1:28 AM        Dictated By: CRISTINA MAIN MD  Electronically S
igned By: CRISTINA MAIN MD on 18  Transcribed By: LUCINDA on 18       COPY TO:   JENNIFER TERRELL MD        SPECIAL PROCEDURE IN CATH LAB    
[place] [address]      Patient Name: CRISTINA RICH   MR #: [id number]    :  Age/Sex: 52/F  Acct #: [id number] Req #: [id number]  Adm Physician: AJAY DUENAS MD    Ordered by: JACQUELINE ARORA MD  Report #: [id number]   Location: 
ED/SURG3  Room/Bed: [facility]    ___________________________________________________
________________________________________________    Procedure: [id number] IR/SPEC
IAL PROCEDURE IN CATH LAB  Exam Date:                             Exam Time:    
   REPORT STATUS: Signed    Date and Time: 2017      Procedure: Right per
cutaneous nephrostomy catheter exchange      : Dr. Lisa CAMPOS
re-operative diagnosis: Right sided pyelonephritis with indwelling   nephrostomy
.   Post-operative diagnosis: Indwelling nephrostomy      Conscious Sedation: Fe
ntanyl 50 mcg.   The patient's heart rate and pulse oximetry were continuously m
onitored by the   interventional radiology nurse.  Blood pressure was monitored 
at 5 minute   intervals.      Additional Medications: Lidocaine 1% for local ane
sthesia   Fluoroscopy time:  1.0 minutes   Dose-area Product:   22.7 cGycm2.    
 Contrast used: 20 cc Isovue-300   Estimated blood loss: Minimal   Specimens: 10
French nephrostomy catheter, discarded   Implants: New 10 French nephrostomy c
atheter         DISCUSSION:      Informed consent for the procedure was obtained
from the patient and documented   in the medical record after discussion of ris
ks and benefits.      The patient was placed in the prone position on the fluoro
scopic table. The   right flank and existing percutaneous nephrostomy catheter w
ere prepped and   draped in standard sterile fashion. 1% lidocaine was infiltrat
ed into the skin   and subcutaneous tissues along the catheter for local anesthe
sana. The retention   suture was cut. Dilute contrast material was injected throu
gh the catheter,   confirming appropriate positioning within the collecting syst
em. The catheter   was then severed at the hub and a 0.0 3 5-in. wire was advanc
ed through the   catheter under fluoroscopic guidance. The catheter was removed 
over the wire   and a new 10 French locking loop percutaneous the prostate was a
dvanced over   the wire, which was then removed. The locking loop of the cathete
r was   positioned in the renal pelvis with appropriate positioning confirmed by
  injection of dilute contrast material. The catheter was flushed with sterile  
saline and connected to gravity drainage. The catheter was secured to the skin  
with monofilament nylon suture and a sterile dressing was applied.            
FINDINGS:       Mild right hydronephrosis.      IMPRESSION:         Successful 
exchange of a right percutaneous nephrostomy catheter (10 French   locking loop)
under fluoroscopic guidance.      The patient should return to interventional r
adiology in 8 weeks for routine   catheter exchange.      Signed by: Dr. Clau Ngo M.D. on 2017 7:09 AM        Dictated By: CLAU NGO MD  Electro
nically Signed By: CLAU NGO MD on 17  Transcribed By: LUCINDA on
17       COPY TO:   JACQUELINE ARORA MD        CHEST XRAY LINE PLACEMENT   
[place] [address]      Patient Name: CRISTINA RICH   MR #: [id number]    :  Age/Sex: 52/F  Acct #: [id number] Req #: [id number]  Adm Physician: AJAY DUENAS MD    Ordered by: YESENIA SOUSA MD  Report #: [id number]   Location:
MED/SURG3  Room/Bed: [facility]    _________________________________________________
__________________________________________________    Procedure:  DX/CH
EST XRAY LINE PLACEMENT  Exam Date: 17                            Exam Tate
e: 1440       REPORT STATUS: Signed    PROCEDURE:   CHEST XRAY LINE PLACEMENT   
TECHNIQUE:   Portable AP chest   INDICATION:   PICC placement   COMPARISON:   No
ne.       FINDINGS:   See conclusion.           CONCLUSION:   1. Right PICC term
inating in the high SVC.   2. Low lung volume with left lower lobe medial subseg
mental    atelectasis.   3. Normal heart size and mediastinal contour for techni
que.   4. Crescentic skeleton.        Dictated by:  Best Patel M.D. on 
2017 at 14:58        Electronically approved by:  Best Patel M.D.
on 2017 at    14:58                Dictated By: BEST PATEL MD  Electr
onically Signed By: BEST PATEL MD on 17  Transcribed By: FERNANDO on
17       COPY TO:   YESENIA SOUSA MD        IR CONSULT    [place] [address]      Patient Name: CRISTINA RICH   MR #: [id number]    : 1965 
Age/Sex: 52/F  Acct #: [id number] Req #: [id number]  Adm Physician: AJAY DUENAS MD    Ordered by: JACQUELINE ARORA MD  Report #: [id number]   Location: 
ED/SURG2  Room/Bed: [facility]    ___________________________________________________
________________________________________________    Procedure: [id number] DX/IR C
ONSULT  Exam Date:                             Exam Time:        REPORT STATUS: 
Signed    Date and Time: 2017      Procedure: Replacement of right percutan
eous nephrostomy catheter      : Dr. Ngo   Assistants: None   
     Pre-operative diagnosis: Dislodged right percutaneous nephrostomy   Post-o
perative diagnosis: Replaced right percutaneous nephrostomy      Conscious Sedat
ion: Versed 1  mg and Fentanyl 100 mcg.   The patient's heart rate and pulse oxi
metry were continuously monitored by the   interventional radiology nurse.  Bloo
d pressure was monitored at 5 minute   intervals.      Additional Medications: L
idocaine 1% for local anesthesia   Fluoroscopy time:  1.3 minutes   Dose-area Pr
oduct:   499.8 cGycm2.      Contrast used: 20 cc Isovue-300   Estimated blood lo
ss: Minimal   Specimens: None   Implants: 10 French locking loop nephrostomy geoff
inage catheter   Blood products administered: None   Complications: No immediate
  Condition at completion of procedure: Stable   Disposition: Returned to floor 
unit         DISCUSSION:   Informed consent was obtained and documented in the 
medical record.      The patient was placed in the prone position on the fluoros
copic table and the   right flank was prepped and draped in standard sterile fas
hion. 1% lidocaine   was infiltrated into the skin and subcutaneous tissues for 
local anesthesia.      Then under fluoroscopic guidance, a 5 French angled terry
ter was gently   advanced through the existing percutaneous nephrostomy tract an
d into the renal   pelvis, with appropriate positioning confirmed by injection o
f dilute contrast   material.      A 0.0 3 5-in. Amplatz wire was then advanced 
through the catheter and into the   proximal right ureter. The catheter was axel
ayo and the tract was dilated. Then   a 10 French locking loop nephrostomy terry
ter was advanced over the wire and   formed in the renal pelvis. Injection of di
lute contrast material confirmed   appropriate positioning. The catheter was flu
shed with sterile saline, secured   to the skin with monofilament nylon suture, 
and connected to gravity drainage.   A sterile dressing was applied. Patient swapna
erated the procedure well without   immediate complication.         FINDINGS:   
Moderate right hydronephrosis.         IMPRESSION:   Successful replacement of a
right percutaneous nephrostomy catheter (10 French   locking loop) through the 
existing subcutaneous tract.      The patient should return to interventional r
adiology for routine catheter   exchange in 3 months.      Signed by: Dr. Clau Ngo M.D. on 10/4/2017 10:12 AM        Dictated By: CLAU NGO MD  Electr
onically Signed By: CLAU NGO MD on 10/04/17 1012  Transcribed By: LUCINDA quintero 10/04/17 1012       COPY TO:   JACQUELINE ARORA MD        SPECIAL PROCEDURE IN CATH 
LAB    [place] [address]      Patient Name: CRISTINA RICH   MR #: [id number]    
: 1965 Age/Sex: 52/F  Acct #: [id number] Req #: [id number]  Adm 
Physician: AJAY GE MD    Ordered by: JACQUELINE ARORA MD  Report #: [id number]  
Location: MED/SURG2  Room/Bed: [facility]    
___________________________________________________
________________________________________________    Procedure: [id number] IR/SPEC
IAL PROCEDURE IN CATH LAB  Exam Date:                             Exam Time:    
   REPORT STATUS: Signed    Date and Time: 2017      Procedure: Replacement
of right percutaneous nephrostomy catheter      : Dr. Ngo   
Assistants: None         Pre-operative diagnosis: Dislodged right percutaneous n
ephrostomy   Post-operative diagnosis: Replaced right percutaneous nephrostomy  
   Conscious Sedation: Versed 1  mg and Fentanyl 100 mcg.   The patient's heart 
rate and pulse oximetry were continuously monitored by the   interventional rad
iology nurse.  Blood pressure was monitored at 5 minute   intervals.      Additi
onal Medications: Lidocaine 1% for local anesthesia   Fluoroscopy time:  1.3 min
utes   Dose-area Product:   499.8 cGycm2.      Contrast used: 20 cc Isovue-300  
Estimated blood loss: Minimal   Specimens: None   Implants: 10 French locking l
oop nephrostomy drainage catheter   Blood products administered: None   Complica
tions: No immediate   Condition at completion of procedure: Stable   Disposition
: Returned to floor unit         DISCUSSION:   Informed consent was obtained and
documented in the medical record.      The patient was placed in the prone posi
tion on the fluoroscopic table and the   right flank was prepped and draped in s
tandard sterile fashion. 1% lidocaine   was infiltrated into the skin and subcut
aneous tissues for local anesthesia.      Then under fluoroscopic guidance, a 5 
French angled catheter was gently   advanced through the existing percutaneous n
ephrostomy tract and into the renal   pelvis, with appropriate positioning confi
rmed by injection of dilute contrast   material.      A 0.0 3 5-in. Amplatz wire
was then advanced through the catheter and into the   proximal right ureter. The
catheter was removed and the tract was dilated. Then   a 10 French locking loop 
nephrostomy catheter was advanced over the wire and   formed in the renal pelv
is. Injection of dilute contrast material confirmed   appropriate positioning. T
he catheter was flushed with sterile saline, secured   to the skin with monofila
ment nylon suture, and connected to gravity drainage.   A sterile dressing was a
pplied. Patient tolerated the procedure well without   immediate complication.  
      FINDINGS:    Moderate right hydronephrosis.         IMPRESSION:   Success
ful replacement of a right percutaneous nephrostomy catheter (10 French   lockin
g loop) through the existing subcutaneous tract.      The patient should return 
to interventional radiology for routine catheter   exchange in 3 months.      Si
gned by: Dr. Clau Ngo M.D. on 10/4/2017 10:12 AM        Dictated By: CLAU NGO MD  Electronically Signed By: CLAU NGO MD on 10/04/17 1012  Transc
ribed By: LUCINDA on 10/04/17 1012       COPY TO:   JACQUELINE ARORA MD        
[address] ([place] [address]      Patient Name: CRISTINA RICH   MR #: 
[id number]    : 1965 Age/Sex: 52/F  Acct #: [id number] Req #: [id number]
[id number]  Adm Physician:     Ordered by: SANDRA JUNIOR MD  Report #: [id number]
  Location: ER  Room/Bed:     
__________________________________________________________________________
_________________________    Procedure: [id number] DX/ABDOMEN-1VIEW (KUB)  Exam D
ate: 17                            Exam Time: 1430       REPORT STATUS: Si
gned    PROCEDURE:   X-RAY ABDOMEN - KUB       COMPARISON:   CT of the abdomen a
nd pelvis from 6/15/2017       INDICATIONS:   right side abdomen       FINDINGS:
         No dilated loops of bowel or abnormal air-fluid levels patterns.  No   
free air underneath the diaphragm.  Visualized portions of the lung    bases are
clear.         A nephrostomy tube is projected over the right hemiabdomen. A 2.5
cm    calcified gallstone is unchanged. No definite calcifications are seen    
overlying the urinary system.  Amorphous calcifications in the right    hemip
dick are indeterminate. There are surgical clips in the pelvis.    Five non-rib
bearing lumbar type vertebral bodies identified.       CONCLUSION:      A nephr
ostomy tube is projected over the right hemiabdomen.   Indeterminate calcificati
ons in the right hemipelvis which could    represent ureteral stones, phlebolith
, or vascular calcifications.       Dictated by:  Vita Moreira M.D. on 
017 at 14:59        Electronically approved by:  Vita Moreira M.D. on 20
17 at 14:59                   Dictated By: VITA MOREIRA MD  Electronically S
igned By: VITA MOREIRA MD on 17 [id number]  Transcribed By: FERNANDO on 
7 [id number]       COPY TO:   SANDRA JUNIOR MD

## 2020-10-20 NOTE — XMS REPORT
Clinical Summary

                             Created on: 10/20/2020



Savannah Jackson

External Reference #: [id number]

: 1965

Sex: Female



Demographics





                          Address                   [address] [place]  [address]

 

                          Home Phone                [phone]

 

                          Preferred Language        Unknown

 

                          Marital Status            Single

 

                          Temple Affiliation     [facility]

 

                          Race                      Unknown

 

                          Ethnic Group              Unknown





Author





                          Author                    [facility]

ict

 

                          Organization              [facility]

ict

 

                          Address                   Unknown

 

                          Phone                     Unavailable







Support





                Name            Relationship    Address         Phone

 

                    Geneva Jackson      [employer]                [address] [place]  [address]                      [phone]

 

                    Anat Jackson    [employer]                [address] [place]  [address]                      [phone]







Care Team Providers





                    Care Team Member Name Role                Phone

 

                          PCP                       Unavailable







Allergies





                                        Comments



                 Active Allergy  Reactions       Severity        Noted Date 

 

                                        



BECAME ANXIOUS AND FELT STRANGE



                     Diphenhydramine     Other               2009 







Medications





                          End Date                  Status



              Medication   Sig          Dispensed    Refills      Start  



                                         Date  

 

                                                    Active



              oxybutynin (DITROPAN) 5  Take 1 Tab by  90 Tab       1            

  



                     mg tabletIndications:  mouth 3 times       0  



                           Ureteral stricture        daily. For     



                                         bladder     



                                         spasms     

 

                                                    Active



              doxazosin (CARDURA) 2 mg  Take 1 Tab by  90 Tab       1           

   



                     tabletIndications:  mouth at            0  



                           Ureteral stricture        bedtime.     

 

                                                    Active



              famotidine (PEPCID) 20 mg  Take 1 Tab by  30 Tab       3          

  01/10/201  



                     tabletIndications: GERD  mouth 2 times       2  



                           (gastroesophageal reflux  daily.     



                                         disease)      

 

                                                    Active



                     traMADol (ULTRAM) 50 mg  Take 50 mg by       0   



                           tablet                    mouth as     



                                         needed.     

 

                                                    Active



              tamsulosin (FLOMAX) 0.4  Take 1       30 capsule   3              



                     mg extended release  capsule by          2  



                           capsuleIndications:       mouth daily.     



                                         Hydronephrosis      

 

                                                    Active



              flavoxate (URISPAS) 100  Take 1 tablet  30 tablet    0            

  



                     mg tabletIndications:  by mouth 3          2  



                           Hydronephrosis, right     times daily     



                                         as needed     



                                         (dysuria).     

 

                                                    Active



                     acetaminophen (TYLENOL)  Take 650 mg         0   



                           325 mg tablet             by mouth     



                                         every 6 hours     



                                         as needed.     

 

                                                    Active



              cyclobenzaprine  Take 1 tablet  40 tablet    2            10/14/20

1  



                     (FLEXERIL) 10 mg    by mouth 2          3  



                           tabletIndications: Flank  times daily     



                           Pain                      as needed for     



                                         Muscle     



                                         Spasms.     

 

                                                    Active



              HYDROcodone-acetaminophen  Take 1 tablet  30 tablet    0          

    



                     (NORCO)  mg   by mouth            4  



                           tabletIndications:        every 6 hours     



                           Pyelonephritis            as needed for     



                                         Pain.     

 

                                                    Active



                     metFORMIN (GLUCOPHAGE)  Take 500 mg         0   



                           500 mg tablet             by mouth 2     



                                         times daily     



                                         (with meals).     

 

                                                    Active



                     fenofibrate         Take 145 mg         0   



                           nanocrystallized (TRICOR)  by mouth     



                           145 mg tablet             daily.     

 

                                                    Active



                     lisinopril (PRINIVIL,  Take 2.5 mg         0   



                           ZESTRIL) 2.5 mg tablet    by mouth     



                                         daily.     

 

                                                    Active



                     atorvastatin (LIPITOR) 20  Take 20 mg by       0   



                           mg tablet                 mouth at     



                                         bedtime     



                                         nightly.     







Active Problems





 



                           Problem                   Noted Date

 

 



                           BMI 36.0-36.9,adult       2014

 

 



                                         Overview:



                                         Obese

 

 



                           Obese                     2014

 

 



                           Complicated UTI (urinary tract infection)  2014

 

 



                           Hematuria                 2014

 

 



                           Fatigue                   2013

 

 



                           Myalgia                   2013

 

 



                           Fever                     2013

 

 



                           UTI (lower urinary tract infection)  10/31/2012

 

 



                           Hydronephrosis, right     2012

 

 



                           Flank pain                2012

 

 



                           Pyelonephritis            2012

 

 



                           Cough                     2011







Immunizations





  



                     Name                Administration Dates  Next Due

 

  



                           Influenza Vaccine         2013 (Deferred: Patie

nt Refused) 

 

  



                           Pneumoccoccal             2013 (Deferred: Loyd

nt Refused) 







Family History





   



                 Medical History  Relation        Name            Comments

 

   



                           Diabetes                  Father  







   



                 Relation        Name            Status          Comments

 

   



                           Brother                   Alive 

 

   



                           Father                    Alive 

 

   



                           Mother                    Alive 

 

   



                           Sister                    Alive 







Social History





                                        Date



                 Tobacco Use     Types           Packs/Day       Years Used 

 

                                         



                                         Never Smoker    

 

    



                                         Smokeless Tobacco: Never   



                                         Used   







                    Drinks/Week         oz/Week             Comments



                                         Alcohol Use   

 

                                                             



                                         No   







 



                           Sex Assigned at Birth     Date Recorded

 

 



                                         Not on file 







                                        Industry



                           Job Start Date            Occupation 

 

                                        Not on file



                           Not on file               Not on file 







                                        Travel End



                           Travel History            Travel Start 

 





                                         No recent travel history available.







Last Filed Vital Signs

Not on file



Plan of Treatment





   



                 Health Maintenance  Due Date        Last Done       Comments

 

   



                           Cervical Cancer Scrn (3   1984  



                                         Yrs)   

 

   



                     Breast Cancer Scrn  2005 



                                         (Yearly)   

 

   



                           Colorectal Cancer Scrn    2013  



                                         Annual (FIT/FOBT) Age 50   



                                         to 73   

 

   



                           IMM Influenza Seasonal    10/01/2020  



                                         Oct to March (>/= 19 yrs)   







Implants





                    Device Identifier   Shelf Expiration Date Model / Serial / L

ot



                 Implanted       Type            Area            Manufactur   



                                         er   

 

                                        2017          [id number] /

 /

[id number]



                 Implant Gu Stent Black Beauty  Stent           Right:          

Cook   



                     Double Pigtail 7fr X 24cm [id number] -   Kidney(s)           Ur

ological   



                           [id number]                   Inc   



                                         Implanted: Qty: 1 on 2014 by     

 



                                         Mohan Gaary ResidentMD      



                                         at [facility]      







Results

Not on fileafter 10/20/2019



Insurance





                                        Type



            Payer      Benefit    Subscriber ID  Effective  Phone      Address 



                           Plan /                    Dates   



                                         Group     

 

                                         



            BC/BS      BCBS HMO   xxxxxxxxxxxx  2015-P  [phone]  [address]

X 



                           resent                    [id number] 



                                         [facility] [place] 



                                         [address] 







     



            Guarantor Name  Account    Relation to  Date of    Phone      Billin

g Address



                     Type                Patient             Birth  

 

     



            Savannah Jackson  Personal/F  Self       1965  [phone]

[address]  [address] 

[facility]               (Home)              [place] [address]



                                         [phone] 



                                         (Work) 







Advance Directives





                          Date Inactivated          Comments



                           Code Status               Date Activated  

 

                          2015  6:47 PM          



                           Full Code                 2015  4:06 AM  







                                                     

 

                          2014  4:20 PM          



                           Full Code                 5/3/2014 11:43 PM  







                                                     

 

                          3/16/2014  1:19 AM         



                           Full Code                 3/15/2014  6:29 AM  







                                                     

 

                          2013  4:29 PM         



                           Full Code                 2013  6:24 AM  







                                                     

 

                          1/10/2012 10:06 PM         



                           Full Code                 2012 10:55 PM

## 2020-10-20 NOTE — EMERGENCY DEPARTMENT NOTE
History of Present Illnes


History of Present Illness


Chief Complaint:  Genitourinary


History of Present Illness


This is a 55 year old  female Patient states she has a nolasco catheter 

and states she felt the ballon pop at


   approx 1600 today. .


Historian:  Patient


Arrival Mode:  Car


 Required:  No


Onset (how long ago):  hour(s) (4)


Location:  NOLASCO CATHETER


Quality:  BALLOON POPPED AND NOLASCO LEAKING


Radiation:  Reports non-radiation


Severity:  mild


Onset quality:  sudden


Duration (how long):  hour(s) (4)


Timing of current episode:  constant


Progression:  unchanged


Chronicity:  new


Context:  Denies recent illness, Denies recent surgery


Relieving factors:  none


Exacerbating factors:  none


Associated symptoms:  Reports denies other symptoms


Treatments prior to arrival:  none





Past Medical/Family History


Physician Review


I have reviewed the patient's past medical and family history.  Any updates have

been documented here.





Past Medical History


Recent Fever:  No


Clinical Suspicion of Infectio:  No


New/Unexplained Change in Ment:  No


Past Medical History:  UTI's, Anxiety, Depression


Other Medical History:  


Cervical Cancer Healed, Nephrostomy tubes,Long  


standing Nolasco indwelling catheter, Ureteral  


stents, HTN


Past Surgical History:  None


Other Surgery:  


Kidney/Ureteral Stent Placements/Replacements   multiple





Social History


Smoking Cessation:  Never Smoker


Counseling Performed:  Yes


Any Illegal Drug Use:  No





Family History


Family history of heart diseas:  No





Other


Last Tetanus:  OOD





Review of Systems


Review of Systems


Constitutional:  Reports no symptoms


EENTM:  Reports no symptoms


Cardiovascular:  Reports no symptoms


Respiratory:  Reports no symptoms


Gastrointestinal:  Reports no symptoms


Genitourinary:  Reports as per HPI


Musculoskeletal:  Reports no symptoms


Integumentary:  Reports no symptoms


Neurological:  Reports no symptoms


Psychological:  Reports no symptoms


Endocrine:  Reports no symptoms


Hematological/Lymphatic:  Reports no symptoms





Physical Exam


Related Data


Allergies:  


Coded Allergies:  


     Penicillins (Verified  Allergy, Unknown, 9/21/20)


     meropenem (Verified  Allergy, Unknown, REDNESS, ITCHING, 9/21/20)


Triage Vital Signs





Vital Signs








  Date Time  Temp Pulse Resp B/P (MAP) Pulse Ox O2 Delivery O2 Flow Rate FiO2


 


10/20/20 19:31 98.5 100 20 150/93 100 Room Air  








Vital signs reviewed:  Yes





Physical Exam


CONSTITUTIONAL





Constitutional:  Present well-developed, Present well-nourished


HENT


HENT:  Present normocephalic, Present atraumatic, Present oropharynx 

clear/moist, Present nose normal


HENT L/R:  Present left ext ear normal, Present right ext ear normal


EYES





Eyes:  Reports PERRL, Reports conjunctivae normal


NECK


Neck:  Present ROM normal


PULMONARY


Pulmonary:  Present effort normal, Present breath sounds normal


CARDIOVASCULAR





Cardiovascular:  Present regular rhythm, Present heart sounds normal, Present 

capillary refill normal, Present normal rate


GASTROINTESTINAL





Abdominal:  Present soft, Present nontender, Present bowel sounds normal


GENITOURINARY





Genitourinary:  Present other (NOLASCO CATHETER PRESENT ON ARRIVAL)


SKIN


Skin:  Present warm, Present dry


MUSCULOSKELETAL





Musculoskeletal:  Present ROM normal


NEUROLOGICAL





Neurological:  Present alert, Present oriented x 3, Present no gross motor or 

sensory deficits


PSYCHOLOGICAL


Psychological:  Present mood/affect normal, Present judgement normal


Exam - additional comments


PT WITH LEFT NEPHROSTOMY TUBE PRESENT, DARK URINE IN BAG.





Assessment & Plan


Medical Decision Making


MDM


PT WITH NOLASCO MALFUNCTION


I CALLED DR ARORA, STATES INSERT NEW NOLASCO CATHETER AND SEND URINE CULTURE





Reassessment


Reassessment time:  20:52


Reassessment


NOLASCO REPLACED


URINE DRAINING FROM NOLASCO


PT DISCHARGED HOME, FOLLOW UP WITH DR ARORA AS SCHEDULED





Assessment & Plan


Final Impression:  


(1) Malfunction of Nolasco catheter


Depart Disposition:  HOME, SELF-CARE


Last Vital Signs











  Date Time  Temp Pulse Resp B/P (MAP) Pulse Ox O2 Delivery O2 Flow Rate FiO2


 


10/20/20 19:44  96 20 146/85 100 Room Air  


 


10/20/20 19:31 98.5       








Home Meds


Reported Medications


Metoprolol Tartrate (LOPRESSOR) 25 Mg Tab, 50 MG PO BID, #60 TAB 1 Refill


   5/2/20


Glipizide (GLIPIZIDE ER) 5 Mg Tab.er.24, 2.5 MG PO DAILY, #30 1 Refill


   5/2/20


Buspirone Hcl (BUSPIRONE HCL) 10 Mg Tablet, 10 MG PO BID PRN for ANXIETY


   6/21/19











TREY OCONNELL MD        Oct 20, 2020 20:13

## 2020-10-20 NOTE — XMS REPORT
Continuity of Care Document

                             Created on: 10/20/2020



CRISTINA RICH

External Reference #: [id number]

: 1965

Sex: Female



Demographics





                          Address                   [address] [place] [address]  [address]

 

                          Home Phone                [phone]

 

                          Preferred Language        English

 

                          Marital Status            Unknown

 

                          Confucianist Affiliation     Unknown

 

                          Race                      Unknown

 

                          Ethnic Group              Unknown





Author





                          Author                    Deni Nieves Yasmo

 CRISTINA Carmona              [employer]

 

                          Address                   Unknown

 

                          Phone                     Unavailable







Care Team Providers





                    Care Team Member Name Role                Phone

 

                    fluid Operations Information [facility] Unavailable         Un

available



                                    



Problems

                    



                    Problem                         Status                      

   Onset Date       

                          Classification                         Date Reported  

         

                          Comments                         Source               

     

 

                                        Discharge Diagnosis: Compression of late

ral cutaneous femoral nerve of thigh    

                                             2016                                            

  

                                        [facility]                    

 

                    GEN. PAIN                         Active                    

     2016     

                                                                                

 

                                        [facility]                    

 

                                        Discharge Diagnosis: Urinary tract infec

tion, site not specified                

                                             2016                         

            

                    01/10/2016                                                  

[facility]                    

 

                    WEAKNESS                         Active                     

    2016      

                                                                                

  

                                        [facility]                    

 

                          Methicillin resistant Staphylococcus aureus (organism)

                         

Active                                                   Problem                

 

                          2016                         Problem added by Keena gordon Expert.      

                                        [facility]                    



                                                                                
                                                       



Medications

                    



                    Medication                         Details                  

       Route        

                          Status                         Patient Instructions   

          

                          Ordering Provider                         Order Date  

               

                                        Source                    

 

                          Ativan                         1 mg, Route: PO, Drug f

orm: TAB, ONCE, Dosing 

Weight 86.364, kg, Priority: STAT, Start date: 16 23:44:00, Stop date: 
16 23:44:00                                                   No Longer 

Active                                                                          

 

                          2016                         [facility]       

             

 

                          tramadol hydrochloride 50 MG Oral Tablet              

           50 mg = 1 tab, 

PO, BID, X 15 day, # 30 tab, 0 Refill(s)                                        

                    Active                                                      

           

                          2016                         [facility]       

             

 

                          tramadol hydrochloride 50 MG Oral Tablet [Ultram]     

                    1 - 2 

tabs, PO, Q4-6H, PRN as needed for pain, X 7 day, # 12 tab, 0 Refill(s)         
                                             Active                             

    

                                             2016                         

[facility]                    

 

                                        Sulfamethoxazole 800 MG / Trimethoprim 1

60 MG Oral Tablet [Bactrim]             

                          1 tab, PO, BID, # 20 tab, 0 Refill(s)                 

              

                    Active                                                      

 

                          2016                         [facility]       

      

       

 

                          Rocephin                         1 gm, Route: IM, ONCE

, Dosing Weight 84.091, 

kg, Start date: 16 17:48:00, Stop date: 16 17:48:00                 
                                             Inactive                           

            

                                             2016                         

[facility]                    

 

                          Ondansetron                         Notes: (Same as: BARAK banda)   *** MEDICATION 

WASTE *** Product Size:  4 mg Product Wasted:  ___ mg                           

                          Inactive                                              

   

                                             2016                         

[facility]       

             

 

                          Sodium Chloride 0.154 MEQ/ML Injectable Solution      

                   1,000 

mL, 1000 ml/hr, Infuse Over: 1 hr, Route: IV, 1,000, Drug form: INJ, ONCE, 
Priority: STAT, Dosing Weight 84.091 kg, Start date: 16 15:36:00, 
Duration: 1 doses or times, Stop date: 16 15:36:00                        
                                             Inactive                           

                   

                                             2016                         

[facility]    

                

 

                          Saline Flush 0.9%                         Notes: Same 

as: BD Posiflush Sterile  

                                                    Inactive                    

    

                                                                      2016

                   

                                        [facility]                    



                                                                                
                                                                                
                                    



Allergies, Adverse Reactions, Alerts

                    



                    Substance                         Category                  

       Reaction     

                          Severity                         Reaction type        

      

                    Status                         Date Reported                

         

Comments                                Source                    

 

                    diphenhydrAMINE                         Assertion           

                    

                                                    Drug allergy                

        

                    Active                                                      

                   

                                        [facility]                    



                                                        



Immunizations

        



                                        No Data Provided for This Section



                                    



Results





                    Order Name                         Results                  

       Value        

                          Reference Range                         Date          

         

                    Interpretation                         Comments             

            

Source                    

 

                    URINE AND STOOL                         UA Urobilinogen     

<=1.0 mg/dL             

                          0.1 - 1.0                         2016          

              

                                                                      [facility]                 

   

 

                    URINE AND STOOL                         UA Protein          

100 mg/dL                    

                    Negative mg/dL                         2016           

               

                                                    [facility]                

   

 

 

                    URINE AND STOOL                         UA Glucose          

Negative mg/dL               

                          Negative mg/dL                         2016     

                

                                                                      [facility]              

      

 

                    URINE AND STOOL                         UA Ketones          

Negative mg/dL               

                          Negative mg/dL                         2016     

                

                                                                      [facility]              

      

 

                    URINE AND STOOL                         UA Bili             

Negative 

*NA*

                    (16 4:54 PM)                         Negative           

              

2016                                                                      

 

                                        [facility]                    

 

                    URINE AND STOOL                         UA Blood            

Moderate 

*ABN*

                    (16 4:54 PM)                         Negative           

              

2016                                                                      

 

                                        [facility]                    

 

                    URINE AND STOOL                         UA Nitrite          

Positive 

*ABN*

                    (16 4:54 PM)                         Negative           

              

2016                                                                      

 

                                        [facility]                    

 

                    URINE AND STOOL                         UA Leuk Est         

Large 

*ABN*

                    (16 4:54 PM)                         Negative           

              

2016                                                                      

 

                                        [facility]                    

 

                    URINE AND STOOL                         UA Sq Epi           

Few /LPF                      

                    Few /LPF                         2016                 

                 

                                                    [facility]                

    

 

                URINE AND STOOL                         UA WBC          >182    

                     0 - 

5                         2016                                            

                                                    [facility]                

    

 

                URINE AND STOOL                         UA RBC          155     

                    0 - 2

                          2016                                            

 

                                                    [facility]                

    

 

                    URINE AND STOOL                         UA Bacteria         

Many /HPF                   

                    None Seen /HPF                         2016           

              

                                                    [facility]                

  

  

 

                URINE AND STOOL                         UA WBC Cast     34      

                   

<=0 /LPF                         2016                                     

                                                    [facility]                

    

 

                    URINE AND STOOL                         UA Mucus            

Few /LPF                       

                    None Seen /LPF                         2016           

                  

                                                    [facility]                

    

 

                    URINE AND STOOL                         UA Color            

Yellow 

*NA*

                    (16 4:54 PM)                         Yellow             

            

2016                                                                      

 

                                        [facility]                    

 

                    URINE AND STOOL                         UA Turbidity        

Marked 

*ABN*

                    (16 4:54 PM)                         Clear              

           

2016                                                                      

 

                                        [facility]                    

 

                URINE AND STOOL                         UA pH           5.0     

                    5.0 - 

8.0                         2016                                          

                                                    [facility]                

    

 

                    URINE AND STOOL                         UA Spec Grav        

1.018                      

                    <=1.030                         2016                  

                 

                                                    [facility]                

    

 

                CHEM PANEL                         A/G Ratio       0.6          

               0.7 - 

1.6                         2016                                          

                                                    [facility]                

    

 

                CHEM PANEL                         Globulin        5.8          

               2.0 - 

4.0                         2016                                          

                                                    [facility]                

    

 

                CHEM PANEL                         B/C Ratio       12           

              6 - 25  

                          2016                                            

   

                                                    [facility]                

    

 

                CHEM PANEL                         AGAP            12.6         

                10.0 - 20.0

                          2016                                            

 

                                                    [facility]                

    

 

                CHEM PANEL                         Alk Phos        115          

               39 - 136

                          2016                                            

 

                                                    [facility]                

    

 

                CHEM PANEL                         AST             28           

              0 - 37        

                    2016                                                  

   

                                        [facility]                    

 

                CHEM PANEL                         Bili Total      0.3          

               0.2 - 

1.3                         2016                                          

                                                    [facility]                

    

 

                CHEM PANEL                         eGFR            73           

                           

                    2016                                                  

Result 

Comment: The eGFR is calculated using the CKD-EPI formula. In most young, 
healthy individuals the eGFR will be >90 mL/min/1.73m2. The eGFR declines with 
age. An eGFR of 60-89 may be normal in some populations, particularly the 
elderly, for whom the CKD-EPI formula has not been extensively validated. Use of
 the eGFR is not recommended in the following populations:<br/><br/>Individuals 
with unstable creatinine concentrations, including pregnant patients and those 
with serious co-morbid conditions.<br/><br/>Patients with extremes in muscle 
mass or diet. <br/><br/>The data above are obtained from the National Kid[employer] 
Disease Education Program (NKDEP) which additionally recommends that when the 
eGFR is used in patients with extremes of body mass index for purposes of drug 
dosing, the eGFR should be multiplied by the estimated BMI.                     
                                        [facility]                    

 

                CHEM PANEL                         ALT             25           

              0 - 65        

                    2016                                                  

   

                                        [facility]                    

 

                CHEM PANEL                         Albumin Lvl     3.4          

               3.5 -

 5.0                         2016                                         

                                                    [facility]                

    

 

                CHEM PANEL                         Total Protein   9.2          

               6.4

 - 8.4                         2016                                       

                                                    [facility]                

    

 

                CHEM PANEL                         Calcium Lvl     9.1          

               8.5 -

 10.5                         2016                                        

                                                    [facility]                

    

 

                CHEM PANEL                         CO2             26           

              24 - 32       

                    2016                                                  

  

                                        [facility]                    

 

                CHEM PANEL                         Sodium Lvl      140          

               135 - 

145                         2016                                          

                                                    [facility]                

    

 

                CHEM PANEL                         Chloride Lvl    106          

               95 -

 109                         2016                                         

                                                    [facility]                

    

 

                CHEM PANEL                         Potassium Lvl   4.6          

               3.5

 - 5.1                         2016                                       

                                                    [facility]                

    

 

                CHEM PANEL                         Creatinine Lvl  0.91         

                

0.50 - 1.40                         2016                                  

                                                    [facility]                

    

 

                CHEM PANEL                         BUN             11           

              7 - 22        

                    2016                                                  

   

                                        [facility]                    

 

                CHEM PANEL                         Glucose Lvl     118          

               70 - 

99                         2016                                           

                                                    [facility]                

    

 

                    ENDOCRINOLOGY                         S Preg              Ne

gative 

*NA*

                    (16 4:40 PM)                         Negative           

              

2016                                                                      

 

                                        [facility]                    

 

                HEMATOLOGY                         Lymphocytes     32.8         

                20.0

 - 40.0                         2016                                      

                                                    [facility]                

    

 

                HEMATOLOGY                         Segs            60.0         

                45.0 - 75.0

                          2016                                            

 

                                                    [facility]                

    

 

                HEMATOLOGY                         Eosinophils #   0.2          

               0.0

 - 0.5                         2016                                       

                                                    [facility]                

    

 

                HEMATOLOGY                         Basophils #     0.2          

               0.0 -

 0.2                         2016                                         

                                                    [facility]                

    

 

                HEMATOLOGY                         Monocytes       4.6          

               2.0 - 

12.0                         2016                                         

                                                    [facility]                

    

 

                HEMATOLOGY                         Eosinophils     1.2          

               0.0 -

 4.0                         2016                                         

                                                    [facility]                

    

 

                HEMATOLOGY                         Basophils       1.4          

               0.0 - 

1.0                         2016                                          

                                                    [facility]                

    

 

                HEMATOLOGY                         Segs-Bands #    7.3          

               1.5 

- 8.1                         2016                                        

                                                    [facility]                

    

 

                HEMATOLOGY                         Monocytes #     0.6          

               0.0 -

 0.8                         2016                                         

                                                    [facility]                

    

 

                HEMATOLOGY                         Lymphocytes #   4.0          

               1.0

 - 5.5                         2016                                       

                                                    [facility]                

    

 

                    HEMATOLOGY                         Plt Morph           Clump

ed 

                    (16 4:40 PM)                                            

      2016    

                                                                       [facility]                    

 

                    HEMATOLOGY                         RBC Morph           Cinda

l 

                    (16 4:40 PM)                                            

      2016    

                                                                       [facility]                    

 

                HEMATOLOGY                         MPV             8.7          

               7.4 - 10.4   

                          2016                                            

    

                                                    [facility]                

    

 

                HEMATOLOGY                         WBC             12.0         

                3.7 - 10.4  

                          2016                                            

   

                                                    [facility]                         RBC             4.90         

                4.20 - 5.40 

                          2016                                            

  

                                                    [facility]                

    

 

                HEMATOLOGY                         Hct             39.0         

                36.0 - 48.0 

                          2016                                            

  

                                                    [facility]                

    

 

                HEMATOLOGY                         MCV             79.7         

                80.0 - 98.0 

                          2016                                            

  

                                                    [facility]                

    

 

                HEMATOLOGY                         Hgb             12.0         

                12.0 - 16.0 

                          2016                                            

  

                                                    [facility]                

    

 

                HEMATOLOGY                         RDW             14.8         

                11.5 - 14.5 

                          2016                                            

  

                                                    [facility]                

    

 

                HEMATOLOGY                         MCHC            30.8         

                32.0 - 36.0

                          2016                                            

 

                                                    [facility]                

    

 

                HEMATOLOGY                         Platelet        220          

               133 - 

450                         2016                                          

                                                    [facility]                

    

 

                HEMATOLOGY                         MCH             24.5         

                27.0 - 31.0 

                          2016                                            

  

                                                    [facility]                

    



                                                                                
                                                                                
                                                                                
                                                                                
                                                                                
                                                                                
                                                                        



Pathology Reports





                                        No Data Provided for This Section       

             



                            



Diagnostic Reports

            



                                        No Data Provided for This Section       

             



                                                            



Consultation Notes

                    



                                        No Data Provided for This Section       

             



                                                            



Discharge Summaries

                    



                                        No Data Provided for This Section       

             



                                                            



History and Physicals

                    



                                        No Data Provided for This Section       

             



                                                                



Vital Signs

                     



                    Vital Sign                         Value                    

     Date           

                          Comments                         Source               

     

 

                    Weight                         86.364                       

   2016       

                                                    [facility]                

    

 

                    Temperature Oral (F)                         97.9 F         

                

2016                                                   [facility]       

 

            

 

                    Respitory Rate                         18                   

       2016   

                                                    [facility]                

    

 

                    Heart Rate                         91                       

   2016       

                                                    [facility]                

    

 

                    Systolic (mm Hg)                         140                

          2016

                                                    [facility]                

  

  

 

                    Diastolic (mm Hg)                         99                

          2016

                                                    [facility]                

  

  

 

                    Temperature Oral (F)                         98.4 F         

                

2016                                                   [facility]       

 

            

 

                    Respitory Rate                         18                   

       2016   

                                                    [facility]                

    

 

                    Heart Rate                         97                       

   2016       

                                                    [facility]                

    

 

                    Systolic (mm Hg)                         142                

          2016

                                                    [facility]                

  

  

 

                    Diastolic (mm Hg)                         82                

          2016

                                                    [facility]                

  

  

 

                    Systolic (mm Hg)                         147                

          2016

                                                    [facility]                

  

  

 

                    Diastolic (mm Hg)                         93                

          2016

                                                    [facility]                

  

  

 

                    Weight                         84.091                       

   2016       

                                                    [facility]                

    

 

                    BMI Calculated                         36.21                

          2016

                                                    [facility]                

  

  

 

                    Height                         152.4 cm                     

    2016      

                                                    [facility]                

    

 

                    Heart Rate                         76                       

   2016       

                                                    [facility]                

    

 

                    Respitory Rate                         18                   

       2016   

                                                    [facility]                

    

 

                    Temperature Oral (F)                         98.1 F         

                

2016                                                   [facility]       

 

            



                                                                                
                                                                                
                                                                                
                                                                                
                                                        



Encounters

                    



                    Location                         Location Details           

              

Encounter Type                         Encounter Number                         

Reason For Visit                         Attending Provider                     

                    ADM Date                         DC Date                    

     Status     

                                        Source                    

 

                          [facility]                         [id number]

01                   

                                             Parulchristopher Wilson                   

       

2016                                   

                                        [facility]                         [id number]

02                   

                                             Mohan Curtis                   

       

2016                                   

                                        [facility]                    



                                                                                
    



Procedures

        



                                        No Data Provided for This Section



                                                    



Assessment and Plan

                    



                                        No Data Provided for This Section       

             



                                     



Plan of Care





                                        No Data Provided for This Section       

             



                                                                



Social History

                    



                    Social History                         Date                 

        Source      

              

 

                                        Social History TypeResponse

Smoking Status

Never smoker; Exposure to Tobacco Smoke None; Cigarette Smoking Last 365 Days 
No; Reg Smoking Cessation Counseling No

                          2016                         [facility]       

 

            



                                                                    



Family History

                    



                                        No Data Provided for This Section       

             



                                                            



Advance Directives

                    



                                        No Data Provided for This Section       

             



                                                            



Functional Status

                    



                                        No Data Provided for This Section

## 2020-10-27 LAB
ANION GAP SERPL CALC-SCNC: 14 MMOL/L (ref 8–16)
BASOPHILS # BLD AUTO: 0.1 10*3/UL (ref 0–0.1)
BASOPHILS NFR BLD AUTO: 0.6 % (ref 0–1)
BUN SERPL-MCNC: 17 MG/DL (ref 7–26)
BUN/CREAT SERPL: 16 (ref 6–25)
CALCIUM SERPL-MCNC: 9.6 MG/DL (ref 8.4–10.2)
CHLORIDE SERPL-SCNC: 106 MMOL/L (ref 98–107)
CO2 SERPL-SCNC: 25 MMOL/L (ref 22–29)
DEPRECATED NEUTROPHILS # BLD AUTO: 5.6 10*3/UL (ref 2.1–6.9)
EGFRCR SERPLBLD CKD-EPI 2021: 52 ML/MIN (ref 60–?)
EOSINOPHIL # BLD AUTO: 0.1 10*3/UL (ref 0–0.4)
EOSINOPHIL NFR BLD AUTO: 1.1 % (ref 0–6)
ERYTHROCYTE [DISTWIDTH] IN CORD BLOOD: 14.5 % (ref 11.7–14.4)
GLUCOSE SERPLBLD-MCNC: 126 MG/DL (ref 74–118)
HCT VFR BLD AUTO: 36.8 % (ref 34.2–44.1)
HGB BLD-MCNC: 11.1 G/DL (ref 12–16)
LYMPHOCYTES # BLD: 2.3 10*3/UL (ref 1–3.2)
LYMPHOCYTES NFR BLD AUTO: 27.3 % (ref 18–39.1)
MCH RBC QN AUTO: 25.9 PG (ref 28–32)
MCHC RBC AUTO-ENTMCNC: 30.2 G/DL (ref 31–35)
MCV RBC AUTO: 85.8 FL (ref 81–99)
MONOCYTES # BLD AUTO: 0.5 10*3/UL (ref 0.2–0.8)
MONOCYTES NFR BLD AUTO: 5.6 % (ref 4.4–11.3)
NEUTS SEG NFR BLD AUTO: 64.9 % (ref 38.7–80)
PLATELET # BLD AUTO: 306 X10E3/UL (ref 140–360)
POTASSIUM SERPL-SCNC: 4 MMOL/L (ref 3.5–5.1)
RBC # BLD AUTO: 4.29 X10E6/UL (ref 3.6–5.1)
SODIUM SERPL-SCNC: 141 MMOL/L (ref 136–145)

## 2020-10-30 ENCOUNTER — HOSPITAL ENCOUNTER (OUTPATIENT)
Dept: HOSPITAL 88 - OR | Age: 55
Discharge: HOME | End: 2020-10-30
Attending: UROLOGY
Payer: COMMERCIAL

## 2020-10-30 VITALS — DIASTOLIC BLOOD PRESSURE: 69 MMHG | SYSTOLIC BLOOD PRESSURE: 121 MMHG

## 2020-10-30 DIAGNOSIS — Z88.0: ICD-10-CM

## 2020-10-30 DIAGNOSIS — Z01.812: ICD-10-CM

## 2020-10-30 DIAGNOSIS — R35.1: ICD-10-CM

## 2020-10-30 DIAGNOSIS — Z11.59: ICD-10-CM

## 2020-10-30 DIAGNOSIS — Z01.810: ICD-10-CM

## 2020-10-30 DIAGNOSIS — N39.0: ICD-10-CM

## 2020-10-30 DIAGNOSIS — N13.1: Primary | ICD-10-CM

## 2020-10-30 DIAGNOSIS — I10: ICD-10-CM

## 2020-10-30 DIAGNOSIS — N81.6: ICD-10-CM

## 2020-10-30 DIAGNOSIS — E66.9: ICD-10-CM

## 2020-10-30 DIAGNOSIS — N95.2: ICD-10-CM

## 2020-10-30 DIAGNOSIS — N31.9: ICD-10-CM

## 2020-10-30 DIAGNOSIS — N20.0: ICD-10-CM

## 2020-10-30 DIAGNOSIS — N39.41: ICD-10-CM

## 2020-10-30 DIAGNOSIS — Z46.6: ICD-10-CM

## 2020-10-30 PROCEDURE — 80048 BASIC METABOLIC PNL TOTAL CA: CPT

## 2020-10-30 PROCEDURE — 52344 CYSTO/URETERO STRICTURE TX: CPT

## 2020-10-30 PROCEDURE — 93005 ELECTROCARDIOGRAM TRACING: CPT

## 2020-10-30 PROCEDURE — 52332 CYSTOSCOPY AND TREATMENT: CPT

## 2020-10-30 PROCEDURE — 36415 COLL VENOUS BLD VENIPUNCTURE: CPT

## 2020-10-30 PROCEDURE — 74420 UROGRAPHY RTRGR +-KUB: CPT

## 2020-10-30 PROCEDURE — 85025 COMPLETE CBC W/AUTO DIFF WBC: CPT

## 2020-10-31 NOTE — OPERATIVE REPORT
DATE OF PROCEDURE:  10/30/2020

 

SURGEON:  Goldy Rachel MD

 

PREOPERATIVE DIAGNOSES:  

1. Left ureteral stricture.

2. Left hydronephrosis due to stricture.

3. Left indwelling ureteral stents.

4. Neurogenic bladder.

 

POSTOPERATIVE DIAGNOSES:  

1. Left ureteral stricture.

2. Left hydronephrosis due to stricture.

3. Left indwelling ureteral stents.

4. Neurogenic bladder.

5. Grade 1-2 rectocele.

6. Atrophic (senile) vaginitis.

 

OPERATION PERFORMED:  

1. Cystourethroscopy with complicated removal of 2 separate left-sided indwelling

ureteral stents (separate procedure performed for the diagnosis of stents). 

2. Cystourethroscopy with dilation of left ureteral stricture (separate procedure

performed for the diagnosis of stricture). 

3. Radiological services for supervision and stricture dilation, no radiologist present.

4. Cystourethroscopy and insertion of 2 separate left-sided indwelling ureteral stents

(separate procedure performed to relieve the hydronephrosis). 

5. Interpretation of retrograde ureteropyelography.

6. Supervision of fluoroscopy, no radiologist present.

7. Interpretation of cystography.

8. Pelvic examination under anesthesia.

 

ANESTHESIA:  General.

 

COMPLICATIONS:  None.

 

CLINICAL SUMMARY:  Savannah Jackson is a complicated 55-year-old woman with chronic

right nephrostomy due to ureteral stricture that failed stenting.  She also has a

ureteral stricture on the left hand side, where we managed her with 2 separate stents

and change them on a regular basis.  The patient has had recurrent urinary tract

infections and also necessitates a Gomez catheter to the fact she has a functionally

neurogenic bladder with high pressure and has caused reflux nephropathy into the left

kidney.  The patient is brought for the above procedures.  She is aware of the risks of

bleeding, infection, injury to adjacent structures, need for additional procedures, and

elected to proceed.  Her last urine culture was negative. 

 

OPERATIVE PROCEDURE IN DETAIL:  Informed consent was verified.  Savannah Jackson was

properly identified, taken to the operating room, placed on the cystoscopy table in

supine position, and anesthesia was uneventfully begun.  The patient's Gomez catheter

was removed and her genitalia were prepared and draped in usual sterile fashion.  The

cystoscope was inserted with the obturator into the patient's bladder and the bladder

was drained.  Panendoscopy revealed blanching of the mucosa and there was some erythema

of the mucosa.  No abnormal due to prior radiotherapy.  There was also some signs of

erythema.  There were no suspicious lesions that were identified.  A guidewire was then

placed into the left ureter and guided alongside the stent up into the patient's left

kidney.  Both stents were then grasped and removed. 

 

Semi-rigid ureteroscope was then placed alongside the guidewire and guided into the

patient's left ureter.  The ureter had some inflammation in it, presumably from the

stone procedures in stone management.  A double-lumen ureteral catheter was then

inserted over the guidewire and guided to the level of the patient's kidney.  Contrast

was injected.  Following this dilation, a secondary guidewire was left in place. 

 

With cystoscopic and fluoroscopic guidance, 2 separate left-sided indwelling ureteral

stents were placed, both were coiled in the patient's kidney as well as the patient's

bladder.  The retaining sutures were removed. 

 

Interpretation of cystography contrast instilled in a retrograde fashion on the left

hand side.  There was chronic appearing fullness of the left-sided collecting system.

The stents were in good position, coiled in the patient's kidneys as well as the

patient's bladder at the end of the case. 

 

The cystoscope was withdrawn and Gomez catheter was placed.  Contrast was then injected

and performed cystography. 

 

Interpretation of cystography, the bladder is extremely small.  There was left-sided

vesicoureteric reflux.  Gomez catheter was in good position within the bladder.  There

was no extravasation. 

 

Pelvic examination under anesthesia was then performed, revealed a grade 1-2 rectocele

with atrophic vaginitis.  No abnormal palpable pelvic masses could be appreciated.

There were no obvious mucosal lesions.  The patient was then uneventfully reversed

anesthesia and taken to recovery room in stable condition.  There were no complications

to the procedure.  She tolerated the procedure well.  Plans will be to have

Interventional Radiology change the patient's right 

nephrostomy tube before it causes additional problems and then we will plan to follow

the patient up in about a month to remove her Gomez catheter, replace it with a new one. 

 

 

 

 

______________________________

Goldy Rachel MD

 

OH/MODL

D:  10/30/2020 23:00:30

T:  10/31/2020 01:19:04

Job #:  [id number]

 

cc:            Paramjit Hermrann MD

## 2020-11-24 ENCOUNTER — HOSPITAL ENCOUNTER (EMERGENCY)
Dept: HOSPITAL 88 - ER | Age: 55
Discharge: HOME | End: 2020-11-24
Payer: COMMERCIAL

## 2020-11-24 VITALS — BODY MASS INDEX: 36.91 KG/M2 | WEIGHT: 188 LBS | HEIGHT: 60 IN

## 2020-11-24 DIAGNOSIS — F41.9: ICD-10-CM

## 2020-11-24 DIAGNOSIS — F32.9: ICD-10-CM

## 2020-11-24 DIAGNOSIS — T83.091A: Primary | ICD-10-CM

## 2020-11-24 LAB
BACTERIA URNS QL MICRO: (no result) /HPF
BILIRUB UR QL: (no result)
CLARITY UR: (no result)
COLOR UR: (no result)
DEPRECATED RBC URNS MANUAL-ACNC: >50 /HPF (ref 0–5)
EPI CELLS URNS QL MICRO: (no result) /LPF
KETONES UR QL STRIP.AUTO: NEGATIVE
LEUKOCYTE ESTERASE UR QL STRIP.AUTO: (no result)
NITRITE UR QL STRIP.AUTO: NEGATIVE
PROT UR QL STRIP.AUTO: >=300
SP GR UR STRIP: 1.02 (ref 1.01–1.02)
UROBILINOGEN UR STRIP-MCNC: 0.2 MG/DL (ref 0.2–1)
WBC #/AREA URNS HPF: (no result) /HPF (ref 0–5)

## 2020-11-24 PROCEDURE — 51700 IRRIGATION OF BLADDER: CPT

## 2020-11-24 PROCEDURE — 87086 URINE CULTURE/COLONY COUNT: CPT

## 2020-11-24 PROCEDURE — 99283 EMERGENCY DEPT VISIT LOW MDM: CPT

## 2020-11-24 PROCEDURE — 87186 SC STD MICRODIL/AGAR DIL: CPT

## 2020-11-24 PROCEDURE — 81001 URINALYSIS AUTO W/SCOPE: CPT

## 2020-11-24 NOTE — XMS REPORT
Continuity of Care Document

                             Created on: 2020



CRISTINA RICH

External Reference #: [id number]

: 1965

Sex: Female



Demographics





                          Address                   [address] [place] [address]  [id number]

 

                          Home Phone                [phone]

 

                          Preferred Language        English

 

                          Marital Status            Unknown

 

                          Congregational Affiliation     Unknown

 

                          Race                      Unknown

 

                          Ethnic Group              Unknown





Author





                          Author                    Deni Nieves [employer]

 CRISTINA Carmona              eshtery

 

                          Address                   Unknown

 

                          Phone                     Unavailable







Care Team Providers





                    Care Team Member Name Role                Phone

 

                    "[employer] Information [employer] Unavailable         Un

available



                                    



Problems

                    



                    Problem                         Status                      

   Onset Date       

                          Classification                         Date Reported  

         

                          Comments                         Source               

     

 

                                        Discharge Diagnosis: Compression of late

ral cutaneous femoral nerve of thigh    

                                             2016                                            

  

                                        [facility]                    

 

                    GEN. PAIN                         Active                    

     2016     

                                                                                

 

                                        [facility]                    

 

                                        Discharge Diagnosis: Urinary tract infec

tion, site not specified                

                                             2016                         

            

                    01/10/2016                                                  

[facility]                    

 

                    WEAKNESS                         Active                     

    2016      

                                                                                

  

                                        [facility]                    

 

                          Methicillin resistant Staphylococcus aureus (organism)

                         

Active                                                   Problem                

 

                          2016                         Problem added by Keena gordon Expert.      

                                        [facility]                    



                                                                                
                                                       



Medications

                    



                    Medication                         Details                  

       Route        

                          Status                         Patient Instructions   

          

                          Ordering Provider                         Order Date  

               

                                        Source                    

 

                          Ativan                         1 mg, Route: PO, Drug f

orm: TAB, ONCE, Dosing 

Weight 86.364, kg, Priority: STAT, Start date: 16 23:44:00, Stop date: 
16 23:44:00                                                   No Longer 

Active                                                                          

 

                          2016                         [facility]       

             

 

                          tramadol hydrochloride 50 MG Oral Tablet              

           50 mg = 1 tab, 

PO, BID, X 15 day, # 30 tab, 0 Refill(s)                                        

                    Active                                                      

           

                          2016                         [facility]       

             

 

                          tramadol hydrochloride 50 MG Oral Tablet [Ultram]     

                    1 - 2 

tabs, PO, Q4-6H, PRN as needed for pain, X 7 day, # 12 tab, 0 Refill(s)         
                                             Active                             

    

                                             2016                         

[facility]                    

 

                                        Sulfamethoxazole 800 MG / Trimethoprim 1

60 MG Oral Tablet [Bactrim]             

                          1 tab, PO, BID, # 20 tab, 0 Refill(s)                 

              

                    Active                                                      

 

                          2016                         [facility]       

      

       

 

                          Rocephin                         1 gm, Route: IM, ONCE

, Dosing Weight 84.091, 

kg, Start date: 16 17:48:00, Stop date: 16 17:48:00                 
                                             Inactive                           

            

                                             2016                         

[facility]                    

 

                          Ondansetron                         Notes: (Same as: BARAK banda)   *** MEDICATION 

WASTE *** Product Size:  4 mg Product Wasted:  ___ mg                           

                          Inactive                                              

   

                                             2016                         

[facility]       

             

 

                          Sodium Chloride 0.154 MEQ/ML Injectable Solution      

                   1,000 

mL, 1000 ml/hr, Infuse Over: 1 hr, Route: IV, 1,000, Drug form: INJ, ONCE, 
Priority: STAT, Dosing Weight 84.091 kg, Start date: 16 15:36:00, 
Duration: 1 doses or times, Stop date: 16 15:36:00                        
                                             Inactive                           

                   

                                             2016                         

[facility]    

                

 

                          Saline Flush 0.9%                         Notes: Same 

as: BD Posiflush Sterile  

                                                    Inactive                    

    

                                                                      2016

                   

                                        [facility]                    



                                                                                
                                                                                
                                    



Allergies, Adverse Reactions, Alerts

                    



                    Substance                         Category                  

       Reaction     

                          Severity                         Reaction type        

      

                    Status                         Date Reported                

         

Comments                                Source                    

 

                    diphenhydrAMINE                         Assertion           

                    

                                                    Drug allergy                

        

                    Active                                                      

                   

                                        [facility]                    



                                                        



Immunizations

        



                                        No Data Provided for This Section



                                    



Results





                    Order Name                         Results                  

       Value        

                          Reference Range                         Date          

         

                    Interpretation                         Comments             

            

Source                    

 

                    URINE AND STOOL                         UA Urobilinogen     

<=1.0 mg/dL             

                          0.1 - 1.0                         2016          

              

                                                                      [facility]                 

   

 

                    URINE AND STOOL                         UA Protein          

100 mg/dL                    

                    Negative mg/dL                         2016           

               

                                                    [facility]                

   

 

 

                    URINE AND STOOL                         UA Glucose          

Negative mg/dL               

                          Negative mg/dL                         2016     

                

                                                                      [facility]              

      

 

                    URINE AND STOOL                         UA Ketones          

Negative mg/dL               

                          Negative mg/dL                         2016     

                

                                                                      [facility]              

      

 

                    URINE AND STOOL                         UA Bili             

Negative 

*NA*

                    (16 4:54 PM)                         Negative           

              

2016                                                                      

 

                                        [facility]                    

 

                    URINE AND STOOL                         UA Blood            

Moderate 

*ABN*

                    (16 4:54 PM)                         Negative           

              

2016                                                                      

 

                                        [facility]                    

 

                    URINE AND STOOL                         UA Nitrite          

Positive 

*ABN*

                    (16 4:54 PM)                         Negative           

              

2016                                                                      

 

                                        [facility]                    

 

                    URINE AND STOOL                         UA Leuk Est         

Large 

*ABN*

                    (16 4:54 PM)                         Negative           

              

2016                                                                      

 

                                        [facility]                    

 

                    URINE AND STOOL                         UA Sq Epi           

Few /LPF                      

                    Few /LPF                         2016                 

                 

                                                    [facility]                

    

 

                URINE AND STOOL                         UA WBC          >182    

                     0 - 

5                         2016                                            

                                                    [facility]                

    

 

                URINE AND STOOL                         UA RBC          155     

                    0 - 2

                          2016                                            

 

                                                    [facility]                

    

 

                    URINE AND STOOL                         UA Bacteria         

Many /HPF                   

                    None Seen /HPF                         2016           

              

                                                    [facility]                

  

  

 

                URINE AND STOOL                         UA WBC Cast     34      

                   

<=0 /LPF                         2016                                     

                                                    [facility]                

    

 

                    URINE AND STOOL                         UA Mucus            

Few /LPF                       

                    None Seen /LPF                         2016           

                  

                                                    [facility]                

    

 

                    URINE AND STOOL                         UA Color            

Yellow 

*NA*

                    (16 4:54 PM)                         Yellow             

            

2016                                                                      

 

                                        [facility]                    

 

                    URINE AND STOOL                         UA Turbidity        

Marked 

*ABN*

                    (16 4:54 PM)                         Clear              

           

2016                                                                      

 

                                        [facility]                    

 

                URINE AND STOOL                         UA pH           5.0     

                    5.0 - 

8.0                         2016                                          

                                                    [facility]                

    

 

                    URINE AND STOOL                         UA Spec Grav        

1.018                      

                    <=1.030                         2016                  

                 

                                                    [facility]                

    

 

                CHEM PANEL                         A/G Ratio       0.6          

               0.7 - 

1.6                         2016                                          

                                                    [facility]                

    

 

                CHEM PANEL                         Globulin        5.8          

               2.0 - 

4.0                         2016                                          

                                                    [facility]                

    

 

                CHEM PANEL                         B/C Ratio       12           

              6 - 25  

                          2016                                            

   

                                                    [facility]                

    

 

                CHEM PANEL                         AGAP            12.6         

                10.0 - 20.0

                          2016                                            

 

                                                    [facility]                

    

 

                CHEM PANEL                         Alk Phos        115          

               39 - 136

                          2016                                            

 

                                                    [facility]                

    

 

                CHEM PANEL                         AST             28           

              0 - 37        

                    2016                                                  

   

                                        [facility]                    

 

                CHEM PANEL                         Bili Total      0.3          

               0.2 - 

1.3                         2016                                          

                                                    [facility]                

    

 

                CHEM PANEL                         eGFR            73           

                           

                    2016                                                  

Result 

Comment: The eGFR is calculated using the CKD-EPI formula. In most young, 
healthy individuals the eGFR will be >90 mL/min/1.73m2. The eGFR declines with 
age. An eGFR of 60-89 may be normal in some populations, particularly the 
elderly, for whom the CKD-EPI formula has not been extensively validated. Use of
 the eGFR is not recommended in the following populations:<br/><br/>Individuals 
with unstable creatinine concentrations, including pregnant patients and those 
with serious co-morbid conditions.<br/><br/>Patients with extremes in muscle 
mass or diet. <br/><br/>The data above are obtained from the National Kidney 
Disease Education Program (NKDEP) which additionally recommends that when the 
eGFR is used in patients with extremes of body mass index for purposes of drug 
dosing, the eGFR should be multiplied by the estimated BMI.                     
                                        [facility]                    

 

                CHEM PANEL                         ALT             25           

              0 - 65        

                    2016                                                  

   

                                        [facility]                    

 

                CHEM PANEL                         Albumin Lvl     3.4          

               3.5 -

 5.0                         2016                                         

                                                    [facility]                

    

 

                CHEM PANEL                         Total Protein   9.2          

               6.4

 - 8.4                         2016                                       

                                                    [facility]                

    

 

                CHEM PANEL                         Calcium Lvl     9.1          

               8.5 -

 10.5                         2016                                        

                                                    [facility]                

    

 

                CHEM PANEL                         CO2             26           

              24 - 32       

                    2016                                                  

  

                                        [facility]                    

 

                CHEM PANEL                         Sodium Lvl      140          

               135 - 

145                         2016                                          

                                                    [facility]                

    

 

                CHEM PANEL                         Chloride Lvl    106          

               95 -

 109                         2016                                         

                                                    [facility]                

    

 

                CHEM PANEL                         Potassium Lvl   4.6          

               3.5

 - 5.1                         2016                                       

                                                    [facility]                

    

 

                CHEM PANEL                         Creatinine Lvl  0.91         

                

0.50 - 1.40                         2016                                  

                                                    [facility]                

    

 

                CHEM PANEL                         BUN             11           

              7 - 22        

                    2016                                                  

   

                                        [facility]                    

 

                CHEM PANEL                         Glucose Lvl     118          

               70 - 

99                         2016                                           

                                                    [facility]                

    

 

                    ENDOCRINOLOGY                         S Preg              Ne

gative 

*NA*

                    (16 4:40 PM)                         Negative           

              

2016                                                                      

 

                                        [facility]                    

 

                HEMATOLOGY                         Lymphocytes     32.8         

                20.0

 - 40.0                         2016                                      

                                                    [facility]                

    

 

                HEMATOLOGY                         Segs            60.0         

                45.0 - 75.0

                          2016                                            

 

                                                    [facility]                

    

 

                HEMATOLOGY                         Eosinophils #   0.2          

               0.0

 - 0.5                         2016                                       

                                                    [facility]                

    

 

                HEMATOLOGY                         Basophils #     0.2          

               0.0 -

 0.2                         2016                                         

                                                    [facility]                

    

 

                HEMATOLOGY                         Monocytes       4.6          

               2.0 - 

12.0                         2016                                         

                                                    [facility]                

    

 

                HEMATOLOGY                         Eosinophils     1.2          

               0.0 -

 4.0                         2016                                         

                                                    [facility]                

    

 

                HEMATOLOGY                         Basophils       1.4          

               0.0 - 

1.0                         2016                                          

                                                    [facility]                

    

 

                HEMATOLOGY                         Segs-Bands #    7.3          

               1.5 

- 8.1                         2016                                        

                                                    [facility]                

    

 

                HEMATOLOGY                         Monocytes #     0.6          

               0.0 -

 0.8                         2016                                         

                                                    [facility]                

    

 

                HEMATOLOGY                         Lymphocytes #   4.0          

               1.0

 - 5.5                         2016                                       

                                                    [facility]                

    

 

                    HEMATOLOGY                         Plt Morph           Clump

ed 

                    (16 4:40 PM)                                            

      2016    

                                                                       [facility]                    

 

                    HEMATOLOGY                         RBC Morph           Cinda

l 

                    (16 4:40 PM)                                            

      2016    

                                                                       [facility]                    

 

                HEMATOLOGY                         MPV             8.7          

               7.4 - 10.4   

                          2016                                            

    

                                                    [facility]                

    

 

                HEMATOLOGY                         WBC             12.0         

                3.7 - 10.4  

                          2016                                            

   

                                                    [facility]                         RBC             4.90         

                4.20 - 5.40 

                          2016                                            

  

                                                    [facility]                

    

 

                HEMATOLOGY                         Hct             39.0         

                36.0 - 48.0 

                          2016                                            

  

                                                    [facility]                

    

 

                HEMATOLOGY                         MCV             79.7         

                80.0 - 98.0 

                          2016                                            

  

                                                    [facility]                

    

 

                HEMATOLOGY                         Hgb             12.0         

                12.0 - 16.0 

                          2016                                            

  

                                                    [facility]                

    

 

                HEMATOLOGY                         RDW             14.8         

                11.5 - 14.5 

                          2016                                            

  

                                                    [facility]                

    

 

                HEMATOLOGY                         MCHC            30.8         

                32.0 - 36.0

                          2016                                            

 

                                                    [facility]                

    

 

                HEMATOLOGY                         Platelet        220          

               133 - 

450                         2016                                          

                                                    [facility]                

    

 

                HEMATOLOGY                         MCH             24.5         

                27.0 - 31.0 

                          2016                                            

  

                                                    [facility]                

    



                                                                                
                                                                                
                                                                                
                                                                                
                                                                                
                                                                                
                                                                        



Pathology Reports





                                        No Data Provided for This Section       

             



                            



Diagnostic Reports

            



                                        No Data Provided for This Section       

             



                                                            



Consultation Notes

                    



                                        No Data Provided for This Section       

             



                                                            



Discharge Summaries

                    



                                        No Data Provided for This Section       

             



                                                            



History and Physicals

                    



                                        No Data Provided for This Section       

             



                                                                



Vital Signs

                     



                    Vital Sign                         Value                    

     Date           

                          Comments                         Source               

     

 

                    Weight                         86.364                       

   2016       

                                                    [facility]                

    

 

                    Temperature Oral (F)                         97.9 F         

                

2016                                                   [facility]       

 

            

 

                    Respitory Rate                         18                   

       2016   

                                                    [facility]                

    

 

                    Heart Rate                         91                       

   2016       

                                                    [facility]                

    

 

                    Systolic (mm Hg)                         140                

          2016

                                                    [facility]                

  

  

 

                    Diastolic (mm Hg)                         99                

          2016

                                                    [facility]                

  

  

 

                    Temperature Oral (F)                         98.4 F         

                

2016                                                   [facility]       

 

            

 

                    Respitory Rate                         18                   

       2016   

                                                    [facility]                

    

 

                    Heart Rate                         97                       

   2016       

                                                    [facility]                

    

 

                    Systolic (mm Hg)                         142                

          2016

                                                    [facility]                

  

  

 

                    Diastolic (mm Hg)                         82                

          2016

                                                    [facility]                

  

  

 

                    Systolic (mm Hg)                         147                

          2016

                                                    [facility]                

  

  

 

                    Diastolic (mm Hg)                         93                

          2016

                                                    [facility]                

  

  

 

                    Weight                         84.091                       

   2016       

                                                    [facility]                

    

 

                    BMI Calculated                         36.21                

          2016

                                                    [facility]                

  

  

 

                    Height                         152.4 cm                     

    2016      

                                                    [facility]                

    

 

                    Heart Rate                         76                       

   2016       

                                                    [facility]                

    

 

                    Respitory Rate                         18                   

       2016   

                                                    [facility]                

    

 

                    Temperature Oral (F)                         98.1 F         

                

2016                                                   [facility]       

 

            



                                                                                
                                                                                
                                                                                
                                                                                
                                                        



Encounters

                    



                    Location                         Location Details           

              

Encounter Type                         Encounter Number                         

Reason For Visit                         Attending Provider                     

                    ADM Date                         DC Date                    

     Status     

                                        Source                    

 

                          [facility]                         [id number]

01                   

                                             Parulchristopher Wilson                   

       

2016                                   

                                        [facility]                         [id number]

02                   

                                             Mohan Curtis                   

       

2016                                   

                                        [facility]                    



                                                                                
    



Procedures

        



                                        No Data Provided for This Section



                                                    



Assessment and Plan

                    



                                        No Data Provided for This Section       

             



                                     



Plan of Care





                                        No Data Provided for This Section       

             



                                                                



Social History

                    



                    Social History                         Date                 

        Source      

              

 

                                        Social History TypeResponse

Smoking Status

Never smoker; Exposure to Tobacco Smoke None; Cigarette Smoking Last 365 Days 
No; Reg Smoking Cessation Counseling No

                          2016                         [facility]       

 

            



                                                                    



Family History

                    



                                        No Data Provided for This Section       

             



                                                            



Advance Directives

                    



                                        No Data Provided for This Section       

             



                                                            



Functional Status

                    



                                        No Data Provided for This Section

## 2020-11-24 NOTE — XMS REPORT
Continuity of Care Document

                             Created on: 2020



CRISTINA RICH JERRY

External Reference #: [id number]

: 1965

Sex: Female



Demographics





                          Address                   [address] [place]  [address]

 

                          Home Phone                [phone]

 

                          Preferred Language        English

 

                          Marital Status            Unknown

 

                          Catholic Affiliation     Unknown

 

                          Race                      Unknown

 

                          Additional Race(s)        Other

White/



 

                          Ethnic Group              /Latin





Author





                          Author                    [facility]

t

 

                          Organization              [facility]

 

                          Address                   [address]

[place]  [address]



 

                          Phone                     Unavailable







Support





                Name            Relationship    Address         Phone

 

                    BALA RICH  ECON                [address]

[place]  [address]                      Unavailable

 

                    Geneva Rich    ECON                [address] [place]  [address]                      [phone]

 

                    Bala Rich  ECON                [address]

[place]  [address]                      [phone]







Care Team Providers





                    Care Team Member Name Role                Phone

 

                    NONSTAFF            PCP                 Unavailable

 

                    MITZI TERRELL      Attphys             Unavailable

 

                    HAMPEL,  JACQUELINE        Attphys             Unavailable

 

                    SHADY PEMBERTON        Attphys             Unavailable

 

                    SANDHIR  AMBLYNN    Attphys             Unavailable

 

                    MISHEL,  MATTHEW     Attphys             Unavailable

 

                    BAR, T RUSSELL       Attphys             Unavailable

 

                    DOVER,  SOUHEIL    Attphys             Unavailable

 

                    GABRIELA NGO      Attphys             Unavailable

 

                    ZOMPA A MERYL     Attphys             Unavailable

 

                    AJAY GE     Attphys             Unavailable

 

                    Grayson Curtis Attphys             [phone]

 

                    Elli Wilson Attphys             [phone]

 

                    SHADY PEMBERTON        Admphys             Unavailable

 

                    HAMPEL,  JACQUELINE        Admphys             Unavailable

 

                    MISHEL,  MATTHEW     Admphys             Unavailable

 

                    DOVER,  SOUHEIL    Admphys             Unavailable

 

                    AJAY GE     Admphys             Unavailable







Payers





           Payer Name Policy Type Policy Number Effective Date Expiration Date YAO curran

 

           [employer]            [id number] 2019 00:00:00  00:00:00 [facility]

 

           Cdc Review Covid19            [id number]                         [facility]







Problems





           Condition Name Condition Details Condition Category Status     Onset 

Date Resolution

Date            Last Treatment Date Treating Clinician Comments        Source

 

                Complicated urinary tract infection UTI (urinary tract infection

) Problem         Active

           2016 00:00:00                                             [facility]

 

                          GEN. PAIN                                         GEN.

 PAIN                        Active       

                2016                                                      
                                         [facility]                     

Diagnosis Active    2016 00:00:00           2016-09-15 22:11:00           

          [facility]

 

                          WEAKNESS                                          WEAK

NESS                        Active         

              2016                                                        
                                       [facility]                     

Diagnosis Active    2016 00:00:00           2016 16:11:00           

          [facility]

 

        BMI 36.0-36.9,adult BMI 36.0-36.9,adult Disease Active  2014 00:00

:00                  

                          Overview: Obese           [facility]

 

       Obese  Obese  Disease Active 2014 00:00:00                         

    [facility]

 

                          Complicated UTI (urinary tract infection) Complicated 

UTI (urinary tract 

infection) Disease Active  2014 00:00:00                                 [facility]

 

       Hematuria Hematuria Disease Active 2014 00:00:00                   

          [facility]

 

       Fatigue Fatigue Disease Active 2013 00:00:00                       

      [facility]

 

       Myalgia Myalgia Disease Active 2013 00:00:00                       

      [facility]

 

       Fever  Fever  Disease Active 2013 00:00:00                         

    [facility]

 

                UTI (lower urinary tract infection) UTI (lower urinary tract inf

ection) Disease         

Active     2012-10-31 00:00:00                                             [facility]

 

           Hydronephrosis, right Hydronephrosis, right Disease    Active     201

08 00:00:00  

                                                                [facility]

 

       Flank pain Flank pain Disease Active 2012 00:00:00                 

            [facility]

 

       Cough  Cough  Disease Active 2011 00:00:00                         

    [facility]

 

                          Methicillin resistant Staphylococcus aureus (organism)

                         

Methicillin resistant Staphylococcus aureus (organism)                        
Active                                                Problem                   
    2016                        Problem added by Discern Expert.          
             [facility]                     Problem      Active               

                  2016 

06:01:38                                                    [facility]

 

       Colitis Colitis Problem Active                                    [facility]

 

       Hypokalemia Hypokalemia Problem Active                                   

 [facility]

 

       Pyelonephritis Pyelonephritis Problem Active                             

       [facility]

 

       Obstruction of urinary tract Urinary obstruction Problem Active          

                          [facility]

 

       Nephrostomy status Nephrostomy status Problem Active                     

               [facility]

 

       Complication of nephrostomy Nephrostomy complication Problem Active      

                              

[facility]

 

                          Urinary tract infection due to Enterococcus UTI (urina

ry tract infection) due to

Enterococcus Problem Active                                          [facility]

 

                          Urinary tract infection associated with catheterizatio

n of urinary tract UTI 

(urinary tract infection) due to urinary indwelling catheter Problem      Active

                                 

                                                            [facility]

 

       Abdominal pain Abdominal pain Problem Active                             

       [facility]

 

        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

 Active                          

                                                    [facility]

 

                          Discharge Diagnosis: Compression of lateral cutaneous 

femoral nerve of thigh    

                    Discharge Diagnosis: Compression of lateral cutaneous 
femoral nerve of thigh                                                2016                       
                        [facility]                     Problem                

                 2016 

06:00:00     2016 06:01:38 2016 06:01:38                           M

odessa [facility]iskell

 

                          Discharge Diagnosis: Urinary tract infection, site not

 specified                

        Discharge Diagnosis: Urinary tract infection, site not specified        
                                       2016                               
                01/10/2016                                                [facility]                     Problem                   2016 06:00:00 2016

-01-10 06:06:10 

2016-01-10 06:06:10                                         [facility]







Allergies, Adverse Reactions, Alerts





        Allergy Name Allergy Type Status  Severity Reaction(s) Onset Date Inacti

ve Date 

Treating Clinician        Comments                  Source

 

       Penicillin Allergy to substance Active               2020 00:00:00 

                     [facility]

 

        Meropenem Allergy to substance Active          REDNESS, ITCHING  00:00:00          

                                                    [facility]

 

           Diphenhydramine Propensity to adverse reactions to drug Active       

         Other      

2009 00:00:00                                 BECAME ANXIOUS AND FELT STRA

NGE [facility]

 

       diphenhydrAMINE diphenhydrAMINE Active                                   

        [facility]







Family History





           Family Member Diagnosis  Comments   Start Date Stop Date  Source

 

           Natural father Diabetes                                    [facility]

[facility]







Social History





           Social Habit Start Date Stop Date  Quantity   Comments   Source

 

                Alcohol intake  2015 00:00:00 2015 00:00:00 Current 

non-drinker of 

alcohol (finding)                                   [facility]

 

           Sex Assigned At Birth 1965 00:00:00 1965 00:00:00 Female 

               [facility]







                Smoking Status  Start Date      Stop Date       Source

 

                Never smoker                                    [facility]







Medications





             Ordered Medication Name Filled Medication Name Start Date   Stop Da

te    Current 

Medication? Ordering Clinician Indication Dosage     Frequency  Signature (SIG) 

Comments                  Components                Source

 

        Acetaminophen Acetaminophen 2017 09:47:00 2018 00:00:00 No  

                    650      

                Every 4 Hours as needed for Pain And Temperature                

                 [facility]

 

                          Acetaminophen/Codeine Phosphate (Tylenol # 3*) 1 Ea TA

B Acetaminophen/Codeine 

Phosphate (Tylenol # 3*) 1 Ea TAB 2017 09:47:00 2018 00:00:00 No    

                     

           2                     Every 6 Hours as needed for Pain               

        [facility]

 

                Meropenem (Merrem) 500 Mg INJ Meropenem (Merrem) 500 Mg INJ 2017 09:47:00 

2018 00:00:00 No                      500             Q8h@05,13,21        

         [facility]

 

       Lisinopril Lisinopril 2017 12:33:00 2018 00:00:00 No         

          2.5           Daily

                                                            [facility]

 

        Metformin Hcl Metformin Hcl 2017 12:33:00 2017 00:00:00 No  

                    500      

                Twice A Day                                     [facility]

 

       Fluconazole Fluconazole 2017 10:56:00 2017 00:00:00 No       

            200           

Daily                                                       [facility]

 

                    Levofloxacin (Levaquin) 500 Mg TABLET Levofloxacin (Levaquin

) 500 Mg TABLET 

2017 10:56:00 2017 00:00:00 No                   500           Daily

                [facility]

 

                          Sulfamethoxazole/Trimethoprim (Bactrim Ds Tablet) 1 Ea

ch TABLET 

Sulfamethoxazole/Trimethoprim (Bactrim Ds Tablet) 1 Each TABLET 2017 

10:56:00 2017 00:00:00 No                   1             Twice A Day     

          [facility]

 

       Ativan        2016 05:44:00        No                              

   1 mg, Route: PO, Drug form: TAB, ONCE,

Dosing Weight 86.364, kg, Priority: STAT, Start date: 16 23:44:00, Stop 
date: 16 23:44:00                                         [facility]

 

       tramadol hydrochloride 50 MG Oral Tablet        2016 05:43:00      

  Yes                                50 

mg = 1 tab, PO, BID, X 15 day, # 30 tab, 0 Refill(s)                            

             [facility]

 

          tramadol hydrochloride 50 MG Oral Tablet [Ultram]           2016

 01:10:00           Yes                 

                                                            1 - 2 tabs, PO, Q4-6

H, PRN as needed for pain, X 7 day, # 12 tab, 0 

Refill(s)                                                   [facility]

 

                    Sulfamethoxazole 800 MG / Trimethoprim 160 MG Oral Tablet [B

actrim]                     2016

 01:09:00        Yes                                1 tab, PO, BID, # 20 tab, 0 

Refill(s)               [facility]

 

       Rocephin        2016 23:48:00        No                            

     1 gm, Route: IM, ONCE, Dosing Weight

 84.091, kg, Start date: 16 17:48:00, Stop date: 16 17:48:00        

                                 

[facility]

 

       Ondansetron        2016 21:36:00        No                         

        Notes: (Same as: Zofran)   *** 

MEDICATION WASTE *** Product Size:  4 mg Product Wasted:  ___ mg                

                         [facility]

 

        Sodium Chloride 0.154 MEQ/ML Injectable Solution         2016 21:3

6:00         No                      

                                                    1,000 mL, 1000 ml/hr, Infuse

 Over: 1 hr, Route: IV, 1,000, Drug form: INJ, 

ONCE, Priority: STAT, Dosing Weight 84.091 kg, Start date: 16 15:36:00, 
Duration: 1 doses or times, Stop date: 16 15:36:00                        

                 [facility]

 

       Saline Flush 0.9%        2016 21:36:00        No                   

              Notes: Same as: BD 

Posiflush Sterile                                           [facility]

 

       traMADol (ULTRAM) 50 mg tablet        2015 16:46:57        Yes     

             50mg          Take 50 mg

 by mouth as needed.                                         [facility]

 

       acetaminophen (TYLENOL) 325 mg tablet        2015 16:46:57        Y

es                  650mg         

Take 650 mg by mouth every 6 hours as needed.                                   

      [facility]

 

       metFORMIN (GLUCOPHAGE) 500 mg tablet        2015 16:46:57        Ye

s                  500mg         

Take 500 mg by mouth 2 times daily (with meals).                                

         [facility]

 

           fenofibrate nanocrystallized (TRICOR) 145 mg tablet             16:46:57            Yes        

                    145mg     QD        Take 145 mg by mouth daily.             

        [facility]

 

        lisinopril (PRINIVIL, ZESTRIL) 2.5 mg tablet         2015 16:46:57

         Yes                     

2.5mg        QD           Take 2.5 mg by mouth daily.                           

[facility]

 

       atorvastatin (LIPITOR) 20 mg tablet        2015 16:46:57        Yes

                  20mg          Take 

20 mg by mouth at bedtime nightly.                                         [facility]

 

           HYDROcodone-acetaminophen (NORCO)  mg tablet            2014 00:00:00            Yes         

                Pyelonephritis  1{tbl}                          Take 1 tablet by

 mouth every 6 hours as needed for 

Pain.                                                       [facility]

 

          cyclobenzaprine (FLEXERIL) 10 mg tablet           2013-10-14 00:00:00 

          Yes                 Flank Pain

           10mg                  Take 1 tablet by mouth 2 times daily as needed 

for Muscle Spasms.                       

[facility]

 

          flavoxate (URISPAS) 100 mg tablet           2012 00:00:00       

    Yes                 Hydronephrosis, 

right      100mg                 Take 1 tablet by mouth 3 times daily as needed 

(dysuria).                       

[facility]

 

           tamsulosin (FLOMAX) 0.4 mg extended release capsule             00:00:00            Yes        

          Hydronephrosis .4mg      QD        Take 1 capsule by mouth daily.     

                [facility]

 

          famotidine (PEPCID) 20 mg tablet           2012-01-10 00:00:00        

   Yes                 GERD 

(gastroesophageal reflux disease) 20mg            Q.5D            Take 1 Tab by 

mouth 2 times daily.  

                                                    [facility]

 

          oxybutynin (DITROPAN) 5 mg tablet           2010 00:00:00       

    Yes                 Ureteral 

stricture  5mg                   Take 1 Tab by mouth 3 times daily. For bladder 

spasms                       [facility]

 

          doxazosin (CARDURA) 2 mg tablet           2010 00:00:00         

  Yes                 Ureteral stricture

           2mg                   Take 1 Tab by mouth at bedtime.                

       [facility]

 

      Buspirone Hcl Buspirone Hcl             Yes               10          Twic

e A Day as needed for Anxiety  

                                                    [facility]

 

                    Glipizide (Glipizide Er) 5 Mg TAB.ER.24 Glipizide (Glipizide

 Er) 5 Mg TAB.ER.24 

              Yes                  2.5           Daily                [facility]

 

                          Metoprolol Tartrate (Lopressor) 25 Mg TAB Metoprolol T

artrate (Lopressor) 25 Mg 

TAB               Yes               50          Twice A Day             [facility]

 

                Amlodipine Besylate (Norvasc) 5 Mg TAB Amlodipine Besylate (Norv

asc) 5 Mg TAB                 

2020 00:00:00 No                      5               Daily               

    [facility]

 

                Ondansetron Hcl (Zofran) 8 Mg TABLET Ondansetron Hcl (Zofran) 8 

Mg TABLET                 

2020 00:00:00 No                            4                   Every 4 Ho

urs as needed for Nausea And Vomiting  

                                                    [facility]

 

                          Cefuroxime Axetil (Cefuroxime) 500 Mg TABLET Cefuroxim

e Axetil (Cefuroxime) 500 

Mg TABLET       2020 00:00:00 No                500         Twice A Day   

          [facility]

 

                          Hydrocodone Bit/Acetaminophen (Norco 7.5-325 Tablet) 1

 Each TABLET Hydrocodone 

Bit/Acetaminophen (Norco 7.5-325 Tablet) 1 Each TABLET                 2020

6 00:00:00 No              

                    1                   Every 8 Hours as needed for Pain        

             [facility]

 

                          Sennosides/Docusate Sodium (Senokot-S Tablet) 1 Each T

ABLET Sennosides/Docusate 

Sodium (Senokot-S Tablet) 1 Each TABLET         2020 00:00:00 No          

            1               Twice A

 Day                                                        [facility]

 

                          Acetaminophen With Codeine (Tylenol With Codeine #3 Ta

blet) 1 Each TABLET 

Acetaminophen With Codeine (Tylenol With Codeine #3 Tablet) 1 Each TABLET       

                    

2020 00:00:00 No                      1               As Needed as needed 

for Moderate Pain (4-6)                 

[facility]

 

      Metoprolol Tartrate Metoprolol Tartrate       2020 00:00:00 No      

          25          Daily 

                                                    [facility]

 

     Fluconazole Fluconazole      2020 00:00:00 No             100       D

aily           [facility]

 

                Levofloxacin (Levaquin) 500 Mg TABLET Levofloxacin (Levaquin) 50

0 Mg TABLET                 

2020 00:00:00 No                      250             Daily               

    [facility]

 

                Linezolid (Zyvox) 600 Mg TABLET Linezolid (Zyvox) 600 Mg TABLET 

                2020 

00:00:00 No                      600             Twice A Day                 [facility]

 

                Cephalexin (Keflex) 250 Mg CAPSULE Cephalexin (Keflex) 250 Mg CA

PSULE                 

2020 00:00:00 No                                      Twice A Day         

        [facility]

 

     Chrofloxacin Chrofloxacin      2020 00:00:00 No             25       

 Twice A Day           [facility]

 

     Lisinopril Lisinopril      2019 00:00:00 No             5         Nay

ly           [facility]

 

     Dicyclomine Hcl Dicyclomine Hcl      2019 00:00:00 No             20 

       Daily           [facility] 

[facility]

 

                Docusate Sodium (Colace) 100 Mg CAP Docusate Sodium (Colace) 100

 Mg CAP                 

2019 00:00:00 No                      100             Twice A Day         

        [facility]

 

                Ondansetron Hcl (Zofran*) 4 Mg TABLET Ondansetron Hcl (Zofran*) 

4 Mg TABLET                 

2019 00:00:00 No                      4               as needed for Nausea

 And Vomiting                 [facility]

 

       Buspirone Hcl Buspirone Hcl        2019 00:00:00 No                

   10            Twice A Day as 

needed for Anxiety                                          [facility]

 

                    Ciprofloxacin Hcl (Cipro) 500 Mg TABLET Ciprofloxacin Hcl (C

ipro) 500 Mg TABLET 

       2019 00:00:00 No                   500           Every 12 Hours    

           [facility]

 

                          Acetaminophen With Codeine (Tylenol With Codeine #3 Ta

blet) 1 Each TABLET 

Acetaminophen With Codeine (Tylenol With Codeine #3 Tablet) 1 Each TABLET       

                    

2019-06-10 00:00:00 No                      1               Every 6 Hours as nee

ded for Pain                 [facility]

 

                Fluconazole (Diflucan) 200 Mg TABLET Fluconazole (Diflucan) 200 

Mg TABLET                 

2019-06-10 00:00:00 No                      200             Daily               

    [facility]

 

       Ondansetron Hcl Ondansetron Hcl        2019-06-10 00:00:00 No            

       4             Every 4 Hours 

as needed for Nausea                                         [facility]

 

                          Cephalexin Monohydrate (Keflex) 500 Mg CAPSULE Cephale

nancy Monohydrate (Keflex) 

500 Mg CAPSULE       2019 00:00:00 No                500         Every 12 

Hours             [facility]

 

     Lisinopril Lisinopril      2019 00:00:00 No             5         Nay

ly           [facility]

 

       Ondansetron Ondansetron        2019 00:00:00 No                   4

             Every 4 Hours as needed

 for Nausea                                                 [facility]

 

                    Ciprofloxacin Hcl (Cipro) 500 Mg TABLET Ciprofloxacin Hcl (C

ipro) 500 Mg TABLET 

       2019-02-15 00:00:00 No                   500           Every 12 Hours    

           [facility]

 

     Meropenem Meropenem      2018 00:00:00 No             1         Every

 8 Hours           [facility]

 

                    Ciprofloxacin Hcl (Cipro) 500 Mg TABLET Ciprofloxacin Hcl (C

ipro) 500 Mg TABLET 

       2018 00:00:00 No                   250           Twice A Day       

        [facility]

 

                Fluconazole (Diflucan) 100 Mg TABLET Fluconazole (Diflucan) 100 

Mg TABLET                 

2018 00:00:00 No                                                          

    [facility]

 

                    Ciprofloxacin Hcl (Cipro) 500 Mg TABLET Ciprofloxacin Hcl (C

ipro) 500 Mg TABLET 

       2017 00:00:00 No                   250           Every 12 Hours    

           [facility]

 

                Fluconazole (Diflucan) 100 Mg TABLET Fluconazole (Diflucan) 100 

Mg TABLET                 

2017 00:00:00 No                                      Daily               

    [facility]

 

                Metronidazole (Flagyl) 250 Mg TABLET Metronidazole (Flagyl) 250 

Mg TABLET                 

2017 00:00:00 No                      250             Every 6 Hours       

          [facility]

 

                          Cranberry/Vit C/L. Sporogenes (Cranberry Tablet) 1 Eac

h TABLET Cranberry/Vit 

C/L. Sporogenes (Cranberry Tablet) 1 Each TABLET            2017-06-15 00:00:00 

No                               

1000                      Twice A Day                            [facility]

 

       Atorvastatin Calcium Atorvastatin Calcium        2017 00:00:00 No  

                 20            

Daily                                                       [facility]

 

                Cefdinir (Omnicef) 300 Mg CAPSULE Cefdinir (Omnicef) 300 Mg CAPS

ULE                 2017

 00:00:00 No                                      Twice A Day                 [facility]

[facility]

 

                          Cyanocobalamin (Vitamin B-12) (Vitamin B-12) 1,000 Mcg

 TAB.SUBL Cyanocobalamin 

(Vitamin B-12) (Vitamin B-12) 1,000 Mcg TAB.SUBL           2017 00:00:00 N

o                                      

                                                                [facility]

 

                          Fenofibrate Nanocrystallized (Fenofibrate) 145 Mg TABL

ET Fenofibrate 

Nanocrystallized (Fenofibrate) 145 Mg TABLET         2017 00:00:00 No     

                                 

Daily                                                       [facility]

 

     Lisinopril Lisinopril      2017 00:00:00 No             25        Nay

ly           [facility]

 

                          Amoxicillin/Potassium Clav (Amox Tr-K Clv 875-125 Mg T

ab) 1 Each TABLET 

Amoxicillin/Potassium Clav (Amox Tr-K Clv 875-125 Mg Tab) 1 Each TABLET         

                  

2016 00:00:00 No                      1               Twice A Day         

        [facility]

 

       Atorvastatin Calcium Atorvastatin Calcium        2016 00:00:00 No  

                 20            

Bedtime                                                     [facility]

 

                          Cholecalciferol (Vitamin D3) (Vitamin D3) 50,000 Unit 

CAPSULE Cholecalciferol 

(Vitamin D3) (Vitamin D3) 50,000 Unit CAPSULE         2016 00:00:00 No    

                  1               

On Call                                                     [facility]

 

                          Cyanocobalamin (Vitamin B-12) 1,000 Mcg TAB Cyanocobal

amin (Vitamin B-12) 1,000 

Mcg TAB       2016 00:00:00 No                1000        Daily           

  [facility]

 

                          Fenofibrate Nanocrystallized (Fenofibrate) 145 Mg TABL

ET Fenofibrate 

Nanocrystallized (Fenofibrate) 145 Mg TABLET         2016 00:00:00 No     

                 145             

Daily                                                       [facility]

 

     Lisinopril Lisinopril      2016 00:00:00 No             2.5       Nay

ly           [facility]

 

     Metformin Hcl Metformin Hcl      2016 00:00:00 No             500    

   Twice A Day           

[facility]

 

     Trazodone Hcl Trazodone Hcl      2016 00:00:00 No             100    

   Bedtime           [facility]

 

       Atorvastatin Calcium Atorvastatin Calcium        2016-10-07 00:00:00 No  

                 20            

Today At 9:00PM                                             [facility]

 

                Cefdinir (Omnicef) 300 Mg CAPSULE Cefdinir (Omnicef) 300 Mg CAPS

ULE                 2016-10-07

 00:00:00 No                      300             Twice A Day                 [facility]

[facility]

 

                          Cyanocobalamin (Vitamin B-12) 1,000 Mcg TAB Cyanocobal

amin (Vitamin B-12) 1,000 

Mcg TAB       2016-10-07 00:00:00 No                1000        Daily           

  [facility]

 

                          Fenofibrate Nanocrystallized (Fenofibrate) 145 Mg TABL

ET Fenofibrate 

Nanocrystallized (Fenofibrate) 145 Mg TABLET         2016-10-07 00:00:00 No     

                 1               

Daily                                                       [facility]

 

                Fluconazole (Diflucan) 100 Mg TABLET Fluconazole (Diflucan) 100 

Mg TABLET                 

2016-10-07 00:00:00 No                      100             Daily               

    [facility]

 

                          Lisinopril (Prinavil / Zestril) 20 Mg TABLET Lisinopri

l (Prinavil / Zestril) 20 

Mg TABLET       2016-10-07 00:00:00 No                25          Daily         

    [facility]

 

     Metformin Hcl Metformin Hcl      2016 00:00:00 No             500    

   Twice A Day           

[facility]

 

                Tramadol Hcl (Ultram) 50 Mg TABLET Tramadol Hcl (Ultram) 50 Mg T

ABLET                 

2016 00:00:00 No                      50              Twice A Day         

        [facility]







Vital Signs





             Vital Name   Observation Time Observation Value Comments     Source

 

             Body Temperature 2020 13:33:00 98.7 [degF]               [facility]

 

             Weight       2020 10:56:00 202 [lb_av]               [facility]

 

             BMI (Body Mass Index) 2020 10:56:00 39.4 kg/m2               

 [facility]

 

             Body Temperature 2020 11:31:00 98.2 [degF]               [facility]

 

             Weight       2020 01:05:00 202 [lb_av]               [facility]

 

             BMI (Body Mass Index) 2020 01:05:00 39.4 kg/m2               

 [facility]

 

             Body Temperature 2020 08:25:00 98.5 [degF]               [facility]

 

             Weight       2020 15:24:00 198 [lb_av]               [facility]

 

             BMI (Body Mass Index) 2020 15:24:00 38.7 kg/m2               

 [facility]

 

             [facility]       2016 02:31:00                           [facility]

 

             Temperature Oral (F) 2016 02:31:00 97.9 F                    

[facility]

 

             Respitory Rate 2016 02:31:00                           [facility]

 

             Heart Rate   2016 02:31:00                           [facility]

 

             Systolic (mm Hg) 2016 02:31:00                           [facility]

[facility]

 

             Diastolic (mm Hg) 2016 02:31:00                           [facility]

 

             Temperature Oral (F) 2016 01:28:00 98.4 F                    

[facility]

 

             Respitory Rate 2016 01:28:00                           [facility]

 

             Heart Rate   2016 01:28:00                           [facility]

 

             Systolic (mm Hg) 2016 01:28:00                           [facility]

[facility]

 

             Diastolic (mm Hg) 2016 01:28:00                           [facility]

 

             Systolic (mm Hg) 2016 21:32:00                           [facility]

[facility]

 

             Diastolic (mm Hg) 2016 21:32:00                           [facility]

 

             Weight       2016 21:32:00                           [facility]

 

             BMI Calculated 2016 21:32:00                           [facility]

 

             Height       2016 21:32:00 152.4 cm                  [facility]

 

             Heart Rate   2016 21:32:00                           [facility]

 

             Respitory Rate 2016 21:32:00                           [facility]

 

             Temperature Oral (F) 2016 21:32:00 98.1 F                    

[facility]







Procedures





                Procedure       Date / Time Performed Performing Clinician [facility]

e

 

                CYSTOSCOPY AND TREATMENT 2020 00:00:00                 [facility]

 

                CYSTO W/URETER STRICTURE TX 2020 00:00:00                 

[facility]

 

                REMOVAL OF INTRALUMINAL DEVICE FROM URETER, ENDO 2020 00:0

0:00                 [facility]

 

                DILATION OF LEFT URETER WITH INTRALUMINAL DEVICE, ENDO 2020-04-3

0 00:00:00                 [facility]

 

                FLUOROSCOPY KIDNEY, URETER, BLADDER, L W L OSM CONTRAST  00:00:00                 

[facility]

 

                DILATION OF URETHRA, ENDO 2020 00:00:00                 [facility]

[facility]

 

                Computed tomography of brain without radiopaque contrast 2020 00:00:00                 

[facility]

 

                CT of abdomen and pelvis without contrast 2020 00:00:00   

              [facility]

 

                CHANGE DRAINAGE DEVICE IN KIDNEY, EXTERNAL APPROACH 2019 0

0:00:00                 [facility]

 

                CYSTOSCOPY AND TREATMENT 2019 00:00:00                 [facility]

 

                CYSTO W/URETER STRICTURE TX 2019 00:00:00                 

[facility]







Plan of Care





             Planned Activity Planned Date Details      Comments     Source

 

                    Future Scheduled Test 2020-10-01 00:00:00 IMM Influenza Seas

onal Oct to March 

(>/= 19 yrs) [code = IMM Influenza Seasonal Oct to March (>/= 19 yrs)]          

                 [facility] Scheduled Test 2014 00:00:00 Screening for isabelle

gnant neoplasm of 

cervix (procedure) [code = 828899962]                           [facility] Scheduled Test 2013 00:00:00 Screening for isabelle

gnant neoplasm of 

colon (procedure) [code = 292008107]                           [facility] Scheduled Test 2005 00:00:00 Breast Cancer Scrn

 (Yearly) [code = 

Breast Cancer Scrn (Yearly)]                           [facility] Scheduled Test 1993 00:00:00 Screening for isabelle

gnant neoplasm of 

cervix (procedure) [code = 068631579]                           [facility]

 

             Instructions              [facility]              [facility] 

[facility]

 

             Instructions              Urinary Tract Infection - Women          

    [facility]







Encounters





             Start Date/Time End Date/Time Encounter Type Admission Type Attendi

[facility]   Care Department Encounter ID    Source

 

           2020 11:57:00 2020 13:46:00 Departed Emergency Room      

      JENNIFER TERRELL 

[facility]              [facility] [id number]        [facility]

[facility] - Patients 

[facility]

 

          2020 15:13:00 2020 15:13:00 Registered Clinic 3         PONCE

MPEL, JACQUELINE [facility]    

[facility] [id number]              [facility]

 [facility]

 

          2020 10:39:00 2020 10:39:00 Registered Surgical Day Care  

                   [facility]    

[facility] [id number]              [facility] Patients

 [facility]

 

          2020 21:44:00 2020 12:09:00 Discharged Inpatient          

           [facility]    [facility] 

[facility] [id number]              [facility]

 

           2020 13:17:00 2020 10:40:00 Discharged Inpatient 1       

   SHADY PEMBERTON 

[facility]              [facility] [id number]        [facility]

[facility] - Patients 

[facility]

 

             2020 11:21:00 2020 20:46:00 Departed Emergency Room 1  

          LISSETTE SCOTT          [facility]          [facility] [id number]    [facility] - 

Patients [facility]

 

           2019 08:38:00 2020 11:48:00 Discharged Inpatient 1       

   SHADY PEMBERTON 

[facility]              [facility] [id number]        [facility]

[facility] - Patients 

[facility]

 

             2019 10:39:00 2019 10:39:00 Registered Surgical Day Car

e 3            JACQUELINE ARORA             [facility]          [facility] [id number]    [facility] Patients 

[facility]

 

          2019 20:48:00 2019 00:40:00 Departed Emergency Room       

              [facility]    [facility] 

[facility] [id number]              [facility] Patients [facility]

 

          2019 09:50:00 2019 12:01:00 Departed Emergency Room       

              [facility]    [facility] [id number]              [facility] Patients [facility]

 

          2019-10-24 08:28:00 2019-10-24 08:28:00 Registered Clinic 3         PONCE

MPEL, JACQUELINE [facility]    

[facility] [id number]              [facility] Patients

 [facility]

 

          2019 05:33:00 2019 06:27:00 Departed Emergency Room       

              [facility]    [facility] 

[facility] [id number]              [facility] - Patients [facility]

 

          2019 12:06:00 2019 13:42:00 Departed Emergency Room       

              [facility]    [facility] [id number]              [facility] - Patients [facility]

 

             2019 06:19:00 2019 06:19:00 Registered Surgical Day Car

e 3            ULYSSES 

[facility]          [facility] [id number]    

[facility] 

[facility]

 

           2019 18:21:00 2019 12:48:00 Discharged Inpatient 1       

   JENNIFER TERRELL 

[facility]              [facility] [id number]        [facility]

[facility] - Patients 

[facility]

 

             2019 09:33:00 2019 09:33:00 Registered Surgical Day Car

e 3            ULYSSES 

JACQUELINE             [facility]          [id number]    [facility] - 

Patients [facility]

 

          2019 10:15:00 2019 11:33:00 Departed Emergency Room       

              [facility]                    [id number]              [facility] - Patients [facility]

 

          2019-06-10 14:11:00 2019-06-10 19:19:00 Departed Emergency Room       

              [facility]                    [id number]              [facility] - Patients [facility]

 

           2019 13:14:00 2019 19:30:00 Departed Emergency Room 1    

      JENNIFER TERRELL 

[facility]              [id number]        [facility]

 

           2019 14:04:00 2019 16:20:00 Discharged Inpatient 1       

   SHADY PEMBERTON 

[facility]              [id number]        [facility]

 

             2019 08:50:00 2019 08:50:00 Registered Surgical Day Car

e JACQUELINE DUNBAR             [facility]          [id number]    [facility]

 

          2019 15:25:00 2019 17:05:00 Departed Emergency Room       

              [facility]                    [id number]              [facility] Patients [facility]

 

             2019 12:32:00 2019 16:55:00 Departed Emergency Room 1  

          LISSETTE GATES

                [facility]          [id number]    [facility]

 

          2019 12:46:00 2019 16:46:00 Departed Emergency Room       

              [facility]                    [id number]              [facility] Patients [facility]

 

        2018 09:35:00 2018 16:22:00 Discharged Inpatient            

     [facility]  

[id number]                            [facility]

 

          2018 12:28:00 2018 12:55:00 Departed Emergency Room       

              [facility]                    [id number]              [facility]

 

           2018 16:30:00 2018 21:52:00 Departed Emergency Room 1    

      JENNIFER TERRELL 

[facility]              [id number]        [facility]

 

           2018 14:41:00 2018 18:30:00 Discharged Inpatient 1       

   MATTHEW FLORENTINO 

[facility]              [id number]        [facility]

 

           2018-10-22 20:49:00 2018-10-26 16:53:00 Discharged Inpatient 1       

   DREW DINERO 

[facility]              [id number]        [facility]

 

          2018-10-06 20:27:00 2018-10-06 21:25:00 Departed Emergency Room       

              [facility]                    [id number]              [facility] - Patients [facility]

 

          2018 15:08:00 2018 15:08:00 Registered Clinic 3         JACQUELINE ENCARNACION [facility]                    [id number]              [facility] - Patients [facility]

 

           2018-08-10 01:04:00 2018-08-15 18:35:00 Discharged Inpatient 1       

   LAVONNE DOVERIL 

[facility]              [id number]        [facility]

 

          2018 13:10:00 2018 18:25:00 Departed Emergency Room       

              [facility]                    [id number]              [facility] - Patients [facility]

 

          2018 02:07:00 2018 02:38:00 Departed Emergency Room       

              [facility]                    [id number]              [facility] Patients [facility]

 

          2018 12:59:00 2018 13:01:00 Departed Emergency Room       

              [facility]                    [id number]              [facility] Patients [facility]

 

          2018 07:21:00 2018 07:21:00 Registered Surgical Day Care  

                   [facility]                    [id number]              [facility] Patients [facility]

 

             2018 11:08:00 2018 11:08:00 Registered Surgical Day Car

e 3            CLAU NGO           [facility]          [id number]    [facility] - 

Patients [facility]

 

             2018 13:23:00 2018 17:28:00 Departed Emergency Room ER 

          STEVENMITZI LEXABEV

                [facility]          [id number]    [facility] - 

Patients [facility]

 

           2018 06:30:00 2018 14:25:00 Discharged Inpatient ER      

   ROYER DOVER 

[facility]              [id number]        [facility]

 

           2017 20:18:00 2017 14:28:00 Discharged Inpatient ER      

   AJAY GE 

[facility]              [id number]        [facility]

 

           2017 07:17:00 2017 17:05:00 Discharged Inpatient ER      

   AJAY GE 

[facility]              [id number]        [facility]

 

        2017 18:56:00 2017 17:00:00 Discharged Inpatient            

     [facility]  

[id number]                            [facility]

 

        2017-06-15 15:58:00 2017 12:09:00 Discharged Inpatient            

     [facility]  

[id number]                            [facility]

 

          2017 08:01:00 2017 08:01:00 Registered Surgical Day Care  

                   [facility]                    [id number]              [facility]

 

           2016 20:28:00 2016 23:43:00 Outpatient            [facility] 

[facility]                [id number]         

 

             2016 15:13:00 2016 19:33:00 Out[facility]                [facility]      [facility]            [id number]     







Results





           Test Description Test Time  Test Comments Results    Result Comments 

Source

 

                    Blood leukocytes automated count (number/volume) 2020 

11:45:00   

 

                                        Test Item

 

             White Blood Count (test code = 6690-2) 7.89         4.8-10.8       

            





[facility] Center[facility] count 
(number/volume)2020 11:45:00* 



             Test Item    Value        Reference Range Interpretation Comments

 

             Red Blood Count (test code = 789-8) 4.15         3.6-5.1           

         





[facility] Center[facility] hemoglobin measurement 
(moles/volume)2020 11:45:00* 



             Test Item    Value        Reference Range Interpretation Comments

 

             Hemoglobin (test code = 70450-6) 10.6         12.0-16.0            

      





[facility] CenterAutomated blood hematocrit (volume 
fraction)2020 11:45:00* 



             Test Item    Value        Reference Range Interpretation Comments

 

             Hematocrit (test code = 4544-3) 34.6         34.2-44.1             

     





[facility] CenterAutomated erythrocyte mean corpuscular 
ofmplj3590-95-54 11:45:00* 



             Test Item    Value        Reference Range Interpretation Comments

 

             Mean Corpuscular Volume (test code = 787-2) 83.4         81-99     

                 





[facility] CenterAutomated erythrocyte mean corpuscular 
hemoglobin (mass per erythrocyte)2020 11:45:00* 



             Test Item    Value        Reference Range Interpretation Comments

 

             Mean Corpuscular Hemoglobin (test code = 785-6) 25.5         28-32 

                     





[facility] erythrocyte mean corpuscular 
hemoglobin concentration measurement (mass/volume)2020 11:45:00* 



             Test Item    Value        Reference Range Interpretation Comments

 

             Mean Corpuscular Hemoglobin Concent (test code = 786-4) 30.6       

  31-35                      





[facility] CenterRDW GudWp-Rql7966-81-21 11:45:00* 



             Test Item    Value        Reference Range Interpretation Comments

 

             Red Cell Distribution Width (test code = 99710-4) 14.4         11.7

-14.4                  





[facility] CenterAut[facility] platelet count 
(count/volume)2020 11:45:00* 



             Test Item    Value        Reference Range Interpretation Comments

 

             Platelet Count (test code = 777-3) 266          140-360            

        





[facility] Center[facility] blood segmented neutrophil 
count as percentage of total ngvlxvenzg1104-81-88 11:45:00* 



             Test Item    Value        Reference Range Interpretation Comments

 

             Neutrophils (%) (Auto) (test code = 63341-5) 76.9         38.7-80.0

                  





[facility] CenterAutomated blood lymphocyte count as 
percentage ot total iawcnyofea8719-32-95 11:45:00* 



             Test Item    Value        Reference Range Interpretation Comments

 

             Lymphocytes (%) (Auto) (test code = 736-9) 16.1         18.0-39.1  

                





[facility] CenterAutomated blood monocyte count as 
percentage of total hafmzbmugn0581-71-28 11:45:00* 



             Test Item    Value        Reference Range Interpretation Comments

 

             Monocytes (%) (Auto) (test code = 5905-5) 5.1          4.4-11.3    

               





[facility] CenterAutomated blood eosinophil count as 
percentage of total lckrotrkfl3762-31-68 11:45:00* 



             Test Item    Value        Reference Range Interpretation Comments

 

             Eosinophils (%) (Auto) (test code = 713-8) 0.8          0.0-6.0    

                





[facility] CenterAutomated blood basophil count as 
percentage of total uxrghmvlcd1705-37-62 11:45:00* 



             Test Item    Value        Reference Range Interpretation Comments

 

             Basophils (%) (Auto) (test code = 706-2) 0.5          0.0-1.0      

              





[facility] CenterFluoroscopic procedure less than one hour
rnqqvzyn0998-62-37 11:45:00* 



             Test Item    Value        Reference Range Interpretation Comments

 

             IM GRANULOCYTES % (test code = IM GRANULOCYTES %) 0.6          0.0-

1.0                    





[facility] CenterAutomated blood neutrophil count
2020 11:45:00* 



             Test Item    Value        Reference Range Interpretation Comments

 

             Neutrophils # (Auto) (test code = 751-8) 6.1          2.1-6.9      

              





[facility] CenterBlood lymphocytes count (number/volume)
2020 11:45:00* 



             Test Item    Value        Reference Range Interpretation Comments

 

             Lymphocytes # (Auto) (test code = 79302-2) 1.3          1.0-3.2    

                





[facility] CenterBlood monocytes automated count 
(number/volume)2020 11:45:00* 



             Test Item    Value        Reference Range Interpretation Comments

 

             Monocytes # (Auto) (test code = 742-7) 0.4          0.2-0.8        

            





[facility] CenterAutomated blood eosinophil count
2020 11:45:00* 



             Test Item    Value        Reference Range Interpretation Comments

 

             Eosinophils # (Auto) (test code = 711-2) 0.1          0.0-0.4      

              





[facility] CenterAutomated blood basophil count 
(count/volume)2020 11:45:00* 



             Test Item    Value        Reference Range Interpretation Comments

 

             Basophils # (Auto) (test code = 704-7) 0.0          0.0-0.1        

            





[facility] CenterFluoroscopic procedure less than one hour
shobarhq2290-47-00 11:45:00* 



             Test Item    Value        Reference Range Interpretation Comments

 

                                        Absolute Immature Granulocyte (auto (lloyd

t code = Absolute Immature Granulocyte 

(auto)          0.05            0-0.1                            





[facility] CenterProthrombin time (PT) in platelet poor 
plasma by coagulation mhuvm6290-14-60 11:45:00* 



             Test Item    Value        Reference Range Interpretation Comments

 

             Prothrombin Time (test code = 5902-2) 13.1         11.9-14.5       

           





[facility] CenterINR in Platelet poor plasma by 
Coagulation birts7367-55-67 11:45:00* 



             Test Item    Value        Reference Range Interpretation Comments

 

             Prothromb Time International Ratio (test code = 6301-6) 0.94       

                             





Oral Anticoagulant Therapy INR Values:1. Low Intensity Therapy        1.5 - 2.02
. Moderate Intensity Therapy   2.0 - 3.03. High Intensity Therapy(1)    2.5 - 3.
54. High Intensity Therapy(2)    3.0 - 4.05. Panic Value INR              > 5.0
[facility] CenterActivated partial thromboplastin time 
(aPTT) in platelet poor plasma by coagulation fkkjz6325-68-40 11:45:00* 



             Test Item    Value        Reference Range Interpretation Comments

 

             Activated Partial Thromboplast Time (test code = 47868-9) 31.0     

    23.8-35.5                  





[facility] CenterUrine color nhapapujjscpu5613-85-92 
11:45:00* 



             Test Item    Value        Reference Range Interpretation Comments

 

             Urine Color (test code = 5778-6) LISA        YELLOW               

      





[facility] CenterUrine ctqflym5017-72-78 11:45:00* 



             Test Item    Value        Reference Range Interpretation Comments

 

             Urine Clarity (test code = 69436-9) TURBID       CLEAR             

         





[facility] Center[facility] Urine by Test strip
2020 11:45:00* 



             Test Item    Value        Reference Range Interpretation Comments

 

             Urine Specific Gravity (test code = 5811-5) 1.025        1.010-1.02

5                





[facility] CenterUrine pH measurement by automated test 
cnyur5423-26-52 11:45:00* 



             Test Item    Value        Reference Range Interpretation Comments

 

             Urine pH (test code = 30870-8) 7.5          5-7                    

    





[facility] CenterUrine leukocyte esterase detection by 
orsvrxzf7287-42-15 11:45:00* 



             Test Item    Value        Reference Range Interpretation Comments

 

             Urine Leukocyte Esterase (test code = 5799-2) MODERATE     NEGATIVE

                   





[facility] CenterUrine nitrite zyswfobha2169-02-71 
11:45:00* 



             Test Item    Value        Reference Range Interpretation Comments

 

             Urine Nitrite (test code = 71779-6) POSITIVE     NEGATIVE          

         





[facility] CenterUrine protein measurement by test strip 
(mass/volume)2020 11:45:00* 



             Test Item    Value        Reference Range Interpretation Comments

 

             Urine Protein (test code = 5804-0) >=300        NEGATIVE           

        





[facility] CenterUrine glucose ceoahohbg5060-39-61 
11:45:00* 



             Test Item    Value        Reference Range Interpretation Comments

 

             Urine Glucose (UA) (test code = 2349-9) NEGATIVE     NEGATIVE      

             





[facility] CenterUrine ketones detection by automated test
aieah0876-41-68 11:45:00* 



             Test Item    Value        Reference Range Interpretation Comments

 

             Urine Ketones (test code = 72328-2) NEGATIVE     NEGATIVE          

         





[facility] CenterUrine urobilinogen measurement by test 
strip (mass/volume)2020 11:45:00* 



             Test Item    Value        Reference Range Interpretation Comments

 

             Urine Urobilinogen (test code = 05163-7) 1            0.2-1        

              





[facility] CenterUrine total bilirubin measurement 
(mass/volume)2020 11:45:00* 



             Test Item    Value        Reference Range Interpretation Comments

 

             Urine Bilirubin (test code = 1978-6) NEGATIVE     NEGATIVE         

          





[facility] CenterUrine erythrocytes wxdsnkghs4578-08-50 
11:45:00* 



             Test Item    Value        Reference Range Interpretation Comments

 

             Urine Blood (test code = 75834-9) LARGE        NEGATIVE            

       





[facility] CenterAutomated urine sediment leukocyte count 
by microscopy (number/high power field)2020 11:45:00* 



             Test Item    Value        Reference Range Interpretation Comments

 

             Urine WBC (test code = 5821-4) 20        0-5                    

    





[facility] CenterErythrocytes detection in urine sediment 
by light kfnqcunbae6425-93-24 11:45:00* 



             Test Item    Value        Reference Range Interpretation Comments

 

             Urine RBC (test code = 76192-0) 21-50        0-5                   

     





[facility] CenterBacteria detection in urine sediment by 
light svlukyjqeq8361-24-71 11:45:00* 



             Test Item    Value        Reference Range Interpretation Comments

 

             Urine Bacteria (test code = 56475-8) MANY         NONE             

          





[facility] CenterEpithelial cells detection in urine 
sediment by light jxpshmjmrd2898-68-01 11:45:00* 



             Test Item    Value        Reference Range Interpretation Comments

 

             Urine Epithelial Cells (test code = 08161-2) FEW          NONE     

                  





[facility] Centererum or plasma sodium measurement 
(moles/volume)2020 11:45:00* 



             Test Item    Value        Reference Range Interpretation Comments

 

             Sodium Level (test code = 2951-2) 140          136-145             

       





[facility] Centererum or plasma potassium measurement 
(moles/volume)2020 11:45:00* 



             Test Item    Value        Reference Range Interpretation Comments

 

             Potassium Level (test code = 2823-3) 3.3          3.5-5.1          

          





[facility] Centererum or plasma chloride measurement 
(moles/volume)2020 11:45:00* 



             Test Item    Value        Reference Range Interpretation Comments

 

             Chloride Level (test code = 2075-0) 105                      

         





[facility] Centererum or plasma carbon dioxide, total 
measurement (moles/volume)2020 11:45:00* 



             Test Item    Value        Reference Range Interpretation Comments

 

             Carbon Dioxide Level (test code = 2028-9) 21           22-29       

               





[facility] Centererum or plasma anion rml5428-00-02 
11:45:00* 



             Test Item    Value        Reference Range Interpretation Comments

 

             Anion Gap (test code = 33037-3) 17.3         8-16                  

     





[facility] Centererum or plasma urea nitrogen measurement
(mass/volume)2020 11:45:00* 



             Test Item    Value        Reference Range Interpretation Comments

 

             Blood Urea Nitrogen (test code = 3094-0) 14           7-26         

              





[facility] Centererum or plasma creatinine measurement 
(mass/volume)2020 11:45:00* 



             Test Item    Value        Reference Range Interpretation Comments

 

             Creatinine (test code = 2160-0) 1.19         0.57-1.11             

     





[facility] Centererum or plasma urea nitrogen/creatinine 
mass bzynv3188-31-43 11:45:00* 



             Test Item    Value        Reference Range Interpretation Comments

 

             BUN/Creatinine Ratio (test code = 3097-3) 12           6-25        

               





[facility] CenterEstimated glomerular filtration rate 
(GFR) pnqozgfufsjap6473-32-94 11:45:00* 



             Test Item    Value        Reference Range Interpretation Comments

 

             Estimat Glomerular Filtration Rate (test code = 792565501) 47      

     >60                        





Ranges were taken from the National Kidney Disease Education Program and the [facility] [employer] Foundation literature.Reference ranges:60 or greater: Cbyaxh10-50 (
for 3 consecutive months): Chronic kidney disease 15 or less: Kidney failureBrownfield Regional Medical CenterGlucose tatlgidsald9779-19-00 11:45:00* 



             Test Item    Value        Reference Range Interpretation Comments

 

             Glucose Level (test code = WDI0330) 201                      

         





[facility] Centererum or plasma calcium measurement 
(mass/volume)2020 11:45:00* 



             Test Item    Value        Reference Range Interpretation Comments

 

             Calcium Level (test code = 36856-8) 9.2          8.4-10.2          

         





[facility] Centererum or plasma total bilirubin 
measurement (mass/volume)2020 11:45:00* 



             Test Item    Value        Reference Range Interpretation Comments

 

             Total Bilirubin (test code = 1975-2) 0.3          0.2-1.2          

          





[facility] CenterFluoroscopic procedure less than one hour
fyvjexrg4994-88-87 11:45:00* 



             Test Item    Value        Reference Range Interpretation Comments

 

                                        Aspartate Amino Transf (AST/SGOT) (test 

code = Aspartate Amino Transf 

(AST/SGOT))     13              5-34                             





[facility] Centererum or plasma alanine aminotransferase 
measurement (enzymatic activity/volume)2020 11:45:00* 



             Test Item    Value        Reference Range Interpretation Comments

 

             Alanine Aminotransferase (ALT/SGPT) (test code = 1742-6) 13        

   0-55                       





[facility] Centererum or plasma protein measurement 
(mass/volume)2020 11:45:00* 



             Test Item    Value        Reference Range Interpretation Comments

 

             Total Protein (test code = 2885-2) 8.3          6.5-8.1            

        





[facility] Centererum or plasma albumin measurement 
(mass/volume)2020 11:45:00* 



             Test Item    Value        Reference Range Interpretation Comments

 

             Albumin (test code = 1751-7) 4.1          3.5-5.0                  

  





[facility] CenterPlasma globulin measurement (mass/volume)
2020 11:45:00* 



             Test Item    Value        Reference Range Interpretation Comments

 

             Globulin (test code = 52879-2) 4.2          2.3-3.5                

    





[facility] Centererum or plasma albumin/globulin mass 
zfoms5188-99-60 11:45:00* 



             Test Item    Value        Reference Range Interpretation Comments

 

             Albumin/Globulin Ratio (test code = 1759-0) 1.0          0.8-2.0   

                 





[facility] Centererum or plasma alkaline phosphatase 
measurement (enzymatic activity/volume)2020 11:45:00* 



             Test Item    Value        Reference Range Interpretation Comments

 

             Alkaline Phosphatase (test code = 6768-6) 111                

               





[facility] CenterCHEST SINGLE (PORTABLE)2020 
11:30:00                                                                        
             [facility]                        [address] [place] [address]      Patient Name: CRISTINA RICH                                MR #: [id number]                  : 
1965                                   Age/Sex: 55/F  Acct #: [id number]
                             Req #: [id number]  Adm Physician:                  
                                   Ordered by: JENNIFER TERRELL MD                
        Report #: [id number]        Location: ER                                 
    Room/Bed:                   
_____________________________________________________
______________________________________________    Procedure: [id number] DX/CHEST 
SINGLE (PORTABLE)  Exam Date: 20                            Exam Time: 112
0                                              REPORT STATUS: Signed    EXAMINAT
ION:  CHEST SINGLE (PORTABLE)          INDICATION: Chest pain, urinary tract inf
ection      COMPARISON: Chest radiograph 2020           FINDINGS:      LINE
S/TUBES:None      LUNGS:The lungs are well-inflated. No focal consolidation or p
ulmonary edema.      PLEURA:No pleural effusion or pneumothorax.      MEDIASTINU
M:The cardiomediastinal silhouette appears normal in size and shape.      BONES/
SOFT TISSUES:No acute osseous injury.      ABDOMEN:No free air under the diaphra
gm.         IMPRESSION:    No focal pneumonia or pulmonary edema.       Signed b
y: Radha Florez MD on 2020 11:31 AM        Dictated By: RADHA FLOREZ MD  Elect
ronically Signed By: RADHA FLOREZ MD on 20 113  Transcribed By: LUCINDA on 
20 113       COPY TO:   JENNIFER TERRELL MD         PCNU QCCQRRQB6823-27-03 
16:35:00                                                                        
             [place] [address]      Patient Name: CRISTINA RICH                                MR #: [id number]                  : 
1965                                   Age/Sex: 54/F  Acct #: [id number]
                             Req #: [id number]  Adm Physician:                  
                                   Ordered by: JACQUELINE ARORA MD                   
     Report #: [id number]        Location: DX                                    
 Room/Bed:                   
________________________________________________________
___________________________________________    Procedure: [id number] IR/PCNU EXCH
KWAME  Exam Date:                             Exam Time:                         
                     REPORT STATUS: Signed    Procedure: Right percutaneous nep
hrostomy catheter exchange.                                                     
                                                                History: Routine
maintenance.                                                         Primary O
perator: Salvador Kramer M.D.       Assistant:  NIK Teixeira                      
                                                           Modality: Fluoroscop
y.                             DOSE REDUCTION: The examination was performed acc
ording to departmental   dose-optimization program.       Fluoro time: 5.2 minut
es.      Radiation dose: 48.7 mGy air Kerma.                                    
                                                Sedation: Intravenous conscious 
sedation was administered by a dedicated RN   using one milligrams of Versed and
50 mcg of fentanyl IV. Vital signs were   monitored throughout the procedure by 
a dedicated RN under direct supervision   of Dr. Kramer, and remained stable.  
    Physician intra-service sedation time was approximately 30 minutes.         
           Anesthesia: Lidocaine local infiltration      Medicines: Not appl
icable      Contrast medium: Isovue 300, 20 cc.      Estimated blood loss:  < 5 
cc.               Technique:    A discussion of the risks, benefits, and alte
rnatives was carried out with the   patient. A written informed consent was obta
ined. The patient expressed   understanding and agreed to proceed.        A univ
ersal timeout was performed prior to starting the procedure. The   procedure corby
m personnel used personal protective equipment. The operators used   sterile stephany
ns and gloves.      The patient was laid prone on the procedure table. The right
percutaneous   nephrostomy site was prepped with chlorhexidine gluconate and dr harmon in sterile   fashion.      A  radiograph was performed showing the cat
heter to be in the expected   location. Contrast injection through the catheter 
confirmed the catheter tip   location in the collecting system.      After local
anesthetic infiltration, the retention suture was removed. The   catheter was c
ut but could not be withdrawn over a wire. The catheter was   encrusted close to
the tip. A Glidewire was maneuvered alongside the catheter   into the renal col
lecting system. The catheter was slowly pulled, the pigtail   unformed, and the 
catheter removed. It was replaced with an identical catheter.   The catheter pos
ition was confirmed with contrast injection. The catheter was   secured to skin 
with nonabsorbable suture.  The catheter was connected to a   gravity drainage b
ag. An aseptic dressing was applied.      The patient was transferred to the [facility] and discharged from the   department in stable condition.            
             Complications: Mild hematuria.          Findings:   As above.      
                                                         Impression:   Success
ful fluoroscopic guided 12 French right nephrostomy catheter exchange as   descr
ibed above.      Further management dictated by the clinical scenario. The nephr
ostomy catheter   should be exchanged in approximately 10 weeks instead of 3 mon
ths. The   referring doctor was made aware and agreed. The patient was instructe
d to   increase the oral fluid intake.                                          
                              Thank you for the opportunity to assist in the ca
re of your patient.      Signed by: Salvador Kramer MD on 2020 4:39 PM      
 Dictated By: SALVADOR KRAMER MD  Electronically Signed By: SALVADOR KRAMER MD 
on 20  Transcribed By: LUCINDA on 20       COPY TO:   JACQUELINE SZYMANSKI MD         Capillary blood glucose measurement by glucometer 
(mass/volume)2020 10:54:00* 



             Test Item    Value        Reference Range Interpretation Comments

 

             Bedside Glucose (test code = 21544-9) 159                    

           





Meter ID: [id number] [facility] 
[facility] less than one hour yackbsfb9950-21-36 11:55:00* 



             Test Item    Value        Reference Range Interpretation Comments

 

             Coronavirus (PCR) (test code = Coronavirus (PCR)) NOT DETECTED NOTD

ETECTED                





[employer] SARS-CoV-2 assay is a nucleic amplification test intended for the
qualitative detection of RNA from SARS-CoV-2 from nasopharyngeal (NP) specimens.
It is used under Emergency Use Authorization (EUA) by FDA.A positive result is 
indicative of the presence of SARS-CoV-2 RNA. Clinical correlation with patient 
history and other diagnostic information is necessary to determine patient infe
ction status.A negative (Not Detected) result does not preclude SARS-CoV-2 infec
tion. Clinical Correlation with patient history and other diagnostic information
should be used in patient management decisions.Invalid: Unable to generate a va
lid result on this specimen. Please submit a new specimen for reprat testing oc 
clinically indicated.Tesing performed by:[facility] [id number] [place] [address] [id number], Timmy Hair MD, PhD
[facility] CenterCapillary blood glucose measurement by 
glucometer (mass/volume)2020 20:13:00* 



             Test Item    Value        Reference Range Interpretation Comments

 

             Bedside Glucose (test code = 61797-7) 124                    

           





Meter ID: [id number] St[facility] CenterBlood leukocytes 
automated count (number/volume)2020 04:50:00* 



             Test Item    Value        Reference Range Interpretation Comments

 

             White Blood Count (test code = 6690-2) 9.74         4.8-10.8       

            





[facility] CenterBl[facility] erythrocytes automated count 
(number/volume)2020 04:50:00* 



             Test Item    Value        Reference Range Interpretation Comments

 

             Red Blood Count (test code = 789-8) 3.97         3.6-5.1           

         





[facility] hemoglobin measurement 
(moles/volume)2020 04:50:00* 



             Test Item    Value        Reference Range Interpretation Comments

 

             Hemoglobin (test code = 03181-2) 9.9          12.0-16.0            

      





[facility] CenterAutomated blood hematocrit (volume 
fraction)2020 04:50:00* 



             Test Item    Value        Reference Range Interpretation Comments

 

             Hematocrit (test code = 4544-3) 32.5         34.2-44.1             

     





[facility] CenterAutomated erythrocyte mean corpuscular 
sxoumb4670-59-90 04:50:00* 



             Test Item    Value        Reference Range Interpretation Comments

 

             Mean Corpuscular Volume (test code = 787-2) 81.9         81-99     

                 





[facility] CenterAutomated erythrocyte mean corpuscular 
hemoglobin (mass per erythrocyte)2020 04:50:00* 



             Test Item    Value        Reference Range Interpretation Comments

 

             Mean Corpuscular Hemoglobin (test code = 785-6) 24.9         28-32 

                     





[facility] CenterAutomated erythrocyte mean corpuscular 
hemoglobin concentration measurement (mass/volume)2020 04:50:00* 



             Test Item    Value        Reference Range Interpretation Comments

 

             Mean Corpuscular Hemoglobin Concent (test code = 786-4) 30.5       

  31-35                      





[facility] CenterRDW AbhIt-Cjp6130-79-29 04:50:00* 



             Test Item    Value        Reference Range Interpretation Comments

 

             Red Cell Distribution Width (test code = 61920-8) 15.0         11.7

-14.4                  





[facility] CenterAutomated blood platelet count 
(count/volume)2020 04:50:00* 



             Test Item    Value        Reference Range Interpretation Comments

 

             Platelet Count (test code = 777-3) 268          140-360            

        





[facility] neutrophil 
count as percentage of total tbbemeducv8897-00-40 04:50:00* 



             Test Item    Value        Reference Range Interpretation Comments

 

             Neutrophils (%) (Auto) (test code = 21363-4) 59.5         38.7-80.0

                  





[facility] CenterAutomated blood lymphocyte count as 
percentage ot total jgktfwwjpb5593-21-68 04:50:00* 



             Test Item    Value        Reference Range Interpretation Comments

 

             Lymphocytes (%) (Auto) (test code = 736-9) 29.9         18.0-39.1  

                





[facility] CenterAutomated blood monocyte count as 
percentage of total ayxwqpemuc5399-92-78 04:50:00* 



             Test Item    Value        Reference Range Interpretation Comments

 

             Monocytes (%) (Auto) (test code = 5905-5) 7.0          4.4-11.3    

               





[facility] CenterAutSelect [facility] Hospital [facility] Durhamed blood eosinophil count as 
percentage of total bhaposlnpr3170-12-23 04:50:00* 



             Test Item    Value        Reference Range Interpretation Comments

 

             Eosinophils (%) (Auto) (test code = 713-8) 2.6          0.0-6.0    

                





[facility] CenterAutomated blood basophil count as 
percentage of total ibwnbbwhtz7160-63-49 04:50:00* 



             Test Item    Value        Reference Range Interpretation Comments

 

             Basophils (%) (Auto) (test code = 706-2) 0.6          0.0-1.0      

              





[facility] CenterFluoroscopic procedure less than one hour
izbebeby8131-09-74 04:50:00* 



             Test Item    Value        Reference Range Interpretation Comments

 

             IM GRANULOCYTES % (test code = IM GRANULOCYTES %) 0.4          0.0-

1.0                    





[facility] CenterAutomated blood neutrophil count
2020 04:50:00* 



             Test Item    Value        Reference Range Interpretation Comments

 

             Neutrophils # (Auto) (test code = 751-8) 5.8          2.1-6.9      

              





[facility] CenterBlood lymphocytes count (number/volume)
2020 04:50:00* 



             Test Item    Value        Reference Range Interpretation Comments

 

             Lymphocytes # (Auto) (test code = 66138-5) 2.9          1.0-3.2    

                





[facility] monocytes automated count 
(number/volume)2020 04:50:00* 



             Test Item    Value        Reference Range Interpretation Comments

 

             Monocytes # (Auto) (test code = 742-7) 0.7          0.2-0.8        

            





[facility] CenterAutomated blood eosinophil count
2020 04:50:00* 



             Test Item    Value        Reference Range Interpretation Comments

 

             Eosinophils # (Auto) (test code = 711-2) 0.3          0.0-0.4      

              





[facility] CenterAutomated blood basophil count 
(count/volume)2020 04:50:00* 



             Test Item    Value        Reference Range Interpretation Comments

 

             Basophils # (Auto) (test code = 704-7) 0.1          0.0-0.1        

            





[facility] CenterFluoroscopic procedure less than one hour
txaihztp2808-63-99 04:50:00* 



             Test Item    Value        Reference Range Interpretation Comments

 

                                        Absolute Immature Granulocyte (auto (lloyd

t code = Absolute Immature Granulocyte 

(auto)          0.04            0-0.1                            





[facility] Centererum or plasma sodium measurement 
(moles/volume)2020 04:50:00* 



             Test Item    Value        Reference Range Interpretation Comments

 

             Sodium Level (test code = 2951-2) 139          136-145             

       





[facility] Centererum or plasma potassium measurement 
(moles/volume)2020 04:50:00* 



             Test Item    Value        Reference Range Interpretation Comments

 

             Potassium Level (test code = 2823-3) 3.7          3.5-5.1          

          





[facility] Centererum or plasma chloride measurement 
(moles/volume)2020 04:50:00* 



             Test Item    Value        Reference Range Interpretation Comments

 

             Chloride Level (test code = 2075-0) 109                      

         





[facility] Centererum or plasma carbon dioxide, total 
measurement (moles/volume)2020 04:50:00* 



             Test Item    Value        Reference Range Interpretation Comments

 

             Carbon Dioxide Level (test code = 2028-9) 21           -29       

               





[facility] Centererum or plasma anion zxa9522-89-67 
04:50:00* 



             Test Item    Value        Reference Range Interpretation Comments

 

             Anion Gap (test code = 33037-3) 12.7         8-16                  

     





[facility] Centererum or plasma urea nitrogen measurement
(mass/volume)2020 04:50:00* 



             Test Item    Value        Reference Range Interpretation Comments

 

             Blood Urea Nitrogen (test code = 3094-0) 16           -         

              





[facility] Centererum or plasma creatinine measurement 
(mass/volume)2020 04:50:00* 



             Test Item    Value        Reference Range Interpretation Comments

 

             Creatinine (test code = 2160-0) 1.31         0.57-1.11             

     





[facility] Centererum or plasma urea nitrogen/creatinine 
mass owwgr9997-67-78 04:50:00* 



             Test Item    Value        Reference Range Interpretation Comments

 

             BUN/Creatinine Ratio (test code = 3097-3) 12           6-        

               





[facility] CenterEstimated glomerular filtration rate 
(GFR) fgzspilkxbszx5614-16-11 04:50:00* 



             Test Item    Value        Reference Range Interpretation Comments

 

             Estimat Glomerular Filtration Rate (test code = 724958405) 42      

     >60                        





Ranges were taken from the National Kidney Disease Education Program and the Michelle
[facility] Kid[facility] Foundation literature.Reference ranges:60 or greater: Tskuth40-61 (
for 3 consecutive months): Chronic kidney disease 15 or less: Kidney failureBrownfield Regional Medical CenterGlucose ixbthylcbhn2009-27-97 04:50:00* 



             Test Item    Value        Reference Range Interpretation Comments

 

             Glucose Level (test code = SVG1664) 110                      

         





[facility] Centererum or plasma calcium measurement 
(mass/volume)2020 04:50:00* 



             Test Item    Value        Reference Range Interpretation Comments

 

             Calcium Level (test code = 85615-9) 9.1          8.4-10.2          

         





[facility] Centererum or plasma total bilirubin 
measurement (mass/volume)2020 04:45:00* 



             Test Item    Value        Reference Range Interpretation Comments

 

             Total Bilirubin (test code = 1975-2) 0.3          0.2-1.2          

          





[facility] CenterFluoroscopic procedure less than one hour
brswcyql0450-84-67 04:45:00* 



             Test Item    Value        Reference Range Interpretation Comments

 

                                        Aspartate Amino Transf (AST/SGOT) (test 

code = Aspartate Amino Transf 

(AST/SGOT))     19              5-34                             





[facility] Centererum or plasma alanine aminotransferase 
measurement (enzymatic activity/volume)2020 04:45:00* 



             Test Item    Value        Reference Range Interpretation Comments

 

             Alanine Aminotransferase (ALT/SGPT) (test code = 1742-6) 19        

   0-55                       





[facility] Centererum or plasma protein measurement 
(mass/volume)2020 04:45:00* 



             Test Item    Value        Reference Range Interpretation Comments

 

             Total Protein (test code = 2885-2) 8.5          6.5-8.1            

        





[facility] Centererum or plasma albumin measurement 
(mass/volume)2020 04:45:00* 



             Test Item    Value        Reference Range Interpretation Comments

 

             Albumin (test code = 1751-7) 3.6          3.5-5.0                  

  





[facility] CenterPlasma globulin measurement (mass/volume)
2020 04:45:00* 



             Test Item    Value        Reference Range Interpretation Comments

 

             Globulin (test code = 50006-1) 4.9          2.3-3.5                

    





[facility] Centererum or plasma albumin/globulin mass 
xbohh5865-69-07 04:45:00* 



             Test Item    Value        Reference Range Interpretation Comments

 

             Albumin/Globulin Ratio (test code = 1759-0) 0.7          0.8-2.0   

                 





[facility] Centererum or plasma alkaline phosphatase 
measurement (enzymatic activity/volume)2020 04:45:00* 



             Test Item    Value        Reference Range Interpretation Comments

 

             Alkaline Phosphatase (test code = 6768-6) 120                

               





[facility] CenterFluoroscopic procedure less than one hour
ndpkoufo5184-05-69 22:35:00* 



             Test Item    Value        Reference Range Interpretation Comments

 

             Coronavirus (PCR) (test code = Coronavirus (PCR)) NOT DETECTED NOTD

ETECTED                





SARS-COV-2 (COVID19), HIGHRISK, RT-PCRNegative results do not preclude SARS-CoV-
2 infection and should not be used as the sole basis for patient management deci
sions. Negative results must be combined with clinical observations, patient his
tory, and epidemiological information. Optimum specimen types and timing for pea
k viral levels during infections caused by SARS-CoV-2 have not been determined. 
Collection of multiple specimens ot types of specimens may be necessary to detec
t virus. Improper specimen collection and handling, sequence variability under p
rimers/probes, or organism present below the limit of detection may lead to fals
e negative results. Positive and negative predictive values of testing are highl
y dependent on prevalance. False negative test results are more likely when prev
alence is high.The expected result is negative (not detected).The SARS-CoV-2 lloyd
t is intended for the qualitative detection of nucleic acid from SARS-CoV-2 in n
asopharyngeal and oropharyngeal swab samples from patients who meet COVID-19 cli
nical and or epidemiological criteria. For lower respiratory tract specimens, th
e assay is submitted for authoriztion by FDA under an Emergency Use Authorizatio
n (EUA). Testing methodology is real time RT-PCR. If received as separate collec
tion devices, nasopharygeal and oropharyngeal specimens are combined for analysi
s. Additional specimens may be split to a separate accession for analysi and rep
orting as this test includes a single unit of service.Test results must be corre
lated with clinical presentation and evaluated in the context of other laborator
y and epidemiologic data. Test performance can be affected because the epidemiol
ogy and clinical spectrum of infection caused by SARS-CoV-2 is not fully known. 
For example, the optimum types of specimens to collect and when during the cours
e of infection these specimens are most likely to contain detectable viral RNA m
ay not be known.This test has not been Food and Drug Administration (FDA) cleare
d or approved and has been authorized by FDA under an Emergency Use Authorizatio
n (EUA). The test is only authorized for the duration of the declaration that ci
rcumstances exist justifying the authorization of emergency use of in vitro diag
nostic tests for detection and/or diagnosis of SARS-CoV-2 under section 564(b) o
f the Act, 21 U.S.C. section 360bbb-3(b)(1), unless the authorization is termina
hugh or revoked sooner. [facility] are certified under the C
linical Laboratory Improvement Amendments of 1988 (CLIA), 42 U.S.C. section 263a
, to perform high complexity tests.Testing performed by [facility]
[id number] [place], [place] [id number] Director: Perez Ricci M.D.CLIA # [id number] [facility]
Fluoroscopic procedure less than one hour ymaksfur3996-48-76 20:35:00* 



             Test Item    Value        Reference Range Interpretation Comments

 

             Lactic Acid Level (test code = Lactic Acid Level) 1.8          0.5-

2.0                    





[facility] CenterFluoroscopic procedure less than one hour
dhkvyjdx7286-21-23 20:35:00* 



             Test Item    Value        Reference Range Interpretation Comments

 

             Lactic Acid Level (test code = Lactic Acid Level) 1.8          0.5-

2.0                    





[facility] CenterUrine color vtwjwthzpjevr1142-84-50 
20:30:00* 



             Test Item    Value        Reference Range Interpretation Comments

 

             Urine Color (test code = 5778-6) OTHER        YELLOW               

      





PINKBrownfield Regional Medical CenterUrine ciqmiih1039-93-58 20:30:00* 



             Test Item    Value        Reference Range Interpretation Comments

 

             Urine Clarity (test code = 03825-8) CLOUDY       CLEAR             

         





[facility] Centerpecific gravity of Urine by Test strip
2020 20:30:00* 



             Test Item    Value        Reference Range Interpretation Comments

 

             Urine Specific Gravity (test code = 5811-5) 1.025        1.010-1.02

5                





[facility] CenterUrine pH measurement by automated test 
gkavp0528-22-19 20:30:00* 



             Test Item    Value        Reference Range Interpretation Comments

 

             Urine pH (test code = 75381-3) 8.5          5-7                    

    





[facility] CenterUrine leukocyte esterase detection by 
pxsnomvm7105-14-42 20:30:00* 



             Test Item    Value        Reference Range Interpretation Comments

 

             Urine Leukocyte Esterase (test code = 5799-2) LARGE        NEGATIVE

                   





[facility] CenterUrine nitrite gpqfkpkpm8795-34-66 
20:30:00* 



             Test Item    Value        Reference Range Interpretation Comments

 

             Urine Nitrite (test code = 22633-8) NEGATIVE     NEGATIVE          

         





[facility] CenterUrine protein measurement by test strip 
(mass/volume)2020 20:30:00* 



             Test Item    Value        Reference Range Interpretation Comments

 

             Urine Protein (test code = 5804-0) >=300        NEGATIVE           

        





[facility] CenterUrine glucose rqqgpwgjy1278-28-78 
20:30:00* 



             Test Item    Value        Reference Range Interpretation Comments

 

             Urine Glucose (UA) (test code = 2349-9) NEGATIVE     NEGATIVE      

             





[facility] CenterUrine ketones detection by automated test
kvfjx8579-45-49 20:30:00* 



             Test Item    Value        Reference Range Interpretation Comments

 

             Urine Ketones (test code = 91146-4) NEGATIVE     NEGATIVE          

         





[facility] CenterUrine urobilinogen measurement by test 
strip (mass/volume)2020 20:30:00* 



             Test Item    Value        Reference Range Interpretation Comments

 

             Urine Urobilinogen (test code = 78950-7) 0.2          0.2-1        

              





[facility] CenterUrine total bilirubin measurement 
(mass/volume)2020 20:30:00* 



             Test Item    Value        Reference Range Interpretation Comments

 

             Urine Bilirubin (test code = 1978-6) NEGATIVE     NEGATIVE         

          





[facility] CenterUrine erythrocytes uvviochzn9008-64-40 
20:30:00* 



             Test Item    Value        Reference Range Interpretation Comments

 

             Urine Blood (test code = 21136-7) LARGE        NEGATIVE            

       





[facility] CenterAutomated urine sediment leukocyte count 
by microscopy (number/high power field)2020 20:30:00* 



             Test Item    Value        Reference Range Interpretation Comments

 

             Urine WBC (test code = 5821-4) 11-20        0-5                    

    





[facility] CenterErythrocytes detection in urine sediment 
by light vhisbecvos8489-23-03 20:30:00* 



             Test Item    Value        Reference Range Interpretation Comments

 

             Urine RBC (test code = 04263-1) 11-20        0-5                   

     





[facility] CenterBacteria detection in urine sediment by 
light zfmjhzdlbo3642-32-12 20:30:00* 



             Test Item    Value        Reference Range Interpretation Comments

 

             Urine Bacteria (test code = 84534-8) MANY         NONE             

          





[facility] CenterEpithelial cells detection in urine 
sediment by light cmfcweduvx5472-19-26 20:30:00* 



             Test Item    Value        Reference Range Interpretation Comments

 

             Urine Epithelial Cells (test code = 03693-1) NONE         NONE     

                  





[facility] CenterBlood vjchwrm9832-05-60 20:30:00* 



             Test Item    Value        Reference Range Interpretation Comments

 

             Blood Culture (test code = 75436013) NO GROWTH AFTER 72 HOURS      

                      





[facility] CenterBacterial urine enetwae8009-52-62 
20:30:00* 



             Test Item    Value        Reference Range Interpretation Comments

 

             Urine Culture (test code = 630-4) CANDIDA ALBICANS                 

           





[facility] CenterBacterial urine eqqfkrd8672-20-18 
20:30:00* 



             Test Item    Value        Reference Range Interpretation Comments

 

             Urine Culture (test code = 630-4) PSEUDO FLUORESCENS/PUTIDA        

                    





[facility] CenterBlood qjpnude8873-24-35 20:30:00* 



             Test Item    Value        Reference Range Interpretation Comments

 

             Blood Culture (test code = 91709595) NO GROWTH AFTER 5 DAYS, FINAL 

REPORT                            





[facility] CenterBacteria urine pehgqyf4049-39-32 
20:30:00* 



             Test Item    Value        Reference Range Interpretation Comments

 

             Urine Culture (test code = 630-4) CANDIDA ALBICANS                 

           





[facility] CenterBacteria urine leqvsip2411-29-39 
20:30:00* 



             Test Item    Value        Reference Range Interpretation Comments

 

             Urine Culture (test code = 630-4) PSEUDO FLUORESCENS/PUTIDA        

                    





[facility] Medical CenterProthrombin time (PT) in platelet poor 
plasma by coagulation [id number] 20:15:00* 



             Test Item    Value        Reference Range Interpretation Comments

 

             Prothrombin Time (test code = 5902-2) 12.7         11.9-14.5       

           





[facility] CenterIN[facility] in Platelet poor plasma by 
Coagulation ryxfm2042-35-13 20:15:00* 



             Test Item    Value        Reference Range Interpretation Comments

 

             Prothromb Time International Ratio (test code = 6301-6) 0.90       

                             





Oral Anticoagulant Therapy INR Values:1. Low Intensity Therapy        1.5 - 2.02
. Moderate Intensity Therapy   2.0 - 3.03. High Intensity Therapy(1)    2.5 - 3.
54. High Intensity Therapy(2)    3.0 - 4.05. Panic Value INR              > 5.0
[facility] CenterActivated partial thromboplastin time 
(aPTT) in platelet poor plasma by coagulation uwwtq1798-74-23 20:15:00* 



             Test Item    Value        Reference Range Interpretation Comments

 

             Activated Partial Thromboplast Time (test code = 99052-2) 28.6     

    23.8-35.5                  





[facility] CenterBlSt[facility] leukocytes automated count 
(number/volume)2020 04:45:00* 



             Test Item    Value        Reference Range Interpretation Comments

 

             White Blood Count (test code = 6690-2) 10.45        4.8-10.8       

            





[facility]Bl[facility] erythrocytes automated count 
(number/volume)2020 04:45:00* 



             Test Item    Value        Reference Range Interpretation Comments

 

             Red Blood Count (test code = 789-8) 3.91         3.6-5.1           

         





[facility] CenterBlood hemoglobin measurement 
(moles/volume)2020 04:45:00* 



             Test Item    Value        Reference Range Interpretation Comments

 

             Hemoglobin (test code = 82919-3) 10.0         12.0-16.0            

      





[facility] CenterAutomated blood hematocrit (volume 
fraction)2020 04:45:00* 



             Test Item    Value        Reference Range Interpretation Comments

 

             Hematocrit (test code = 4544-3) 32.4         34.2-44.1             

     





[facility] CenterAutomated erythrocyte mean corpuscular 
pejzum6977-95-06 04:45:00* 



             Test Item    Value        Reference Range Interpretation Comments

 

             Mean Corpuscular Volume (test code = 787-2) 82.9         81-99     

                 





[facility] CenterAutomated erythrocyte mean corpuscular 
hemoglobin (mass per erythrocyte)2020 04:45:00* 



             Test Item    Value        Reference Range Interpretation Comments

 

             Mean Corpuscular Hemoglobin (test code = 785-6) 25.6         28-32 

                     





[facility] CenterAutomated erythrocyte mean corpuscular 
hemoglobin concentration measurement (mass/volume)2020 04:45:00* 



             Test Item    Value        Reference Range Interpretation Comments

 

             Mean Corpuscular Hemoglobin Concent (test code = 786-4) 30.9       

  31-35                      





[facility] CenterRDW ZquLt-Ttn9045-69-02 04:45:00* 



             Test Item    Value        Reference Range Interpretation Comments

 

             Red Cell Distribution Width (test code = 65844-3) 14.7         11.7

-14.4                  





[facility] CenterAutomated blood platelet count 
(count/volume)2020 04:45:00* 



             Test Item    Value        Reference Range Interpretation Comments

 

             Platelet Count (test code = 777-3) 272          140-360            

        





[facility] CenterAutomated blood segmented neutrophil 
count as percentage of total fwmbohnqzn7291-35-61 04:45:00* 



             Test Item    Value        Reference Range Interpretation Comments

 

             Neutrophils (%) (Auto) (test code = 45829-2) 54.2         38.7-80.0

                  





[facility] CenterAutomated blood lymphocyte count as 
percentage ot total jirujcqjfd5919-34-41 04:45:00* 



             Test Item    Value        Reference Range Interpretation Comments

 

             Lymphocytes (%) (Auto) (test code = 736-9) 35.0         18.0-39.1  

                





[facility] CenterAutomated blood monocyte count as 
percentage of total ipgqiziipl4113-65-35 04:45:00* 



             Test Item    Value        Reference Range Interpretation Comments

 

             Monocytes (%) (Auto) (test code = 5905-5) 7.1          4.4-11.3    

               





[facility] CenterAutSelect Specialty Hospital - Durhamed blood eosinophil count as 
percentage of total qdlckikqrp6594-09-93 04:45:00* 



             Test Item    Value        Reference Range Interpretation Comments

 

             Eosinophils (%) (Auto) (test code = 713-8) 2.5          0.0-6.0    

                





[facility] CenterAutomated blood basophil count as 
percentage of total iekwkzrzxk0332-83-25 04:45:00* 



             Test Item    Value        Reference Range Interpretation Comments

 

             Basophils (%) (Auto) (test code = 706-2) 0.4          0.0-1.0      

              





[facility] CenterFluoroscopic procedure less than one hour
saxdpecx7659-15-41 04:45:00* 



             Test Item    Value        Reference Range Interpretation Comments

 

             IM GRANULOCYTES % (test code = IM GRANULOCYTES %) 0.8          0.0-

1.0                    





[facility] CenterAutomated blood neutrophil count
2020 04:45:00* 



             Test Item    Value        Reference Range Interpretation Comments

 

             Neutrophils # (Auto) (test code = 751-8) 5.7          2.1-6.9      

              





[facility] CenterBlood lymphocytes count (number/volume)
2020 04:45:00* 



             Test Item    Value        Reference Range Interpretation Comments

 

             Lymphocytes # (Auto) (test code = 60491-1) 3.7          1.0-3.2    

                





[facility] CenterBlood monocytes automated count 
(number/volume)2020 04:45:00* 



             Test Item    Value        Reference Range Interpretation Comments

 

             Monocytes # (Auto) (test code = 742-7) 0.7          0.2-0.8        

            





[facility] CenterAutomated blood eosinophil count
2020 04:45:00* 



             Test Item    Value        Reference Range Interpretation Comments

 

             Eosinophils # (Auto) (test code = 711-2) 0.3          0.0-0.4      

              





[facility] CenterAutomated blood basophil count 
(count/volume)2020 04:45:00* 



             Test Item    Value        Reference Range Interpretation Comments

 

             Basophils # (Auto) (test code = 704-7) 0.0          0.0-0.1        

            





[facility] CenterFluoroscopic procedure less than one hour
cqydqyzw2843-92-38 04:45:00* 



             Test Item    Value        Reference Range Interpretation Comments

 

                                        Absolute Immature Granulocyte (auto (lloyd

t code = Absolute Immature Granulocyte 

(auto)          0.08            0-0.1                            





[facility] CenterCapillary blood glucose measurement by 
glucometer (mass/volume)2020 19:25:00* 



             Test Item    Value        Reference Range Interpretation Comments

 

             Bedside Glucose (test code = 15198-8) 187                    

           





Meter ID: [id number] Park Regional Medical Centererum or plasma 
sodium measurement (moles/volume)2020 05:20:00* 



             Test Item    Value        Reference Range Interpretation Comments

 

             Sodium Level (test code = 2951-2) 138          136-145             

       





[facility] Centererum or plasma potassium measurement 
(moles/volume)2020 05:20:00* 



             Test Item    Value        Reference Range Interpretation Comments

 

             Potassium Level (test code = 2823-3) 3.6          3.5-5.1          

          





[facility] Centererum or plasma chloride measurement 
(moles/volume)2020 05:20:00* 



             Test Item    Value        Reference Range Interpretation Comments

 

             Chloride Level (test code = 2075-0) 109                      

         





[facility] Centererum or plasma carbon dioxide, total 
measurement (moles/volume)2020 05:20:00* 



             Test Item    Value        Reference Range Interpretation Comments

 

             Carbon Dioxide Level (test code = 2028-9) 23           22-29       

               





[facility] Centererum or plasma anion rql0151-19-90 
05:20:00* 



             Test Item    Value        Reference Range Interpretation Comments

 

             Anion Gap (test code = 33037-3) 9.6          8-16                  

     





[facility] Centererum or plasma urea nitrogen measurement
(mass/volume)2020 05:20:00* 



             Test Item    Value        Reference Range Interpretation Comments

 

             Blood Urea Nitrogen (test code = 3094-0) 19           7-26         

              





[facility] Centererum or plasma creatinine measurement 
(mass/volume)2020 05:20:00* 



             Test Item    Value        Reference Range Interpretation Comments

 

             Creatinine (test code = 2160-0) 1.27         0.57-1.11             

     





[facility] Centererum or plasma urea nitrogen/creatinine 
mass kioxu6045-23-85 05:20:00* 



             Test Item    Value        Reference Range Interpretation Comments

 

             BUN/Creatinine Ratio (test code = 3097-3) 15           6-25        

               





[facility] CenterEstimated glomerular filtration rate 
(GFR) zdzphctzvcnin6918-05-31 05:20:00* 



             Test Item    Value        Reference Range Interpretation Comments

 

             Estimat Glomerular Filtration Rate (test code = 005925622) 44      

     >60                        





Ranges were taken from the National Kidney Disease Education Program and the Michelle
[facility] Kid[facility] Foundation literature.Reference ranges:60 or greater: Rxccpz05-91 (
for 3 consecutive months): Chronic kidney disease 15 or less: Kidney failureBrownfield Regional Medical CenterGlucose mpfuwdhacqi5343-78-02 05:20:00* 



             Test Item    Value        Reference Range Interpretation Comments

 

             Glucose Level (test code = XDD0872) 163                      

         





[facility] Centererum or plasma calcium measurement 
(mass/volume)2020 05:20:00* 



             Test Item    Value        Reference Range Interpretation Comments

 

             Calcium Level (test code = 17816-1) 8.6          8.4-10.2          

         





[facility] CenterRETROGRADE VSUJIOPUC8773-47-75 14:31:00  
                                                                                
  [facility]                        [address] [place] [address]      Patient Name: CRISTINA RICH     
                          MR #: [id number]                  : 1965     
                             Age/Sex: 54/F  Acct #: [id number]                
             Req #: [id number]  [facility]: SHADY PEMBERTON MD                    
                   Ordered by: JACQUELINE ARORA MD                         Report #: 
[id number]        Location: MED/SURG                                Room/Bed: 
[facility]             ________________________________________________________
___________________________________________    Procedure: [id number] DX/RETROGRAD
E PYELOGRAM  Exam Date: 20                            Exam Time: 07     
                                        REPORT STATUS: Signed    OR Fluoroscopy:
     IMPRESSION: Fluoroscopy service provided in the OR. Interpretation not   
requested.      Signed by: Salvador Kramer MD on 2020 2:31 PM        Dictate
d By: SALVADOR KRAMER MD  Electronically Signed By: SALVADOR KRAMER MD on  1431  Transcribed By: LUCINDA on 20 1431       COPY TO:   JACQUELINE ARORA MD
        Bacterial urine gysdpvk7959-43-92 07:40:00* 



             Test Item    Value        Reference Range Interpretation Comments

 

             Urine Culture (test code = 630-4) CANDIDA ALBICANS                 

           





[facility] CenterPhosphorus raefoolbtqi2822-94-97 05:05:00
  * 



             Test Item    Value        Reference Range Interpretation Comments

 

             Phosphorus Level (test code = XKT0593) 2.7          2.3-4.7        

            





[facility] Centererum or plasma magnesium measurement 
(mass/volume)2020 05:05:00* 



             Test Item    Value        Reference Range Interpretation Comments

 

             Magnesium Level (test code = 57357-1) 1.9          1.3-2.1         

           





[facility] CenterPhosphorus lhzbkcjzzis5012-19-46 05:05:00
  * 



             Test Item    Value        Reference Range Interpretation Comments

 

             Phosphorus Level (test code = VTJ3642) 2.7          2.3-4.7        

            





[facility] Centererum or plasma magnesium measurement 
(mass/volume)2020 05:05:00* 



             Test Item    Value        Reference Range Interpretation Comments

 

             Magnesium Level (test code = 09703-6) 1.9          1.3-2.1         

           





[facility] CenterPhosphorus vcszbqhvjif7966-17-42 05:05:00
  * 



             Test Item    Value        Reference Range Interpretation Comments

 

             Phosphorus Level (test code = EIR8613) 2.7          2.3-4.7        

            





[facility] Centererum or plasma magnesium measurement 
(mass/volume)2020 05:05:00* 



             Test Item    Value        Reference Range Interpretation Comments

 

             Magnesium Level (test code = 18248-7) 1.9          1.3-2.1         

           





[facility] CenterFluoroscopic procedure less than one hour
knghuirm1612-17-87 09:50:00* 



             Test Item    Value        Reference Range Interpretation Comments

 

             Coronavirus (PCR) (test code = Coronavirus (PCR)) NOT DETECTED NOTD

ETECTED                





SARS-COV-2 (COVID19), HIGHRISK, RT-PCRNegative results do not preclude SARS-CoV-
2 infection and should not be used as the sole basis for patient management deci
sions. Negative results must be combined with clinical observations, patient his
tory, and epidemiological information. Optimum specimen types and timing for pea
k viral levels during infections caused by SARS-CoV-2 have not been determined. 
Collection of multiple specimens ot types of specimens may be necessary to detec
t virus. Improper specimen collection and handling, sequence variability under p
rimers/probes, or organism present below the limit of detection may lead to fals
e negative results. Positive and negative predictive values of testing are highl
y dependent on prevalance. False negative test results are more likely when prev
alence is high.The expected result is negative (not detected).The SARS-CoV-2 lloyd
t is intended for the qualitative detection of nucleic acid from SARS-CoV-2 in n
asopharyngeal and oropharyngeal swab samples from patients who meet COVID-19 cli
nical and or epidemiological criteria. For lower respiratory tract specimens, th
e assay is submitted for authoriztion by FDA under an Emergency Use Authorizatio
n (EUA). Testing methodology is real time RT-PCR. If received as separate collec
tion devices, nasopharygeal and oropharyngeal specimens are combined for analysi
s. Additional specimens may be split to a separate accession for analysi and rep
orting as this test includes a single unit of service.Test results must be corre
lated with clinical presentation and evaluated in the context of other laborator
y and epidemiologic data. Test performance can be affected because the epidemiol
ogy and clinical spectrum of infection caused by SARS-CoV-2 is not fully known. 
For example, the optimum types of specimens to collect and when during the cours
e of infection these specimens are most likely to contain detectable viral RNA m
ay not be known.This test has not been Food and Drug Administration (FDA) cleare
d or approved and has been authorized by FDA under an Emergency Use Authorizatio
n (EUA). The test is only authorized for the duration of the declaration that ci
rcumstances exist justifying the authorization of emergency use of in vitro diag
nostic tests for detection and/or diagnosis of SARS-CoV-2 under section 564(b) o
f the Act, 21 U.S.C. section 360bbb-3(b)(1), unless the authorization is termina
hugh or revoked sooner. [facility] are certified under the C
linical Laboratory Improvement Amendments of 1988 (CLIA), 42 U.S.C. section 263a
, to perform high complexity tests.Specimen sent to [facility] and testing performed by [facility] [address] [place] [address] [phone] Director: Perez Ricci M.D.CLIA # [id number]
[id number] [facility] HospitalNEPHRO/URET [facility]G/INJ-EXISTING
2020 15:57:00                                                             
                        [place] [address]      Patient Name: 
CRISTINA RICH                                MR #: [id number]            
     : 1965                                   Age/Sex: 54/F  Acct #: 
[id number]                              Req #: [id number]  Adm Physician: 
SHADY PEMBERTON MD                                        Ordered by: JACQUELINE ARORA MD 
                       Report #: [id number]        Location: MED/SURG            
                   Room/Bed: 101-1             
________________________________________________________
___________________________________________    Procedure: [id number] IR/NEPHRO/UR
ET W IMG/INJ-EXISTING  Exam Date:                             Exam Time:        
                                      REPORT STATUS: Signed    Right frontal ca
theter exchange/upsize.                                                         
                                                                  History: Malf
unctioning indwelling right nephrostomy catheter. Exchange and   upsize requeste
d by the referring provider.                                                    
                                                                          Modal
ity: Fluoroscopy                                                                
                   Sedation: Versed 2 mg and fentanyl 100 mcg was given intrave
nously for   conscious sedation.  Vital signs were monitored throughout the proc
edure by a   nurse, and remained stable. Physician intra-service time was 20.   
                                                                                
     Fluoroscopy Time: 3.0 min.   Reference Air Kerma (Ka, r): 27.4 mGy.        
                                     Approach:  The indwelling 10 French neph
rostomy catheter      Estimated blood loss:  < 5 cc.      Specimen: None.       
: Kirk Mcfarlane MD.      Assistant: None.      
Technique/findings:    Informed written consent was obtained. Discussion of 
risks, benefits, and   alternatives were made with the patient. The patient 
expressed understanding   and agreed to proceed.  A universal timeout was 
performed prior to starting the   procedure.  All elements maximal sterile b
arrier technique was utilized for   this procedure, including utilization of phan
rile scrub solution for skin prep,   a large sterile sheet to cover the areas of
the patient that were not prepped,   and hand hygiene, mask, head covering, and 
sterile gown for performing   radiologist and scrub technologist.      Contrast 
is injected via the indwelling right nephrostomy catheter   demonstrating intra
luminal position within the right collecting system.   Catheter noted to be retr
acted within a right calyx.      The existing catheter was cut. A 0.035 wire cou
ld not be advanced through the   catheter given occluded end hole. Subsequent, a
5 French Kumpe catheter and   0.035 angled Glidewire was used to obtain access 
into the right collecting   system along side the indwelling nephrostomy cathete
r. The Glidewire was   exchanged for a 0.035 Bentson wire. The indwelling nephro
stomy catheter was   removed. A new 12 French and a prosthetic catheter was adva
nced with the   pigtail formed within the right renal pelvis. Contrast injection
confirmed   position. The catheter was fixed to the skin with silk suture. A st
erile   dressing was applied.      The patient tolerated the procedure without i
mmediate complication.                                                          
                     Impression:                                               
Successful fluoroscopic guided right nephrostomy catheter exchange/upsize with  
moderate sedation as detailed above.                          Signed by: Dr. Ashley Rangel MD on 2020 4:03 PM        Dictated By: KIRK MCFARLANE MD  Electro
nically Signed By: KIRK MCFARLANE MD on 20  Transcribed By: LUCINDA on 
20       COPY TO:   JACQUELINE ARORA MD         IR [id number] 
15:57:00                                                                        
             [place] [address]      Patient Name: CRISTINA RICH                                MR #: [id number]                  : 
1965                                   Age/Sex: 54/F  Acct #: [id number]
                             Req #: [id number]  Adm Physician: SHADY PEMBERTON MD    
                                   Ordered by: JACQUELINE ARORA MD                   
     Report #: [id number]        Location: MED/SURG                              
 Room/Bed: [facility]             
________________________________________________________
___________________________________________    Procedure: [id number] DX/IR CONSUL
T  Exam Date:                             Exam Time:                            
                  REPORT STATUS: Signed    Right frontal catheter exchange/upsi
ze.                                                                             
                                              History: Malfunctioning indwelling
right nephrostomy catheter. Exchange and   upsize requested by the referring leilani stephen.                                                                        
                                                      Modality: Fluoroscopy     
                                                                              
Sedation: Versed 2 mg and fentanyl 100 mcg was given intravenously for   conscio
us sedation.  Vital signs were monitored throughout the procedure by a   nurse, 
and remained stable. Physician intra-service time was 20.                       
                                                                  Fluoroscopy T
alexi: 3.0 min.   Reference Air Kerma (Ka, r): 27.4 mGy.                          
                   Approach:  The indwelling 10 French nephrostomy catheter     
Estimated blood loss:  < 5 cc.      Specimen: None.        : 
Kirk Mcfarlane MD.      Assistant: None.      Technique/findings:    Informed 
written consent was obtained. Discussion of risks, benefits, and   alternatives 
were made with the patient. The patient expressed understanding   and agreed to 
proceed.  A universal timeout was performed prior to starting the   procedure.  
All elements maximal sterile barrier technique was utilized for   this 
procedure, including utilization of sterile scrub solution for skin prep,   a 
large sterile sheet to cover the areas of the patient that were not prepped,   
and hand hygiene, mask, head covering, and sterile gown for performing   
radiologist and scrub technologist.      Contrast is injected via the indwelling
right nephrostomy catheter   demonstrating intraluminal position within the 
right collecting system.   Catheter noted to be retracted within a right calyx. 
    The existing catheter was cut. A 0.035 wire could not be advanced through 
the   catheter given occluded end hole. Subsequent, a 5 French Kumpe catheter 
and   0.035 angled Glidewire was used to obtain access into the right collecting
  system along side the indwelling nephrostomy catheter. The Glidewire was   
exchanged for a 0.035 Bentson wire. The indwelling nephrostomy catheter was   
removed. A new 12 French and a prosthetic catheter was advanced with the   
pigtail formed within the right renal pelvis. Contrast injection confirmed   
position. The catheter was fixed to the skin with silk suture. A sterile   
dressing was applied.      The patient tolerated the procedure without immediate
complication.                                                                   
            Impression:                                               Successful
fluoroscopic guided right nephrostomy catheter exchange/upsize with   moderate 
sedation as detailed above.                          Signed by: Dr. Kirk Mcfarlane MD on 2020 4:03 PM        Dictated By: KIRK MCFARLANE MD  Electro
nically Signed By: KIRK MCFARLANE MD on 20 1603  Transcribed By: LUCINDA on 
20 1603       COPY TO:   JACQUELINE ARORA MD         Fluoroscopic procedure less
than one hour hvromxkz9776-57-55 04:40:00* 



             Test Item    Value        Reference Range Interpretation Comments

 

             Hemoglobin A1c Percent (test code = Hemoglobin A1c Percent) 6.9    

      4.0-7.0                    





[facility] Center[facility]
ivdnkrsg5987-97-01 04:40:00* 



             Test Item    Value        Reference Range Interpretation Comments

 

             Hemoglobin A1c Percent (test code = Hemoglobin A1c Percent) 6.9    

      4.0-7.0                    





[facility] Center[facility] than one hour
nnlvkpkd4586-55-95 04:40:00* 



             Test Item    Value        Reference Range Interpretation Comments

 

             Hemoglobin A1c Percent (test code = Hemoglobin A1c Percent) 6.9    

      4.0-7.0                    





[facility] total bilirubin 
measurement (mass/volume)2020 05:10:00* 



             Test Item    Value        Reference Range Interpretation Comments

 

             Total Bilirubin (test code = 1975-2) 0.2          0.2-1.2          

          





[facility] CenterFluor[facility] procedure less than one hour
fztfetvq8325-46-86 05:10:00* 



             Test Item    Value        Reference Range Interpretation Comments

 

                                        Aspartate Amino Transf (AST/SGOT) (test 

code = Aspartate Amino Transf 

(AST/SGOT))     9               5-34                             





[facility] or plasma alanine aminotransferase 
measurement (enzymatic activity/volume)2020 05:10:00* 



             Test Item    Value        Reference Range Interpretation Comments

 

             Alanine Aminotransferase (ALT/SGPT) (test code = 1742-6) 12        

   0-55                       





[facility] or plasma protein measurement 
(mass/volume)2020 05:10:00* 



             Test Item    Value        Reference Range Interpretation Comments

 

             Total Protein (test code = 2885-2) 7.3          6.5-8.1            

        





[facility] plasma albumin measurement 
(mass/volume)2020 05:10:00* 



             Test Item    Value        Reference Range Interpretation Comments

 

             Albumin (test code = 1751-7) 2.8          3.5-5.0                  

  





[facility] Center[facility] globulin measurement (mass/volume)
2020 05:10:00* 



             Test Item    Value        Reference Range Interpretation Comments

 

             Globulin (test code = 48244-6) 4.5          2.3-3.5                

    





[facility] or plasma albumin/globulin mass 
eghrj9597-51-47 05:10:00* 



             Test Item    Value        Reference Range Interpretation Comments

 

             Albumin/Globulin Ratio (test code = 1759-0) 0.6          0.8-2.0   

                 





[facility]rum [facility] plasma alkaline phosphatase 
measurement (enzymatic activity/volume)2020 05:10:00* 



             Test Item    Value        Reference Range Interpretation Comments

 

             Alkaline Phosphatase (test code = 6768-6) 90                 

               





[facility] Center[facility]LVIS [id number] 17:22:00  
                                                                                
  [facility]                        [address] [address]      Patient Name: CRISTINA RICH     
                             MR #: [id number]                     :                                    Age/Sex: 54/F  Acct #: [id number]   
                          Req #: [id number]  Adm Physician: SHADY PEMBERTON MD       
                                Ordered by: SHADY PEMBERTON MD                      
     Report #: [id number]        Location: MED/SURG                              
 Room/Bed: [facility]               _____________________________________________
______________________________________________________    Procedure: [id number] C
T/CT ABDOMEN/PELVIS WO  Exam Date: 20                            Exam Time
: 1700                                              REPORT STATUS: Signed    CT 
Abdomen and Pelvis without contrast      INDICATION:  UTI/abdominal pain, ureter
al stents and percutaneous nephrostomies    ABD PAIN    2020    170      TE
CHNIQUE: Thin collimation axial images obtained from the diaphragm to the   leve
l of the pubic symphysis without nonionic intravenous contrast.       Dose reduc
tion techniques used: Automated exposure control, adjustment of the   mAs and/or
kVp according to patient size, standardized low-dose protocol,   and/or iterati
ve reconstruction technique.      RADIATION DOSE:        Total DLP: 634.06 mGy*c
m        Estimated effective dose: DLP x 0.015 x size factor mSv        CTDIvol 
has been reviewed. It is below the limits set by the Radiation   Protocol Commit
anastacio (RPC).      COMPARISON: CT abdomen/pelvis 2019.       ABDOMEN FINDINGS:
     Lung Bases: Calcified granuloma in the lateral right lower lobe is stable. 
 Small amount of posterior layering atelectasis. The visualized portion of the  
mediastinum is normal.      Liver: Normal in attenuation without mass.      G
allbladder: Present with a calcified gallstone measuring 2 cm near the neck.    
No ductal dilatation.      Pancreas: Normal attenuation without mass.      Splee
n: Normal size without mass.      Adrenal Glands: No evidence for mass.      Kid
neys:    Right: Percutaneous the approximate is in the posterior aspect of the  
collecting system. There is marked hydronephrosis. Trace perinephric   inflamma
tion. No calculus in the collecting system.  No cortical mass      Left:  There 
are 2 stents in the collecting system are redemonstrated that   extend into the 
urinary bladder. The renal pelvis remains dilated. No evidence   of calculus.  M
ild renal atrophy. No cortical mass      Lymph Nodes: Prominent left periaortic 
lymph nodes are stable and likely   reactive.      Aorta:  Normal in diameter wi
th scattered calcifications.      PELVIS FINDINGS:       Bowel:    Stomach:  Nor
mal.   Small Bowel: Normal in caliber with normal wall thickness.   Large Bowel:
Normal in caliber with normal wall thickness.   Appendix: Normal.      Bladder: 
Collapsed around a Gomez catheter. Coarse calcifications at the right   UVJ are 
redemonstrated. There is a small amount of encrustation surrounding a   loop of 
the ureteral stent.      Ureters: The right ureter is distended to the iliac ar
osiris bifurcation. There   are 1-2 linear hyperdensities along the course of the 
mid/distal ureter   suggestive of calculi. The ureter distal to this is collapse
d.      The proximal left ureter is distended just past the UPJ. There are no   
calcifications along the course of the stent.      The uterus is atrophic. No ad
nexal mass.      Peritoneum/retroperitoneum: No free fluid or fluid collection. 
    Bones: Mild degenerative changes of the lower lumbar spine. Mild to moderate
  degenerative changes of the lower thoracic spine. Diffuse demineralization of 
 the sacrum is redemonstrated. No associated fracture.      IMPRESSION:      1. 
New right hydroureteronephrosis, possibly due to at least two calculi in   the 
mid/distal right ureter. Percutaneous nephrostomy is in appropriate   position. 
    2.  Persistent left hydronephrosis despite the presence of 2 stents in the 
left   ureter. One loop in the pelvis appears to have an encrustation.      3. 
Other findings as described above are stable.      Signed by: Dr. Austin vargas MD on 2020 5:55 PM        Dictated By: AUSTIN RIVERO MD  Electro
nically Signed By: AUSTIN RIVERO MD on 20  Transcribed By: JAVED QUINTERO on 20       COPY TO:   SHADY PEMBERTON MD         CT BRAIN [id number] 
15:26:00                                                                        
             [address] [address]      Patient Name: CRISTINA RICH                                   MR #: [id number]                     
: 1965                                   Age/Sex: 54/F  Acct #: 
[id number]                              Req #: [id number]  Adm Physician: 
SHADY PEMBERTON MD                                        Ordered by: JENNIFER TERRELL MD                            Report #: [id number]        Location: MED/SURG     
                          Room/Bed: [facility]               
__________________________________________
_________________________________________________________    Procedure: [id number]
5 CT/CT BRAIN WO  Exam Date: 20                            Exam Time: 1435
                                             REPORT STATUS: Signed    Examinati
on: CT BRAIN WO CONTRAST      History: Acute headache.   Comparison studies:None
     Technique:   Axial images were obtained from the skull base to the vertex. 
 Coronal and sagittal images reconstructed from the axial data.   Dose modulat
ion, iterative reconstruction, and/or weight based adjustment of   the mA/kV was
utilized to reduce the radiation dose to as low as reasonably   achievable.    
Intravenous contrast: None      Findings:      Scalp: No abnormalities.   Bones:
No fractures, blastic or lytic lesions.      Brain sulci: Appropriate for age.  
Ventricles: Normal in size and configuration. No hydrocephalus.      Extra-axial
space:   No abnormalities.      Parenchyma:    No abnormal densities.    No m
asses, hemorrhage, or acute or chronic cortical based vascular insults..      Se
llar/suprasellar region: No abnormalities.   Craniocervical junction: Patent for
amen magnum. No Chiari one malformation.      Incidental findings:    None.     
Impression:       No intracranial abnormalities.      Signed by: Dr. Mame scott M.D. on 2020 3:31 PM        Dictated By: MAME ALLEN  Electronically Signed By: MAME BADILLO MD on 20  Trans
cribed By: LUCINDA on 201       COPY TO:   JENNIFER TERRELL MD         
CHEST SINGLE (PORTABLE)2020 15:17:00                                      
                                               [address] [place] [address]
     Patient Name: CRISTINA RICH                                   MR #: 
[id number]                     : 1965                                  
Age/Sex: 54/F  Acct #: [id number]                              Req #: [id number]
[id number]  [facility]: SHADY PEMBERTON MD                                        
Ordered by: JENNIFER TERRELL MD                            Report #: [id number]    
   Location: MED/SURG                                Room/Bed: [facility]            
  __________________________________________
_________________________________________________________    Procedure: [id number]
6 DX/CHEST SINGLE (PORTABLE)  Exam Date: 20                            Exa
 Time: 1430                                              REPORT STATUS: Signed 
  EXAMINATION:  CHEST SINGLE (PORTABLE)         COMPARISON: Chest x-ray 20
18      INDICATION: Fever, pain, weakness     LOW GRADE FEVER, SOMNOLENCE, LIKEL
Y UTI    2020    1430        DISCUSSION:   Frontal view of the chest obtaine
d at 1439 hours.      HEART AND MEDIASTINUM:  The heart is normal in size. The a
ortic arch is   tortuous but stable        LINES:  None.      LUNGS:  The lungs 
are well inflated and clear. Calcified granuloma in the right   lung base is sta
ble. No pneumonia or pulmonary edema.      PLEURA:  No pleural effusion or pneum
othorax. Stable mild eventration of the   right diaphragm.      BONES AND SOFT T
ISSUES: Degenerative changes of the right AC joint area No   focal osseous lesio
n. The soft tissues are normal.         IMPRESSION:    Stable chest. No acute ca
rdiopulmonary disease.      Signed by: Dr. Austin Rivero MD on 2020 3:
19 PM        Dictated By: AUSTIN RIVERO MD  Electronically Signed By: DEMETRIA RIVERO MD on 20  Transcribed By: LUCINDA on 20       
COPY TO:   JENNIFER TERRELL MD         Blood [id number] 12:11:00* 



             Test Item    Value        Reference Range Interpretation Comments

 

             Blood Culture (test code = 99323559) NO GROWTH AFTER 5 DAYS, FINAL 

REPORT                            





[facility] CenterProthrombin time (PT) in platelet poor 
plasma by coagulation ejacr8613-50-08 11:56:00* 



             Test Item    Value        Reference Range Interpretation Comments

 

             Prothrombin Time (test code = 5902-2) 13.7         11.9-14.5       

           





[facility] CenterINR in Platelet poor plasma by 
Coagulation aikyu8505-72-40 11:56:00* 



             Test Item    Value        Reference Range Interpretation Comments

 

             Prothromb Time International Ratio (test code = 6301-6) 0.99       

                             





Oral Anticoagulant Therapy INR Values:1. Low Intensity Therapy        1.5 - 2.02
. Moderate Intensity Therapy   2.0 - 3.03. High Intensity Therapy(1)    2.5 - 3.
54. High Intensity Therapy(2)    3.0 - 4.05. Panic Value INR              > 5.0
[facility] CenterActivated partial thromboplastin time 
(aPTT) in platelet poor plasma by coagulation ulbhd8178-88-06 11:56:00* 



             Test Item    Value        Reference Range Interpretation Comments

 

             Activated Partial Thromboplast Time (test code = 34935-9) 33.1     

    23.8-35.5                  





[facility] plasma creatine kinase 
measurement (enzymatic activity/volume)2020 11:56:00* 



             Test Item    Value        Reference Range Interpretation Comments

 

             Creatine Kinase (test code = 2157-6) 28                      

          





[facility] Centererum or plasma creatine kinase MB 
measurement (mass/volume)2020 11:56:00* 



             Test Item    Value        Reference Range Interpretation Comments

 

             Creatine Kinase MB (test code = 44188-3) 0.40         0-5.0        

              





[facility] CenterTroponin I measurement by highly 
sensitive enzyme vdodgshjiix1127-20-50 11:56:00* 



             Test Item    Value        Reference Range Interpretation Comments

 

             Troponin I (test code = 66071-2) < 0.001      0-0.300              

      





[facility] Centererum or plasma creatine kinase 
measurement (enzymatic activity/volume)2020 11:56:00* 



             Test Item    Value        Reference Range Interpretation Comments

 

             Creatine Kinase (test code = 2157-6) 28                      

          





[facility] Centererum or plasma creatine kinase MB 
measurement (mass/volume)2020 11:56:00* 



             Test Item    Value        Reference Range Interpretation Comments

 

             Creatine Kinase MB (test code = 06538-3) 0.40         0-5.0        

              





[facility] CenterTroponin I measurement by highly 
sensitive enzyme ebbartxwqen2687-68-76 11:56:00* 



             Test Item    Value        Reference Range Interpretation Comments

 

             Troponin I (test code = 49940-9) < 0.001      0-0.300              

      





[facility] Centererum or plasma creatine kinase 
measurement (enzymatic activity/volume)2020 11:56:00* 



             Test Item    Value        Reference Range Interpretation Comments

 

             Creatine Kinase (test code = 2157-6) 28           -168           

          





[facility] Centererum or plasma creatine kinase MB 
measurement (mass/volume)2020 11:56:00* 



             Test Item    Value        Reference Range Interpretation Comments

 

             Creatine Kinase MB (test code = 34861-3) 0.40         0-5.0        

              





[facility] CenterTroponin I measurement by highly 
sensitive enzyme wiqhmlsfhtj4503-81-16 11:56:00* 



             Test Item    Value        Reference Range Interpretation Comments

 

             Troponin I (test code = 08297-6) < 0.001      0-0.300              

      





[facility] CenterUrine color luzzvtxdbkfzw5380-73-93 
11:55:00* 



             Test Item    Value        Reference Range Interpretation Comments

 

             Urine Color (test code = 5778-6) YELLOW       YELLOW               

      





[facility] CenterUrine pwhkrwc7662-08-50 11:55:00* 



             Test Item    Value        Reference Range Interpretation Comments

 

             Urine Clarity (test code = 24911-8) HAZY         CLEAR             

         





[facility] Centerpecific gravity of Urine by Test strip
2020 11:55:00* 



             Test Item    Value        Reference Range Interpretation Comments

 

             Urine Specific Gravity (test code = 5811-5) 1.020        1.010-1.02

5                





[facility] CenterUrine pH measurement by automated test 
xtbos9795-34-08 11:55:00* 



             Test Item    Value        Reference Range Interpretation Comments

 

             Urine pH (test code = 95907-3) 8            5-7                    

    





[facility] CenterUrine leukocyte esterase detection by 
qwewkvxk4627-36-17 11:55:00* 



             Test Item    Value        Reference Range Interpretation Comments

 

             Urine Leukocyte Esterase (test code = 5799-2) LARGE        NEGATIVE

                   





[facility] CenterUrine nitrite jfxfgnqef6804-59-81 
11:55:00* 



             Test Item    Value        Reference Range Interpretation Comments

 

             Urine Nitrite (test code = 58998-2) NEGATIVE     NEGATIVE          

         





[facility] CenterUrine protein measurement by test strip 
(mass/volume)2020 11:55:00* 



             Test Item    Value        Reference Range Interpretation Comments

 

             Urine Protein (test code = 5804-0) >=300        NEGATIVE           

        





[facility] CenterUrine glucose zolkkkrxt1703-70-83 
11:55:00* 



             Test Item    Value        Reference Range Interpretation Comments

 

             Urine Glucose (UA) (test code = 2349-9) NEGATIVE     NEGATIVE      

             





[facility] CenterUrine ketones detection by automated test
ezpag1771-62-54 11:55:00* 



             Test Item    Value        Reference Range Interpretation Comments

 

             Urine Ketones (test code = 78024-5) NEGATIVE     NEGATIVE          

         





[facility] CenterUrine urobilinogen measurement by test 
strip (mass/volume)2020 11:55:00* 



             Test Item    Value        Reference Range Interpretation Comments

 

             Urine Urobilinogen (test code = 90151-9) 0.2          0.2-1        

              





[facility] CenterUrine total bilirubin measurement 
(mass/volume)2020 11:55:00* 



             Test Item    Value        Reference Range Interpretation Comments

 

             Urine Bilirubin (test code = 1978-6) NEGATIVE     NEGATIVE         

          





[facility] CenterUrine erythrocytes mbxuszuvt6171-83-45 
11:55:00* 



             Test Item    Value        Reference Range Interpretation Comments

 

             Urine Blood (test code = 55525-9) 3+           NEGATIVE            

       





[facility] CenterAutomated urine sediment leukocyte count 
by microscopy (number/high power field)2020 11:55:00* 



             Test Item    Value        Reference Range Interpretation Comments

 

             Urine WBC (test code = 5821-4) >50          0-5                    

    





[facility] CenterErythrocytes detection in urine sediment 
by light lheddahwvz4746-75-38 11:55:00* 



             Test Item    Value        Reference Range Interpretation Comments

 

             Urine RBC (test code = 46768-2) >50          0-5                   

     





[facility] CenterBacteria detection in urine sediment by 
light gigcxjhkjn0185-83-93 11:55:00* 



             Test Item    Value        Reference Range Interpretation Comments

 

             Urine Bacteria (test code = 44259-0) MODERATE     NONE             

          





[facility] CenterEpithelial cells detection in urine 
sediment by light qrujefvajn2974-10-96 11:55:00* 



             Test Item    Value        Reference Range Interpretation Comments

 

             Urine Epithelial Cells (test code = 86354-8) FEW          NONE     

                  





[facility] CenterCoarse granular casts detection in urine 
sediment by light rtxkklmgum8273-92-02 11:55:00* 



             Test Item    Value        Reference Range Interpretation Comments

 

             Urine Coarse Granular Casts (test code = 26510-3) 1-5          >0  

                       





[facility] CenterMucus detection in urine sediment by 
light zkjqpmshtj7475-15-93 11:55:00* 



             Test Item    Value        Reference Range Interpretation Comments

 

             Urine Mucus (test code = 8247-9) MODERATE     RARE                 

      





[facility] CenterCoarse granular casts detection in urine 
sediment by light emqblvidmp5105-15-01 11:55:00* 



             Test Item    Value        Reference Range Interpretation Comments

 

             Urine Coarse Granular Casts (test code = 80029-0) 1-5          >0  

                       





[facility] CenterMucus detection in urine sediment by 
light exozddjcci9680-45-70 11:55:00* 



             Test Item    Value        Reference Range Interpretation Comments

 

             Urine Mucus (test code = 8247-9) MODERATE     RARE                 

      





[facility] CenterCoarse granular casts detection in urine 
sediment by light dqmhurzxhn7864-52-99 11:55:00* 



             Test Item    Value        Reference Range Interpretation Comments

 

             Urine Coarse Granular Casts (test code = 83476-8) 1-5          >0  

                       





[facility] CenterMucus detection in urine sediment by 
light hivunwrpqp5521-55-68 11:55:00* 



             Test Item    Value        Reference Range Interpretation Comments

 

             Urine Mucus (test code = 8247-9) MODERATE     RARE                 

      





[facility] Centerodium Mnsic3091-82-86 20:54:00* 



             Test Item    Value        Reference Range Interpretation Comments

 

             Sodium Level (test code = 2951-2) 141          136-145             

       





[facility] CenterPotassium Yugrb2885-93-26 20:54:00* 



             Test Item    Value        Reference Range Interpretation Comments

 

             Potassium Level (test code = 2823-3) 3.9          3.5-5.1          

          





[facility] CenterChloride Sijvr8262-89-10 20:54:00* 



             Test Item    Value        Reference Range Interpretation Comments

 

             Chloride Level (test code = 2075-0) 105                      

         





[facility] CenterCarbon Dioxide Nzmzt1401-04-51 20:54:00* 



             Test Item    Value        Reference Range Interpretation Comments

 

             Carbon Dioxide Level (test code = 2028-9) 25           22-29       

               





[facility] CenterAnion Icp5468-23-54 20:54:00* 



             Test Item    Value        Reference Range Interpretation Comments

 

             Anion Gap (test code = 33037-3) 14.9         8-16                  

     





[facility] CenterBlood Urea Uugolerc7916-10-43 20:54:00* 



             Test Item    Value        Reference Range Interpretation Comments

 

             Blood Urea Nitrogen (test code = 3094-0) 17           7-26         

              





[facility] CenterCreatinine2020-01-16 20:54:00* 



             Test Item    Value        Reference Range Interpretation Comments

 

             Creatinine (test code = 2160-0) 0.99         0.57-1.11             

     





[facility] CenterBUN/Creatinine Ugjnn5671-86-59 20:54:00* 



             Test Item    Value        Reference Range Interpretation Comments

 

             BUN/Creatinine Ratio (test code = 3097-3) 17           6-25        

               





[facility] CenterEstimat Glomerular Filtration Rate
2020 20:54:00* 



             Test Item    Value        Reference Range Interpretation Comments

 

             Estimat Glomerular Filtration Rate (test code = 961161967) 58      

     >60          L             





Ranges were taken from the National Kidney Disease Education Program and the [facility] Foundation literature.Reference ranges:60 or greater: Pcmifa07-57 (
for 3 consecutive months): Chronic kidney disease 15 or less: Kidney failureBrownfield Regional Medical CenterGlucose Mommp5078-45-92 20:54:00* 



             Test Item    Value        Reference Range Interpretation Comments

 

             Glucose Level (test code = XFK4307) 117                      

         





[facility] CenterCalcium Ydonn6444-63-18 20:54:00* 



             Test Item    Value        Reference Range Interpretation Comments

 

             Calcium Level (test code = 66127-3) 9.6          8.4-10.2          

         





[facility] CenterTotal Vfyohgmtx2526-03-82 20:54:00* 



             Test Item    Value        Reference Range Interpretation Comments

 

             Total Bilirubin (test code = 1975-2) 0.2          0.2-1.2          

          





[facility] CenterAspartate Amino Transf (AST/SGOT)
2020 20:54:00* 



             Test Item    Value        Reference Range Interpretation Comments

 

                                        Aspartate Amino Transf (AST/SGOT) (test 

code = Aspartate Amino Transf 

(AST/SGOT))     30              5-34                             





[facility] CenterAlanine Aminotransferase (ALT/SGPT)
2020 20:54:00* 



             Test Item    Value        Reference Range Interpretation Comments

 

             Alanine Aminotransferase (ALT/SGPT) (test code = 1742-6) 35        

   0-55                       





[facility] CenterTotal Sdxgrre5351-81-68 20:54:00* 



             Test Item    Value        Reference Range Interpretation Comments

 

             Total Protein (test code = 2885-2) 7.8          6.5-8.1            

        





[facility] CenterAlbumin2020-01-16 20:54:00* 



             Test Item    Value        Reference Range Interpretation Comments

 

             Albumin (test code = 1751-7) 3.8          3.5-5.0                  

  





[facility] CenterGlobulin2020-01-16 20:54:00* 



             Test Item    Value        Reference Range Interpretation Comments

 

             Globulin (test code = 18037-3) 4.0          2.3-3.5      H         

    





[facility] CenterAlbumin/Globulin Qiioh1131-12-33 20:54:00
  * 



             Test Item    Value        Reference Range Interpretation Comments

 

             Albumin/Globulin Ratio (test code = 1759-0) 1.0          0.8-2.0   

                 





[facility] CenterAlkaline Chxegytyhku7680-14-86 20:54:00* 



             Test Item    Value        Reference Range Interpretation Comments

 

             Alkaline Phosphatase (test code = 6768-6) 134                

               





[facility] CenterABDOMEN-1VIEW (KUB)2020 19:48:00   
                                                                                
 [facility]                        [address] [place] [address]      Patient Name: CRISTINA RICH     
                             MR #: [id number]                     :                                    Age/Sex: 54/F  Acct #: [id number]   
                          Req #: [id number]  Adm Physician:                     
                                Ordered by: TINO MALHOTRA NP                 
          Report #: [id number]        Location: ER                               
      Room/Bed:                     ________________________________________
___________________________________________________________    Procedure: [id number]
[id number] DX/ABDOMEN-1VIEW (KUB)  Exam Date: 20                            Exam 
Time:                                               REPORT STATUS: Signed   
   Exam: Abdominal film       Clinical History: Pain      Comparison: 
9. 2019      DISCUSSION:       2 left-sided ureteral stents stable i
n position.      Right percutaneous nephrostomy tube stable in position.      No
nobstructive bowel gas pattern.      IMPRESSION:      Stable appearance of the l
eft ureteral stents and right percutaneous   nephrostomy tube            Signed 
by: Dr. Deb Del Castillo M.D. on 2020 7:49 PM        Dictated By: DEB DEL CASTILLO MD, MD  Electronically Signed By: DEB DEL CASTILLO MD, MD on 20  Transcri
bed By: LUCINDA on 20       COPY TO:   TINO MALHOTRA NP         
Platelet Myizstsh5844-96-73 18:29:00* 



             Test Item    Value        Reference Range Interpretation Comments

 

             Platelet Estimate (test code = 38416-9) SLIGHTLY DECREASED         

                   





[facility] CenterPlatelet Morphology Hubgaun2746-97-88 
18:29:00* 



             Test Item    Value        Reference Range Interpretation Comments

 

             Platelet Morphology Comment (test code = 25467-2) NORMAL           

                       





[facility] CenterHypochromasia2020-01-16 18:29:00* 



             Test Item    Value        Reference Range Interpretation Comments

 

             Hypochromasia (test code = 728-6) MODERATE                         

       





[facility] CenterPoikilocytosis2020-01-16 18:29:00* 



             Test Item    Value        Reference Range Interpretation Comments

 

             Poikilocytosis (test code = 779-9) MODERATE                        

        





[facility] CenterAnisocytosis2020-01-16 18:29:00* 



             Test Item    Value        Reference Range Interpretation Comments

 

             Anisocytosis (test code = 702-1) SLIGHT                            

      





[facility] CenterRed Cell Morphology Efjzeis8112-34-19 
18:29:00* 



             Test Item    Value        Reference Range Interpretation Comments

 

             Red Cell Morphology Comment (test code = 6742-1) NORMAL            

                      





[facility] CenterPlatelet Vxsiv2313-62-67 18:28:00* 



             Test Item    Value        Reference Range Interpretation Comments

 

             Platelet Count (test code = 777-3) 114          140-360      L     

        





[facility] CenterUrine TNE3531-64-10 18:08:00* 



             Test Item    Value        Reference Range Interpretation Comments

 

             Urine WBC (test code = 5821-4) 6-10         0-5          H         

    





[facility] CenterUrine TCC9592-12-50 18:08:00* 



             Test Item    Value        Reference Range Interpretation Comments

 

             Urine RBC (test code = 66114-3) >50          0-5          H        

     





[facility] CenterUrine Pnbhlzvp8937-74-53 18:08:00* 



             Test Item    Value        Reference Range Interpretation Comments

 

             Urine Bacteria (test code = 54743-6) MODERATE     NONE         H   

          





[facility] CenterUrine Epithelial Cwvia7932-78-93 18:08:00
  * 



             Test Item    Value        Reference Range Interpretation Comments

 

             Urine Epithelial Cells (test code = 56846-0) FEW          NONE     

                  





[facility] CenterUrine Fqohp5311-59-47 18:08:00* 



             Test Item    Value        Reference Range Interpretation Comments

 

             Urine Mucus (test code = 8247-9) MODERATE     RARE         H       

      





[facility] CenterUrine Siove5976-71-01 17:51:00* 



             Test Item    Value        Reference Range Interpretation Comments

 

             Urine Color (test code = 5778-6) YELLOW       YELLOW               

      





[facility] CenterUrine Hqbxjwf6962-95-63 17:51:00* 



             Test Item    Value        Reference Range Interpretation Comments

 

             Urine Clarity (test code = 67520-6) CLEAR        CLEAR             

         





[facility] CenterUrine Specific Wxxhjai2323-16-48 17:51:00
  * 



             Test Item    Value        Reference Range Interpretation Comments

 

             Urine Specific Gravity (test code = 5811-5) >=1.030      1.010-1.02

5                





[facility] CenterUrine uX3933-60-02 17:51:00* 



             Test Item    Value        Reference Range Interpretation Comments

 

             Urine pH (test code = 27550-2) 6            5-7                    

    





[facility] Leukocyte Vagsadvi9919-66-24 
17:51:00* 



             Test Item    Value        Reference Range Interpretation Comments

 

             Urine Leukocyte Esterase (test code = 5799-2) SMALL        NEGATIVE

                   





[facility] Yppuwzc5116-07-22 17:51:00* 



             Test Item    Value        Reference Range Interpretation Comments

 

             Urine Nitrite (test code = 65608-4) NEGATIVE     NEGATIVE          

         





[facility] Ckufjow4438-46-86 17:51:00* 



             Test Item    Value        Reference Range Interpretation Comments

 

             Urine Protein (test code = 5804-0) 3+           NEGATIVE     H     

        





[facility] Glucose (UA)2020 17:51:00* 



             Test Item    Value        Reference Range Interpretation Comments

 

             Urine Glucose (UA) (test code = 2349-9) NEGATIVE     NEGATIVE      

             





[facility] Onytccf5049-97-65 17:51:00* 



             Test Item    Value        Reference Range Interpretation Comments

 

             Urine Ketones (test code = 23974-1) NEGATIVE     NEGATIVE          

         





[facility] Uovkfrawnowv4159-90-87 17:51:00* 



             Test Item    Value        Reference Range Interpretation Comments

 

             Urine Urobilinogen (test code = 08807-4) 0.2          0.2-1        

              





[facility] CenterUrine Fbzlrounp5048-57-02 17:51:00* 



             Test Item    Value        Reference Range Interpretation Comments

 

             Urine Bilirubin (test code = 1978-6) NEGATIVE     NEGATIVE         

          





[facility] CenterUrine Lcict5999-71-23 17:51:00* 



             Test Item    Value        Reference Range Interpretation Comments

 

             Urine Blood (test code = 49073-2) 3+           NEGATIVE            

       





[facility] CenterWhite Blood Hmykb1265-53-89 17:44:00* 



             Test Item    Value        Reference Range Interpretation Comments

 

             White Blood Count (test code = 6690-2) 7.26         4.8-10.8       

            





[facility] CenterRed Blood Fxepf3426-13-81 17:44:00* 



             Test Item    Value        Reference Range Interpretation Comments

 

             Red Blood Count (test code = 789-8) 4.09         3.6-5.1           

         





[facility] CenterHemoglobin2020-01-16 17:44:00* 



             Test Item    Value        Reference Range Interpretation Comments

 

             Hemoglobin (test code = 28298-0) 10.6         12.0-16.0    L       

      





[facility] CenterHematocrit2020-01-16 17:44:00* 



             Test Item    Value        Reference Range Interpretation Comments

 

             Hematocrit (test code = 4544-3) 33.1         34.2-44.1    L        

     





[facility] CenterMean Corpuscular Mwmaxo9325-79-38 
17:44:00* 



             Test Item    Value        Reference Range Interpretation Comments

 

             Mean Corpuscular Volume (test code = 787-2) 80.9         81-99     

   L             





[facility] CenterMean Corpuscular Pzxubancnc8596-33-44 
17:44:00* 



             Test Item    Value        Reference Range Interpretation Comments

 

             Mean Corpuscular Hemoglobin (test code = 785-6) 25.9         28-32 

       L             





[facility] CenterMean Corpuscular Hemoglobin Concent
2020 17:44:00* 



             Test Item    Value        Reference Range Interpretation Comments

 

             Mean Corpuscular Hemoglobin Concent (test code = 786-4) 32.0       

  31-35                      





[facility] CenterRed Cell Distribution Gxnba8129-14-57 
17:44:00* 



             Test Item    Value        Reference Range Interpretation Comments

 

             Red Cell Distribution Width (test code = 98915-0) 15.4         11.7

-14.4    H             





[facility] CenterNeutrophils (%) (Auto)2020 17:44:00
  * 



             Test Item    Value        Reference Range Interpretation Comments

 

             Neutrophils (%) (Auto) (test code = 25658-1) 51.4         38.7-80.0

                  





[facility] CenterLymphocytes (%) (Auto)2020 17:44:00
  * 



             Test Item    Value        Reference Range Interpretation Comments

 

             Lymphocytes (%) (Auto) (test code = 736-9) 38.0         18.0-39.1  

                





[facility] CenterMonocytes (%) (Auto)2020 17:44:00* 



             Test Item    Value        Reference Range Interpretation Comments

 

             Monocytes (%) (Auto) (test code = 5905-5) 8.0          4.4-11.3    

               





[facility] CenterEosinophils (%) (Auto)2020 17:44:00
  * 



             Test Item    Value        Reference Range Interpretation Comments

 

             Eosinophils (%) (Auto) (test code = 713-8) 1.7          0.0-6.0    

                





[facility] CenterBasophils (%) (Auto)2020 17:44:00* 



             Test Item    Value        Reference Range Interpretation Comments

 

             Basophils (%) (Auto) (test code = 706-2) 0.6          0.0-1.0      

              





[facility] CenterIM GRANULOCYTES %2020 17:44:00* 



             Test Item    Value        Reference Range Interpretation Comments

 

             IM GRANULOCYTES % (test code = IM GRANULOCYTES %) 0.3          0.0-

1.0                    





[facility] CenterNeutrophils # (Auto)2020 17:44:00* 



             Test Item    Value        Reference Range Interpretation Comments

 

             Neutrophils # (Auto) (test code = 751-8) 3.7          2.1-6.9      

              





[facility] CenterLymphocytes # (Auto)2020 17:44:00* 



             Test Item    Value        Reference Range Interpretation Comments

 

             Lymphocytes # (Auto) (test code = 33720-1) 2.8          1.0-3.2    

                





[facility] CenterMonocytes # (Auto)2020 17:44:00* 



             Test Item    Value        Reference Range Interpretation Comments

 

             Monocytes # (Auto) (test code = 742-7) 0.6          0.2-0.8        

            





[facility] CenterEosinophils # (Auto)2020 17:44:00* 



             Test Item    Value        Reference Range Interpretation Comments

 

             Eosinophils # (Auto) (test code = 711-2) 0.1          0.0-0.4      

              





[facility] CenterBasophils # (Auto)2020 17:44:00* 



             Test Item    Value        Reference Range Interpretation Comments

 

             Basophils # (Auto) (test code = 704-7) 0.0          0.0-0.1        

            





[facility] CenterAbsolute Immature Granulocyte (auto
2020 17:44:00* 



             Test Item    Value        Reference Range Interpretation Comments

 

                                        Absolute Immature Granulocyte (auto (lloyd

t code = Absolute Immature Granulocyte 

(auto)          0.02            0-0.1                            





[facility] CenterBlood platelets count by estimate 
(number/volume)2020 16:28:00* 



             Test Item    Value        Reference Range Interpretation Comments

 

             Platelet Estimate (test code = 17140-0) SLIGHTLY DECREASED         

                   





[facility] CenterPlatelet reebicdfnp8509-95-81 16:28:00* 



             Test Item    Value        Reference Range Interpretation Comments

 

             Platelet Morphology Comment (test code = 32146-0) NORMAL           

                       





[facility] hypochromia detection by light 
llybnjhrvu4216-21-38 16:28:00* 



             Test Item    Value        Reference Range Interpretation Comments

 

             Hypochromasia (test code = 728-6) MODERATE                         

       





[facility] CenterBlSt[facility] poikilocytosis detection by light 
ktftrkliva4812-33-00 16:28:00* 



             Test Item    Value        Reference Range Interpretation Comments

 

             Poikilocytosis (test code = 779-9) MODERATE                        

        





[facility] CenterBlSt. [facility] Medical Center anisocytosis detection by light 
mlcaoozlma4327-81-64 16:28:00* 



             Test Item    Value        Reference Range Interpretation Comments

 

             Anisocytosis (test code = 702-1) SLIGHT                            

      





[facility] CenterRBC xlhigoxdfr6407-30-28 16:28:00* 



             Test Item    Value        Reference Range Interpretation Comments

 

             Red Cell Morphology Comment (test code = 6742-1) NORMAL            

                      





[facility] platelets count by estimate 
(number/volume)2020 16:28:00* 



             Test Item    Value        Reference Range Interpretation Comments

 

             Platelet Estimate (test code = 50776-6) SLIGHTLY DECREASED         

                   





[facility] CenterPlatelet hnphitivpt7576-30-37 16:28:00* 



             Test Item    Value        Reference Range Interpretation Comments

 

             Platelet Morphology Comment (test code = 81318-3) NORMAL           

                       





[facility] hypochromia detection by light 
krjonkrwpl4452-36-05 16:28:00* 



             Test Item    Value        Reference Range Interpretation Comments

 

             Hypochromasia (test code = 728-6) MODERATE                         

       





[facility] poikilocytosis detection by light 
zvyntflkzj6748-75-81 16:28:00* 



             Test Item    Value        Reference Range Interpretation Comments

 

             Poikilocytosis (test code = 779-9) MODERATE                        

        





[facility] anisocytosis detection by light 
bcwmtutasu6810-15-86 16:28:00* 



             Test Item    Value        Reference Range Interpretation Comments

 

             Anisocytosis (test code = 702-1) SLIGHT                            

      





[facility] yqngfnjywd2221-87-00 16:28:00* 



             Test Item    Value        Reference Range Interpretation Comments

 

             Red Cell Morphology Comment (test code = 6742-1) NORMAL            

                      





[facility] platelets count by estimate 
(number/volume)2020 16:28:00* 



             Test Item    Value        Reference Range Interpretation Comments

 

             Platelet Estimate (test code = 63432-1) SLIGHTLY DECREASED         

                   





[facility] CenterPlateSt. [facility] aepnfbdvpq8196-86-88 16:28:00* 



             Test Item    Value        Reference Range Interpretation Comments

 

             Platelet Morphology Comment (test code = 09044-0) NORMAL           

                       





[facility] hypochromia detection by light 
miilfufrts1749-70-04 16:28:00* 



             Test Item    Value        Reference Range Interpretation Comments

 

             Hypochromasia (test code = 728-6) MODERATE                         

       





[facility] poikilocytosis detection by light 
lhliajyooh6280-28-60 16:28:00* 



             Test Item    Value        Reference Range Interpretation Comments

 

             Poikilocytosis (test code = 779-9) MODERATE                        

        





[facility] CenterBlood anisocytosis detection by light 
rhbubzffhh5824-66-24 16:28:00* 



             Test Item    Value        Reference Range Interpretation Comments

 

             Anisocytosis (test code = 702-1) SLIGHT                            

      





[facility] CenterRB lcahhjgaen7762-86-35 16:28:00* 



             Test Item    Value        Reference Range Interpretation Comments

 

             Red Cell Morphology Comment (test code = 6742-1) NORMAL            

                      





[facility] Vvvrupv6891-29-90 11:30:00* 



             Test Item    Value        Reference Range Interpretation Comments

 

             Bedside Glucose (test code = 51176-8) 131                 H  

           





Meter ID: [id number] [facility]mas Medical Center Culture
2020 10:27:00* 



             Test Item    Value        Reference Range Interpretation Comments

 

             Blood Culture (test code = 13563793) NO GROWTH AFTER 5 DAYS, FINAL 

REPORT                            





[facility] Ibxxqfg8901-89-88 06:39:00* 



             Test Item    Value        Reference Range Interpretation Comments

 

             Bedside Glucose (test code = 22084-3) 132                 H  

           





Meter ID: [id number] Regional Medical CenterUrine Culture
2020 14:29:00* 



             Test Item    Value        Reference Range Interpretation Comments

 

             Urine Culture (test code = 630-4) No Result Data Provided          

                  





[facility] Dasfobx7869-13-63 14:29:00* 



             Test Item    Value        Reference Range Interpretation Comments

 

             Urine Culture (test code = 630-4) No Result Data Provided          

                  





[facility] Dfsswgr8916-23-59 10:27:00* 



             Test Item    Value        Reference Range Interpretation Comments

 

                          Blood Culture (test code = 71909796)                  

           NO GROWTH AFTER

72 HOURS                                                     





[facility] CenterUrine Pqaxqoc1930-86-25 10:06:00* 



             Test Item    Value        Reference Range Interpretation Comments

 

             Urine Culture (test code = 630-4) No Result Data Provided          

                  





[facility] Dkdyknb3830-72-87 10:06:00* 



             Test Item    Value        Reference Range Interpretation Comments

 

             Urine Culture (test code = 630-4) No Result Data Provided          

                  





[facility] CenterIR VMEIBJJ9267-08-61 15:02:00            
                                                                         [facility]                        [address] [address]      Patient Name: CRISTINA RICH     
                             MR #: [id number]                     :                                    Age/Sex: 54/F  Acct #: [id number]   
                          Req #: [id number]  Adm Physician: SHADY PEMBERTON MD       
                                Ordered by: JACQUELINE ARORA MD                      
     Report #: [id number]        Location: MED/[facility]                             
 Room/Bed: [facility]               _____________________________________________
______________________________________________________    Procedure: [id number] D
X/IR CONSULT  Exam Date:                             Exam Time:                 
                             REPORT STATUS: Signed    PROCEDURE: Genitourinary 
catheter exchange      Procedural Personnel   Attending physician(s): Radha Florez MD   Fellow physician(s): None   Resident physician(s): None   Advanced practi
ce provider(s): None      Pre-procedure diagnosis: Right ureteral obstruction   
Post-procedure diagnosis: Same   Indication: Routine scheduled exchange   Additi
onal clinical history: None      Complications: No immediate complications.     
IMPRESSION:      Successful routine exchange of right nephrostomy tube for new 
10Fr nephrostomy   tube.      Plan:    Routine exchange in 8 weeks.   __________
_____________________________________________________      PROCEDURE SUMMARY   -
Target organ: Unilateral native kidney   - Antegrade nephrostogram(s) via the e
xisting access   - Nephrostomy tube exchange   - Additional procedure(s): None  
   PROCEDURE DETAILS:      Pre-procedure   Consent: Informed consent for the pr
ocedure including risks, benefits and   alternatives was obtained and time-out w
as performed prior to the procedure.   Preparation: The site was prepared and dr
aped using maximal sterile barrier   technique including cutaneous antisepsis.  
   Anesthesia/sedation   Level of anesthesia/sedation: Moderate sedation (consc
ious sedation) 1mg   Versed, 50mcg Fentanyl   Anesthesia/sedation administered b
y: Independent trained observer under   attending supervision with continuous mo
nitoring of the patient?s level of   consciousness and physiologic status   Tota
l intra-service sedation time (minutes): 30      Right genitourinary catheter ex
change   Local anesthesia was administered. Initial nephrostogram was performed.
A wire   was placed through the existing tube and it was removed. The new tube 
was   advanced over the wire and position was confirmed with contrast injection.
  Pre-existing genitourinary catheter: 10Fr Uresil   Genitourinary catheter(s) 
placed: 10Fr Uresil    Findings: Locking loop in renal pelvis.   External cathet
er securement: Non-absorbable suture       Additional genitourinary system inter
vention   Genitourinary intervention: None   Location of intervention: Not appli
cable   Device used: Not applicable   Description of intervention: Not applicabl
e   Post-intervention findings: Not applicable      Contrast   Contrast agent: I
sovue 300   Contrast volume (mL): 10      Radiation Dose   Fluoroscopy time (min
utes): 1.0     Reference air kerma (mGy): 8.7       Additional Details   Additio
nal description of procedure: None   Equipment details: None   Specimens removed
: None   Estimated blood loss (mL): Less than 10   Standardized report: SIR_Alphonse
theterExchange_v3      Attestation   Signer name: Radha Florez MD   I attest that
I was present for the entire procedure. I reviewed the stored   images and agree
with the report as written.               Signed by: Radha Florez MD on 
019 3:05 PM        Dictated By: RADHA FLOREZ MD  Electronically Signed By: RADHA FLOREZ MD on 19 1508  Transcribed By: LUCINDA on 19 1500       COPY TO: 
 JACQUELINE ARORA MD         NEPHRO/URET W IMG/INJ-KFJGBLCD0541-22-04 15:02:00        
                                                                             [address] [address]      Patient Name: CRISTINA RICH     
                             MR #: [id number]                     :                                    Age/Sex: 54/F  Acct #: [id number]   
                          Req #: [id number]  Adm Physician: SHADY PEMBERTON MD       
                                Ordered by: SHADY PEMBERTON MD                      
     Report #: [id number]        Location: MED/[facility]                             
 Room/Bed: 298-1               _____________________________________________
______________________________________________________    Procedure: [id number] I
R/NEPHRO/URET W IMG/INJ-EXISTING  Exam Date:                             Exam Ti
me:                                               REPORT STATUS: Signed    PROCE
DURE: Genitourinary catheter exchange      Procedural Personnel   Attending phys
mandyan(s): Radha Florez MD   Fellow physician(s): None   Resident physician(s): Anne-Marie
ne   Advanced practice provider(s): None      Pre-procedure diagnosis: Right ure
teral obstruction   Post-procedure diagnosis: Same   Indication: Routine schedul
ed exchange   Additional clinical history: None      Complications: No immediate
complications.      IMPRESSION:      Successful routine exchange of right nephr
ostomy tube for new 10Fr nephrostomy   tube.      Plan:    Routine exchange in 8
weeks.   _______________________________________________________________      P
ROCEDURE SUMMARY   - Target organ: Unilateral native kidney   - Antegrade nephro
stogram(s) via the existing access   - Nephrostomy tube exchange   - Additional 
procedure(s): None      PROCEDURE DETAILS:      Pre-procedure   Consent: Informe
d consent for the procedure including risks, benefits and   alternatives was obt
ained and time-out was performed prior to the procedure.   Preparation: The site
was prepared and draped using maximal sterile barrier   technique including cut
aneous antisepsis.      Anesthesia/sedation   Level of anesthesia/sedation: Mode
rate sedation (conscious sedation) 1mg   Versed, 50mcg Fentanyl   Anesthesia/sed
ation administered by: Independent trained observer under   attending supervisio
n with continuous monitoring of the patient?s level of   consciousness and physi
ologic status   Total intra-service sedation time (minutes): 30      Right genit
ourinary catheter exchange   Local anesthesia was administered. Initial nephrost
ogram was performed. A wire   was placed through the existing tube and it was re
moved. The new tube was   advanced over the wire and position was confirmed with
contrast injection.   Pre-existing genitourinary catheter: 10Fr Uresil   Genito
urinary catheter(s) placed: 10Fr Uresil    Findings: Locking loop in renal pelvi
s.   External catheter securement: Non-absorbable suture       Additional genito
urinary system intervention   Genitourinary intervention: None   Location of int
ervention: Not applicable   Device used: Not applicable   Description of interve
ntion: Not applicable   Post-intervention findings: Not applicable      Contrast
  Contrast agent: Isovue 300   Contrast volume (mL): 10      Radiation Dose   F
luoroscopy time (minutes): 1.0     Reference air kerma (mGy): 8.7       Addition
al Details   Additional description of procedure: None   Equipment details: None
  Specimens removed: None   Estimated blood loss (mL): Less than 10   Standardi
zed report: [email or web address]_v3      Attestation   Signer name: Radha Florez MD   I attest that I was present for the entire procedure. I reviewed the stor
ed   images and agree with the report as written.               Signed by: Fahad Florez MD on 2019 3:05 PM        Dictated By: RADHA FLOREZ MD  Electronicall
y Signed By: RADHA FLOREZ MD on 19 150  Transcribed By: LUCINDA on 19
1505       COPY TO:   SHADY PEMBERTON MD         Prothrombin Ohci9290-42-66 09:45:00
  * 



             Test Item    Value        Reference Range Interpretation Comments

 

             Prothrombin Time (test code = 5902-2) 12.5         11.9-14.5       

           





[facility] CenterProthromb Time International Ratio
2019 09:45:00* 



             Test Item    Value        Reference Range Interpretation Comments

 

             Prothromb Time International Ratio (test code = 6301-6) 0.89       

                             





Oral Anticoagulant Therapy INR Values:1. Low Intensity Therapy        1.5 - 2.02
. Moderate Intensity Therapy   2.0 - 3.03. High Intensity Therapy(1)    2.5 - 3.
54. High Intensity Therapy(2)    3.0 - 4.05. Panic Value INR              > 5.0
[facility] CenterProthrombin Hagh0927-26-15 09:45:00* 



             Test Item    Value        Reference Range Interpretation Comments

 

             Prothrombin Time (test code = 5902-2) 12.5         11.9-14.5       

           





[facility] CenterProthromb Time International Ratio
2019 09:45:00* 



             Test Item    Value        Reference Range Interpretation Comments

 

             Prothromb Time International Ratio (test code = 6301-6) 0.89       

                             





Oral Anticoagulant Therapy INR Values:1. Low Intensity Therapy        1.5 - 2.02
. Moderate Intensity Therapy   2.0 - 3.03. High Intensity Therapy(1)    2.5 - 3.
54. High Intensity Therapy(2)    3.0 - 4.05. Panic Value INR              > 5.0
[facility] Centerodium Nhjaf5868-84-65 07:13:00* 



             Test Item    Value        Reference Range Interpretation Comments

 

             Sodium Level (test code = 2951-2) 137          136-145             

       





[facility] CenterPotassium Fjcmz2132-22-71 07:13:00* 



             Test Item    Value        Reference Range Interpretation Comments

 

             Potassium Level (test code = 2823-3) 4.0          3.5-5.1          

          





[facility] CenterChloride Haiki7348-66-50 07:13:00* 



             Test Item    Value        Reference Range Interpretation Comments

 

             Chloride Level (test code = 2075-0) 105                      

         





[facility] CenterCarbon Dioxide Oyygh1438-01-86 07:13:00* 



             Test Item    Value        Reference Range Interpretation Comments

 

             Carbon Dioxide Level (test code = 2028-9) 21           22-29       

 L             





[facility] CenterAnion Dwz3391-80-83 07:13:00* 



             Test Item    Value        Reference Range Interpretation Comments

 

             Anion Gap (test code = 33037-3) 15.0         8-16                  

     





[facility] CenterBlood Urea Xgfnnrqh9595-73-25 07:13:00* 



             Test Item    Value        Reference Range Interpretation Comments

 

             Blood Urea Nitrogen (test code = 3094-0) 12           7-26         

              





[facility] CenterCreatinine2019-12-30 07:13:00* 



             Test Item    Value        Reference Range Interpretation Comments

 

             Creatinine (test code = 2160-0) 0.83         0.57-1.11             

     





[facility] CenterBUN/Creatinine Tbyew7401-64-01 07:13:00* 



             Test Item    Value        Reference Range Interpretation Comments

 

             BUN/Creatinine Ratio (test code = 3097-3) 14           6-25        

               





[facility] CenterEstimat Glomerular Filtration Rate
2019 07:13:00* 



             Test Item    Value        Reference Range Interpretation Comments

 

             Estimat Glomerular Filtration Rate (test code = 671316001) > 60    

     >60                        





Ranges were taken from the National Kidney Disease Education Program and the [facility] [employer]ney Foundation literature.Reference ranges:60 or greater: Opwyih74-87 (
for 3 consecutive months): Chronic kidney disease 15 or less: Kidney failureCHI 
[facility]olute [facility] HospitalGlucose Urult6352-04-56 07:13:00* 



             Test Item    Value        Reference Range Interpretation Comments

 

             Glucose Level (test code = VEB3062) 136                 H    

         





[facility] CenterCalcium Mjrei0084-81-06 07:13:00* 



             Test Item    Value        Reference Range Interpretation Comments

 

             Calcium Level (test code = 56365-2) 9.0          8.4-10.2          

         





[facility] CenterTotal Afpvijsue6065-39-18 07:13:00* 



             Test Item    Value        Reference Range Interpretation Comments

 

             Total Bilirubin (test code = 1975-2) 0.4          0.2-1.2          

          





[facility] Medical CenterAspartate Amino Transf (AST/SGOT)
2019 07:13:00* 



             Test Item    Value        Reference Range Interpretation Comments

 

                                        Aspartate Amino Transf (AST/SGOT) (test 

code = Aspartate Amino Transf 

(AST/SGOT))     25              5-34                             





[facility] CenterAlanine Aminotransferase (ALT/SGPT)
2019 07:13:00* 



             Test Item    Value        Reference Range Interpretation Comments

 

             Alanine Aminotransferase (ALT/SGPT) (test code = 1742-6) 21        

   0-55                       





[facility] CenterTotal Qjqwzhj1260-85-43 07:13:00* 



             Test Item    Value        Reference Range Interpretation Comments

 

             Total Protein (test code = 2885-2) 7.2          6.5-8.1            

        





[facility] Medical CenterAlbumin2019-12-30 07:13:00* 



             Test Item    Value        Reference Range Interpretation Comments

 

             Albumin (test code = 1751-7) 2.8          3.5-5.0      L           

  





[facility] CenterGlobulin2019-12-30 07:13:00* 



             Test Item    Value        Reference Range Interpretation Comments

 

             Globulin (test code = 31267-9) 4.4          2.3-3.5      H         

    





[facility] CenterAlbumin/Globulin Kpipq4630-69-46 07:13:00
  * 



             Test Item    Value        Reference Range Interpretation Comments

 

             Albumin/Globulin Ratio (test code = 1759-0) 0.6          0.8-2.0   

   L             





[facility] CenterAlkaline Blqgenajojc4563-16-30 07:13:00* 



             Test Item    Value        Reference Range Interpretation Comments

 

             Alkaline Phosphatase (test code = 6768-6) 105                

               





[facility] CenterWhite Blood Bdgjr2720-79-57 06:46:00* 



             Test Item    Value        Reference Range Interpretation Comments

 

             White Blood Count (test code = 6690-2) 6.80         4.8-10.8       

            





[facility] CenterRed Blood Lzfds4485-97-49 06:46:00* 



             Test Item    Value        Reference Range Interpretation Comments

 

             Red Blood Count (test code = 789-8) 3.75         3.6-5.1           

         





[facility] CenterHemoglobin2019-12-30 06:46:00* 



             Test Item    Value        Reference Range Interpretation Comments

 

             Hemoglobin (test code = 72410-2) 9.5          12.0-16.0    L       

      





[facility] CenterHematocrit2019-12-30 06:46:00* 



             Test Item    Value        Reference Range Interpretation Comments

 

             Hematocrit (test code = 4544-3) 31.9         34.2-44.1    L        

     





[facility] CenterMean Corpuscular Lnrlgn0990-37-31 
06:46:00* 



             Test Item    Value        Reference Range Interpretation Comments

 

             Mean Corpuscular Volume (test code = 787-2) 85.1         81-99     

                 





[facility] CenterMean Corpuscular Wmktbvbjvl4942-05-16 
06:46:00* 



             Test Item    Value        Reference Range Interpretation Comments

 

             Mean Corpuscular Hemoglobin (test code = 785-6) 25.3         28-32 

       L             





[facility] CenterMean Corpuscular Hemoglobin Concent
2019 06:46:00* 



             Test Item    Value        Reference Range Interpretation Comments

 

             Mean Corpuscular Hemoglobin Concent (test code = 786-4) 29.8       

  31-35        L             





[facility] CenterRed Cell Distribution Ewwhk0081-59-69 
06:46:00* 



             Test Item    Value        Reference Range Interpretation Comments

 

             Red Cell Distribution Width (test code = 87200-3) 15.5         11.7

-14.4    H             





[facility] CenterPlatelet Hujdv5982-97-73 06:46:00* 



             Test Item    Value        Reference Range Interpretation Comments

 

             Platelet Count (test code = 777-3) 225          140-360            

        





[facility] CenterNeutrophils (%) (Auto)2019 06:46:00
  * 



             Test Item    Value        Reference Range Interpretation Comments

 

             Neutrophils (%) (Auto) (test code = 16796-8) 60.9         38.7-80.0

                  





[facility] CenterLymphocytes (%) (Auto)2019 06:46:00
  * 



             Test Item    Value        Reference Range Interpretation Comments

 

             Lymphocytes (%) (Auto) (test code = 736-9) 26.3         18.0-39.1  

                





[facility] CenterMonocytes (%) (Auto)2019 06:46:00* 



             Test Item    Value        Reference Range Interpretation Comments

 

             Monocytes (%) (Auto) (test code = 5905-5) 8.8          4.4-11.3    

               





[facility] CenterEosinophils (%) (Auto)2019 06:46:00
  * 



             Test Item    Value        Reference Range Interpretation Comments

 

             Eosinophils (%) (Auto) (test code = 713-8) 3.5          0.0-6.0    

                





[facility] CenterBasophils (%) (Auto)2019 06:46:00* 



             Test Item    Value        Reference Range Interpretation Comments

 

             Basophils (%) (Auto) (test code = 706-2) 0.4          0.0-1.0      

              





[facility] CenterIM GRANULOCYTES %2019 06:46:00* 



             Test Item    Value        Reference Range Interpretation Comments

 

             IM GRANULOCYTES % (test code = IM GRANULOCYTES %) 0.1          0.0-

1.0                    





[facility] CenterNeutrophils # (Auto)2019 06:46:00* 



             Test Item    Value        Reference Range Interpretation Comments

 

             Neutrophils # (Auto) (test code = 751-8) 4.1          2.1-6.9      

              





[facility] CenterLymphocytes # (Auto)2019 06:46:00* 



             Test Item    Value        Reference Range Interpretation Comments

 

             Lymphocytes # (Auto) (test code = 86204-2) 1.8          1.0-3.2    

                





[facility] CenterMonocytes # (Auto)2019 06:46:00* 



             Test Item    Value        Reference Range Interpretation Comments

 

             Monocytes # (Auto) (test code = 742-7) 0.6          0.2-0.8        

            





[facility] CenterEosinophils # (Auto)2019 06:46:00* 



             Test Item    Value        Reference Range Interpretation Comments

 

             Eosinophils # (Auto) (test code = 711-2) 0.2          0.0-0.4      

              





[facility] CenterBasophils # (Auto)2019 06:46:00* 



             Test Item    Value        Reference Range Interpretation Comments

 

             Basophils # (Auto) (test code = 704-7) 0.0          0.0-0.1        

            





[facility] CenterAbsolute Immature Granulocyte (auto
2019 06:46:00* 



             Test Item    Value        Reference Range Interpretation Comments

 

                                        Absolute Immature Granulocyte (auto (lloyd

t code = Absolute Immature Granulocyte 

(auto)          0.01            0-0.1                            





[facility] CenterUrine YNG2574-02-81 10:00:00* 



             Test Item    Value        Reference Range Interpretation Comments

 

             Urine WBC (test code = 5821-4) 11-20        0-5          H         

    





[facility] CenterUrine XPY6028-88-27 10:00:00* 



             Test Item    Value        Reference Range Interpretation Comments

 

             Urine RBC (test code = 38646-6) 11-20        0-5          H        

     





[facility] CenterUrine Twuchkla5493-53-02 10:00:00* 



             Test Item    Value        Reference Range Interpretation Comments

 

             Urine Bacteria (test code = 05066-3) MODERATE     NONE         H   

          





[facility] CenterUrine Epithelial Fpwtm6389-61-55 10:00:00
  * 



             Test Item    Value        Reference Range Interpretation Comments

 

             Urine Epithelial Cells (test code = 83498-3) FEW          NONE     

                  





[facility] CenterUrine Ibpbk0585-35-29 09:27:00* 



             Test Item    Value        Reference Range Interpretation Comments

 

             Urine Color (test code = 5778-6) YELLOW       YELLOW               

      





[facility] CenterUrine Ptpupuh7280-80-16 09:27:00* 



             Test Item    Value        Reference Range Interpretation Comments

 

             Urine Clarity (test code = 26713-2) SL CLOUDY    CLEAR             

         





[facility] Specific Icmmuzi7830-54-62 09:27:00
  * 



             Test Item    Value        Reference Range Interpretation Comments

 

             Urine Specific Gravity (test code = 5811-5) 1.025        1.010-1.02

5                





[facility] CenterUrine gU1028-02-66 09:27:00* 



             Test Item    Value        Reference Range Interpretation Comments

 

             Urine pH (test code = 92858-4) 7            5-7                    

    





[facility] CenterUrine Leukocyte Rxplliiw0723-35-72 
09:27:00* 



             Test Item    Value        Reference Range Interpretation Comments

 

             Urine Leukocyte Esterase (test code = 74029-7) SMALL        NEGATIV

E                   





[facility] CenterUrine Eqairiu3413-78-07 09:27:00* 



             Test Item    Value        Reference Range Interpretation Comments

 

             Urine Nitrite (test code = 14766-8) POSITIVE     NEGATIVE          

         





[facility] CenterUrine Tphmhpw5459-68-67 09:27:00* 



             Test Item    Value        Reference Range Interpretation Comments

 

             Urine Protein (test code = 57735-3) 2+           NEGATIVE     H    

         





[facility] CenterUrine Glucose (UA)2019 09:27:00* 



             Test Item    Value        Reference Range Interpretation Comments

 

             Urine Glucose (UA) (test code = 34525-1) NEGATIVE     NEGATIVE     

              





[facility] CenterUrine Sgkhijq3369-86-78 09:27:00* 



             Test Item    Value        Reference Range Interpretation Comments

 

             Urine Ketones (test code = 04571-3) NEGATIVE     NEGATIVE          

         





[facility] Canwaneecenq1016-14-32 09:27:00* 



             Test Item    Value        Reference Range Interpretation Comments

 

             Urine Urobilinogen (test code = 00397-6) 0.2          0.2-1        

              





[facility] Gswowfelz5653-03-90 09:27:00* 



             Test Item    Value        Reference Range Interpretation Comments

 

             Urine Bilirubin (test code = 1977-8) NEGATIVE     NEGATIVE         

          





[facility] Poeur9047-71-80 09:27:00* 



             Test Item    Value        Reference Range Interpretation Comments

 

             Urine Blood (test code = 07107-5) MODERATE     NEGATIVE            

       





[facility] 08[facility]49[facility]00   
                                                                                
 [facility]                        [address] [place] [address]      Patient Name: CRISTINA RICH     
                             MR #: [id number]                     :                                    Age/Sex: 54/F  Acct #: [id number]   
                          Req #: [id number]  Adm Physician:                     
                                Ordered by: JENNIFER TERRELL MD                   
        Report #: [id number]        Location: ER                                 
    Room/Bed:                     __________________________________________
_________________________________________________________    Procedure: [id number]
0 DX/ABDOMEN-1VIEW (KUB)  Exam Date: 19                            Exam Ti
me: 0835                                              REPORT STATUS: Signed     
 Exam: Abdominal film       Clinical History: Pain      Comparison: 2019      DISCUSSION:       2 left-sided ureteral stents stable in position.    
 Right percutaneous nephrostomy tube stable in position.      Nonobstructive b
owel gas pattern.      IMPRESSION:      Stable appearance of the left ureteral s
tents and right percutaneous   nephrostomy tube                        Signed by
: Dr. Andrew Palisch, M.D. on 2019 8:52 AM        Dictated By: ANDREW R PA
LISCH MD  Electronically Signed By: ANDREW R PALISCH MD on 19 0852  Transc
ribed By: LUCINDA on 19 0852       COPY TO:   JENNIFER TERRELL MD         
Bacterial urine itaxudi4386-38-06 07:10:00* 



             Test Item    Value        Reference Range Interpretation Comments

 

             Urine Culture (test code = 630-4) PSEUDOMONAS AERUGINOSA           

                 





[facility] Touifhh8658-44-67 08:04:00* 



             Test Item    Value        Reference Range Interpretation Comments

 

             Urine Culture (test code = 630-4) No Result Data Provided          

                  





[facility] Ztdpilw4545-55-55 08:04:00* 



             Test Item    Value        Reference Range Interpretation Comments

 

             Urine Culture (test code = 630-4) No Result Data Provided          

                  





[facility] 16:47:00   
                                                                                
 [place] [address]      Patient Name: CRISTINA RICH     
                             MR #: [id number]                     :                                    Age/Sex: 54/F  Acct #: [id number]   
                          Req #: [id number]  Adm Physician:                     
                                Ordered by: JACQUELINE ARORA MD                      
     Report #: [id number]        Location: OR                                    
 Room/Bed:                     _____________________________________________
______________________________________________________    Procedure: [id number] D
X/ABDOMEN-1VIEW (KUB)  Exam Date: 19                            Exam Time:
1632                                              REPORT STATUS: Signed    Abdo
men, one view      Clinical indication: Preoperative evaluation, hydronephrosis 
    Comparison: 2019 and 2019      Findings:   A right percutaneous n
ephrostomy catheter is in place. Two parallel left   double-J stent are in place
. The bowel gas pattern is nonobstructive. No acute   osseous abdomen bodies.   
  Impression:   Unchanged appearance of right nephrostomy catheter and left ure
teral stents.      Signed by: Stephen E Dreyer, MD on 2019 4:50 PM        D
ictated By: STEPHEN E DREYER MD  Electronically Signed By: STEPHEN E DREYER MD o
n 19  Transcribed By: LUCINDA on 19       COPY TO:   JACQUELINE SMITH MD         Urine IEV7328-27-90 00:56:00* 



             Test Item    Value        Reference Range Interpretation Comments

 

             Urine WBC (test code = 5821-4) >50          0-5          H         

    





[facility] DDM8771-58-31 00:56:00* 



             Test Item    Value        Reference Range Interpretation Comments

 

             Urine RBC (test code = 31211-4) >50          0-5          H        

     





[facility] Siegigbo3826-28-51 00:56:00* 



             Test Item    Value        Reference Range Interpretation Comments

 

             Urine Bacteria (test code = 54537-8) MODERATE     NONE         H   

          





[facility] Epithelial Kocan8701-11-64 00:56:00
  * 



             Test Item    Value        Reference Range Interpretation Comments

 

             Urine Epithelial Cells (test code = 70519-6) FEW          NONE     

                  





[facility] Idtwz1460-13-64 00:53:00* 



             Test Item    Value        Reference Range Interpretation Comments

 

             Urine Color (test code = 5778-6) STRAW        YELLOW               

      





[facility] Neqzily2453-20-09 00:53:00* 



             Test Item    Value        Reference Range Interpretation Comments

 

             Urine Clarity (test code = 22740-0) CLOUDY       CLEAR        H    

         





[facility] Specific Yxdnjgu9699-88-16 00:53:00
  * 



             Test Item    Value        Reference Range Interpretation Comments

 

             Urine Specific Gravity (test code = 5811-5) 1.010        1.010-1.02

5                





[facility] pZ5906-61-15 00:53:00* 



             Test Item    Value        Reference Range Interpretation Comments

 

             Urine pH (test code = 62187-6) 7            5-7                    

    





[facility] Leukocyte Nkkkpiwz6532-84-17 
00:53:00* 



             Test Item    Value        Reference Range Interpretation Comments

 

             Urine Leukocyte Esterase (test code = 20517-8) LARGE        NEGATIV

E                   





[facility] Jbeczen2181-76-35 00:53:00* 



             Test Item    Value        Reference Range Interpretation Comments

 

             Urine Nitrite (test code = 42279-7) NEGATIVE     NEGATIVE          

         





[facility] Uvixhkv3702-72-03 00:53:00* 



             Test Item    Value        Reference Range Interpretation Comments

 

             Urine Protein (test code = 57735-3) 2+           NEGATIVE     H    

         





[facility] Glucose [facility]UA)2019 00:53:00* 



             Test Item    Value        Reference Range Interpretation Comments

 

             Urine Glucose (UA) (test code = 41177-6) NEGATIVE     NEGATIVE     

              





[facility] Uwihgjw4626-00-67 00:53:00* 



             Test Item    Value        Reference Range Interpretation Comments

 

             Urine Ketones (test code = 23277-4) NEGATIVE     NEGATIVE          

         





[facility] Tjcnzgxtiqko2728-67-14 00:53:00* 



             Test Item    Value        Reference Range Interpretation Comments

 

             Urine Urobilinogen (test code = 30725-3) 0.2          0.2-1        

              





[facility] Uwzpiywam1675-76-97 00:53:00* 



             Test Item    Value        Reference Range Interpretation Comments

 

             Urine Bilirubin (test code = 1977-8) NEGATIVE     NEGATIVE         

          





[facility] Txfzu0959-19-65 00:53:00* 



             Test Item    Value        Reference Range Interpretation Comments

 

             Urine Blood (test code = 51855-4) 3+           NEGATIVE            

       





[facility] Center[facility] fafdbtn7870-08-07 
23:00:00* 



             Test Item    Value        Reference Range Interpretation Comments

 

             Urine Culture (test code = 630-4) ENTEROCOCCUS FAECIUM             

               





[facility] Center[facility] ukdmncp2989-19-67 
23:00:00* 



             Test Item    Value        Reference Range Interpretation Comments

 

             Urine Culture (test code = 630-4) ENTEROCOCCUS FAECIUM             

               





[facility] Center[facility] jfixcca6914-48-44 
23:00:00* 



             Test Item    Value        Reference Range Interpretation Comments

 

             Urine Culture (test code = 630-4) ENTEROCOCCUS FAECIUM             

               





[facility] Sficpys4835-28-83 07:20:00* 



             Test Item    Value        Reference Range Interpretation Comments

 

             Urine Culture (test code = 630-4) No Result Data Provided          

                  





[facility] Kyjvaao8674-48-89 07:20:00* 



             Test Item    Value        Reference Range Interpretation Comments

 

             Urine Culture (test code = 630-4) No Result Data Provided          

                  





[facility] Pkxvklf0496-98-51 07:20:00* 



             Test Item    Value        Reference Range Interpretation Comments

 

             Urine Culture (test code = 630-4) No Result Data Provided          

                  





[facility] BZZ4618-98-75 12:57:00* 



             Test Item    Value        Reference Range Interpretation Comments

 

             Urine WBC (test code = 5821-4) >50          0-5          H         

    





[facility] NZB0085-54-42 12:57:00* 



             Test Item    Value        Reference Range Interpretation Comments

 

             Urine RBC (test code = 91791-5) >50          0-5          H        

     





[facility] Vnzqvqbo3544-90-14 12:57:00* 



             Test Item    Value        Reference Range Interpretation Comments

 

             Urine Bacteria (test code = 54699-5) MANY         NONE         H   

          





[facility] Epithelial Ziqev2094-17-94 12:57:00
  * 



             Test Item    Value        Reference Range Interpretation Comments

 

             Urine Epithelial Cells (test code = 35945-2) MANY         NONE     

                  





[facility] Amorphous Dfseabur9083-35-65 
12:57:00* 



             Test Item    Value        Reference Range Interpretation Comments

 

             Urine Amorphous Sediment (test code = 8246-1) FEW          FEW     

                   





[facility] Jilfh1348-56-49 12:57:00* 



             Test Item    Value        Reference Range Interpretation Comments

 

             Urine Yeast (test code = 09062-9) MANY         NONE         H      

       





[facility] Amorphous Cgpxwalh1098-16-35 
12:57:00* 



             Test Item    Value        Reference Range Interpretation Comments

 

             Urine Amorphous Sediment (test code = 8246-1) FEW          FEW     

                   





[facility] Kpopg4000-46-24 12:57:00* 



             Test Item    Value        Reference Range Interpretation Comments

 

             Urine Yeast (test code = 14886-3) MANY         NONE         H      

       





[facility] Amorphous Oyrhhmsw6394-41-84 
12:57:00* 



             Test Item    Value        Reference Range Interpretation Comments

 

             Urine Amorphous Sediment (test code = 8246-1) FEW          FEW     

                   





[facility] Cnvna6663-87-36 12:57:00* 



             Test Item    Value        Reference Range Interpretation Comments

 

             Urine Yeast (test code = 16396-0) MANY         NONE         H      

       





[facility] Amorphous Nrkgvzfn3414-63-69 
12:57:00* 



             Test Item    Value        Reference Range Interpretation Comments

 

             Urine Amorphous Sediment (test code = 8246-1) FEW          FEW     

                   





[facility] Wmyhd7951-21-71 12:57:00* 



             Test Item    Value        Reference Range Interpretation Comments

 

             Urine Yeast (test code = 61429-8) MANY         NONE         H      

       





[facility] Hnmdb9501-99-61 12:48:00* 



             Test Item    Value        Reference Range Interpretation Comments

 

             Urine Color (test code = 5778-6) YELLOW       YELLOW               

      





[facility] Dbyakbh4977-28-71 12:48:00* 



             Test Item    Value        Reference Range Interpretation Comments

 

             Urine Clarity (test code = 40289-7) CLOUDY       CLEAR        H    

         





[facility] Specific Bxjdvsl9853-41-99 12:48:00
  * 



             Test Item    Value        Reference Range Interpretation Comments

 

             Urine Specific Gravity (test code = 5811-5) 1.010        1.010-1.02

5                





[facility] qL8644-84-06 12:48:00* 



             Test Item    Value        Reference Range Interpretation Comments

 

             Urine pH (test code = 41536-5) 7            5-7                    

    





[facility] Leukocyte Bvzsvpqo6216-70-50 
12:48:00* 



             Test Item    Value        Reference Range Interpretation Comments

 

             Urine Leukocyte Esterase (test code = 73649-0) LARGE        NEGATIV

E                   





[facility] Qqsvvin5407-57-43 12:48:00* 



             Test Item    Value        Reference Range Interpretation Comments

 

             Urine Nitrite (test code = 88580-9) NEGATIVE     NEGATIVE          

         





[facility] Xlcjhxt8712-83-70 12:48:00* 



             Test Item    Value        Reference Range Interpretation Comments

 

             Urine Protein (test code = 57735-3) 2+           NEGATIVE     H    

         





[facility] Glucose [facility])2019 12:48:00* 



             Test Item    Value        Reference Range Interpretation Comments

 

             Urine Glucose (UA) (test code = 73586-0) 1+           NEGATIVE     

H             





[facility] Emjhosh4277-86-42 12:48:00* 



             Test Item    Value        Reference Range Interpretation Comments

 

             Urine Ketones (test code = 81285-0) NEGATIVE     NEGATIVE          

         





[facility] Rtdtvotjfjfg3670-05-09 12:48:00* 



             Test Item    Value        Reference Range Interpretation Comments

 

             Urine Urobilinogen (test code = 92996-3) 0.2          0.2-1        

              





[facility] Zxdklmsty6519-49-67 12:48:00* 



             Test Item    Value        Reference Range Interpretation Comments

 

             Urine Bilirubin (test code = 1977-8) NEGATIVE     NEGATIVE         

          





[facility] Bbhmt4123-55-75 12:48:00* 



             Test Item    Value        Reference Range Interpretation Comments

 

             Urine Blood (test code = 23632-1) 3+           NEGATIVE            

       





[facility] Center[facility] sediment detection in urine 
sediment by light ltdbplgaqa1881-90-55 11:33:00* 



             Test Item    Value        Reference Range Interpretation Comments

 

             Urine Amorphous Sediment (test code = 8246-1) FEW          FEW     

                   





[facility] CenterYeast detection in urine sediment by 
light utmyauumun9754-97-07 11:33:00* 



             Test Item    Value        Reference Range Interpretation Comments

 

             Urine Yeast (test code = 96636-1) MANY         NONE                

       





[facility] urine tkblqyu5642-71-73 
11:33:00* 



             Test Item    Value        Reference Range Interpretation Comments

 

             Urine Culture (test code = 630-4) ENTEROCOCCUS FAECIUM             

               





[facility] Center[facility] sediment detection in urine 
sediment by light xnehdxjemw5919-05-61 11:33:00* 



             Test Item    Value        Reference Range Interpretation Comments

 

             Urine Amorphous Sediment (test code = 8246-1) FEW          FEW     

                   





[facility] CenterYeast detection in urine sediment by 
light gmokfxjwgl1758-72-06 11:33:00* 



             Test Item    Value        Reference Range Interpretation Comments

 

             Urine Yeast (test code = 55400-9) MANY         NONE                

       





[facility] Center[facility] urine hptldpm1873-67-09 
11:33:00* 



             Test Item    Value        Reference Range Interpretation Comments

 

             Urine Culture (test code = 630-4) ENTEROCOCCUS FAECIUM             

               





[facility]2019-10-24 
14:16:00                                                                        
             [place] [address]      Patient Name: CRISTINA RICH                                   MR #: [id number]                     
: 1965                                   Age/Sex: 54/F  Acct #: 
[id number]                              Req #: [id number]  Adm Physician:     
                                                Ordered by: JACQUELINE ARORA MD      
                     Report #: [id number]        Location: DX                    
                 Room/Bed:                     
_____________________________________________
______________________________________________________    Procedure:  I
R/NEPHRO/URET W IMG/INJ-EXISTING  Exam Date:                             Exam Ti
me:                                               REPORT STATUS: Signed    PROCE
DURE: Genitourinary catheter exchange      Procedural Personnel   Attending phys
mandyan(s): Radha Florez MD   Fellow physician(s): None   Resident physician(s): No
ne   Advanced practice provider(s): None      Pre-procedure diagnosis: Ureteral 
obstruction   Post-procedure diagnosis: Same   Indication: Routine scheduled exc
hange   Additional clinical history: None      Complications: Nephrostomy tube w
as heavily encrusted and calcified and   required advanced maneuvers to remove a
nd exchange.       IMPRESSION:      Successful exchange of right nephrostomy tub
e. Nephrostomy tube was heavily   encrusted and calcified and required advanced 
maneuvers to remove and exchange.         Plan:    Future exchanges should be do
ne at no longer than 6-8 week intervals.   _____________________________________
__________________________      PROCEDURE SUMMARY   - Target organ: Right native
kidney   - Antegrade nephrostogram(s) via the existing access   - Nephrostomy t
ube exchange   - Additional procedure(s): None      PROCEDURE DETAILS:      Pre-
procedure   Consent: Informed consent for the procedure including risks, benefit
s and   alternatives was obtained and time-out was performed prior to the proced
ure.   Preparation: The site was prepared and draped using maximal sterile barri
er   technique including cutaneous antisepsis.      Anesthesia/sedation   Level 
of anesthesia/sedation: Minimal sedation 50mcg Fentanyl   Anesthesia/sedation ad
ministered by: Independent trained observer under   attending supervision with c
ontinuous monitoring of the patient?s level of   consciousness and physiologic s
tatus      Right genitourinary catheter exchange   Local anesthesia was administ
ered. Initial nephrostogram was performed. A wire   was placed via the existing 
tube and the tube was very encrusted. Wire could   not be passed all the way thr
ough the tube and the loop would not unform. A   12Fr peel-away sheath was advan
melanie over the nephrostomy tube and the tube was   retracted through the sheath an
d removed. A Kumpe catheter and guidewire were   then advanced through the sheat
h and the sheath removed. A new 10Fr nephrostomy   tube was advanced over the wi
re and position was confirmed with contrast   injection.   Pre-existing genitour
inary catheter: 10Fr Uresil   Genitourinary catheter(s) placed: 10Fr Uresil    F
indings: Loop formed in renal pelvis.   External catheter securement: Non-absorb
able suture       Additional genitourinary system intervention   Genitourinary i
ntervention: None   Location of intervention: Not applicable   Device used: Not 
applicable   Description of intervention: Not applicable   Post-intervention fin
dings: Not applicable      Contrast   Contrast agent: Isovue 370   Contrast volu
me (mL): 10      Radiation Dose   Fluoroscopy time (minutes): 13.24     Referenc
e air kerma (mGy): 134       Additional Details   Additional description of proc
edure: None   Equipment details: None   Specimens removed: None   Estimated bloo
d loss (mL): Less than 10   Standardized report: [email or web address]      
Attestation   Signer name: Radha Florez MD   I attest that I was present for the 
entire procedure. I reviewed the stored   images and agree with the report as dominick ortiz.               Signed by: Radha Florez MD on 10/24/2019 2:21 PM        Dict
ated By: RADHA FLOREZ MD  Electronically Signed By: RADHA FLOREZ MD on 10/24/19 1421
 Transcribed By: LUCINDA on 10/24/19 1421       COPY TO:   JACQUELINE ARORA MD        
ABDOMEN-1VIEW (KUB)2019 07:49:00                                          
                                           [place] [address]    
 Patient Name: CRISTINA RICH                                   MR #: 
[id number]                     : 1965                                  
Age/Sex: 53/F  Acct #: [id number]                              Req #: [id number]
[id number]  Adm Physician:                                                      
Ordered by: JACQUELINE ARORA MD                            Report #: [id number]       
Location: OR                                      Room/Bed:                     
_____________________________________________
______________________________________________________    Procedure: [id number] D
X/ABDOMEN-1VIEW (KUB)  Exam Date: 19                            Exam Time:
0725                                              REPORT STATUS: Signed       E
xam: Abdominal film       Clinical History: Urolithiasis, stents      Comparison
: Abdominal radiograph dated 2019, CT of the abdomen and pelvis   dated       DISCUSSION:    Right percutaneous nephrostomy and dual parallel left 
internal ureteral stents   are again noted.    There is unchanged 5 mm calcific 
density overlying the region of the right   ureterovesicular junction.      Charlie
l gas pattern is nonobstructive. Regional skeletal structures are intact.       
A calcified gallstone is again identified projecting of the right upper   quadra
nt.      IMPRESSION:      Right percutaneous nephrostomy catheter and parallel l
eft internal ureteral   stents are unchanged in position. Unchanged 5 mm calcifi
c density in the   expected region of the right ureterovesicular junction.      
Signed by: Stephen E Dreyer, MD on 2019 7:56 AM        Dictated By: STEPHEN
E DREYER MD  Electronically Signed By: STEPHEN E DREYER MD on 19 0756  Tr
anscribed By: LUCINDA on 19 0756       COPY TO:   JACQUELINE ARORA MD         
Bedside Vborzqw1102-55-01 19:41:00* 



             Test Item    Value        Reference Range Interpretation Comments

 

             Bedside Glucose (test code = 88427-8) 184                 H  

           





Meter ID: [id number] [facility] Glucose
2019 19:41:00* 



             Test Item    Value        Reference Range Interpretation Comments

 

             Bedside Glucose (test code = 79811-4) 184                 H  

           





Meter ID: [id number] [facility] Glucose
2019 19:41:00* 



             Test Item    Value        Reference Range Interpretation Comments

 

             Bedside Glucose (test code = 46468-2) 184                 H  

           





Meter ID: [id number] [facility] Glucose
2019 19:41:00* 



             Test Item    Value        Reference Range Interpretation Comments

 

             Bedside Glucose (test code = 18187-6) 184                 H  

           





Meter ID: [id number] [facility] Glucose
2019 19:41:00* 



             Test Item    Value        Reference Range Interpretation Comments

 

             Bedside Glucose (test code = 66655-3) 184                 H  

           





Meter ID: [id number] [facility] 07:33:00* 



             Test Item    Value        Reference Range Interpretation Comments

 

             Thyroid Stimulating Hormone (TSH) (test code = 57877-5) 2.374      

  0.350-4.940                





[facility]
2019 07:33:00* 



             Test Item    Value        Reference Range Interpretation Comments

 

             Thyroid Stimulating Hormone (TSH) (test code = 13882-5) 2.374      

  0.350-4.940                





[facility] Center[facility]
2019 07:33:00* 



             Test Item    Value        Reference Range Interpretation Comments

 

             Thyroid Stimulating Hormone (TSH) (test code = 54802-1) 2.374      

  0.350-4.940                





[facility] Center[facility]
2019 07:33:00* 



             Test Item    Value        Reference Range Interpretation Comments

 

             Thyroid Stimulating Hormone (TSH) (test code = 38856-9) 2.374      

  0.350-4.940                





[facility] Center[facility]
2019 07:33:00* 



             Test Item    Value        Reference Range Interpretation Comments

 

             Thyroid Stimulating Hormone (TSH) (test code = 61730-9) 2.374      

  0.350-4.940                





[facility] CenterTh[facility]
2019 07:33:00* 



             Test Item    Value        Reference Range Interpretation Comments

 

             Thyroid Stimulating Hormone (TSH) (test code = 53801-8) 2.374      

  0.350-4.940                





[facility] Center[facility]
2019 07:33:00* 



             Test Item    Value        Reference Range Interpretation Comments

 

             Thyroid Stimulating Hormone (TSH) (test code = 78133-3) 2.374      

  0.350-4.940                





[facility] CenterHemoglobin A1c Njcmura0033-49-42 07:21:00
  * 



             Test Item    Value        Reference Range Interpretation Comments

 

             Hemoglobin A1c Percent (test code = Hemoglobin A1c Percent) 6.7    

      4.0-7.0                    





[facility] CenterHemoglobin A1c Plwinku0886-04-41 07:21:00
  * 



             Test Item    Value        Reference Range Interpretation Comments

 

             Hemoglobin A1c Percent (test code = Hemoglobin A1c Percent) 6.7    

      4.0-7.0                    





[facility] CenterHemoglobin A1c Fhsuvoh2158-81-45 07:21:00
  * 



             Test Item    Value        Reference Range Interpretation Comments

 

             Hemoglobin A1c Percent (test code = Hemoglobin A1c Percent) 6.7    

      4.0-7.0                    





[facility] CenterHemoglobin A1c Ycrisyv3592-64-31 07:21:00
  * 



             Test Item    Value        Reference Range Interpretation Comments

 

             Hemoglobin A1c Percent (test code = Hemoglobin A1c Percent) 6.7    

      4.0-7.0                    





[facility] CenterHemoglobin A1c Styctuj8446-29-96 07:21:00
  * 



             Test Item    Value        Reference Range Interpretation Comments

 

             Hemoglobin A1c Percent (test code = Hemoglobin A1c Percent) 6.7    

      4.0-7.0                    





[facility] CenterHemoglobin A1c Xvewusv8022-85-70 07:21:00
  * 



             Test Item    Value        Reference Range Interpretation Comments

 

             Hemoglobin A1c Percent (test code = Hemoglobin A1c Percent) 6.7    

      4.0-7.0                    





[facility] CenterHemoglobin A1c Slowaax3449-77-44 07:21:00
  * 



             Test Item    Value        Reference Range Interpretation Comments

 

             Hemoglobin A1c Percent (test code = Hemoglobin A1c Percent) 6.7    

      4.0-7.0                    





[facility] Centerodium Kfqer5945-94-12 06:29:00* 



             Test Item    Value        Reference Range Interpretation Comments

 

             Sodium Level (test code = 2951-2) 140          136-145             

       





[facility] CenterPotassium Pghxj3531-02-90 06:29:00* 



             Test Item    Value        Reference Range Interpretation Comments

 

             Potassium Level (test code = 2823-3) 3.1          3.5-5.1      L   

          





[facility] CenterChloride Srgcj1724-91-73 06:29:00* 



             Test Item    Value        Reference Range Interpretation Comments

 

             Chloride Level (test code = 2075-0) 106                      

         





[facility] CenterCarbon Dioxide Fkagk4355-97-76 06:29:00* 



             Test Item    Value        Reference Range Interpretation Comments

 

             Carbon Dioxide Level (test code = 2028-9) 23           22-29       

               





[facility] CenterAnion Qty1849-74-16 06:29:00* 



             Test Item    Value        Reference Range Interpretation Comments

 

             Anion Gap (test code = 33037-3) 14.1         8-16                  

     





[facility] CenterBlood Urea Thbgtdon9748-30-46 06:29:00* 



             Test Item    Value        Reference Range Interpretation Comments

 

             Blood Urea Nitrogen (test code = 3094-0) 11           7-26         

              





[facility] CenterCreatinine2019-08-04 06:29:00* 



             Test Item    Value        Reference Range Interpretation Comments

 

             Creatinine (test code = 2160-0) 1.14         0.57-1.11    H        

     





[facility] CenterBUN/Creatinine Wpuba3609-51-66 06:29:00* 



             Test Item    Value        Reference Range Interpretation Comments

 

             BUN/Creatinine Ratio (test code = 3097-3) 10           6-25        

               





[facility] CenterEstimat Glomerular Filtration Rate
2019 06:29:00* 



             Test Item    Value        Reference Range Interpretation Comments

 

             Estimat Glomerular Filtration Rate (test code = 475162106) 49      

     >60          L             





Ranges were taken from the National Kidney Disease Education Program and the [place]
ional Kid[employer] Foundation literature.Reference ranges:60 or greater: Lclmte67-59 (
for 3 consecutive months): Chronic kidney disease 15 or less: Kidney failureBrownfield Regional Medical CenterGlucose Rhbtt1205-03-04 06:29:00* 



             Test Item    Value        Reference Range Interpretation Comments

 

             Glucose Level (test code = GCY6671) 172                 H    

         





[facility] CenterCalcium Ubsgj7207-00-01 06:29:00* 



             Test Item    Value        Reference Range Interpretation Comments

 

             Calcium Level (test code = 03004-3) 9.2          8.4-10.2          

         





[facility] Centerodium Lvhvl5481-25-01 06:29:00* 



             Test Item    Value        Reference Range Interpretation Comments

 

             Sodium Level (test code = 2951-2) 140          136-145             

       





[facility] CenterPotassium Hxlef0164-86-77 06:29:00* 



             Test Item    Value        Reference Range Interpretation Comments

 

             Potassium Level (test code = 2823-3) 3.1          3.5-5.1      L   

          





[facility] CenterChloride Wamia2202-46-16 06:29:00* 



             Test Item    Value        Reference Range Interpretation Comments

 

             Chloride Level (test code = 2075-0) 106                      

         





[facility] CenterCarbon Dioxide Pffws6059-66-51 06:29:00* 



             Test Item    Value        Reference Range Interpretation Comments

 

             Carbon Dioxide Level (test code = 2028-9) 23           22-29       

               





[facility] CenterAnion Pvs3666-64-50 06:29:00* 



             Test Item    Value        Reference Range Interpretation Comments

 

             Anion Gap (test code = 33037-3) 14.1         8-16                  

     





[facility] CenterBlood Urea Ocfjkhnr3037-66-54 06:29:00* 



             Test Item    Value        Reference Range Interpretation Comments

 

             Blood Urea Nitrogen (test code = 3094-0) 11           7-26         

              





[facility] CenterCreatinine2019-08-04 06:29:00* 



             Test Item    Value        Reference Range Interpretation Comments

 

             Creatinine (test code = 2160-0) 1.14         0.57-1.11    H        

     





[facility] CenterBUN/Creatinine Agocd3466-47-15 06:29:00* 



             Test Item    Value        Reference Range Interpretation Comments

 

             BUN/Creatinine Ratio (test code = 3097-3) 10           6-25        

               





[facility] CenterEstimat Glomerular Filtration Rate
2019 06:29:00* 



             Test Item    Value        Reference Range Interpretation Comments

 

             Estimat Glomerular Filtration Rate (test code = 965094081) 49      

     >60          L             





Ranges were taken from the National Kidney Disease Education Program and the [facility] Kidney Foundation literature.Reference ranges:60 or greater: Cwtsnz77-62 (
for 3 consecutive months): Chronic kidney disease 15 or less: Kidney failureBrownfield Regional Medical CenterGlucose Uhwpg3810-33-77 06:29:00* 



             Test Item    Value        Reference Range Interpretation Comments

 

             Glucose Level (test code = QUS6975) 172                 H    

         





[facility] CenterCalcium Fjmdw5923-54-42 06:29:00* 



             Test Item    Value        Reference Range Interpretation Comments

 

             Calcium Level (test code = 41045-9) 9.2          8.4-10.2          

         





[facility] Centerodium Dbvry7571-13-61 06:29:00* 



             Test Item    Value        Reference Range Interpretation Comments

 

             Sodium Level (test code = 2951-2) 140          136-145             

       





[facility] CenterPotassium Xlzwr5786-30-78 06:29:00* 



             Test Item    Value        Reference Range Interpretation Comments

 

             Potassium Level (test code = 2823-3) 3.1          3.5-5.1      L   

          





[facility] CenterChloride Pxwxu8363-96-97 06:29:00* 



             Test Item    Value        Reference Range Interpretation Comments

 

             Chloride Level (test code = 2075-0) 106                      

         





[facility] CenterCarbon Dioxide Mejnq7903-95-83 06:29:00* 



             Test Item    Value        Reference Range Interpretation Comments

 

             Carbon Dioxide Level (test code = 2028-9) 23           22-29       

               





[facility] CenterAnion Xwn7963-99-72 06:29:00* 



             Test Item    Value        Reference Range Interpretation Comments

 

             Anion Gap (test code = 33037-3) 14.1         8-16                  

     





[facility] CenterBlood Urea Rmtyynsf5370-46-48 06:29:00* 



             Test Item    Value        Reference Range Interpretation Comments

 

             Blood Urea Nitrogen (test code = 3094-0) 11           7-26         

              





[facility] CenterCreatinine2019-08-04 06:29:00* 



             Test Item    Value        Reference Range Interpretation Comments

 

             Creatinine (test code = 2160-0) 1.14         0.57-1.11    H        

     





[facility] CenterBUN/Creatinine Weozf9492-18-64 06:29:00* 



             Test Item    Value        Reference Range Interpretation Comments

 

             BUN/Creatinine Ratio (test code = 3097-3) 10           6-        

               





[facility] CenterEstimat Glomerular Filtration Rate
2019 06:29:00* 



             Test Item    Value        Reference Range Interpretation Comments

 

             Estimat Glomerular Filtration Rate (test code = 174686739) 49      

     >60          L             





Ranges were taken from the National Kidney Disease Education Program and the Michelle
[facility] Kid[facility] Foundation literature.Reference ranges:60 or greater: Scjgfi53-83 (
for 3 consecutive months): Chronic kidney disease 15 or less: Kidney failureBrownfield Regional Medical CenterGlucose Rkagw9213-20-06 06:29:00* 



             Test Item    Value        Reference Range Interpretation Comments

 

             Glucose Level (test code = FIW3140) 172                 H    

         





[facility] CenterCalcium Woyra0953-34-42 06:29:00* 



             Test Item    Value        Reference Range Interpretation Comments

 

             Calcium Level (test code = 93445-4) 9.2          8.4-10.2          

         





[facility] Centerodium Gxmlc9166-94-52 06:29:00* 



             Test Item    Value        Reference Range Interpretation Comments

 

             Sodium Level (test code = 2951-2) 140          136-145             

       





[facility] CenterPotassium Jjivd3980-77-82 06:29:00* 



             Test Item    Value        Reference Range Interpretation Comments

 

             Potassium Level (test code = 2823-3) 3.1          3.5-5.1      L   

          





[facility] CenterChloride Crbrh4747-58-18 06:29:00* 



             Test Item    Value        Reference Range Interpretation Comments

 

             Chloride Level (test code = 2075-0) 106                      

         





[facility] CenterCarbon Dioxide Dlnam0830-61-74 06:29:00* 



             Test Item    Value        Reference Range Interpretation Comments

 

             Carbon Dioxide Level (test code = 2028-9) 23           22-29       

               





[facility] CenterAnion Umk1952-79-95 06:29:00* 



             Test Item    Value        Reference Range Interpretation Comments

 

             Anion Gap (test code = 33037-3) 14.1         8-16                  

     





[facility] CenterBlood Urea Gzymyfit8994-97-74 06:29:00* 



             Test Item    Value        Reference Range Interpretation Comments

 

             Blood Urea Nitrogen (test code = 3094-0) 11           7-26         

              





[facility] CenterCreatinine2019-08-04 06:29:00* 



             Test Item    Value        Reference Range Interpretation Comments

 

             Creatinine (test code = 2160-0) 1.14         0.57-1.11    H        

     





[facility] CenterBUN/Creatinine Sgdzk1887-00-18 06:29:00* 



             Test Item    Value        Reference Range Interpretation Comments

 

             BUN/Creatinine Ratio (test code = 3097-3) 10           6-25        

               





[facility] CenterEstimat Glomerular Filtration Rate
2019 06:29:00* 



             Test Item    Value        Reference Range Interpretation Comments

 

             Estimat Glomerular Filtration Rate (test code = 157511686) 49      

     >60          L             





Ranges were taken from the National Kidney Disease Education Program and the [facility] Kidney Foundation literature.Reference ranges:60 or greater: Yjeili12-63 (
for 3 consecutive months): Chronic kidney disease 15 or less: Kidney failureBrownfield Regional Medical CenterGlucose Yfttg2262-34-52 06:29:00* 



             Test Item    Value        Reference Range Interpretation Comments

 

             Glucose Level (test code = ZHR1411) 172                 H    

         





[facility] CenterCalcium Pepkc2674-01-54 06:29:00* 



             Test Item    Value        Reference Range Interpretation Comments

 

             Calcium Level (test code = 25620-8) 9.2          8.4-10.2          

         





[facility] Centerodium Imzry5957-58-51 06:29:00* 



             Test Item    Value        Reference Range Interpretation Comments

 

             Sodium Level (test code = 2951-2) 140          136-145             

       





[facility] CenterPotassium Bzpoh1512-90-56 06:29:00* 



             Test Item    Value        Reference Range Interpretation Comments

 

             Potassium Level (test code = 2823-3) 3.1          3.5-5.1      L   

          





[facility] CenterChloride Qsuqc3459-26-06 06:29:00* 



             Test Item    Value        Reference Range Interpretation Comments

 

             Chloride Level (test code = 2075-0) 106                      

         





[facility] CenterCarbon Dioxide Awent6022-42-20 06:29:00* 



             Test Item    Value        Reference Range Interpretation Comments

 

             Carbon Dioxide Level (test code = 2028-9) 23           22-29       

               





[facility] CenterAnion Rmd0585-40-52 06:29:00* 



             Test Item    Value        Reference Range Interpretation Comments

 

             Anion Gap (test code = 33037-3) 14.1         8-16                  

     





[facility] CenterBlood Urea Vydhnzuk7939-31-89 06:29:00* 



             Test Item    Value        Reference Range Interpretation Comments

 

             Blood Urea Nitrogen (test code = 3094-0) 11           7-26         

              





[facility] CenterCreatinine2019-08-04 06:29:00* 



             Test Item    Value        Reference Range Interpretation Comments

 

             Creatinine (test code = 2160-0) 1.14         0.57-1.11    H        

     





[facility] CenterBUN/Creatinine Onjti8134-86-74 06:29:00* 



             Test Item    Value        Reference Range Interpretation Comments

 

             BUN/Creatinine Ratio (test code = 3097-3) 10           6-        

               





[facility] CenterEstimat Glomerular Filtration Rate
2019 06:29:00* 



             Test Item    Value        Reference Range Interpretation Comments

 

             Estimat Glomerular Filtration Rate (test code = 756386142) 49      

     >60          L             





Ranges were taken from the National Kidney Disease Education Program and the Michelle
[facility] Kid[facility] Foundation literature.Reference ranges:60 or greater: Bbvhli87-58 (
for 3 consecutive months): Chronic kidney disease 15 or less: Kidney failureBrownfield Regional Medical CenterGlucose Awugu5221-14-44 06:29:00* 



             Test Item    Value        Reference Range Interpretation Comments

 

             Glucose Level (test code = KSQ3869) 172                 H    

         





[facility] CenterCalcium Lvtrr9806-16-76 06:29:00* 



             Test Item    Value        Reference Range Interpretation Comments

 

             Calcium Level (test code = 53400-3) 9.2          8.4-10.2          

         





[facility] CenterWhite Blood Ztwso6641-51-19 06:26:00* 



             Test Item    Value        Reference Range Interpretation Comments

 

             White Blood Count (test code = 6690-2) 9.37         4.8-10.8       

            





[facility] CenterRed Blood Obkhu4821-07-05 06:26:00* 



             Test Item    Value        Reference Range Interpretation Comments

 

             Red Blood Count (test code = 789-8) 3.74         3.6-5.1           

         





[facility] CenterHemoglobin2019-08-04 06:26:00* 



             Test Item    Value        Reference Range Interpretation Comments

 

             Hemoglobin (test code = 21572-1) 9.3          12.0-16.0    L       

      





[facility] CenterHematocrit2019-08-04 06:26:00* 



             Test Item    Value        Reference Range Interpretation Comments

 

             Hematocrit (test code = 4544-3) 30.4         34.2-44.1    L        

     





[facility] CenterMean Corpuscular Ucyzjg7910-39-90 
06:26:00* 



             Test Item    Value        Reference Range Interpretation Comments

 

             Mean Corpuscular Volume (test code = 787-2) 81.3         81-99     

                 





[facility] CenterMean Corpuscular Wqbllakrwu7188-28-66 
06:26:00* 



             Test Item    Value        Reference Range Interpretation Comments

 

             Mean Corpuscular Hemoglobin (test code = 785-6) 24.9         28-32 

       L             





[facility] CenterMean Corpuscular Hemoglobin Concent
2019 06:26:00* 



             Test Item    Value        Reference Range Interpretation Comments

 

             Mean Corpuscular Hemoglobin Concent (test code = 786-4) 30.6       

  31-35        L             





[facility] CenterRed Cell Distribution Kdilf0015-64-08 
06:26:00* 



             Test Item    Value        Reference Range Interpretation Comments

 

             Red Cell Distribution Width (test code = 07337-2) 15.9         11.7

-14.4    H             





[facility] CenterPlatelet Izgvh8160-64-44 06:26:00* 



             Test Item    Value        Reference Range Interpretation Comments

 

             Platelet Count (test code = 777-3) 279          140-360            

        





[facility] CenterNeutrophils (%) (Auto)2019 06:26:00
  * 



             Test Item    Value        Reference Range Interpretation Comments

 

             Neutrophils (%) (Auto) (test code = 87325-5) 73.2         38.7-80.0

                  





[facility] CenterLymphocytes (%) (Auto)2019 06:26:00
  * 



             Test Item    Value        Reference Range Interpretation Comments

 

             Lymphocytes (%) (Auto) (test code = 736-9) 19.2         18.0-39.1  

                





[facility] CenterMonocytes (%) (Auto)2019 06:26:00* 



             Test Item    Value        Reference Range Interpretation Comments

 

             Monocytes (%) (Auto) (test code = 5905-5) 5.2          4.4-11.3    

               





[facility] CenterEosinophils (%) (Auto)2019 06:26:00
  * 



             Test Item    Value        Reference Range Interpretation Comments

 

             Eosinophils (%) (Auto) (test code = 713-8) 1.8          0.0-6.0    

                





[facility]* 



             Test Item    Value        Reference Range Interpretation Comments

 

             Basophils (%) (Auto) (test code = 706-2) 0.3          0.0-1.0      

              





[facility] CenterIM [facility] %2019 06:26:00* 



             Test Item    Value        Reference Range Interpretation Comments

 

             IM GRANULOCYTES % (test code = IM GRANULOCYTES %) 0.3          0.0-

1.0                    





[facility] CenterNeutrophils # (Auto)2019 06:26:00* 



             Test Item    Value        Reference Range Interpretation Comments

 

             Neutrophils # (Auto) (test code = 751-8) 6.9          2.1-6.9      

              





[facility] CenterL[facility]ocytes # (Auto)[facility] 06:26:00* 



             Test Item    Value        Reference Range Interpretation Comments

 

             Lymphocytes # (Auto) (test code = 70911-7) 1.8          1.0-3.2    

                





[facility] CenterMonocytes # (Auto)2019 06:26:00* 



             Test Item    Value        Reference Range Interpretation Comments

 

             Monocytes # (Auto) (test code = 742-7) 0.5          0.2-0.8        

            





[facility] CenterE[facility]ophils [facility] 06:26:00* 



             Test Item    Value        Reference Range Interpretation Comments

 

             Eosinophils # (Auto) (test code = 711-2) 0.2          0.0-0.4      

              





[facility] CenterBas[facility] ([facility] 06:26:00* 



             Test Item    Value        Reference Range Interpretation Comments

 

             Basophils # (Auto) (test code = 704-7) 0.0          0.0-0.1        

            





[facility] CenterAbsolute Immature Granulocyte (auto
2019 06:26:00* 



             Test Item    Value        Reference Range Interpretation Comments

 

                                        Absolute Immature Granulocyte (auto (lloyd

t code = Absolute Immature Granulocyte 

(auto)          0.03            0-0.1                            





[facility] CenterWhite Blood [id number] 06:26:00* 



             Test Item    Value        Reference Range Interpretation Comments

 

             White Blood Count (test code = 6690-2) 9.37         4.8-10.8       

            





[facility] CenterRed Blood [id number] 06:26:00* 



             Test Item    Value        Reference Range Interpretation Comments

 

             Red Blood Count (test code = 789-8) 3.74         3.6-5.1           

         





[facility] CenterHemoglobin2019-08-[facility] 06:26:00* 



             Test Item    Value        Reference Range Interpretation Comments

 

             Hemoglobin (test code = 60888-0) 9.3          12.0-16.0    L       

      





[facility] CenterHematocrit2019-08-04 06:26:00* 



             Test Item    Value        Reference Range Interpretation Comments

 

             Hematocrit (test code = 4544-3) 30.4         34.2-44.1    L        

     





[facility] CenterMean Corpuscular [id number] 
06:26:00* 



             Test Item    Value        Reference Range Interpretation Comments

 

             Mean Corpuscular Volume (test code = 787-2) 81.3         81-99     

                 





[facility] CenterMean Corpuscular Rjwujoajgw8107-79-17 
06:26:00* 



             Test Item    Value        Reference Range Interpretation Comments

 

             Mean Corpuscular Hemoglobin (test code = 785-6) 24.9         28-32 

       L             





[facility] CenterMean Corpuscular Hemoglobin Concent
2019 06:26:00* 



             Test Item    Value        Reference Range Interpretation Comments

 

             Mean Corpuscular Hemoglobin Concent (test code = 786-4) 30.6       

  31-35        L             





[facility] CenterRed Cell Distribution [id number] 
06:26:00* 



             Test Item    Value        Reference Range Interpretation Comments

 

             Red Cell Distribution Width (test code = 28639-1) 15.9         11.7

-14.4    H             





[facility] CenterPlatelet [id number] 06:26:00* 



             Test Item    Value        Reference Range Interpretation Comments

 

             Platelet Count (test code = 777-3) 279          140-360            

        





[facility] CenterNeutrophils (%) (Auto)2019 06:26:00
  * 



             Test Item    Value        Reference Range Interpretation Comments

 

             Neutrophils (%) (Auto) (test code = 34114-0) 73.2         38.7-80.0

                  





[facility] CenterLymphocytes (%) (Auto)2019 06:26:00
  * 



             Test Item    Value        Reference Range Interpretation Comments

 

             Lymphocytes (%) (Auto) (test code = 736-9) 19.2         18.0-39.1  

                





[facility] CenterMonocytes (%) (Auto)2019 06:26:00* 



             Test Item    Value        Reference Range Interpretation Comments

 

             Monocytes (%) (Auto) (test code = 5905-5) 5.2          4.4-11.3    

               





[facility] CenterEosinophils (%) (Auto)2019 06:26:00
  * 



             Test Item    Value        Reference Range Interpretation Comments

 

             Eosinophils (%) (Auto) (test code = 713-8) 1.8          0.0-6.0    

                





[facility] CenterBasophils (%) (Auto)2019 06:26:00* 



             Test Item    Value        Reference Range Interpretation Comments

 

             Basophils (%) (Auto) (test code = 706-2) 0.3          0.0-1.0      

              





[facility] CenterIM GRANULOCYTES %2019 06:26:00* 



             Test Item    Value        Reference Range Interpretation Comments

 

             IM GRANULOCYTES % (test code = IM GRANULOCYTES %) 0.3          0.0-

1.0                    





[facility] CenterNeutrophils # (Auto)2019 06:26:00* 



             Test Item    Value        Reference Range Interpretation Comments

 

             Neutrophils # (Auto) (test code = 751-8) 6.9          2.1-6.9      

              





[facility] CenterLymphocytes # (Auto)2019 06:26:00* 



             Test Item    Value        Reference Range Interpretation Comments

 

             Lymphocytes # (Auto) (test code = 73270-5) 1.8          1.0-3.2    

                





[facility] CenterMonocytes # (Auto)2019 06:26:00* 



             Test Item    Value        Reference Range Interpretation Comments

 

             Monocytes # (Auto) (test code = 742-7) 0.5          0.2-0.8        

            





[facility] CenterEosinophils # (Auto)2019 06:26:00* 



             Test Item    Value        Reference Range Interpretation Comments

 

             Eosinophils # (Auto) (test code = 711-2) 0.2          0.0-0.4      

              





[facility] CenterBasophils # (Auto)2019 06:26:00* 



             Test Item    Value        Reference Range Interpretation Comments

 

             Basophils # (Auto) (test code = 704-7) 0.0          0.0-0.1        

            





[facility] CenterAbsolute Immature Granulocyte (auto
2019 06:26:00* 



             Test Item    Value        Reference Range Interpretation Comments

 

                                        Absolute Immature Granulocyte (auto (lloyd

t code = Absolute Immature Granulocyte 

(auto)          0.03            0-0.1                            





[facility] CenterWhite Blood [id number] 06:26:00* 



             Test Item    Value        Reference Range Interpretation Comments

 

             White Blood Count (test code = 6690-2) 9.37         4.8-10.8       

            





[facility] CenterRed Blood [id number] 06:26:00* 



             Test Item    Value        Reference Range Interpretation Comments

 

             Red Blood Count (test code = 789-8) 3.74         3.6-5.1           

         





[facility] CenterHemoglobin2019-08-04 06:26:00* 



             Test Item    Value        Reference Range Interpretation Comments

 

             Hemoglobin (test code = 82229-9) 9.3          12.0-16.0    L       

      





[facility] CenterHematocrit2019-08-04 06:26:00* 



             Test Item    Value        Reference Range Interpretation Comments

 

             Hematocrit (test code = 4544-3) 30.4         34.2-44.1    L        

     





[facility] CenterMean Corpuscular [id number] 
06:26:00* 



             Test Item    Value        Reference Range Interpretation Comments

 

             Mean Corpuscular Volume (test code = 787-2) 81.3         81-99     

                 





[facility] CenterMean Corpuscular Qpfpmftkvk6334-65-10 
06:26:00* 



             Test Item    Value        Reference Range Interpretation Comments

 

             Mean Corpuscular Hemoglobin (test code = 785-6) 24.9         28-32 

       L             





[facility] CenterMean Corpuscular Hemoglobin Concent
2019 06:26:00* 



             Test Item    Value        Reference Range Interpretation Comments

 

             Mean Corpuscular Hemoglobin Concent (test code = 786-4) 30.6       

  31-35        L             





[facility] CenterRed Cell Distribution [id number] 
06:26:00* 



             Test Item    Value        Reference Range Interpretation Comments

 

             Red Cell Distribution Width (test code = 95180-5) 15.9         11.7

-14.4    H             





[facility] CenterPlatelet Trpqc8056-70-22 06:26:00* 



             Test Item    Value        Reference Range Interpretation Comments

 

             Platelet Count (test code = 777-3) 279          140-360            

        





[facility] CenterNeutrophils (%) (Auto)2019 06:26:00
  * 



             Test Item    Value        Reference Range Interpretation Comments

 

             Neutrophils (%) (Auto) (test code = 21147-1) 73.2         38.7-80.0

                  





[facility] CenterLymphocytes (%) (Auto)2019 06:26:00
  * 



             Test Item    Value        Reference Range Interpretation Comments

 

             Lymphocytes (%) (Auto) (test code = 736-9) 19.2         18.0-39.1  

                





[facility] CenterMonocytes (%) (Auto)2019 06:26:00* 



             Test Item    Value        Reference Range Interpretation Comments

 

             Monocytes (%) (Auto) (test code = 5905-5) 5.2          4.4-11.3    

               





[facility] CenterEosinophils (%) (Auto)2019 06:26:00
  * 



             Test Item    Value        Reference Range Interpretation Comments

 

             Eosinophils (%) (Auto) (test code = 713-8) 1.8          0.0-6.0    

                





[facility] CenterBasophils (%) (Auto)2019 06:26:00* 



             Test Item    Value        Reference Range Interpretation Comments

 

             Basophils (%) (Auto) (test code = 706-2) 0.3          0.0-1.0      

              





[facility] CenterIM GRANULOCYTES %2019 06:26:00* 



             Test Item    Value        Reference Range Interpretation Comments

 

             IM GRANULOCYTES % (test code = IM GRANULOCYTES %) 0.3          0.0-

1.0                    





[facility] CenterNeutrophils # (Auto)2019 06:26:00* 



             Test Item    Value        Reference Range Interpretation Comments

 

             Neutrophils # (Auto) (test code = 751-8) 6.9          2.1-6.9      

              





[facility] CenterLymphocytes # (Auto)2019 06:26:00* 



             Test Item    Value        Reference Range Interpretation Comments

 

             Lymphocytes # (Auto) (test code = 47734-9) 1.8          1.0-3.2    

                





[facility] CenterMonocytes # (Auto)2019 06:26:00* 



             Test Item    Value        Reference Range Interpretation Comments

 

             Monocytes # (Auto) (test code = 742-7) 0.5          0.2-0.8        

            





[facility] CenterEosinophils # (Auto)2019 06:26:00* 



             Test Item    Value        Reference Range Interpretation Comments

 

             Eosinophils # (Auto) (test code = 711-2) 0.2          0.0-0.4      

              





[facility] CenterBasophils # (Auto)2019 06:26:00* 



             Test Item    Value        Reference Range Interpretation Comments

 

             Basophils # (Auto) (test code = 704-7) 0.0          0.0-0.1        

            





[facility] CenterAbsolute Immature Granulocyte (auto
2019 06:26:00* 



             Test Item    Value        Reference Range Interpretation Comments

 

                                        Absolute Immature Granulocyte (auto (lloyd

t code = Absolute Immature Granulocyte 

(auto)          0.03            0-0.1                            





[facility] CenterWhite Blood [id number] 06:26:00* 



             Test Item    Value        Reference Range Interpretation Comments

 

             White Blood Count (test code = 6690-2) 9.37         4.8-10.8       

            





[facility] CenterRed Blood [id number] 06:26:00* 



             Test Item    Value        Reference Range Interpretation Comments

 

             Red Blood Count (test code = 789-8) 3.74         3.6-5.1           

         





[facility] CenterHemoglobin2019-08-04 06:26:00* 



             Test Item    Value        Reference Range Interpretation Comments

 

             Hemoglobin (test code = 67169-6) 9.3          12.0-16.0    L       

      





[facility] CenterHematocrit2019-08-04 06:26:00* 



             Test Item    Value        Reference Range Interpretation Comments

 

             Hematocrit (test code = 4544-3) 30.4         34.2-44.1    L        

     





[facility] CenterMean Corpuscular [id number] 
06:26:00* 



             Test Item    Value        Reference Range Interpretation Comments

 

             Mean Corpuscular Volume (test code = 787-2) 81.3         81-99     

                 





[facility] CenterMean Corpuscular Rvnjuruhrf5486-71-66 
06:26:00* 



             Test Item    Value        Reference Range Interpretation Comments

 

             Mean Corpuscular Hemoglobin (test code = 785-6) 24.9         28-32 

       L             





[facility] CenterMean Corpuscular Hemoglobin Concent
2019 06:26:00* 



             Test Item    Value        Reference Range Interpretation Comments

 

             Mean Corpuscular Hemoglobin Concent (test code = 786-4) 30.6       

  31-35        L             





[facility] CenterRed Cell Distribution [id number] 
06:26:00* 



             Test Item    Value        Reference Range Interpretation Comments

 

             Red Cell Distribution Width (test code = 06734-2) 15.9         11.7

-14.4    H             





[facility] CenterPlatelet Ketli3695-26-90 06:26:00* 



             Test Item    Value        Reference Range Interpretation Comments

 

             Platelet Count (test code = 777-3) 279          140-360            

        





[facility] CenterNeutrophils (%) (Auto)2019 06:26:00
  * 



             Test Item    Value        Reference Range Interpretation Comments

 

             Neutrophils (%) (Auto) (test code = 71401-4) 73.2         38.7-80.0

                  





[facility] CenterLymphocytes (%) (Auto)2019 06:26:00
  * 



             Test Item    Value        Reference Range Interpretation Comments

 

             Lymphocytes (%) (Auto) (test code = 736-9) 19.2         18.0-39.1  

                





[facility] CenterMonocytes (%) (Auto)2019 06:26:00* 



             Test Item    Value        Reference Range Interpretation Comments

 

             Monocytes (%) (Auto) (test code = 5905-5) 5.2          4.4-11.3    

               





[facility] CenterEosinophils (%) (Auto)2019 06:26:00
  * 



             Test Item    Value        Reference Range Interpretation Comments

 

             Eosinophils (%) (Auto) (test code = 713-8) 1.8          0.0-6.0    

                





[facility] CenterBasophils (%) (Auto)2019 06:26:00* 



             Test Item    Value        Reference Range Interpretation Comments

 

             Basophils (%) (Auto) (test code = 706-2) 0.3          0.0-1.0      

              





[facility] CenterIM GRANULOCYTES %2019 06:26:00* 



             Test Item    Value        Reference Range Interpretation Comments

 

             IM GRANULOCYTES % (test code = IM GRANULOCYTES %) 0.3          0.0-

1.0                    





[facility] CenterNeutrophils # (Auto)2019 06:26:00* 



             Test Item    Value        Reference Range Interpretation Comments

 

             Neutrophils # (Auto) (test code = 751-8) 6.9          2.1-6.9      

              





[facility] CenterLymphocytes # (Auto)2019 06:26:00* 



             Test Item    Value        Reference Range Interpretation Comments

 

             Lymphocytes # (Auto) (test code = 94467-2) 1.8          1.0-3.2    

                





[facility] CenterMonocytes # (Auto)2019 06:26:00* 



             Test Item    Value        Reference Range Interpretation Comments

 

             Monocytes # (Auto) (test code = 742-7) 0.5          0.2-0.8        

            





[facility] CenterEosinophils # (Auto)2019 06:26:00* 



             Test Item    Value        Reference Range Interpretation Comments

 

             Eosinophils # (Auto) (test code = 711-2) 0.2          0.0-0.4      

              





[facility] CenterBasophils # (Auto)2019 06:26:00* 



             Test Item    Value        Reference Range Interpretation Comments

 

             Basophils # (Auto) (test code = 704-7) 0.0          0.0-0.1        

            





[facility] CenterAbsolute Immature Granulocyte (auto
2019 06:26:00* 



             Test Item    Value        Reference Range Interpretation Comments

 

                                        Absolute Immature Granulocyte (auto (lloyd

t code = Absolute Immature Granulocyte 

(auto)          0.03            0-0.1                            





[facility] CenterWhite Blood [id number] 06:26:00* 



             Test Item    Value        Reference Range Interpretation Comments

 

             White Blood Count (test code = 6690-2) 9.37         4.8-10.8       

            





[facility] CenterRed Blood [id number] 06:26:00* 



             Test Item    Value        Reference Range Interpretation Comments

 

             Red Blood Count (test code = 789-8) 3.74         3.6-5.1           

         





[facility] CenterHemoglobin2019-08-[facility] 06:26:00* 



             Test Item    Value        Reference Range Interpretation Comments

 

             Hemoglobin (test code = 45397-1) 9.3          12.0-16.0    L       

      





[facility] CenterHematocrit2019-08-04 06:26:00* 



             Test Item    Value        Reference Range Interpretation Comments

 

             Hematocrit (test code = 4544-3) 30.4         34.2-44.1    L        

     





[facility] CenterMean Corpuscular [id number] 
06:26:00* 



             Test Item    Value        Reference Range Interpretation Comments

 

             Mean Corpuscular Volume (test code = 787-2) 81.3         81-99     

                 





[facility] CenterMean Corpuscular Sytpjjkuiu2747-32-62 
06:26:00* 



             Test Item    Value        Reference Range Interpretation Comments

 

             Mean Corpuscular Hemoglobin (test code = 785-6) 24.9         28-32 

       L             





[facility] CenterMean Corpuscular Hemoglobin Concent
2019 06:26:00* 



             Test Item    Value        Reference Range Interpretation Comments

 

             Mean Corpuscular Hemoglobin Concent (test code = 786-4) 30.6       

  31-35        L             





[facility] CenterRed Cell Distribution [id number] 
06:26:00* 



             Test Item    Value        Reference Range Interpretation Comments

 

             Red Cell Distribution Width (test code = 12600-6) 15.9         11.7

-14.4    H             





[facility] CenterPlatelet [id number] 06:26:00* 



             Test Item    Value        Reference Range Interpretation Comments

 

             Platelet Count (test code = 777-3) 279          140-360            

        





[facility] CenterNeutrophils (%) (Auto)2019 06:26:00
  * 



             Test Item    Value        Reference Range Interpretation Comments

 

             Neutrophils (%) (Auto) (test code = 87380-3) 73.2         38.7-80.0

                  





[facility] CenterLymphocytes (%) (Auto)2019 06:26:00
  * 



             Test Item    Value        Reference Range Interpretation Comments

 

             Lymphocytes (%) (Auto) (test code = 736-9) 19.2         18.0-39.1  

                





[facility] CenterMonocytes (%) (Auto)2019 06:26:00* 



             Test Item    Value        Reference Range Interpretation Comments

 

             Monocytes (%) (Auto) (test code = 5905-5) 5.2          4.4-11.3    

               





[facility] CenterEosinophils (%) (Auto)2019 06:26:00
  * 



             Test Item    Value        Reference Range Interpretation Comments

 

             Eosinophils (%) (Auto) (test code = 713-8) 1.8          0.0-6.0    

                





[facility] CenterBasophils (%) (Auto)2019 06:26:00* 



             Test Item    Value        Reference Range Interpretation Comments

 

             Basophils (%) (Auto) (test code = 706-2) 0.3          0.0-1.0      

              





[facility] CenterIM GRANULOCYTES %2019 06:26:00* 



             Test Item    Value        Reference Range Interpretation Comments

 

             IM GRANULOCYTES % (test code = IM GRANULOCYTES %) 0.3          0.0-

1.0                    





[facility] CenterNeutrophils # (Auto)2019 06:26:00* 



             Test Item    Value        Reference Range Interpretation Comments

 

             Neutrophils # (Auto) (test code = 751-8) 6.9          2.1-6.9      

              





[facility] CenterLymphocytes # (Auto)2019 06:26:00* 



             Test Item    Value        Reference Range Interpretation Comments

 

             Lymphocytes # (Auto) (test code = 14675-4) 1.8          1.0-3.2    

                





[facility] CenterMonocytes # (Auto)2019 06:26:00* 



             Test Item    Value        Reference Range Interpretation Comments

 

             Monocytes # (Auto) (test code = 742-7) 0.5          0.2-0.8        

            





[facility] CenterEosinophils # (Auto)2019 06:26:00* 



             Test Item    Value        Reference Range Interpretation Comments

 

             Eosinophils # (Auto) (test code = 711-2) 0.2          0.0-0.4      

              





[facility] CenterBasophils # (Auto)2019 06:26:00* 



             Test Item    Value        Reference Range Interpretation Comments

 

             Basophils # (Auto) (test code = 704-7) 0.0          0.0-0.1        

            





[facility] CenterAbsolute Immature Granulocyte (auto
2019 06:26:00* 



             Test Item    Value        Reference Range Interpretation Comments

 

                                        Absolute Immature Granulocyte (auto (lloyd

t code = Absolute Immature Granulocyte 

(auto)          0.03            0-0.1                            





[facility] 
by detection limit <= 0.005 miu/l (units/volume)2019 05:15:00* 



             Test Item    Value        Reference Range Interpretation Comments

 

             Thyroid Stimulating Hormone (TSH) (test code = 74392-9) 2.374      

  0.350-4.940                





[facility] Ijqgqjt7009-67-49 09:10:00* 



             Test Item    Value        Reference Range Interpretation Comments

 

             Urine Culture (test code = 630-4) Organism: STAPHYLOCOCCUS AUREUS  

                          





[facility] Vlxuccn4279-05-05 09:10:00* 



             Test Item    Value        Reference Range Interpretation Comments

 

             Urine Culture (test code = 630-4) Organism: STAPHYLOCOCCUS AUREUS  

                          





[facility] Trkoxod7238-02-14 09:10:00* 



             Test Item    Value        Reference Range Interpretation Comments

 

             Urine Culture (test code = 630-4) Organism: STAPHYLOCOCCUS AUREUS  

                          





[facility] Pxkdnfr1149-41-98 09:10:00* 



             Test Item    Value        Reference Range Interpretation Comments

 

             Urine Culture (test code = 630-4) No Result Data Provided          

                  





[facility] Cpnnjji4378-83-69 09:10:00* 



             Test Item    Value        Reference Range Interpretation Comments

 

             Urine Culture (test code = 630-4) No Result Data Provided          

                  





[facility] Bxdtseftg4237-59-61 06:09:00* 



             Test Item    Value        Reference Range Interpretation Comments

 

             Total Bilirubin (test code = 1975-2) 0.2          0.2-1.2          

          





[facility] Center[facility]SGOT[facility]
2019 06:09:00* 



             Test Item    Value        Reference Range Interpretation Comments

 

                                        Aspartate Amino Transf (AST/SGOT) (test 

code = Aspartate Amino Transf 

(AST/SGOT))     8               5-34                             





[facility] CenterAl[facility]otransferase ([facility]/SGPT)
2019 06:09:00* 



             Test Item    Value        Reference Range Interpretation Comments

 

             Alanine Aminotransferase (ALT/SGPT) (test code = 1742-6) 12        

   0-55                       





[facility] Center[facility] Lqocjrr5966-24-32 06:09:00* 



             Test Item    Value        Reference Range Interpretation Comments

 

             Total Protein (test code = 2885-2) 7.5          6.5-8.1            

        





[facility] 06[facility]09[facility]* 



             Test Item    Value        Reference Range Interpretation Comments

 

             Albumin (test code = 1751-7) 2.9          3.5-5.0      L           

  





[facility] Center[facility]2019-[facility] 06:09:00* 



             Test Item    Value        Reference Range Interpretation Comments

 

             Globulin (test code = 91309-3) 4.6          2.3-3.5      H         

    





[facility] CenterAlbum[facility]/Globulin Vthyl8455-13-26 06:09:00
  * 



             Test Item    Value        Reference Range Interpretation Comments

 

             Albumin/Globulin Ratio (test code = 1759-0) 0.6          0.8-2.0   

   L             





[facility] CenterAlkaline Aqkfrdpmgcm8938-04-92 06:09:00* 



             Test Item    Value        Reference Range Interpretation Comments

 

             Alkaline Phosphatase (test code = 6768-6) 95                 

               





[facility] CenterTotal Hgcgjhdes8333-57-33 06:09:00* 



             Test Item    Value        Reference Range Interpretation Comments

 

             Total Bilirubin (test code = 1975-2) 0.2          0.2-1.2          

          





[facility] CenterAsp[facility] Amino Transf (AST/SGOT)
2019 06:09:00* 



             Test Item    Value        Reference Range Interpretation Comments

 

                                        Aspartate Amino Transf (AST/SGOT) (test 

code = Aspartate Amino Transf 

(AST/SGOT))     8               5-34                             





[facility] CenterAlanine Aminotransferase (ALT/SGPT)
2019 06:09:00* 



             Test Item    Value        Reference Range Interpretation Comments

 

             Alanine Aminotransferase (ALT/SGPT) (test code = 1742-6) 12        

   0-55                       





[facility] CenterTotal Wfxcery3926-00-34 06:09:00* 



             Test Item    Value        Reference Range Interpretation Comments

 

             Total Protein (test code = 2885-2) 7.5          6.5-8.1            

        





[facility] CenterAlbumin2019-08-[facility] 06:09:00* 



             Test Item    Value        Reference Range Interpretation Comments

 

             Albumin (test code = 1751-7) 2.9          3.5-5.0      L           

  





[facility] CenterGlobulin2019-08-[facility] 06:09:00* 



             Test Item    Value        Reference Range Interpretation Comments

 

             Globulin (test code = 73336-1) 4.6          2.3-3.5      H         

    





[facility] CenterAlbum[facility]/Globulin Bvbgv4863-91-34 06:09:00
  * 



             Test Item    Value        Reference Range Interpretation Comments

 

             Albumin/Globulin Ratio (test code = 1759-0) 0.6          0.8-2.0   

   L             





[facility] CenterAlkaline Dermxsxczmb4515-91-89 06:09:00* 



             Test Item    Value        Reference Range Interpretation Comments

 

             Alkaline Phosphatase (test code = 6768-6) 95                 

               





[facility] Center[facility] Yxjnghshw9649-89-63 06:09:00* 



             Test Item    Value        Reference Range Interpretation Comments

 

             Total Bilirubin (test code = 1975-2) 0.2          0.2-1.2          

          





[facility] CenterAspartate Amino Transf (AST/SGOT)
2019 06:09:00* 



             Test Item    Value        Reference Range Interpretation Comments

 

                                        Aspartate Amino Transf (AST/SGOT) (test 

code = Aspartate Amino Transf 

(AST/SGOT))     8               5-34                             





[facility] CenterAlanine Aminotransferase (ALT/SGPT)
2019 06:09:00* 



             Test Item    Value        Reference Range Interpretation Comments

 

             Alanine Aminotransferase (ALT/SGPT) (test code = 1742-6) 12        

   0-55                       





[facility] CenterTotal Ukxxoke4511-25-89 06:09:00* 



             Test Item    Value        Reference Range Interpretation Comments

 

             Total Protein (test code = 2885-2) 7.5          6.5-8.1            

        





[facility] CenterAlbumin2019-08-[facility] 06:09:00* 



             Test Item    Value        Reference Range Interpretation Comments

 

             Albumin (test code = 1751-7) 2.9          3.5-5.0      L           

  





[facility] CenterGlobulin2019-08-[facility] 06:09:00* 



             Test Item    Value        Reference Range Interpretation Comments

 

             Globulin (test code = 79779-1) 4.6          2.3-3.5      H         

    





[facility] CenterAlbumin/Globulin Nnlwn4762-12-12 06:09:00
  * 



             Test Item    Value        Reference Range Interpretation Comments

 

             Albumin/Globulin Ratio (test code = 1759-0) 0.6          0.8-2.0   

   L             





[facility] CenterAlkaline Ogujfsjpqpj2400-87-66 06:09:00* 



             Test Item    Value        Reference Range Interpretation Comments

 

             Alkaline Phosphatase (test code = 6768-6) 95                 

               





[facility] Garenpiay4265-73-54 06:09:00* 



             Test Item    Value        Reference Range Interpretation Comments

 

             Total Bilirubin (test code = 1975-2) 0.2          0.2-1.2          

          





[facility] CenterAspartate Amino Transf (AST/SGOT)
2019 06:09:00* 



             Test Item    Value        Reference Range Interpretation Comments

 

                                        Aspartate Amino Transf (AST/SGOT) (test 

code = Aspartate Amino Transf 

(AST/SGOT))     8               5-34                             





[facility] CenterAlanine Aminotransferase (ALT/SGPT)
2019 06:09:00* 



             Test Item    Value        Reference Range Interpretation Comments

 

             Alanine Aminotransferase (ALT/SGPT) (test code = 1742-6) 12        

   0-55                       





[facility] Idurner5836-99-23 06:09:00* 



             Test Item    Value        Reference Range Interpretation Comments

 

             Total Protein (test code = 2885-2) 7.5          6.5-8.1            

        





[facility] CenterAlbumin2019-08-[facility] 06:09:00* 



             Test Item    Value        Reference Range Interpretation Comments

 

             Albumin (test code = 1751-7) 2.9          3.5-5.0      L           

  





[facility] CenterGlobulin2019-08-02 06:09:00* 



             Test Item    Value        Reference Range Interpretation Comments

 

             Globulin (test code = 64316-6) 4.6          2.3-3.5      H         

    





[facility] CenterAlbumin/Globulin Qdxre8646-60-33 06:09:00
  * 



             Test Item    Value        Reference Range Interpretation Comments

 

             Albumin/Globulin Ratio (test code = 1759-0) 0.6          0.8-2.0   

   L             





[facility] CenterAlkaline Mrzmgmqsgur2301-00-68 06:09:00* 



             Test Item    Value        Reference Range Interpretation Comments

 

             Alkaline Phosphatase (test code = 6768-6) 95                 

               





[facility] Vzilvwgrz5233-96-73 06:09:00* 



             Test Item    Value        Reference Range Interpretation Comments

 

             Total Bilirubin (test code = 1975-2) 0.2          0.2-1.2          

          





[facility] CenterAspartate Amino Transf (AST/SGOT)
2019 06:09:00* 



             Test Item    Value        Reference Range Interpretation Comments

 

                                        Aspartate Amino Transf (AST/SGOT) (test 

code = Aspartate Amino Transf 

(AST/SGOT))     8               5-34                             





[facility] CenterAlanine Aminotransferase (ALT/SGPT)
2019 06:09:00* 



             Test Item    Value        Reference Range Interpretation Comments

 

             Alanine Aminotransferase (ALT/SGPT) (test code = 1742-6) 12        

   0-55                       





[facility] CenterTotal Xvgifyq5757-39-47 06:09:00* 



             Test Item    Value        Reference Range Interpretation Comments

 

             Total Protein (test code = 2885-2) 7.5          6.5-8.1            

        





[facility] CenterAlbumin2019-08-02 06:09:00* 



             Test Item    Value        Reference Range Interpretation Comments

 

             Albumin (test code = 1751-7) 2.9          3.5-5.0      L           

  





[facility] CenterGlobulin2019-08-02 06:09:00* 



             Test Item    Value        Reference Range Interpretation Comments

 

             Globulin (test code = 93491-3) 4.6          2.3-3.5      H         

    





[facility] CenterAlbumin/Globulin Xgmdi8851-22-90 06:09:00
  * 



             Test Item    Value        Reference Range Interpretation Comments

 

             Albumin/Globulin Ratio (test code = 1759-0) 0.6          0.8-2.0   

   L             





[facility] CenterAlkaline Lwxgkvocmak7737-34-89 06:09:00* 



             Test Item    Value        Reference Range Interpretation Comments

 

             Alkaline Phosphatase (test code = 6768-6) 95                 

               





[facility] CenterU[facility] EDH7327-76-69 19:25:00* 



             Test Item    Value        Reference Range Interpretation Comments

 

             Urine WBC (test code = 5821-4) 11-20        0-5          H         

    





[facility] CenterU[facility] [id number] 19:25:00* 



             Test Item    Value        Reference Range Interpretation Comments

 

             Urine RBC (test code = 47696-6) 6-10         0-5          H        

     





[facility] CenterU[facility] Dnnewvws4284-88-28 19:25:00* 



             Test Item    Value        Reference Range Interpretation Comments

 

             Urine Bacteria (test code = 40255-7) MANY         NONE         H   

          





[facility] CenterU[facility] Epithelial Cszxw8809-81-64 19:25:00
  * 



             Test Item    Value        Reference Range Interpretation Comments

 

             Urine Epithelial Cells (test code = 15557-7) NONE         NONE     

                  





[facility] CenterU[facility] Amorphous Xsmkmbqj5669-09-66 
19:25:00* 



             Test Item    Value        Reference Range Interpretation Comments

 

             Urine Amorphous Sediment (test code = 8246-1) MODERATE     FEW     

     H             





[facility] CenterU[facility] IKN0227-19-70 19:25:00* 



             Test Item    Value        Reference Range Interpretation Comments

 

             Urine WBC (test code = 5821-4) 11-20        0-5          H         

    





[facility] [id number] 19:25:00* 



             Test Item    Value        Reference Range Interpretation Comments

 

             Urine RBC (test code = 16883-7) 6-10         0-5          H        

     





[facility] Center[facility] Zvccmpvu3192-80-10 19:25:00* 



             Test Item    Value        Reference Range Interpretation Comments

 

             Urine Bacteria (test code = 31081-9) MANY         NONE         H   

          





[facility] CenterU[facility] Epithelial Bnrnv8402-41-68 19:25:00
  * 



             Test Item    Value        Reference Range Interpretation Comments

 

             Urine Epithelial Cells (test code = 96566-5) NONE         NONE     

                  





[facility] Amorphous Vziftugt2281-57-91 
19:25:00* 



             Test Item    Value        Reference Range Interpretation Comments

 

             Urine Amorphous Sediment (test code = 8246-1) MODERATE     FEW     

     H             





[facility] Center[facility] [id number] 19:25:00* 



             Test Item    Value        Reference Range Interpretation Comments

 

             Urine WBC (test code = 5821-4) 11-20        0-5          H         

    





[facility] [id number] 19:25:00* 



             Test Item    Value        Reference Range Interpretation Comments

 

             Urine RBC (test code = 03915-8) 6-10         0-5          H        

     





[facility] Quzcevew4892-41-31 19:25:00* 



             Test Item    Value        Reference Range Interpretation Comments

 

             Urine Bacteria (test code = 70226-8) MANY         NONE         H   

          





[facility] Center[facility] Epithelial Yzqtc3945-11-83 19:25:00
  * 



             Test Item    Value        Reference Range Interpretation Comments

 

             Urine Epithelial Cells (test code = 69969-1) NONE         NONE     

                  





[facility] CenterU[facility] Amorphous Qgoifxwq0280-60-17 
19:25:00* 



             Test Item    Value        Reference Range Interpretation Comments

 

             Urine Amorphous Sediment (test code = 8246-1) MODERATE     FEW     

     H             





[facility] Center[facility] Elovv0092-45-03 19:22:00* 



             Test Item    Value        Reference Range Interpretation Comments

 

             Urine Color (test code = 5778-6) OTHER        YELLOW               

      





[facility] Xdsna9187-79-42 19:22:00* 



             Test Item    Value        Reference Range Interpretation Comments

 

             Urine Color (test code = 5778-6) OTHER        YELLOW               

      





[facility] Xfhme6664-75-85 19:22:00* 



             Test Item    Value        Reference Range Interpretation Comments

 

             Urine Color (test code = 5778-6) OTHER        YELLOW               

      





[facility] CenterU[facility] Bkdnnjz1723-40-80 19:16:00* 



             Test Item    Value        Reference Range Interpretation Comments

 

             Urine Clarity (test code = 45252-3) SL CLOUDY    CLEAR             

         





[facility] CenterU[facility] Specific Mvqhclk1043-82-94 19:16:00
  * 



             Test Item    Value        Reference Range Interpretation Comments

 

             Urine Specific Gravity (test code = 5811-5) 1.010        1.010-1.02

5                





[facility] CenterU[facility] [id number] 19:16:00* 



             Test Item    Value        Reference Range Interpretation Comments

 

             Urine pH (test code = 77398-4) 6            5-7                    

    





[facility] CenterU[facility] Leukocyte Rzvanwom2300-75-68 
19:16:00* 



             Test Item    Value        Reference Range Interpretation Comments

 

             Urine Leukocyte Esterase (test code = 57447-0) LARGE        NEGATIV

E                   





[facility] Center[facility] Cmhixse9585-83-05 19:16:00* 



             Test Item    Value        Reference Range Interpretation Comments

 

             Urine Nitrite (test code = 05056-0) NEGATIVE     NEGATIVE          

         





[facility] Center[facility] Mwinbmx7348-51-40 19:16:00* 



             Test Item    Value        Reference Range Interpretation Comments

 

             Urine Protein (test code = 57735-3) NEGATIVE     NEGATIVE          

         





[facility] CenterU[facility] Glucose (UA)2019 19:16:00* 



             Test Item    Value        Reference Range Interpretation Comments

 

             Urine Glucose (UA) (test code = 74654-7) NEGATIVE     NEGATIVE     

              





[facility] Center[facility] Kwgjzim2431-28-95 19:16:00* 



             Test Item    Value        Reference Range Interpretation Comments

 

             Urine Ketones (test code = 91885-5) NEGATIVE     NEGATIVE          

         





[facility] Center[facility] Adspdyvwaxom4132-18-30 19:16:00* 



             Test Item    Value        Reference Range Interpretation Comments

 

             Urine Urobilinogen (test code = 39712-6) 0.2          0.2-1        

              





[facility] Center[facility] Vrsenhawi8052-57-48 19:16:00* 



             Test Item    Value        Reference Range Interpretation Comments

 

             Urine Bilirubin (test code = 1977-8) NEGATIVE     NEGATIVE         

          





[facility] Center[facility] Myitq6197-58-14 19:16:00* 



             Test Item    Value        Reference Range Interpretation Comments

 

             Urine Blood (test code = 31473-2) 3+           NEGATIVE            

       





[facility] Center[facility] Zqjafsf5695-89-34 19:16:00* 



             Test Item    Value        Reference Range Interpretation Comments

 

             Urine Clarity (test code = 24954-1) SL CLOUDY    CLEAR             

         





[facility] CenterU[facility] Specific Butmofy0552-13-25 19:16:00
  * 



             Test Item    Value        Reference Range Interpretation Comments

 

             Urine Specific Gravity (test code = 5811-5) 1.010        1.010-1.02

5                





[facility] Center[facility] [id number] 19:16:00* 



             Test Item    Value        Reference Range Interpretation Comments

 

             Urine pH (test code = 02622-5) 6            5-7                    

    





[facility] Center[facility] Leukocyte Lmtmmbbh7328-65-61 
19:16:00* 



             Test Item    Value        Reference Range Interpretation Comments

 

             Urine Leukocyte Esterase (test code = 47758-5) LARGE        NEGATIV

E                   





[facility] Qzprjvs2387-63-66 19:16:00* 



             Test Item    Value        Reference Range Interpretation Comments

 

             Urine Nitrite (test code = 19951-4) NEGATIVE     NEGATIVE          

         





[facility] Qxlncsf8064-96-97 19:16:00* 



             Test Item    Value        Reference Range Interpretation Comments

 

             Urine Protein (test code = 57735-3) NEGATIVE     NEGATIVE          

         





[facility] Center[facility] Glucose (UA)2019 19:16:00* 



             Test Item    Value        Reference Range Interpretation Comments

 

             Urine Glucose (UA) (test code = 30011-2) NEGATIVE     NEGATIVE     

              





[facility] Yefucxy3126-56-26 19:16:00* 



             Test Item    Value        Reference Range Interpretation Comments

 

             Urine Ketones (test code = 59715-1) NEGATIVE     NEGATIVE          

         





[facility] Lhioopjwfdhk2060-50-88 19:16:00* 



             Test Item    Value        Reference Range Interpretation Comments

 

             Urine Urobilinogen (test code = 26862-0) 0.2          0.2-1        

              





[facility] Kgaxdqlxg2421-85-24 19:16:00* 



             Test Item    Value        Reference Range Interpretation Comments

 

             Urine Bilirubin (test code = 1977-8) NEGATIVE     NEGATIVE         

          





[facility] Kehyw1609-96-66 19:16:00* 



             Test Item    Value        Reference Range Interpretation Comments

 

             Urine Blood (test code = 50707-1) 3+           NEGATIVE            

       





[facility] Bgfjqku5762-88-16 19:16:00* 



             Test Item    Value        Reference Range Interpretation Comments

 

             Urine Clarity (test code = 62308-0) SL CLOUDY    CLEAR             

         





[facility] CenterU[facility] Specific Aebrujx6215-48-28 19:16:00
  * 



             Test Item    Value        Reference Range Interpretation Comments

 

             Urine Specific Gravity (test code = 5811-5) 1.010        1.010-1.02

5                





[facility] iZ8352-38-18 19:16:00* 



             Test Item    Value        Reference Range Interpretation Comments

 

             Urine pH (test code = 97773-3) 6            5-7                    

    





[facility] Center[facility] Leukocyte Bvnlkfus8876-87-99 
19:16:00* 



             Test Item    Value        Reference Range Interpretation Comments

 

             Urine Leukocyte Esterase (test code = 75288-3) LARGE        NEGATIV

E                   





[facility] Khyoqoy2478-30-39 19:16:00* 



             Test Item    Value        Reference Range Interpretation Comments

 

             Urine Nitrite (test code = 29949-4) NEGATIVE     NEGATIVE          

         





[facility] Cpqexpv0837-18-80 19:16:00* 



             Test Item    Value        Reference Range Interpretation Comments

 

             Urine Protein (test code = 57735-3) NEGATIVE     NEGATIVE          

         





[facility] CenterUrine Glucose (UA)2019 19:16:00* 



             Test Item    Value        Reference Range Interpretation Comments

 

             Urine Glucose (UA) (test code = 92875-8) NEGATIVE     NEGATIVE     

              





[facility] Qwntfzd4581-19-50 19:16:00* 



             Test Item    Value        Reference Range Interpretation Comments

 

             Urine Ketones (test code = 62130-7) NEGATIVE     NEGATIVE          

         





[facility] Dymillkddcls9809-00-36 19:16:00* 



             Test Item    Value        Reference Range Interpretation Comments

 

             Urine Urobilinogen (test code = 97898-5) 0.2          0.2-1        

              





[facility] Lfjimubsy9296-52-99 19:16:00* 



             Test Item    Value        Reference Range Interpretation Comments

 

             Urine Bilirubin (test code = 1977-8) NEGATIVE     NEGATIVE         

          





[facility] Lhdje1154-57-41 19:16:00* 



             Test Item    Value        Reference Range Interpretation Comments

 

             Urine Blood (test code = 16863-9) 3+           NEGATIVE            

       





[facility] 18:34:00   
                                                                                
 [facility]                        [address] [address]      Patient Name: CRISTINA RICH     
                             MR #: [id number]                     :                                    Age/Sex: 53/F  Acct #: [id number]   
                          Req #: [id number]  Adm Physician:                     
                                Ordered by: TINO MALHOTRA NP                 
          Report #: [id number]        Location: ER                               
      Room/Bed:                     ________________________________________
___________________________________________________________    Procedure: [id number]
054 DX/ABDOMEN-1VIEW (KUB)  Exam Date:                             Exam Time:   
                                           REPORT STATUS: Signed    RADIOGRAPH(
S) OF THE ABDOMEN AND PELVIS, 2 view(s)          HISTORY:  Abdominal pain, right
kidney drain      COMPARISON:  CT of the abdomen and pelvis 2019. Abd
ominal pelvic   radiographs 2019.      FINDINGS:   No specific evidenc
e of obstruction or ileus.     Unchanged appearance of the right nephrostomy tub
e.   Unchanged appearance of the 2 left-sided ureteral stents.   Unchanged multi
ple metallic surgical clips within the pelvis.   Presumed Gomez catheter, the ti
p of the catheter is not definitively visualized   in the region of the urinary 
bladder.   The bones are partially obscured by stool and overlying bowel gas.   
Cholelithiasis.      IMPRESSION:    1.  Nonobstructive bowel gas pattern.   2.  
Unchanged appearance of the right nephrostomy tube and left ureteral   stents.  
3.  Cholelithiasis.      Signed by: Dr. Vamsi Guerin D.O., M.M.M. on 2019 6
:37 PM        Dictated By: VAMSI GUERIN DO  Electronically Signed By: VAMSI GUERIN DO on 19  Transcribed By: LUCINDA on 19       COPY TO:   
TINO MALHOTRA NP         SPECIAL PROCEDURE IN CATH [id number] 14:18:00     
                                                                                
[address] [address]      Patient Name: CRISTINA RICH     
                             MR #: [id number]                     :                                    Age/Sex: 53/F  Acct #: [id number]   
                          Req #: [id number]  Adm Physician:                     
                                Ordered by: JACQUELINE ARORA MD                      
     Report #: [id number]        Location: CATH LAB                              
 Room/Bed:                     _____________________________________________
______________________________________________________    Procedure: [id number] I
R/SPECIAL PROCEDURE IN CATH LAB  Exam Date:                             Exam Tate
e:                                               REPORT STATUS: Signed    PROCED
URE:   CHG PERC TUBE/DRN CATH/CON/S I       COMPARISON:   Prior nephrostomy tube
change 2019       Preprocedure diagnosis: right ureteral obstruction, chron
ic indwelling    nephrostomy   Post procedure diagnosis: Right ureteral obstruct
ion, chronic    indwelling nephrostomy.       Sedation/anesthesia: Intravenous f
entanyl and Versed. Refer to nursing    record. The patient's heart rate and pul
se oximetry were continuously    monitored by the interventional radiology nurse
. Blood pressure was    monitored at 5 minute intervals.       Additional medica
tions: Lidocaine 1% for local anesthesia.       Total fluoroscopy time: One alfonso
te   Dose-area product:  267.5 cGycm2       Contrast used: 10 cc Isovue-300   Es
timated blood loss: Minimal   Blood products administered: None   Specimens: 10 
French percutaneous nephrostomy catheter, discarded   Implants/grafts: 10 French
percutaneous nephrostomy catheter       Condition at completion: Stable   Dispo
sition: Catheter lab holding area for recovery   Complications: No immediate    
  Procedure in detail:   Informed consent was obtained and documented in the me
dical record    after discussion of risks and benefits.       The patient was pl
aced in the prone position on the angiographic table.    The right flank and exi
sting percutaneous nephrostomy catheter were    prepped and draped in standard s
terile fashion. 1% lidocaine was    infiltrated into the skin and subcutaneous t
issues along the existing    catheter for local anesthesia.       Injection of d
ilute contrast material through the catheter confirmed    appropriate position w
ithin the collecting system. The catheter was    severed and a 0.035 inch stiff 
Glidewire was advanced through the    catheter and coiled within the renal pelvi
s. The catheter was then    exchanged over-the-wire for a new 10 French locking 
loop percutaneous    nephrostomy catheter. The wire was removed and the locking 
loop was    formed in the renal pelvis. Appropriate positioning was confirmed by
   injection of dilute contrast material. The catheter was then flushed    with 
sterile saline, secured to the skin with monofilament nylon    suture, and conn
ected to gravity drainage. A sterile dressing was    applied. The patient tolera
hugh the procedure well without immediate    complication.           CONCLUSION: 
        Successful exchange of a 10 French locking loop right percutaneous    n
ephrostomy catheter under fluoroscopic guidance.       The patient should return
to interventional radiology for routine    catheter exchange in 3 months if the 
catheter is still needed at that    time.    Dictated by:  Clau Ngo M.D. on 
2019 at 14:18        Electronically approved by:  Clau Ngo M.D. on 2019 at 14:18                Dictated By: CLAU NGO MD  Electronically Katerina
d By: CLAU NGO MD on 198  Transcribed By: FERNANDO on 19 1418 
     COPY TO:   JACQUELINE ARORA MD         Human Chorionic Gonadotropin, [facility]
2019 11:36:00* 



             Test Item    Value        Reference Range Interpretation Comments

 

             Human Chorionic Gonadotropin, Qual (test code = 2118-8) NEGATIVE   

  NEGATIVE                   





[facility] Chorionic Gonadotropin, [facility]
2019 11:36:00* 



             Test Item    Value        Reference Range Interpretation Comments

 

             Human Chorionic Gonadotropin, Qual (test code = 2118-8) NEGATIVE   

  NEGATIVE                   





[facility] Chorionic Gonadotropin, [facility]
2019 11:36:00* 



             Test Item    Value        Reference Range Interpretation Comments

 

             Human Chorionic Gonadotropin, Qual (test code = 2118-8) NEGATIVE   

  NEGATIVE                   





[facility] Chorionic Gonadotropin, [facility]
2019 11:36:00* 



             Test Item    Value        Reference Range Interpretation Comments

 

             Human Chorionic Gonadotropin, Qual (test code = 2118-8) NEGATIVE   

  NEGATIVE                   





[facility] Chorionic Gonadotropin, [facility]
2019 11:36:00* 



             Test Item    Value        Reference Range Interpretation Comments

 

             Human Chorionic Gonadotropin, Qual (test code = 2118-8) NEGATIVE   

  NEGATIVE                   





[facility] Chorionic Gonadotropin, [facility]
2019 11:36:00* 



             Test Item    Value        Reference Range Interpretation Comments

 

             Human Chorionic Gonadotropin, Qual (test code = 2118-8) NEGATIVE   

  NEGATIVE                   





[facility] Chorionic Gonadotropin, [facility]
2019 11:36:00* 



             Test Item    Value        Reference Range Interpretation Comments

 

             Human Chorionic Gonadotropin, Qual (test code = 2118-8) NEGATIVE   

  NEGATIVE                   





[facility] Qzfiyhq7198-02-33 14:16:00* 



             Test Item    Value        Reference Range Interpretation Comments

 

             Urine Culture (test code = 630-4) Organism: ENTEROCOCCUS FAECIUM-VR

E                            





[facility] Ofrqyot1256-81-56 13:57:00* 



             Test Item    Value        Reference Range Interpretation Comments

 

             Urine Culture (test code = 630-4) Organism: ENTEROCOCCUS FAECIUM-VR

E                            





[facility] Whvpqab1244-09-93 13:57:00* 



             Test Item    Value        Reference Range Interpretation Comments

 

             Urine Culture (test code = 630-4) Organism: ENTEROCOCCUS FAECIUM-VR

E                            





[facility] Rwsokfn7000-20-31 13:57:00* 



             Test Item    Value        Reference Range Interpretation Comments

 

             Urine Culture (test code = 630-4) Organism: ENTEROCOCCUS FAECIUM-VR

E                            





[facility] Sopnptz4154-18-19 13:57:00* 



             Test Item    Value        Reference Range Interpretation Comments

 

             Urine Culture (test code = 630-4) Organism: ENTEROCOCCUS FAECIUM-VR

E                            





[facility] Wjvbpot1001-78-85 13:57:00* 



             Test Item    Value        Reference Range Interpretation Comments

 

             Urine Culture (test code = 630-4) No Result Data Provided          

                  





[facility] Rhrunxd5883-24-47 13:57:00* 



             Test Item    Value        Reference Range Interpretation Comments

 

             Urine Culture (test code = 630-4) No Result Data Provided          

                  





[facility] WBC2019-06-10 17:43:00* 



             Test Item    Value        Reference Range Interpretation Comments

 

             Urine WBC (test code = 5821-4) 11-20        0-5          H         

    





[facility] RBC2019-06-10 17:43:00* 



             Test Item    Value        Reference Range Interpretation Comments

 

             Urine RBC (test code = 91325-9) 6-10         0-5          H        

     





[facility] Bacteria2019-06-10 17:43:00* 



             Test Item    Value        Reference Range Interpretation Comments

 

             Urine Bacteria (test code = 67428-5) MODERATE     NONE         H   

          





[facility] Epithelial Cells2019-06-10 17:43:00
  * 



             Test Item    Value        Reference Range Interpretation Comments

 

             Urine Epithelial Cells (test code = 52774-3) NONE         NONE     

                  





[facility] Amorphous Sediment2019-06-10 
17:43:00* 



             Test Item    Value        Reference Range Interpretation Comments

 

             Urine Amorphous Sediment (test code = 8246-1) MODERATE     FEW     

     H             





[facility] WBC2019-06-10 17:43:00* 



             Test Item    Value        Reference Range Interpretation Comments

 

             Urine WBC (test code = 5821-4) 11-20        0-5          H         

    





[facility]43[facility] 



             Test Item    Value        Reference Range Interpretation Comments

 

             Urine RBC (test code = 80847-6) 6-10         0-5          H        

     





[facility] Bacteria[facility] 17:43:00* 



             Test Item    Value        Reference Range Interpretation Comments

 

             Urine Bacteria (test code = 93963-9) MODERATE     NONE         H   

          





[facility] Epithelial Cells2019-06-10 17:43:00
  * 



             Test Item    Value        Reference Range Interpretation Comments

 

             Urine Epithelial Cells (test code = 11119-3) NONE         NONE     

                  





[facility] Amorphous Sediment2019-06-10 
17:43:00* 



             Test Item    Value        Reference Range Interpretation Comments

 

             Urine Amorphous Sediment (test code = 8246-1) MODERATE     FEW     

     H             





[facility] 17:29:00* 



             Test Item    Value        Reference Range Interpretation Comments

 

             Urine Color (test code = 5778-6) YELLOW       YELLOW               

      





[facility] Oslzuek7673-47-65 17:29:00* 



             Test Item    Value        Reference Range Interpretation Comments

 

             Urine Clarity (test code = 60625-1) CLEAR        CLEAR             

         





[facility] Gravity2019-06-10 17:29:00
  * 



             Test Item    Value        Reference Range Interpretation Comments

 

             Urine Specific Gravity (test code = 5811-5) <=1.005      1.010-1.02

5                





[facility] pH2019-[facility] 17:29:00* 



             Test Item    Value        Reference Range Interpretation Comments

 

             Urine pH (test code = 45765-5) 7.5          5-7                    

    





[facility] Leukocyte Esterase2019-06-10 
17:29:00* 



             Test Item    Value        Reference Range Interpretation Comments

 

             Urine Leukocyte Esterase (test code = 46182-5) LARGE        NEGATIV

E                   





[facility] Nitrite2019-[facility]10 17:29:00* 



             Test Item    Value        Reference Range Interpretation Comments

 

             Urine Nitrite (test code = 52710-7) NEGATIVE     NEGATIVE          

         





[facility] Protein2019-06-10 17:29:00* 



             Test Item    Value        Reference Range Interpretation Comments

 

             Urine Protein (test code = 57735-3) 1+           NEGATIVE     H    

         





[facility] Gl[facility]2019-06-10 17:29:00* 



             Test Item    Value        Reference Range Interpretation Comments

 

             Urine Glucose (UA) (test code = 87524-0) NEGATIVE     NEGATIVE     

              





[facility] Hvfheqc3309-04-39 17:29:00* 



             Test Item    Value        Reference Range Interpretation Comments

 

             Urine Ketones (test code = 27523-2) NEGATIVE     NEGATIVE          

         





[facility] Urobilinogen2019-06-10 17:29:00* 



             Test Item    Value        Reference Range Interpretation Comments

 

             Urine Urobilinogen (test code = 97914-5) 0.2          0.2-1        

              





[facility] Bilirubin2019-06-10 17:29:00* 



             Test Item    Value        Reference Range Interpretation Comments

 

             Urine Bilirubin (test code = 1977-8) NEGATIVE     NEGATIVE         

          





[facility] Blood2019-06-10 17:29:00* 



             Test Item    Value        Reference Range Interpretation Comments

 

             Urine Blood (test code = 51305-8) 3+           NEGATIVE            

       





[facility] 17:29:00* 



             Test Item    Value        Reference Range Interpretation Comments

 

             Urine Color (test code = 5778-6) YELLOW       YELLOW               

      





[facility] Iebxhlv3779-66-79 17:29:00* 



             Test Item    Value        Reference Range Interpretation Comments

 

             Urine Clarity (test code = 06536-9) CLEAR        CLEAR             

         





[facility] Gravity2019-06-10 17:29:00
  * 



             Test Item    Value        Reference Range Interpretation Comments

 

             Urine Specific Gravity (test code = 5811-5) <=1.005      1.010-1.02

5                





[facility] pH2019-06-[facility] 17:29:00* 



             Test Item    Value        Reference Range Interpretation Comments

 

             Urine pH (test code = 59808-1) 7.5          5-7                    

    





[facility] Leukocyte Esterase2019-06-10 
17:29:00* 



             Test Item    Value        Reference Range Interpretation Comments

 

             Urine Leukocyte Esterase (test code = 37504-3) LARGE        NEGATIV

E                   





[facility] Nitrite2019-[facility]10 17:29:00* 



             Test Item    Value        Reference Range Interpretation Comments

 

             Urine Nitrite (test code = 36504-8) NEGATIVE     NEGATIVE          

         





[facility] Protein2019-06-10 17:29:00* 



             Test Item    Value        Reference Range Interpretation Comments

 

             Urine Protein (test code = 57735-3) 1+           NEGATIVE     H    

         





[facility] Glucose [facility])2019-06-10 17:29:00* 



             Test Item    Value        Reference Range Interpretation Comments

 

             Urine Glucose (UA) (test code = 62905-1) NEGATIVE     NEGATIVE     

              





[facility] Vxhzkwv5921-50-25 17:29:00* 



             Test Item    Value        Reference Range Interpretation Comments

 

             Urine Ketones (test code = 15613-5) NEGATIVE     NEGATIVE          

         





[facility] Urobilinogen2019-06-10 17:29:00* 



             Test Item    Value        Reference Range Interpretation Comments

 

             Urine Urobilinogen (test code = 46174-5) 0.2          0.2-1        

              





[facility] Bilirubin2019-06-10 17:29:00* 



             Test Item    Value        Reference Range Interpretation Comments

 

             Urine Bilirubin (test code = 1977-8) NEGATIVE     NEGATIVE         

          





[facility] Blood2019-06-[facility] 17:29:00* 



             Test Item    Value        Reference Range Interpretation Comments

 

             Urine Blood (test code = 70998-7) 3+           NEGATIVE            

       





[facility] Hyaline Casts2019-06-10 17:10:00* 



             Test Item    Value        Reference Range Interpretation Comments

 

             Urine Hyaline Casts (test code = 62630-0) 0-1          0-1         

               





[facility] Hyaline Casts2019-06-10 17:10:00* 



             Test Item    Value        Reference Range Interpretation Comments

 

             Urine Hyaline Casts (test code = 02558-7) 0-1          0-1         

               





[facility] Hyaline Casts2019-06-10 17:10:00* 



             Test Item    Value        Reference Range Interpretation Comments

 

             Urine Hyaline Casts (test code = 75811-7) 0-1          0-1         

               





[facility] Hyaline Casts2019-06-10 17:10:00* 



             Test Item    Value        Reference Range Interpretation Comments

 

             Urine Hyaline Casts (test code = 89048-8) 0-1          0-1         

               





[facility] Hyaline Casts2019-06-10 17:10:00* 



             Test Item    Value        Reference Range Interpretation Comments

 

             Urine Hyaline Casts (test code = 27435-8) 0-1          0-1         

               





[facility] Hyaline Casts2019-06-10 17:10:00* 



             Test Item    Value        Reference Range Interpretation Comments

 

             Urine Hyaline Casts (test code = 86906-4) 0-1          0-1         

               





[facility] Hyaline Casts2019-06-10 17:10:00* 



             Test Item    Value        Reference Range Interpretation Comments

 

             Urine Hyaline Casts (test code = 70094-0) 0-1          0-1         

               





[facility] Hyaline Casts2019-06-10 17:10:00* 



             Test Item    Value        Reference Range Interpretation Comments

 

             Urine Hyaline Casts (test code = 33662-3) 0-1          0-1         

               





[facility] Hyaline Casts2019-06-10 17:10:00* 



             Test Item    Value        Reference Range Interpretation Comments

 

             Urine Hyaline Casts (test code = 22544-2) 0-1          0-1         

               





[facility] 



             Test Item    Value        Reference Range Interpretation Comments

 

             Sodium Level (test code = 2951-2) 134          136-145      L      

       





[facility] Center[facility]2019-06-[facility] 16:53:00* 



             Test Item    Value        Reference Range Interpretation Comments

 

             Potassium Level (test code = 2823-3) 4.6          3.5-5.1          

          





[facility] CenterChloride Level2019-[facility]-10 16:53:00* 



             Test Item    Value        Reference Range Interpretation Comments

 

             Chloride Level (test code = 2075-0) 105                      

         





[facility] CenterCarbon Dioxide Level2019-06-10 16:53:00* 



             Test Item    Value        Reference Range Interpretation Comments

 

             Carbon Dioxide Level (test code = 2028-9) 21           22-29       

 L             





[facility] CenterAn[facility] Gap2019-[facility] 16:53:00* 



             Test Item    Value        Reference Range Interpretation Comments

 

             Anion Gap (test code = 33037-3) 12.6         8-16                  

     





[facility] Center[facility]rea Nitrogen2019-06-10 16:53:00* 



             Test Item    Value        Reference Range Interpretation Comments

 

             Blood Urea Nitrogen (test code = 3094-0) 18           7-26         

              





[facility] CenterCreatinine2019-06-10 16:53:00* 



             Test Item    Value        Reference Range Interpretation Comments

 

             Creatinine (test code = 2160-0) 1.32         0.57-1.11    H        

     





[facility] CenterBUN/Creatinine Ratio2019-06-10 16:53:00* 



             Test Item    Value        Reference Range Interpretation Comments

 

             BUN/Creatinine Ratio (test code = 3097-3) 14           6-25        

               





[facility] CenterEstimat Glomerular Filtration Rate
2019-06-10 16:53:00* 



             Test Item    Value        Reference Range Interpretation Comments

 

             Estimat Glomerular Filtration Rate (test code = [id number]) 42      

     >60          L             





Ranges were taken from the National Kidney Disease Education Program and the Michelle
[facility] literature.Reference ranges:60 or greater: Mgbtgt02-57 (
for 3 consecutive months): Chronic kidney disease 15 or less: Kidney failureBrownfield [facility] Medical CenterGlucose Level2019-06-10 16:53:00* 



             Test Item    Value        Reference Range Interpretation Comments

 

             Glucose Level (test code = PXF0562) 147                 H    

         





[facility] CenterCalcium Level2019-06-10 16:53:00* 



             Test Item    Value        Reference Range Interpretation Comments

 

             Calcium Level (test code = 07724-5) 9.8          8.4-10.2          

         





[facility] Center[facility]:53:00* 



             Test Item    Value        Reference Range Interpretation Comments

 

             Sodium Level (test code = 2951-2) 134          136-145      L      

       





[facility] CenterPotassium Level2019-06-10 16:53:00* 



             Test Item    Value        Reference Range Interpretation Comments

 

             Potassium Level (test code = 2823-3) 4.6          3.5-5.1          

          





[facility] CenterChloride Level2019-06-10 16:53:00* 



             Test Item    Value        Reference Range Interpretation Comments

 

             Chloride Level (test code = 2075-0) 105                      

         





[facility] CenterCarbon Dioxide Level2019-06-10 16:53:00* 



             Test Item    Value        Reference Range Interpretation Comments

 

             Carbon Dioxide Level (test code = 2028-9) 21           22-29       

 L             





[facility] CenterAnion Gap2019-06-10 16:53:00* 



             Test Item    Value        Reference Range Interpretation Comments

 

             Anion Gap (test code = 33037-3) 12.6         8-16                  

     





[facility] CenterBlood Urea Nitrogen2019-06-10 16:53:00* 



             Test Item    Value        Reference Range Interpretation Comments

 

             Blood Urea Nitrogen (test code = 3094-0) 18           7-26         

              





[facility] CenterCreatinine2019-06-10 16:53:00* 



             Test Item    Value        Reference Range Interpretation Comments

 

             Creatinine (test code = 2160-0) 1.32         0.57-1.11    H        

     





[facility] CenterBUN/Creatinine Ratio2019-06-10 16:53:00* 



             Test Item    Value        Reference Range Interpretation Comments

 

             BUN/Creatinine Ratio (test code = 3097-3) 14           6-25        

               





[facility] CenterEstimat Glomerular Filtration Rate
2019-06-10 16:53:00* 



             Test Item    Value        Reference Range Interpretation Comments

 

             Estimat Glomerular Filtration Rate (test code = 383032963) 42      

     >60          L             





Ranges were taken from the National Kidney Disease Education Program and the Michelle
[facility] literature.Reference ranges:60 or greater: Ovkynd47-68 (
for 3 consecutive months): Chronic kidney disease 15 or less: Kidney failureBrownfield Regional Medical CenterGlucose Level2019-06-10 16:53:00* 



             Test Item    Value        Reference Range Interpretation Comments

 

             Glucose Level (test code = ZCO3649) 147                 H    

         





[facility] CenterCalcium Level2019-06-10 16:53:00* 



             Test Item    Value        Reference Range Interpretation Comments

 

             Calcium Level (test code = 37194-9) 9.8          8.4-10.2          

         





[facility] CenterWhite Blood Count2019-06-10 16:46:00* 



             Test Item    Value        Reference Range Interpretation Comments

 

             White Blood Count (test code = 6690-2) 8.52         4.8-10.8       

            





[facility] CenterRed Blood Count2019-06-10 16:46:00* 



             Test Item    Value        Reference Range Interpretation Comments

 

             Red Blood Count (test code = 789-8) 4.04         3.6-5.1           

         





[facility] CenterHemoglobin2019-06-10 16:46:00* 



             Test Item    Value        Reference Range Interpretation Comments

 

             Hemoglobin (test code = 87898-5) 10.2         12.0-16.0    L       

      





[facility] CenterHematocrit2019-06-10 16:46:00* 



             Test Item    Value        Reference Range Interpretation Comments

 

             Hematocrit (test code = 4544-3) 33.7         34.2-44.1    L        

     





[facility] CenterMean Corpuscular Volume2019-06-10 
16:46:00* 



             Test Item    Value        Reference Range Interpretation Comments

 

             Mean Corpuscular Volume (test code = 787-2) 83.4         81-99     

                 





[facility] CenterMean Corpuscular Hemoglobin2019-06-10 
16:46:00* 



             Test Item    Value        Reference Range Interpretation Comments

 

             Mean Corpuscular Hemoglobin (test code = 785-6) 25.2         28-32 

       L             





[facility] CenterMean Corpuscular Hemoglobin Concent
2019-06-10 16:46:00* 



             Test Item    Value        Reference Range Interpretation Comments

 

             Mean Corpuscular Hemoglobin Concent (test code = 786-4) 30.3       

  31-35        L             





[facility] CenterRed Cell Distribution Width2019-06-10 
16:46:00* 



             Test Item    Value        Reference Range Interpretation Comments

 

             Red Cell Distribution Width (test code = 42688-0) 16.1         11.7

-14.4    H             





[facility] CenterPlatelet Count2019-06-10 16:46:00* 



             Test Item    Value        Reference Range Interpretation Comments

 

             Platelet Count (test code = 777-3) 276          140-360            

        





[facility] CenterNeutrophils (%) (Auto)2019-06-10 16:46:00
  * 



             Test Item    Value        Reference Range Interpretation Comments

 

             Neutrophils (%) (Auto) (test code = 84484-6) 68.2         38.7-80.0

                  





[facility] CenterLymphocytes (%) (Auto)2019-06-10 16:46:00
  * 



             Test Item    Value        Reference Range Interpretation Comments

 

             Lymphocytes (%) (Auto) (test code = 736-9) 22.7         18.0-39.1  

                





[facility] CenterMonocytes (%) (Auto)2019-06-10 16:46:00* 



             Test Item    Value        Reference Range Interpretation Comments

 

             Monocytes (%) (Auto) (test code = 5905-5) 5.4          4.4-11.3    

               





[facility] CenterEosinophils (%) (Auto)2019-06-10 16:46:00
  * 



             Test Item    Value        Reference Range Interpretation Comments

 

             Eosinophils (%) (Auto) (test code = 713-8) 2.3          0.0-6.0    

                





[facility] CenterBasophils (%) (Auto)2019-06-10 16:46:00* 



             Test Item    Value        Reference Range Interpretation Comments

 

             Basophils (%) (Auto) (test code = 706-2) 0.8          0.0-1.0      

              





[facility] CenterIM GRANULOCYTES %2019-06-10 16:46:00* 



             Test Item    Value        Reference Range Interpretation Comments

 

             IM GRANULOCYTES % (test code = IM GRANULOCYTES %) 0.6          0.0-

1.0                    





[facility] CenterNeutrophils # (Auto)2019-06-10 16:46:00* 



             Test Item    Value        Reference Range Interpretation Comments

 

             Neutrophils # (Auto) (test code = 751-8) 5.8          2.1-6.9      

              





[facility] CenterLymphocytes # (Auto)2019-06-10 16:46:00* 



             Test Item    Value        Reference Range Interpretation Comments

 

             Lymphocytes # (Auto) (test code = 29163-7) 1.9          1.0-3.2    

                





[facility] CenterMonocytes # (Auto)2019-06-10 16:46:00* 



             Test Item    Value        Reference Range Interpretation Comments

 

             Monocytes # (Auto) (test code = 742-7) 0.5          0.2-0.8        

            





[facility] CenterEosinophils # (Auto)2019-06-10 16:46:00* 



             Test Item    Value        Reference Range Interpretation Comments

 

             Eosinophils # (Auto) (test code = 711-2) 0.2          0.0-0.4      

              





[facility] CenterBasophils # (Auto)2019-06-10 16:46:00* 



             Test Item    Value        Reference Range Interpretation Comments

 

             Basophils # (Auto) (test code = 704-7) 0.1          0.0-0.1        

            





[facility] CenterAbsolute Immature Granulocyte (auto
2019-06-10 16:46:00* 



             Test Item    Value        Reference Range Interpretation Comments

 

                                        Absolute Immature Granulocyte (auto (lloyd

t code = Absolute Immature Granulocyte 

(auto)          0.05            0-0.1                            





[facility] CenterWhite Blood Count2019-06-10 16:46:00* 



             Test Item    Value        Reference Range Interpretation Comments

 

             White Blood Count (test code = 6690-2) 8.52         4.8-10.8       

            





[facility] CenterRed Blood Count2019-06-10 16:46:00* 



             Test Item    Value        Reference Range Interpretation Comments

 

             Red Blood Count (test code = 789-8) 4.04         3.6-5.1           

         





[facility] CenterHemoglobin2019-06-10 16:46:00* 



             Test Item    Value        Reference Range Interpretation Comments

 

             Hemoglobin (test code = 49130-8) 10.2         12.0-16.0    L       

      





[facility] CenterHematocrit2019-06-10 16:46:00* 



             Test Item    Value        Reference Range Interpretation Comments

 

             Hematocrit (test code = 4544-3) 33.7         34.2-44.1    L        

     





[facility] CenterMean Corpuscular Volume2019-06-10 
16:46:00* 



             Test Item    Value        Reference Range Interpretation Comments

 

             Mean Corpuscular Volume (test code = 787-2) 83.4         81-99     

                 





[facility] CenterMean Corpuscular Hemoglobin2019-06-10 
16:46:00* 



             Test Item    Value        Reference Range Interpretation Comments

 

             Mean Corpuscular Hemoglobin (test code = 785-6) 25.2         28-32 

       L             





[facility] CenterMean Corpuscular Hemoglobin Concent
2019-06-10 16:46:00* 



             Test Item    Value        Reference Range Interpretation Comments

 

             Mean Corpuscular Hemoglobin Concent (test code = 786-4) 30.3       

  31-35        L             





[facility] CenterRed Cell Distribution Width2019-06-10 
16:46:00* 



             Test Item    Value        Reference Range Interpretation Comments

 

             Red Cell Distribution Width (test code = 58373-4) 16.1         11.7

-14.4    H             





[facility] CenterPlatelet Count2019-06-10 16:46:00* 



             Test Item    Value        Reference Range Interpretation Comments

 

             Platelet Count (test code = 777-3) 276          140-360            

        





[facility] CenterNeutrophils (%) (Auto)2019-06-10 16:46:00
  * 



             Test Item    Value        Reference Range Interpretation Comments

 

             Neutrophils (%) (Auto) (test code = 82298-7) 68.2         38.7-80.0

                  





[facility] CenterLymphocytes (%) (Auto)2019-06-10 16:46:00
  * 



             Test Item    Value        Reference Range Interpretation Comments

 

             Lymphocytes (%) (Auto) (test code = 736-9) 22.7         18.0-39.1  

                





[facility] CenterMonocytes (%) (Auto)2019-06-10 16:46:00* 



             Test Item    Value        Reference Range Interpretation Comments

 

             Monocytes (%) (Auto) (test code = 5905-5) 5.4          4.4-11.3    

               





[facility] CenterEosinophils (%) (Auto)2019-06-10 16:46:00
  * 



             Test Item    Value        Reference Range Interpretation Comments

 

             Eosinophils (%) (Auto) (test code = 713-8) 2.3          0.0-6.0    

                





[facility] CenterBasophils (%) (Auto)2019-06-10 16:46:00* 



             Test Item    Value        Reference Range Interpretation Comments

 

             Basophils (%) (Auto) (test code = 706-2) 0.8          0.0-1.0      

              





[facility] CenterIM GRANULOCYTES %2019-06-10 16:46:00* 



             Test Item    Value        Reference Range Interpretation Comments

 

             IM GRANULOCYTES % (test code = IM GRANULOCYTES %) 0.6          0.0-

1.0                    





[facility] CenterNeutrophils # (Auto)2019-06-10 16:46:00* 



             Test Item    Value        Reference Range Interpretation Comments

 

             Neutrophils # (Auto) (test code = 751-8) 5.8          2.1-6.9      

              





[facility] CenterLymphocytes # (Auto)2019-06-10 16:46:00* 



             Test Item    Value        Reference Range Interpretation Comments

 

             Lymphocytes # (Auto) (test code = 09220-5) 1.9          1.0-3.2    

                





[facility] CenterMonocytes # (Auto)2019-06-10 16:46:00* 



             Test Item    Value        Reference Range Interpretation Comments

 

             Monocytes # (Auto) (test code = 742-7) 0.5          0.2-0.8        

            





[facility] CenterEosinophils # (Auto)2019-06-10 16:46:00* 



             Test Item    Value        Reference Range Interpretation Comments

 

             Eosinophils # (Auto) (test code = 711-2) 0.2          0.0-0.4      

              





[facility] CenterBasophils # (Auto)2019-06-10 16:46:00* 



             Test Item    Value        Reference Range Interpretation Comments

 

             Basophils # (Auto) (test code = 704-7) 0.1          0.0-0.1        

            





[facility] CenterAbsolute Immature Granulocyte (auto
2019-06-10 16:46:00* 



             Test Item    Value        Reference Range Interpretation Comments

 

                                        Absolute Immature Granulocyte (auto (lloyd

t code = Absolute Immature Granulocyte 

(auto)          0.05            0-0.1                            





[facility] CenterUrine Zuceyyk4410-30-10 08:24:00* 



             Test Item    Value        Reference Range Interpretation Comments

 

             Urine Culture (test code = 630-4) Organism: ENTEROCOCCUS FAECIUM   

                         





[facility] CenterUrine Inolwne7131-78-24 08:24:00* 



             Test Item    Value        Reference Range Interpretation Comments

 

             Urine Culture (test code = 630-4) Organism: ENTEROCOCCUS FAECIUM   

                         





[facility] CenterUrine Uynipkt6933-96-65 08:24:00* 



             Test Item    Value        Reference Range Interpretation Comments

 

             Urine Culture (test code = 630-4) Organism: ENTEROCOCCUS FAECIUM   

                         





[facility] Axqeyzg8165-61-38 08:24:00* 



             Test Item    Value        Reference Range Interpretation Comments

 

             Urine Culture (test code = 630-4) Organism: ENTEROCOCCUS FAECIUM   

                         





[facility] Yaznuif7236-50-26 08:24:00* 



             Test Item    Value        Reference Range Interpretation Comments

 

             Urine Culture (test code = 630-4) Organism: ENTEROCOCCUS FAECIUM   

                         





[facility] Ywwnjyb9891-58-33 08:24:00* 



             Test Item    Value        Reference Range Interpretation Comments

 

             Urine Culture (test code = 630-4) No Result Data Provided          

                  





[facility] Xhvtmbe3551-53-83 08:24:00* 



             Test Item    Value        Reference Range Interpretation Comments

 

             Urine Culture (test code = 630-4) No Result Data Provided          

                  





[facility] Drphs3105-00-94 18:38:00* 



             Test Item    Value        Reference Range Interpretation Comments

 

             Urine Color (test code = 5778-6) RED          YELLOW               

      





[facility] CenterUrine Ucphmuf8496-71-08 18:38:00* 



             Test Item    Value        Reference Range Interpretation Comments

 

             Urine Clarity (test code = 52129-1) CLOUDY       CLEAR        H    

         





[facility] CenterUrine Specific Bltamcq6236-36-13 18:38:00
  * 



             Test Item    Value        Reference Range Interpretation Comments

 

             Urine Specific Gravity (test code = 5811-5) 1.010        1.010-1.02

5                





[facility] CenterUrine [id number] 18:38:00* 



             Test Item    Value        Reference Range Interpretation Comments

 

             Urine pH (test code = 19082-5) 7            5-7                    

    





[facility] CenterUrine Leukocyte Hdwicqis9452-82-64 
18:38:00* 



             Test Item    Value        Reference Range Interpretation Comments

 

             Urine Leukocyte Esterase (test code = 5799-2) 1+           NEGATIVE

     H             





[facility] Kzvnikc3439-34-52 18:38:00* 



             Test Item    Value        Reference Range Interpretation Comments

 

             Urine Nitrite (test code = 90841-4) NEGATIVE     NEGATIVE          

         





[facility] Jfiwxwr8770-52-41 18:38:00* 



             Test Item    Value        Reference Range Interpretation Comments

 

             Urine Protein (test code = 5804-0) 3+           NEGATIVE     H     

        





[facility] CenterUrine Glucose (UA)2019 18:38:00* 



             Test Item    Value        Reference Range Interpretation Comments

 

             Urine Glucose (UA) (test code = 2349-9) NEGATIVE     NEGATIVE      

             





[facility] CenterUrine Hqohsci4075-30-94 18:38:00* 



             Test Item    Value        Reference Range Interpretation Comments

 

             Urine Ketones (test code = 59942-5) NEGATIVE     NEGATIVE          

         





[facility] CenterUrine Grbpzwffhzog1897-43-63 18:38:00* 



             Test Item    Value        Reference Range Interpretation Comments

 

             Urine Urobilinogen (test code = 00665-4) 0.2          0.2-1        

              





[facility] CenterUrine Odgbluiar5079-36-01 18:38:00* 



             Test Item    Value        Reference Range Interpretation Comments

 

             Urine Bilirubin (test code = 1978-6) NEGATIVE     NEGATIVE         

          





[facility] CenterUrine Hkqvu0783-17-85 18:38:00* 



             Test Item    Value        Reference Range Interpretation Comments

 

             Urine Blood (test code = 94193-7) 4+           NEGATIVE     H      

       





[facility] CenterUrine [id number] 18:38:00* 



             Test Item    Value        Reference Range Interpretation Comments

 

             Urine WBC (test code = 5821-4) >50          0-5          H         

    





[facility] CenterUrine [id number] 18:38:00* 



             Test Item    Value        Reference Range Interpretation Comments

 

             Urine RBC (test code = 48294-3) >50          0-5          H        

     





[facility] CenterUrine Icrrrcrd1771-97-20 18:38:00* 



             Test Item    Value        Reference Range Interpretation Comments

 

             Urine Bacteria (test code = 11173-8) MANY         NONE         H   

          





[facility] CenterUrine Epithelial Jbgob3774-48-57 18:38:00
  * 



             Test Item    Value        Reference Range Interpretation Comments

 

             Urine Epithelial Cells (test code = 14775-0) MODERATE     NONE     

                  





[facility] CenterCT ABDOMEN/PELVIS [id number] 17:33:00  
                                                                                
  [facility]                        [address] [place] [address]      Patient Name: CRISTINA RICH            
                      MR #: [id number]                     : 1965      
                            Age/Sex: 53/F  [id number]                 
            Req #: [id number]  Adm Physician:                                   
                  Ordered by: JENNIFER TERRELL MD                            
Report #: [id number]        Location: ER                                      
Room/Bed:                     _________________________________________________
__________________________________________________    Procedure: [id number] CT/CT
ABDOMEN/PELVIS WO  Exam Date: 19                            Exam Time:                                               REPORT STATUS: Signed    EXAM: C
T Abdomen and Pelvis WITHOUT contrast     INDICATION:          CT CYSTOGRAM    2
[id number]    1626    Y          COMPARISON: Nephrostomy tube x-ray 3/29/2019   LUIS FERNANDO
HNIQUE: Abdomen and pelvis were scanned utilizing a multidetector helical   scan
ner from the lung base to the pubic symphysis without administration of IV   con
trast. Absence of intravenous contrast decreases sensitivity for detection   of 
focal lesions and vascular pathology. Coronal and sagittal reformations were   o
btained. Routine protocol was performed.  Contrast injected through Gomez   time
s 2.               IV CONTRAST: None.               ORAL CONTRAST: Water        
      RADIATION DOSE: Total DLP: 889.2 mGy*cm                Estimated effective
dose: (DLP x 0.015 x size factor) mSv               COMPLICATIONS: None      F
INDINGS:      LINES and TUBES: Right percutaneous nephrostomy tube. Left interna
l ureteral   stent with proximal aspect in the left renal pelvis and distal sten
t within the   urinary bladder.      LOWER THORAX:  Unremarkable      HEPATOBILI
PROMISE:      No focal hepatic lesions. No biliary ductal dilation.       GALLBLADDE
R: Cholelithiasis.       SPLEEN: No splenomegaly.       PANCREAS: No focal michael
s or ductal dilatation.        ADRENALS: No adrenal nodules          KIDNEYS/URE
TERS:  Stable position of the right buttock without intravenous   nephrostomy tu
be with decompressed right kidney. No new calcified stones in the   right kidney
. The right ureter is decompressed without calcified stones.   Moderate left hyd
roureteronephrosis has not improved despite with stent   placement. Persistent 3
mm calcified stone in the inferior pole of the left   kidney. No visualized new 
when stones along the left ureter on limited   evaluation or remaining left kid
marcela. Mild left perinephric fat stranding   without fluid collections.      GI TR
ACT: No abnormal distention, wall thickening, or evidence of bowel   obstruction
.       Appendix is normal.      PELVIC ORGANS/BLADDER: The urinary bladder is d
ecompressed with 4 Lake catheter   in place. No calcified stones in the urinary 
bladder. Hysterectomy.      LYMPH NODES: No lymphadenopathy.      VESSELS: Unrem
arkable.      PERITONEUM / RETROPERITONEUM: No free air or fluid.      BONES: Un
remarkable.      SOFT TISSUES: Unremarkable.                  IMPRESSION:    1. 
Unchanged right nephrostomy tube with decompressed right kidney. No   calcified 
stones in the right kidney or right ureter.      2.  Persistent moderate left h
ydroureteronephrosis despite ureteral stent in   adequate position. Unchanged le
ft nephrolithiasis. Recommend revision of the   left ureteral stent.      3.  Spangler
boptimal cystogram with only a small amount of contrast in the urinary   bladder
, which persist decompressed with Gomez catheter in place.      Signed by: Dr. DIANE Purcell M.D. on 2019 5:43 PM        Dictated By: GEORGIANA PURCELL MD  Electronically Signed By: GEORGIANA PURCELL MD on 1743  Transcribed By: LUCINDA on 19       COPY TO:   JENNIFER TERRELL MD         Sodium Trlvw0103-60-05 16:54:00* 



             Test Item    Value        Reference Range Interpretation Comments

 

             Sodium Level (test code = 2951-2) 137          136-145             

       





[facility] CenterPotassium Qymhh2311-75-60 16:54:00* 



             Test Item    Value        Reference Range Interpretation Comments

 

             Potassium Level (test code = 2823-3) 3.9          3.5-5.1          

          





[facility] CenterChloride Dwyvm2980-40-08 16:54:00* 



             Test Item    Value        Reference Range Interpretation Comments

 

             Chloride Level (test code = 2075-0) 104                      

         





[facility] CenterCarbon Dioxide Btupr8265-71-20 16:54:00* 



             Test Item    Value        Reference Range Interpretation Comments

 

             Carbon Dioxide Level (test code = 2028-9) 23           22-29       

               





[facility] CenterAnion Lbn3832-18-76 16:54:00* 



             Test Item    Value        Reference Range Interpretation Comments

 

             Anion Gap (test code = 33037-3) 13.9         8-16                  

     





[facility] CenterBlood Urea Hjuiajtf9681-18-84 16:54:00* 



             Test Item    Value        Reference Range Interpretation Comments

 

             Blood Urea Nitrogen (test code = 3094-0) 10           7-26         

              





[facility] CenterCreatinine2019-04-20 16:54:00* 



             Test Item    Value        Reference Range Interpretation Comments

 

             Creatinine (test code = 2160-0) 1.29         0.57-1.11    H        

     





[facility] CenterBUN/Creatinine Mtsqy3111-49-54 16:54:00* 



             Test Item    Value        Reference Range Interpretation Comments

 

             BUN/Creatinine Ratio (test code = 3097-3) 8            6-25        

               





[facility] CenterEstimat Glomerular Filtration Rate
2019 16:54:00* 



             Test Item    Value        Reference Range Interpretation Comments

 

             Estimat Glomerular Filtration Rate (test code = 807957808) 43      

     >60          L             





Ranges were taken from the National Kidney Disease Education Program and the Michelle
[facility] literature.Reference ranges:60 or greater: Mvvdzr32-97 (
for 3 consecutive months): Chronic kidney disease 15 or less: Kidney failureBrownfield Regional Medical CenterGlucose Ihzoy4885-35-05 16:54:00* 



             Test Item    Value        Reference Range Interpretation Comments

 

             Glucose Level (test code = KPR8270) 125                 H    

         





[facility] CenterCalcium Nrpdn1944-85-49 16:54:00* 



             Test Item    Value        Reference Range Interpretation Comments

 

             Calcium Level (test code = 86830-5) 9.8          8.4-10.2          

         





[facility] CenterTotal Exovjhzqv4688-25-20 16:54:00* 



             Test Item    Value        Reference Range Interpretation Comments

 

             Total Bilirubin (test code = 1975-2) 0.6          0.2-1.2          

          





[facility] CenterAspartate Amino Transf (AST/SGOT)
2019 16:54:00* 



             Test Item    Value        Reference Range Interpretation Comments

 

                                        Aspartate Amino Transf (AST/SGOT) (test 

code = Aspartate Amino Transf 

(AST/SGOT))     22              5-34                             





[facility] CenterAlanine Aminotransferase (ALT/SGPT)
2019 16:54:00* 



             Test Item    Value        Reference Range Interpretation Comments

 

             Alanine Aminotransferase (ALT/SGPT) (test code = 1742-6) 25        

   0-55                       





[facility] CenterTotal Ikgvxwz1152-68-82 16:54:00* 



             Test Item    Value        Reference Range Interpretation Comments

 

             Total Protein (test code = 2885-2) 8.4          6.5-8.1      H     

        





[facility] CenterAlbumin2019-04-20 16:54:00* 



             Test Item    Value        Reference Range Interpretation Comments

 

             Albumin (test code = 1751-7) 3.4          3.5-5.0      L           

  





[facility] CenterGlobulin2019-04-20 16:54:00* 



             Test Item    Value        Reference Range Interpretation Comments

 

             Globulin (test code = 83715-5) 5.0          2.3-3.5      H         

    





[facility] CenterAlbumin/Globulin Xsovw7063-57-29 16:54:00
  * 



             Test Item    Value        Reference Range Interpretation Comments

 

             Albumin/Globulin Ratio (test code = 1759-0) 0.7          0.8-2.0   

   L             





[facility] CenterAlkaline Zzowrhnxmkm2569-86-86 16:54:00* 



             Test Item    Value        Reference Range Interpretation Comments

 

             Alkaline Phosphatase (test code = 6768-6) 108                

               





[facility] CenterTotal Kzufcjogg2297-26-91 16:54:00* 



             Test Item    Value        Reference Range Interpretation Comments

 

             Total Bilirubin (test code = 1975-2) 0.6          0.2-1.2          

          





[facility] CenterAspartate Amino Transf (AST/SGOT)
2019 16:54:00* 



             Test Item    Value        Reference Range Interpretation Comments

 

                                        Aspartate Amino Transf (AST/SGOT) (test 

code = Aspartate Amino Transf 

(AST/SGOT))     22              5-34                             





[facility] CenterAlanine Aminotransferase (ALT/SGPT)
2019 16:54:00* 



             Test Item    Value        Reference Range Interpretation Comments

 

             Alanine Aminotransferase (ALT/SGPT) (test code = 1742-6) 25        

   0-55                       





[facility] CenterToH[facility] Xmjiqll2829-26-94 16:54:00* 



             Test Item    Value        Reference Range Interpretation Comments

 

             Total Protein (test code = 2885-2) 8.4          6.5-8.1      H     

        





[facility] CenterAlbumin2019-04-20 16:54:00* 



             Test Item    Value        Reference Range Interpretation Comments

 

             Albumin (test code = 1751-7) 3.4          3.5-5.0      L           

  





[facility] CenterGlobulin2019-04-20 16:54:00* 



             Test Item    Value        Reference Range Interpretation Comments

 

             Globulin (test code = 36493-9) 5.0          2.3-3.5      H         

    





[facility] CenterAlbumin/Globulin Hutin3460-43-27 16:54:00
  * 



             Test Item    Value        Reference Range Interpretation Comments

 

             Albumin/Globulin Ratio (test code = 1759-0) 0.7          0.8-2.0   

   L             





[facility] CenterAlkaline Hipwhvpnxyi8651-67-06 16:54:00* 



             Test Item    Value        Reference Range Interpretation Comments

 

             Alkaline Phosphatase (test code = 6768-6) 108                

               





[facility] CenterTotal Avyxhobsg8282-87-47 16:54:00* 



             Test Item    Value        Reference Range Interpretation Comments

 

             Total Bilirubin (test code = 1975-2) 0.6          0.2-1.2          

          





[facility] CenterAspartate Amino Transf (AST/SGOT)
2019 16:54:00* 



             Test Item    Value        Reference Range Interpretation Comments

 

                                        Aspartate Amino Transf (AST/SGOT) (test 

code = Aspartate Amino Transf 

(AST/SGOT))     22              5-34                             





[facility] CenterAlanine Aminotransferase (ALT/SGPT)
2019 16:54:00* 



             Test Item    Value        Reference Range Interpretation Comments

 

             Alanine Aminotransferase (ALT/SGPT) (test code = 1742-6) 25        

   0-55                       





[facility] CenterTotal Zrhgakw5590-96-02 16:54:00* 



             Test Item    Value        Reference Range Interpretation Comments

 

             Total Protein (test code = 2885-2) 8.4          6.5-8.1      H     

        





[facility] CenterAlbumin2019-04-20 16:54:00* 



             Test Item    Value        Reference Range Interpretation Comments

 

             Albumin (test code = 1751-7) 3.4          3.5-5.0      L           

  





[facility] CenterGlobulin2019-04-20 16:54:00* 



             Test Item    Value        Reference Range Interpretation Comments

 

             Globulin (test code = 03028-9) 5.0          2.3-3.5      H         

    





[facility] CenterAlbumin/Globulin Dmjpv7485-87-17 16:54:00
  * 



             Test Item    Value        Reference Range Interpretation Comments

 

             Albumin/Globulin Ratio (test code = 1759-0) 0.7          0.8-2.0   

   L             





[facility] CenterAlkaline Rqngkczwfwr0247-90-03 16:54:00* 



             Test Item    Value        Reference Range Interpretation Comments

 

             Alkaline Phosphatase (test code = 6768-6) 108                

               





[facility] CenterWhite Blood Rmsvl9659-55-53 16:29:00* 



             Test Item    Value        Reference Range Interpretation Comments

 

             White Blood Count (test code = 6690-2) 11.44        4.8-10.8     H 

            





[facility] CenterRed Blood Cclzj9320-40-89 16:29:00* 



             Test Item    Value        Reference Range Interpretation Comments

 

             Red Blood Count (test code = 789-8) 4.15         3.6-5.1           

         





[facility] CenterHemoglobin2019-04-20 16:29:00* 



             Test Item    Value        Reference Range Interpretation Comments

 

             Hemoglobin (test code = 26154-6) 10.7         12.0-16.0    L       

      





[facility] CenterHematocrit2019-04-20 16:29:00* 



             Test Item    Value        Reference Range Interpretation Comments

 

             Hematocrit (test code = 4544-3) 34.4         34.2-44.1             

     





[facility] CenterMean Corpuscular Vfkfkk5795-61-50 
16:29:00* 



             Test Item    Value        Reference Range Interpretation Comments

 

             Mean Corpuscular Volume (test code = 787-2) 82.9         81-99     

                 





[facility] CenterMean Corpuscular Xwmnsekeor5149-04-71 
16:29:00* 



             Test Item    Value        Reference Range Interpretation Comments

 

             Mean Corpuscular Hemoglobin (test code = 785-6) 25.8         28-32 

       L             





[facility] CenterMean Corpuscular Hemoglobin Concent
2019 16:29:00* 



             Test Item    Value        Reference Range Interpretation Comments

 

             Mean Corpuscular Hemoglobin Concent (test code = 786-4) 31.1       

  31-35                      





[facility] CenterRed Cell Distribution Ivyuu0106-43-22 
16:29:00* 



             Test Item    Value        Reference Range Interpretation Comments

 

             Red Cell Distribution Width (test code = 61771-0) 14.8         11.7

-14.4    H             





[facility] CenterPlatelet Aqzgq7080-19-97 16:29:00* 



             Test Item    Value        Reference Range Interpretation Comments

 

             Platelet Count (test code = 777-3) 305          140-360            

        





[facility] CenterNeutrophils (%) (Auto)2019 16:29:00
  * 



             Test Item    Value        Reference Range Interpretation Comments

 

             Neutrophils (%) (Auto) (test code = 22850-9) 82.6         38.7-80.0

    H             





[facility] CenterLymphocytes (%) (Auto)2019 16:29:00
  * 



             Test Item    Value        Reference Range Interpretation Comments

 

             Lymphocytes (%) (Auto) (test code = 736-9) 11.8         18.0-39.1  

  L             





[facility] CenterMonocytes (%) (Auto)2019 16:29:00* 



             Test Item    Value        Reference Range Interpretation Comments

 

             Monocytes (%) (Auto) (test code = 5905-5) 4.6          4.4-11.3    

               





[facility] CenterEosinophils (%) (Auto)2019 16:29:00
  * 



             Test Item    Value        Reference Range Interpretation Comments

 

             Eosinophils (%) (Auto) (test code = 713-8) 0.3          0.0-6.0    

                





[facility] CenterBasophils (%) (Auto)2019 16:29:00* 



             Test Item    Value        Reference Range Interpretation Comments

 

             Basophils (%) (Auto) (test code = 706-2) 0.3          0.0-1.0      

              





[facility] CenterIM GRANULOCYTES %2019 16:29:00* 



             Test Item    Value        Reference Range Interpretation Comments

 

             IM GRANULOCYTES % (test code = IM GRANULOCYTES %) 0.4          0.0-

1.0                    





[facility] CenterNeutrophils # (Auto)2019 16:29:00* 



             Test Item    Value        Reference Range Interpretation Comments

 

             Neutrophils # (Auto) (test code = 751-8) 9.4          2.1-6.9      

H             





[facility] CenterLymphocytes # (Auto)2019 16:29:00* 



             Test Item    Value        Reference Range Interpretation Comments

 

             Lymphocytes # (Auto) (test code = 48234-5) 1.4          1.0-3.2    

                





[facility] CenterMonocytes # (Auto)2019 16:29:00* 



             Test Item    Value        Reference Range Interpretation Comments

 

             Monocytes # (Auto) (test code = 742-7) 0.5          0.2-0.8        

            





[facility] CenterEosinophils # (Auto)2019 16:29:00* 



             Test Item    Value        Reference Range Interpretation Comments

 

             Eosinophils # (Auto) (test code = 711-2) 0.0          0.0-0.4      

              





[facility] CenterBasophils # (Auto)2019 16:29:00* 



             Test Item    Value        Reference Range Interpretation Comments

 

             Basophils # (Auto) (test code = 704-7) 0.0          0.0-0.1        

            





[facility] CenterAbsolute Immature Granulocyte (auto
2019 16:29:00* 



             Test Item    Value        Reference Range Interpretation Comments

 

                                        Absolute Immature Granulocyte (auto (lloyd

t code = Absolute Immature Granulocyte 

(auto)          0.05            0-0.1                            





[facility] CenterPE[facility] AAU2433-73-88 
12:30:00                                                                        
             [facility]                        [address] [place] [address]      Patient Name: CRISTINA RICH
                                  MR #: [id number]                     : 
1965                                   Age/Sex: 53/F  Acct #: [id number]
                             Req #: [id number]  Adm Physician: SHADY PEMBERTON MD    
                                   Ordered by: JACQUELINE ARORA MD                   
        Report #: [id number]        Location: MED/SURG3                          
    Room/Bed: [facility]               
____________________________________________________
_______________________________________________    Procedure: [id number] IR/HOMERO AGUILAR PROCEDURE IN CATH LAB  Exam Date:                             Exam Time:     
                                         REPORT STATUS: Signed    Procedure: Ri
ght nephrostomy catheter exchange with fluoroscopic guidance      Comparison: Ri
ght nephrostomy exchange 2018.       : Naomi Martinez MD      Sedation
/Medications: Conscious sedation. IV Fentanyl and Versed per nursing   administr
ation records.  The patient's vital signs, including pulse oximetry,   were cont
inuously monitored by the interventional radiology nurse.      Fluoroscopy time:
0.6 minutes.   Dose area product: 77.1 cGycm2      Blood loss: 0 cc   Specimens:
8 Fr right nephrostomy catheter   Implants: 10 Fr right nephrostomy catheter    
 Technique/Findings:   Informed consent for the procedure was obtained from the 
patient after   discussion of risks and benefits. The right back was prepped and
draped in the   usual fashion.      1% lidocaine was administered into the skin 
and subcutaneous tissues of the   right back for local anesthesia.       Con
trast was injected demonstrating catheter was in place with mild   hydronephrosi
s. Some encrustation patient was noted at the tip of the existing   catheter. Spangler
bsequently, a stiff Glidewire was advanced through the catheter and   the cathet
er was exchanged for a new 10 French nephrostomy catheter. Contrast   injection 
confirmed satisfactory positioning. Catheter was secured with suture   and overl
gabino sterile dressing. There were no immediate complications.      Impression:  
Fluoroscopic guided right nephrostomy catheter exchange as above.      Signed b
y: Dr. Naomi Martinez MD on 2019 12:34 PM        Dictated By: NAOMI MARTINEZ MD  El
ectronically Signed By: NAOMI MARTINEZ MD on 19 1234  Transcribed By: LUCINDA 
on 19 1234       COPY TO:   JACQUELINE ARORA MD         IR FKPSOOY4515-69-55 
12:30:00                                                                        
             [address], [place], [place] [address]      Patient Name: CRISTINA RICH
                                  MR #: [id number]                     : 
1965                                   Age/Sex: 53/F  Acct #: [id number]
                             Req #: [id number]  [facility] Phys[facility]: SHADY PEMBERTON MD    
                                   Ordered by: JACQUELINE ARORA MD                   
        Report #: [id number]        Location: MED/SURG3                          
    Room/Bed: [facility]               
____________________________________________________
_______________________________________________    Procedure: [id number] DX/IR CO
NSULT  Exam Date:                             Exam Time:                        
                      REPORT STATUS: Signed    Procedure: Right nephrostomy cat
heter exchange with fluoroscopic guidance      Comparison: Right nephrostomy exc
hange 2018.       : Naomi Martinez MD      Sedation/Medications: Consc
ious sedation. IV Fentanyl and Versed per nursing   administration records.  The
patient's vital signs, including pulse oximetry,   were continuously monitored 
by the interventional radiology nurse.      Fluoroscopy time: 0.6 minutes.   Dos
e area product: 77.1 cGycm2      Blood loss: 0 cc   Specimens: 8 Fr right nephro
stomy catheter   Implants: 10 Fr right nephrostomy catheter      Technique/Findi
ngs:   Informed consent for the procedure was obtained from the patient after   
discussion of risks and benefits. The right back was prepped and draped in the  
usual fashion.      1% lidocaine was administered into the skin and subcutaneous
tissues of the   right back for local anesthesia.       Contrast was injected 
demonstrating catheter was in place with mild   hydronephrosis. Some encrustatio
n patient was noted at the tip of the existing   catheter. Subsequently, a stiff
Glidewire was advanced through the catheter and   the catheter was exchanged for
a new 10 French nephrostomy catheter. Contrast   injection confirmed satisfact
ory positioning. Catheter was secured with suture   and overlying sterile dressi
ng. There were no immediate complications.      Impression:   Fluoroscopic guide
d right nephrostomy catheter exchange as above.      Signed by: Dr. Naomi Martinez MD on 2019 12:34 PM        Dictated By: NAOMI MARTINEZ MD  Electronically Signed
By: NAOMI MARTINEZ MD on 19 1234  Transcribed By: LUCINDA on 19 1234    
  COPY TO:   JACQUELINE ARORA MD         Blood Rwqhvfc6753-90-49 12:15:00* 



             Test Item    Value        Reference Range Interpretation Comments

 

             Blood Culture (test code = 12136396) NO GROWTH AFTER 5 DAYS, FINAL 

REPORT                            





[facility] Qgpqwaz1026-03-60 12:15:00* 



             Test Item    Value        Reference Range Interpretation Comments

 

             Blood Culture (test code = 69666574) NO GROWTH AFTER 5 DAYS, FINAL 

REPORT                            





[facility] Nslfafl3229-67-22 12:15:00* 



             Test Item    Value        Reference Range Interpretation Comments

 

             Blood Culture (test code = 70315134) NO GROWTH AFTER 5 DAYS, FINAL 

REPORT                            





[facility] Rfcfeyh6809-56-17 12:15:00* 



             Test Item    Value        Reference Range Interpretation Comments

 

             Blood Culture (test code = 75533705) NO GROWTH AFTER 5 DAYS, FINAL 

REPORT                            





[facility] Anmvalk7135-93-74 12:15:00* 



             Test Item    Value        Reference Range Interpretation Comments

 

             Blood Culture (test code = 17428379) NO GROWTH AFTER 5 DAYS, FINAL 

REPORT                            





[facility] Xyquijy3077-54-89 12:15:00* 



             Test Item    Value        Reference Range Interpretation Comments

 

             Blood Culture (test code = 77825719) NO GROWTH AFTER 5 DAYS, FINAL 

REPORT                            





[facility] Dfzsady7194-39-42 12:15:00* 



             Test Item    Value        Reference Range Interpretation Comments

 

             Blood Culture (test code = 64938777) NO GROWTH AFTER 5 DAYS, FINAL 

REPORT                            





[facility] Jxwshoa0013-83-97 12:15:00* 



             Test Item    Value        Reference Range Interpretation Comments

 

             Blood Culture (test code = 31687307) NO GROWTH AFTER 5 DAYS, FINAL 

REPORT                            





[facility] Centerodium Txbwg8178-20-24 06:03:00* 



             Test Item    Value        Reference Range Interpretation Comments

 

             Sodium Level (test code = 2951-2) 141          136-145             

       





[facility] CenterPotassium Tlojr5098-20-07 06:03:00* 



             Test Item    Value        Reference Range Interpretation Comments

 

             Potassium Level (test code = 2823-3) 3.3          3.5-5.1      L   

          





[facility] CenterChloride Tdlnh4388-60-30 06:03:00* 



             Test Item    Value        Reference Range Interpretation Comments

 

             Chloride Level (test code = 2075-0) 108                 H    

         





[facility] CenterCarbon Dioxide Tubfv0968-20-93 06:03:00* 



             Test Item    Value        Reference Range Interpretation Comments

 

             Carbon Dioxide Level (test code = 2028-9) 25           22-29       

               





[facility] CenterAnion Wxv7869-38-14 06:03:00* 



             Test Item    Value        Reference Range Interpretation Comments

 

             Anion Gap (test code = 33037-3) 11.3         8-16                  

     





[facility] CenterBlood Urea Mudivzow6664-77-14 06:03:00* 



             Test Item    Value        Reference Range Interpretation Comments

 

             Blood Urea Nitrogen (test code = 3094-0) 9            7-26         

              





[facility] CenterCreatinine2019-04-01 06:03:00* 



             Test Item    Value        Reference Range Interpretation Comments

 

             Creatinine (test code = 2160-0) 1.01         0.57-1.11             

     





[facility] CenterBUN/Creatinine Ccjmv2287-54-49 06:03:00* 



             Test Item    Value        Reference Range Interpretation Comments

 

             BUN/Creatinine Ratio (test code = 3097-3) 9            6-25        

               





[facility] CenterEstimat Glomerular Filtration Rate
2019 06:03:00* 



             Test Item    Value        Reference Range Interpretation Comments

 

             Estimat Glomerular Filtration Rate (test code = 264615864) 57      

     >60          L             





Ranges were taken from the National Kidney Disease Education Program and the Michelle
[facility] literature.Reference ranges:60 or greater: Brmokf11-71 (
for 3 consecutive months): Chronic kidney disease 15 or less: Kidney failureBrownfield Regional Medical CenterGlucose Ulope0802-64-35 06:03:00* 



             Test Item    Value        Reference Range Interpretation Comments

 

             Glucose Level (test code = JEL6845) 127                 H    

         





[facility] CenterCalcium Cdyax4102-31-51 06:03:00* 



             Test Item    Value        Reference Range Interpretation Comments

 

             Calcium Level (test code = 32913-7) 9.5          8.4-10.2          

         





[facility] CenterWhite Blood Mobxp0806-44-89 05:50:00* 



             Test Item    Value        Reference Range Interpretation Comments

 

             White Blood Count (test code = 6690-2) 7.55         4.8-10.8       

            





[facility] CenterRed Blood Ngwgy4185-41-73 05:50:00* 



             Test Item    Value        Reference Range Interpretation Comments

 

             Red Blood Count (test code = 789-8) 3.99         3.6-5.1           

         





[facility] CenterHemoglobin2019-04-01 05:50:00* 



             Test Item    Value        Reference Range Interpretation Comments

 

             Hemoglobin (test code = 24198-5) 10.2         12.0-16.0    L       

      





[facility] CenterHematocrit2019-04-01 05:50:00* 



             Test Item    Value        Reference Range Interpretation Comments

 

             Hematocrit (test code = 4544-3) 32.7         34.2-44.1    L        

     





[facility] CenterMean Corpuscular Zokkzj9812-11-15 
05:50:00* 



             Test Item    Value        Reference Range Interpretation Comments

 

             Mean Corpuscular Volume (test code = 787-2) 82.0         81-99     

                 





[facility] CenterMean Corpuscular Vvzystwfqb8630-93-25 
05:50:00* 



             Test Item    Value        Reference Range Interpretation Comments

 

             Mean Corpuscular Hemoglobin (test code = 785-6) 25.6         28-32 

       L             





[facility] CenterMean Corpuscular Hemoglobin Concent
2019 05:50:00* 



             Test Item    Value        Reference Range Interpretation Comments

 

             Mean Corpuscular Hemoglobin Concent (test code = 786-4) 31.2       

  31-35                      





[facility] CenterRed Cell Distribution Bqull5205-16-21 
05:50:00* 



             Test Item    Value        Reference Range Interpretation Comments

 

             Red Cell Distribution Width (test code = 54467-9) 13.6         11.7

-14.4                  





[facility] CenterPlatelet Uqzgc8312-76-14 05:50:00* 



             Test Item    Value        Reference Range Interpretation Comments

 

             Platelet Count (test code = 777-3) 296          140-360            

        





[facility] CenterNeutrophils (%) (Auto)2019 05:50:00
  * 



             Test Item    Value        Reference Range Interpretation Comments

 

             Neutrophils (%) (Auto) (test code = 05270-6) 62.3         38.7-80.0

                  





[facility] CenterLymphocytes (%) (Auto)2019 05:50:00
  * 



             Test Item    Value        Reference Range Interpretation Comments

 

             Lymphocytes (%) (Auto) (test code = 736-9) 28.6         18.0-39.1  

                





[facility] CenterMonocytes (%) (Auto)2019 05:50:00* 



             Test Item    Value        Reference Range Interpretation Comments

 

             Monocytes (%) (Auto) (test code = 5905-5) 5.8          4.4-11.3    

               





[facility] CenterEosinophils (%) (Auto)2019 05:50:00
  * 



             Test Item    Value        Reference Range Interpretation Comments

 

             Eosinophils (%) (Auto) (test code = 713-8) 2.5          0.0-6.0    

                





[facility] CenterBasophils (%) (Auto)2019 05:50:00* 



             Test Item    Value        Reference Range Interpretation Comments

 

             Basophils (%) (Auto) (test code = 706-2) 0.5          0.0-1.0      

              





[facility] CenterIM GRANULOCYTES %2019 05:50:00* 



             Test Item    Value        Reference Range Interpretation Comments

 

             IM GRANULOCYTES % (test code = IM GRANULOCYTES %) 0.3          0.0-

1.0                    





[facility] CenterNeutrophils # (Auto)2019 05:50:00* 



             Test Item    Value        Reference Range Interpretation Comments

 

             Neutrophils # (Auto) (test code = 751-8) 4.7          2.1-6.9      

              





[facility] CenterLymphocytes # (Auto)2019 05:50:00* 



             Test Item    Value        Reference Range Interpretation Comments

 

             Lymphocytes # (Auto) (test code = 48644-7) 2.2          1.0-3.2    

                





[facility] CenterMonocytes # (Auto)2019 05:50:00* 



             Test Item    Value        Reference Range Interpretation Comments

 

             Monocytes # (Auto) (test code = 742-7) 0.4          0.2-0.8        

            





[facility] CenterEosinophils # (Auto)2019 05:50:00* 



             Test Item    Value        Reference Range Interpretation Comments

 

             Eosinophils # (Auto) (test code = 711-2) 0.2          0.0-0.4      

              





[facility] CenterBasophils # (Auto)2019 05:50:00* 



             Test Item    Value        Reference Range Interpretation Comments

 

             Basophils # (Auto) (test code = 704-7) 0.0          0.0-0.1        

            





[facility] CenterAbsolute Immature Granulocyte (auto
2019 05:50:00* 



             Test Item    Value        Reference Range Interpretation Comments

 

                                        Absolute Immature Granulocyte (auto (lloyd

t code = Absolute Immature Granulocyte 

(auto)          0.02            0-0.1                            





[facility] Jxsfukt5606-13-43 16:34:00* 



             Test Item    Value        Reference Range Interpretation Comments

 

             Bedside Glucose (test code = 90245-1) 141                 H  

           





Meter ID: [id number] Glucose
2019 16:34:00* 



             Test Item    Value        Reference Range Interpretation Comments

 

             Bedside Glucose (test code = 88878-2) 141                 H  

           





Meter ID: [id number] Glucose
2019 16:34:00* 



             Test Item    Value        Reference Range Interpretation Comments

 

             Bedside Glucose (test code = 75352-4) 141                 H  

           





Meter ID: [id number] Glucose
2019 16:34:00* 



             Test Item    Value        Reference Range Interpretation Comments

 

             Bedside Glucose (test code = 77968-6) 141                 H  

           





Meter ID: [id number] Regional Medical CenterBlood Culture
2019 12:15:00* 



             Test Item    Value        Reference Range Interpretation Comments

 

                          Blood Culture (test code = 17395458)                  

           NO GROWTH AFTER

72 HOURS                                                     





[facility]2019-03-30 12:03:00* 



             Test Item    Value        Reference Range Interpretation Comments

 

             Urine Culture (test code = 630-4) Organism: PSEUDOMONAS AERUGINOSA 

                           





[facility]2019-03-30 12:03:00* 



             Test Item    Value        Reference Range Interpretation Comments

 

             Urine Culture (test code = 630-4) Organism: PSEUDOMONAS AERUGINOSA 

                           





[facility]2019-03-29 09:30:00* 



             Test Item    Value        Reference Range Interpretation Comments

 

             Urine Culture (test code = 630-4) Organism: GRAM NEGATIVE BACILLUS 

                           





[facility]2019-03-29 09:30:00* 



             Test Item    Value        Reference Range Interpretation Comments

 

             Urine Culture (test code = 630-4) Organism: GRAM NEGATIVE BACILLUS 

                           





[facility]2019-03-29 09:30:00* 



             Test Item    Value        Reference Range Interpretation Comments

 

             Urine Culture (test code = 630-4) Organism: GRAM NEGATIVE BACILLUS 

                           





[facility]2019-03-29 09:30:00* 



             Test Item    Value        Reference Range Interpretation Comments

 

             Urine Culture (test code = 630-4) Organism: GRAM NEGATIVE BACILLUS 

                           





[facility]2019-03-29 09:30:00* 



             Test Item    Value        Reference Range Interpretation Comments

 

             Urine Culture (test code = 630-4) Organism: GRAM NEGATIVE BACILLUS 

                           





[facility]2019-03-29 09:30:00* 



             Test Item    Value        Reference Range Interpretation Comments

 

             Urine Culture (test code = 630-4) Organism: GRAM NEGATIVE BACILLUS 

                           





[facility]2019-03-29 09:30:00* 



             Test Item    Value        Reference Range Interpretation Comments

 

             Urine Culture (test code = 630-4) Organism: GRAM NEGATIVE BACILLUS 

                           





[facility]2019-03-29 09:30:00* 



             Test Item    Value        Reference Range Interpretation Comments

 

             Urine Culture (test code = 630-4) No Result Data Provided          

                  





[facility]2019-03-29 09:30:00* 



             Test Item    Value        Reference Range Interpretation Comments

 

             Urine Culture (test code = 630-4) No Result Data Provided          

                  





[facility] CenterUrine Ufdbymf4664-27-29 09:30:00* 



             Test Item    Value        Reference Range Interpretation Comments

 

             Urine Culture (test code = 630-4) No Result Data Provided          

                  





[facility] Binycre2162-12-01 09:30:00* 



             Test Item    Value        Reference Range Interpretation Comments

 

             Urine Culture (test code = 630-4) No Result Data Provided          

                  





[facility] Chorionic Gonadotropin, Qual
2019 09:05:00* 



             Test Item    Value        Reference Range Interpretation Comments

 

             Human Chorionic Gonadotropin, Qual (test code = 2118-8) NEGATIVE   

  NEGATIVE                   





[facility] Chorionic Gonadotropin, Qual
2019 09:05:00* 



             Test Item    Value        Reference Range Interpretation Comments

 

             Human Chorionic Gonadotropin, Qual (test code = 2118-8) NEGATIVE   

  NEGATIVE                   





[facility] Chorionic Gonadotropin, Qual
2019 09:05:00* 



             Test Item    Value        Reference Range Interpretation Comments

 

             Human Chorionic Gonadotropin, Qual (test code = 2118-8) NEGATIVE   

  NEGATIVE                   





[facility] Chorionic Gonadotropin, Qual
2019 09:05:00* 



             Test Item    Value        Reference Range Interpretation Comments

 

             Human Chorionic Gonadotropin, Qual (test code = 8-8) NEGATIVE   

  NEGATIVE                   





[facility] CenterTo[facility] Hoaoqdjfu9444-67-31 06:06:00* 



             Test Item    Value        Reference Range Interpretation Comments

 

             Total Bilirubin (test code = 1975-2) 0.3          0.2-1.2          

          





[facility] CenterAspartate Amino Transf (AST/SGOT)
2019 06:06:00* 



             Test Item    Value        Reference Range Interpretation Comments

 

                                        Aspartate Amino Transf (AST/SGOT) (test 

code = Aspartate Amino Transf 

(AST/SGOT))     14              5-34                             





[facility] CenterAlanine Aminotransferase (ALT/SGPT)
2019 06:06:00* 



             Test Item    Value        Reference Range Interpretation Comments

 

             Alanine Aminotransferase (ALT/SGPT) (test code = 1742-6) 13        

   0-55                       





[facility] CenterTotal Xsnmgci5937-50-96 06:06:00* 



             Test Item    Value        Reference Range Interpretation Comments

 

             Total Protein (test code = 2885-2) 7.3          6.5-8.1            

        





[facility] CenterAlbumin2019-03-29 06:06:00* 



             Test Item    Value        Reference Range Interpretation Comments

 

             Albumin (test code = 1751-7) 2.7          3.5-5.0      L           

  





[facility] CenterGlobulin2019-03-29 06:06:00* 



             Test Item    Value        Reference Range Interpretation Comments

 

             Globulin (test code = 27804-3) 4.6          2.3-3.5      H         

    





[facility] CenterAlbumin/Globulin Ldjxm5012-58-75 06:06:00
  * 



             Test Item    Value        Reference Range Interpretation Comments

 

             Albumin/Globulin Ratio (test code = 1759-0) 0.6          0.8-2.0   

   L             





[facility] CenterAlkaline Kfzuenjqgdm8996-45-90 06:06:00* 



             Test Item    Value        Reference Range Interpretation Comments

 

             Alkaline Phosphatase (test code = 6768-6) 87                 

               





[facility] CenterUrine FCO3086-66-86 13:02:00* 



             Test Item    Value        Reference Range Interpretation Comments

 

             Urine WBC (test code = 5821-4) 21-50        0-5          H         

    





[facility] CenterUrine QTV0637-49-91 13:02:00* 



             Test Item    Value        Reference Range Interpretation Comments

 

             Urine RBC (test code = 54933-6) 6-10         0-5          H        

     





[facility] CenterUrine Fxgraitq8231-48-35 13:02:00* 



             Test Item    Value        Reference Range Interpretation Comments

 

             Urine Bacteria (test code = 48012-2) FEW          NONE             

          





[facility] CenterUrine Epithelial Tnbcx6341-80-23 13:02:00
  * 



             Test Item    Value        Reference Range Interpretation Comments

 

             Urine Epithelial Cells (test code = 28374-1) NONE         NONE     

                  





[facility] CenterABDOMEN-[facility]VIEW ([facility])2019 12:55:00   
                                                                                
 [place] [address]      Patient Name: CRISTINA RICH            
                      MR #: [id number]                     : 1965      
                            Age/Sex: 53/F  Acct #: [id number]                 
            Req #: [id number]  Adm Physician:                                   
                  Ordered by: LISSETTE GATES MD                            
Report #: [id number]        Location: ER                                      
Room/Bed:                     _______________________________________________
____________________________________________________    Procedure: [id number] DX/
ABDOMEN-1VIEW (KUB)  Exam Date: 19                            Exam Time: 1
215                                              REPORT STATUS: Signed       Exa
m: GIGI.      Clinical History: Nephrostomy placement.      Comparison: 2019
. CT scan 2019.      Findings: Right percutaneous nephrostomy and parallel 
left internal ureteral   stents are again noted.  Pelvic surgical clips are unch
anged. No calcifications   project over the renal shadows or expected ureteral c
ourses. Calcification in   the dependent portion of the dilated left collecting 
system seen on the   comparison CT is not appreciated by plain radiography.     
Bowel gas pattern is nonobstructive. Regional skeletal structures are intact.   
Calcified gallstone projects over the right upper quadrant.      IMPRESSION:    
 Right percutaneous nephrostomy catheter and parallel left internal ureteral   
stents are again seen. Left renal calculus seen on the comparison CT is not   i
dentified by plain radiography.      Signed by: Dr. Deb Del Castillo M.D. on 3/28/20
19 12:58 PM        Dictated By: DEB DEL CASTILLO MD, MD  Electronically Signed By: DARSHAN DEL CASTILLO MD, MD on 19 1258  Transcribed By: LUCINDA on 19 1258   
   COPY TO:   LISSETTE GATES MD         Urine Ivpsu4242-51-94 12:47:00* 



             Test Item    Value        Reference Range Interpretation Comments

 

             Urine Color (test code = 5778-6) YELLOW       YELLOW               

      





[facility] CenterUrine Hsdxyec1052-01-38 12:47:00* 



             Test Item    Value        Reference Range Interpretation Comments

 

             Urine Clarity (test code = 79421-3) CLOUDY       CLEAR        H    

         





[facility] CenterUrine Specific Vadkhbg7462-64-45 12:47:00
  * 



             Test Item    Value        Reference Range Interpretation Comments

 

             Urine Specific Gravity (test code = 5811-5) 1.010        1.010-1.02

5                





[facility] CenterUrine dF6113-30-38 12:47:00* 



             Test Item    Value        Reference Range Interpretation Comments

 

             Urine pH (test code = 31578-4) 7            5-7                    

    





[facility] Leukocyte Qtkylpph4882-81-53 
12:47:00* 



             Test Item    Value        Reference Range Interpretation Comments

 

             Urine Leukocyte Esterase (test code = 5799-2) 1+           NEGATIVE

     H             





[facility] Moqmsqz1614-45-65 12:47:00* 



             Test Item    Value        Reference Range Interpretation Comments

 

             Urine Nitrite (test code = 43658-4) POSITIVE     NEGATIVE     [facility] Yslqxln6833-93-50 12:47:00* 



             Test Item    Value        Reference Range Interpretation Comments

 

             Urine Protein (test code = 5804-0) 2+           NEGATIVE     [facility] Glucose (UA)2019 12:47:00* 



             Test Item    Value        Reference Range Interpretation Comments

 

             Urine Glucose (UA) (test code = 2349-9) NEGATIVE     NEGATIVE      

             





[facility] Cegieod6503-88-11 12:47:00* 



             Test Item    Value        Reference Range Interpretation Comments

 

             Urine Ketones (test code = 77823-9) NEGATIVE     NEGATIVE          

         





[facility] Yjdrorgrfnqz4093-54-00 12:47:00* 



             Test Item    Value        Reference Range Interpretation Comments

 

             Urine Urobilinogen (test code = 96088-2) 0.2          0.2-1        

              





[facility] Youzdxpgx6974-04-87 12:47:00* 



             Test Item    Value        Reference Range Interpretation Comments

 

             Urine Bilirubin (test code = 1978-6) NEGATIVE     NEGATIVE         

          





[facility] Xmmmz3983-37-62 12:47:00* 



             Test Item    Value        Reference Range Interpretation Comments

 

             Urine Blood (test code = 53985-9) 3+           NEGATIVE     H      

       





[facility] Qdbfdex5850-31-82 13:56:00* 



             Test Item    Value        Reference Range Interpretation Comments

 

             Urine Culture (test code = 630-4) Organism: PSEUDO FLUORESCENS/PUTI

DA                            





[facility] Wdlnxbb2396-63-83 13:56:00* 



             Test Item    Value        Reference Range Interpretation Comments

 

             Urine Culture (test code = 630-4) Organism: PSEUDO FLUORESCENS/PUTI

DA                            





[facility] Ngpbfvg8628-32-30 13:56:00* 



             Test Item    Value        Reference Range Interpretation Comments

 

             Urine Culture (test code = 630-4) Organism: PSEUDO FLUORESCENS/PUTI

DA                            





[facility] 14:44:00   
                                                                                
 [facility]                        [address] [address]      Patient Name: CRISTINA RICH     
                             MR #: [id number]                     :                                    Age/Sex: 53/F  Acct #: [id number]   
                          Req #: [id number]  Adm Physician:                     
                                Ordered by: JACQUELINE ARORA MD                      
     Report #: [id number]        Location: OR                                    
 Room/Bed:                     _____________________________________________
______________________________________________________    Procedure: [id number] D
X/ABDOMEN-1VIEW (KU)  Exam Date: 19                            Exam Time:
1315                                              REPORT STATUS: Signed       E
xam: Abdominal film       Clinical History: Nephrolithiasis      Comparison: CT 
abdomen and pelvis 2019      DISCUSSION: Right percutaneous nephrostomy and
parallel left internal ureteral   stents are again noted. Positions are unchang
ed relative to  tomogram from   comparison CT. Pelvic surgical clips are un
changed. No calcifications project   over the renal shadows or expected ureteral
courses. Calcification in the   dependent portion of the dilated left collecting
system seen on the comparison   CT is not appreciated by plain radiography.     
Bowel gas pattern is nonobstructive. Regional skeletal structures are intact.   
Calcified gallstone projects over the right upper quadrant.      IMPRESSION:    
 Right percutaneous nephrostomy catheter and parallel left internal ureteral   
stents are unchanged in position. Left renal calculus seen on the comparison CT 
 is not identified by plain radiography.               Signed by: Dr. Clau Ngo M.D. on 2019 2:47 PM        Dictated By: CLAU NGO MD  Electro
nically Signed By: CLAU NGO MD on 19  Transcribed By: LUCINDA on
19       COPY TO:   JACQUELINE ARORA MD         Sodium Yfarq9273-96-65 
15:26:00* 



             Test Item    Value        Reference Range Interpretation Comments

 

             Sodium Level (test code = 2951-2) 136          136-145             

       





[facility] CenterPotassium Qguan9787-32-98 15:26:00* 



             Test Item    Value        Reference Range Interpretation Comments

 

             Potassium Level (test code = 2823-3) 3.5          3.5-5.1          

          





[facility] CenterChloride Dnycj8657-05-07 15:26:00* 



             Test Item    Value        Reference Range Interpretation Comments

 

             Chloride Level (test code = 2075-0) 102                      

         





[facility] CenterCarbon Dioxide Pvqcq0963-69-42 15:26:00* 



             Test Item    Value        Reference Range Interpretation Comments

 

             Carbon Dioxide Level (test code = 2028-9) 22           22-29       

               





[facility] CenterAnion Lws6791-95-62 15:26:00* 



             Test Item    Value        Reference Range Interpretation Comments

 

             Anion Gap (test code = 33037-3) 15.5         8-16                  

     





[facility] CenterBlood Urea Asgwbsbv8935-00-38 15:26:00* 



             Test Item    Value        Reference Range Interpretation Comments

 

             Blood Urea Nitrogen (test code = 3094-0) 20           7-26         

              





[facility] CenterCreatinine2019-01-30 15:26:00* 



             Test Item    Value        Reference Range Interpretation Comments

 

             Creatinine (test code = 2160-0) 1.14         0.57-1.11    H        

     





[facility] CenterBUN/Creatinine Pkhuk5354-82-27 15:26:00* 



             Test Item    Value        Reference Range Interpretation Comments

 

             BUN/Creatinine Ratio (test code = 3097-3) 18           6-25        

               





[facility] CenterEstimat Glomerular Filtration Rate
2019 15:26:00* 



             Test Item    Value        Reference Range Interpretation Comments

 

             Estimat Glomerular Filtration Rate (test code = 329023884) 49      

     >60          L             





Ranges were taken from the National Kidney Disease Education Program and the [facility] literature.Reference ranges:60 or greater: Cvfetv56-28 (
for 3 consecutive months): Chronic kidney disease 15 or less: Kidney failureBrownfield Regional Medical CenterGlucose Dmfmd6797-03-83 15:26:00* 



             Test Item    Value        Reference Range Interpretation Comments

 

             Glucose Level (test code = JUP8709) 162                 H    

         





[facility] CenterCalcium Bovvn0121-74-50 15:26:00* 



             Test Item    Value        Reference Range Interpretation Comments

 

             Calcium Level (test code = 28074-6) 9.3          8.4-10.2          

         





[facility] CenterTotal Lfcotfvjk9672-94-18 15:26:00* 



             Test Item    Value        Reference Range Interpretation Comments

 

             Total Bilirubin (test code = 1975-2) 0.5          0.2-1.2          

          





[facility] CenterAspartate Amino Transf (AST/SGOT)
2019 15:26:00* 



             Test Item    Value        Reference Range Interpretation Comments

 

                                        Aspartate Amino Transf (AST/SGOT) (test 

code = Aspartate Amino Transf 

(AST/SGOT))     16              5-34                             





[facility] CenterAlanine Aminotransferase (ALT/SGPT)
2019 15:26:00* 



             Test Item    Value        Reference Range Interpretation Comments

 

             Alanine Aminotransferase (ALT/SGPT) (test code = 1742-6) 20        

   0-55                       





[facility] CenterTotal Igyqadu5719-45-06 15:26:00* 



             Test Item    Value        Reference Range Interpretation Comments

 

             Total Protein (test code = 2885-2) 7.7          6.5-8.1            

        





[facility] CenterAlbumin2019-01-30 15:26:00* 



             Test Item    Value        Reference Range Interpretation Comments

 

             Albumin (test code = 1751-7) 3.4          3.5-5.0      L           

  





[facility] CenterGlobulin2019-01-30 15:26:00* 



             Test Item    Value        Reference Range Interpretation Comments

 

             Globulin (test code = 21183-9) 4.3          2.3-3.5      H         

    





[facility] CenterAlbumin/Globulin Epwxa5923-89-30 15:26:00
  * 



             Test Item    Value        Reference Range Interpretation Comments

 

             Albumin/Globulin Ratio (test code = 1759-0) 0.8          0.8-2.0   

                 





[facility] CenterAlkaline Lvhbhdpgsuf9334-40-03 15:26:00* 



             Test Item    Value        Reference Range Interpretation Comments

 

             Alkaline Phosphatase (test code = 6768-6) 98                 

               





[facility] Centerodium Irrxx5796-83-06 15:26:00* 



             Test Item    Value        Reference Range Interpretation Comments

 

             Sodium Level (test code = 2951-2) 136          136-145             

       





[facility] CenterPotassium Gfjet6237-24-92 15:26:00* 



             Test Item    Value        Reference Range Interpretation Comments

 

             Potassium Level (test code = 2823-3) 3.5          3.5-5.1          

          





[facility] CenterChloride Xejql6359-01-89 15:26:00* 



             Test Item    Value        Reference Range Interpretation Comments

 

             Chloride Level (test code = 2075-0) 102                      

         





[facility] CenterCarbon Dioxide Gieor0974-72-28 15:26:00* 



             Test Item    Value        Reference Range Interpretation Comments

 

             Carbon Dioxide Level (test code = 2028-9) 22           22-29       

               





[facility] CenterAnion Vqg7544-02-27 15:26:00* 



             Test Item    Value        Reference Range Interpretation Comments

 

             Anion Gap (test code = 33037-3) 15.5         8-16                  

     





[facility] CenterBlood Urea Odlgxopi0967-54-51 15:26:00* 



             Test Item    Value        Reference Range Interpretation Comments

 

             Blood Urea Nitrogen (test code = 3094-0) 20           7-26         

              





[facility] CenterCreatinine2019-01-30 15:26:00* 



             Test Item    Value        Reference Range Interpretation Comments

 

             Creatinine (test code = 2160-0) 1.14         0.57-1.11    H        

     





[facility] CenterBUN/Creatinine Wgkyp0617-71-54 15:26:00* 



             Test Item    Value        Reference Range Interpretation Comments

 

             BUN/Creatinine Ratio (test code = 3097-3) 18           6-25        

               





[facility] CenterEstimat Glomerular Filtration Rate
2019 15:26:00* 



             Test Item    Value        Reference Range Interpretation Comments

 

             Estimat Glomerular Filtration Rate (test code = [id number]) 49      

     >60          L             





Ranges were taken from the National Kidney Disease Education Program and the [facility] Foundation literature.Reference ranges:60 or greater: Kwdgaf27-15 (
for 3 consecutive months): Chronic kidney disease 15 or less: Kidney failureBrownfield Regional Medical CenterGlucose Jciut7670-94-09 15:26:00* 



             Test Item    Value        Reference Range Interpretation Comments

 

             Glucose Level (test code = WID9160) 162                 H    

         





[facility] CenterCalcium Gykpn2164-33-54 15:26:00* 



             Test Item    Value        Reference Range Interpretation Comments

 

             Calcium Level (test code = 87059-3) 9.3          8.4-10.2          

         





[facility] CenterTotal Pmprizrdi6886-85-29 15:26:00* 



             Test Item    Value        Reference Range Interpretation Comments

 

             Total Bilirubin (test code = 1975-2) 0.5          0.2-1.2          

          





[facility] CenterAspartate Amino Transf (AST/SGOT)
2019 15:26:00* 



             Test Item    Value        Reference Range Interpretation Comments

 

                                        Aspartate Amino Transf (AST/SGOT) (test 

code = Aspartate Amino Transf 

(AST/SGOT))     16              5-34                             





[facility] CenterAlanine Aminotransferase (ALT/SGPT)
2019 15:26:00* 



             Test Item    Value        Reference Range Interpretation Comments

 

             Alanine Aminotransferase (ALT/SGPT) (test code = 1742-6) 20        

   0-55                       





[facility] CenterTotal Zyjyezx5981-54-37 15:26:00* 



             Test Item    Value        Reference Range Interpretation Comments

 

             Total Protein (test code = 2885-2) 7.7          6.5-8.1            

        





[facility] CenterAlbumin2019-01-30 15:26:00* 



             Test Item    Value        Reference Range Interpretation Comments

 

             Albumin (test code = 1751-7) 3.4          3.5-5.0      L           

  





[facility] CenterGlobulin2019-01-30 15:26:00* 



             Test Item    Value        Reference Range Interpretation Comments

 

             Globulin (test code = 19936-0) 4.3          2.3-3.5      H         

    





[facility] CenterAlbumin/Globulin Qzqnl9550-99-37 15:26:00
  * 



             Test Item    Value        Reference Range Interpretation Comments

 

             Albumin/Globulin Ratio (test code = 1759-0) 0.8          0.8-2.0   

                 





[facility] CenterAlkaline Hitgrpgrfqr6544-28-09 15:26:00* 



             Test Item    Value        Reference Range Interpretation Comments

 

             Alkaline Phosphatase (test code = 6768-6) 98                 

               





[facility] CenterUrine SUR0058-07-54 15:23:00* 



             Test Item    Value        Reference Range Interpretation Comments

 

             Urine WBC (test code = 5821-4) 11-20        0-5          H         

    





[facility] CenterUrine NGG4595-82-66 15:23:00* 



             Test Item    Value        Reference Range Interpretation Comments

 

             Urine RBC (test code = 43253-9) 11-20        0-5          H        

     





[facility] CenterUrine Mqddhirh8281-48-33 15:23:00* 



             Test Item    Value        Reference Range Interpretation Comments

 

             Urine Bacteria (test code = 43038-2) MANY         NONE         H   

          





[facility] CenterUrine Epithelial Geomh2331-36-36 15:23:00
  * 



             Test Item    Value        Reference Range Interpretation Comments

 

             Urine Epithelial Cells (test code = 63526-5) NONE         NONE     

                  





[facility] CenterUrine SYJ2570-81-74 15:23:00* 



             Test Item    Value        Reference Range Interpretation Comments

 

             Urine WBC (test code = 5821-4) 11-20        0-5          H         

    





[facility] CenterUrine DQX7766-07-61 15:23:00* 



             Test Item    Value        Reference Range Interpretation Comments

 

             Urine RBC (test code = 17914-3) 11-20        0-5          H        

     





[facility] CenterUrine Ihnjvxar1397-66-81 15:23:00* 



             Test Item    Value        Reference Range Interpretation Comments

 

             Urine Bacteria (test code = 44915-0) MANY         NONE         H   

          





[facility] CenterUrine Epithelial Rkixk3021-33-03 15:23:00
  * 



             Test Item    Value        Reference Range Interpretation Comments

 

             Urine Epithelial Cells (test code = 61763-8) NONE         NONE     

                  





[facility] CenterUrine Werhk8265-59-47 15:15:00* 



             Test Item    Value        Reference Range Interpretation Comments

 

             Urine Color (test code = 5778-6) STRAW        YELLOW               

      





[facility] CenterUrine Xchrbvq2691-51-94 15:15:00* 



             Test Item    Value        Reference Range Interpretation Comments

 

             Urine Clarity (test code = 85544-3) CLOUDY       CLEAR        H    

         





[facility] CenterUrine Specific Scyxldy3141-53-75 15:15:00
  * 



             Test Item    Value        Reference Range Interpretation Comments

 

             Urine Specific Gravity (test code = 5811-5) 1.025        1.010-1.02

5                





[facility] CenterUrine fP2249-87-33 15:15:00* 



             Test Item    Value        Reference Range Interpretation Comments

 

             Urine pH (test code = 77866-9) 6            5-7                    

    





[facility] Leukocyte Mijspixk0142-06-78 
15:15:00* 



             Test Item    Value        Reference Range Interpretation Comments

 

             Urine Leukocyte Esterase (test code = 5799-2) 2+           NEGATIVE

     H             





[facility] Tqiedyw7900-47-92 15:15:00* 



             Test Item    Value        Reference Range Interpretation Comments

 

             Urine Nitrite (test code = 21321-0) NEGATIVE     NEGATIVE          

         





[facility] CenterUrine Ccrhgbt8564-25-37 15:15:00* 



             Test Item    Value        Reference Range Interpretation Comments

 

             Urine Protein (test code = 5804-0) 2+           NEGATIVE     H     

        





[facility] CenterUrine Glucose (UA)2019 15:15:00* 



             Test Item    Value        Reference Range Interpretation Comments

 

             Urine Glucose (UA) (test code = 2349-9) NEGATIVE     NEGATIVE      

             





[facility] CenterUrine Ayzsmhk7596-71-58 15:15:00* 



             Test Item    Value        Reference Range Interpretation Comments

 

             Urine Ketones (test code = 17083-6) NEGATIVE     NEGATIVE          

         





[facility] CenterUrine Ueqcuyufbmgm9068-67-54 15:15:00* 



             Test Item    Value        Reference Range Interpretation Comments

 

             Urine Urobilinogen (test code = 56418-0) 0.2          0.2-1        

              





[facility] CenterUrine Afpeoiwpd3287-44-68 15:15:00* 



             Test Item    Value        Reference Range Interpretation Comments

 

             Urine Bilirubin (test code = 1978-6) NEGATIVE     NEGATIVE         

          





[facility] CenterUrine Dilki2801-48-03 15:15:00* 



             Test Item    Value        Reference Range Interpretation Comments

 

             Urine Blood (test code = 80930-6) 4+           NEGATIVE     H      

       





[facility] CenterUrine Uerhl6791-37-53 15:15:00* 



             Test Item    Value        Reference Range Interpretation Comments

 

             Urine Color (test code = 5778-6) STRAW        YELLOW               

      





[facility] CenterUrine Ikrhkse8142-93-30 15:15:00* 



             Test Item    Value        Reference Range Interpretation Comments

 

             Urine Clarity (test code = 29729-3) CLOUDY       CLEAR        H    

         





[facility] CenterUrine Specific Ijonmxf5002-56-22 15:15:00
  * 



             Test Item    Value        Reference Range Interpretation Comments

 

             Urine Specific Gravity (test code = 5811-5) 1.025        1.010-1.02

5                





[facility] CenterUrine bQ4883-23-13 15:15:00* 



             Test Item    Value        Reference Range Interpretation Comments

 

             Urine pH (test code = 35394-9) 6            5-7                    

    





[facility] CenterUrine Leukocyte Ecdubhsf1507-04-90 
15:15:00* 



             Test Item    Value        Reference Range Interpretation Comments

 

             Urine Leukocyte Esterase (test code = 5799-2) 2+           NEGATIVE

     H             





[facility] CenterUrine Yqtxydi0552-18-96 15:15:00* 



             Test Item    Value        Reference Range Interpretation Comments

 

             Urine Nitrite (test code = 56642-4) NEGATIVE     NEGATIVE          

         





[facility] CenterUrine Tzgczzi9926-50-06 15:15:00* 



             Test Item    Value        Reference Range Interpretation Comments

 

             Urine Protein (test code = 5804-0) 2+           NEGATIVE     H     

        





[facility] CenterUrine Glucose (UA)2019 15:15:00* 



             Test Item    Value        Reference Range Interpretation Comments

 

             Urine Glucose (UA) (test code = 2349-9) NEGATIVE     NEGATIVE      

             





[facility] CenterUrine Vujoszz4414-35-30 15:15:00* 



             Test Item    Value        Reference Range Interpretation Comments

 

             Urine Ketones (test code = 36072-0) NEGATIVE     NEGATIVE          

         





[facility] CenterUrine Ucsjvivbouae2705-16-95 15:15:00* 



             Test Item    Value        Reference Range Interpretation Comments

 

             Urine Urobilinogen (test code = 73808-2) 0.2          0.2-1        

              





[facility] CenterUrine Bdfrjjevy0723-56-06 15:15:00* 



             Test Item    Value        Reference Range Interpretation Comments

 

             Urine Bilirubin (test code = 1978-6) NEGATIVE     NEGATIVE         

          





[facility] CenterUrine Hppdc2087-77-04 15:15:00* 



             Test Item    Value        Reference Range Interpretation Comments

 

             Urine Blood (test code = 30352-5) 4+           NEGATIVE     H      

       





[facility] CenterWhite Blood Onepo4197-85-94 15:12:00* 



             Test Item    Value        Reference Range Interpretation Comments

 

             White Blood Count (test code = 6690-2) 10.96        4.8-10.8     H 

            





[facility] CenterRed Blood Zplxf3086-37-34 15:12:00* 



             Test Item    Value        Reference Range Interpretation Comments

 

             Red Blood Count (test code = 789-8) 4.03         3.6-5.1           

         





[facility] CenterHemoglobin2019-01-30 15:12:00* 



             Test Item    Value        Reference Range Interpretation Comments

 

             Hemoglobin (test code = 44572-2) 10.2         12.0-16.0    L       

      





[facility] CenterHematocrit2019-01-30 15:12:00* 



             Test Item    Value        Reference Range Interpretation Comments

 

             Hematocrit (test code = 4544-3) 32.6         34.2-44.1    L        

     





[facility] CenterMean Corpuscular Jdkwas2294-15-25 
15:12:00* 



             Test Item    Value        Reference Range Interpretation Comments

 

             Mean Corpuscular Volume (test code = 787-2) 80.9         81-99     

   L             





[facility] CenterMean Corpuscular Znyxfvmtjz3411-71-41 
15:12:00* 



             Test Item    Value        Reference Range Interpretation Comments

 

             Mean Corpuscular Hemoglobin (test code = 785-6) 25.3         28-32 

       L             





[facility] CenterMean Corpuscular Hemoglobin Concent
2019 15:12:00* 



             Test Item    Value        Reference Range Interpretation Comments

 

             Mean Corpuscular Hemoglobin Concent (test code = 786-4) 31.3       

  31-35                      





[facility] CenterRed Cell Distribution Efcid2006-12-14 
15:12:00* 



             Test Item    Value        Reference Range Interpretation Comments

 

             Red Cell Distribution Width (test code = 87324-7) 16.8         11.7

-14.4    H             





[facility] CenterPlatelet Hdhzd3411-65-97 15:12:00* 



             Test Item    Value        Reference Range Interpretation Comments

 

             Platelet Count (test code = 777-3) 289          140-360            

        





[facility] CenterNeutrophils (%) (Auto)2019 15:12:00
  * 



             Test Item    Value        Reference Range Interpretation Comments

 

             Neutrophils (%) (Auto) (test code = 78077-9) 76.2         38.7-80.0

                  





[facility] CenterLymphocytes (%) (Auto)2019 15:12:00
  * 



             Test Item    Value        Reference Range Interpretation Comments

 

             Lymphocytes (%) (Auto) (test code = 736-9) 16.4         18.0-39.1  

  L             





[facility] CenterMonocytes (%) (Auto)2019 15:12:00* 



             Test Item    Value        Reference Range Interpretation Comments

 

             Monocytes (%) (Auto) (test code = 5905-5) 5.6          4.4-11.3    

               





[facility] CenterEosinophils (%) (Auto)2019 15:12:00
  * 



             Test Item    Value        Reference Range Interpretation Comments

 

             Eosinophils (%) (Auto) (test code = 713-8) 1.0          0.0-6.0    

                





[facility] CenterBasophils (%) (Auto)2019 15:12:00* 



             Test Item    Value        Reference Range Interpretation Comments

 

             Basophils (%) (Auto) (test code = 706-2) 0.5          0.0-1.0      

              





[facility] CenterIM GRANULOCYTES %2019 15:12:00* 



             Test Item    Value        Reference Range Interpretation Comments

 

             IM GRANULOCYTES % (test code = IM GRANULOCYTES %) 0.3          0.0-

1.0                    





[facility] CenterNeutrophils # (Auto)2019 15:12:00* 



             Test Item    Value        Reference Range Interpretation Comments

 

             Neutrophils # (Auto) (test code = 751-8) 8.4          2.1-6.9      

H             





[facility] CenterLymphocytes # (Auto)2019 15:12:00* 



             Test Item    Value        Reference Range Interpretation Comments

 

             Lymphocytes # (Auto) (test code = 94398-3) 1.8          1.0-3.2    

                





[facility] CenterMonocytes # (Auto)2019 15:12:00* 



             Test Item    Value        Reference Range Interpretation Comments

 

             Monocytes # (Auto) (test code = 742-7) 0.6          0.2-0.8        

            





[facility] CenterEosinophils # (Auto)2019 15:12:00* 



             Test Item    Value        Reference Range Interpretation Comments

 

             Eosinophils # (Auto) (test code = 711-2) 0.1          0.0-0.4      

              





[facility] CenterBasophils # (Auto)2019 15:12:00* 



             Test Item    Value        Reference Range Interpretation Comments

 

             Basophils # (Auto) (test code = 704-7) 0.1          0.0-0.1        

            





[facility] CenterAbsolute Immature Granulocyte (auto
2019 15:12:00* 



             Test Item    Value        Reference Range Interpretation Comments

 

                                        Absolute Immature Granulocyte (auto (lloyd

t code = Absolute Immature Granulocyte 

(auto)          0.03            0-0.1                            





[facility] CenterWhite Blood Dlulg3496-41-83 15:12:00* 



             Test Item    Value        Reference Range Interpretation Comments

 

             White Blood Count (test code = 6690-2) 10.96        4.8-10.8     H 

            





[facility] CenterRed Blood Xuhgv5205-80-82 15:12:00* 



             Test Item    Value        Reference Range Interpretation Comments

 

             Red Blood Count (test code = 789-8) 4.03         3.6-5.1           

         





[facility] CenterHemoglobin2019-[facility] 15:12:00* 



             Test Item    Value        Reference Range Interpretation Comments

 

             Hemoglobin (test code = 35746-3) 10.2         12.0-16.0    L       

      





[facility] CenterHematocrit2019-01-30 15:12:00* 



             Test Item    Value        Reference Range Interpretation Comments

 

             Hematocrit (test code = 4544-3) 32.6         34.2-44.1    L        

     





[facility] CenterMean Corpuscular Kdcgje6715-60-72 
15:12:00* 



             Test Item    Value        Reference Range Interpretation Comments

 

             Mean Corpuscular Volume (test code = 787-2) 80.9         81-99     

   L             





[facility] CenterMean Corpuscular Aaodxquxvy7535-74-21 
15:12:00* 



             Test Item    Value        Reference Range Interpretation Comments

 

             Mean Corpuscular Hemoglobin (test code = 785-6) 25.3         28-32 

       L             





[facility] CenterMean Corpuscular Hemoglobin Concent
2019 15:12:00* 



             Test Item    Value        Reference Range Interpretation Comments

 

             Mean Corpuscular Hemoglobin Concent (test code = 786-4) 31.3       

  31-35                      





[facility] CenterRed Cell Distribution Hrnuj9796-48-38 
15:12:00* 



             Test Item    Value        Reference Range Interpretation Comments

 

             Red Cell Distribution Width (test code = 14219-4) 16.8         11.7

-14.4    H             





[facility] CenterPlatelet Puwfy3578-50-64 15:12:00* 



             Test Item    Value        Reference Range Interpretation Comments

 

             Platelet Count (test code = 777-3) 289          140-360            

        





[facility] CenterNeutrophils (%) (Auto)2019 15:12:00
  * 



             Test Item    Value        Reference Range Interpretation Comments

 

             Neutrophils (%) (Auto) (test code = 92532-1) 76.2         38.7-80.0

                  





[facility] CenterLymphocytes (%) (Auto)2019 15:12:00
  * 



             Test Item    Value        Reference Range Interpretation Comments

 

             Lymphocytes (%) (Auto) (test code = 736-9) 16.4         18.0-39.1  

  L             





[facility] CenterMonocytes (%) (Auto)2019 15:12:00* 



             Test Item    Value        Reference Range Interpretation Comments

 

             Monocytes (%) (Auto) (test code = 5905-5) 5.6          4.4-11.3    

               





[facility] CenterEosinophils (%) (Auto)2019 15:12:00
  * 



             Test Item    Value        Reference Range Interpretation Comments

 

             Eosinophils (%) (Auto) (test code = 713-8) 1.0          0.0-6.0    

                





[facility] CenterBasophils (%) (Auto)2019 15:12:00* 



             Test Item    Value        Reference Range Interpretation Comments

 

             Basophils (%) (Auto) (test code = 706-2) 0.5          0.0-1.0      

              





[facility] CenterIM GRANULOCYTES %2019 15:12:00* 



             Test Item    Value        Reference Range Interpretation Comments

 

             IM GRANULOCYTES % (test code = IM GRANULOCYTES %) 0.3          0.0-

1.0                    





[facility] CenterNeutrophils # (Auto)2019 15:12:00* 



             Test Item    Value        Reference Range Interpretation Comments

 

             Neutrophils # (Auto) (test code = 751-8) 8.4          2.1-6.9      

H             





[facility] CenterLymphocytes # (Auto)2019 15:12:00* 



             Test Item    Value        Reference Range Interpretation Comments

 

             Lymphocytes # (Auto) (test code = 39991-8) 1.8          1.0-3.2    

                





[facility] CenterMonocytes # (Auto)2019 15:12:00* 



             Test Item    Value        Reference Range Interpretation Comments

 

             Monocytes # (Auto) (test code = 742-7) 0.6          0.2-0.8        

            





[facility] CenterEosinophils # (Auto)2019 15:12:00* 



             Test Item    Value        Reference Range Interpretation Comments

 

             Eosinophils # (Auto) (test code = 711-2) 0.1          0.0-0.4      

              





[facility] CenterBasophils # (Auto)2019 15:12:00* 



             Test Item    Value        Reference Range Interpretation Comments

 

             Basophils # (Auto) (test code = 704-7) 0.1          0.0-0.1        

            





[facility] CenterAbsolute Immature Granulocyte (auto
2019 15:12:00* 



             Test Item    Value        Reference Range Interpretation Comments

 

                                        Absolute Immature Granulocyte (auto (lloyd

t code = Absolute Immature Granulocyte 

(auto)          0.03            0-0.1                            





[facility] CenterCT ABDOMEN/PELVIS [id number] 13:57:00  
                                                                                
  [facility]                        [address] [address]      Patient Name: CRISTINA RICH     
                             MR #: [id number]                     :                                    Age/Sex: 53/F  Acct #: [id number]   
                          Req #: [id number]  Adm Physician:                     
                                Ordered by: LISSETTE GATES MD                 
          Report #: [id number]        Location: ER                               
      Room/Bed:                     ________________________________________
___________________________________________________________    Procedure: [id number]
009 CT/CT ABDOMEN/PELVIS WO  Exam Date: 19                            Exam
Time: 1320                                              REPORT STATUS: Signed   
EXAMINATION: CT of the abdomen and pelvis without contrast.       TECHNIQUE: S
piral CT images of the abdomen and pelvis were performed from the   lung bases t
o the lesser trochanters.  No intravenous contrast was given per   renal stone p
rotocol.  Coronal and sagittal reformatted images were obtained.      COMPARISON
:  None.      CLINICAL HISTORY:Nephrostomy tubes, catheters obstruction         
 DISCUSSION: ABSENCE OF INTRAVENOUS CONTRAST DECREASES SENSITIVITY FOR DETECTION
  OF FOCAL LESIONS AND VASCULAR PATHOLOGY.      ABDOMEN/PELVIS:      LOWER THO
RAX: Unremarkable.       HEPATOBILIARY:No focal hepatic lesions. Calcified galls
tone.      SPLEEN: No splenomegaly.      PANCREAS: No focal masses or ductal dil
atation.      ADRENALS: No adrenal nodules.      KIDNEYS/URETERS: Right percutan
eous nephrostomy tube. No obstruction.       2 left ureteral stents extending fr
om the renal pelvis to the bladder. Left   hydronephrosis. In the inferior pole 
3 small calculi measuring up to 3 mm.      PELVIC ORGANS/BLADDER: Gomez catheter
. Bladder is decompressed. Hysterectomy.      PERITONEUM/RETROPERITONEUM: No rizwan
e air or fluid.      LYMPH NODES: No intra-abdominal,retroperitoneal, pelvic or 
inguinal   lymphadenopathy.      VESSELS: Vascular calcifications.      GI TRACT
: No distention or wall thickening.      BONES AND SOFT TISSUES: No bony destruc
tive lesions.  No soft tissue   abnormalities.        IMPRESSION:       Left hyd
ronephrosis despite 2 left renal stents present.      Small nonobstructing left 
inferior pole renal calculi measuring up to 3 mm      Right percutaneous nephros
edison tube. No obstruction.      Gomez catheter within the decompressed bladder. 
    Cholelithiasis.      Signed by: Dr. Andrew Palisch, M.D. on 2019 2:10 
PM        Dictated By: ANDREW R PALISCH MD  Electronically Signed By: ANDREW R P
ALISCH MD on 19  Transcribed By: LUCINDA on 190       COPY 
TO:   LISSETTE GATES MD        Urine Culture2019-01-15 13:22:00* 



             Test Item    Value        Reference Range Interpretation Comments

 

             Urine Culture (test code = 630-4) Organism: PSEUDO FLUORESCENS/PUTI

DA                            





[facility] CenterUrine Culture2019-01-15 13:22:00* 



             Test Item    Value        Reference Range Interpretation Comments

 

             Urine Culture (test code = 630-4) Organism: PSEUDO FLUORESCENS/PUTI

DA                            





[facility] CenterUrine Culture2019-01-15 13:22:00* 



             Test Item    Value        Reference Range Interpretation Comments

 

             Urine Culture (test code = 630-4) Organism: PSEUDO FLUORESCENS/PUTI

DA                            





[facility] CenterUrine Culture2019-01-15 13:22:00* 



             Test Item    Value        Reference Range Interpretation Comments

 

             Urine Culture (test code = 630-4) Organism: PSEUDO FLUORESCENS/PUTI

DA                            





[facility] CenterUrine Culture2019-01-15 13:22:00* 



             Test Item    Value        Reference Range Interpretation Comments

 

             Urine Culture (test code = 630-4) Organism: PSEUDO FLUORESCENS/PUTI

DA                            





[facility] CenterUrine [id number] 15:12:00* 



             Test Item    Value        Reference Range Interpretation Comments

 

             Urine WBC (test code = 5821-4) 21-50        0-5          H         

    





[facility] CenterUrine UQP9858-26-31 15:12:00* 



             Test Item    Value        Reference Range Interpretation Comments

 

             Urine RBC (test code = 57065-6) 11-20        0-5          H        

     





[facility] CenterUrine Izmvnobj5087-32-00 15:12:00* 



             Test Item    Value        Reference Range Interpretation Comments

 

             Urine Bacteria (test code = 93480-9) FEW          NONE             

          





[facility] CenterUrine Epithelial Ifcdm6385-55-64 15:12:00
  * 



             Test Item    Value        Reference Range Interpretation Comments

 

             Urine Epithelial Cells (test code = 75449-5) RARE         NONE     

                  





[facility] CenterUrine Rwlkt0574-60-96 15:00:00* 



             Test Item    Value        Reference Range Interpretation Comments

 

             Urine Color (test code = 5778-6) YELLOW       YELLOW               

      





[facility] CenterUrine Hchwmhs2683-70-36 15:00:00* 



             Test Item    Value        Reference Range Interpretation Comments

 

             Urine Clarity (test code = 38158-8) HAZY         CLEAR             

         





[facility] CenterUrine Specific Ddbdwle8356-91-75 15:00:00
  * 



             Test Item    Value        Reference Range Interpretation Comments

 

             Urine Specific Gravity (test code = 5811-5) 1.030        1.010-1.02

5  H             





[facility] CenterUrine eV5612-77-75 15:00:00* 



             Test Item    Value        Reference Range Interpretation Comments

 

             Urine pH (test code = 86802-2) 6            5-7                    

    





[facility] CenterUrine Leukocyte Criecvxt8367-87-76 
15:00:00* 



             Test Item    Value        Reference Range Interpretation Comments

 

             Urine Leukocyte Esterase (test code = 5799-2) 2+           NEGATIVE

     H             





[facility] CenterUrine Klfmkhc6786-16-28 15:00:00* 



             Test Item    Value        Reference Range Interpretation Comments

 

             Urine Nitrite (test code = 94397-6) NEGATIVE     NEGATIVE          

         





[facility] CenterUrine Sxxasif9205-34-61 15:00:00* 



             Test Item    Value        Reference Range Interpretation Comments

 

             Urine Protein (test code = 5804-0) 2+           NEGATIVE     H     

        





[facility] CenterUrine Glucose (UA)2019 15:00:00* 



             Test Item    Value        Reference Range Interpretation Comments

 

             Urine Glucose (UA) (test code = 2349-9) NEGATIVE     NEGATIVE      

             





[facility] CenterUrine Vpauwao5760-98-64 15:00:00* 



             Test Item    Value        Reference Range Interpretation Comments

 

             Urine Ketones (test code = 17430-9) NEGATIVE     NEGATIVE          

         





[facility] CenterUrine Hspvfqzimvpi1024-88-54 15:00:00* 



             Test Item    Value        Reference Range Interpretation Comments

 

             Urine Urobilinogen (test code = 93827-7) 0.2          0.2-1        

              





[facility] CenterUrine Ctztwxjak4718-16-18 15:00:00* 



             Test Item    Value        Reference Range Interpretation Comments

 

             Urine Bilirubin (test code = 1978-6) NEGATIVE     NEGATIVE         

          





[facility] Rfuhj8114-97-89 15:00:00* 



             Test Item    Value        Reference Range Interpretation Comments

 

             Urine Blood (test code = 26704-7) 3+           NEGATIVE     H      

       





[facility] Qmfipzk2526-26-26 07:47:00* 



             Test Item    Value        Reference Range Interpretation Comments

 

             Urine Culture (test code = 630-4) Organism: CANDIDA ALBICANS       

                     





[facility] Cjuijuw5354-06-01 07:47:00* 



             Test Item    Value        Reference Range Interpretation Comments

 

             Urine Culture (test code = 630-4) Organism: CANDIDA ALBICANS       

                     





[facility] Jbitgav6520-51-61 07:47:00* 



             Test Item    Value        Reference Range Interpretation Comments

 

             Urine Culture (test code = 630-4) Organism: CANDIDA ALBICANS       

                     





[facility] Chnlixj8749-43-48 07:47:00* 



             Test Item    Value        Reference Range Interpretation Comments

 

             Urine Culture (test code = 630-4) Organism: CANDIDA ALBICANS       

                     





[facility] Centerodium Tqzyp6615-55-48 06:39:00* 



             Test Item    Value        Reference Range Interpretation Comments

 

             Sodium Level (test code = 2951-2) 138          136-145             

       





[facility] CenterPotassium Udrlg5213-26-20 06:39:00* 



             Test Item    Value        Reference Range Interpretation Comments

 

             Potassium Level (test code = 2823-3) 4.2          3.5-5.1          

          





[facility] CenterChloride Eoosa7669-42-51 06:39:00* 



             Test Item    Value        Reference Range Interpretation Comments

 

             Chloride Level (test code = 2075-0) 105                      

         





[facility] CenterCarbon Dioxide Krlkv5966-84-84 06:39:00* 



             Test Item    Value        Reference Range Interpretation Comments

 

             Carbon Dioxide Level (test code = 2028-9) 26           22-29       

               





[facility] CenterAnion Rne6391-63-09 06:39:00* 



             Test Item    Value        Reference Range Interpretation Comments

 

             Anion Gap (test code = 33037-3) 11.2         8-16                  

     





[facility] CenterBlood Urea Rwgisdka6547-18-46 06:39:00* 



             Test Item    Value        Reference Range Interpretation Comments

 

             Blood Urea Nitrogen (test code = 3094-0) 16           7-26         

              





[facility] CenterCreatinine2018-12-04 06:39:00* 



             Test Item    Value        Reference Range Interpretation Comments

 

             Creatinine (test code = 2160-0) 1.18         0.57-1.11    H        

     





[facility] CenterBUN/Creatinine Vqbeb8883-79-93 06:39:00* 



             Test Item    Value        Reference Range Interpretation Comments

 

             BUN/Creatinine Ratio (test code = 3097-3) 14           6-25        

               





[facility] CenterEstimat Glomerular Filtration Rate
2018 06:39:00* 



             Test Item    Value        Reference Range Interpretation Comments

 

             Estimat Glomerular Filtration Rate (test code = 792993604) 48      

     >60          L             





Ranges were taken from the National Kidney Disease Education Program and the Michelle
[facility] literature.Reference ranges:60 or greater: Fxifko56-62 (
for 3 consecutive months): Chronic kidney disease 15 or less: Kidney failureBrownfield Regional Medical CenterGlucose Fikak9865-79-36 06:39:00* 



             Test Item    Value        Reference Range Interpretation Comments

 

             Glucose Level (test code = CVZ3621) 246                 H    

         





[facility] CenterCalcium Mnyqe6025-17-80 06:39:00* 



             Test Item    Value        Reference Range Interpretation Comments

 

             Calcium Level (test code = 35843-2) 9.4          8.4-10.2          

         





[facility] Centerodium Xxjqr4513-26-99 06:39:00* 



             Test Item    Value        Reference Range Interpretation Comments

 

             Sodium Level (test code = 2951-2) 138          136-145             

       





[facility] CenterPotassium Xcwav8487-77-45 06:39:00* 



             Test Item    Value        Reference Range Interpretation Comments

 

             Potassium Level (test code = 2823-3) 4.2          3.5-5.1          

          





[facility] CenterChloride Njcli4947-71-63 06:39:00* 



             Test Item    Value        Reference Range Interpretation Comments

 

             Chloride Level (test code = 2075-0) 105                      

         





[facility] CenterCarbon Dioxide Yihjl7455-03-02 06:39:00* 



             Test Item    Value        Reference Range Interpretation Comments

 

             Carbon Dioxide Level (test code = 2028-9) 26           22-29       

               





[facility] CenterAnion Gue9613-15-52 06:39:00* 



             Test Item    Value        Reference Range Interpretation Comments

 

             Anion Gap (test code = 33037-3) 11.2         8-16                  

     





[facility] CenterBlood Urea Yaarwmua1032-57-66 06:39:00* 



             Test Item    Value        Reference Range Interpretation Comments

 

             Blood Urea Nitrogen (test code = 3094-0) 16           7-26         

              





[facility] CenterCreatinine2018-12-04 06:39:00* 



             Test Item    Value        Reference Range Interpretation Comments

 

             Creatinine (test code = 2160-0) 1.18         0.57-1.11    H        

     





[facility] CenterBUN/Creatinine Oenis7529-43-61 06:39:00* 



             Test Item    Value        Reference Range Interpretation Comments

 

             BUN/Creatinine Ratio (test code = 3097-3) 14           6-25        

               





[facility] CenterEstimat Glomerular Filtration Rate
2018 06:39:00* 



             Test Item    Value        Reference Range Interpretation Comments

 

             Estimat Glomerular Filtration Rate (test code = 444448837) 48      

     >60          L             





Ranges were taken from the National Kidney Disease Education Program and the Imchelle
[facility] Foundation literature.Reference ranges:60 or greater: Obpdtq60-60 (
for 3 consecutive months): Chronic kidney disease 15 or less: Kidney failureBrownfield Regional Medical CenterGlucose Oarig5343-17-99 06:39:00* 



             Test Item    Value        Reference Range Interpretation Comments

 

             Glucose Level (test code = NAC0847) 246                 H    

         





[facility] CenterCalcium Uuxvr0707-88-72 06:39:00* 



             Test Item    Value        Reference Range Interpretation Comments

 

             Calcium Level (test code = 75897-4) 9.4          8.4-10.2          

         





[facility] CenterWhite Blood Izhnx6667-44-99 06:35:00* 



             Test Item    Value        Reference Range Interpretation Comments

 

             White Blood Count (test code = 6690-2) 12.70        4.8-10.8     H 

            





[facility] CenterRed Blood Ivdtk9211-49-04 06:35:00* 



             Test Item    Value        Reference Range Interpretation Comments

 

             Red Blood Count (test code = 789-8) 3.76         3.6-5.1           

         





[facility] CenterHemoglobin2018-12-04 06:35:00* 



             Test Item    Value        Reference Range Interpretation Comments

 

             Hemoglobin (test code = 88307-7) 9.4          12.0-16.0    L       

      





[facility] CenterHematocrit2018-12-04 06:35:00* 



             Test Item    Value        Reference Range Interpretation Comments

 

             Hematocrit (test code = 4544-3) 30.2         34.2-44.1    L        

     





[facility] CenterMean Corpuscular Ljyynt9992-57-90 
06:35:00* 



             Test Item    Value        Reference Range Interpretation Comments

 

             Mean Corpuscular Volume (test code = 787-2) 80.3         81-99     

   L             





[facility] CenterMean Corpuscular Pxwatkjqkp2297-11-10 
06:35:00* 



             Test Item    Value        Reference Range Interpretation Comments

 

             Mean Corpuscular Hemoglobin (test code = 785-6) 25.0         28-32 

       L             





[facility] CenterMean Corpuscular Hemoglobin Concent
2018 06:35:00* 



             Test Item    Value        Reference Range Interpretation Comments

 

             Mean Corpuscular Hemoglobin Concent (test code = 786-4) 31.1       

  31-35                      





[facility] CenterRed Cell Distribution Izwwo1992-91-39 
06:35:00* 



             Test Item    Value        Reference Range Interpretation Comments

 

             Red Cell Distribution Width (test code = 49059-6) 15.5         11.7

-14.4    H             





[facility] CenterPlatelet Hajsd3787-77-10 06:35:00* 



             Test Item    Value        Reference Range Interpretation Comments

 

             Platelet Count (test code = 777-3) 336          140-360            

        





[facility] CenterNeutrophils (%) (Auto)2018 06:35:00
  * 



             Test Item    Value        Reference Range Interpretation Comments

 

             Neutrophils (%) (Auto) (test code = 82606-9) 80.0         38.7-80.0

                  





[facility] CenterLymphocytes (%) (Auto)2018 06:35:00
  * 



             Test Item    Value        Reference Range Interpretation Comments

 

             Lymphocytes (%) (Auto) (test code = 736-9) 13.9         18.0-39.1  

  L             





[facility] CenterMonocytes (%) (Auto)2018 06:35:00* 



             Test Item    Value        Reference Range Interpretation Comments

 

             Monocytes (%) (Auto) (test code = 5905-5) 4.7          4.4-11.3    

               





[facility] CenterEosinophils (%) (Auto)2018 06:35:00
  * 



             Test Item    Value        Reference Range Interpretation Comments

 

             Eosinophils (%) (Auto) (test code = 713-8) 0.1          0.0-6.0    

                





[facility]Auto)[facility] 06:35:00* 



             Test Item    Value        Reference Range Interpretation Comments

 

             Basophils (%) (Auto) (test code = 706-2) 0.3          0.0-1.0      

              





[facility] CenterIM [facility]ULOC[facility]TES %2018 06:35:00* 



             Test Item    Value        Reference Range Interpretation Comments

 

             IM GRANULOCYTES % (test code = IM GRANULOCYTES %) 1.0          0.0-

1.0                    





[facility] CenterNeutrophils # (Auto)2018 06:35:00* 



             Test Item    Value        Reference Range Interpretation Comments

 

             Neutrophils # (Auto) (test code = 751-8) 10.2         2.1-6.9      

H             





[facility] CenterLymphocytes # (Auto)2018 06:35:00* 



             Test Item    Value        Reference Range Interpretation Comments

 

             Lymphocytes # (Auto) (test code = 16930-1) 1.8          1.0-3.2    

                





[facility] CenterMonocytes # (Auto)2018 06:35:00* 



             Test Item    Value        Reference Range Interpretation Comments

 

             Monocytes # (Auto) (test code = 742-7) 0.6          0.2-0.8        

            





[facility] CenterEosinophils # (Auto)2018 06:35:00* 



             Test Item    Value        Reference Range Interpretation Comments

 

             Eosinophils # (Auto) (test code = 711-2) 0.0          0.0-0.4      

              





[facility] CenterBas[facility] (Auto)2018 06:35:00* 



             Test Item    Value        Reference Range Interpretation Comments

 

             Basophils # (Auto) (test code = 704-7) 0.0          0.0-0.1        

            





[facility] CenterAbsolute Immature Granulocyte (auto
2018 06:35:00* 



             Test Item    Value        Reference Range Interpretation Comments

 

                                        Absolute Immature Granulocyte (auto (lloyd

t code = Absolute Immature Granulocyte 

(auto)          0.13            0-0.1           H                





[facility] CenterWhite Blood Vpxvt7166-00-16 06:35:00* 



             Test Item    Value        Reference Range Interpretation Comments

 

             White Blood Count (test code = 6690-2) 12.70        4.8-10.8     H 

            





[facility] CenterRed Blood Gwnup0322-26-52 06:35:00* 



             Test Item    Value        Reference Range Interpretation Comments

 

             Red Blood Count (test code = 789-8) 3.76         3.6-5.1           

         





[facility] CenterHemoglobin2018-12-04 06:35:00* 



             Test Item    Value        Reference Range Interpretation Comments

 

             Hemoglobin (test code = 11788-9) 9.4          12.0-16.0    L       

      





[facility] CenterHematocrit2018-12-04 06:35:00* 



             Test Item    Value        Reference Range Interpretation Comments

 

             Hematocrit (test code = 4544-3) 30.2         34.2-44.1    L        

     





[facility] CenterMean Corpuscular Idqmlb4426-07-77 
06:35:00* 



             Test Item    Value        Reference Range Interpretation Comments

 

             Mean Corpuscular Volume (test code = 787-2) 80.3         81-99     

   L             





[facility] CenterMean Corpuscular Vykwnrnpog8481-54-51 
06:35:00* 



             Test Item    Value        Reference Range Interpretation Comments

 

             Mean Corpuscular Hemoglobin (test code = 785-6) 25.0         28-32 

       L             





[facility] CenterMean Corpuscular Hemoglobin Concent
2018 06:35:00* 



             Test Item    Value        Reference Range Interpretation Comments

 

             Mean Corpuscular Hemoglobin Concent (test code = 786-4) 31.1       

  31-35                      





[facility] CenterRed Cell Distribution Toazx4681-96-68 
06:35:00* 



             Test Item    Value        Reference Range Interpretation Comments

 

             Red Cell Distribution Width (test code = 78120-3) 15.5         11.7

-14.4    H             





[facility] CenterPlatelet Dyhkm6189-66-94 06:35:00* 



             Test Item    Value        Reference Range Interpretation Comments

 

             Platelet Count (test code = 777-3) 336          140-360            

        





[facility] CenterNeutrophils (%) (Auto)2018 06:35:00
  * 



             Test Item    Value        Reference Range Interpretation Comments

 

             Neutrophils (%) (Auto) (test code = 70611-1) 80.0         38.7-80.0

                  





[facility] CenterLymphocytes (%) (Auto)2018 06:35:00
  * 



             Test Item    Value        Reference Range Interpretation Comments

 

             Lymphocytes (%) (Auto) (test code = 736-9) 13.9         18.0-39.1  

  L             





[facility] CenterMonocytes (%) (Auto)2018 06:35:00* 



             Test Item    Value        Reference Range Interpretation Comments

 

             Monocytes (%) (Auto) (test code = 5905-5) 4.7          4.4-11.3    

               





[facility] CenterEosinophils (%) (Auto)2018 06:35:00
  * 



             Test Item    Value        Reference Range Interpretation Comments

 

             Eosinophils (%) (Auto) (test code = 713-8) 0.1          0.0-6.0    

                





[facility] CenterBasophils (%) (Auto)2018 06:35:00* 



             Test Item    Value        Reference Range Interpretation Comments

 

             Basophils (%) (Auto) (test code = 706-2) 0.3          0.0-1.0      

              





[facility] CenterIM GRANULOCYTES %2018 06:35:00* 



             Test Item    Value        Reference Range Interpretation Comments

 

             IM GRANULOCYTES % (test code = IM GRANULOCYTES %) 1.0          0.0-

1.0                    





[facility] CenterNeutrophils # (Auto)2018 06:35:00* 



             Test Item    Value        Reference Range Interpretation Comments

 

             Neutrophils # (Auto) (test code = 751-8) 10.2         2.1-6.9      

H             





[facility] CenterLymphocytes # (Auto)2018 06:35:00* 



             Test Item    Value        Reference Range Interpretation Comments

 

             Lymphocytes # (Auto) (test code = 23437-7) 1.8          1.0-3.2    

                





[facility] CenterMonocytes # (Auto)2018 06:35:00* 



             Test Item    Value        Reference Range Interpretation Comments

 

             Monocytes # (Auto) (test code = 742-7) 0.6          0.2-0.8        

            





[facility] CenterEosinophils # (Auto)2018 06:35:00* 



             Test Item    Value        Reference Range Interpretation Comments

 

             Eosinophils # (Auto) (test code = 711-2) 0.0          0.0-0.4      

              





[facility] CenterBasophils # (Auto)2018 06:35:00* 



             Test Item    Value        Reference Range Interpretation Comments

 

             Basophils # (Auto) (test code = 704-7) 0.0          0.0-0.1        

            





[facility] CenterAbsolute Immature Granulocyte (auto
2018 06:35:00* 



             Test Item    Value        Reference Range Interpretation Comments

 

                                        Absolute Immature Granulocyte (auto (lloyd

t code = Absolute Immature Granulocyte 

(auto)          0.13            0-0.1           H                





[facility] CenterTotal Ghytpyyhd6499-42-04 20:34:00* 



             Test Item    Value        Reference Range Interpretation Comments

 

             Total Bilirubin (test code = 1975-2) 0.2          0.2-1.2          

          





[facility] CenterAspartate Amino Transf (AST/SGOT)
2018 20:34:00* 



             Test Item    Value        Reference Range Interpretation Comments

 

                                        Aspartate Amino Transf (AST/SGOT) (test 

code = Aspartate Amino Transf 

(AST/SGOT))     28              5-34                             





[facility] CenterAlanine Aminotransferase (ALT/SGPT)
2018 20:34:00* 



             Test Item    Value        Reference Range Interpretation Comments

 

             Alanine Aminotransferase (ALT/SGPT) (test code = 1742-6) 25        

   0-55                       





[facility] CenterTotal Lckvqpd0974-31-59 20:34:00* 



             Test Item    Value        Reference Range Interpretation Comments

 

             Total Protein (test code = 2885-2) 9.4          6.5-8.1      H     

        





[facility] CenterAlbumin2018-11-30 20:34:00* 



             Test Item    Value        Reference Range Interpretation Comments

 

             Albumin (test code = 1751-7) 3.4          3.5-5.0      L           

  





[facility] CenterGlobulin2018-11-30 20:34:00* 



             Test Item    Value        Reference Range Interpretation Comments

 

             Globulin (test code = 36132-5) 6.0          2.3-3.5      H         

    





[facility] CenterAlbumin/Globulin Azcms0530-78-40 20:34:00
  * 



             Test Item    Value        Reference Range Interpretation Comments

 

             Albumin/Globulin Ratio (test code = 1759-0) 0.6          0.8-2.0   

   L             





[facility] CenterAlkaline Bsayxzdyeet7885-46-14 20:34:00* 



             Test Item    Value        Reference Range Interpretation Comments

 

             Alkaline Phosphatase (test code = 6768-6) 120                

               





[facility] CenterTotal Bwqtydgnp5823-14-37 20:34:00* 



             Test Item    Value        Reference Range Interpretation Comments

 

             Total Bilirubin (test code = 1975-2) 0.2          0.2-1.2          

          





[facility] CenterAspartate Amino Transf (AST/SGOT)
2018 20:34:00* 



             Test Item    Value        Reference Range Interpretation Comments

 

                                        Aspartate Amino Transf (AST/SGOT) (test 

code = Aspartate Amino Transf 

(AST/SGOT))     34                             





[facility] CenterAlanine Aminotransferase (ALT/SGPT)
2018 20:34:00* 



             Test Item    Value        Reference Range Interpretation Comments

 

             Alanine Aminotransferase (ALT/SGPT) (test code = 1742-6) 25        

   0-55                       





[facility] CenterTo[facility] [place] Xlwwmyw6596-46-65 20:34:00* 



             Test Item    Value        Reference Range Interpretation Comments

 

             Total Protein (test code = 2885-2) 9.4          6.5-8.1      H     

        





[facility] CenterAlbumin2018-11-30 20:34:00* 



             Test Item    Value        Reference Range Interpretation Comments

 

             Albumin (test code = 1751-7) 3.4          3.5-5.0      L           

  





[facility] CenterGlobulin2018-11-30 20:34:00* 



             Test Item    Value        Reference Range Interpretation Comments

 

             Globulin (test code = 69616-0) 6.0          2.3-3.5      H         

    





[facility] CenterAlbumin/Globulin Eobnq8059-68-60 20:34:00
  * 



             Test Item    Value        Reference Range Interpretation Comments

 

             Albumin/Globulin Ratio (test code = 1759-0) 0.6          0.8-2.0   

   L             





[facility] CenterAlkaline Euogmoxzeaa4386-77-42 20:34:00* 



             Test Item    Value        Reference Range Interpretation Comments

 

             Alkaline Phosphatase (test code = 6768-6) 120                

               





[facility] CenterUrine Cpfqp6299-01-95 20:33:00* 



             Test Item    Value        Reference Range Interpretation Comments

 

             Urine Color (test code = 5778-6) YELLOW       YELLOW               

      





[facility] CenterUrine Yjddlqy6600-01-85 20:33:00* 



             Test Item    Value        Reference Range Interpretation Comments

 

             Urine Clarity (test code = 33594-6) HAZY         CLEAR             

         





[facility] CenterUrine Specific Yllhgqi5122-91-04 20:33:00
  * 



             Test Item    Value        Reference Range Interpretation Comments

 

             Urine Specific Gravity (test code = 5811-5) 1.010        1.010-1.02

5                





[facility] CenterUrine iN9916-39-40 20:33:00* 



             Test Item    Value        Reference Range Interpretation Comments

 

             Urine pH (test code = 79246-2) 7            5-7                    

    





[facility] CenterUrine Leukocyte Mmchthba9695-97-66 
20:33:00* 



             Test Item    Value        Reference Range Interpretation Comments

 

             Urine Leukocyte Esterase (test code = 5799-2) 2+           NEGATIVE

     H             





[facility] CenterUrine Vhpdjob9090-57-57 20:33:00* 



             Test Item    Value        Reference Range Interpretation Comments

 

             Urine Nitrite (test code = 15343-5) NEGATIVE     NEGATIVE          

         





[facility] CenterUrine Csfewwq0186-92-16 20:33:00* 



             Test Item    Value        Reference Range Interpretation Comments

 

             Urine Protein (test code = 5804-0) 2+           NEGATIVE     H     

        





[facility] Glucose (UA)2018 20:33:00* 



             Test Item    Value        Reference Range Interpretation Comments

 

             Urine Glucose (UA) (test code = 2349-9) NEGATIVE     NEGATIVE      

             





[facility] Dxncqxm9922-85-70 20:33:00* 



             Test Item    Value        Reference Range Interpretation Comments

 

             Urine Ketones (test code = 77740-9) NEGATIVE     NEGATIVE          

         





[facility] Yrszxfoyxicu5155-29-82 20:33:00* 



             Test Item    Value        Reference Range Interpretation Comments

 

             Urine Urobilinogen (test code = 68356-5) 0.2          0.2-1        

              





[facility] Vgoabugif3102-26-41 20:33:00* 



             Test Item    Value        Reference Range Interpretation Comments

 

             Urine Bilirubin (test code = 1978-6) NEGATIVE     NEGATIVE         

          





[facility] CenterUrine Izxjs1307-28-42 20:33:00* 



             Test Item    Value        Reference Range Interpretation Comments

 

             Urine Blood (test code = 51860-3) 2+           NEGATIVE     H      

       





[facility] CenterUrine JUD2745-59-95 20:33:00* 



             Test Item    Value        Reference Range Interpretation Comments

 

             Urine WBC (test code = 5821-4) >50          0-5          H         

    





[facility] CenterUrine GGC9855-95-72 20:33:00* 



             Test Item    Value        Reference Range Interpretation Comments

 

             Urine RBC (test code = 31179-0) 11-20        0-5          H        

     





[facility] CenterUrine Hrqaphno7905-14-14 20:33:00* 



             Test Item    Value        Reference Range Interpretation Comments

 

             Urine Bacteria (test code = 44193-3) MANY         NONE         H   

          





[facility] CenterUrine Epithelial Bxsci0960-09-03 20:33:00
  * 



             Test Item    Value        Reference Range Interpretation Comments

 

             Urine Epithelial Cells (test code = 93501-7) NONE         NONE     

                  





[facility] CenterCHEST SINGLE (NOT PORTABLE)2018 
18:00:00                                                                        
             [facility]                        [address] [address]      Patient Name: CRISTINA RICH                                   MR #: [id number]                     
: 1965                                   Age/Sex: 53/F  Acct #: 
[id number]                              Req #: [id number]  Adm Physician:     
                                                Ordered by: JENNIFER TERRELL MD   
                        Report #: [id number]        Location: ER                 
                    Room/Bed:                     
__________________________________________
_________________________________________________________    Procedure: [id number]
6 DX/CHEST SINGLE (NOT PORTABLE)  Exam Date: 18                           
Exam Time: 1750                                              REPORT STATUS: Sig
ki    EXAM: CHEST SINGLE (NOT PORTABLE), AP 1 view   INDICATION: PIC line leaki
ng   COMPARISON: None      FINDINGS:   LINES/TUBES: There is a left midline cath
eter with tip projected over the   axilla.      LUNGS: No consolidations or don
a.       PLEURA: No effusions or pneumothorax.      HEART AND MEDIASTINUM: Cinda
l size and contour.      BONES AND SOFT TISSUES: No acute findings.       IMPRES
WILFRED:   There is a left midline catheter with tip projected over the axilla.   N
o priors are available to determine original location.            Signed by: Dr. Cristina Main M.D. on 2018 6:03 PM        Dictated By: CRISTINA MAIN MD  Electronically Signed By: CRISTINA MAIN MD on 18  Transcribed By:
LUCINDA on 18       COPY TO:   JENNIFER TERRELL MD        NEPHROSTOMY 
CATHETER PUCIRNQT6452-74-26 07:05:00                                            
                                         [address] [address]      
Patient Name: CRISTINA RICH                                   MR #: 
[id number]                     : 1965                                  
Age/Sex: 53/F  Acct #: [id number]                              Req #: [id number]
[id number]  [facility] Physician: MATTHEW FLORENTINO MD                                      
Ordered by: JACQUELINE ARORA MD                            Report #: [id number]       
Location: MED/SURG2                               Room/Bed: [facility]               
____________________________________________
_______________________________________________________    Procedure: [id number] 
DX/NEPHROSTOMY CATHETER EXCHANGE  Exam Date:                             Exam Ti
me:                                               REPORT STATUS: Signed    Proce
dure: Right nephrostomy catheter exchange      Medications: Versed 2 mg intraven
ous, Fentanyl 100 mcg intravenous.  The   patient's vital signs, including pulse
oximetry, were continuously monitored by   the interventional radiology nurse.  
   Fluoroscopy time: 1.9 minutes.   Dose area product: 1.76 cGycm2         Pro
cedure in detail:      Informed consent for the procedure was obtained from the 
patient after   discussion of risks and benefits. The right back was prepped and
draped in the   usual fashion.      1% lidocaine was administered into the skin 
and subcutaneous tissues of the   right back for local anesthesia. Contrast was 
injected showing proximal   ureteral obstruction. A    0.035  " Amplatz superst
iff wire was placed through the catheter. A new 10   French all-purpose drainage
catheter was then placed into the renal pelvis.   Contrast injection confirms a
ppropriate position.  Catheter was secured to the   skin with a sterile dressing
.      Impression:      Successful right percutaneous nephrostomy catheter excha
nge.      Signed by: Dr. Shayla Krause DO on 2018 7:11 AM        Dictated 
By: SHAYLA KRAUSE DO  Electronically Signed By: SHAYLA KRAUSE DO on 18
1  Transcribed By: LUCINDA on 18       COPY TO:   JACQUELINE ARORA MD      
 Blood Yxciuug5321-71-52 16:51:00* 



             Test Item    Value        Reference Range Interpretation Comments

 

             Blood Culture (test code = 72411175) NO GROWTH AFTER 5 DAYS, FINAL 

REPORT                            





[facility]2018-11-06 16:51:00* 



             Test Item    Value        Reference Range Interpretation Comments

 

             Blood Culture (test code = 25953294) NO GROWTH AFTER 5 DAYS, FINAL 

REPORT                            





[facility]2018-11-06 16:51:00* 



             Test Item    Value        Reference Range Interpretation Comments

 

             Blood Culture (test code = 25335083) NO GROWTH AFTER 5 DAYS, FINAL 

REPORT                            





[facility]2018-11-06 16:51:00* 



             Test Item    Value        Reference Range Interpretation Comments

 

             Blood Culture (test code = 50558090) NO GROWTH AFTER 5 DAYS, FINAL 

REPORT                            





[facility]2018-11-05 13:21:00* 



             Test Item    Value        Reference Range Interpretation Comments

 

             Urine Culture (test code = 630-4) Organism: ESCHERICHIA COLI-ESBL  

                          





[facility]2018-11-05 13:21:00* 



             Test Item    Value        Reference Range Interpretation Comments

 

             Urine Culture (test code = 630-4) Organism: ESCHERICHIA COLI-ESBL  

                          





[facility]2018-11-05 13:21:00* 



             Test Item    Value        Reference Range Interpretation Comments

 

             Urine Culture (test code = 630-4) Organism: ESCHERICHIA COLI-ESBL  

                          





[facility]2018-11-05 13:21:00* 



             Test Item    Value        Reference Range Interpretation Comments

 

             Urine Culture (test code = 630-4) Organism: ESCHERICHIA COLI-ESBL  

                          





[facility] Xllve0950-83-84 19:12:00* 



             Test Item    Value        Reference Range Interpretation Comments

 

             Magnesium Level (test code = 42671-5) 1.7          1.3-2.1         

           





[facility] Cbrhq1071-88-91 19:12:00* 



             Test Item    Value        Reference Range Interpretation Comments

 

             Magnesium Level (test code = 25330-6) 1.7          1.3-2.1         

           





[facility] Xhvfs7918-12-94 19:12:00* 



             Test Item    Value        Reference Range Interpretation Comments

 

             Magnesium Level (test code = 47433-6) 1.7          1.3-2.1         

           





[facility] Vsdum2849-47-65 19:12:00* 



             Test Item    Value        Reference Range Interpretation Comments

 

             Magnesium Level (test code = 68655-0) 1.7          1.3-2.1         

           





[facility]2018-11-02 19:12:00* 



             Test Item    Value        Reference Range Interpretation Comments

 

             Magnesium Level (test code = 52242-5) 1.7          1.3-2.1         

           





[facility]2018-11-02 19:12:00* 



             Test Item    Value        Reference Range Interpretation Comments

 

             Magnesium Level (test code = 73242-4) 1.7          1.3-2.1         

           





[facility]2018-11-02 19:12:00* 



             Test Item    Value        Reference Range Interpretation Comments

 

             Magnesium Level (test code = 20369-6) 1.7          1.3-2.1         

           





[facility]2018-11-02 19:12:00* 



             Test Item    Value        Reference Range Interpretation Comments

 

             Magnesium Level (test code = 85626-3) 1.7          1.3-2.1         

           





[facility]2018-11-02 19:12:00* 



             Test Item    Value        Reference Range Interpretation Comments

 

             Magnesium Level (test code = 37374-4) 1.7          1.3-2.1         

           





[facility] Acid [id number] 17:57:00* 



             Test Item    Value        Reference Range Interpretation Comments

 

             Lactic Acid Level (test code = Lactic Acid Level) 17.4         4.5-

19.8                   





[facility] [id number] 17:57:00* 



             Test Item    Value        Reference Range Interpretation Comments

 

             Lactic Acid Level (test code = Lactic Acid Level) 17.4         4.5-

19.8                   





[facility] [id number] 17:57:00* 



             Test Item    Value        Reference Range Interpretation Comments

 

             Lactic Acid Level (test code = Lactic Acid Level) 17.4         4.5-

19.8                   





[facility] Acid [id number] 17:57:00* 



             Test Item    Value        Reference Range Interpretation Comments

 

             Lactic Acid Level (test code = Lactic Acid Level) 17.4         4.5-

19.8                   





[facility] Acid [id number] 17:57:00* 



             Test Item    Value        Reference Range Interpretation Comments

 

             Lactic Acid Level (test code = Lactic Acid Level) 17.4         4.5-

19.8                   





[facility] [id number] 17:57:00* 



             Test Item    Value        Reference Range Interpretation Comments

 

             Lactic Acid Level (test code = Lactic Acid Level) 17.4         4.5-

19.8                   





[facility] Acid [id number] 17:57:00* 



             Test Item    Value        Reference Range Interpretation Comments

 

             Lactic Acid Level (test code = Lactic Acid Level) 17.4         4.5-

19.8                   





[facility] Acid [id number] 17:57:00* 



             Test Item    Value        Reference Range Interpretation Comments

 

             Lactic Acid Level (test code = Lactic Acid Level) 17.4         4.5-

19.8                   





[facility] Acid [id number] 17:57:00* 



             Test Item    Value        Reference Range Interpretation Comments

 

             Lactic Acid Level (test code = Lactic Acid Level) 17.4         4.5-

19.8                   





[facility]2018-11-01 14:26:00* 



             Test Item    Value        Reference Range Interpretation Comments

 

             Urine Yeast (test code = 62287-1) FEW          NONE         [facility]2018-11-01 14:26:00* 



             Test Item    Value        Reference Range Interpretation Comments

 

             Urine Yeast (test code = 30536-3) FEW          NONE         [facility]2018-11-01 14:26:00* 



             Test Item    Value        Reference Range Interpretation Comments

 

             Urine Yeast (test code = 72817-0) FEW          NONE         [facility]  14:26:00* 



             Test Item    Value        Reference Range Interpretation Comments

 

             Urine Yeast (test code = 20655-9) FEW          NONE         [facility]  14:26:00* 



             Test Item    Value        Reference Range Interpretation Comments

 

             Urine Yeast (test code = 05139-7) FEW          NONE         [facility] 



             Test Item    Value        Reference Range Interpretation Comments

 

             Urine Yeast (test code = 58450-3) FEW          NONE         [facility]  [facility] 



             Test Item    Value        Reference Range Interpretation Comments

 

             Urine Yeast (test code = 73546-0) FEW          NONE         [facility]  [facility] 



             Test Item    Value        Reference Range Interpretation Comments

 

             Urine Yeast (test code = 64427-0) FEW          [facility]  [facility] 



             Test Item    Value        Reference Range Interpretation Comments

 

             Urine Yeast (test code = 55723-2) FEW          [id number] 12:56:00  
                                                                                
  [place] [address]      Patient Name: CRISTINA RICH     
                             MR #: [id number]                     :                                    Age/Sex: 53/F  Acct #: [id number]   
                          Req #: [id number]  Adm Physician:                     
                                Ordered by: SHARIF BRITO MD               
            Report #: [id number]        Location: ER                             
        Room/Bed:                     ______________________________________
_____________________________________________________________    Procedure: 1101
-0019 CT/CT ABDOMEN/PELVIS WO  Exam Date: 18                            Ex
am Time: 1230                                              REPORT STATUS: Signed
   EXAM: CT Abdomen and Pelvis WITHOUT contrast     INDICATION: Left flank pain.
Previous kidney stone with stent.   COMPARISON: 10/22/2018   TECHNIQUE: Abdomen 
and pelvis were scanned utilizing a multidetector helical   scanner from the l
yoav base to the pubic symphysis without administration of IV   contrast. Absence
of intravenous contrast decreases sensitivity for detection   of focal lesions 
and vascular pathology. Coronal and sagittal reformations were   obtained. Routi
ne protocol was performed.                IV CONTRAST: None.               ORAL 
CONTRAST: Water               RADIATION DOSE: Total DLP: 797.04 mGy*cm          
     Estimated effective dose: (DLP x 0.015 x size factor) mSv               CO
MPLICATIONS: None      FINDINGS:      LINES and TUBES: Right nephrostomy tube wi
th distal tip coiled within the right   renal pelvis. Left double-J ureteral phan
nt in adequate position. Catheter   within the bladder.      LOWER THORAX:  Calc
ified granuloma in the posterior right lung base.      HEPATOBILIARY:   No focal
hepatic lesions. No biliary ductal dilation.       GALLBLADDER: Peripherally ca
lcified gallstone in the lumen of the gallbladder.   No wall thickening.      SP
CONSTANTINO: No splenomegaly.       PANCREAS: No focal masses or ductal dilatation.    
   ADRENALS: No adrenal nodules.      KIDNEYS/URETERS: 7.0 mm stone within the 
lower pole of the left kidney. This   appears to be fragmented when compared wit
h the prior exam. Minimal right   hydronephrosis improved since the prior exam. 
Mild left hydronephrosis improved   when compared with the prior exam.       Unc
hanged left perinephric stranding may reflect pyelonephritis. Unchanged left   p
eriureteral fat stranding.      GI TRACT: No abnormal distention, wall thickenin
g, or evidence of bowel   obstruction.            PELVIC ORGANS/BLADDER: Radiati
on seeds again observed in the uterine cervix.   Urinary bladder is decompressed
by a Gomez catheter.      LYMPH NODES: No lymphadenopathy.      VESSELS: Unrema
rkable.      PERITONEUM / RETROPERITONEUM: No free air or fluid.      BONES: The
re are degenerative changes in the lumbar spine.      SOFT TISSUES: Unremarkable
.        IMPRESSION:       1.  Unchanged left perinephric stranding may reflect 
pyelonephritis. Minimal   left hydroureteronephrosis is improved when compared w
ith the prior exam. The   previously seen 9.0 mm stone in the lower pole of the 
left kidney appears to be   fragmented and slightly smaller.   2. Minimal right 
hydroureteronephrosis improved when compared with the prior   exam. Signed by: ALEJANDRO Del Castillo M.D. on 2018 1:09 PM        Dictated By: DEB ALLEN  Electronically Signed By: DEB DEL CASTILLO MD, MD on 18 1309  Transcribed B
y: LUCINDA on 18 0491       COPY TO:   SHARIF BRITO MD        Urine 
Culture2018-10-25 14:55:00* 



             Test Item    Value        Reference Range Interpretation Comments

 

             Urine Culture (test code = 630-4) Organism: PSEUDOMONAS AERUGINOSA 

                           





[facility]2018-10-25 14:55:00* 



             Test Item    Value        Reference Range Interpretation Comments

 

             Urine Culture (test code = 630-4) Organism: PSEUDOMONAS AERUGINOSA 

                           





[facility]2018-10-25 14:55:00* 



             Test Item    Value        Reference Range Interpretation Comments

 

             Urine Culture (test code = 630-4) Organism: PSEUDOMONAS AERUGINOSA 

                           





[facility]2018-10-25 14:55:00* 



             Test Item    Value        Reference Range Interpretation Comments

 

             Urine Culture (test code = 630-4) Organism: PSEUDOMONAS AERUGINOSA 

                           





[facility]2018-10-25 14:52:00* 



             Test Item    Value        Reference Range Interpretation Comments

 

             Urine Culture (test code = 630-4) Organism: PSEUDOMONAS AERUGINOSA 

                           





[facility]2018-10-25 14:52:00* 



             Test Item    Value        Reference Range Interpretation Comments

 

             Urine Culture (test code = 630-4) Organism: PSEUDOMONAS AERUGINOSA 

                           





[facility]2018-10-25 14:52:00* 



             Test Item    Value        Reference Range Interpretation Comments

 

             Urine Culture (test code = 630-4) Organism: PSEUDOMONAS AERUGINOSA 

                           





[facility]2018-10-25 14:52:00* 



             Test Item    Value        Reference Range Interpretation Comments

 

             Urine Culture (test code = 630-4) Organism: PSEUDOMONAS AERUGINOSA 

                           





[facility]2018-10-25 14:52:00* 



             Test Item    Value        Reference Range Interpretation Comments

 

             Urine Culture (test code = 630-4) Organism: PSEUDOMONAS AERUGINOSA 

                           





[facility]2018-10-25 14:52:00* 



             Test Item    Value        Reference Range Interpretation Comments

 

             Urine Culture (test code = 630-4) Organism: PSEUDOMONAS AERUGINOSA 

                           





[facility]2018-10-25 14:52:00* 



             Test Item    Value        Reference Range Interpretation Comments

 

             Urine Culture (test code = 630-4) Organism: PSEUDOMONAS AERUGINOSA 

                           





[facility] Culture2018-10-25 14:52:00* 



             Test Item    Value        Reference Range Interpretation Comments

 

             Urine Culture (test code = 630-4) Organism: PSEUDOMONAS AERUGINOSA 

                           





[facility] Culture2018-10-25 14:52:00* 



             Test Item    Value        Reference Range Interpretation Comments

 

             Urine Culture (test code = 630-4) Organism: PSEUDOMONAS AERUGINOSA 

                           





[facility] 



             Test Item    Value        Reference Range Interpretation Comments

 

             Amylase Level (test code = 1798-8) 46                        

        





[facility] 



             Test Item    Value        Reference Range Interpretation Comments

 

             Lipase (test code = 3040-3)                       

 





[facility] 19:16:00* 



             Test Item    Value        Reference Range Interpretation Comments

 

             Amylase Level (test code = 1798-8) 46                        

        





[facility] 19:16[facility] 



             Test Item    Value        Reference Range Interpretation Comments

 

             Lipase (test code = 3040-3)                       

 





[facility] 19:16:00* 



             Test Item    Value        Reference Range Interpretation Comments

 

             Amylase Level (test code = 1798-8) 46                        

        





[facility] 19:16:[facility]* 



             Test Item    Value        Reference Range Interpretation Comments

 

             Lipase (test code = 3040-3)                       

 





[facility] 19:16:00* 



             Test Item    Value        Reference Range Interpretation Comments

 

             Amylase Level (test code = 1798-8) 46                        

        





[facility] 19:16:[facility] 



             Test Item    Value        Reference Range Interpretation Comments

 

             Lipase (test code = 3040-3)                       

 





[facility] 19:16:00* 



             Test Item    Value        Reference Range Interpretation Comments

 

             Amylase Level (test code = 1798-8) 46                        

        





[facility] 19:16:[facility]* 



             Test Item    Value        Reference Range Interpretation Comments

 

             Lipase (test code = 3040-3)                       

 





[facility] 19:16:00* 



             Test Item    Value        Reference Range Interpretation Comments

 

             Amylase Level (test code = 1798-8) 46           -125             

        





[facility] 19:16[facility] 



             Test Item    Value        Reference Range Interpretation Comments

 

             Lipase (test code = 3040-3)                       

 





[facility] 19:16:00* 



             Test Item    Value        Reference Range Interpretation Comments

 

             Amylase Level (test code = 1798-8) 46                        

        





[facility] 19:[facility] 



             Test Item    Value        Reference Range Interpretation Comments

 

             Lipase (test code = 3040-3)                       

 





[facility] 19:16:00* 



             Test Item    Value        Reference Range Interpretation Comments

 

             Amylase Level (test code = 1798-8) 46           -125             

        





[facility] 19:[facility] 



             Test Item    Value        Reference Range Interpretation Comments

 

             Lipase (test code = 3040-3)                       

 





[facility]-10[facility] 19:16:00* 



             Test Item    Value        Reference Range Interpretation Comments

 

             Amylase Level (test code = 1798-8) 46           125             

        





[facility] 19:16:[facility]* 



             Test Item    Value        Reference Range Interpretation Comments

 

             Lipase (test code = 3040-3)                       

 





[facility]2018-10[facility] 18:26:00  
                                                                                
  [facility]                        [address] [place] [address]      Patient Name: CRISTINA RICH     
                             MR #: [id number]                     :                                    Age/Sex: 53/F  Acct #: [id number]   
                          Req #: [id number]  Adm Physician:                     
                                Ordered by: IGNACIO MARQUES NP               
            Report #: [id number]        Location: ER                             
        Room/Bed:                     ______________________________________
_____________________________________________________________    Procedure: 1022
-0022 CT/CT ABDOMEN/PELVIS WO  Exam Date:                             Exam Time:
                                              REPORT STATUS: Signed    EXAM: CT 
Abdomen and Pelvis WITHOUT contrast     INDICATION: Left-sided abdominal pain. 
Kidney stones? Cervical cancer.   COMPARISON: .   TECHNIQUE: Abdomen and pe
lvis were scanned utilizing a multidetector helical   scanner from the lung base
to the pubic symphysis without administration of IV   contrast. Absence of intr
avenous contrast decreases sensitivity for detection   of focal lesions and vasc
ular pathology. Coronal and sagittal reformations were   obtained. Routine roland
col was performed.                IV CONTRAST: None.               ORAL CONTRAST
: Water               RADIATION DOSE: Total DLP: 567.26 mGy*cm                Es
timated effective dose: (DLP x 0.015 x size factor) mSv               COMPLICATI
ONS: None      FINDINGS:      LINES and TUBES: Right nephrostomy tube with dista
l tip coiled within the right   renal pelvis. Interval placement of a left doubl
e-J ureteral stent in adequate   position. Pole catheter within the bladder.    
 LOWER THORAX:  Costophrenic granuloma in the posterior right lung base.      H
EPATOBILIARY:   No focal hepatic lesions. No biliary ductal dilation.       GALL
BLADDER: No stomach and observed. No wall thickening.      SPLEEN: No splenomega
ly.       PANCREAS: No focal masses or ductal dilatation.        ADRENALS: No ad
renal nodules.      KIDNEYS/URETERS:  9 mm top within the lower pole of the left
kidney. Mild right   hydronephrosis new since the prior examinations the presen
ce of a nephrostomy   tube. Mild to moderate left hydronephrosis appears unchang
ed. New left   perinephric stranding may reflect pyelonephritis. Left periureter
al fat   stranding.      GI TRACT: No abnormal distention, wall thickening, or e
vidence of bowel   obstruction.   Appendix is normal.      PELVIC ORGANS/BLADDER
: Radiation seeds again observed in the uterine cervix.   Urinary bladder is dec
ompressed by a Gomez catheter.      LYMPH NODES: No lymphadenopathy.      VESSEL
S: Unremarkable.      PERITONEUM / RETROPERITONEUM: No free air or fluid.      B
ONES: There are degenerative changes in the lumbar spine.      SOFT TISSUES: Unr
emarkable.        IMPRESSION:       1.  New left perinephric stranding may refle
ct pyelonephritis.  Mild to   moderate left hydroureteronephrosis is stable.   2
. Interval development of right hydroureteronephrosis in spite of presence of   
a nephrostomy tube. Please correlate with nephrostomy output.      Signed by: Dr Vannessa Grimaldo M.D. on 10/22/2018 6:39 PM        Dictated By: EMMA DELGADILLO MD, MD  Electronically Signed By: EMMA GRIMALDO MD, MD on 10/22/18 1839  Tr
anscribed By: LUCINDA on 10/22/18 1839       COPY TO:   IGNACOI MARQUES NP    
   RENAL SCAN W/FLOW   [id number] 19:56:00    [place] [address]      Patient 
Name: CRISTINA RICH   MR #: [id number]    : 1965 Age/Sex: 53/F  Acct #:
[id number] Req #: [id number]  [facility] Phys[facility]:     Ordered by: JACQUELINE ARORA MD  
Report #: [id number]   Location: NM  Room/Bed:     
_______________________________________________________________________________
____________________    Procedure: [id number] NM/RENAL SCAN W/FLOW   FUNCTION  Ex
am Date: 18                            Exam Time: 1510       REPORT STATUS
: Signed       Renal Scan       Reason for exam: 53 F with vesicoureteral reflux
.      Radiopharmaceutical: Tc-99m MAG3 11 mCi      Report:  After administratio
n of the radiopharmaceutical, dynamic images of the   kidneys were obtained thro
[facility] 40 minutes.        LEFT KIDNEY:  Perfusion is prompt.  The left kidney has a
reniform shape with   irregular contours and mild thinning of the renal cortex, 
more apparent in the   upper pole.  Extraction of tracer from the blood pool is 
mildly decreased.   Clearance of tracer from the renal parenchyma is prompt. The
pelvicaliceal   system is very mildly dilated. Some increase in pooling of tra
cer is seen   within the pelvicaliceal system. Drainage of tracer from the pelvi
caliceal   system is adequate.  No stasis of tracer is seen in the left ureter. 
    RIGHT KIDNEY: A PERCUTANEOUS NEPHROSTOMY CATHETER IN THE RIGHT KIDNEY IS OP
EN   DURING THE STUDY.  Perfusion to the right kidney is prompt. The kidney has 
a   distorted reniform shape with moderate thinning of the renal cortex and   ir
regular contours.  The kidney is smaller than the left kidney. Extraction of   t
racer from the blood pool is mildly decreased. Clearance of tracer from the   re
nal parenchyma is prompt. The pelvicaliceal system does not appear dilated   alt
sean calices in the upper pole are slightly prominent.  Tracer drains   freely 
in the PCN catheter so the renal pelvis is not adequately evaluated.   There is 
no significant increase in pooling of tracer within the pelvicaliceal   system. 
Drainage of tracer from the pelvicaliceal study cannot be assessed   because of 
the open PCN tube. No tracer is seen in the right ureter.      DIFFERENTIAL SANTOSH
AL FUNCTION: Left kidney 62% and right kidney 38% (normal   43-57%.      Impress
ion:        1.  The left kidney shows evidence of mild medical renal disease and
some   scarring.  Mild hydronephrosis is present.  Obstruction is not suspected.
     2. The right kidney shows evidence of mild medical renal disease.  The ki
dney   shows significant scarring and this accounts for the decreased differenti
al   function of 38%.  Hydronephrosis cannot be assessed because an open PCN is 
 draining the pelvicaliceal system throughout the study.                  Signed
by: Dr. Riddhi Mcdermott M.D. on 2018 8:12 PM        Dictated By: RIDDHI MCDERMOTT MD  Electronically Signed By: RIDDHI MCDERMOTT MD on 18  Transcribed By: 
LUCINDA on 18       COPY TO:   JACQUELINE ARORA MD        IR CONSULT
2018 07:24:00    [facility]   [address] [place] [address]      Patient Name: CRISTINA RICH   MR #: 
[id number]    : 1965 Age/Sex: 52/F  Acct #: [id number] Req #: [id number]
[id number]  Adm Physician: ROYER DOVER MD    Ordered by: JACQUELINE ARORA MD  Report 
#: [id number]   Location: MED/SURG3  Room/Bed: [facility]    
__________________________________________________
_________________________________________________    Procedure:  DX/IR 
CONSULT  Exam Date:                             Exam Time:        REPORT STATUS:
Signed    Nephrostomy catheter evaluation and fluoroscopic exchange      Pre-Pr
ocedure Diagnosis: Cervical cancer status post radiation therapy with   right ur
eteral stenosis.   Post-procedure Diagnosis:Cervical cancer status post radiatio
n therapy with   right ureteral stenosis      : Naomi Martinez MD   Assista
nt: None   Sedation: Local 5 cc of 1% subcutaneous lidocaine      Radiation Dose
:235  cGycm2 (Dose Area Product)   Fluoroscopy time 1.1  minutes      Estimate b
lood loss: None    Blood administered: None   Complications: No immediate compli
cations   Implants/Grafts: 10 Fr UreSil    Specimen: None      Procedure:   Info
rmed consent was obtained and the patient positioned prone in the   fluoroscopy 
suite. A timeout was performed. The indwelling right nephrostomy   was prepped a
nd draped in standard fashion.      Diagnostic antegrade nephrostogram was perfo
ed nephrostomy. See findings   below.      An Amplatz wire was placed to secur
e access. The existing catheter was removed   demonstrating cloudy fluid and enc
rustation. A new 10 Fr nephrostomy catheter   was coiled in the renal pelvis wit
h fluoroscopic contrast injection confirming   position.       At the end of the
procedure the catheter was connected to gravity drainage and   a sterile dressi
ng applied. The patient tolerated the procedure well and   without immediate com
plication.      Findings:   Antegrade nephrostogram: Patent catheter. No hydrone
phrosis.  Diffuse right   ureteral stenosis with distal occlusion, as before. Th
e catheter was encrusted   with debris with cloudy urine.       Impression:   Ri
ght nephrostomy catheter exchange with fluoroscopic guidance as above.      Surya
mmendations:   Routine catheter exchange in 8-10 weeks.       Signed by: Dr. Shell Martinez MD on 2018 7:29 AM        Dictated By: NAOMI MARTINEZ MD  Electronical
ly Signed By: NAOMI MARTINEZ MD on 18 1326  Transcribed By: LUCINDA on 
8 1326       COPY TO:   JACQUELINE ARORA MD        SPECIAL PROCEDURE IN CATH LAB
2018 07:24:00    [place] [address]      Patient Name: CRISTINA RICH   MR #: 
[id number]    : 1965 Age/Sex: 52/F  Acct #: [id number] Req #: [id number]
[id number]  [facility] Physician: ROYER DOVER MD    Ordered by: JACQUELINE ARORA MD  Report 
#: [id number]   Location: MED/SURG3  Room/Bed: [facility]    
__________________________________________________
_________________________________________________    Procedure: [id number] IR/SPE
CIAL PROCEDURE IN CATH LAB  Exam Date:                             Exam Time:   
    REPORT STATUS: Signed    Nephrostomy catheter evaluation and fluoroscopic e
xchange      Pre-Procedure Diagnosis: Cervical cancer status post radiation ther
apy with   right ureteral stenosis.   Post-procedure Diagnosis:Cervical cancer s
tatus post radiation therapy with   right ureteral stenosis      : Leonor Martinez MD   Assistant: None   Sedation: Local 5 cc of 1% subcutaneous lidocaine 
    Radiation Dose:235  cGycm2 (Dose Area Product)   Fluoroscopy time 1.1  alfonso
lloyd      Estimate blood loss: None    Blood administered: None   Complications: 
No immediate complications   Implants/Grafts: 10 Fr UreSil    Specimen: None    
 Procedure:   Informed consent was obtained and the patient positioned prone in 
the   fluoroscopy suite. A timeout was performed. The indwelling right nephrost
angelina   was prepped and draped in standard fashion.      Diagnostic antegrade neph
rostogram was performed nephrostomy. See findings   below.      An Amplatz wire 
was placed to secure access. The existing catheter was removed   demonstrating c
loudy fluid and encrustation. A new 10 Fr nephrostomy catheter   was coiled in t
he renal pelvis with fluoroscopic contrast injection confirming   position.     
 At the end of the procedure the catheter was connected to gravity drainage and 
 a sterile dressing applied. The patient tolerated the procedure well and   wi
thout immediate complication.      Findings:   Antegrade nephrostogram: Patent c
atheter. No hydronephrosis.  Diffuse right   ureteral stenosis with distal occlu
wilfred, as before. The catheter was encrusted   with debris with cloudy urine.    
  Impression:   Right nephrostomy catheter exchange with fluoroscopic guidance 
as above.      Recommendations:   Routine catheter exchange in 8-10 weeks.      
Signed by: Dr. Naomi Martinez MD on 2018 7:29 AM        Dictated By: NAOMI MOHAMUD MD  Electronically Signed By: NAOMI MARTINEZ MD on 18 1326  Transcribed By:
LUCINDA on 18 1326       COPY TO:   JACQUELINE ARORA MD        Bacterial urine 
bypuuby0515-48-00 06:08:00* 



             Test Item    Value        Reference Range Interpretation Comments

 

             Urine Culture (test code = 630-4) Organism: KLEBSIELLA PNEUMONIAE-E

SBL                            





[facility] Center[facility] loypxli5219-64-30 
06:08:00* 



             Test Item    Value        Reference Range Interpretation Comments

 

             Urine Culture (test code = 630-4) Organism: KLEBSIELLA PNEUMONIAE-E

SBL                            





[facility] Center[facility] FFIVBOFOW5455-81-52 
11:46:00    [place] [address]      Patient Name: CRISTINA RICH   MR #: 
[id number]    : 1965 Age/Sex: 52/F  Acct #: [id number] Req #: [id number]
[id number]  [facility]m Physician: ROYER DOVER MD    Ordered by: JACQUELINE ARORA MD  Report 
#: [id number]   Location: MED/SURG3  Room/Bed: 2901    
__________________________________________________
_________________________________________________    Procedure: [id number] DX/BAKARI
ST XRAY LINE PLACEMENT  Exam Date:                             Exam Time:       
REPORT STATUS: Signed    PROCEDURE:   A single AP view of the chest.       COMP
ARISON: None.       INDICATIONS:   PICC LINE PLACEMENT           FINDINGS:   Birgit
es/tubes:  Right sided PICC terminating in the expected location of    the SVC. 
      Lungs: Low lung volumes. There is no evidence of pneumonia or pulmonary   
edema. Mild patchy bibasilar atelectasis.        Pleura:  There is no pleural 
effusion or pneumothorax.       Heart and mediastinum:  The cardiomediastinal si
lhouette is    unremarkable.       Bones:  No acute bony abnormality.       IMPR
ESSION:    Right sided PICC terminating in the expected location of the SVC. No 
  evidence of pneumothorax.            Dictated by:  NAOMI MARTINEZ M.D. on 
018 at 11:46        Electronically approved by:  NAOMI MARTINEZ M.D. on 2018 a
t 11:46                Dictated By: NAOMI MARTINEZ MD  Electronically Signed By: SHELL MARTINEZ MD on 18 1146  Transcribed By: FERNANDO on 18 1146       COPY T
O:   JACQUELINE ARORA MD        Platelet Nffxxhoy6085-09-42 07:05:00* 



             Test Item    Value        Reference Range Interpretation Comments

 

             Platelet Estimate (test code = 11824-4) ADEQUATE                   

             





[facility] Hldodnc5850-32-83 
07:05:00* 



             Test Item    Value        Reference Range Interpretation Comments

 

             Platelet Morphology Comment (test code = 83430-8) NORMAL           

                       





NO CLUMPS. 0705 on 18 by Maribel Doyle [facility]
Ppcixzcwjnwwm0006-09-82 07:05:00* 



             Test Item    Value        Reference Range Interpretation Comments

 

             Hypochromasia (test code = 728-6) SLIGHT                           

       





[facility] Center[facility] Bvwjrpn7293-80-82 
07:05:00* 



             Test Item    Value        Reference Range Interpretation Comments

 

             Red Cell Morphology Comment (test code = 6742-1) NORMAL            

                      





[facility] CenterPl[facility] Iavqhxeq3618-88-65 07:05:00* 



             Test Item    Value        Reference Range Interpretation Comments

 

             Platelet Estimate (test code = 34725-4) ADEQUATE                   

             





[facility] CenterPl[facility] Morphology Hoxizij7597-21-20 
07:05:00* 



             Test Item    Value        Reference Range Interpretation Comments

 

             Platelet Morphology Comment (test code = 63286-7) NORMAL           

                       





NO CLUMPS. 07 on 18 by [facility]
Vxbpbuazgnoro9956-25-83 07:05:00* 



             Test Item    Value        Reference Range Interpretation Comments

 

             Hypochromasia (test code = 728-6) SLIGHT                           

       





[facility] Center[facility] Mxojdzl5625-15-47 
07:05:00* 



             Test Item    Value        Reference Range Interpretation Comments

 

             Red Cell Morphology Comment (test code = 6742-1) NORMAL            

                      





[facility] CenterPl[facility] Dxczmzvo0428-20-17 07:05:00* 



             Test Item    Value        Reference Range Interpretation Comments

 

             Platelet Estimate (test code = 30029-2) ADEQUATE                   

             





[facility] Center[facility] Morph[facility] Aifudkr5507-07-50 
07:05:00* 



             Test Item    Value        Reference Range Interpretation Comments

 

             Platelet Morphology Comment (test code = 06009-0) NORMAL           

                       





NO CLUMPS. 705 on 18 by [facility]
Schypxzydxqnt4093-06-83 07:05:00* 



             Test Item    Value        Reference Range Interpretation Comments

 

             Hypochromasia (test code = 728-6) SLIGHT                           

       





[facility] Center[facility] Pahbqpt4769-66-68 
07:05:00* 



             Test Item    Value        Reference Range Interpretation Comments

 

             Red Cell Morphology Comment (test code = 6742-1) NORMAL            

                      





[facility] CenterPl[facility]let Ecpulyzq0908-16-12 07:05:00* 



             Test Item    Value        Reference Range Interpretation Comments

 

             Platelet Estimate (test code = 14045-2) ADEQUATE                   

             





[facility] CenterPl[facility] Morphology Jrtbmtg6925-99-69 
07:05:00* 



             Test Item    Value        Reference Range Interpretation Comments

 

             Platelet Morphology Comment (test code = 18088-6) NORMAL           

                       





NO CLUMPS. 07 on 18 by [facility]
Gnwijvqivfdip7910-48-85 07:05:00* 



             Test Item    Value        Reference Range Interpretation Comments

 

             Hypochromasia (test code = 728-6) SLIGHT                           

       





[facility] Center[facility] Plszxhj2712-68-36 
07:05:00* 



             Test Item    Value        Reference Range Interpretation Comments

 

             Red Cell Morphology Comment (test code = 6742-1) NORMAL            

                      





[facility] CenterPl[facility]let Mjjnxhgi5347-49-13 07:05:00* 



             Test Item    Value        Reference Range Interpretation Comments

 

             Platelet Estimate (test code = 40475-3) ADEQUATE                   

             





[facility]Pl[facility]ology Hwbaolm5379-68-78 
07:05:00* 



             Test Item    Value        Reference Range Interpretation Comments

 

             Platelet Morphology Comment (test code = 93024-8) NORMAL           

                       





NO CLUMPS. 705 on 18 by [facility]
Malktmzmvofhc5585-49-44 07:05:00* 



             Test Item    Value        Reference Range Interpretation Comments

 

             Hypochromasia (test code = 728-6) SLIGHT                           

       





[facility] CenterRed [facility] Morphology Iafyfzz2241-12-94 
07:05:00* 



             Test Item    Value        Reference Range Interpretation Comments

 

             Red Cell Morphology Comment (test code = 6742-1) NORMAL            

                      





[facility] CenterPl[facility]let Pknpdbkz2475-13-05 07:05:00* 



             Test Item    Value        Reference Range Interpretation Comments

 

             Platelet Estimate (test code = 74996-8) ADEQUATE                   

             





[facility] CenterPl[facility]let Morphology Aydualf0716-07-92 
07:05:00* 



             Test Item    Value        Reference Range Interpretation Comments

 

             Platelet Morphology Comment (test code = 15349-0) NORMAL           

                       





NO CLUMPS. 705 on 18 by [facility]
Fsndsawmkmwye4855-83-26 07:05:00* 



             Test Item    Value        Reference Range Interpretation Comments

 

             Hypochromasia (test code = 728-6) SLIGHT                           

       





[facility] CenterRed [facility] Morphology Aolfjgq1564-78-96 
07:05:00* 



             Test Item    Value        Reference Range Interpretation Comments

 

             Red Cell Morphology Comment (test code = 6742-1) NORMAL            

                      





[facility] CenterPlatelet Zvkawpqm9978-37-36 07:05:00* 



             Test Item    Value        Reference Range Interpretation Comments

 

             Platelet Estimate (test code = 84213-2) ADEQUATE                   

             





[facility] CenterPlateSt[facility] Morphology Ukfydho4842-68-36 
07:05:00* 



             Test Item    Value        Reference Range Interpretation Comments

 

             Platelet Morphology Comment (test code = 94265-7) NORMAL           

                       





NO CLUMPS. 0705 on 18 by [facility]
Jkuogdhnaetiu2690-97-12 07:05:00* 



             Test Item    Value        Reference Range Interpretation Comments

 

             Hypochromasia (test code = 728-6) SLIGHT                           

       





[facility] CenterRed [facility] Morphology Sehexln3033-73-75 
07:05:00* 



             Test Item    Value        Reference Range Interpretation Comments

 

             Red Cell Morphology Comment (test code = 6742-1) NORMAL            

                      





[facility] CenterPlatelet Xzrrueme4221-33-77 07:05:00* 



             Test Item    Value        Reference Range Interpretation Comments

 

             Platelet Estimate (test code = 49563-9) ADEQUATE                   

             





[facility] CenterPlatelet Morphology Ukrvmkp0361-12-69 
07:05:00* 



             Test Item    Value        Reference Range Interpretation Comments

 

             Platelet Morphology Comment (test code = 85717-5) NORMAL           

                       





NO CLUMPS. 07 on 18 by [facility]
Eagxjysxwjcjf0470-71-55 07:05:00* 



             Test Item    Value        Reference Range Interpretation Comments

 

             Hypochromasia (test code = 728-6) SLIGHT                           

       





[facility] CenterRed [facility] Morphology Qlucykd4881-94-85 
07:05:00* 



             Test Item    Value        Reference Range Interpretation Comments

 

             Red Cell Morphology Comment (test code = 6742-1) NORMAL            

                      





[facility] CenterPlatelet Pohlkzia2170-70-09 07:05:00* 



             Test Item    Value        Reference Range Interpretation Comments

 

             Platelet Estimate (test code = 13176-5) ADEQUATE                   

             





[facility] CenterPlatelet Morphology Uqmmowv8472-07-63 
07:05:00* 



             Test Item    Value        Reference Range Interpretation Comments

 

             Platelet Morphology Comment (test code = 76711-8) NORMAL           

                       





NO CLUMPS. 0705 on 18 by Maribel Doyle [facility]
Cabpsmjodtwnv5852-52-57 07:05:00* 



             Test Item    Value        Reference Range Interpretation Comments

 

             Hypochromasia (test code = 728-6) SLIGHT                           

       





[facility] CenterRed [facility] Morphology Hleqwjo0147-94-93 
07:05:00* 



             Test Item    Value        Reference Range Interpretation Comments

 

             Red Cell Morphology Comment (test code = 6742-1) NORMAL            

                      





[facility] CenterBacterial urine thdbhja9939-87-60 
06:42:00* 



             Test Item    Value        Reference Range Interpretation Comments

 

             Urine Culture (test code = 630-4) Organism: KLEBSIELLA PNEUMONIAE-E

SBL#2                            





[facility] CenterBacterial urine uqovjec7270-58-27 
06:42:00* 



             Test Item    Value        Reference Range Interpretation Comments

 

             Urine Culture (test code = 630-4) Organism: KLEBSIELLA PNEUMONIAE-E

SBL#2                            





[facility] CenterWhite Blood Nwrkm2562-24-46 06:24:00* 



             Test Item    Value        Reference Range Interpretation Comments

 

             White Blood Count (test code = 6690-2) 8.38         4.8-10.8       

            





[facility] CenterRed Blood Tsjke5828-46-59 06:24:00* 



             Test Item    Value        Reference Range Interpretation Comments

 

             Red Blood Count (test code = 789-8) 3.76         3.6-5.1           

         





[facility] CenterHemoglobin2018-08-13 06:24:00* 



             Test Item    Value        Reference Range Interpretation Comments

 

             Hemoglobin (test code = 12452-4) 9.6          12.0-16.0    L       

      





[facility] CenterHematocrit2018-08-13 06:24:00* 



             Test Item    Value        Reference Range Interpretation Comments

 

             Hematocrit (test code = 4544-3) 31.3         34.2-44.1    L        

     





[facility] CenterMean Corpuscular Nnfwmj3790-19-19 
06:24:00* 



             Test Item    Value        Reference Range Interpretation Comments

 

             Mean Corpuscular Volume (test code = 787-2) 83.2         81-99     

                 





[facility] CenterMean Corpuscular Yvdpnhbnws6325-69-93 
06:24:00* 



             Test Item    Value        Reference Range Interpretation Comments

 

             Mean Corpuscular Hemoglobin (test code = 785-6) 25.5         28-32 

       L             





[facility] CenterMean Corpuscular Hemoglobin Concent
2018 06:24:00* 



             Test Item    Value        Reference Range Interpretation Comments

 

             Mean Corpuscular Hemoglobin Concent (test code = 786-4) 30.7       

  31-35        L             





[facility] CenterRed Cell Distribution Xigmd1561-19-61 
06:24:00* 



             Test Item    Value        Reference Range Interpretation Comments

 

             Red Cell Distribution Width (test code = 44855-6) 15.0         11.7

-14.4    H             





[facility] CenterPlatelet Hasir6457-61-90 06:24:00* 



             Test Item    Value        Reference Range Interpretation Comments

 

             Platelet Count (test code = 777-3) 302          140-360            

        





[facility] CenterNeutrophils (%) (Auto)2018 06:24:00
  * 



             Test Item    Value        Reference Range Interpretation Comments

 

             Neutrophils (%) (Auto) (test code = 40962-0) 58.2         38.7-80.0

                  





[facility] CenterLymphocytes (%) (Auto)2018 06:24:00
  * 



             Test Item    Value        Reference Range Interpretation Comments

 

             Lymphocytes (%) (Auto) (test code = 736-9) 31.7         18.0-39.1  

                





[facility] CenterMonocytes (%) (Auto)2018 06:24:00* 



             Test Item    Value        Reference Range Interpretation Comments

 

             Monocytes (%) (Auto) (test code = 5905-5) 7.0          4.4-11.3    

               





[facility] CenterEosinophils (%) (Auto)2018 06:24:00
  * 



             Test Item    Value        Reference Range Interpretation Comments

 

             Eosinophils (%) (Auto) (test code = 713-8) 1.9          0.0-6.0    

                





[facility] CenterBasophils (%) (Auto)2018 06:24:00* 



             Test Item    Value        Reference Range Interpretation Comments

 

             Basophils (%) (Auto) (test code = 706-2) 0.6          0.0-1.0      

              





[facility] CenterIM GRANULOCYTES %2018 06:24:00* 



             Test Item    Value        Reference Range Interpretation Comments

 

             IM GRANULOCYTES % (test code = IM GRANULOCYTES %) 0.6          0.0-

1.0                    





[facility] CenterNeutrophils # (Auto)2018 06:24:00* 



             Test Item    Value        Reference Range Interpretation Comments

 

             Neutrophils # (Auto) (test code = 751-8) 4.9          2.1-6.9      

              





[facility] CenterLymphocytes # (Auto)2018 06:24:00* 



             Test Item    Value        Reference Range Interpretation Comments

 

             Lymphocytes # (Auto) (test code = 89224-9) 2.7          1.0-3.2    

                





[facility] CenterMonocytes # (Auto)2018 06:24:00* 



             Test Item    Value        Reference Range Interpretation Comments

 

             Monocytes # (Auto) (test code = 742-7) 0.6          0.2-0.8        

            





[facility] CenterEosinophils # (Auto)2018 06:24:00* 



             Test Item    Value        Reference Range Interpretation Comments

 

             Eosinophils # (Auto) (test code = 711-2) 0.2          0.0-0.4      

              





[facility] CenterBasophils # (Auto)2018 06:24:00* 



             Test Item    Value        Reference Range Interpretation Comments

 

             Basophils # (Auto) (test code = 704-7) 0.1          0.0-0.1        

            





[facility] CenterAbsolute Immature Granulocyte (auto
2018 06:24:00* 



             Test Item    Value        Reference Range Interpretation Comments

 

                                        Absolute Immature Granulocyte (auto (lloyd

t code = Absolute Immature Granulocyte 

(auto)          0.05            0-0.1                            





[facility] CenterWhite Blood Uekvh0818-65-86 06:24:00* 



             Test Item    Value        Reference Range Interpretation Comments

 

             White Blood Count (test code = 6690-2) 8.38         4.8-10.8       

            





[facility] CenterRed Blood Ylmpa1783-46-31 06:24:00* 



             Test Item    Value        Reference Range Interpretation Comments

 

             Red Blood Count (test code = 789-8) 3.76         3.6-5.1           

         





[facility] CenterHemoglobin2018-08-13 06:24:00* 



             Test Item    Value        Reference Range Interpretation Comments

 

             Hemoglobin (test code = 98769-9) 9.6          12.0-16.0    L       

      





[facility] CenterHematocrit2018-08-13 06:24:00* 



             Test Item    Value        Reference Range Interpretation Comments

 

             Hematocrit (test code = 4544-3) 31.3         34.2-44.1    L        

     





[facility] CenterMean Corpuscular Svcouo2538-49-94 
06:24:00* 



             Test Item    Value        Reference Range Interpretation Comments

 

             Mean Corpuscular Volume (test code = 787-2) 83.2         81-99     

                 





[facility] CenterMean Corpuscular Gdqcgobake9897-96-02 
06:24:00* 



             Test Item    Value        Reference Range Interpretation Comments

 

             Mean Corpuscular Hemoglobin (test code = 785-6) 25.5         28-32 

       L             





[facility] CenterMean Corpuscular Hemoglobin Concent
2018 06:24:00* 



             Test Item    Value        Reference Range Interpretation Comments

 

             Mean Corpuscular Hemoglobin Concent (test code = 786-4) 30.7       

  31-35        L             





[facility] CenterRed Cell Distribution Vamrn3286-32-12 
06:24:00* 



             Test Item    Value        Reference Range Interpretation Comments

 

             Red Cell Distribution Width (test code = 99863-7) 15.0         11.7

-14.4    H             





[facility] CenterPlatelet Flfvt0183-84-04 06:24:00* 



             Test Item    Value        Reference Range Interpretation Comments

 

             Platelet Count (test code = 777-3) 302          140-360            

        





[facility] CenterNeutrophils (%) (Auto)2018 06:24:00
  * 



             Test Item    Value        Reference Range Interpretation Comments

 

             Neutrophils (%) (Auto) (test code = 52121-7) 58.2         38.7-80.0

                  





[facility] CenterLymphocytes (%) (Auto)2018 06:24:00
  * 



             Test Item    Value        Reference Range Interpretation Comments

 

             Lymphocytes (%) (Auto) (test code = 736-9) 31.7         18.0-39.1  

                





[facility] CenterMonocytes (%) (Auto)2018 06:24:00* 



             Test Item    Value        Reference Range Interpretation Comments

 

             Monocytes (%) (Auto) (test code = 5905-5) 7.0          4.4-11.3    

               





[facility] CenterEosinophils (%) (Auto)2018 06:24:00
  * 



             Test Item    Value        Reference Range Interpretation Comments

 

             Eosinophils (%) (Auto) (test code = 713-8) 1.9          0.0-6.0    

                





[facility] CenterBas[facility] (%) (Auto)2018 06:24:00* 



             Test Item    Value        Reference Range Interpretation Comments

 

             Basophils (%) (Auto) (test code = 706-2) 0.6          0.0-1.0      

              





[facility] CenterIM GRANULOCYTES %2018 06:24:00* 



             Test Item    Value        Reference Range Interpretation Comments

 

             IM GRANULOCYTES % (test code = IM GRANULOCYTES %) 0.6          0.0-

1.0                    





[facility] CenterNeutrophils # (Auto)2018 06:24:00* 



             Test Item    Value        Reference Range Interpretation Comments

 

             Neutrophils # (Auto) (test code = 751-8) 4.9          2.1-6.9      

              





[facility] CenterLymphocytes # (Auto)2018 06:24:00* 



             Test Item    Value        Reference Range Interpretation Comments

 

             Lymphocytes # (Auto) (test code = 82602-8) 2.7          1.0-3.2    

                





[facility] CenterMonocytes # (Auto)2018 06:24:00* 



             Test Item    Value        Reference Range Interpretation Comments

 

             Monocytes # (Auto) (test code = 742-7) 0.6          0.2-0.8        

            





[facility] CenterEosinophils # (Auto)2018 06:24:00* 



             Test Item    Value        Reference Range Interpretation Comments

 

             Eosinophils # (Auto) (test code = 711-2) 0.2          0.0-0.4      

              





[facility] CenterBasophils # (Auto)2018 06:24:00* 



             Test Item    Value        Reference Range Interpretation Comments

 

             Basophils # (Auto) (test code = 704-7) 0.1          0.0-0.1        

            





[facility] CenterAbsolute Immature Granulocyte (auto
2018 06:24:00* 



             Test Item    Value        Reference Range Interpretation Comments

 

                                        Absolute Immature Granulocyte (auto (lloyd

t code = Absolute Immature Granulocyte 

(auto)          0.05            0-0.1                            





[facility] Centerodium Rdovd5136-92-44 06:13:00* 



             Test Item    Value        Reference Range Interpretation Comments

 

             Sodium Level (test code = 2951-2) 141          136-145             

       





[facility] CenterPotassium Xuxgu2581-78-63 06:13:00* 



             Test Item    Value        Reference Range Interpretation Comments

 

             Potassium Level (test code = 2823-3) 3.7          3.5-5.1          

          





[facility] CenterChloride Aanae0989-05-79 06:13:00* 



             Test Item    Value        Reference Range Interpretation Comments

 

             Chloride Level (test code = 2075-0) 107                      

         





[facility] CenterCarbon Dioxide Hiwwh6063-23-72 06:13:00* 



             Test Item    Value        Reference Range Interpretation Comments

 

             Carbon Dioxide Level (test code = 2028-9) 23           22-29       

               





[facility] CenterAnion Wrn6396-22-14 06:13:00* 



             Test Item    Value        Reference Range Interpretation Comments

 

             Anion Gap (test code = 33037-3) 14.7         8-16                  

     





[facility] CenterBlood Urea Xkpyttou3699-71-08 06:13:00* 



             Test Item    Value        Reference Range Interpretation Comments

 

             Blood Urea Nitrogen (test code = 3094-0) 11           7-26         

              





[facility] CenterCreatinine2018-08-13 06:13:00* 



             Test Item    Value        Reference Range Interpretation Comments

 

             Creatinine (test code = 2160-0) 0.73         0.57-1.11             

     





[facility] CenterBUN/Creatinine Uljxc2887-71-81 06:13:00* 



             Test Item    Value        Reference Range Interpretation Comments

 

             BUN/Creatinine Ratio (test code = 3097-3) 15           6-25        

               





[facility] CenterEstimat Glomerular Filtration Rate
2018 06:13:00* 



             Test Item    Value        Reference Range Interpretation Comments

 

             Estimat Glomerular Filtration Rate (test code = 94492-7) 60-       

   >60                        





Ranges were taken from the National Kidney Disease Education Program and the [facility] Foundation literature.Reference ranges:60 or greater: Uyfnol98-35 (
for 3 consecutive months): Chronic kidney disease 15 or less: Kidney failureBrownfield Regional Medical CenterGlucose Iygfs3449-33-76 06:13:00* 



             Test Item    Value        Reference Range Interpretation Comments

 

             Glucose Level (test code = CAA0995) 118                      

         





[facility] CenterCalcium Aehup2316-90-04 06:13:00* 



             Test Item    Value        Reference Range Interpretation Comments

 

             Calcium Level (test code = 19334-4) 8.7          8.4-10.2          

         





[facility] Centerodium Iaqri2977-17-78 06:13:00* 



             Test Item    Value        Reference Range Interpretation Comments

 

             Sodium Level (test code = 2951-2) 141          136-145             

       





[facility] CenterPotassium Apcvx5477-25-83 06:13:00* 



             Test Item    Value        Reference Range Interpretation Comments

 

             Potassium Level (test code = 2823-3) 3.7          3.5-5.1          

          





[facility] CenterChloride Jviuq8839-04-75 06:13:00* 



             Test Item    Value        Reference Range Interpretation Comments

 

             Chloride Level (test code = 2075-0) 107                      

         





[facility] CenterCarbon Dioxide Frztt5047-70-08 06:13:00* 



             Test Item    Value        Reference Range Interpretation Comments

 

             Carbon Dioxide Level (test code = 2028-9) 23           22-29       

               





[facility] CenterAnion Nnj6597-79-05 06:13:00* 



             Test Item    Value        Reference Range Interpretation Comments

 

             Anion Gap (test code = 33037-3) 14.7         8-16                  

     





[facility] CenterBlood Urea Qvnpsnlr5105-53-06 06:13:00* 



             Test Item    Value        Reference Range Interpretation Comments

 

             Blood Urea Nitrogen (test code = 3094-0) 11           -26         

              





[facility] CenterCreatinine2018-08-13 06:13:00* 



             Test Item    Value        Reference Range Interpretation Comments

 

             Creatinine (test code = 2160-0) 0.73         0.57-1.11             

     





[facility] CenterBUN/Creatinine Zyzft0077-47-62 06:13:00* 



             Test Item    Value        Reference Range Interpretation Comments

 

             BUN/Creatinine Ratio (test code = 3097-3) 15                   

               





[facility] CenterEstimat Glomerular Filtration Rate
2018 06:13:00* 



             Test Item    Value        Reference Range Interpretation Comments

 

             Estimat Glomerular Filtration Rate (test code = 139012114) 60-     

     >60                        





Ranges were taken from the National Kidney Disease Education Program and the [facility] Foundation literature.Reference ranges:60 or greater: Xgiyun27-34 (
for 3 consecutive months): Chronic kidney disease 15 or less: Kidney failureBrownfield Regional Medical CenterGlucose Pqmys7417-48-28 06:13:00* 



             Test Item    Value        Reference Range Interpretation Comments

 

             Glucose Level (test code = SGY7251) 118                      

         





[facility] CenterCalcium Yhyyh0908-48-50 06:13:00* 



             Test Item    Value        Reference Range Interpretation Comments

 

             Calcium Level (test code = 55203-9) 8.7          8.4-10.2          

         





[facility] CenterABDOMEN-1VIEW (KUB)2018-08-10 07:34:00   
[facility]   [address] [place] [address]      Patient Name: CRISTINA RICH   MR #: [id number]    :  Age/Sex: 52/F  Acct #: [id number] Req #: [id number]  Adm Physician: ROYER ALW MD    Ordered by: JACQUELINE ARORA MD  Report #: [id number]   Location: 
[facility]  Room/Bed: [place] [address]    __________________________________________________
_________________________________________________    Procedure: [id number] DX/ABD
OMEN-1VIEW (KUB)  Exam Date: 08/10/18                            Exam Time: 0710
      REPORT STATUS: Signed    PROCEDURE:   X-RAY ABDOMEN - KUB       COMPARISO
N:   CT Abdomen/Pelvis 3/4/18.       INDICATIONS:   PREOPERATIVE XRAY FOR ESWL  
     FINDINGS:      Right sided PCN is present. A 5 mm and 2 mm calcification p
rojects over    the left kidney. No calcifications over the expected location of
the    ureters. Surgical clips in the pelvis.        There is a non-obstructed 
bowel-gas pattern. There are no acute osseous    abnormalities.        CONCLUSIO
N:      Right sided PCN in similar position.        Left sided renal stones kristi
uring up to 5 mm, better characterized on    CT from 3/4/18. No evidence of ston
es overlying the ureters.       Dictated by:  NAOMI MARTINEZ M.D. on 8/10/2018 at 7
:34        Electronically approved by:  NAOMI MARTINEZ M.D. on 8/10/2018 at 7:34   
            Dictated By: NAOMI MARTINEZ MD  Electronically Signed By: NAOMI MARTINEZ MD 
on 08/10/18 0734  Transcribed By: FERNANDO on 08/10/18 0734       COPY TO:   JACQUELINE SMITH MD        Urine Szbmr7547-56-49 23:08:00* 



             Test Item    Value        Reference Range Interpretation Comments

 

             Urine Color (test code = 5778-6) YELLOW       YELLOW               

      





[facility] CenterUrine Crggziy5933-19-99 23:08:00* 



             Test Item    Value        Reference Range Interpretation Comments

 

             Urine Clarity (test code = 60462-5) CLOUDY       CLEAR        H    

         





[facility] CenterUrine Specific Adpluei5663-47-08 23:08:00
  * 



             Test Item    Value        Reference Range Interpretation Comments

 

             Urine Specific Gravity (test code = 5811-5) 1.020        1.010-1.02

5                





[facility] CenterUrine hS8098-59-70 23:08:00* 



             Test Item    Value        Reference Range Interpretation Comments

 

             Urine pH (test code = 12389-9) 6            5-7                    

    





[facility] CenterUrine Leukocyte Acmheyld6585-96-83 
23:08:00* 



             Test Item    Value        Reference Range Interpretation Comments

 

             Urine Leukocyte Esterase (test code = 5799-2) 1+           NEGATIVE

     H             





[facility] CenterUrine Wgbpjyu0345-37-98 23:08:00* 



             Test Item    Value        Reference Range Interpretation Comments

 

             Urine Nitrite (test code = 02508-3) POSITIVE     NEGATIVE     H    

         





[facility] CenterUrine Qutfpuy2319-98-58 23:08:00* 



             Test Item    Value        Reference Range Interpretation Comments

 

             Urine Protein (test code = 5804-0) 2+           NEGATIVE     H     

        





[facility] CenterUrine Glucose (UA)2018 23:08:00* 



             Test Item    Value        Reference Range Interpretation Comments

 

             Urine Glucose (UA) (test code = 2349-9) 1+           NEGATIVE     H

             





[facility] CenterUrine Xezhxpk0022-78-02 23:08:00* 



             Test Item    Value        Reference Range Interpretation Comments

 

             Urine Ketones (test code = 13669-2) NEGATIVE     NEGATIVE          

         





[facility] CenterUrine Mgecsgclxwyi7449-48-17 23:08:00* 



             Test Item    Value        Reference Range Interpretation Comments

 

             Urine Urobilinogen (test code = 02628-8) 0.2          0.2-1        

              





[facility] CenterUrine Vimilqsiv8319-86-56 23:08:00* 



             Test Item    Value        Reference Range Interpretation Comments

 

             Urine Bilirubin (test code = 1978-6) NEGATIVE     NEGATIVE         

          





[facility] CenterUrine Wahbr4558-11-85 23:08:00* 



             Test Item    Value        Reference Range Interpretation Comments

 

             Urine Blood (test code = 75443-9) 3+           NEGATIVE     H      

       





[facility] CenterUrine VJQ0980-00-61 23:08:00* 



             Test Item    Value        Reference Range Interpretation Comments

 

             Urine WBC (test code = 5821-4) 50-          0-5          H         

    





[facility] CenterUrine HQO9599-03-32 23:08:00* 



             Test Item    Value        Reference Range Interpretation Comments

 

             Urine RBC (test code = 96147-5) 0-5          0-5                   

     





[facility] CenterUrine Etsjzasg9364-54-31 23:08:00* 



             Test Item    Value        Reference Range Interpretation Comments

 

             Urine Bacteria (test code = 04522-2) MODERATE     NONE         [facility] Epithelial Qnqxx8480-74-13 23:08:00
  * 



             Test Item    Value        Reference Range Interpretation Comments

 

             Urine Epithelial Cells (test code = 09495-9) RARE         NONE     

                  





[facility] CenterUrine Psxqx9515-85-68 23:08:00* 



             Test Item    Value        Reference Range Interpretation Comments

 

             Urine Color (test code = 5778-6) YELLOW       YELLOW               

      





[facility] CenterUrine Sixodrc5163-25-01 23:08:00* 



             Test Item    Value        Reference Range Interpretation Comments

 

             Urine Clarity (test code = 02554-1) CLOUDY       CLEAR        [facility] CenterU[facility] Specific Awvtedv8243-28-01 23:08:00
  * 



             Test Item    Value        Reference Range Interpretation Comments

 

             Urine Specific Gravity (test code = 5811-5) 1.020        1.010-1.02

5                





[facility] CenterUrine mM1390-21-64 23:08:00* 



             Test Item    Value        Reference Range Interpretation Comments

 

             Urine pH (test code = 20002-2) 6            5-7                    

    





[facility] CenterUrine Leukocyte Rlnlgkum3387-43-23 
23:08:00* 



             Test Item    Value        Reference Range Interpretation Comments

 

             Urine Leukocyte Esterase (test code = 5799-2) 1+           NEGATIVE

     [facility] CenterU[facility] Lzmagfn5887-56-02 23:08:00* 



             Test Item    Value        Reference Range Interpretation Comments

 

             Urine Nitrite (test code = 97511-8) POSITIVE     NEGATIVE     [facility] Yegccit9853-61-98 23:08:00* 



             Test Item    Value        Reference Range Interpretation Comments

 

             Urine Protein (test code = 5804-0) 2+           NEGATIVE     [facility] CenterU[facility] Glucose (UA)2018 23:08:00* 



             Test Item    Value        Reference Range Interpretation Comments

 

             Urine Glucose (UA) (test code = 2349-9) 1+           NEGATIVE     [facility] CenterU[facility] Eumdddu4729-21-62 23:08:00* 



             Test Item    Value        Reference Range Interpretation Comments

 

             Urine Ketones (test code = 81384-1) NEGATIVE     NEGATIVE          

         





[facility] CenterUrine Kjzfyxmatckc7649-27-77 23:08:00* 



             Test Item    Value        Reference Range Interpretation Comments

 

             Urine Urobilinogen (test code = 87045-6) 0.2          0.2-1        

              





[facility] CenterU[facility] Kthmucefc1438-09-10 23:08:00* 



             Test Item    Value        Reference Range Interpretation Comments

 

             Urine Bilirubin (test code = 1978-6) NEGATIVE     NEGATIVE         

          





[facility] CenterUrine Mvyxe8132-36-95 23:08:00* 



             Test Item    Value        Reference Range Interpretation Comments

 

             Urine Blood (test code = 21042-5) 3+           NEGATIVE     H      

       





[facility] CenterUrine LCZ5173-60-87 23:08:00* 



             Test Item    Value        Reference Range Interpretation Comments

 

             Urine WBC (test code = 5821-4) 50-          0-5          H         

    





[facility] CenterUrine DTF0119-74-32 23:08:00* 



             Test Item    Value        Reference Range Interpretation Comments

 

             Urine RBC (test code = 78669-6) 0-5          0-5                   

     





[facility] CenterUrine Jteefthx6926-92-44 23:08:00* 



             Test Item    Value        Reference Range Interpretation Comments

 

             Urine Bacteria (test code = 50157-7) MODERATE     NONE         H   

          





[facility] CenterUrine Epithelial Pleek8645-32-83 23:08:00
  * 



             Test Item    Value        Reference Range Interpretation Comments

 

             Urine Epithelial Cells (test code = 99068-9) RARE         NONE     

                  





[facility] CenterCT ABDOMEN/PELVIS OW6498-14-84 19:09:00  
 [facility]   [address] [place] [address]      Patient Name: CRISTINA RICH   MR #: [id number]    :  Age/Sex: 52/F  Acct #: [id number] Req #: [id number]  Adm Physician:  
  Ordered by: MARIFRE REYNA NP  Report #: [id number]   Location:   Room/Be
d:     _________________________________________________________________________
__________________________    Procedure: [id number] CT/CT ABDOMEN/PELVIS WO  Exam
Date: 18                            Exam Time: 1830       REPORT STATUS: 
Signed    EXAM: CT Abdomen and Pelvis WITHOUT contrast     INDICATION: UTI. Feve
r.   COMPARISON: CT abdomen and pelvis 3/4/2018.   TECHNIQUE: Abdomen and pelvis
were scanned utilizing a multidetector helical   scanner from the lung base to 
the pubic symphysis without administration of IV   contrast. Absence of intraven
ous contrast decreases sensitivity for detection   of focal lesions and vascular
pathology. Coronal and sagittal reformations were   obtained. Renal stone roland
col was performed.                IV CONTRAST: None.               ORAL CONTRAST
: Water               RADIATION DOSE: Total DLP: 560.77 mGy*cm                Es
timated effective dose: (DLP x 0.015 x size factor) mSv               COMPLICATI
ONS: None      FINDINGS:      LINES and TUBES: Right percutaneous nephrostomy tu
be, unchanged position.      LOWER THORAX:  2 mm right lower lobe calcified gran
uloma, unchanged.      HEPATOBILIARY: Mild diffuse low-attenuation of the hepati
c parenchyma   suggesting mild steatosis.  No focal hepatic lesions. No biliary 
ductal   dilation.       GALLBLADDER: Calcified gallstone.  No wall thickening. 
    SPLEEN: No splenomegaly.       PANCREAS: No focal masses or ductal dilatati
on.        ADRENALS: No adrenal nodules          KIDNEYS/URETERS: Interval devel
opment of mild to moderate left-sided   hydronephrosis. Mild to moderate left ur
eteral dilatation all the way to the   level just proximal to the UVJ. 2 calculi
were previously present in the lower   pole of the left kidney; the smallest one
is no longer visualized, possibly   passed. 5.8 x 5.8 mm calculus in the lower 
pole of the left kidney appears   slightly bulkier, previously measuring 4 mm.  
   GI TRACT: No abnormal distention, wall thickening, or evidence of bowel   o
bstruction.       Appendix is normal.      REPRODUCTIVE ORGANS: The uterus is sm
all. No adnexal masses. Likely radiation   seeds in the cervix.      BLADDER: A 
small contracted bladder is again observed. Redemonstration of a   cystic struct
ure in the pelvis suggestive of a small bladder, which is   unchanged in appeara
nce. Radiation seeds again observed in the uterine cervix.      LYMPH NODES: No 
lymphadenopathy.      VESSELS: There is mild atherosclerotic disease in the aort
a and major arterial   branches.      PERITONEUM / RETROPERITONEUM: No free air 
or fluid.      BONES: Lower thoracic DISH.      SOFT TISSUES: Unremarkable.     
   IMPRESSION:    1.  Interval development of mild to moderate left-sided hydro
nephrosis which   may be related to a recently passed calculus or less likely du
e to distal   ureteral stricture. Otherwise unchanged exam.      Signed by: Dr. DELL Grimaldo M.D. on 2018 7:28 PM        Dictated By: EMMA VIERA MD, MD  Electronically Signed By: EMMA GRIMALDO MD, MD on 18  Transc
ribed By: LUCINDA on 18       COPY TO:   MARIFER REYNA NP        
Human Chorionic Gonadotropin, Kusw2819-78-43 18:41:00* 



             Test Item    Value        Reference Range Interpretation Comments

 

             Human Chorionic Gonadotropin, Qual (test code = 2118-8) NEGATIVE   

  NEGATIVE                   





[facility] Chorionic Gonadotropin, [facility]
2018 18:41:00* 



             Test Item    Value        Reference Range Interpretation Comments

 

             Human Chorionic Gonadotropin, Qual (test code = 2118-8) NEGATIVE   

  NEGATIVE                   





[facility] Chorionic Gonadotropin, [facility]
2018 18:41:00* 



             Test Item    Value        Reference Range Interpretation Comments

 

             Human Chorionic Gonadotropin, Qual (test code = 2118-8) NEGATIVE   

  NEGATIVE                   





[facility] Chorionic Gonadotropin, [facility]
2018 18:41:00* 



             Test Item    Value        Reference Range Interpretation Comments

 

             Human Chorionic Gonadotropin, Qual (test code = 2118-8) NEGATIVE   

  NEGATIVE                   





[facility] Chorionic Gonadotropin, [facility]
2018 18:41:00* 



             Test Item    Value        Reference Range Interpretation Comments

 

             Human Chorionic Gonadotropin, Qual (test code = 2118-8) NEGATIVE   

  NEGATIVE                   





[facility] Chorionic Gonadotropin, [facility]
2018 18:41:00* 



             Test Item    Value        Reference Range Interpretation Comments

 

             Human Chorionic Gonadotropin, Qual (test code = 2118-8) NEGATIVE   

  NEGATIVE                   





[facility] Umtjg3497-80-08 16:31:00* 



             Test Item    Value        Reference Range Interpretation Comments

 

             Urine Mucus (test code = 8247-9) FEW          RARE         H       

      





[facility] Aepwo8462-44-20 16:31:00* 



             Test Item    Value        Reference Range Interpretation Comments

 

             Urine Mucus (test code = 8247-9) [facility] Cxkqu2951-26-89 16:31:00* 



             Test Item    Value        Reference Range Interpretation Comments

 

             Urine Mucus (test code = 8247-9) [facility]2018-08-09 16:31:00* 



             Test Item    Value        Reference Range Interpretation Comments

 

             Urine Mucus (test code = 8247-9) [facility]2018-08-09 16:31:00* 



             Test Item    Value        Reference Range Interpretation Comments

 

             Urine Mucus (test code = 8247-9) [facility]2018-08-09 16:31:00* 



             Test Item    Value        Reference Range Interpretation Comments

 

             Urine Mucus (test code = 8247-9) [facility]2018-08-09 16:31:00* 



             Test Item    Value        Reference Range Interpretation Comments

 

             Urine Mucus (test code = 8247-9) [facility] Vltji0072-52-04 16:31:00* 



             Test Item    Value        Reference Range Interpretation Comments

 

             Urine Mucus (test code = 8247-9) [facility] Autjh4568-52-47 16:31:00* 



             Test Item    Value        Reference Range Interpretation Comments

 

             Urine Mucus (test code = 8247-9) [facility] Bxhyzmj1291-77-50 08:01:00* 



             Test Item    Value        Reference Range Interpretation Comments

 

             Urine Culture (test code = 630-4) Organism: ESCHERICHIA COLI       

                     





[facility] Odlltig9327-26-95 08:01:00* 



             Test Item    Value        Reference Range Interpretation Comments

 

             Urine Culture (test code = 630-4) Organism: ESCHERICHIA COLI       

                     





[facility] HCB8300-87-98 16:50:00* 



             Test Item    Value        Reference Range Interpretation Comments

 

             Urine WBC (test code = 5821-4) 50-          0-5          [facility] PVW1385-18-57 16:50:00* 



             Test Item    Value        Reference Range Interpretation Comments

 

             Urine RBC (test code = 18575-3) 11-20        0-5          H        

     





[facility] Hyexvoon3913[facility]85-09 16:50:00* 



             Test Item    Value        Reference Range Interpretation Comments

 

             Urine Bacteria (test code = 31475-5) MODERATE     NONE         H   

          





[facility] Epithelial Pblls2930-91-78 16:50:00
  * 



             Test Item    Value        Reference Range Interpretation Comments

 

             Urine Epithelial Cells (test code = 57303-1) NONE         NONE     

                  





[facility] White Blood Cell Cbnli7881-03-37 
16:50:00* 



             Test Item    Value        Reference Range Interpretation Comments

 

             Urine White Blood Cell Casts (test code = 58089-7) 1-5          >0 

          H             





[facility] Xsrzh5109-89-11 
16:50:00* 



             Test Item    Value        Reference Range Interpretation Comments

 

             Urine White Blood Cell Casts (test code = 30747-7) 1-5          >0 

          H             





[facility] White [facility] Cell Smwdh8150-88-66 
16:50:00* 



             Test Item    Value        Reference Range Interpretation Comments

 

             Urine White Blood Cell Casts (test code = 42310-2) 1-5          >0 

          H             





[facility] Nslnm7637-69-48 
16:50:00* 



             Test Item    Value        Reference Range Interpretation Comments

 

             Urine White Blood Cell Casts (test code = 93451-6) 1-5          >0 

          H             





[facility] Cell Mzrcg4716-76-99 
16:50:00* 



             Test Item    Value        Reference Range Interpretation Comments

 

             Urine White Blood Cell Casts (test code = 41173-2) 1-5          >0 

          H             





[facility] White [facility] Cell Skyyq8868-34-37 
16:50:00* 



             Test Item    Value        Reference Range Interpretation Comments

 

             Urine White Blood Cell Casts (test code = 84228-8) 1-5          >0 

          H             





[facility] Center[facility] Cell Vxjmq1951-46-49 
16:50:00* 



             Test Item    Value        Reference Range Interpretation Comments

 

             Urine White Blood Cell Casts (test code = 57673-4) 1-5          >0 

          H             





[facility] Cell Nncve2752-90-69 
16:50:00* 



             Test Item    Value        Reference Range Interpretation Comments

 

             Urine White Blood Cell Casts (test code = 66771-5) 1-5          >0 

          H             





[facility] Center[facility] White [facility] Cell Ijcpv1503-36-71 
16:50:00* 



             Test Item    Value        Reference Range Interpretation Comments

 

             Urine White Blood Cell Casts (test code = 71525-2) 1-5          >0 

          H             





[facility] Moqxu4048-39-46 16:38:00* 



             Test Item    Value        Reference Range Interpretation Comments

 

             Urine Color (test code = 5778-6) YELLOW       YELLOW               

      





[facility] Modpzht0340-38-13 16:38:00* 



             Test Item    Value        Reference Range Interpretation Comments

 

             Urine Clarity (test code = 03916-9) TURBID       CLEAR        H    

         





[facility] Center[facility] Specific Kkauilp3748-76-81 16:38:00
  * 



             Test Item    Value        Reference Range Interpretation Comments

 

             Urine Specific Gravity (test code = 5811-5) 1.030        1.010-1.02

5  H             





[facility] Center[facility] tD6232-58-02 16:38:00* 



             Test Item    Value        Reference Range Interpretation Comments

 

             Urine pH (test code = 56974-6) 6            5-7                    

    





[facility] Center[facility] Leukocyte Lwegolpv3481-29-53 
16:38:00* 



             Test Item    Value        Reference Range Interpretation Comments

 

             Urine Leukocyte Esterase (test code = 5799-2) 2+           NEGATIVE

     H             





[facility] Center[facility] Wmtbpsf6129-89-51 16:38:00* 



             Test Item    Value        Reference Range Interpretation Comments

 

             Urine Nitrite (test code = 06571-6) POSITIVE     NEGATIVE     H    

         





[facility] Kibjwhf0198-24-40 16:38:00* 



             Test Item    Value        Reference Range Interpretation Comments

 

             Urine Protein (test code = 5804-0) 3+           NEGATIVE     H     

        





[facility] Center[facility] Glucose (UA)2018 16:38:00* 



             Test Item    Value        Reference Range Interpretation Comments

 

             Urine Glucose (UA) (test code = 2349-9) NEGATIVE     NEGATIVE      

             





[facility] Center[facility] Iojcdmu0882-19-78 16:38:00* 



             Test Item    Value        Reference Range Interpretation Comments

 

             Urine Ketones (test code = 06393-1) NEGATIVE     NEGATIVE          

         





[facility] Center[facility] Pcrocraligpm0159-31-72 16:38:00* 



             Test Item    Value        Reference Range Interpretation Comments

 

             Urine Urobilinogen (test code = 71968-7) 0.2          0.2-1        

              





[facility] Center[facility] Bjjxcdtmy0035-15-20 16:38:00* 



             Test Item    Value        Reference Range Interpretation Comments

 

             Urine Bilirubin (test code = 1978-6) NEGATIVE     NEGATIVE         

          





[facility] Center[facility] Iejdi9345-71-94 16:38:00* 



             Test Item    Value        Reference Range Interpretation Comments

 

             Urine Blood (test code = 65833-8) 3+           NEGATIVE     H      

       





[facility] Yhuff4196-65-19 14:37:00* 



             Test Item    Value        Reference Range Interpretation Comments

 

             Sodium Level (test code = 2951-2) 139          136-145             

       





[facility] CenterPotassium Dhpof8711-02-45 14:37:00* 



             Test Item    Value        Reference Range Interpretation Comments

 

             Potassium Level (test code = 2823-3) 3.9          3.5-5.1          

          





[facility] CenterChloride Kpvqw4669-94-45 14:37:00* 



             Test Item    Value        Reference Range Interpretation Comments

 

             Chloride Level (test code = 2075-0) 105                      

         





[facility] CenterCarbon Dioxide Ygiza2380-31-99 14:37:00* 



             Test Item    Value        Reference Range Interpretation Comments

 

             Carbon Dioxide Level (test code = 2028-9) 23           22-29       

               





[facility] CenterAnion Lsf5409-05-67 14:37:00* 



             Test Item    Value        Reference Range Interpretation Comments

 

             Anion Gap (test code = 33037-3) 14.9         8-16                  

     





[facility] CenterBlood Urea Qqwrprix7206-79-29 14:37:00* 



             Test Item    Value        Reference Range Interpretation Comments

 

             Blood Urea Nitrogen (test code = 3094-0) 13           7-26         

              





[facility] CenterCreatinine2018-07-02 14:37:00* 



             Test Item    Value        Reference Range Interpretation Comments

 

             Creatinine (test code = 2160-0) 0.85         0.57-1.11             

     





[facility] CenterBUN/Creatinine Wisvg9776-97-82 14:37:00* 



             Test Item    Value        Reference Range Interpretation Comments

 

             BUN/Creatinine Ratio (test code = 3097-3) 15           6-25        

               





[facility] CenterEstimat Glomerular Filtration Rate
2018 14:37:00* 



             Test Item    Value        Reference Range Interpretation Comments

 

             Estimat Glomerular Filtration Rate (test code = 05867-5) 60-       

   >60                        





Ranges were taken from the National Kidney Disease Education Program and the [facility] Foundation literature.Reference ranges:60 or greater: Twchuv13-63 (
for 3 consecutive months): Chronic kidney disease 15 or less: Kidney failureCHI 
[facility] HospitalGlucose Zminw0059-64-78 14:37:00* 



             Test Item    Value        Reference Range Interpretation Comments

 

             Glucose Level (test code = SHI5211) 191                 H    

         





[facility] CenterCalcium Jwytt7647-20-85 14:37:00* 



             Test Item    Value        Reference Range Interpretation Comments

 

             Calcium Level (test code = 26092-1) 9.7          8.4-10.2          

         





[facility] CenterTotal Srrmaifdx9647-11-68 14:37:00* 



             Test Item    Value        Reference Range Interpretation Comments

 

             Total Bilirubin (test code = 1975-2) 0.5          0.2-1.2          

          





[facility] CenterAspartate Amino Transf (AST/SGOT)
2018 14:37:00* 



             Test Item    Value        Reference Range Interpretation Comments

 

                                        Aspartate Amino Transf (AST/SGOT) (test 

code = Aspartate Amino Transf 

(AST/SGOT))     20              5-34                             





[facility] CenterAlanine Aminotransferase (ALT/SGPT)
2018 14:37:00* 



             Test Item    Value        Reference Range Interpretation Comments

 

             Alanine Aminotransferase (ALT/SGPT) (test code = 1742-6) 19        

   0-55                       





[facility] CenterTotal Wbfztjf1352-09-40 14:37:00* 



             Test Item    Value        Reference Range Interpretation Comments

 

             Total Protein (test code = 2885-2) 8.2          6.5-8.1      H     

        





[facility] CenterAlbumin2018-07-02 14:37:00* 



             Test Item    Value        Reference Range Interpretation Comments

 

             Albumin (test code = 1751-7) 3.3          3.5-5.0      L           

  





[facility] CenterGlobulin2018-07-02 14:37:00* 



             Test Item    Value        Reference Range Interpretation Comments

 

             Globulin (test code = 11618-1) 4.9          2.3-3.5      H         

    





[facility] CenterAlbumin/Globulin Footk0072-46-40 14:37:00
  * 



             Test Item    Value        Reference Range Interpretation Comments

 

             Albumin/Globulin Ratio (test code = 1759-0) 0.7          0.8-2.0   

   L             





[facility] CenterAlkaline Nqhocbykjqd5408-08-38 14:37:00* 



             Test Item    Value        Reference Range Interpretation Comments

 

             Alkaline Phosphatase (test code = 6768-6) 88                 

               





[facility] CenterAmylase Iwehs9918-90-87 14:37:00* 



             Test Item    Value        Reference Range Interpretation Comments

 

             Amylase Level (test code = 1798-8) 51                        

        





[facility] CenterLipase2018-07-02 14:37:00* 



             Test Item    Value        Reference Range Interpretation Comments

 

             Lipase (test code = 3040-3) 27           8-78                      

 





[facility] CenterTotal Bcbmxhtcw5684-65-14 14:37:00* 



             Test Item    Value        Reference Range Interpretation Comments

 

             Total Bilirubin (test code = 1975-2) 0.5          0.2-1.2          

          





[facility] CenterAspartate Amino Transf (AST/SGOT)
2018 14:37:00* 



             Test Item    Value        Reference Range Interpretation Comments

 

                                        Aspartate Amino Transf (AST/SGOT) (test 

code = Aspartate Amino Transf 

(AST/SGOT))     20              5-34                             





[facility] CenterAlanine Aminotransferase (ALT/SGPT)
2018 14:37:00* 



             Test Item    Value        Reference Range Interpretation Comments

 

             Alanine Aminotransferase (ALT/SGPT) (test code = 1742-6) 19        

   0-55                       





[facility] CenterTotal Gojqycr3563-37-24 14:37:00* 



             Test Item    Value        Reference Range Interpretation Comments

 

             Total Protein (test code = 2885-2) 8.2          6.5-8.1      H     

        





[facility] CenterAlbumin2018-07-02 14:37:00* 



             Test Item    Value        Reference Range Interpretation Comments

 

             Albumin (test code = 1751-7) 3.3          3.5-5.0      L           

  





[facility] CenterGlobulin2018-07-02 14:37:00* 



             Test Item    Value        Reference Range Interpretation Comments

 

             Globulin (test code = 46619-9) 4.9          2.3-3.5      H         

    





[facility] CenterAlbumin/Globulin Fgatn0469-28-84 14:37:00
  * 



             Test Item    Value        Reference Range Interpretation Comments

 

             Albumin/Globulin Ratio (test code = 1759-0) 0.7          0.8-2.0   

   L             





[facility] CenterAlkaline Qgehxcvdxua6879-81-43 14:37:00* 



             Test Item    Value        Reference Range Interpretation Comments

 

             Alkaline Phosphatase (test code = 6768-6) 88                 

               





[facility] CenterAmylase Bxxna4591-67-15 14:37:00* 



             Test Item    Value        Reference Range Interpretation Comments

 

             Amylase Level (test code = 1798-8) 51                        

        





[facility] CenterLipase2018-07-02 14:37:00* 



             Test Item    Value        Reference Range Interpretation Comments

 

             Lipase (test code = 3040-3) 27           8-78                      

 





[facility] CenterTotal Scmaowzlx3718-30-53 14:37:00* 



             Test Item    Value        Reference Range Interpretation Comments

 

             Total Bilirubin (test code = 1975-2) 0.5          0.2-1.2          

          





[facility] CenterAspartate Amino Transf (AST/SGOT)
2018 14:37:00* 



             Test Item    Value        Reference Range Interpretation Comments

 

                                        Aspartate Amino Transf (AST/SGOT) (test 

code = Aspartate Amino Transf 

(AST/SGOT))     20              5-34                             





[facility] CenterAlanine Aminotransferase (ALT/SGPT)
2018 14:37:00* 



             Test Item    Value        Reference Range Interpretation Comments

 

             Alanine Aminotransferase (ALT/SGPT) (test code = 1742-6) 19        

   0-55                       





[facility] CenterTotal Limjwry1675-69-00 14:37:00* 



             Test Item    Value        Reference Range Interpretation Comments

 

             Total Protein (test code = 2885-2) 8.2          6.5-8.1      H     

        





[facility] CenterAlbumin2018-07-02 14:37:00* 



             Test Item    Value        Reference Range Interpretation Comments

 

             Albumin (test code = 1751-7) 3.3          3.5-5.0      L           

  





[facility] CenterGlobulin2018-07-02 14:37:00* 



             Test Item    Value        Reference Range Interpretation Comments

 

             Globulin (test code = 87456-2) 4.9          2.3-3.5      H         

    





[facility] CenterAlbumin/Globulin Dqrjc3605-25-51 14:37:00
  * 



             Test Item    Value        Reference Range Interpretation Comments

 

             Albumin/Globulin Ratio (test code = 1759-0) 0.7          0.8-2.0   

   L             





[facility] CenterAlkaline Xckzbycuywi6652-83-73 14:37:00* 



             Test Item    Value        Reference Range Interpretation Comments

 

             Alkaline Phosphatase (test code = 6768-6) 88                 

               





[facility] CenterAmylase Ebkea9188-79-20 14:37:00* 



             Test Item    Value        Reference Range Interpretation Comments

 

             Amylase Level (test code = 1798-8) 51                        

        





[facility] CenterLipase2018-07-02 14:37:00* 



             Test Item    Value        Reference Range Interpretation Comments

 

             Lipase (test code = 3040-3) 27           8-78                      

 





[facility] CenterUrine Amorphous Iknetvhh7156-49-66 
14:31:00* 



             Test Item    Value        Reference Range Interpretation Comments

 

             Urine Amorphous Sediment (test code = 8246-1) MODERATE     FEW     

     H             





[facility] CenterUrine Amorphous Decsmrpe9673-69-28 
14:31:00* 



             Test Item    Value        Reference Range Interpretation Comments

 

             Urine Amorphous Sediment (test code = 8246-1) MODERATE     FEW     

     H             





[facility] CenterUrine Amorphous Slngfktc9210-87-62 
14:31:00* 



             Test Item    Value        Reference Range Interpretation Comments

 

             Urine Amorphous Sediment (test code = 8246-1) MODERATE     FEW     

     H             





[facility] CenterUrine Amorphous Ngotedca8022-88-66 
14:31:00* 



             Test Item    Value        Reference Range Interpretation Comments

 

             Urine Amorphous Sediment (test code = 8246-1) MODERATE     FEW     

     H             





[facility] CenterUrine Amorphous Hpxgpwde3719-24-26 
14:31:00* 



             Test Item    Value        Reference Range Interpretation Comments

 

             Urine Amorphous Sediment (test code = 8246-1) MODERATE     FEW     

     H             





[facility] CenterUrine Amorphous Vegqpdka8600-88-85 
14:31:00* 



             Test Item    Value        Reference Range Interpretation Comments

 

             Urine Amorphous Sediment (test code = 8246-1) MODERATE     FEW     

     H             





[facility] CenterUrine Amorphous Scdcsoas2279-30-78 
14:31:00* 



             Test Item    Value        Reference Range Interpretation Comments

 

             Urine Amorphous Sediment (test code = 8246-1) MODERATE     FEW     

     H             





[facility] CenterUrine Amorphous Swprskto9926-00-03 
14:31:00* 



             Test Item    Value        Reference Range Interpretation Comments

 

             Urine Amorphous Sediment (test code = 8246-1) MODERATE     FEW     

     H             





[facility] CenterUrine Amorphous Sgzjtqca6870-33-61 
14:31:00* 



             Test Item    Value        Reference Range Interpretation Comments

 

             Urine Amorphous Sediment (test code = 8246-1) MODERATE     FEW     

     H             





[facility] CenterWhite Blood Ynshe4207-27-22 14:17:00* 



             Test Item    Value        Reference Range Interpretation Comments

 

             White Blood Count (test code = 6690-2) 7.92         4.8-10.8       

            





[facility] CenterRed Blood Ezcqe3075-99-59 14:17:00* 



             Test Item    Value        Reference Range Interpretation Comments

 

             Red Blood Count (test code = 789-8) 4.05         3.6-5.1           

         





[facility] CenterHemoglobin2018-07-[facility] 14:17:00* 



             Test Item    Value        Reference Range Interpretation Comments

 

             Hemoglobin (test code = 56906-2) 10.4         12.0-16.0    L       

      





[facility] CenterHematocrit2018-07-02 14:17:00* 



             Test Item    Value        Reference Range Interpretation Comments

 

             Hematocrit (test code = 4544-3) 33.0         34.2-44.1    L        

     





[facility] CenterMean Corpuscular Vofbhf9072-35-02 
14:17:00* 



             Test Item    Value        Reference Range Interpretation Comments

 

             Mean Corpuscular Volume (test code = 787-2) 81.5         81-99     

                 





[facility] CenterMean Corpuscular Ibmhhajosb5568-62-42 
14:17:00* 



             Test Item    Value        Reference Range Interpretation Comments

 

             Mean Corpuscular Hemoglobin (test code = 785-6) 25.7         28-32 

       L             





[facility] CenterMean Corpuscular Hemoglobin Concent
2018 14:17:00* 



             Test Item    Value        Reference Range Interpretation Comments

 

             Mean Corpuscular Hemoglobin Concent (test code = 786-4) 31.5       

  31-35                      





[facility] CenterRed Cell Distribution Lrznl2810-13-99 
14:17:00* 



             Test Item    Value        Reference Range Interpretation Comments

 

             Red Cell Distribution Width (test code = 13441-6) 15.7         11.7

-14.4    H             





[facility] CenterPlatelet Uwncw6295-39-14 14:17:00* 



             Test Item    Value        Reference Range Interpretation Comments

 

             Platelet Count (test code = 777-3) 334          140-360            

        





[facility] CenterNeutrophils (%) (Auto)2018 14:17:00
  * 



             Test Item    Value        Reference Range Interpretation Comments

 

             Neutrophils (%) (Auto) (test code = 40894-3) 65.1         38.7-80.0

                  





[facility] CenterLymphocytes (%) (Auto)2018 14:17:00
  * 



             Test Item    Value        Reference Range Interpretation Comments

 

             Lymphocytes (%) (Auto) (test code = 736-9) 27.3         18.0-39.1  

                





[facility] CenterMonocytes (%) (Auto)2018 14:17:00* 



             Test Item    Value        Reference Range Interpretation Comments

 

             Monocytes (%) (Auto) (test code = 5905-5) 4.9          4.4-11.3    

               





[facility] CenterEosinophils (%) (Auto)[facility] 14:17:00
  * 



             Test Item    Value        Reference Range Interpretation Comments

 

             Eosinophils (%) (Auto) (test code = 713-8) 1.8          0.0-6.0    

                





[facility] CenterBasophils (%) (Auto)2018 14:17:00* 



             Test Item    Value        Reference Range Interpretation Comments

 

             Basophils (%) (Auto) (test code = 706-2) 0.6          0.0-1.0      

              





[facility] CenterIM GRANULOCYTES %2018 14:17:00* 



             Test Item    Value        Reference Range Interpretation Comments

 

             IM GRANULOCYTES % (test code = IM GRANULOCYTES %) 0.3          0.0-

1.0                    





[facility] CenterNeutrophils # (Auto)2018 14:17:00* 



             Test Item    Value        Reference Range Interpretation Comments

 

             Neutrophils # (Auto) (test code = 751-8) 5.2          2.1-6.9      

              





[facility] CenterLymphocytes # (Auto)2018 14:17:00* 



             Test Item    Value        Reference Range Interpretation Comments

 

             Lymphocytes # (Auto) (test code = 63385-0) 2.2          1.0-3.2    

                





[facility] CenterMonocytes # (Auto)2018 14:17:00* 



             Test Item    Value        Reference Range Interpretation Comments

 

             Monocytes # (Auto) (test code = 742-7) 0.4          0.2-0.8        

            





[facility] CenterEosinophils # (Auto)2018 14:17:00* 



             Test Item    Value        Reference Range Interpretation Comments

 

             Eosinophils # (Auto) (test code = 711-2) 0.1          0.0-0.4      

              





[facility] CenterBasophils # (Auto)2018 14:17:00* 



             Test Item    Value        Reference Range Interpretation Comments

 

             Basophils # (Auto) (test code = 704-7) 0.1          0.0-0.1        

            





[facility] CenterAbsolute Immature Granulocyte (auto
2018 14:17:00* 



             Test Item    Value        Reference Range Interpretation Comments

 

                                        Absolute Immature Granulocyte (auto (lloyd

t code = Absolute Immature Granulocyte 

(auto)          0.02            0-0.1                            





[facility] CenterUrine [id number] 15:41:00* 



             Test Item    Value        Reference Range Interpretation Comments

 

             Urine WBC (test code = 5821-4) 50-          0-5          H         

    





[facility] CenterUrine OXV6653-82-64 15:41:00* 



             Test Item    Value        Reference Range Interpretation Comments

 

             Urine RBC (test code = 87038-1) 11-20        0-5          H        

     





[facility] CenterUrine Vljoqhgz1852-29-13 15:41:00* 



             Test Item    Value        Reference Range Interpretation Comments

 

             Urine Bacteria (test code = 90115-1) MANY         NONE         H   

          





[facility] CenterUrine Epithelial Rlslp8499-10-11 15:41:00
  * 



             Test Item    Value        Reference Range Interpretation Comments

 

             Urine Epithelial Cells (test code = 10576-9) FEW          NONE     

                  





[facility] CenterUrine WGY0217-09-56 15:41:00* 



             Test Item    Value        Reference Range Interpretation Comments

 

             Urine WBC (test code = 5821-4) 50-          0-5          H         

    





[facility] CenterUrine QDX5740-00-06 15:41:00* 



             Test Item    Value        Reference Range Interpretation Comments

 

             Urine RBC (test code = 97523-2) 11-20        0-5          H        

     





[facility] CenterUrine Pmtlrrno4843-86-15 15:41:00* 



             Test Item    Value        Reference Range Interpretation Comments

 

             Urine Bacteria (test code = 70679-8) MANY         NONE         H   

          





[facility] CenterUrine Epithelial Gkyod6973-33-57 15:41:00
  * 



             Test Item    Value        Reference Range Interpretation Comments

 

             Urine Epithelial Cells (test code = 73193-3) FEW          [facility] WEC4649-49-12 15:41:00* 



             Test Item    Value        Reference Range Interpretation Comments

 

             Urine WBC (test code = 5821-4) 50-          0-5          H         

    





[facility] CenterUrine HAB3578-70-46 15:41:00* 



             Test Item    Value        Reference Range Interpretation Comments

 

             Urine RBC (test code = 18859-3) 11-20        0-5          H        

     





[facility] CenterUrine Ofmexbfh9348-98-86 15:41:00* 



             Test Item    Value        Reference Range Interpretation Comments

 

             Urine Bacteria (test code = 28592-0) MANY         NONE         H   

          





[facility] CenterUrine Epithelial Ajwkh6048-13-93 15:41:00
  * 



             Test Item    Value        Reference Range Interpretation Comments

 

             Urine Epithelial Cells (test code = 75276-3) FEW          NONE     

                  





[facility] Centerodium Amwpo7544-47-93 15:33:00* 



             Test Item    Value        Reference Range Interpretation Comments

 

             Sodium Level (test code = 2951-2) 137          136-145             

       





[facility] CenterPotassium Euogm4826-65-72 15:33:00* 



             Test Item    Value        Reference Range Interpretation Comments

 

             Potassium Level (test code = 2823-3) 3.3          3.5-5.1      L   

          





[facility] CenterChloride Qqryt3003-29-25 15:33:00* 



             Test Item    Value        Reference Range Interpretation Comments

 

             Chloride Level (test code = 2075-0) 99                       

         





[facility] CenterCarbon Dioxide Ogbjz1630-37-95 15:33:00* 



             Test Item    Value        Reference Range Interpretation Comments

 

             Carbon Dioxide Level (test code = 2028-9) 25           22-29       

               





[facility] CenterAnion Lkz4560-89-22 15:33:00* 



             Test Item    Value        Reference Range Interpretation Comments

 

             Anion Gap (test code = 33037-3) 16.3         8-16         H        

     





[facility] CenterBlood Urea Ravafxxo1491-53-08 15:33:00* 



             Test Item    Value        Reference Range Interpretation Comments

 

             Blood Urea Nitrogen (test code = 3094-0) 17           7-26         

              





[facility] CenterCreatinine2018-03-04 15:33:00* 



             Test Item    Value        Reference Range Interpretation Comments

 

             Creatinine (test code = 2160-0) 0.99         0.57-1.11             

     





[facility] CenterBUN/Creatinine Qljbk0133-50-04 15:33:00* 



             Test Item    Value        Reference Range Interpretation Comments

 

             BUN/Creatinine Ratio (test code = 3097-3) 17           6-25        

               





[facility] CenterEstimat Glomerular Filtration Rate
2018 15:33:00* 



             Test Item    Value        Reference Range Interpretation Comments

 

             Estimat Glomerular Filtration Rate (test code = 34663-5) 58        

   >60          L             





Ranges were taken from the National Kidney Disease Education Program and the Michelle
[facility] literature.Reference ranges:60 or greater: Inieuc90-20 (
for 3 consecutive months): Chronic kidney disease 15 or less: Kidney failureBrownfield Regional Medical CenterGlucose Sozyk4235-26-64 15:33:00* 



             Test Item    Value        Reference Range Interpretation Comments

 

             Glucose Level (test code = NMM0153) 138                 H    

         





[facility] CenterCalcium Pglji3313-88-01 15:33:00* 



             Test Item    Value        Reference Range Interpretation Comments

 

             Calcium Level (test code = 43774-4) 9.3          8.4-10.2          

         





[facility] CenterTotal Nzpfxxwaj5426-25-45 15:33:00* 



             Test Item    Value        Reference Range Interpretation Comments

 

             Total Bilirubin (test code = 1975-2) 0.8          0.2-1.2          

          





[facility] CenterAspartate Amino Transf (AST/SGOT)
2018 15:33:00* 



             Test Item    Value        Reference Range Interpretation Comments

 

                                        Aspartate Amino Transf (AST/SGOT) (test 

code = Aspartate Amino Transf 

(AST/SGOT))     23              5-34                             





[facility] CenterAlanine Aminotransferase (ALT/SGPT)
2018 15:33:00* 



             Test Item    Value        Reference Range Interpretation Comments

 

             Alanine Aminotransferase (ALT/SGPT) (test code = 1742-6) 33        

   0-55                       





[facility] CenterTotal Bwqxtrg5894-80-57 15:33:00* 



             Test Item    Value        Reference Range Interpretation Comments

 

             Total Protein (test code = 2885-2) 8.5          6.5-8.1      H     

        





[facility] CenterAlbumin2018-03-04 15:33:00* 



             Test Item    Value        Reference Range Interpretation Comments

 

             Albumin (test code = 1751-7) 3.3          3.5-5.0      L           

  





[facility] CenterGlobulin2018-03-04 15:33:00* 



             Test Item    Value        Reference Range Interpretation Comments

 

             Globulin (test code = 72200-0) 5.2          2.3-3.5      H         

    





[facility] CenterAlbumin/Globulin Axtqg8950-24-76 15:33:00
  * 



             Test Item    Value        Reference Range Interpretation Comments

 

             Albumin/Globulin Ratio (test code = 1759-0) 0.6          0.8-2.0   

   L             





[facility] CenterAlkaline Zljsrklgkoe9572-65-85 15:33:00* 



             Test Item    Value        Reference Range Interpretation Comments

 

             Alkaline Phosphatase (test code = 6768-6) 92                 

               





[facility] Centerodium Odcme4138-65-97 15:33:00* 



             Test Item    Value        Reference Range Interpretation Comments

 

             Sodium Level (test code = 2951-2) 137          136-145             

       





[facility] CenterPotassium Ohnfy4430-98-87 15:33:00* 



             Test Item    Value        Reference Range Interpretation Comments

 

             Potassium Level (test code = 2823-3) 3.3          3.5-5.1      L   

          





[facility] CenterChloride Hwsfb1619-31-74 15:33:00* 



             Test Item    Value        Reference Range Interpretation Comments

 

             Chloride Level (test code = 2075-0) 99                       

         





[facility] CenterCarbon Dioxide Lakua3320-68-86 15:33:00* 



             Test Item    Value        Reference Range Interpretation Comments

 

             Carbon Dioxide Level (test code = 2028-9) 25           22-29       

               





[facility] CenterAnion Eng2182-22-75 15:33:00* 



             Test Item    Value        Reference Range Interpretation Comments

 

             Anion Gap (test code = 33037-3) 16.3         8-16         H        

     





[facility] CenterBlood Urea Nmyvdcwv5222-60-08 15:33:00* 



             Test Item    Value        Reference Range Interpretation Comments

 

             Blood Urea Nitrogen (test code = 3094-0) 17           7-26         

              





[facility] CenterCreatinine2018-03-04 15:33:00* 



             Test Item    Value        Reference Range Interpretation Comments

 

             Creatinine (test code = 2160-0) 0.99         0.57-1.11             

     





[facility] CenterBUN/Creatinine Xggve7763-22-44 15:33:00* 



             Test Item    Value        Reference Range Interpretation Comments

 

             BUN/Creatinine Ratio (test code = 3097-3) 17           6-25        

               





[facility] CenterEstimat Glomerular Filtration Rate
2018 15:33:00* 



             Test Item    Value        Reference Range Interpretation Comments

 

             Estimat Glomerular Filtration Rate (test code = 14538-3) 58        

   >60          L             





Ranges were taken from the National Kidney Disease Education Program and the Michelle
[facility] literature.Reference ranges:60 or greater: Utqpsf65-94 (
for 3 consecutive months): Chronic kidney disease 15 or less: Kidney failureBrownfield Regional Medical CenterGlucose Ddvmo1791-14-83 15:33:00* 



             Test Item    Value        Reference Range Interpretation Comments

 

             Glucose Level (test code = YYM4323) 138                 H    

         





[facility] CenterCalcium Ayckb8455-22-89 15:33:00* 



             Test Item    Value        Reference Range Interpretation Comments

 

             Calcium Level (test code = 48628-5) 9.3          8.4-10.2          

         





[facility] CenterTotal Ycnyqvrcj6623-39-10 15:33:00* 



             Test Item    Value        Reference Range Interpretation Comments

 

             Total Bilirubin (test code = 1975-2) 0.8          0.2-1.2          

          





[facility] CenterAspartate Amino Transf (AST/SGOT)
2018 15:33:00* 



             Test Item    Value        Reference Range Interpretation Comments

 

                                        Aspartate Amino Transf (AST/SGOT) (test 

code = Aspartate Amino Transf 

(AST/SGOT))     23              5-34                             





[facility] CenterAlanine Aminotransferase (ALT/SGPT)
2018 15:33:00* 



             Test Item    Value        Reference Range Interpretation Comments

 

             Alanine Aminotransferase (ALT/SGPT) (test code = 1742-6) 33        

   0-55                       





[facility] CenterTotal Tragliv9415-92-70 15:33:00* 



             Test Item    Value        Reference Range Interpretation Comments

 

             Total Protein (test code = 2885-2) 8.5          6.5-8.1      H     

        





[facility] CenterAlbumin2018-03-04 15:33:00* 



             Test Item    Value        Reference Range Interpretation Comments

 

             Albumin (test code = 1751-7) 3.3          3.5-5.0      L           

  





[facility] CenterGlobulin2018-03-04 15:33:00* 



             Test Item    Value        Reference Range Interpretation Comments

 

             Globulin (test code = 66571-6) 5.2          2.3-3.5      H         

    





[facility] CenterAlbumin/Globulin Urkxm7850-96-22 15:33:00
  * 



             Test Item    Value        Reference Range Interpretation Comments

 

             Albumin/Globulin Ratio (test code = 1759-0) 0.6          0.8-2.0   

   L             





[facility] CenterAlkaline Pfzpyatqjpy8036-00-32 15:33:00* 



             Test Item    Value        Reference Range Interpretation Comments

 

             Alkaline Phosphatase (test code = 6768-6) 92                 

               





[facility] Centerodium Jpwgu9210-17-69 15:33:00* 



             Test Item    Value        Reference Range Interpretation Comments

 

             Sodium Level (test code = 2951-2) 137          136-145             

       





[facility] CenterPotassium Czugb2833-86-61 15:33:00* 



             Test Item    Value        Reference Range Interpretation Comments

 

             Potassium Level (test code = 2823-3) 3.3          3.5-5.1      L   

          





[facility] CenterChloride Fxqsb4622-32-22 15:33:00* 



             Test Item    Value        Reference Range Interpretation Comments

 

             Chloride Level (test code = 2075-0) 99                       

         





[facility] CenterCarbon Dioxide Jaooo8736-07-29 15:33:00* 



             Test Item    Value        Reference Range Interpretation Comments

 

             Carbon Dioxide Level (test code = 2028-9) 25           22-29       

               





[facility] CenterAnion Jyj9879-56-45 15:33:00* 



             Test Item    Value        Reference Range Interpretation Comments

 

             Anion Gap (test code = 33037-3) 16.3         8-16         H        

     





[facility] CenterBlood Urea Izwvzvih0168-35-71 15:33:00* 



             Test Item    Value        Reference Range Interpretation Comments

 

             Blood Urea Nitrogen (test code = 3094-0) 17           7-26         

              





[facility] CenterCreatinine2018-03-04 15:33:00* 



             Test Item    Value        Reference Range Interpretation Comments

 

             Creatinine (test code = 2160-0) 0.99         0.57-1.11             

     





[facility] CenterBUN/Creatinine Llrik8034-00-93 15:33:00* 



             Test Item    Value        Reference Range Interpretation Comments

 

             BUN/Creatinine Ratio (test code = 3097-3) 17           6-25        

               





[facility] CenterEstimat Glomerular Filtration Rate
2018 15:33:00* 



             Test Item    Value        Reference Range Interpretation Comments

 

             Estimat Glomerular Filtration Rate (test code = 50751-4) 58        

   >60          L             





Ranges were taken from the National Kidney Disease Education Program and the Michelle
[facility] literature.Reference ranges:60 or greater: Ihtswp88-15 (
for 3 consecutive months): Chronic kidney disease 15 or less: Kidney failureBrownfield Regional Medical CenterGlucose Jvxfy1046-40-36 15:33:00* 



             Test Item    Value        Reference Range Interpretation Comments

 

             Glucose Level (test code = POR1709) 138                 H    

         





[facility] CenterCalcium Iganl2415-05-48 15:33:00* 



             Test Item    Value        Reference Range Interpretation Comments

 

             Calcium Level (test code = 36510-4) 9.3          8.4-10.2          

         





[facility] CenterTotal Huohsvghz4831-26-63 15:33:00* 



             Test Item    Value        Reference Range Interpretation Comments

 

             Total Bilirubin (test code = 1975-2) 0.8          0.2-1.2          

          





[facility] CenterAspartate Amino Transf (AST/SGOT)
2018 15:33:00* 



             Test Item    Value        Reference Range Interpretation Comments

 

                                        Aspartate Amino Transf (AST/SGOT) (test 

code = Aspartate Amino Transf 

(AST/SGOT))     23              5-34                             





[facility] CenterAlanine Aminotransferase (ALT/SGPT)
2018 15:33:00* 



             Test Item    Value        Reference Range Interpretation Comments

 

             Alanine Aminotransferase (ALT/SGPT) (test code = 1742-6) 33        

   0-55                       





[facility] CenterTotal Zalsxlv2897-69-77 15:33:00* 



             Test Item    Value        Reference Range Interpretation Comments

 

             Total Protein (test code = 2885-2) 8.5          6.5-8.1      H     

        





[facility] CenterAlbumin2018-03-04 15:33:00* 



             Test Item    Value        Reference Range Interpretation Comments

 

             Albumin (test code = 1751-7) 3.3          3.5-5.0      L           

  





[facility] CenterGlobulin2018-03-04 15:33:00* 



             Test Item    Value        Reference Range Interpretation Comments

 

             Globulin (test code = 58768-3) 5.2          2.3-3.5      H         

    





[facility] CenterAlbumin/Globulin Mfimc7241-02-80 15:33:00
  * 



             Test Item    Value        Reference Range Interpretation Comments

 

             Albumin/Globulin Ratio (test code = 1759-0) 0.6          0.8-2.0   

   L             





[facility] CenterAlkaline Agfayqidocl3561-23-97 15:33:00* 



             Test Item    Value        Reference Range Interpretation Comments

 

             Alkaline Phosphatase (test code = 6768-6) 92                 

               





[facility] CenterUrine Pregnancy Epun8698-86-44 15:23:00* 



             Test Item    Value        Reference Range Interpretation Comments

 

             Urine Pregnancy Test (test code = 2106-3) NEGATIVE     NEGATIVE    

               





[facility] CenterUrine Pregnancy Abtq5256-49-60 15:23:00* 



             Test Item    Value        Reference Range Interpretation Comments

 

             Urine Pregnancy Test (test code = 2106-3) NEGATIVE     NEGATIVE    

               





[facility] CenterUrine Pregnancy Tzow5279-99-08 15:23:00* 



             Test Item    Value        Reference Range Interpretation Comments

 

             Urine Pregnancy Test (test code = 2106-3) NEGATIVE     NEGATIVE    

               





[facility] CenterUrine Pregnancy Uwzr2639-18-91 15:23:00* 



             Test Item    Value        Reference Range Interpretation Comments

 

             Urine Pregnancy Test (test code = 2106-3) NEGATIVE     NEGATIVE    

               





[facility] CenterUrine Pregnancy Ugel8377-18-81 15:23:00* 



             Test Item    Value        Reference Range Interpretation Comments

 

             Urine Pregnancy Test (test code = 2106-3) NEGATIVE     NEGATIVE    

               





[facility] CenterUrine Pregnancy Brmb3728-60-72 15:23:00* 



             Test Item    Value        Reference Range Interpretation Comments

 

             Urine Pregnancy Test (test code = 2106-3) NEGATIVE     NEGATIVE    

               





[facility] CenterUrine Pregnancy Beko8695-31-23 15:23:00* 



             Test Item    Value        Reference Range Interpretation Comments

 

             Urine Pregnancy Test (test code = 2106-3) NEGATIVE     NEGATIVE    

               





[facility] CenterUrine Pqakt1743-27-21 15:21:00* 



             Test Item    Value        Reference Range Interpretation Comments

 

             Urine Color (test code = 5778-6) YELLOW       YELLOW               

      





[facility] CenterUrine Fcykvxt4133-52-12 15:21:00* 



             Test Item    Value        Reference Range Interpretation Comments

 

             Urine Clarity (test code = 52452-1) CLOUDY       CLEAR        H    

         





[facility] CenterUrine Specific Qvhxvgi8749-49-02 15:21:00
  * 



             Test Item    Value        Reference Range Interpretation Comments

 

             Urine Specific Gravity (test code = 5811-5) 1.010        1.010-1.02

5                





[facility] CenterUrine eD9652-93-64 15:21:00* 



             Test Item    Value        Reference Range Interpretation Comments

 

             Urine pH (test code = 23410-4) 6            5-7                    

    





[facility] CenterUrine Leukocyte Cvxeosia4808-28-10 
15:21:00* 



             Test Item    Value        Reference Range Interpretation Comments

 

             Urine Leukocyte Esterase (test code = 5799-2) 2+           NEGATIVE

     H             





[facility] CenterUrine Tmzyvkt5154-87-62 15:21:00* 



             Test Item    Value        Reference Range Interpretation Comments

 

             Urine Nitrite (test code = 72144-6) NEGATIVE     NEGATIVE          

         





[facility] Kkntiiq8859-55-22 15:21:00* 



             Test Item    Value        Reference Range Interpretation Comments

 

             Urine Protein (test code = 5804-0) 2+           NEGATIVE     H     

        





[facility] 15:21:00* 



             Test Item    Value        Reference Range Interpretation Comments

 

             Urine Glucose (UA) (test code = 2349-9) NEGATIVE     NEGATIVE      

             





[facility] Qwnifin8200-27-12 15:21:00* 



             Test Item    Value        Reference Range Interpretation Comments

 

             Urine Ketones (test code = 16502-9) NEGATIVE     NEGATIVE          

         





[facility] Qvcirxxvnqqz0887-59-18 15:21:00* 



             Test Item    Value        Reference Range Interpretation Comments

 

             Urine Urobilinogen (test code = 31881-5) 1            0.2-1        

              





[facility] Wxxupslfm4122-83-07 15:21:00* 



             Test Item    Value        Reference Range Interpretation Comments

 

             Urine Bilirubin (test code = 1978-6) 1+           NEGATIVE     H   

          





[facility] Bchow2660-48-01 15:21:00* 



             Test Item    Value        Reference Range Interpretation Comments

 

             Urine Blood (test code = 07203-6) 4+           NEGATIVE     H      

       





[facility] Rultw8882-21-68 15:21:00* 



             Test Item    Value        Reference Range Interpretation Comments

 

             Urine Color (test code = 5778-6) YELLOW       YELLOW               

      





[facility] Qjuezrj7224-18-30 15:21:00* 



             Test Item    Value        Reference Range Interpretation Comments

 

             Urine Clarity (test code = 22520-8) CLOUDY       CLEAR        H    

         





[facility] Specific Uncjehq2788-40-32 15:21:00
  * 



             Test Item    Value        Reference Range Interpretation Comments

 

             Urine Specific Gravity (test code = 5811-5) 1.010        1.010-1.02

5                





[facility] jO4594-07-50 15:21:00* 



             Test Item    Value        Reference Range Interpretation Comments

 

             Urine pH (test code = 10214-4) 6            5-7                    

    





[facility] Leukocyte Jhmblldf3477-58-97 
15:21:00* 



             Test Item    Value        Reference Range Interpretation Comments

 

             Urine Leukocyte Esterase (test code = 5799-2) 2+           NEGATIVE

     H             





[facility] Wjhemsx1874-20-66 15:21:00* 



             Test Item    Value        Reference Range Interpretation Comments

 

             Urine Nitrite (test code = 57683-8) NEGATIVE     NEGATIVE          

         





[facility] Hwscejb1894-35-56 15:21:00* 



             Test Item    Value        Reference Range Interpretation Comments

 

             Urine Protein (test code = 5804-0) 2+           NEGATIVE     H     

        





[facility] Glucose (UA)2018 15:21:00* 



             Test Item    Value        Reference Range Interpretation Comments

 

             Urine Glucose (UA) (test code = 2349-9) NEGATIVE     NEGATIVE      

             





[facility] Ftxktzw9622-39-76 15:21:00* 



             Test Item    Value        Reference Range Interpretation Comments

 

             Urine Ketones (test code = 91102-1) NEGATIVE     NEGATIVE          

         





[facility] Ymbamhogfwjc4382-93-96 15:21:00* 



             Test Item    Value        Reference Range Interpretation Comments

 

             Urine Urobilinogen (test code = 85445-9) 1            0.2-1        

              





[facility] Qnsobnzch1096-98-34 15:21:00* 



             Test Item    Value        Reference Range Interpretation Comments

 

             Urine Bilirubin (test code = 1978-6) 1+           NEGATIVE     H   

          





[facility] Cwyjz6205-66-66 15:21:00* 



             Test Item    Value        Reference Range Interpretation Comments

 

             Urine Blood (test code = 94972-3) 4+           NEGATIVE     H      

       





[facility] Aqylp1719-32-60 15:21:00* 



             Test Item    Value        Reference Range Interpretation Comments

 

             Urine Color (test code = 5778-6) YELLOW       YELLOW               

      





[facility] Cjjwvfq0341-97-82 15:21:00* 



             Test Item    Value        Reference Range Interpretation Comments

 

             Urine Clarity (test code = 66718-7) CLOUDY       CLEAR        H    

         





[facility] Specific Ycpmjzl7382-02-82 15:21:00
  * 



             Test Item    Value        Reference Range Interpretation Comments

 

             Urine Specific Gravity (test code = 5811-5) 1.010        1.010-1.02

5                





[facility] Center[facility] [id number] 15:21:00* 



             Test Item    Value        Reference Range Interpretation Comments

 

             Urine pH (test code = 99899-9) 6            5-7                    

    





[facility] Center[facility] Leukocyte Wtwwmukr6148-41-01 
15:21:00* 



             Test Item    Value        Reference Range Interpretation Comments

 

             Urine Leukocyte Esterase (test code = 5799-2) 2+           NEGATIVE

     H             





[facility] Nxljtnk0467-30-21 15:21:00* 



             Test Item    Value        Reference Range Interpretation Comments

 

             Urine Nitrite (test code = 79770-7) NEGATIVE     NEGATIVE          

         





[facility] Lgdhlgo0094-11-12 15:21:00* 



             Test Item    Value        Reference Range Interpretation Comments

 

             Urine Protein (test code = 5804-0) 2+           NEGATIVE     H     

        





[facility] Glucose [facility])2018 15:21:00* 



             Test Item    Value        Reference Range Interpretation Comments

 

             Urine Glucose (UA) (test code = 2349-9) NEGATIVE     NEGATIVE      

             





[facility] Eqvzqfx6859-12-90 15:21:00* 



             Test Item    Value        Reference Range Interpretation Comments

 

             Urine Ketones (test code = 78251-2) NEGATIVE     NEGATIVE          

         





[facility] Center[facility] Crkexsgroiac5758-98-68 15:21:00* 



             Test Item    Value        Reference Range Interpretation Comments

 

             Urine Urobilinogen (test code = 77424-3) 1            0.2-1        

              





[facility] Jctpgauvb2656-86-87 15:21:00* 



             Test Item    Value        Reference Range Interpretation Comments

 

             Urine Bilirubin (test code = 1978-6) 1+           NEGATIVE     H   

          





[facility] Center[facility] Sbmbb6060-09-04 15:21:00* 



             Test Item    Value        Reference Range Interpretation Comments

 

             Urine Blood (test code = 86026-1) 4+           NEGATIVE     H      

       





[facility] Center[facility] Akobj9514-12-01 14:58:00* 



             Test Item    Value        Reference Range Interpretation Comments

 

             White Blood Count (test code = 6690-2) 11.11        4.8-10.8     H 

            





[facility] Center[facility] Zsprr4532-62-47 14:58:00* 



             Test Item    Value        Reference Range Interpretation Comments

 

             Red Blood Count (test code = 789-8) 4.39         3.6-5.1           

         





[facility] CenterHemoglobin2018-03-[facility] 14:58:00* 



             Test Item    Value        Reference Range Interpretation Comments

 

             Hemoglobin (test code = 68009-2) 11.2         12.0-16.0    L       

      





[facility] CenterHematocrit2018-03-04 14:58:00* 



             Test Item    Value        Reference Range Interpretation Comments

 

             Hematocrit (test code = 4544-3) 34.9         34.2-44.1             

     





[facility] CenterMean Corpuscular Ofaexa1147-20-82 
14:58:00* 



             Test Item    Value        Reference Range Interpretation Comments

 

             Mean Corpuscular Volume (test code = 787-2) 79.5         81-99     

   L             





[facility] CenterMean Corpuscular Kxcbpruoxe6146-49-19 
14:58:00* 



             Test Item    Value        Reference Range Interpretation Comments

 

             Mean Corpuscular Hemoglobin (test code = 785-6) 25.5         28-32 

       L             





[facility] CenterMean Corpuscular Hemoglobin Concent
2018 14:58:00* 



             Test Item    Value        Reference Range Interpretation Comments

 

             Mean Corpuscular Hemoglobin Concent (test code = 786-4) 32.1       

  31-35                      





[facility] CenterRed Cell Distribution Qsmjn3449-91-49 
14:58:00* 



             Test Item    Value        Reference Range Interpretation Comments

 

             Red Cell Distribution Width (test code = 46473-9) 15.2         11.7

-14.4    H             





[facility] CenterPlatelet Vzpey6053-27-23 14:58:00* 



             Test Item    Value        Reference Range Interpretation Comments

 

             Platelet Count (test code = 777-3) 249          140-360            

        





[facility] CenterNeutrophils (%) (Auto)2018 14:58:00
  * 



             Test Item    Value        Reference Range Interpretation Comments

 

             Neutrophils (%) (Auto) (test code = 79682-6) 73.3         38.7-80.0

                  





[facility] CenterLymphocytes (%) (Auto)2018 14:58:00
  * 



             Test Item    Value        Reference Range Interpretation Comments

 

             Lymphocytes (%) (Auto) (test code = 736-9) 16.7         18.0-39.1  

  L             





[facility] CenterMonocytes (%) (Auto)2018 14:58:00* 



             Test Item    Value        Reference Range Interpretation Comments

 

             Monocytes (%) (Auto) (test code = 5905-5) 8.8          4.4-11.3    

               





[facility] CenterEosinophils (%) (Auto)2018 14:58:00
  * 



             Test Item    Value        Reference Range Interpretation Comments

 

             Eosinophils (%) (Auto) (test code = 713-8) 0.5          0.0-6.0    

                





[facility] CenterBasophils (%) (Auto)2018 14:58:00* 



             Test Item    Value        Reference Range Interpretation Comments

 

             Basophils (%) (Auto) (test code = 706-2) 0.3          0.0-1.0      

              





[facility] CenterIM GRANULOCYTES %2018 14:58:00* 



             Test Item    Value        Reference Range Interpretation Comments

 

             IM GRANULOCYTES % (test code = IM GRANULOCYTES %) 0.4          0.0-

1.0                    





[facility] CenterNeutrophils # (Auto)2018 14:58:00* 



             Test Item    Value        Reference Range Interpretation Comments

 

             Neutrophils # (Auto) (test code = 751-8) 8.2          2.1-6.9      

H             





[facility] CenterLymphocytes # (Auto)2018 14:58:00* 



             Test Item    Value        Reference Range Interpretation Comments

 

             Lymphocytes # (Auto) (test code = 49093-2) 1.9          1.0-3.2    

                





[facility] CenterMonocytes # (Auto)2018 14:58:00* 



             Test Item    Value        Reference Range Interpretation Comments

 

             Monocytes # (Auto) (test code = 742-7) 1.0          0.2-0.8      H 

            





[facility] CenterEosinophils # (Auto)2018 14:58:00* 



             Test Item    Value        Reference Range Interpretation Comments

 

             Eosinophils # (Auto) (test code = 711-2) 0.1          0.0-0.4      

              





[facility] CenterBasophils # (Auto)2018 14:58:00* 



             Test Item    Value        Reference Range Interpretation Comments

 

             Basophils # (Auto) (test code = 704-7) 0.0          0.0-0.1        

            





[facility] CenterAbsolute Immature Granulocyte (auto
2018 14:58:00* 



             Test Item    Value        Reference Range Interpretation Comments

 

                                        Absolute Immature Granulocyte (auto (lloyd

t code = Absolute Immature Granulocyte 

(auto)          0.04            0-0.1                            





[facility] CenterWhite Blood Ttkyg4644-09-82 14:58:00* 



             Test Item    Value        Reference Range Interpretation Comments

 

             White Blood Count (test code = 6690-2) 11.11        4.8-10.8     H 

            





[facility] CenterRed Blood Vvhrf0259-96-04 14:58:00* 



             Test Item    Value        Reference Range Interpretation Comments

 

             Red Blood Count (test code = 789-8) 4.39         3.6-5.1           

         





[facility] CenterHemoglobin2018-03-04 14:58:00* 



             Test Item    Value        Reference Range Interpretation Comments

 

             Hemoglobin (test code = 46961-6) 11.2         12.0-16.0    L       

      





[facility] CenterHematocrit2018-03-04 14:58:00* 



             Test Item    Value        Reference Range Interpretation Comments

 

             Hematocrit (test code = 4544-3) 34.9         34.2-44.1             

     





[facility] CenterMean Corpuscular Gxdtqp8413-06-20 
14:58:00* 



             Test Item    Value        Reference Range Interpretation Comments

 

             Mean Corpuscular Volume (test code = 787-2) 79.5         81-99     

   L             





[facility] CenterMean Corpuscular Ygfcmuijpp8743-26-24 
14:58:00* 



             Test Item    Value        Reference Range Interpretation Comments

 

             Mean Corpuscular Hemoglobin (test code = 785-6) 25.5         28-32 

       L             





[facility] CenterMean Corpuscular Hemoglobin Concent
2018 14:58:00* 



             Test Item    Value        Reference Range Interpretation Comments

 

             Mean Corpuscular Hemoglobin Concent (test code = 786-4) 32.1       

  31-35                      





[facility] CenterRed Cell Distribution Kgojj7822-72-41 
14:58:00* 



             Test Item    Value        Reference Range Interpretation Comments

 

             Red Cell Distribution Width (test code = 41366-1) 15.2         11.7

-14.4    H             





[facility] CenterPlatelet Envph6688-51-73 14:58:00* 



             Test Item    Value        Reference Range Interpretation Comments

 

             Platelet Count (test code = 777-3) 249          140-360            

        





[facility] CenterNeutrophils (%) (Auto)2018 14:58:00
  * 



             Test Item    Value        Reference Range Interpretation Comments

 

             Neutrophils (%) (Auto) (test code = 01237-4) 73.3         38.7-80.0

                  





[facility] CenterLymphocytes (%) (Auto)2018 14:58:00
  * 



             Test Item    Value        Reference Range Interpretation Comments

 

             Lymphocytes (%) (Auto) (test code = 736-9) 16.7         18.0-39.1  

  L             





[facility] CenterMonocytes (%) (Auto)2018 14:58:00* 



             Test Item    Value        Reference Range Interpretation Comments

 

             Monocytes (%) (Auto) (test code = 5905-5) 8.8          4.4-11.3    

               





[facility] CenterEosinophils (%) (Auto)2018 14:58:00
  * 



             Test Item    Value        Reference Range Interpretation Comments

 

             Eosinophils (%) (Auto) (test code = 713-8) 0.5          0.0-6.0    

                





[facility] CenterBasophils (%) (Auto)2018 14:58:00* 



             Test Item    Value        Reference Range Interpretation Comments

 

             Basophils (%) (Auto) (test code = 706-2) 0.3          0.0-1.0      

              





[facility] CenterIM GRANULOCYTES %2018 14:58:00* 



             Test Item    Value        Reference Range Interpretation Comments

 

             IM GRANULOCYTES % (test code = IM GRANULOCYTES %) 0.4          0.0-

1.0                    





[facility] CenterNeutrophils # (Auto)2018 14:58:00* 



             Test Item    Value        Reference Range Interpretation Comments

 

             Neutrophils # (Auto) (test code = 751-8) 8.2          2.1-6.9      

H             





[facility] CenterLymphocytes # (Auto)2018 14:58:00* 



             Test Item    Value        Reference Range Interpretation Comments

 

             Lymphocytes # (Auto) (test code = 87585-3) 1.9          1.0-3.2    

                





[facility] CenterMonocytes # (Auto)2018 14:58:00* 



             Test Item    Value        Reference Range Interpretation Comments

 

             Monocytes # (Auto) (test code = 742-7) 1.0          0.2-0.8      H 

            





[facility] CenterEosinophils # (Auto)2018 14:58:00* 



             Test Item    Value        Reference Range Interpretation Comments

 

             Eosinophils # (Auto) (test code = 711-2) 0.1          0.0-0.4      

              





[facility] CenterBasophils # (Auto)2018 14:58:00* 



             Test Item    Value        Reference Range Interpretation Comments

 

             Basophils # (Auto) (test code = 704-7) 0.0          0.0-0.1        

            





[facility] CenterAbsolute Immature Granulocyte (auto
2018 14:58:00* 



             Test Item    Value        Reference Range Interpretation Comments

 

                                        Absolute Immature Granulocyte (auto (lloyd

t code = Absolute Immature Granulocyte 

(auto)          0.04            0-0.1                            





[facility] CenterWhite Blood Ptqgz7690-90-59 14:58:00* 



             Test Item    Value        Reference Range Interpretation Comments

 

             White Blood Count (test code = 6690-2) 11.11        4.8-10.8     H 

            





[facility] CenterRed Blood Nxvsk7788-15-57 14:58:00* 



             Test Item    Value        Reference Range Interpretation Comments

 

             Red Blood Count (test code = 789-8) 4.39         3.6-5.1           

         





[facility] CenterHemoglobin2018-03-[facility] 14:58:00* 



             Test Item    Value        Reference Range Interpretation Comments

 

             Hemoglobin (test code = 09489-8) 11.2         12.0-16.0    L       

      





[facility] CenterHematocrit2018-03-04 14:58:00* 



             Test Item    Value        Reference Range Interpretation Comments

 

             Hematocrit (test code = 4544-3) 34.9         34.2-44.1             

     





[facility] CenterMean Corpuscular Epwbwz6339-48-93 
14:58:00* 



             Test Item    Value        Reference Range Interpretation Comments

 

             Mean Corpuscular Volume (test code = 787-2) 79.5         81-99     

   L             





[facility] CenterMean Corpuscular Gafvrthssp1415-16-90 
14:58:00* 



             Test Item    Value        Reference Range Interpretation Comments

 

             Mean Corpuscular Hemoglobin (test code = 785-6) 25.5         28-32 

       L             





[facility] CenterMean Corpuscular Hemoglobin Concent
2018 14:58:00* 



             Test Item    Value        Reference Range Interpretation Comments

 

             Mean Corpuscular Hemoglobin Concent (test code = 786-4) 32.1       

  31-35                      





[facility] CenterRed Cell Distribution Zlbyz8200-38-53 
14:58:00* 



             Test Item    Value        Reference Range Interpretation Comments

 

             Red Cell Distribution Width (test code = 77647-0) 15.2         11.7

-14.4    H             





[facility] CenterPlatelet Icdij1650-72-61 14:58:00* 



             Test Item    Value        Reference Range Interpretation Comments

 

             Platelet Count (test code = 777-3) 249          140-360            

        





[facility] CenterNeutrophils (%) (Auto)2018 14:58:00
  * 



             Test Item    Value        Reference Range Interpretation Comments

 

             Neutrophils (%) (Auto) (test code = 18029-3) 73.3         38.7-80.0

                  





[facility] CenterLymphocytes (%) (Auto)2018 14:58:00
  * 



             Test Item    Value        Reference Range Interpretation Comments

 

             Lymphocytes (%) (Auto) (test code = 736-9) 16.7         18.0-39.1  

  L             





[facility] CenterMonocytes (%) (Auto)2018 14:58:00* 



             Test Item    Value        Reference Range Interpretation Comments

 

             Monocytes (%) (Auto) (test code = 5905-5) 8.8          4.4-11.3    

               





[facility] CenterEosinophils (%) (Auto)2018 14:58:00
  * 



             Test Item    Value        Reference Range Interpretation Comments

 

             Eosinophils (%) (Auto) (test code = 713-8) 0.5          0.0-6.0    

                





[facility] CenterBasophils (%) (Auto)2018 14:58:00* 



             Test Item    Value        Reference Range Interpretation Comments

 

             Basophils (%) (Auto) (test code = 706-2) 0.3          0.0-1.0      

              





[facility] CenterIM GRANULOCYTES %2018 14:58:00* 



             Test Item    Value        Reference Range Interpretation Comments

 

             IM GRANULOCYTES % (test code = IM GRANULOCYTES %) 0.4          0.0-

1.0                    





[facility] CenterNeutrophils # (Auto)2018 14:58:00* 



             Test Item    Value        Reference Range Interpretation Comments

 

             Neutrophils # (Auto) (test code = 751-8) 8.2          2.1-6.9      

H             





[facility] CenterLymphocytes # (Auto)2018 14:58:00* 



             Test Item    Value        Reference Range Interpretation Comments

 

             Lymphocytes # (Auto) (test code = 07097-1) 1.9          1.0-3.2    

                





[facility] CenterMonocytes # (Auto)2018 14:58:00* 



             Test Item    Value        Reference Range Interpretation Comments

 

             Monocytes # (Auto) (test code = 742-7) 1.0          0.2-0.8      H 

            





[facility] CenterEosinophils # (Auto)2018 14:58:00* 



             Test Item    Value        Reference Range Interpretation Comments

 

             Eosinophils # (Auto) (test code = 711-2) 0.1          0.0-0.4      

              





[facility] CenterBasophils # (Auto)2018 14:58:00* 



             Test Item    Value        Reference Range Interpretation Comments

 

             Basophils # (Auto) (test code = 704-7) 0.0          0.0-0.1        

            





[facility] CenterAbsolute Immature Granulocyte (auto
2018 14:58:00* 



             Test Item    Value        Reference Range Interpretation Comments

 

                                        Absolute Immature Granulocyte (auto (lloyd

t code = Absolute Immature Granulocyte 

(auto)          0.04            0-0.1                            





[facility]2018-02-04 06:21:00* 



             Test Item    Value        Reference Range Interpretation Comments

 

             Urine Culture (test code = 630-4) Organism: CANDIDA ALBICANS       

                     





[facility]2018-02-04 06:21:00* 



             Test Item    Value        Reference Range Interpretation Comments

 

             Urine Culture (test code = 630-4) Organism: CANDIDA ALBICANS       

                     





[facility]2018-02-04 06:21:00* 



             Test Item    Value        Reference Range Interpretation Comments

 

             Urine Culture (test code = 630-4) Organism: CANDIDA ALBICANS       

                     





[facility]2018-02-04 06:21:00* 



             Test Item    Value        Reference Range Interpretation Comments

 

             Urine Culture (test code = 630-4) Organism: CANDIDA ALBICANS       

                     





[facility]2018-02-04 06:13:00* 



             Test Item    Value        Reference Range Interpretation Comments

 

             Urine Culture (test code = 630-4) Organism: CANDIDA ALBICANS       

                     





[facility]2018-02-04 06:13:00* 



             Test Item    Value        Reference Range Interpretation Comments

 

             Urine Culture (test code = 630-4) Organism: CANDIDA ALBICANS       

                     





[facility]2018-02-04 06:13:00* 



             Test Item    Value        Reference Range Interpretation Comments

 

             Urine Culture (test code = 630-4) Organism: CANDIDA ALBICANS       

                     





[facility]2018-02-04 06:13:00* 



             Test Item    Value        Reference Range Interpretation Comments

 

             Urine Culture (test code = 630-4) Organism: CANDIDA ALBICANS       

                     





[facility]2018-02-04 06:13:00* 



             Test Item    Value        Reference Range Interpretation Comments

 

             Urine Culture (test code = 630-4) Organism: CANDIDA ALBICANS       

                     





[facility] Rcjeshm6132-38-25 06:13:00* 



             Test Item    Value        Reference Range Interpretation Comments

 

             Urine Culture (test code = 630-4) Organism: CANDIDA ALBICANS       

                     





[facility] Lypui3470-68-91 07:24:00* 



             Test Item    Value        Reference Range Interpretation Comments

 

             Sodium Level (test code = 2951-2) 142          136-145             

       





[facility] CenterPotassium Uqket2830-66-38 07:24:00* 



             Test Item    Value        Reference Range Interpretation Comments

 

             Potassium Level (test code = 2823-3) 3.1          3.5-5.1      L   

          





[facility] CenterChloride Mwrtm2317-50-48 07:24:00* 



             Test Item    Value        Reference Range Interpretation Comments

 

             Chloride Level (test code = 2075-0) 108                 H    

         





[facility] CenterCarbon Dioxide Azcdd2761-74-38 07:24:00* 



             Test Item    Value        Reference Range Interpretation Comments

 

             Carbon Dioxide Level (test code = 2028-9) 27           22-29       

               





[facility] CenterAnion Dyg2960-37-63 07:24:00* 



             Test Item    Value        Reference Range Interpretation Comments

 

             Anion Gap (test code = 33037-3) 10.1         8-16                  

     





[facility] CenterBlood Urea Queppsre7296-76-49 07:24:00* 



             Test Item    Value        Reference Range Interpretation Comments

 

             Blood Urea Nitrogen (test code = 3094-0) 7            7-26         

              





[facility] CenterCreatinine2018-02-[facility] 07:24:00* 



             Test Item    Value        Reference Range Interpretation Comments

 

             Creatinine (test code = 2160-0) 0.68         0.57-1.11             

     





[facility] CenterBUN/Creatinine Cfmpp0324-18-39 07:24:00* 



             Test Item    Value        Reference Range Interpretation Comments

 

             BUN/Creatinine Ratio (test code = 3097-3) 10           6-25        

               





[facility] CenterEstimat Glomerular Filtration Rate
2018 07:24:00* 



             Test Item    Value        Reference Range Interpretation Comments

 

             Estimat Glomerular Filtration Rate (test code = 97408-2) 60-       

   >60                        





Ranges were taken from the National Kidney Disease Education Program and the [facility] [employer] Foundation literature.Reference ranges:60 or greater: Yrbqjf93-79 (
for 3 consecutive months): Chronic kidney disease 15 or less: Kidney failureBrownfield Regional Medical CenterGlucose Sgxjq0371-39-87 07:24:00* 



             Test Item    Value        Reference Range Interpretation Comments

 

             Glucose Level (test code = LOX5153) 127                 H    

         





[facility] CenterCalcium Gtxcy4590-48-29 07:24:00* 



             Test Item    Value        Reference Range Interpretation Comments

 

             Calcium Level (test code = 42569-4) 8.8          8.4-10.2          

         





[facility] CenterUrine Rpkujpx5089-53-58 06:48:00* 



             Test Item    Value        Reference Range Interpretation Comments

 

             Urine Culture (test code = 630-4) Organism: YEAST SPECIES          

                  





[facility] CenterWhite Blood Lhmlf2839-94-36 06:45:00* 



             Test Item    Value        Reference Range Interpretation Comments

 

             White Blood Count (test code = 6690-2) 7.03         4.8-10.8       

            





[facility] CenterRed Blood Gupjr7133-37-90 06:45:00* 



             Test Item    Value        Reference Range Interpretation Comments

 

             Red Blood Count (test code = 789-8) 3.86         3.6-5.1           

         





[facility] CenterHemoglobin2018-02-03 06:45:00* 



             Test Item    Value        Reference Range Interpretation Comments

 

             Hemoglobin (test code = 81481-3) 10.0         12.0-16.0    L       

      





[facility] CenterHematocrit2018-02-03 06:45:00* 



             Test Item    Value        Reference Range Interpretation Comments

 

             Hematocrit (test code = 4544-3) 32.0         34.2-44.1    L        

     





[facility] CenterMean Corpuscular Hpzsbb1734-11-72 
06:45:00* 



             Test Item    Value        Reference Range Interpretation Comments

 

             Mean Corpuscular Volume (test code = 787-2) 82.9         81-99     

                 





[facility] CenterMean Corpuscular Hhptqntznv2734-13-81 
06:45:00* 



             Test Item    Value        Reference Range Interpretation Comments

 

             Mean Corpuscular Hemoglobin (test code = 785-6) 25.9         28-32 

       L             





[facility] CenterMean Corpuscular Hemoglobin Concent
2018 06:45:00* 



             Test Item    Value        Reference Range Interpretation Comments

 

             Mean Corpuscular Hemoglobin Concent (test code = 786-4) 31.3       

  31-35                      





[facility] CenterRed Cell Distribution Anobi9415-93-35 
06:45:00* 



             Test Item    Value        Reference Range Interpretation Comments

 

             Red Cell Distribution Width (test code = 70813-0) 15.3         11.7

-14.4    H             





[facility] CenterPlatelet Nahwb3622-37-87 06:45:00* 



             Test Item    Value        Reference Range Interpretation Comments

 

             Platelet Count (test code = 777-3) 238          140-360            

        





[facility] CenterNeutrophils (%) (Auto)2018 06:45:00
  * 



             Test Item    Value        Reference Range Interpretation Comments

 

             Neutrophils (%) (Auto) (test code = 60207-7) 61.4         38.7-80.0

                  





[facility] CenterLymphocytes (%) (Auto)2018 06:45:00
  * 



             Test Item    Value        Reference Range Interpretation Comments

 

             Lymphocytes (%) (Auto) (test code = 736-9) 29.0         18.0-39.1  

                





[facility] CenterMonocytes (%) (Auto)2018 06:45:00* 



             Test Item    Value        Reference Range Interpretation Comments

 

             Monocytes (%) (Auto) (test code = 5905-5) 7.3          4.4-11.3    

               





[facility] CenterEosinophils (%) (Auto)2018 06:45:00
  * 



             Test Item    Value        Reference Range Interpretation Comments

 

             Eosinophils (%) (Auto) (test code = 713-8) 1.6          0.0-6.0    

                





[facility] CenterBasophils (%) (Auto)2018 06:45:00* 



             Test Item    Value        Reference Range Interpretation Comments

 

             Basophils (%) (Auto) (test code = 706-2) 0.3          0.0-1.0      

              





[facility] CenterIM GRANULOCYTES %2018 06:45:00* 



             Test Item    Value        Reference Range Interpretation Comments

 

             IM GRANULOCYTES % (test code = IM GRANULOCYTES %) 0.4          0.0-

1.0                    





[facility] CenterNeutrophils # (Auto)2018 06:45:00* 



             Test Item    Value        Reference Range Interpretation Comments

 

             Neutrophils # (Auto) (test code = 751-8) 4.3          2.1-6.9      

              





[facility] CenterLymphocytes # (Auto)2018 06:45:00* 



             Test Item    Value        Reference Range Interpretation Comments

 

             Lymphocytes # (Auto) (test code = 37370-2) 2.0          1.0-3.2    

                





[facility] CenterMonocytes # (Auto)2018 06:45:00* 



             Test Item    Value        Reference Range Interpretation Comments

 

             Monocytes # (Auto) (test code = 742-7) 0.5          0.2-0.8        

            





[facility] CenterEosinophils # (Auto)2018 06:45:00* 



             Test Item    Value        Reference Range Interpretation Comments

 

             Eosinophils # (Auto) (test code = 711-2) 0.1          0.0-0.4      

              





[facility] CenterBasophils # (Auto)2018 06:45:00* 



             Test Item    Value        Reference Range Interpretation Comments

 

             Basophils # (Auto) (test code = 704-7) 0.0          0.0-0.1        

            





[facility] CenterAbsolute Immature Granulocyte (auto
2018 06:45:00* 



             Test Item    Value        Reference Range Interpretation Comments

 

                                        Absolute Immature Granulocyte (auto (lloyd

t code = Absolute Immature Granulocyte 

(auto)          0.03            0-0.1                            





[facility] CenterTotal Ywsvgoldt1384-03-64 06:49:00* 



             Test Item    Value        Reference Range Interpretation Comments

 

             Total Bilirubin (test code = 1975-2) 0.6          0.2-1.2          

          





[facility] CenterAspartate Amino Transf (AST/SGOT)
2018 06:49:00* 



             Test Item    Value        Reference Range Interpretation Comments

 

                                        Aspartate Amino Transf (AST/SGOT) (test 

code = Aspartate Amino Transf 

(AST/SGOT))     18              5-34                             





[facility] CenterAlanine Aminotransferase (ALT/SGPT)
2018 06:49:00* 



             Test Item    Value        Reference Range Interpretation Comments

 

             Alanine Aminotransferase (ALT/SGPT) (test code = 1742-6) 28        

   0-55                       





[facility] CenterTotal Wgcqfqy7996-84-01 06:49:00* 



             Test Item    Value        Reference Range Interpretation Comments

 

             Total Protein (test code = 2885-2) 6.9          6.5-8.1            

        





[facility] CenterAlbumin2018-02-01 06:49:00* 



             Test Item    Value        Reference Range Interpretation Comments

 

             Albumin (test code = 1751-7) 2.8          3.5-5.0      L           

  





[facility] CenterGlobulin2018-02-01 06:49:00* 



             Test Item    Value        Reference Range Interpretation Comments

 

             Globulin (test code = 27045-2) 4.1          2.3-3.5      H         

    





[facility] CenterAlbumin/Globulin Zcuwf8271-97-32 06:49:00
  * 



             Test Item    Value        Reference Range Interpretation Comments

 

             Albumin/Globulin Ratio (test code = 1759-0) 0.7          0.8-2.0   

   L             





[facility] CenterAlkaline Cgmsprfblgs7435-06-66 06:49:00* 



             Test Item    Value        Reference Range Interpretation Comments

 

             Alkaline Phosphatase (test code = 6768-6) 82                 

               





[facility] CenterUrine PDY7418-84-37 04:16:00* 



             Test Item    Value        Reference Range Interpretation Comments

 

             Urine WBC (test code = 5821-4) 50-          0-5          H         

    





[facility] CenterUrine KVD9732-17-87 04:16:00* 



             Test Item    Value        Reference Range Interpretation Comments

 

             Urine RBC (test code = 05903-9) 50-          0-5          H        

     





[facility] CenterUrine Mosrgstm1712-97-63 04:16:00* 



             Test Item    Value        Reference Range Interpretation Comments

 

             Urine Bacteria (test code = 57974-3) MODERATE     NONE         H   

          





[facility] CenterUrine Epithelial Gpduo7139-85-19 04:16:00
  * 



             Test Item    Value        Reference Range Interpretation Comments

 

             Urine Epithelial Cells (test code = 31024-8) RARE         NONE     

                  





[facility] CenterUrine Oazqn9919-45-42 04:08:00* 



             Test Item    Value        Reference Range Interpretation Comments

 

             Urine Color (test code = 5778-6) YELLOW       YELLOW               

      





[facility] CenterUrine Zqbqxuv2625-03-44 04:08:00* 



             Test Item    Value        Reference Range Interpretation Comments

 

             Urine Clarity (test code = 26223-8) CLOUDY       CLEAR        H    

         





[facility] CenterUrine Specific Errdbtf8197-32-29 04:08:00
  * 



             Test Item    Value        Reference Range Interpretation Comments

 

             Urine Specific Gravity (test code = 5811-5) 1.010        1.010-1.02

5                





[facility] CenterUrine lQ3490-00-55 04:08:00* 



             Test Item    Value        Reference Range Interpretation Comments

 

             Urine pH (test code = 48089-2) 6            5-7                    

    





[facility] CenterUrine Leukocyte Wgvvxwjz4335-53-55 
04:08:00* 



             Test Item    Value        Reference Range Interpretation Comments

 

             Urine Leukocyte Esterase (test code = 5799-2) 2+           NEGATIVE

     H             





[facility] Igedlbl1272-49-16 04:08:00* 



             Test Item    Value        Reference Range Interpretation Comments

 

             Urine Nitrite (test code = 95803-3) NEGATIVE     NEGATIVE          

         





[facility] CenterUrine Kdjscsw2407-84-35 04:08:00* 



             Test Item    Value        Reference Range Interpretation Comments

 

             Urine Protein (test code = 5804-0) 2+           NEGATIVE     H     

        





[facility] CenterUrine Glucose (UA)2018 04:08:00* 



             Test Item    Value        Reference Range Interpretation Comments

 

             Urine Glucose (UA) (test code = 2349-9) NEGATIVE     NEGATIVE      

             





[facility] CenterUrine Ldbludq3607-13-81 04:08:00* 



             Test Item    Value        Reference Range Interpretation Comments

 

             Urine Ketones (test code = 77201-3) NEGATIVE     NEGATIVE          

         





[facility] CenterUrine Amoqelnepshl2702-90-86 04:08:00* 



             Test Item    Value        Reference Range Interpretation Comments

 

             Urine Urobilinogen (test code = 82186-0) 0.2          0.2-1        

              





[facility] CenterUrine Ewohesnvw2586-31-66 04:08:00* 



             Test Item    Value        Reference Range Interpretation Comments

 

             Urine Bilirubin (test code = 1978-6) NEGATIVE     NEGATIVE         

          





[facility] CenterUrine Aopiy2111-37-59 04:08:00* 



             Test Item    Value        Reference Range Interpretation Comments

 

             Urine Blood (test code = 16402-0) 4+           NEGATIVE     H      

       





[facility] Yekpj3431-55-75 02:24:00* 



             Test Item    Value        Reference Range Interpretation Comments

 

             Magnesium Level (test code = 53219-6) 1.8          1.3-2.1         

           





[facility] Doxwd9942-65-57 02:24:00* 



             Test Item    Value        Reference Range Interpretation Comments

 

             Magnesium Level (test code = 74266-9) 1.8          1.3-2.1         

           





[facility] Zfpxy2630-94-36 02:24:00* 



             Test Item    Value        Reference Range Interpretation Comments

 

             Magnesium Level (test code = 69986-2) 1.8          1.3-2.1         

           





[facility] Filqr6535-38-98 02:24:00* 



             Test Item    Value        Reference Range Interpretation Comments

 

             Magnesium Level (test code = 50772-0) 1.8          1.3-2.1         

           





[facility] Zytky8473-49-05 02:24:00* 



             Test Item    Value        Reference Range Interpretation Comments

 

             Magnesium Level (test code = 12910-7) 1.8          1.3-2.1         

           





[facility] Arrgy8373-64-66 02:24:00* 



             Test Item    Value        Reference Range Interpretation Comments

 

             Magnesium Level (test code = 40433-1) 1.8          1.3-2.1         

           





[facility] Pczdn0814-71-82 02:24:00* 



             Test Item    Value        Reference Range Interpretation Comments

 

             Magnesium Level (test code = 84637-3) 1.8          1.3-2.1         

           





[facility] CenterUrine Calcium Oxalate Mhytfkwl1611-41-67 
01:52:00* 



             Test Item    Value        Reference Range Interpretation Comments

 

             Urine Calcium Oxalate Crystals (test code = 5774-5) RARE         FE

W                        





[facility] CenterUrine Amorphous Amukgiym8708-23-77 
01:52:00* 



             Test Item    Value        Reference Range Interpretation Comments

 

             Urine Amorphous Sediment (test code = 8246-1) FEW          FEW     

                   





[facility] CenterUrine Calcium Oxalate Gecrpimx6667-12-24 
01:52:00* 



             Test Item    Value        Reference Range Interpretation Comments

 

             Urine Calcium Oxalate Crystals (test code = 5774-5) RARE         FE

W                        





[facility] CenterUrine Amorphous Klkzakiy5237-20-87 
01:52:00* 



             Test Item    Value        Reference Range Interpretation Comments

 

             Urine Amorphous Sediment (test code = 8246-1) FEW          FEW     

                   





[facility] CenterUrine Calcium Oxalate Jxcxpbfn5817-13-88 
01:52:00* 



             Test Item    Value        Reference Range Interpretation Comments

 

             Urine Calcium Oxalate Crystals (test code = 5774-5) RARE         FE

W                        





[facility] CenterUrine Amorphous Kjgkvxlq9521-46-22 
01:52:00* 



             Test Item    Value        Reference Range Interpretation Comments

 

             Urine Amorphous Sediment (test code = 8246-1) FEW          FEW     

                   





[facility] CenterUrine Calcium Oxalate Znvamcnp5765-27-48 
01:52:00* 



             Test Item    Value        Reference Range Interpretation Comments

 

             Urine Calcium Oxalate Crystals (test code = 5774-5) RARE         FE

W                        





[facility] CenterUrine Amorphous Utgzpnjv1216-35-25 
01:52:00* 



             Test Item    Value        Reference Range Interpretation Comments

 

             Urine Amorphous Sediment (test code = 8246-1) FEW          FEW     

                   





[facility] CenterUrine Calcium Oxalate Trootdnk9234-69-57 
01:52:00* 



             Test Item    Value        Reference Range Interpretation Comments

 

             Urine Calcium Oxalate Crystals (test code = 5774-5) RARE         FE

W                        





[facility] CenterUrine Calcium Oxalate Jzpwtxxg5025-97-48 
01:52:00* 



             Test Item    Value        Reference Range Interpretation Comments

 

             Urine Calcium Oxalate Crystals (test code = 5774-5) RARE         FE

W                        





[facility] CenterUrine Calcium Oxalate Mksphlui1446-86-12 
01:52:00* 



             Test Item    Value        Reference Range Interpretation Comments

 

             Urine Calcium Oxalate Crystals (test code = 5774-5) RARE         FE

W                        





[facility] CenterBlood Pqqqfub4894-38-44 17:29:00* 



             Test Item    Value        Reference Range Interpretation Comments

 

             Blood Culture (test code = 17046531) NO GROWTH AFTER 5 DAYS, FINAL 

REPORT                            





[facility] Mjiwpdz7843-06-90 17:29:00* 



             Test Item    Value        Reference Range Interpretation Comments

 

             Blood Culture (test code = 76494878) NO GROWTH AFTER 5 DAYS, FINAL 

REPORT                            





[facility] Czjdmym3647-64-89 17:29:00* 



             Test Item    Value        Reference Range Interpretation Comments

 

             Blood Culture (test code = 42021061) NO GROWTH AFTER 5 DAYS, FINAL 

REPORT                            





[facility] Ifjkvfz9353-27-76 17:29:00* 



             Test Item    Value        Reference Range Interpretation Comments

 

             Blood Culture (test code = 68373687) NO GROWTH AFTER 5 DAYS, FINAL 

REPORT                            





[facility] Fllhvex5922-10-86 17:29:00* 



             Test Item    Value        Reference Range Interpretation Comments

 

             Blood Culture (test code = 38231428) NO GROWTH AFTER 5 DAYS, FINAL 

REPORT                            





[facility]2017-11-24 17:29:00* 



             Test Item    Value        Reference Range Interpretation Comments

 

             Blood Culture (test code = 90238608) NO GROWTH AFTER 5 DAYS, FINAL 

REPORT                            





[facility] Tranvub2815-33-34 20:10:00* 



             Test Item    Value        Reference Range Interpretation Comments

 

             Bedside Glucose (test code = 02366-9) 216                 H  

           





Meter ID: [id number] Glucose
2017 20:10:00* 



             Test Item    Value        Reference Range Interpretation Comments

 

             Bedside Glucose (test code = 15546-7) 216                 H  

           





Meter ID: [id number] [facility] Glucose
2017 20:10:00* 



             Test Item    Value        Reference Range Interpretation Comments

 

             Bedside Glucose (test code = 78812-6) 216                 H  

           





Meter ID: [id number] [facility] Glucose
2017 20:10:00* 



             Test Item    Value        Reference Range Interpretation Comments

 

             Bedside Glucose (test code = 82458-5) 216                 H  

           





Meter ID: [id number] [facility] Glucose
2017 20:10:00* 



             Test Item    Value        Reference Range Interpretation Comments

 

             Bedside Glucose (test code = 24896-7) 216                 H  

           





[facility] ID: [id number]
2017 20:10:00* 



             Test Item    Value        Reference Range Interpretation Comments

 

             Bedside Glucose (test code = 77610-4) 216                 H  

           





[id number] 
Gonadotropin, Ydegv4230-23-37 08:13:00* 



             Test Item    Value        Reference Range Interpretation Comments

 

             Human Chorionic Gonadotropin, Quant (test code = 24526-0) 1.95     

    0-10                       





[facility] Chorionic Gonadotropin, Quant
2017 08:13:00* 



             Test Item    Value        Reference Range Interpretation Comments

 

             Human Chorionic Gonadotropin, Quant (test code = 60388-5) 1.95     

    0-10                       





[facility] Chorionic Gonadotropin, Quant
2017 08:13:00* 



             Test Item    Value        Reference Range Interpretation Comments

 

             Human Chorionic Gonadotropin, Quant (test code = 76502-3) 1.95     

    0-10                       





[facility] Chorionic Gonadotropin, Quant
2017 08:13:00* 



             Test Item    Value        Reference Range Interpretation Comments

 

             Human Chorionic Gonadotropin, Quant (test code = 86095-6) 1.95     

    0-10                       





[facility] Chorionic Gonadotropin, Quant
2017 08:13:00* 



             Test Item    Value        Reference Range Interpretation Comments

 

             Human Chorionic Gonadotropin, Quant (test code = 69221-1) 1.95     

    0-10                       





[facility] Chorionic Gonadotropin, Quant
2017 08:13:00* 



             Test Item    Value        Reference Range Interpretation Comments

 

             Human Chorionic Gonadotropin, Quant (test code = 73128-6) 1.95     

    0-10                       





[facility] Exud7563-92-77 10:04:00* 



             Test Item    Value        Reference Range Interpretation Comments

 

             Prothrombin Time (test code = 5902-2) 12.8         11.9-14.5       

           





[facility] Center[facility]th[facility] International Ratio
2017 10:04:00* 



             Test Item    Value        Reference Range Interpretation Comments

 

             Prothromb Time International Ratio (test code = 6301-6) 0.92       

                             





Oral Anticoagulant Therapy INR Values:1. Low Intensity Therapy        1.5 - 2.02
. Moderate Intensity Therapy   2.0 - 3.03. High Intensity Therapy(1)    2.5 - 3.
54. High Intensity Therapy(2)    3.0 - 4.05. Panic Value INR              > 5.0
[facility] CenterProth[facility] Yrpp2776-02-30 10:04:00* 



             Test Item    Value        Reference Range Interpretation Comments

 

             Prothrombin Time (test code = 5902-2) 12.8         11.9-14.5       

           





[facility] CenterPro[facility] International Ratio
2017 10:04:00* 



             Test Item    Value        Reference Range Interpretation Comments

 

             Prothromb Time International Ratio (test code = 6301-6) 0.92       

                             





Oral Anticoagulant Therapy INR Values:1. Low Intensity Therapy        1.5 - 2.02
. Moderate Intensity Therapy   2.0 - 3.03. High Intensity Therapy(1)    2.5 - 3.
54. High Intensity Therapy(2)    3.0 - 4.05. Panic Value INR              > 5.0
[facility] CenterProtJames [facility] Aunu9990-48-97 10:04:00* 



             Test Item    Value        Reference Range Interpretation Comments

 

             Prothrombin Time (test code = 5902-2) 12.8         11.9-14.5       

           





[facility] Center[facility] International Ratio
2017 10:04:00* 



             Test Item    Value        Reference Range Interpretation Comments

 

             Prothromb Time International Ratio (test code = 6301-6) 0.92       

                             





Oral Anticoagulant Therapy INR Values:1. Low Intensity Therapy        1.5 - 2.02
. Moderate Intensity Therapy   2.0 - 3.03. High Intensity Therapy(1)    2.5 - 3.
54. High Intensity Therapy(2)    3.0 - 4.05. Panic Value INR              > 5.0
[facility] CenterProth[facility]bin Hwcl4840-18-37 10:04:00* 



             Test Item    Value        Reference Range Interpretation Comments

 

             Prothrombin Time (test code = 5902-2) 12.8         11.9-14.5       

           





[facility] CenterProth[facility] Time International Ratio
2017 10:04:00* 



             Test Item    Value        Reference Range Interpretation Comments

 

             Prothromb Time International Ratio (test code = 6301-6) 0.92       

                             





Oral Anticoagulant Therapy INR Values:1. Low Intensity Therapy        1.5 - 2.02
. Moderate Intensity Therapy   2.0 - 3.03. High Intensity Therapy(1)    2.5 - 3.
54. High Intensity Therapy(2)    3.0 - 4.05. Panic Value INR              > 5.0
[facility] CenterProth[facility] Sxpt4077-70-60 10:04:00* 



             Test Item    Value        Reference Range Interpretation Comments

 

             Prothrombin Time (test code = 5902-2) 12.8         11.9-14.5       

           





[facility] CenterProthromb Time International Ratio
2017 10:04:00* 



             Test Item    Value        Reference Range Interpretation Comments

 

             Prothromb Time International Ratio (test code = 6301-6) 0.92       

                             





Oral Anticoagulant Therapy INR Values:1. Low Intensity Therapy        1.5 - 2.02
. Moderate Intensity Therapy   2.0 - 3.03. High Intensity Therapy(1)    2.5 - 3.
54. High Intensity Therapy(2)    3.0 - 4.05. Panic Value INR              > 5.0
[facility] CenterProth[facility]bin Txso8706-95-50 10:04:00* 



             Test Item    Value        Reference Range Interpretation Comments

 

             Prothrombin Time (test code = 5902-2) 12.8         11.9-14.5       

           





[facility] CenterProthromb Time International Ratio
2017 10:04:00* 



             Test Item    Value        Reference Range Interpretation Comments

 

             Prothromb Time International Ratio (test code = 6301-6) 0.92       

                             





Oral Anticoagulant Therapy INR Values:1. Low Intensity Therapy        1.5 - 2.02
. Moderate Intensity Therapy   2.0 - 3.03. High Intensity Therapy(1)    2.5 - 3.
54. High Intensity Therapy(2)    3.0 - 4.05. Panic Value INR              > 5.0
[facility] CenterUrine Mbcjlrr2919-33-02 07:41:00* 



             Test Item    Value        Reference Range Interpretation Comments

 

             Urine Culture (test code = 630-4) Organism: KLEBSIELLA PNEUMONIAE-E

SBL                            





[facility] CenterUrine Ojtffgc9840-57-01 07:41:00* 



             Test Item    Value        Reference Range Interpretation Comments

 

             Urine Culture (test code = 630-4) Organism: KLEBSIELLA PNEUMONIAE-E

SBL                            





[facility] Inqwopg0336-47-77 07:41:00* 



             Test Item    Value        Reference Range Interpretation Comments

 

             Urine Culture (test code = 630-4) Organism: KLEBSIELLA PNEUMONIAE-E

SBL                            





[facility] Kojwxnn5567-15-74 07:41:00* 



             Test Item    Value        Reference Range Interpretation Comments

 

             Urine Culture (test code = 630-4) Organism: KLEBSIELLA PNEUMONIAE-E

L                            





[facility] Havhuba7714-76-33 07:41:00* 



             Test Item    Value        Reference Range Interpretation Comments

 

             Urine Culture (test code = 630-4) Organism: KLEBSIELLA PNEUMONIAE-E

SBL                            





[facility] Renal Epithelial Fcqhg9343-60-73 
16:15:00* 



             Test Item    Value        Reference Range Interpretation Comments

 

             Urine Renal Epithelial Cells (test code = 63020-6) FEW          NON

E         H             





[facility] Renal Epithelial Jgahn4769-50-14 
16:15:00* 



             Test Item    Value        Reference Range Interpretation Comments

 

             Urine Renal Epithelial Cells (test code = 57327-4) FEW          NON

E         H             





[facility] Renal Epithelial Huody6715-11-72 
16:15:00* 



             Test Item    Value        Reference Range Interpretation Comments

 

             Urine Renal Epithelial Cells (test code = 89989-1) FEW          NON

E         H             





[facility] Renal Epithelial Qkljh6507-58-98 
16:15:00* 



             Test Item    Value        Reference Range Interpretation Comments

 

             Urine Renal Epithelial Cells (test code = 48913-9) FEW          NON

E         H             





[facility] Renal Epithelial Kuzhc7301-58-64 
16:15:00* 



             Test Item    Value        Reference Range Interpretation Comments

 

             Urine Renal Epithelial Cells (test code = 93115-3) FEW          NON

E         H             





[facility] Renal Epithelial Ilbtg2019-94-60 
16:15:00* 



             Test Item    Value        Reference Range Interpretation Comments

 

             Urine Renal Epithelial Cells (test code = 28972-4) FEW          NON

E         H             





[facility] Eqiqnpk4215-60-08 06:03:00* 



             Test Item    Value        Reference Range Interpretation Comments

 

             Urine Culture (test code = 630-4) Organism: CANDIDA ALBICANS       

                     





[facility] CenterPhosphorus Kcrqy3537-55-85 07:09:00* 



             Test Item    Value        Reference Range Interpretation Comments

 

             Phosphorus Level (test code = ODE8208) 3.2          2.3-4.7        

            





[facility] CenterPhosphorus Ggilo5358-67-06 07:09:00* 



             Test Item    Value        Reference Range Interpretation Comments

 

             Phosphorus Level (test code = HDJ2240) 3.2          2.3-4.7        

            





[facility] CenterPhosphorus Wlubs8945-42-46 07:09:00* 



             Test Item    Value        Reference Range Interpretation Comments

 

             Phosphorus Level (test code = HMS0451) 3.2          2.3-4.7        

            





[facility] CenterPhosphorus Egeds1616-21-55 07:09:00* 



             Test Item    Value        Reference Range Interpretation Comments

 

             Phosphorus Level (test code = XAF3300) 3.2          2.3-4.7        

            





[facility] CenterPhosphorus Fwomn8530-28-37 07:09:00* 



             Test Item    Value        Reference Range Interpretation Comments

 

             Phosphorus Level (test code = CBF4977) 3.2          2.3-4.7        

            





[facility] CenterActivated Partial Thromboplast Time
2017 01:54:00* 



             Test Item    Value        Reference Range Interpretation Comments

 

             Activated Partial Thromboplast Time (test code = 36579-6) 30.6     

    23.8-35.5                  





[facility] CenterAct[facility] Partial Thromboplast Time
2017 01:54:00* 



             Test Item    Value        Reference Range Interpretation Comments

 

             Activated Partial Thromboplast Time (test code = 04309-4) 30.6     

    23.8-35.5                  





[facility] CenterAct[facility] Partial Thromboplast Time
2017 01:54:00* 



             Test Item    Value        Reference Range Interpretation Comments

 

             Activated Partial Thromboplast Time (test code = 04080-7) 30.6     

    23.8-35.5                  





[facility] CenterAct[facility] Partial Thromboplast Time
2017 01:54:00* 



             Test Item    Value        Reference Range Interpretation Comments

 

             Activated Partial Thromboplast Time (test code = 72632-6) 30.6     

    23.8-35.5                  





[facility] CenterActivated Partial Thromboplast Time
2017 01:54:00* 



             Test Item    Value        Reference Range Interpretation Comments

 

             Activated Partial Thromboplast Time (test code = 96922-0) 30.6     

    23.8-35.5                  





[facility] CenterVitamin B12 Olziv0498-98-52 14:56:00* 



             Test Item    Value        Reference Range Interpretation Comments

 

             Vitamin B12 Level (test code = 04301-0) 254          213-816       

             





[facility] CenterFolate2017-09-[facility] 14:56:00* 



             Test Item    Value        Reference Range Interpretation Comments

 

             Folate (test code = 2284-8) 14.2         7.0-15.4                  

 





[facility] CenterVitamin B12 Ufkjh2607-29-60 14:56:00* 



             Test Item    Value        Reference Range Interpretation Comments

 

             Vitamin B12 Level (test code = 37488-1) 254          213-816       

             





[facility] CenterFolate2017-09-26 14:56:00* 



             Test Item    Value        Reference Range Interpretation Comments

 

             Folate (test code = 2284-8) 14.2         7.0-15.4                  

 





[facility] CenterVitamin B12 Gkscf9923-50-71 14:56:00* 



             Test Item    Value        Reference Range Interpretation Comments

 

             Vitamin B12 Level (test code = 56653-0) 254          213-816       

             





[facility] CenterFolate2017-09-26 14:56:00* 



             Test Item    Value        Reference Range Interpretation Comments

 

             Folate (test code = 2284-8) 14.2         7.0-15.4                  

 





[facility] CenterVitamin B12 Obgca6046-54-22 14:56:00* 



             Test Item    Value        Reference Range Interpretation Comments

 

             Vitamin B12 Level (test code = 79831-3) 254          213-816       

             





[facility] CenterFolate2017-09-[facility] 14:56:00* 



             Test Item    Value        Reference Range Interpretation Comments

 

             Folate (test code = 2284-8) 14.2         7.0-15.4                  

 





[facility] CenterVitamin B12 Nzjce6995-57-29 14:56:00* 



             Test Item    Value        Reference Range Interpretation Comments

 

             Vitamin B12 Level (test code = 17187-9) 254          213-816       

             





[facility] CenterFolate2017-09-26 14:56:00* 



             Test Item    Value        Reference Range Interpretation Comments

 

             Folate (test code = 2284-8) 14.2         7.0-15.4                  

 





[facility] CenterFerritin2017-09-26 13:58:00* 



             Test Item    Value        Reference Range Interpretation Comments

 

             Ferritin (test code = 2276-4) 120.23       4..00             

   





[facility] CenterFerritin2017-09-26 13:58:00* 



             Test Item    Value        Reference Range Interpretation Comments

 

             Ferritin (test code = 2276-4) 120.23       4..00             

   





[facility] CenterFerritin2017-09-26 13:58:00* 



             Test Item    Value        Reference Range Interpretation Comments

 

             Ferritin (test code = 2276-4) 120.23       4..00             

   





[facility]2017-09-26 13:58:00* 



             Test Item    Value        Reference Range Interpretation Comments

 

             Ferritin (test code = 2276-4) 120.23       4..00             

   





[facility]2017-09-26 13:58:00* 



             Test Item    Value        Reference Range Interpretation Comments

 

             Ferritin (test code = 2276-4) 120.23       4..00             

   





[facility]2017-09-26 13:31:00* 



             Test Item    Value        Reference Range Interpretation Comments

 

             Iron Level (test code = 2498-4) 18                  L        

     





[facility] CenterTotal Iron Binding Cnoeyepl1245-48-55 
13:31:00* 



             Test Item    Value        Reference Range Interpretation Comments

 

             Total Iron Binding Capacity (test code = 2500-7) 269          261-4

78                    





[facility] CenterPercent Iron Aqredvsynv2692-91-21 
13:31:00* 



             Test Item    Value        Reference Range Interpretation Comments

 

             Percent Iron Saturation (test code = 2502-3) 7            15-50    

    L             





[facility] CenterTransferrin2017-09-[facility] 13:31:00* 



             Test Item    Value        Reference Range Interpretation Comments

 

             Transferrin (test code = 3034-6) 192          180-382              

      





[facility]2017-09-26 13:31:00* 



             Test Item    Value        Reference Range Interpretation Comments

 

             Iron Level (test code = 2498-4) 18                  L        

     





[facility] Iron Binding Mxchyvps0188-55-51 
13:31:00* 



             Test Item    Value        Reference Range Interpretation Comments

 

             Total Iron Binding Capacity (test code = 2500-7) 269          261-4

78                    





[facility] Iron Dfbxlvldbq4564-37-37 
13:31:00* 



             Test Item    Value        Reference Range Interpretation Comments

 

             Percent Iron Saturation (test code = 2502-3) 7            15-50    

    L             





[facility] CenterTransferrin2017-09-[facility] 13:31:00* 



             Test Item    Value        Reference Range Interpretation Comments

 

             Transferrin (test code = 3034-6) 192          180-382              

      





[facility]2017-09-26 13:31:00* 



             Test Item    Value        Reference Range Interpretation Comments

 

             Iron Level (test code = 2498-4) 18                  L        

     





[facility] Iron Binding Faglguvs1192-71-69 
13:31:00* 



             Test Item    Value        Reference Range Interpretation Comments

 

             Total Iron Binding Capacity (test code = 2500-7) 269          261-4

78                    





[facility] Iron Oqliylzell6648-31-43 
13:31:00* 



             Test Item    Value        Reference Range Interpretation Comments

 

             Percent Iron Saturation (test code = 2502-3) 7            15-50    

    L             





[facility] CenterTransferr[facility]2017-[facility] 13:31:00* 



             Test Item    Value        Reference Range Interpretation Comments

 

             Transferrin (test code = 3034-6) 192          180-382              

      





[facility]2017-09-26 13:31:00* 



             Test Item    Value        Reference Range Interpretation Comments

 

             Iron Level (test code = 2498-4) 18                  L        

     





[facility] Iron Binding Jgzrsgwi7761-29-29 
13:31:00* 



             Test Item    Value        Reference Range Interpretation Comments

 

             Total Iron Binding Capacity (test code = 2500-7) 269          261-4

78                    





[facility] Iron Xiclhfdvvq3807-83-31 
13:31:00* 



             Test Item    Value        Reference Range Interpretation Comments

 

             Percent Iron Saturation (test code = 2502-3) 7            15-50    

    L             





[facility] CenterTransferr[facility]2017-[facility] 13:31:00* 



             Test Item    Value        Reference Range Interpretation Comments

 

             Transferrin (test code = 3034-6) 192          180-382              

      





[facility] CenterIron Ngerk9172-00-44 13:31:00* 



             Test Item    Value        Reference Range Interpretation Comments

 

             Iron Level (test code = 2498-4) 18                  L        

     





[facility] CenterTotal Iron Binding Sbutkdvl5387-21-54 
13:31:00* 



             Test Item    Value        Reference Range Interpretation Comments

 

             Total Iron Binding Capacity (test code = 2500-7) 269          261-4

78                    





[facility] CenterPercent Iron Wwcsywffpg3816-96-55 
13:31:00* 



             Test Item    Value        Reference Range Interpretation Comments

 

             Percent Iron Saturation (test code = 2502-3) 7            15-50    

    L             





[facility] CenterTransferrin2017-09-[facility] 13:31:00* 



             Test Item    Value        Reference Range Interpretation Comments

 

             Transferrin (test code = 3034-6) 192          180-382              

      





[facility] CenterCreatine Kinase GA8587-20-52 13:11:00* 



             Test Item    Value        Reference Range Interpretation Comments

 

             Creatine Kinase MB (test code = 07984-5) 0.20         0.00-5.00    

              





[facility]Tr[facility]n -[facility] 13:11:00* 



             Test Item    Value        Reference Range Interpretation Comments

 

             Troponin I (test code = 75125-1) -0.001       0-0.300              

      





[facility] CenterCreatine Kinase EA2880-61-25 13:11:00* 



             Test Item    Value        Reference Range Interpretation Comments

 

             Creatine Kinase MB (test code = 85458-7) 0.20         0.00-5.00    

              





[facility]Tr[facility]n -34-18 13:11:00* 



             Test Item    Value        Reference Range Interpretation Comments

 

             Troponin I (test code = YED8170) -0.001       0-0.300              

      





[facility] CenterCreatine Kinase RG7261-29-68 13:11:00* 



             Test Item    Value        Reference Range Interpretation Comments

 

             Creatine Kinase MB (test code = 02238-5) 0.20         0.00-5.00    

              





[place] 97416017-09-25 13:11:00* 



             Test Item    Value        Reference Range Interpretation Comments

 

             Troponin I (test code = YYB0727) -0.001       0-0.300              

      





[facility] CenterCreatine Kinase QG6426-42-98 13:11:00* 



             Test Item    Value        Reference Range Interpretation Comments

 

             Creatine Kinase MB (test code = 02338-4) 0.20         0.00-5.00    

              





[place] 97416017-09-25 13:11:00* 



             Test Item    Value        Reference Range Interpretation Comments

 

             Troponin I (test code = ZEK8609) -0.001       0-0.300              

      





[facility] Center[facility]ine [facility]ase YA5333-56-87 13:11:00* 



             Test Item    Value        Reference Range Interpretation Comments

 

             Creatine Kinase MB (test code = 95530-8) 0.20         0.00-5.00    

              





[place] 97416017-09-25 13:11:00* 



             Test Item    Value        Reference Range Interpretation Comments

 

             Troponin I (test code = VIQ2223) -0.001       0-0.300              

      





[facility] CenterCreat[facility] Qyxhra3574-19-58 12:13:00* 



             Test Item    Value        Reference Range Interpretation Comments

 

             Creatine Kinase (test code = 2157-6) 28                  L   

          





[facility] Center[facility] Gkbnqu7736-77-82 12:13:00* 



             Test Item    Value        Reference Range Interpretation Comments

 

             Creatine Kinase (test code = 2157-6) 28                  L   

          





[facility] CenterCreat[facility] Wyyrqh4169-29-38 12:13:00* 



             Test Item    Value        Reference Range Interpretation Comments

 

             Creatine Kinase (test code = 2157-6) 28                  L   

          





[facility] Center[facility] Kfomuf6450-50-95 12:13:00* 



             Test Item    Value        Reference Range Interpretation Comments

 

             Creatine Kinase (test code = 2157-6) 28                  L   

          





[facility] CenterCreat[facility] Ujpauo4868-59-91 12:13:00* 



             Test Item    Value        Reference Range Interpretation Comments

 

             Creatine Kinase (test code = 2157-6) 28                  L   

          





[facility] CenterB[facility] urine tfhklnk2655-53-26 
15:16:00* 



             Test Item    Value        Reference Range Interpretation Comments

 

             Urine Culture (test code = 630-4) Organism: ESCHERICHIA COLI-ESBL  

                          





[facility] CenterBacterial urine gwfrbgm2615-87-56 
15:16:00* 



             Test Item    Value        Reference Range Interpretation Comments

 

             Urine Culture (test code = 630-4) Organism: ESCHERICHIA COLI-ESBL  

                          





[facility] CenterB[facility] urine rmjfmfr2949-50-91 
15:16:00* 



             Test Item    Value        Reference Range Interpretation Comments

 

             Urine Culture (test code = 630-4) Organism: ESCHERICHIA COLI-ESBL  

                          





[facility] CenterU[facility] Hxuqxsw8928-32-65 16:14:00* 



             Test Item    Value        Reference Range Interpretation Comments

 

             Urine Culture (test code = 630-4) Organism: ESCHERICHIA COLI-ESBL  

                          





[facility] CenterUrine Fcpmzib1738-20-90 16:14:00* 



             Test Item    Value        Reference Range Interpretation Comments

 

             Urine Culture (test code = 630-4) Organism: ESCHERICHIA COLI-ESBL  

                          





[facility] CenterUrine Ewcfrvn1732-39-91 16:14:00* 



             Test Item    Value        Reference Range Interpretation Comments

 

             Urine Culture (test code = 630-4) Organism: ESCHERICHIA COLI-ESBL  

                          





[facility] CenterTriglycerides Citcq8435-74-65 07:19:00* 



             Test Item    Value        Reference Range Interpretation Comments

 

             Triglycerides Level (test code = 2571-8) 164          0-149        

H             





[facility] CenterCholesterol Mzyco7087-19-28 07:19:00* 



             Test Item    Value        Reference Range Interpretation Comments

 

             Cholesterol Level (test code = 2093-3) 187          0-199          

            





Less than 200 mg/dL       Low Bkuh431 - 239 mg/dL           Borderline Uggf837 m
g/dl and greater     High Risk                        [facility] CenterLDL Hhqxjcatxih1518-22-62 07:19:00* 



             Test Item    Value        Reference Range Interpretation Comments

 

             LDL Cholesterol (test code = 35029-8) 106                    

           





[facility] CenterHDL Wvhtpyjpgqz3413-70-26 07:19:00* 



             Test Item    Value        Reference Range Interpretation Comments

 

             HDL Cholesterol (test code = 2085-9) 48           40-60            

          





[facility] CenterCholesterol/HDL Rjyxo3458-88-02 07:19:00
  * 



             Test Item    Value        Reference Range Interpretation Comments

 

             Cholesterol/HDL Ratio (test code = 9830-1) 3.9          3.0-3.6    

  H             





[facility] CenterTriglycerides Ucczj1254-98-72 07:19:00* 



             Test Item    Value        Reference Range Interpretation Comments

 

             Triglycerides Level (test code = 2571-8) 164          0-149        

H             





[facility] CenterCholesterol Zigex1388-61-71 07:19:00* 



             Test Item    Value        Reference Range Interpretation Comments

 

             Cholesterol Level (test code = 2093-3) 187          0-199          

            





Less than 200 mg/dL       Low Hlmy620 - 239 mg/dL           Borderline Gfsg505 m
g/dl and greater     High Risk                        [facility] Itevutxjbwv8452-10-86 07:19:00* 



             Test Item    Value        Reference Range Interpretation Comments

 

             LDL Cholesterol (test code = 31827-3) 106                    

           





[facility] Wdnbmvsszhj3294-47-99 07:19:00* 



             Test Item    Value        Reference Range Interpretation Comments

 

             HDL Cholesterol (test code = 2085-9) 48           40-60            

          





[facility] CenterCholesterol/HDL Lmbcj8452-17-28 07:19:00
  * 



             Test Item    Value        Reference Range Interpretation Comments

 

             Cholesterol/HDL Ratio (test code = 9830-1) 3.9          3.0-3.6    

  H             





[facility] CenterTriglycerides Vxroa2264-19-92 07:19:00* 



             Test Item    Value        Reference Range Interpretation Comments

 

             Triglycerides Level (test code = 2571-8) 164          0-149        

H             





[facility] CenterCholesterol Cyave3717-05-55 07:19:00* 



             Test Item    Value        Reference Range Interpretation Comments

 

             Cholesterol Level (test code = 2093-3) 187          0-199          

            





Less than 200 mg/dL       Low Pwsc494 - 239 mg/dL           Borderline Awgt532 m
g/dl and greater     High Risk                        [facility] Nzikpuihuxu4484-77-08 07:19:00* 



             Test Item    Value        Reference Range Interpretation Comments

 

             LDL Cholesterol (test code = 13395-9) 106                    

           





[facility] Raukcytpfjj5875-35-47 07:19:00* 



             Test Item    Value        Reference Range Interpretation Comments

 

             HDL Cholesterol (test code = 2085-9) 48           40-60            

          





[facility] CenterCholesterol/HDL Hfizf7378-26-83 07:19:00
  * 



             Test Item    Value        Reference Range Interpretation Comments

 

             Cholesterol/HDL Ratio (test code = 9830-1) 3.9          3.0-3.6    

  H             





[facility] CenterHemoglobin A1c Mfrpplv3839-87-09 07:10:00
  * 



             Test Item    Value        Reference Range Interpretation Comments

 

             Hemoglobin A1c Percent (test code = Hemoglobin A1c Percent) 6.5    

      4.0-7.0                    





[facility] CenterHemoglobin A1c Hmwevtp5562-68-11 07:10:00
  * 



             Test Item    Value        Reference Range Interpretation Comments

 

             Hemoglobin A1c Percent (test code = Hemoglobin A1c Percent) 6.5    

      4.0-7.0                    





[facility] CenterHemoglobin A1c Mjfhveq7497-50-74 07:10:00
  * 



             Test Item    Value        Reference Range Interpretation Comments

 

             Hemoglobin A1c Percent (test code = Hemoglobin A1c Percent) 6.5    

      4.0-7.0                    





[facility] CenterLactic Acid Gujwp0733-05-57 18:48:00* 



             Test Item    Value        Reference Range Interpretation Comments

 

             Lactic Acid Level (test code = Lactic Acid Level) 18.1         4.5-

19.8                   





[facility] CenterLactic Acid Dgqtv7675-17-84 18:48:00* 



             Test Item    Value        Reference Range Interpretation Comments

 

             Lactic Acid Level (test code = Lactic Acid Level) 18.1         4.5-

19.8                   





[facility] CenterLactic Acid Chnce4093-60-58 18:48:00* 



             Test Item    Value        Reference Range Interpretation Comments

 

             Lactic Acid Level (test code = Lactic Acid Level) 18.1         4.5-

19.8                   





[facility] Centertool Nhmmfawkttot0709-03-48 12:17:00* 



             Test Item    Value        Reference Range Interpretation Comments

 

             Stool Calprotectin (test code = 38445-3) -16          0-120        

              





Concentration     Interpretation   Follow-Up<16 - 50 ug/g     Normal           
None>50 -120 ug/g     Borderline       Re-evaluate in 4-6 weeks    >120 ug/g    
Abnormal         Repeat as clinically                                    
indicatedPerformed at:   - [facility], [place]  
[id number] Director: Oscar Patel MD, Phone:  [phone] [facility] Hospitaltool Nickdwsewtvc5315-43-95 12:17:00* 



             Test Item    Value        Reference Range Interpretation Comments

 

             Stool Calprotectin (test code = 38445-3) -16          0-120        

              





Concentration     Interpretation   Follow-Up<16 - 50 ug/g     Normal           
None>50 -120 ug/g     Borderline       Re-evaluate in 4-6 weeks    >120 ug/g    
Abnormal         Repeat as clinically                                    
indicatedPerformed at:  [facility]  
[id number] Director: Oscar Patel MD, Phone:  [phone] Regional Medical CenterClostridium Difficile Toxin A & -73-12 14:20:00* 



             Test Item    Value        Reference Range Interpretation Comments

 

             Clostridium Difficile Toxin A & B (test code = 310320394) NEGATIVE 

    NEGATIVE                   





Testing on stool aspirate specimens is outside  claims since specime
n type not validated on this assay.[facility]
Clostridium Difficile Toxin A & -08-10 14:20:00* 



             Test Item    Value        Reference Range Interpretation Comments

 

             Clostridium Difficile Toxin A & B (test code = 630726252) NEGATIVE 

    NEGATIVE                   





Testing on stool aspirate specimens is outside  claims since specime
n type not validated on this assay.[facility] 
Lactoferrin (LAB)2017 21:42:00* 



             Test Item    Value        Reference Range Interpretation Comments

 

             Stool Lactoferrin (LAB) (test code = 44577-4) NEGATIVE     NEGATIVE

                   





Testing on stool aspirate specimens is outside  claims since specime
n type not validated on this assay.[facility] Centerto 
Lactoferrin (LAB)2017 21:42:00* 



             Test Item    Value        Reference Range Interpretation Comments

 

             Stool Lactoferrin (LAB) (test code = 85409-2) NEGATIVE     NEGATIVE

                   





Testing on stool aspirate specimens is outside  claims since specime
n type not validated on this assay.[facility] CenterLipase
2017-06-15 16:31:00* 



             Test Item    Value        Reference Range Interpretation Comments

 

             Lipase (test code = 3040-3) 23           8-78                      

 





[facility] CenterLipase2017-06-15 16:31:00* 



             Test Item    Value        Reference Range Interpretation Comments

 

             Lipase (test code = 3040-3) 23                                 

 





[facility] CenterUrine Mucus2017-06-15 12:39:00* 



             Test Item    Value        Reference Range Interpretation Comments

 

             Urine Mucus (test code = 8247-9) MODERATE     RARE         H       

      





[facility] CenterUrine Mucus2017-06-15 12:39:00* 



             Test Item    Value        Reference Range Interpretation Comments

 

             Urine Mucus (test code = 8247-9) MODERATE     RARE         H       

      





[facility] CenterURINE AND WZPTV3786-42-36 22:54:00
Negative *NA*(16 4:54 PM)[facility] ICZHY7370-33-07 22:54:00
Moderate *ABN*(16 4:54 PM)[facility] JTBQQ6535-30-52 22:54:00
Positive *ABN*(16 4:54 PM)[facility] DSKDU1179-35-02 22:54:00
Large *ABN*(16 4:54 PM)[facility] PDOKW2580-59-83 22:54:00>
182[facility] AND XICMI8133-38-92 22:54:19233Azqpxveh HermannURINE 
AND ZOCXR7866-93-06 22:54:0034[facility] AND FBSKN7362-02-91 22:54:00
Yellow *NA*(16 4:54 PM)[facility] FOLEQ9174-61-82 22:54:00
Marked *ABN*(16 4:54 PM)[facility] OGTYV1472-61-74 22:54:00
5.0[facility] AND SQNOF0997-34-71 22:54:001.018[facility]annCHEM 
ETQHR3285-39-66 22:40:000.6[facility]annCHEM UYRUP4406-03-23 22:40:005.8
[facility]annCHEM YRKNR0091-04-86 22:40:0012Mem[facility]annCHEM PANEL
2016 22:40:0012.6[facility] HermannCHEM YUBSE9329-36-38 22:40:07404Alyiqxgp 
HermannCHEM DYLZH0647-14-16 22:40:0028[facility]annCHEM VNKFR7907-37-92 
22:40:000.3[facility]annCHEM NRUSJ6955-68-63 22:40:0073[facility]annCHEM 
CXVCI3014-87-45 22:40:0025[facility] HermannCHEM OFIOZ3928-11-80 22:40:003.4
[facility]annCHEM IJNSS2754-43-71 22:40:009.2[facility]annCHEM PANEL
2016 22:40:009.1[facility] HermannCHEM IXAWN4271-29-96 22:40:0026[facility] 
HermannCHEM UDOWJ9799-96-10 22:40:99648Vezthoxb HermannCHEM UDPKB5945-77-62 
22:40:13036Yjeqbnza HermannCHEM EVGXO2484-30-40 22:40:004.6[facility]annCHEM 
BSXJF9293-02-03 22:40:000.91[facility]annCHEM ESLAJ0187-25-50 22:40:0011
[facility]annCHEM UHKFH2014-77-56 22:40:88339Aaffdclz HermannENDOCRINOLOGY
2016 22:40:00Negative *NA*(16 4:40 PM)[facility]annHEMATOLOGY
2016 22:40:0032.8[facility] JyhkawtJLCWFYWSIE2717-44-84 22:40:0060.0[facility]orial
YofttcrHBMZRQLAAS5736-99-74 22:40:000.2[facility] OqrlenyCPKNVFHJSW9243-45-59 
22:40:000.2[facility] WexkfurCZRVCMGXAI0692-01-68 22:40:004.6[facility]
HFYOJHKIFK8511-73-21 22:40:001.2[facility] HoovextBRYWNRJCFW3367-17-55 22:40:001.4
[facility] XwiiruzWRBPWAXJOY1890-15-65 22:40:007.3[facility]annHEMATOLOGY
2016 22:40:000.6[facility] XlcryotYGUVPIQWSU6898-04-24 22:40:004.0Memorial 
IstnusmHIWVPBIJYQ6723-06-39 22:40:00Clumped (16 4:40 PM)[facility]equiel
ZXBBJCYIOY6129-01-28 22:40:00Normal (16 4:40 PM)[facility]annHEMATOLOGY
2016 22:40:008.7[facility] TajybebYKYALQBQMF7225-75-77 22:40:0012.0[facility]orial 
LisextcSPUBVKRJVO7209-03-61 22:40:004.90[facility] AnkrggvISKXKQBNMA8129-00-60 
22:40:0039.0[facility] AfzwogwAETOVXCTZN2244-63-21 22:40:0079.7[facility]
XHZBIMYCOF2967-14-02 22:40:0012.0[facility] CfbrtrbATXLCIIQIV9805-19-86 22:40:00
14.8[facility]orial MquoogbLFSZDKDBDF2816-56-23 22:40:0030.8[facility]annHEMATOLOGY
2016 22:40:31610Veigybsg DrzvtatMGKMWAEAOS9999-55-44 22:40:00* 



             Test Item    Value        Reference Range Interpretation Comments

 

             MCH (test code = MCH) 24.5 pg      27.0-31.0                  





[facility]    [place] [address]      Patient 
Name: CRISTINA RICH   MR #: [id number]    : 1965 Age/Sex: 52/F  Acct #:
[id number] Req #: [id number]  Adm Physician:     Ordered by: JACQUELINE ARORA MD  
Report #: [id number]   Location: CATH LAB  Room/Bed:     
_________________________________________________________________________
__________________________    Procedure: [id number] IR/SPECIAL PROCEDURE IN CATH 
LAB  Exam Date:                             Exam Time:        REPORT STATUS: Sig
ki    Nephrostomy catheter evaluation and exchange, 2018      Pre-Procedur
e Diagnosis: Cervical cancer status post radiation therapy with   right ureteral
stenosis.   Post-procedure Diagnosis:Cervical cancer status post radiation ther
apy with   right ureteral stenosis      : NIK Patel   Assistant: Non
e   Sedation: None.      Radiation Dose:1.931 cGycm2 (Dose Area Product)   Fluor
oscopy time:0.7 minutes      Estimate blood loss: None Blood administered: None 
 Complications: None   Implants/Grafts: 8-French right nephrostomy   Specimen: 
None         Procedure:   Informed consent was obtained and the patient position
ed prone in the   fluoroscopy suite. A timeout was performed. The indwelling rig
ht nephrostomy   was prepped and draped in standard fashion.      Diagnostic ant
egrade nephrostogram was performed nephrostomy. See findings   below.      The i
ndwelling catheter was severed and removed over a Glidewire given heavy   encrus
tation. Glidewire was exchanged for a [employer] wire with a short KMP   catheter. 
A new 10-French nephrostomy was coiled in the renal pelvis without   complicatio
n. Contrast injection confirmed placement within the renal pelvis.      At the e
nd of the procedure the catheter was connected to gravity drainage and   a steri
le dressing applied. The patient tolerated the procedure well and   without imme
diate complication.      Findings:   Antegrade nephrostogram: Patent catheter. N
o hydronephrosis. Mildly prominent   pelvis. Diffuse right ureteral stenosis wit
h distal occlusion. The catheter was   heavily encrusted with debris.      Impre
ssion:   Successful right 10-French nephrostomy exchange with antegrade nephrost
ogram.         Recommendations:   Routine catheter exchange in 2 months given st
ent encrustation.            This report was generated with voice-recognition te
nology. Errors in   transcription can occur. Please interpret accordingly and 
contact a radiologist   if there are any questions regarding the report.      Si
gned by: Dr. Best Patel M.D. on 2018 11:55 AM        Dictated By: BEST PATEL MD  Electronically Signed By: BEST PATEL MD on 18 1522  Tra
nscribed By: LUCINDA on 18 1522       COPY TO:   JACQUELINE ARORA MD        CT 
ABDOMEN/PELVIS WO    [place] [address]      Patient Name: CRISTINA RICH   MR #: 
[id number]    : 1965 Age/Sex: 52/F  Acct #: [id number] Req #: [id number]
[id number]  Adm Physician:     Ordered by: TINO MALHOTRA NP  Report #: [id number]
  Location: ER  Room/Bed:     
__________________________________________________________________________
_________________________    Procedure: [id number] CT/CT ABDOMEN/PELVIS WO  Exam 
Date: 18                            Exam Time: 1500       REPORT STATUS: S
igned    EXAM: CT Abdomen and Pelvis WITHOUT contrast     INDICATION: Abdominal 
pain, concerning for renal stone   COMPARISON: CT abdomen and pelvis from 
018 fluoroscopy for nephrostomy   tube change 2018   TECHNIQUE: Abdomen and
pelvis were scanned utilizing a multidetector helical   scanner from the lung b
ase to the pubic symphysis without administration of IV   contrast. Absence of i
ntravenous contrast decreases sensitivity for detection   of focal lesions and v
ascular pathology. Coronal and sagittal reformations were   obtained. Renal ston
e protocol was performed.                IV CONTRAST: None.               ORAL C
ONTRAST: Water               RADIATION DOSE: Total DLP: 550.3 mGy*cm            
   Estimated effective dose: (DLP x 0.015 x size factor) mSv               COMP
LICATIONS: None      FINDINGS:      LINES and TUBES: Right percutaneous nephrost
angelina tube.      LOWER THORAX:  2 mm right lower lobe calcified granuloma.      HE
PATOBILIARY:      No focal hepatic lesions. No biliary ductal dilation.       GA
LLBLADDER: Unchanged peripherally calcified 2 cm gallstone and a few   additiona
l 2 to 3 mm gallstones.  No wall thickening.      SPLEEN: No splenomegaly.      
PANCREAS: No focal masses or ductal dilatation.        ADRENALS: No adrenal nod
ules          KIDNEYS/URETERS:  Interval resolution of the right hydronephrosis 
after   percutaneous nephrostomy tube exchange. Few other foci in the right kidn
ey   likely due to procedure. Previously noted right perinephric fat stranding h
aved   resolved. No cystic or solid mass lesions.  Multiple up to 5 mm left abeba
l   stones remain unchanged. Minimal dilatation of the renal pelves, otherwise, 
no   left hydronephrosis. Unchanged mild left hydroureter.      GI TRACT: No abn
ormal distention, wall thickening, or evidence of bowel   obstruction.       Kelly
endix is normal.      REPRODUCTIVE ORGANS: The uterus is small. No adnexal michael
s. Likely radiation   seeds in the cervix.      BLADDER: The bladder is small an
d irregular, possibly due to postradiation   changes.      LYMPH NODES: No lymph
adenopathy.      VESSELS: Unremarkable.      PERITONEUM / RETROPERITONEUM: No fr
ee air or fluid.      BONES: Unremarkable.      SOFT TISSUES: Unremarkable.     
            IMPRESSION:    1.  Interval resolution of the right hydronephrosis 
after but without new   nephrostomy tube exchange.      2.  Stable left nephroli
thiasis.      3.  No acute abnormalities in the abdomen and pelvis.      Signed 
by: Dr. Georgiana Purcell M.D. on 3/4/2018 4:03 PM        Dictated By: SHAYY PURCELL MD  Electronically Signed By: GEORGIANA flores
n 18 1603  Transcribed By: LUCINDA on 18 1603       COPY TO:   TINO MALHOTRA NP        SPECIAL PROCEDURE IN CATH LAB    [place] [address]      Patient 
Name: CRISTINA RICH   MR #: [id number]    : 1965 Age/Sex: 52/F  Acct #:
[id number] Req #: [id number]  Adm Physician: ROYER DOVER MD    Ordered by: 
JACQUELINE ARORA MD  Report #: [id number]   Location: MED/SURG2  Room/Bed: [facility]    
__________________________________________________
_________________________________________________    Procedure: [id number] IR/SPE
ERIKL PROCEDURE IN CATH LAB  Exam Date:                             Exam Time:   
    REPORT STATUS: Signed    Nephrostomy catheter evaluation and subsequent exc
daniele, 2018      Pre-Procedure Diagnosis: Cervical cancer status post radia
tion therapy with   right ureteral stenosis.   Post-procedure Diagnosis:Cervical
cancer status post radiation therapy with   right ureteral stenosis      Operat
or: NIK Patel   Assistant: None   Sedation: Moderate sedation was provided w
ith intravenous fentanyl and Versed.    Heart rate, rhythm and oxygen saturation
were monitored and real-time by a   dedicated interventional radiology nurse un
donn my direct supervision.  Blood   pressure was monitored in 5 minute increment
s.  1% lidocaine was used for local   anesthesia. Total sedation time: 30 minute
s.      Radiation Dose <1: cGycm2 (Dose Area Product)   Fluoroscopy time:1.2 
minutes      Estimate blood loss: None Blood administered: None   Complications:
None   Implants/Grafts: 10-French right nephrostomy   Specimen: None         
Procedure:   Informed consent was obtained and the patient positioned prone in 
the   fluoroscopy suite. A timeout was performed. The indwelling right 
nephrostomy   was prepped and draped in standard fashion.      Diagnostic 
antegrade nephrostogram was performed given the long indwelling time   of the 
previously placed for ostomy. See findings below.      Attempts to remove the 
indwelling 8-French nephrostomy over a Bentson and   Glidewire were unsuccessful
due to heavy encrustation. The catheter was   subsequently severed and removed 
through an 11-French peel-away sheath. A   Bentson wire was advanced through the
peel-away sheath and exchanged for a   10-French nephrostomy. Contrast injection
confirmed placement within the renal   pelvis.      At the end of the procedure 
the catheter was secured to the skin, connected to   gravity drainage and a 
sterile dressing applied. The patient tolerated the   procedure well and without
immediate complication.      Findings:   Antegrade nephrostogram demonstrated 
mild hydronephrosis of the right   collecting system and a patent, but heavily 
encrusted catheter requiring   removal via peel away sheath. The distal ureter 
is narrowed consistent with   stricture. Aggressive nephrostogram was not 
performed given superimposed   infection.      Impression:   Successful right 
nephrostomy  exchange.   Distal right ureteral stricture      Recommendations:  
Routine catheter exchange in 2 months given heavy encrustation.            This 
report was generated with voice-recognition technology. Errors in   
transcription can occur. Please interpret accordingly and contact a radiologist 
 if there are any questions regarding the report.      Signed by: Dr. Best Patel M.D. on 2018 2:37 PM        Dictated By: BEST PATEL MD  E
lectronically Signed By: BEST PATEL MD on 18  Transcribed By: CHARI PEMBERTON on 18       COPY TO:   JACQUELINE ARORA MD        IR CONSULT    [place] [address]      Patient Name: CRISTINA RICH   MR #: [id number]    :  Age/Sex: 52/F  Acct #: [id number] Req #: [id number]  Adm Physician: ROYER LAW MD    Ordered by: JENNIFER TERRELL MD  Report #: [id number]   Locatio
n: MED/SURG2  Room/Bed: [facility]    _______________________________________________
____________________________________________________    Procedure: [id number] DX/
IR CONSULT  Exam Date:                             Exam Time:        REPORT STAT
US: Signed    Nephrostomy catheter evaluation and subsequent exchange, 2018
     Pre-Procedure Diagnosis: Cervical cancer status post radiation therapy with
  right ureteral stenosis.   Post-procedure Diagnosis:Cervical cancer status p
ost radiation therapy with   right ureteral stenosis      : OLIVIA Patel   Assistant: None   Sedation: Moderate sedation was provided with intravenous 
fentanyl and Versed.    Heart rate, rhythm and oxygen saturation were monitored 
and real-time by a   dedicated interventional radiology nurse under my direct spangler
pervision.  Blood   pressure was monitored in 5 minute increments.  1% lidocaine
was used for local   anesthesia. Total sedation time: 30 minutes.      Radiation
Dose <1: cGycm2 (Dose Area Product)   Fluoroscopy time:1.2 minutes      Estimate
blood loss: None Blood administered: None   Complications: None   
Implants/Grafts: 10-French right nephrostomy   Specimen: None         Procedure:
  Informed consent was obtained and the patient positioned prone in the   
fluoroscopy suite. A timeout was performed. The indwelling right nephrostomy   
was prepped and draped in standard fashion.      Diagnostic antegrade 
nephrostogram was performed given the long indwelling time   of the previously 
placed for ostomy. See findings below.      Attempts to remove the indwelling 8-
French nephrostomy over a Bentson and   Glidewire were unsuccessful due to heavy
encrustation. The catheter was   subsequently severed and removed through an 11-
French peel-away sheath. A   Bentson wire was advanced through the peel-away 
sheath and exchanged for a   10-French nephrostomy. Contrast injection confirmed
placement within the renal   pelvis.      At the end of the procedure the 
catheter was secured to the skin, connected to   gravity drainage and a sterile 
dressing applied. The patient tolerated the   procedure well and without 
immediate complication.      Findings:   Antegrade nephrostogram demonstrated 
mild hydronephrosis of the right   collecting system and a patent, but heavily 
encrusted catheter requiring   removal via peel away sheath. The distal ureter 
is narrowed consistent with   stricture. Aggressive nephrostogram was not 
performed given superimposed   infection.      Impression:   Successful right 
nephrostomy  exchange.   Distal right ureteral stricture      Recommendations:  
Routine catheter exchange in 2 months given heavy encrustation.            This 
report was generated with voice-recognition technology. Errors in   
transcription can occur. Please interpret accordingly and contact a radiologist 
 if there are any questions regarding the report.      Signed by: Dr. Best Patel M.D. on 2018 2:37 PM        Dictated By: BEST PATEL MD  E
lectronically Signed By: BEST PATEL MD on 18  Transcribed By: CHARI PEMBERTON on 18       COPY TO:   JENNIFER TERRELL MD        CT ABDOMEN/PELVIS
[place] [address]      Patient Name: CRISTINA RICH   MR #: [id number]    
: 1965 Age/Sex: 52/F  Acct #: [id number] Req #: [id number]  Adm 
Physician:     Ordered by: JENNIFER TERRELL MD  Report #: [id number]   Location: ER
 Room/Bed:     
____________________________________________________________________________
_______________________    Procedure: [id number] CT/CT ABDOMEN/PELVIS WO  Exam Da
te: 18                            Exam Time: 0056       REPORT STATUS: Sig
ki    EXAM: CT ABDOMEN AND PELVIS without IV CONTRAST   ORDER DATE: 2018 1
2:14 AM  Time stamp on Exam: 0058 hours   INDICATION:  Pain in right kidney stat
us post nephrostomy, decreased urine   output   COMPARISON:  CT of the abdomen a
nd pelvis Ree 15, 2017   TECHNIQUE: The abdomen and pelvis were scanned using a
multidetector helical   scanner. Coronal and sagittal reformations were obtaine
d. Renal stone protocol   performed.   IV Contrast: None   Oral Contrast: None  
CTDIvol has been reviewed. It is below the limits set by the Radiation Protocol 
 Committee (RPC).      FINDINGS:   LOWER THORAX: No consolidations      LIVER: 
No masses   BILIARY: Gallstone in an otherwise normal gallbladder. No ductal di
lation.       SPLEEN: Normal   PANCREAS: Normal      ADRENALS: Normal      RIGHT
KIDNEY: Percutaneously nephrostomy tube in place with distal end coiled   within
the pelvis. Severe hydronephrosis. Mild perinephric fat stranding.      LEFT K
IDNEY: Mild hydroureteronephrosis of the left kidney, similar to prior   exam. P
unctate nonobstructing stones in the inferior calyx.       GI TRACT: No wall thi
ckening or obstruction. Normal appendix.      VESSELS:  Minimal atherosclerotic 
changes of the abdominal aorta without   aneurysm.   PERITONEUM/RETROPERITONEUM:
No free air or fluid   LYMPH NODES: No lymphadenopathy      REPRODUCTIVE ORGANS:
The uterus is small. No adnexal masses. Likely radiation   seeds in the cervix. 
 BLADDER: The bladder is small and irregular, possibly due to postradiation   
changes.      SOFT TISSUES: Normal   BONES: No suspicious bone lesions.      IM
PRESSION:   Severe right hydronephrosis with percutaneous nephrostomy tube in pl
ace,   consistent with tube obstruction.      Signed by: Dr. Cristina Main M.D. on 2018 1:28 AM        Dictated By: CRISTINA MAIN MD  Electronically S
igned By: CRISTINA MAIN MD on 18  Transcribed By: LUCINDA on 18       COPY TO:   JENNIFER TERRELL MD        SPECIAL PROCEDURE IN CATH LAB    
[place] [address]      Patient Name: CRISTINA RICH   MR #: [id number]    :  Age/Sex: 52/F  Acct #: [id number] Req #: [id number]  Adm Physician: AJAY DUENAS MD    Ordered by: JACQUELINE ARORA MD  Report #: [id number]   Location: 
ED/SURG3  Room/Bed: [facility]    ___________________________________________________
________________________________________________    Procedure: [id number] IR/SPEC
IAL PROCEDURE IN CATH LAB  Exam Date:                             Exam Time:    
   REPORT STATUS: Signed    Date and Time: 2017      Procedure: Right per
cutaneous nephrostomy catheter exchange      : Dr. Lisa CAMPOS
re-operative diagnosis: Right sided pyelonephritis with indwelling   nephrostomy
.   Post-operative diagnosis: Indwelling nephrostomy      Conscious Sedation: Fe
ntanyl 50 mcg.   The patient's heart rate and pulse oximetry were continuously m
onitored by the   interventional radiology nurse.  Blood pressure was monitored 
at 5 minute   intervals.      Additional Medications: Lidocaine 1% for local ane
sthesia   Fluoroscopy time:  1.0 minutes   Dose-area Product:   22.7 cGycm2.    
 Contrast used: 20 cc Isovue-300   Estimated blood loss: Minimal   Specimens: 10
French nephrostomy catheter, discarded   Implants: New 10 French nephrostomy c
atheter         DISCUSSION:      Informed consent for the procedure was obtained
from the patient and documented   in the medical record after discussion of ris
ks and benefits.      The patient was placed in the prone position on the fluoro
scopic table. The   right flank and existing percutaneous nephrostomy catheter w
ere prepped and   draped in standard sterile fashion. 1% lidocaine was infiltrat
ed into the skin   and subcutaneous tissues along the catheter for local anesthe
sana. The retention   suture was cut. Dilute contrast material was injected throu
gh the catheter,   confirming appropriate positioning within the collecting syst
em. The catheter   was then severed at the hub and a 0.0 3 5-in. wire was advanc
ed through the   catheter under fluoroscopic guidance. The catheter was removed 
over the wire   and a new 10 French locking loop percutaneous the prostate was a
dvanced over   the wire, which was then removed. The locking loop of the cathete
r was   positioned in the renal pelvis with appropriate positioning confirmed by
  injection of dilute contrast material. The catheter was flushed with sterile  
saline and connected to gravity drainage. The catheter was secured to the skin  
with monofilament nylon suture and a sterile dressing was applied.            
FINDINGS:       Mild right hydronephrosis.      IMPRESSION:         Successful 
exchange of a right percutaneous nephrostomy catheter (10 French   locking loop)
under fluoroscopic guidance.      The patient should return to interventional r
adiology in 8 weeks for routine   catheter exchange.      Signed by: Dr. Clau Ngo M.D. on 2017 7:09 AM        Dictated By: CLAU NGO MD  Electro
nically Signed By: CLAU NGO MD on 17  Transcribed By: LUCINDA on
17       COPY TO:   JACQUELINE ARORA MD        CHEST XRAY LINE PLACEMENT   
[place] [address]      Patient Name: CRISTINA RICH   MR #: [id number]    :  Age/Sex: 52/F  Acct #: [id number] Req #: [id number]  Adm Physician: AJAY DUENAS MD    Ordered by: YESENIA SOUSA MD  Report #: [id number]   Location:
MED/SURG3  Room/Bed: [facility]    _________________________________________________
__________________________________________________    Procedure: [id number] DX/CH
EST XRAY LINE PLACEMENT  Exam Date: 17                            Exam Tate
e: 1440       REPORT STATUS: Signed    PROCEDURE:   CHEST XRAY LINE PLACEMENT   
TECHNIQUE:   Portable AP chest   INDICATION:   PICC placement   COMPARISON:   No
ne.       FINDINGS:   See conclusion.           CONCLUSION:   1. Right PICC term
inating in the high SVC.   2. Low lung volume with left lower lobe medial subseg
mental    atelectasis.   3. Normal heart size and mediastinal contour for techni
que.   4. Crescentic skeleton.        Dictated by:  Best Patel M.D. on 
2017 at 14:58        Electronically approved by:  Best Patel M.D.
on 2017 at    14:58                Dictated By: BEST PATEL MD  Electr
onically Signed By: BEST PATEL MD on 17  Transcribed By: FERNANDO on
17       COPY TO:   YESENIA SOUSA MD        IR CONSULT    [place] [address]      Patient Name: CRISTINA RICH   MR #: [id number]    : 1965 
Age/Sex: 52/F  Acct #: [id number] Req #: [id number]  Adm Physician: AJAY DUENAS MD    Ordered by: JACQUELINE ARORA MD  Report #: [id number]   Location: M
ED/SURG2  Room/Bed: 210    ___________________________________________________
________________________________________________    Procedure: [id number] DX/IR C
ONSULT  Exam Date:                             Exam Time:        REPORT STATUS: 
Signed    Date and Time: 2017      Procedure: Replacement of right percutan
eous nephrostomy catheter      : Dr. Ngo   Assistants: None   
     Pre-operative diagnosis: Dislodged right percutaneous nephrostomy   Post-o
perative diagnosis: Replaced right percutaneous nephrostomy      Conscious Sedat
ion: Versed 1  mg and Fentanyl 100 mcg.   The patient's heart rate and pulse oxi
metry were continuously monitored by the   interventional radiology nurse.  Bloo
d pressure was monitored at 5 minute   intervals.      Additional Medications: L
idocaine 1% for local anesthesia   Fluoroscopy time:  1.3 minutes   Dose-area Pr
oduct:   499.8 cGycm2.      Contrast used: 20 cc Isovue-300   Estimated blood lo
ss: Minimal   Specimens: None   Implants: 10 French locking loop nephrostomy geoff
inage catheter   Blood products administered: None   Complications: No immediate
  Condition at completion of procedure: Stable   Disposition: Returned to floor 
unit         DISCUSSION:   Informed consent was obtained and documented in the 
medical record.      The patient was placed in the prone position on the fluoros
copic table and the   right flank was prepped and draped in standard sterile fas
hion. 1% lidocaine   was infiltrated into the skin and subcutaneous tissues for 
local anesthesia.      Then under fluoroscopic guidance, a 5 French angled terry
ter was gently   advanced through the existing percutaneous nephrostomy tract an
d into the renal   pelvis, with appropriate positioning confirmed by injection o
f dilute contrast   material.      A 0.0 3 5-in. Amplatz wire was then advanced 
through the catheter and into the   proximal right ureter. The catheter was axel
ayo and the tract was dilated. Then   a 10 French locking loop nephrostomy terry
ter was advanced over the wire and   formed in the renal pelvis. Injection of di
lute contrast material confirmed   appropriate positioning. The catheter was flu
shed with sterile saline, secured   to the skin with monofilament nylon suture, 
and connected to gravity drainage.   A sterile dressing was applied. Patient swapna
erated the procedure well without   immediate complication.         FINDINGS:   
Moderate right hydronephrosis.         IMPRESSION:   Successful replacement of a
right percutaneous nephrostomy catheter (10 French   locking loop) through the 
existing subcutaneous tract.      The patient should return to interventional r
adiology for routine catheter   exchange in 3 months.      Signed by: Dr. Clau Ngo M.D. on 10/4/2017 10:12 AM        Dictated By: CLAU NGO MD  Electr
onically Signed By: CLAU NGO MD on 10/04/17 1012  Transcribed By: LUCINDA quintero 10/04/17 1012       COPY TO:   JACQUELINE ARORA MD        SPECIAL PROCEDURE IN CATH 
LAB    [place] [address]      Patient Name: CRISTINA RICH   MR #: [id number]    
: 1965 Age/Sex: 52/F  Acct #: [id number] Req #: [id number]  Adm 
Physician: AJAY GE MD    Ordered by: JACQUELINE ARORA MD  Report #: [id number]  
Location: [facility]/SURG  Room/Bed: [facility]    
___________________________________________________
________________________________________________    Procedure: [id number] IR/SPEC
IAL PROCEDURE IN CATH LAB  Exam Date:                             Exam Time:    
   REPORT STATUS: Signed    Date and Time: 2017      Procedure: Replacement
of right percutaneous nephrostomy catheter      : Dr. Ngo   
Assistants: None         Pre-operative diagnosis: Dislodged right percutaneous n
ephrostomy   Post-operative diagnosis: Replaced right percutaneous nephrostomy  
   Conscious Sedation: Versed 1  mg and Fentanyl 100 mcg.   The patient's heart 
rate and pulse oximetry were continuously monitored by the   interventional rad
iology nurse.  Blood pressure was monitored at 5 minute   intervals.      Additi
onal Medications: Lidocaine 1% for local anesthesia   Fluoroscopy time:  1.3 min
utes   Dose-area Product:   499.8 cGycm2.      Contrast used: 20 cc Isovue-300  
Estimated blood loss: Minimal   Specimens: None   Implants: 10 French locking l
oop nephrostomy drainage catheter   Blood products administered: None   Complica
tions: No immediate   Condition at completion of procedure: Stable   Disposition
: Returned to floor unit         DISCUSSION:   Informed consent was obtained and
documented in the medical record.      The patient was placed in the prone posi
tion on the fluoroscopic table and the   right flank was prepped and draped in s
tandard sterile fashion. 1% lidocaine   was infiltrated into the skin and subcut
aneous tissues for local anesthesia.      Then under fluoroscopic guidance, a 5 
French angled catheter was gently   advanced through the existing percutaneous n
ephrostomy tract and into the renal   pelvis, with appropriate positioning confi
rmed by injection of dilute contrast   material.      A 0.0 3 5-in. Amplatz wire
was then advanced through the catheter and into the   proximal right ureter. The
catheter was removed and the tract was dilated. Then   a 10 French locking loop 
nephrostomy catheter was advanced over the wire and   formed in the renal pelv
is. Injection of dilute contrast material confirmed   appropriate positioning. T
he catheter was flushed with sterile saline, secured   to the skin with monofila
ment nylon suture, and connected to gravity drainage.   A sterile dressing was a
pplied. Patient tolerated the procedure well without   immediate complication.  
      FINDINGS:    Moderate right hydronephrosis.         IMPRESSION:   Success
ful replacement of a right percutaneous nephrostomy catheter (10 French   lockin
g loop) through the existing subcutaneous tract.      The patient should return 
to interventional radiology for routine catheter   exchange in 3 months.      Si
gned by: Dr. Clau Ngo M.D. on 10/4/2017 10:12 AM        Dictated By: CLAU NGO MD  Electronically Signed By: CLAU NGO MD on 10/04/17 1012  Transc
ribed By: LUCINDA on 10/04/17 1012       COPY TO:   JACQUELINE ARORA MD        
ABDOMEN-[facility] ([place] [address]      Patient Name: CRISTINA RICH   MR #: 
[id number]    : 1965 Age/Sex: 52/F  Acct #: [id number] Req #: [id number]
[id number]  Adm Physician:     Ordered by: SANDRA JUNIOR MD  Report #: [id number]
  Location: ER  Room/Bed:     
__________________________________________________________________________
_________________________    Procedure: [id number] DX/ABDOMEN-1VIEW (KUB)  Exam D
ate: 17                            Exam Time: 1430       REPORT STATUS: Si
gned    PROCEDURE:   X-RAY ABDOMEN - KUB       COMPARISON:   CT of the abdomen a
nd pelvis from 6/15/2017       INDICATIONS:   right side abdomen       FINDINGS:
         No dilated loops of bowel or abnormal air-fluid levels patterns.  No   
free air underneath the diaphragm.  Visualized portions of the lung    bases are
clear.         A nephrostomy tube is projected over the right hemiabdomen. A 2.5
cm    calcified gallstone is unchanged. No definite calcifications are seen    
overlying the urinary system.  Amorphous calcifications in the right    hemip
dick are indeterminate. There are surgical clips in the pelvis.    Five non-rib
bearing lumbar type vertebral bodies identified.       CONCLUSION:      A nephr
ostomy tube is projected over the right hemiabdomen.   Indeterminate calcificati
ons in the right hemipelvis which could    represent ureteral stones, phlebolith
, or vascular calcifications.       Dictated by:  Vita Moreira M.D. on 
017 at 14:59        Electronically approved by:  Vita Moreira M.D. on 20
17 at 14:59                   Dictated By: VITA MOREIRA MD  Electronically S
igned By: VITA MOREIRA MD on 17 [id number]  Transcribed By: FERNANDO on 
7 1459       COPY TO:   SANDRA JUNIOR MD

## 2020-11-24 NOTE — EMERGENCY DEPARTMENT NOTE
History of Present Illnes


History of Present Illness


Chief Complaint:  Genitourinary


History of Present Illness


This is a 55 year old  female arrives to the ED after dislodged Paez 

catheter. .











Chief Complaint Comment 54 y/o female pt aaox3 presents to ED with report of 

"popped catheter." Pt also reports suprapubic pain; 18 gauge indwelling paez 

catheter in place; pts v/s/s; pt to ED rm #10 on arrival; er md to pts bedside 

for initial eval; Dr. NAVYA Rachel notified of pts status; order received for urine

culture


Onset (how long ago):  hour(s)


Radiation:  Reports non-radiation


Severity:  mild


Duration (how long):  hour(s)


Timing of current episode:  constant


Progression:  unchanged


Chronicity:  new


Relieving factors:  none





Past Medical/Family History


Physician Review


I have reviewed the patient's past medical and family history.  Any updates have

been documented here.





Past Medical History


Recent Fever:  No


Clinical Suspicion of Infectio:  No


New/Unexplained Change in Ment:  No


Past Medical History:  UTI's, Anxiety, Depression


Other Medical History:  


Cervical Cancer Healed, Nephrostomy tubes,Long  


standing Paez indwelling catheter, Ureteral  


stents, HTN


Past Surgical History:  None


Other Surgery:  


Kidney/Ureteral Stent Placements/Replacements   multiple





Social History


Smoking Cessation:  Never Smoker


Counseling Performed:  No


Alcohol Use:  None


Any Illegal Drug Use:  No


Physically hurt or threatened:  No





Other


Last Tetanus:  OOD


Any Pre-Existing Lines (PICC,:  No





Review of Systems


Review of Systems


Constitutional:  Reports no symptoms


EENTM:  Reports no symptoms


Cardiovascular:  Reports no symptoms


Respiratory:  Reports no symptoms


Gastrointestinal:  Reports no symptoms


Genitourinary:  Reports no symptoms


Musculoskeletal:  Reports no symptoms


Integumentary:  Reports no symptoms


Neurological:  Reports no symptoms


Psychological:  Reports no symptoms


Endocrine:  Reports no symptoms


Hematological/Lymphatic:  Reports no symptoms





Physical Exam


Related Data


Allergies:  


Coded Allergies:  


     Penicillins (Verified  Allergy, Unknown, 9/21/20)


Triage Vital Signs





Vital Signs








  Date Time  Temp Pulse Resp B/P (MAP) Pulse Ox O2 Delivery O2 Flow Rate FiO2


 


11/24/20 04:31 98.4 72 16 131/78 99 Room Air  








Vital signs reviewed:  Yes





Physical Exam


CONSTITUTIONAL





Constitutional:  Present well-developed, Present well-nourished


HENT


HENT:  Present normocephalic, Present atraumatic, Present oropharynx 

clear/moist, Present nose normal


HENT L/R:  Present left ext ear normal, Present right ext ear normal


EYES





Eyes:  Reports PERRL, Reports conjunctivae normal


NECK


Neck:  Present ROM normal


PULMONARY


Pulmonary:  Present effort normal, Present breath sounds normal


CARDIOVASCULAR





Cardiovascular:  Present regular rhythm, Present heart sounds normal, Present 

capillary refill normal, Present normal rate


GASTROINTESTINAL





Abdominal:  Present soft, Present nontender, Present bowel sounds normal


GENITOURINARY





Genitourinary:  Present exam deferred


SKIN


Skin:  Present warm, Present dry


MUSCULOSKELETAL





Musculoskeletal:  Present ROM normal


NEUROLOGICAL





Neurological:  Present alert, Present oriented x 3, Present no gross motor or 

sensory deficits


PSYCHOLOGICAL


Psychological:  Present mood/affect normal, Present judgement normal





Results


Laboratory


Lab results reviewed:  Yes





Imaging


Imaging results reviewed:  Yes





Assessment & Plan


Medical Decision Making


MDM


55-year-old female arrived to the ED after dislodged Paez catheter. Catheter 

placed, lab work reviewed. Discussed with Dr. Rachel.


Patient stable for discharge.





Assessment & Plan


Final Impression:  


(1) Malfunction of Paez catheter


Depart Disposition:  HOME, SELF-CARE


Last Vital Signs











  Date Time  Temp Pulse Resp B/P (MAP) Pulse Ox O2 Delivery O2 Flow Rate FiO2


 


11/24/20 04:31 98.4 72 16 131/78 99 Room Air  








Home Meds


Reported Medications


Acetaminophen (ACETAMINOPHEN) 325 Mg Tablet, 325 MG PO Q6H PRN for Mild Pain (1-

3) or Fever>100.8 for 5 Days, TAB


   10/30/20


Metoprolol Tartrate (LOPRESSOR) 25 Mg Tab, 50 MG PO BID, #60 TAB 1 Refill


   5/2/20


Buspirone Hcl (BUSPIRONE HCL) 10 Mg Tablet, 10 MG PO BID PRN for ANXIETY


   6/21/19











LISSETTE SCOTT,             Nov 24, 2020 05:57

## 2020-11-24 NOTE — XMS REPORT
Clinical Summary

                             Created on: 2020



Savannah Jackson

External Reference #: [id number]

: 1965

Sex: Female



Demographics





                          Address                   [address] [place]  [address]

 

                          Home Phone                [phone]

 

                          Preferred Language        Unknown

 

                          Marital Status            Single

 

                          [employer] Affiliation     [employer]

 

                          Race                      Unknown

 

                          Ethnic Group              Unknown





Author





                          Author                    [facility]

ict

 

                          Organization              [facility]

ict

 

                          Address                   Unknown

 

                          Phone                     Unavailable







Support





                Name            Relationship    Address         Phone

 

                    Geneva Jackson      [employer]                [address] [place]  [address]                      [phone]

 

                    Anat Jackson    [employer]                [address] [place]  [address]                      [phone]







Care Team Providers





                    Care Team Member Name Role                Phone

 

                          PCP                       Unavailable







Allergies





                                        Comments



                 Active Allergy  Reactions       Severity        Noted Date 

 

                                        



BECAME ANXIOUS AND FELT STRANGE



                     Diphenhydramine     Other               2009 







Medications





                          End Date                  Status



              Medication   Sig          Dispensed    Refills      Start  



                                         Date  

 

                                                    Active



              oxybutynin (DITROPAN) 5  Take 1 Tab by  90 Tab       1            

  



                     mg tabletIndications:  mouth 3 times       0  



                           Ureteral stricture        daily. For     



                                         bladder     



                                         spasms     

 

                                                    Active



              doxazosin (CARDURA) 2 mg  Take 1 Tab by  90 Tab       1           

   



                     tabletIndications:  mouth at            0  



                           Ureteral stricture        bedtime.     

 

                                                    Active



              famotidine (PEPCID) 20 mg  Take 1 Tab by  30 Tab       3          

  01/10/201  



                     tabletIndications: GERD  mouth 2 times       2  



                           (gastroesophageal reflux  daily.     



                                         disease)      

 

                                                    Active



                     traMADol (ULTRAM) 50 mg  Take 50 mg by       0   



                           tablet                    mouth as     



                                         needed.     

 

                                                    Active



              tamsulosin (FLOMAX) 0.4  Take 1       30 capsule   3              



                     mg extended release  capsule by          2  



                           capsuleIndications:       mouth daily.     



                                         Hydronephrosis      

 

                                                    Active



              flavoxate (URISPAS) 100  Take 1 tablet  30 tablet    0            

  



                     mg tabletIndications:  by mouth 3          2  



                           Hydronephrosis, right     times daily     



                                         as needed     



                                         (dysuria).     

 

                                                    Active



                     acetaminophen (TYLENOL)  Take 650 mg         0   



                           325 mg tablet             by mouth     



                                         every 6 hours     



                                         as needed.     

 

                                                    Active



              cyclobenzaprine  Take 1 tablet  40 tablet    2            10/14/20

1  



                     (FLEXERIL) 10 mg    by mouth 2          3  



                           tabletIndications: Flank  times daily     



                           Pain                      as needed for     



                                         Muscle     



                                         Spasms.     

 

                                                    Active



              HYDROcodone-acetaminophen  Take 1 tablet  30 tablet    0          

    



                     (NORCO)  mg   by mouth            4  



                           tabletIndications:        every 6 hours     



                           Pyelonephritis            as needed for     



                                         Pain.     

 

                                                    Active



                     metFORMIN (GLUCOPHAGE)  Take 500 mg         0   



                           500 mg tablet             by mouth 2     



                                         times daily     



                                         (with meals).     

 

                                                    Active



                     fenofibrate         Take 145 mg         0   



                           nanocrystallized (TRICOR)  by mouth     



                           145 mg tablet             daily.     

 

                                                    Active



                     lisinopril (PRINIVIL,  Take 2.5 mg         0   



                           ZESTRIL) 2.5 mg tablet    by mouth     



                                         daily.     

 

                                                    Active



                     atorvastatin (LIPITOR) 20  Take 20 mg by       0   



                           mg tablet                 mouth at     



                                         bedtime     



                                         nightly.     







Active Problems





 



                           Problem                   Noted Date

 

 



                           BMI 36.0-36.9,adult       2014

 

 



                                         Overview:



                                         Obese

 

 



                           Obese                     2014

 

 



                           Complicated UTI (urinary tract infection)  2014

 

 



                           Hematuria                 2014

 

 



                           Fatigue                   2013

 

 



                           Myalgia                   2013

 

 



                           Fever                     2013

 

 



                           UTI (lower urinary tract infection)  10/31/2012

 

 



                           Hydronephrosis, right     2012

 

 



                           Flank pain                2012

 

 



                           Pyelonephritis            2012

 

 



                           Cough                     2011







Immunizations





  



                     Name                Administration Dates  Next Due

 

  



                           Influenza Vaccine         2013 (Deferred: Patie

nt Refused) 

 

  



                           Pneumoccoccal             2013 (Deferred: Loyd lovell Refused) 







Family History





   



                 Medical History  Relation        Name            Comments

 

   



                           Diabetes                  Father  







   



                 Relation        Name            Status          Comments

 

   



                           Brother                   Alive 

 

   



                           Father                    Alive 

 

   



                           Mother                    Alive 

 

   



                           Sister                    Alive 







Social History





                                        Date



                 Tobacco Use     Types           Packs/Day       Years Used 

 

                                         



                                         Never Smoker    

 

    



                                         Smokeless Tobacco: Never   



                                         Used   







                    Drinks/Week         oz/Week             Comments



                                         Alcohol Use   

 

                                                             



                                         No   







 



                           Sex Assigned at Birth     Date Recorded

 

 



                                         Not on file 







                                        Industry



                           Job Start Date            Occupation 

 

                                        Not on file



                           Not on file               Not on file 







                                        Travel End



                           Travel History            Travel Start 

 





                                         No recent travel history available.







Last Filed Vital Signs

Not on file



Plan of Treatment





   



                 Health Maintenance  Due Date        Last Done       Comments

 

   



                           HPV Cervical Cancer Scrn  1993  

 

   



                     Breast Cancer Scrn  2005 



                                         (Yearly)   

 

   



                           Colorectal Cancer Scrn    2013  



                                         Annual (FIT/FOBT) Age 50   



                                         to 73   

 

   



                     Pap Cervical Cancer Scrn  2014, 



                                         2003, 



                                         2003, 



                                         Additional 



                                         history 



                                         exists 

 

   



                           IMM Influenza Seasonal    10/01/2020  



                                         Oct to March (>/= 19 yrs)   







Implants





                    Device Identifier   Shelf Expiration Date Model / Serial / L

ot



                 Implanted       Type            Area            Manufactur   



                                         er   

 

                                        2017          [id number] /

 /

[id number]



                 Implant Gu Stent Black Beauty  Stent           Right:          

Cook   



                     Double Pigtail 7fr X 24cm [id number] -   Kidney(s)           Ur

ological   



                           [id number]                   Inc   



                                         Implanted: Qty: 1 on 2014 by     

 



                                         Mohan Garay, MD      



                                         at [facility]      







Results

Not on fileafter 2019



Insurance





                                        Type



            Payer      Benefit    Subscriber ID  Effective  Phone      Address 



                           Plan /                    Dates   



                                         Group     

 

                                         



            BC/BS      BCBS HMO   xxxxxxxxxxxx  2015-P  [phone]  [address]

X 



                           resent                    [id number] 



                                         [place] 



                                         [address] 







     



            Guarantor Name  Account    Relation to  Date of    Phone      Billin

g Address



                     Type                Patient             Birth  

 

     



            Savannah Jackson  Personal/F  Self       1965  [phone]

[address]               (Home)              [place] [address]



                                         [phone] 



                                         (Work) 







Advance Directives





                          Date Inactivated          Comments



                           Code Status               Date Activated  

 

                          2015  6:47 PM          



                           Full Code                 2015  4:06 AM  







                                                     

 

                          2014  4:20 PM          



                           Full Code                 5/3/2014 11:43 PM  







                                                     

 

                          3/16/2014  1:19 AM         



                           Full Code                 3/15/2014  6:29 AM  







                                                     

 

                          2013  4:29 PM         



                           Full Code                 2013  6:24 AM  







                                                     

 

                          1/10/2012 10:06 PM         



                           Full Code                 2012 10:55 PM

## 2020-12-04 ENCOUNTER — HOSPITAL ENCOUNTER (OUTPATIENT)
Dept: HOSPITAL 88 - DX | Age: 55
End: 2020-12-04
Attending: UROLOGY
Payer: COMMERCIAL

## 2020-12-04 DIAGNOSIS — N13.5: Primary | ICD-10-CM

## 2020-12-04 PROCEDURE — 50435 EXCHANGE NEPHROSTOMY CATH: CPT

## 2020-12-04 PROCEDURE — 99152 MOD SED SAME PHYS/QHP 5/>YRS: CPT

## 2020-12-04 PROCEDURE — 50387 CHANGE NEPHROURETERAL CATH: CPT

## 2020-12-04 PROCEDURE — 99153 MOD SED SAME PHYS/QHP EA: CPT

## 2020-12-04 NOTE — DIAGNOSTIC IMAGING REPORT
Procedure: Right percutaneous nephrostomy catheter exchange.                   

                                  

                                                        

History: Routine maintenance.                                                

   

: Salvador Mena M.D. 



Assistant:  Vane., M.D.

                                                                            

Modality: Fluoroscopy.        

               

DOSE REDUCTION: The examination was performed according to departmental

dose-optimization program. 



Fluoro time: 3.5 minutes.



Radiation dose: 41.1 mGy air Kerma.  

                                                                             

Sedation: Versed 1 mg and fentanyl 50 mcg were given intravenously for

conscious sedation by the RN. Vital signs were monitored throughout the

procedure by a dedicated RN under direct supervision of Dr. Mena, and

remained stable. 



Physician intra-service sedation time was 15 minutes.             



Anesthesia: Lidocaine local infiltration



Medicines: Not applicable



Contrast medium: Isovue 300, 15 cc.



Estimated blood loss:  < 5 cc.         



Technique: 

A discussion of the risks, benefits, and alternatives was carried out with the

patient. A written informed consent was obtained. The patient expressed

understanding and agreed to proceed.  



A universal timeout was performed prior to starting the procedure. The

procedure room personnel used personal protective equipment. The operators used

sterile gowns and gloves.



The patient was laid prone on the procedure table. The right percutaneous

nephrostomy site was prepped with chlorhexidine gluconate and draped in sterile

fashion.



A  radiograph was performed showing the catheter to be in the expected

location. Contrast injection through the catheter confirmed the catheter tip

location in the collecting system.



After local anesthetic infiltration, the retention suture was removed. The

catheter was cut and withdrawn over a wire. This required use of a peel-away

sheath as the catheter was encrusted. It was replaced with an identical

catheter. The catheter position was confirmed with contrast injection. The

catheter was secured to skin with nonabsorbable suture.  The catheter was

connected to a gravity drainage bag. An aseptic dressing was applied.



The patient was transferred to the recovery area and discharged from the

department in stable condition.

                    

Complications: None immediate.    



Findings:

As above.

                                                          

Impression:

Successful fluoroscopic guided 12 [place] right nephrostomy catheter exchange as

described above.



Further management dictated by the clinical scenario. The nephrostomy catheter

should be exchanged at the latest in three months.                             

                               

   



Thank you for the opportunity to assist in the care of your patient.



Signed by: Salvador Mena MD on 12/4/2020 12:01 PM

## 2020-12-13 ENCOUNTER — HOSPITAL ENCOUNTER (OUTPATIENT)
Dept: HOSPITAL 88 - ER | Age: 55
Setting detail: OBSERVATION
LOS: 1 days | Discharge: HOME | End: 2020-12-14
Attending: INTERNAL MEDICINE | Admitting: INTERNAL MEDICINE
Payer: COMMERCIAL

## 2020-12-13 VITALS — BODY MASS INDEX: 36.91 KG/M2 | HEIGHT: 60 IN | WEIGHT: 188 LBS

## 2020-12-13 DIAGNOSIS — N18.30: ICD-10-CM

## 2020-12-13 DIAGNOSIS — R65.20: ICD-10-CM

## 2020-12-13 DIAGNOSIS — N13.6: ICD-10-CM

## 2020-12-13 DIAGNOSIS — D64.9: ICD-10-CM

## 2020-12-13 DIAGNOSIS — Z88.0: ICD-10-CM

## 2020-12-13 DIAGNOSIS — E66.9: ICD-10-CM

## 2020-12-13 DIAGNOSIS — N99.522: ICD-10-CM

## 2020-12-13 DIAGNOSIS — Z20.828: ICD-10-CM

## 2020-12-13 DIAGNOSIS — I12.9: ICD-10-CM

## 2020-12-13 DIAGNOSIS — N99.521: Primary | ICD-10-CM

## 2020-12-13 DIAGNOSIS — Z85.41: ICD-10-CM

## 2020-12-13 DIAGNOSIS — A41.9: ICD-10-CM

## 2020-12-13 LAB
ALBUMIN SERPL-MCNC: 3.7 G/DL (ref 3.5–5)
ALBUMIN/GLOB SERPL: 0.6 {RATIO} (ref 0.8–2)
ALP SERPL-CCNC: 129 IU/L (ref 40–150)
ALT SERPL-CCNC: 19 IU/L (ref 0–55)
ANION GAP SERPL CALC-SCNC: 17.9 MMOL/L (ref 8–16)
BACTERIA URNS QL MICRO: (no result) /HPF
BASOPHILS # BLD AUTO: 0.1 10*3/UL (ref 0–0.1)
BASOPHILS NFR BLD AUTO: 0.4 % (ref 0–1)
BUN SERPL-MCNC: 21 MG/DL (ref 7–26)
BUN/CREAT SERPL: 15 (ref 6–25)
CALCIUM SERPL-MCNC: 9.6 MG/DL (ref 8.4–10.2)
CHLORIDE SERPL-SCNC: 102 MMOL/L (ref 98–107)
CK MB SERPL-MCNC: 0.2 NG/ML (ref 0–5)
CK SERPL-CCNC: 26 IU/L (ref 29–168)
CLARITY UR: (no result)
CO2 SERPL-SCNC: 23 MMOL/L (ref 22–29)
COLOR UR: YELLOW
DEPRECATED NEUTROPHILS # BLD AUTO: 11.9 10*3/UL (ref 2.1–6.9)
DEPRECATED RBC URNS MANUAL-ACNC: (no result) /HPF (ref 0–5)
EGFRCR SERPLBLD CKD-EPI 2021: 38 ML/MIN (ref 60–?)
EOSINOPHIL # BLD AUTO: 0 10*3/UL (ref 0–0.4)
EOSINOPHIL NFR BLD AUTO: 0.1 % (ref 0–6)
EPI CELLS URNS QL MICRO: (no result) /LPF
ERYTHROCYTE [DISTWIDTH] IN CORD BLOOD: 14.8 % (ref 11.7–14.4)
GLOBULIN PLAS-MCNC: 5.8 G/DL (ref 2.3–3.5)
GLUCOSE SERPLBLD-MCNC: 196 MG/DL (ref 74–118)
HCT VFR BLD AUTO: 37 % (ref 34.2–44.1)
HGB BLD-MCNC: 11.5 G/DL (ref 12–16)
KETONES UR QL STRIP.AUTO: NEGATIVE
LEUKOCYTE ESTERASE UR QL STRIP.AUTO: (no result)
LYMPHOCYTES # BLD: 1.3 10*3/UL (ref 1–3.2)
LYMPHOCYTES NFR BLD AUTO: 9.2 % (ref 18–39.1)
MCH RBC QN AUTO: 26 PG (ref 28–32)
MCHC RBC AUTO-ENTMCNC: 31.1 G/DL (ref 31–35)
MCV RBC AUTO: 83.5 FL (ref 81–99)
MONOCYTES # BLD AUTO: 0.6 10*3/UL (ref 0.2–0.8)
MONOCYTES NFR BLD AUTO: 4.6 % (ref 4.4–11.3)
MUCOUS THREADS URNS QL MICRO: (no result)
NEUTS SEG NFR BLD AUTO: 85.2 % (ref 38.7–80)
NITRITE UR QL STRIP.AUTO: NEGATIVE
PLATELET # BLD AUTO: 391 X10E3/UL (ref 140–360)
POTASSIUM SERPL-SCNC: 3.9 MMOL/L (ref 3.5–5.1)
PROT UR QL STRIP.AUTO: >=300
RBC # BLD AUTO: 4.43 X10E6/UL (ref 3.6–5.1)
SODIUM SERPL-SCNC: 139 MMOL/L (ref 136–145)
SP GR UR STRIP: 1.01 (ref 1.01–1.02)
UROBILINOGEN UR STRIP-MCNC: 0.2 MG/DL (ref 0.2–1)
WBC #/AREA URNS HPF: (no result) /HPF (ref 0–5)

## 2020-12-13 PROCEDURE — 85025 COMPLETE CBC W/AUTO DIFF WBC: CPT

## 2020-12-13 PROCEDURE — 82553 CREATINE MB FRACTION: CPT

## 2020-12-13 PROCEDURE — 81001 URINALYSIS AUTO W/SCOPE: CPT

## 2020-12-13 PROCEDURE — 87086 URINE CULTURE/COLONY COUNT: CPT

## 2020-12-13 PROCEDURE — 87186 SC STD MICRODIL/AGAR DIL: CPT

## 2020-12-13 PROCEDURE — 99284 EMERGENCY DEPT VISIT MOD MDM: CPT

## 2020-12-13 PROCEDURE — 74177 CT ABD & PELVIS W/CONTRAST: CPT

## 2020-12-13 PROCEDURE — 36415 COLL VENOUS BLD VENIPUNCTURE: CPT

## 2020-12-13 PROCEDURE — 84484 ASSAY OF TROPONIN QUANT: CPT

## 2020-12-13 PROCEDURE — 80053 COMPREHEN METABOLIC PANEL: CPT

## 2020-12-13 PROCEDURE — 87040 BLOOD CULTURE FOR BACTERIA: CPT

## 2020-12-13 PROCEDURE — 82550 ASSAY OF CK (CPK): CPT

## 2020-12-13 PROCEDURE — 83605 ASSAY OF LACTIC ACID: CPT

## 2020-12-13 PROCEDURE — 74470 X-RAY EXAM OF KIDNEY LESION: CPT

## 2020-12-28 ENCOUNTER — HOSPITAL ENCOUNTER (EMERGENCY)
Dept: HOSPITAL 88 - ER | Age: 55
LOS: 1 days | Discharge: HOME | End: 2020-12-29
Payer: COMMERCIAL

## 2020-12-28 VITALS — BODY MASS INDEX: 36.91 KG/M2 | HEIGHT: 60 IN | WEIGHT: 188 LBS

## 2020-12-28 DIAGNOSIS — Z87.440: ICD-10-CM

## 2020-12-28 DIAGNOSIS — I10: ICD-10-CM

## 2020-12-28 DIAGNOSIS — Z46.6: Primary | ICD-10-CM

## 2020-12-28 DIAGNOSIS — F41.9: ICD-10-CM

## 2020-12-28 DIAGNOSIS — N13.9: ICD-10-CM

## 2020-12-28 PROCEDURE — 51700 IRRIGATION OF BLADDER: CPT

## 2020-12-28 PROCEDURE — 81001 URINALYSIS AUTO W/SCOPE: CPT

## 2020-12-28 PROCEDURE — 87186 SC STD MICRODIL/AGAR DIL: CPT

## 2020-12-28 PROCEDURE — 99283 EMERGENCY DEPT VISIT LOW MDM: CPT

## 2020-12-28 PROCEDURE — 87086 URINE CULTURE/COLONY COUNT: CPT

## 2020-12-29 LAB
BACTERIA URNS QL MICRO: (no result) /HPF
CLARITY UR: (no result)
COLOR UR: (no result)
DEPRECATED RBC URNS MANUAL-ACNC: >50 /HPF (ref 0–5)
EPI CELLS URNS QL MICRO: (no result) /LPF
KETONES UR QL STRIP.AUTO: NEGATIVE
LEUKOCYTE ESTERASE UR QL STRIP.AUTO: (no result)
NITRITE UR QL STRIP.AUTO: POSITIVE
PROT UR QL STRIP.AUTO: >=300
SP GR UR STRIP: >=1.03 (ref 1.01–1.02)
UROBILINOGEN UR STRIP-MCNC: 0.2 MG/DL (ref 0.2–1)

## 2021-01-26 ENCOUNTER — HOSPITAL ENCOUNTER (EMERGENCY)
Dept: HOSPITAL 88 - ER | Age: 56
LOS: 1 days | Discharge: HOME | End: 2021-01-27
Payer: COMMERCIAL

## 2021-01-26 VITALS — WEIGHT: 188 LBS | HEIGHT: 60 IN | BODY MASS INDEX: 36.91 KG/M2

## 2021-01-26 DIAGNOSIS — I10: ICD-10-CM

## 2021-01-26 DIAGNOSIS — Z46.6: Primary | ICD-10-CM

## 2021-01-26 DIAGNOSIS — F41.9: ICD-10-CM

## 2021-01-26 DIAGNOSIS — Z85.41: ICD-10-CM

## 2021-01-26 PROCEDURE — 99282 EMERGENCY DEPT VISIT SF MDM: CPT

## 2021-01-26 PROCEDURE — 51700 IRRIGATION OF BLADDER: CPT

## 2021-03-09 ENCOUNTER — HOSPITAL ENCOUNTER (OUTPATIENT)
Dept: HOSPITAL 88 - DX | Age: 56
End: 2021-03-09
Attending: UROLOGY
Payer: COMMERCIAL

## 2021-03-09 DIAGNOSIS — Z43.6: Primary | ICD-10-CM

## 2021-03-09 PROCEDURE — 50387 CHANGE NEPHROURETERAL CATH: CPT

## 2021-03-12 LAB
ANION GAP SERPL CALC-SCNC: 11.9 MMOL/L (ref 8–16)
BASOPHILS # BLD AUTO: 0.1 10*3/UL (ref 0–0.1)
BASOPHILS NFR BLD AUTO: 0.8 % (ref 0–1)
BUN SERPL-MCNC: 15 MG/DL (ref 7–26)
BUN/CREAT SERPL: 18 (ref 6–25)
CALCIUM SERPL-MCNC: 8.9 MG/DL (ref 8.4–10.2)
CHLORIDE SERPL-SCNC: 106 MMOL/L (ref 98–107)
CO2 SERPL-SCNC: 25 MMOL/L (ref 22–29)
DEPRECATED NEUTROPHILS # BLD AUTO: 3.7 10*3/UL (ref 2.1–6.9)
EGFRCR SERPLBLD CKD-EPI 2021: > 60 ML/MIN (ref 60–?)
EOSINOPHIL # BLD AUTO: 0.1 10*3/UL (ref 0–0.4)
EOSINOPHIL NFR BLD AUTO: 2.1 % (ref 0–6)
ERYTHROCYTE [DISTWIDTH] IN CORD BLOOD: 14.6 % (ref 11.7–14.4)
GLUCOSE SERPLBLD-MCNC: 126 MG/DL (ref 74–118)
HCT VFR BLD AUTO: 35.2 % (ref 34.2–44.1)
HGB BLD-MCNC: 11.1 G/DL (ref 12–16)
LYMPHOCYTES # BLD: 2.1 10*3/UL (ref 1–3.2)
LYMPHOCYTES NFR BLD AUTO: 33.9 % (ref 18–39.1)
MCH RBC QN AUTO: 26.3 PG (ref 28–32)
MCHC RBC AUTO-ENTMCNC: 31.5 G/DL (ref 31–35)
MCV RBC AUTO: 83.4 FL (ref 81–99)
MONOCYTES # BLD AUTO: 0.3 10*3/UL (ref 0.2–0.8)
MONOCYTES NFR BLD AUTO: 4.9 % (ref 4.4–11.3)
NEUTS SEG NFR BLD AUTO: 58 % (ref 38.7–80)
PLATELET # BLD AUTO: 266 X10E3/UL (ref 140–360)
POTASSIUM SERPL-SCNC: 3.9 MMOL/L (ref 3.5–5.1)
RBC # BLD AUTO: 4.22 X10E6/UL (ref 3.6–5.1)
SODIUM SERPL-SCNC: 139 MMOL/L (ref 136–145)

## 2021-03-15 ENCOUNTER — HOSPITAL ENCOUNTER (OUTPATIENT)
Dept: HOSPITAL 88 - OR | Age: 56
Discharge: HOME | End: 2021-03-15
Attending: UROLOGY
Payer: COMMERCIAL

## 2021-03-15 VITALS — DIASTOLIC BLOOD PRESSURE: 76 MMHG | SYSTOLIC BLOOD PRESSURE: 136 MMHG

## 2021-03-15 DIAGNOSIS — N13.1: Primary | ICD-10-CM

## 2021-03-15 DIAGNOSIS — Z93.6: ICD-10-CM

## 2021-03-15 DIAGNOSIS — Z46.6: ICD-10-CM

## 2021-03-15 DIAGNOSIS — N31.9: ICD-10-CM

## 2021-03-15 DIAGNOSIS — N81.10: ICD-10-CM

## 2021-03-15 DIAGNOSIS — N81.6: ICD-10-CM

## 2021-03-15 DIAGNOSIS — N32.89: ICD-10-CM

## 2021-03-15 DIAGNOSIS — N95.2: ICD-10-CM

## 2021-03-15 DIAGNOSIS — Z01.812: ICD-10-CM

## 2021-03-15 DIAGNOSIS — Z92.3: ICD-10-CM

## 2021-03-15 DIAGNOSIS — R33.9: ICD-10-CM

## 2021-03-15 DIAGNOSIS — Z01.810: ICD-10-CM

## 2021-03-15 DIAGNOSIS — Z20.822: ICD-10-CM

## 2021-03-15 PROCEDURE — 80048 BASIC METABOLIC PNL TOTAL CA: CPT

## 2021-03-15 PROCEDURE — 52332 CYSTOSCOPY AND TREATMENT: CPT

## 2021-03-15 PROCEDURE — 74420 UROGRAPHY RTRGR +-KUB: CPT

## 2021-03-15 PROCEDURE — 52344 CYSTO/URETERO STRICTURE TX: CPT

## 2021-03-15 PROCEDURE — 36415 COLL VENOUS BLD VENIPUNCTURE: CPT

## 2021-03-15 PROCEDURE — 93005 ELECTROCARDIOGRAM TRACING: CPT

## 2021-03-15 PROCEDURE — 85025 COMPLETE CBC W/AUTO DIFF WBC: CPT

## 2021-04-17 ENCOUNTER — HOSPITAL ENCOUNTER (EMERGENCY)
Dept: HOSPITAL 88 - ER | Age: 56
Discharge: HOME | End: 2021-04-17
Payer: COMMERCIAL

## 2021-04-17 VITALS — HEIGHT: 60 IN | WEIGHT: 188 LBS | BODY MASS INDEX: 36.91 KG/M2

## 2021-04-17 DIAGNOSIS — I10: ICD-10-CM

## 2021-04-17 DIAGNOSIS — Z85.41: ICD-10-CM

## 2021-04-17 DIAGNOSIS — F41.9: ICD-10-CM

## 2021-04-17 DIAGNOSIS — Z46.6: Primary | ICD-10-CM

## 2021-04-17 PROCEDURE — 99282 EMERGENCY DEPT VISIT SF MDM: CPT

## 2021-04-17 PROCEDURE — 51700 IRRIGATION OF BLADDER: CPT

## 2021-04-21 ENCOUNTER — HOSPITAL ENCOUNTER (EMERGENCY)
Dept: HOSPITAL 88 - ER | Age: 56
Discharge: HOME | End: 2021-04-21
Payer: COMMERCIAL

## 2021-04-21 VITALS — DIASTOLIC BLOOD PRESSURE: 87 MMHG | SYSTOLIC BLOOD PRESSURE: 149 MMHG

## 2021-04-21 VITALS — BODY MASS INDEX: 36.91 KG/M2 | WEIGHT: 188 LBS | HEIGHT: 60 IN

## 2021-04-21 DIAGNOSIS — N39.0: ICD-10-CM

## 2021-04-21 DIAGNOSIS — K80.20: ICD-10-CM

## 2021-04-21 DIAGNOSIS — R10.32: Primary | ICD-10-CM

## 2021-04-21 DIAGNOSIS — R39.89: ICD-10-CM

## 2021-04-21 LAB
BACTERIA URNS QL MICRO: (no result) /HPF
CLARITY UR: (no result)
COLOR UR: YELLOW
DEPRECATED RBC URNS MANUAL-ACNC: (no result) /HPF (ref 0–5)
EPI CELLS URNS QL MICRO: (no result) /LPF
KETONES UR QL STRIP.AUTO: NEGATIVE
LEUKOCYTE ESTERASE UR QL STRIP.AUTO: (no result)
NITRITE UR QL STRIP.AUTO: POSITIVE
PROT UR QL STRIP.AUTO: >=300
SP GR UR STRIP: 1.02 (ref 1.01–1.02)
UROBILINOGEN UR STRIP-MCNC: 0.2 MG/DL (ref 0.2–1)
WBC #/AREA URNS HPF: >50 /HPF (ref 0–5)

## 2021-04-21 PROCEDURE — 81001 URINALYSIS AUTO W/SCOPE: CPT

## 2021-04-21 PROCEDURE — 87086 URINE CULTURE/COLONY COUNT: CPT

## 2021-04-21 PROCEDURE — 99283 EMERGENCY DEPT VISIT LOW MDM: CPT

## 2021-04-21 PROCEDURE — 74176 CT ABD & PELVIS W/O CONTRAST: CPT

## 2021-05-10 ENCOUNTER — HOSPITAL ENCOUNTER (OUTPATIENT)
Dept: HOSPITAL 88 - DX | Age: 56
End: 2021-05-10
Attending: UROLOGY
Payer: COMMERCIAL

## 2021-05-10 DIAGNOSIS — Z43.6: Primary | ICD-10-CM

## 2021-05-10 PROCEDURE — 50387 CHANGE NEPHROURETERAL CATH: CPT

## 2021-05-14 ENCOUNTER — HOSPITAL ENCOUNTER (EMERGENCY)
Dept: HOSPITAL 88 - ER | Age: 56
Discharge: HOME | End: 2021-05-14
Payer: COMMERCIAL

## 2021-05-14 VITALS — HEIGHT: 60 IN | WEIGHT: 188 LBS | BODY MASS INDEX: 36.91 KG/M2

## 2021-05-14 DIAGNOSIS — R53.83: ICD-10-CM

## 2021-05-14 DIAGNOSIS — N39.0: Primary | ICD-10-CM

## 2021-05-14 DIAGNOSIS — F41.9: ICD-10-CM

## 2021-05-14 DIAGNOSIS — I10: ICD-10-CM

## 2021-05-14 DIAGNOSIS — B95.2: ICD-10-CM

## 2021-05-14 DIAGNOSIS — Z85.41: ICD-10-CM

## 2021-05-14 DIAGNOSIS — F32.9: ICD-10-CM

## 2021-05-14 LAB
ANION GAP SERPL CALC-SCNC: 18 MMOL/L (ref 8–16)
BACTERIA URNS QL MICRO: (no result) /HPF
BASOPHILS # BLD AUTO: 0 10*3/UL (ref 0–0.1)
BASOPHILS NFR BLD AUTO: 0.3 % (ref 0–1)
BUN SERPL-MCNC: 19 MG/DL (ref 7–26)
BUN/CREAT SERPL: 19 (ref 6–25)
CALCIUM SERPL-MCNC: 9.6 MG/DL (ref 8.4–10.2)
CHLORIDE SERPL-SCNC: 103 MMOL/L (ref 98–107)
CLARITY UR: (no result)
CO2 SERPL-SCNC: 23 MMOL/L (ref 22–29)
COLOR UR: YELLOW
DEPRECATED NEUTROPHILS # BLD AUTO: 6.7 10*3/UL (ref 2.1–6.9)
DEPRECATED RBC URNS MANUAL-ACNC: >50 /HPF (ref 0–5)
EGFRCR SERPLBLD CKD-EPI 2021: 57 ML/MIN (ref 60–?)
EOSINOPHIL # BLD AUTO: 0.1 10*3/UL (ref 0–0.4)
EOSINOPHIL NFR BLD AUTO: 1.1 % (ref 0–6)
EPI CELLS URNS QL MICRO: (no result) /LPF
ERYTHROCYTE [DISTWIDTH] IN CORD BLOOD: 15.4 % (ref 11.7–14.4)
GLUCOSE SERPLBLD-MCNC: 177 MG/DL (ref 74–118)
HCT VFR BLD AUTO: 36.6 % (ref 34.2–44.1)
HGB BLD-MCNC: 11.5 G/DL (ref 12–16)
KETONES UR QL STRIP.AUTO: (no result)
LEUKOCYTE ESTERASE UR QL STRIP.AUTO: (no result)
LYMPHOCYTES # BLD: 1.8 10*3/UL (ref 1–3.2)
LYMPHOCYTES NFR BLD AUTO: 20 % (ref 18–39.1)
MCH RBC QN AUTO: 26.6 PG (ref 28–32)
MCHC RBC AUTO-ENTMCNC: 31.4 G/DL (ref 31–35)
MCV RBC AUTO: 84.5 FL (ref 81–99)
MONOCYTES # BLD AUTO: 0.6 10*3/UL (ref 0.2–0.8)
MONOCYTES NFR BLD AUTO: 6.1 % (ref 4.4–11.3)
NEUTS SEG NFR BLD AUTO: 72.1 % (ref 38.7–80)
NITRITE UR QL STRIP.AUTO: POSITIVE
PLATELET # BLD AUTO: 275 X10E3/UL (ref 140–360)
POTASSIUM SERPL-SCNC: 4 MMOL/L (ref 3.5–5.1)
PROT UR QL STRIP.AUTO: >=300
RBC # BLD AUTO: 4.33 X10E6/UL (ref 3.6–5.1)
SODIUM SERPL-SCNC: 140 MMOL/L (ref 136–145)
SP GR UR STRIP: 1.02 (ref 1.01–1.02)
UROBILINOGEN UR STRIP-MCNC: 1 MG/DL (ref 0.2–1)
WBC #/AREA URNS HPF: >50 /HPF (ref 0–5)

## 2021-05-14 PROCEDURE — 36415 COLL VENOUS BLD VENIPUNCTURE: CPT

## 2021-05-14 PROCEDURE — 80048 BASIC METABOLIC PNL TOTAL CA: CPT

## 2021-05-14 PROCEDURE — 81001 URINALYSIS AUTO W/SCOPE: CPT

## 2021-05-14 PROCEDURE — 51700 IRRIGATION OF BLADDER: CPT

## 2021-05-14 PROCEDURE — 85025 COMPLETE CBC W/AUTO DIFF WBC: CPT

## 2021-05-14 PROCEDURE — 99283 EMERGENCY DEPT VISIT LOW MDM: CPT

## 2021-05-14 PROCEDURE — 87086 URINE CULTURE/COLONY COUNT: CPT

## 2021-05-14 PROCEDURE — 87186 SC STD MICRODIL/AGAR DIL: CPT

## 2021-06-20 ENCOUNTER — HOSPITAL ENCOUNTER (EMERGENCY)
Dept: HOSPITAL 88 - ER | Age: 56
Discharge: HOME | End: 2021-06-20
Payer: COMMERCIAL

## 2021-06-20 VITALS — BODY MASS INDEX: 36.91 KG/M2 | HEIGHT: 60 IN | WEIGHT: 188 LBS

## 2021-06-20 DIAGNOSIS — I10: ICD-10-CM

## 2021-06-20 DIAGNOSIS — Z85.41: ICD-10-CM

## 2021-06-20 DIAGNOSIS — F41.9: ICD-10-CM

## 2021-06-20 DIAGNOSIS — Z46.6: Primary | ICD-10-CM

## 2021-06-20 PROCEDURE — 51700 IRRIGATION OF BLADDER: CPT

## 2021-06-20 PROCEDURE — 99283 EMERGENCY DEPT VISIT LOW MDM: CPT

## 2021-06-30 ENCOUNTER — HOSPITAL ENCOUNTER (INPATIENT)
Dept: HOSPITAL 88 - ER | Age: 56
LOS: 3 days | Discharge: HOME | DRG: 872 | End: 2021-07-03
Attending: INTERNAL MEDICINE | Admitting: INTERNAL MEDICINE
Payer: COMMERCIAL

## 2021-06-30 VITALS — DIASTOLIC BLOOD PRESSURE: 70 MMHG | SYSTOLIC BLOOD PRESSURE: 117 MMHG

## 2021-06-30 VITALS — DIASTOLIC BLOOD PRESSURE: 69 MMHG | SYSTOLIC BLOOD PRESSURE: 119 MMHG

## 2021-06-30 VITALS — WEIGHT: 186 LBS | HEIGHT: 60 IN | BODY MASS INDEX: 36.52 KG/M2

## 2021-06-30 VITALS — DIASTOLIC BLOOD PRESSURE: 74 MMHG | SYSTOLIC BLOOD PRESSURE: 123 MMHG

## 2021-06-30 VITALS — DIASTOLIC BLOOD PRESSURE: 90 MMHG | SYSTOLIC BLOOD PRESSURE: 151 MMHG

## 2021-06-30 VITALS — SYSTOLIC BLOOD PRESSURE: 117 MMHG | DIASTOLIC BLOOD PRESSURE: 70 MMHG

## 2021-06-30 DIAGNOSIS — N99.522: ICD-10-CM

## 2021-06-30 DIAGNOSIS — E78.5: ICD-10-CM

## 2021-06-30 DIAGNOSIS — N17.9: ICD-10-CM

## 2021-06-30 DIAGNOSIS — D64.9: ICD-10-CM

## 2021-06-30 DIAGNOSIS — I12.9: ICD-10-CM

## 2021-06-30 DIAGNOSIS — N18.30: ICD-10-CM

## 2021-06-30 DIAGNOSIS — E66.9: ICD-10-CM

## 2021-06-30 DIAGNOSIS — F32.9: ICD-10-CM

## 2021-06-30 DIAGNOSIS — Z20.822: ICD-10-CM

## 2021-06-30 DIAGNOSIS — F41.9: ICD-10-CM

## 2021-06-30 DIAGNOSIS — R31.9: ICD-10-CM

## 2021-06-30 DIAGNOSIS — Z96.0: ICD-10-CM

## 2021-06-30 DIAGNOSIS — A41.9: Primary | ICD-10-CM

## 2021-06-30 DIAGNOSIS — Z85.41: ICD-10-CM

## 2021-06-30 DIAGNOSIS — G89.4: ICD-10-CM

## 2021-06-30 LAB
ALBUMIN SERPL-MCNC: 3.8 G/DL (ref 3.5–5)
ALBUMIN/GLOB SERPL: 0.7 {RATIO} (ref 0.8–2)
ALP SERPL-CCNC: 108 IU/L (ref 40–150)
ALT SERPL-CCNC: 10 IU/L (ref 0–55)
ANION GAP SERPL CALC-SCNC: 11.5 MMOL/L (ref 8–16)
BACTERIA URNS QL MICRO: (no result) /HPF
BASOPHILS # BLD AUTO: 0.1 10*3/UL (ref 0–0.1)
BASOPHILS NFR BLD AUTO: 0.4 % (ref 0–1)
BUN SERPL-MCNC: 29 MG/DL (ref 7–26)
BUN/CREAT SERPL: 23 (ref 6–25)
CALCIUM SERPL-MCNC: 9.2 MG/DL (ref 8.4–10.2)
CHLORIDE SERPL-SCNC: 104 MMOL/L (ref 98–107)
CLARITY UR: (no result)
CO2 SERPL-SCNC: 23 MMOL/L (ref 22–29)
COLOR UR: YELLOW
DEPRECATED NEUTROPHILS # BLD AUTO: 10.7 10*3/UL (ref 2.1–6.9)
DEPRECATED RBC URNS MANUAL-ACNC: (no result) /HPF (ref 0–5)
EGFRCR SERPLBLD CKD-EPI 2021: 44 ML/MIN (ref 60–?)
EOSINOPHIL # BLD AUTO: 0.1 10*3/UL (ref 0–0.4)
EOSINOPHIL NFR BLD AUTO: 0.7 % (ref 0–6)
EPI CELLS URNS QL MICRO: (no result) /LPF
ERYTHROCYTE [DISTWIDTH] IN CORD BLOOD: 14.5 % (ref 11.7–14.4)
GLOBULIN PLAS-MCNC: 5.2 G/DL (ref 2.3–3.5)
GLUCOSE SERPLBLD-MCNC: 168 MG/DL (ref 74–118)
HCT VFR BLD AUTO: 36.1 % (ref 34.2–44.1)
HGB BLD-MCNC: 11.2 G/DL (ref 12–16)
KETONES UR QL STRIP.AUTO: NEGATIVE
LEUKOCYTE ESTERASE UR QL STRIP.AUTO: (no result)
LYMPHOCYTES # BLD: 2.1 10*3/UL (ref 1–3.2)
LYMPHOCYTES NFR BLD AUTO: 15.1 % (ref 18–39.1)
MCH RBC QN AUTO: 26.2 PG (ref 28–32)
MCHC RBC AUTO-ENTMCNC: 31 G/DL (ref 31–35)
MCV RBC AUTO: 84.5 FL (ref 81–99)
MONOCYTES # BLD AUTO: 0.7 10*3/UL (ref 0.2–0.8)
MONOCYTES NFR BLD AUTO: 5.2 % (ref 4.4–11.3)
NEUTS SEG NFR BLD AUTO: 78.1 % (ref 38.7–80)
NITRITE UR QL STRIP.AUTO: NEGATIVE
PLATELET # BLD AUTO: 274 X10E3/UL (ref 140–360)
POTASSIUM SERPL-SCNC: 3.5 MMOL/L (ref 3.5–5.1)
PROT UR QL STRIP.AUTO: >=300
RBC # BLD AUTO: 4.27 X10E6/UL (ref 3.6–5.1)
SODIUM SERPL-SCNC: 135 MMOL/L (ref 136–145)
SP GR UR STRIP: 1.01 (ref 1.01–1.02)
TRI-PHOS CRY URNS QL MICRO: (no result)
UROBILINOGEN UR STRIP-MCNC: 0.2 MG/DL (ref 0.2–1)
WBC #/AREA URNS HPF: (no result) /HPF (ref 0–5)

## 2021-06-30 PROCEDURE — 85025 COMPLETE CBC W/AUTO DIFF WBC: CPT

## 2021-06-30 PROCEDURE — 99152 MOD SED SAME PHYS/QHP 5/>YRS: CPT

## 2021-06-30 PROCEDURE — 80048 BASIC METABOLIC PNL TOTAL CA: CPT

## 2021-06-30 PROCEDURE — 87186 SC STD MICRODIL/AGAR DIL: CPT

## 2021-06-30 PROCEDURE — 87040 BLOOD CULTURE FOR BACTERIA: CPT

## 2021-06-30 PROCEDURE — 99153 MOD SED SAME PHYS/QHP EA: CPT

## 2021-06-30 PROCEDURE — 99284 EMERGENCY DEPT VISIT MOD MDM: CPT

## 2021-06-30 PROCEDURE — 80053 COMPREHEN METABOLIC PANEL: CPT

## 2021-06-30 PROCEDURE — 74470 X-RAY EXAM OF KIDNEY LESION: CPT

## 2021-06-30 PROCEDURE — 50435 EXCHANGE NEPHROSTOMY CATH: CPT

## 2021-06-30 PROCEDURE — 83605 ASSAY OF LACTIC ACID: CPT

## 2021-06-30 PROCEDURE — 81001 URINALYSIS AUTO W/SCOPE: CPT

## 2021-06-30 PROCEDURE — 36415 COLL VENOUS BLD VENIPUNCTURE: CPT

## 2021-06-30 PROCEDURE — 87086 URINE CULTURE/COLONY COUNT: CPT

## 2021-06-30 PROCEDURE — 0T25X0Z CHANGE DRAINAGE DEVICE IN KIDNEY, EXTERNAL APPROACH: ICD-10-PCS

## 2021-06-30 RX ADMIN — SODIUM CHLORIDE SCH MLS/HR: 9 INJECTION, SOLUTION INTRAVENOUS at 13:55

## 2021-06-30 RX ADMIN — Medication PRN MG: at 19:43

## 2021-06-30 RX ADMIN — SODIUM CHLORIDE SCH MLS/HR: 9 INJECTION, SOLUTION INTRAVENOUS at 07:38

## 2021-06-30 RX ADMIN — Medication PRN MG: at 08:46

## 2021-06-30 RX ADMIN — PANTOPRAZOLE SODIUM SCH MG: 40 TABLET, DELAYED RELEASE ORAL at 07:30

## 2021-06-30 RX ADMIN — SODIUM CHLORIDE PRN MG: 900 INJECTION INTRAVENOUS at 07:08

## 2021-06-30 RX ADMIN — SODIUM CHLORIDE SCH MLS/HR: 9 INJECTION, SOLUTION INTRAVENOUS at 22:17

## 2021-07-01 VITALS — SYSTOLIC BLOOD PRESSURE: 133 MMHG | DIASTOLIC BLOOD PRESSURE: 76 MMHG

## 2021-07-01 VITALS — DIASTOLIC BLOOD PRESSURE: 74 MMHG | SYSTOLIC BLOOD PRESSURE: 133 MMHG

## 2021-07-01 VITALS — DIASTOLIC BLOOD PRESSURE: 76 MMHG | SYSTOLIC BLOOD PRESSURE: 126 MMHG

## 2021-07-01 VITALS — DIASTOLIC BLOOD PRESSURE: 63 MMHG | SYSTOLIC BLOOD PRESSURE: 104 MMHG

## 2021-07-01 VITALS — DIASTOLIC BLOOD PRESSURE: 73 MMHG | SYSTOLIC BLOOD PRESSURE: 142 MMHG

## 2021-07-01 VITALS — SYSTOLIC BLOOD PRESSURE: 139 MMHG | DIASTOLIC BLOOD PRESSURE: 78 MMHG

## 2021-07-01 LAB
ANION GAP SERPL CALC-SCNC: 13.1 MMOL/L (ref 8–16)
BASOPHILS # BLD AUTO: 0.1 10*3/UL (ref 0–0.1)
BASOPHILS NFR BLD AUTO: 0.6 % (ref 0–1)
BUN SERPL-MCNC: 17 MG/DL (ref 7–26)
BUN/CREAT SERPL: 19 (ref 6–25)
CALCIUM SERPL-MCNC: 8.4 MG/DL (ref 8.4–10.2)
CHLORIDE SERPL-SCNC: 109 MMOL/L (ref 98–107)
CO2 SERPL-SCNC: 22 MMOL/L (ref 22–29)
DEPRECATED NEUTROPHILS # BLD AUTO: 5.4 10*3/UL (ref 2.1–6.9)
EGFRCR SERPLBLD CKD-EPI 2021: 66 ML/MIN (ref 60–?)
EOSINOPHIL # BLD AUTO: 0.1 10*3/UL (ref 0–0.4)
EOSINOPHIL NFR BLD AUTO: 1.4 % (ref 0–6)
ERYTHROCYTE [DISTWIDTH] IN CORD BLOOD: 14.6 % (ref 11.7–14.4)
GLUCOSE SERPLBLD-MCNC: 140 MG/DL (ref 74–118)
HCT VFR BLD AUTO: 30.8 % (ref 34.2–44.1)
HGB BLD-MCNC: 9.4 G/DL (ref 12–16)
LYMPHOCYTES # BLD: 2.4 10*3/UL (ref 1–3.2)
LYMPHOCYTES NFR BLD AUTO: 27.5 % (ref 18–39.1)
MCH RBC QN AUTO: 26.1 PG (ref 28–32)
MCHC RBC AUTO-ENTMCNC: 30.5 G/DL (ref 31–35)
MCV RBC AUTO: 85.6 FL (ref 81–99)
MONOCYTES # BLD AUTO: 0.7 10*3/UL (ref 0.2–0.8)
MONOCYTES NFR BLD AUTO: 7.9 % (ref 4.4–11.3)
NEUTS SEG NFR BLD AUTO: 62.1 % (ref 38.7–80)
PLATELET # BLD AUTO: 232 X10E3/UL (ref 140–360)
POTASSIUM SERPL-SCNC: 4.1 MMOL/L (ref 3.5–5.1)
RBC # BLD AUTO: 3.6 X10E6/UL (ref 3.6–5.1)
SODIUM SERPL-SCNC: 140 MMOL/L (ref 136–145)

## 2021-07-01 RX ADMIN — PANTOPRAZOLE SODIUM SCH MG: 40 TABLET, DELAYED RELEASE ORAL at 07:33

## 2021-07-01 RX ADMIN — HYDROCODONE BITARTRATE AND ACETAMINOPHEN PRN EA: 5; 325 TABLET ORAL at 21:54

## 2021-07-01 RX ADMIN — HYDROCODONE BITARTRATE AND ACETAMINOPHEN PRN EA: 5; 325 TABLET ORAL at 07:32

## 2021-07-01 RX ADMIN — SODIUM CHLORIDE SCH MLS/HR: 9 INJECTION, SOLUTION INTRAVENOUS at 13:00

## 2021-07-01 RX ADMIN — SODIUM CHLORIDE SCH MLS/HR: 9 INJECTION, SOLUTION INTRAVENOUS at 23:30

## 2021-07-01 RX ADMIN — SODIUM CHLORIDE PRN MG: 900 INJECTION INTRAVENOUS at 23:20

## 2021-07-01 RX ADMIN — SODIUM CHLORIDE SCH MLS/HR: 9 INJECTION, SOLUTION INTRAVENOUS at 21:48

## 2021-07-01 RX ADMIN — Medication PRN MG: at 23:28

## 2021-07-01 RX ADMIN — HYDROCODONE BITARTRATE AND ACETAMINOPHEN PRN EA: 5; 325 TABLET ORAL at 15:49

## 2021-07-01 RX ADMIN — SODIUM CHLORIDE SCH MLS/HR: 9 INJECTION, SOLUTION INTRAVENOUS at 22:15

## 2021-07-01 RX ADMIN — SODIUM CHLORIDE SCH MLS/HR: 9 INJECTION, SOLUTION INTRAVENOUS at 15:43

## 2021-07-01 RX ADMIN — SODIUM CHLORIDE SCH MLS/HR: 9 INJECTION, SOLUTION INTRAVENOUS at 01:06

## 2021-07-01 RX ADMIN — HYDROCODONE BITARTRATE AND ACETAMINOPHEN PRN EA: 5; 325 TABLET ORAL at 01:06

## 2021-07-01 RX ADMIN — SODIUM CHLORIDE SCH MLS/HR: 9 INJECTION, SOLUTION INTRAVENOUS at 05:14

## 2021-07-02 VITALS — SYSTOLIC BLOOD PRESSURE: 131 MMHG | DIASTOLIC BLOOD PRESSURE: 71 MMHG

## 2021-07-02 VITALS — SYSTOLIC BLOOD PRESSURE: 151 MMHG | DIASTOLIC BLOOD PRESSURE: 79 MMHG

## 2021-07-02 VITALS — DIASTOLIC BLOOD PRESSURE: 70 MMHG | SYSTOLIC BLOOD PRESSURE: 128 MMHG

## 2021-07-02 VITALS — SYSTOLIC BLOOD PRESSURE: 146 MMHG | DIASTOLIC BLOOD PRESSURE: 80 MMHG

## 2021-07-02 VITALS — SYSTOLIC BLOOD PRESSURE: 145 MMHG | DIASTOLIC BLOOD PRESSURE: 81 MMHG

## 2021-07-02 VITALS — SYSTOLIC BLOOD PRESSURE: 128 MMHG | DIASTOLIC BLOOD PRESSURE: 70 MMHG

## 2021-07-02 LAB
ANION GAP SERPL CALC-SCNC: 13.5 MMOL/L (ref 8–16)
BASOPHILS # BLD AUTO: 0.1 10*3/UL (ref 0–0.1)
BASOPHILS NFR BLD AUTO: 0.7 % (ref 0–1)
BUN SERPL-MCNC: 10 MG/DL (ref 7–26)
BUN/CREAT SERPL: 13 (ref 6–25)
CALCIUM SERPL-MCNC: 8.8 MG/DL (ref 8.4–10.2)
CHLORIDE SERPL-SCNC: 110 MMOL/L (ref 98–107)
CO2 SERPL-SCNC: 20 MMOL/L (ref 22–29)
DEPRECATED NEUTROPHILS # BLD AUTO: 4.2 10*3/UL (ref 2.1–6.9)
EGFRCR SERPLBLD CKD-EPI 2021: 74 ML/MIN (ref 60–?)
EOSINOPHIL # BLD AUTO: 0.2 10*3/UL (ref 0–0.4)
EOSINOPHIL NFR BLD AUTO: 2.2 % (ref 0–6)
ERYTHROCYTE [DISTWIDTH] IN CORD BLOOD: 14.6 % (ref 11.7–14.4)
GLUCOSE SERPLBLD-MCNC: 174 MG/DL (ref 74–118)
HCT VFR BLD AUTO: 31.5 % (ref 34.2–44.1)
HGB BLD-MCNC: 9.3 G/DL (ref 12–16)
LYMPHOCYTES # BLD: 2 10*3/UL (ref 1–3.2)
LYMPHOCYTES NFR BLD AUTO: 29.2 % (ref 18–39.1)
MCH RBC QN AUTO: 26.5 PG (ref 28–32)
MCHC RBC AUTO-ENTMCNC: 29.5 G/DL (ref 31–35)
MCV RBC AUTO: 89.7 FL (ref 81–99)
MONOCYTES # BLD AUTO: 0.4 10*3/UL (ref 0.2–0.8)
MONOCYTES NFR BLD AUTO: 6.4 % (ref 4.4–11.3)
NEUTS SEG NFR BLD AUTO: 60.9 % (ref 38.7–80)
PLATELET # BLD AUTO: 146 X10E3/UL (ref 140–360)
POTASSIUM SERPL-SCNC: 3.5 MMOL/L (ref 3.5–5.1)
RBC # BLD AUTO: 3.51 X10E6/UL (ref 3.6–5.1)
SODIUM SERPL-SCNC: 140 MMOL/L (ref 136–145)

## 2021-07-02 RX ADMIN — Medication PRN MG: at 20:41

## 2021-07-02 RX ADMIN — PANTOPRAZOLE SODIUM SCH MG: 40 TABLET, DELAYED RELEASE ORAL at 08:54

## 2021-07-02 RX ADMIN — SODIUM CHLORIDE SCH MLS/HR: 9 INJECTION, SOLUTION INTRAVENOUS at 14:20

## 2021-07-02 RX ADMIN — SODIUM CHLORIDE SCH MLS/HR: 9 INJECTION, SOLUTION INTRAVENOUS at 21:47

## 2021-07-02 RX ADMIN — SODIUM CHLORIDE SCH MLS/HR: 9 INJECTION, SOLUTION INTRAVENOUS at 08:38

## 2021-07-02 RX ADMIN — SODIUM CHLORIDE SCH MLS/HR: 9 INJECTION, SOLUTION INTRAVENOUS at 06:00

## 2021-07-03 VITALS — DIASTOLIC BLOOD PRESSURE: 74 MMHG | SYSTOLIC BLOOD PRESSURE: 112 MMHG

## 2021-07-03 VITALS — DIASTOLIC BLOOD PRESSURE: 76 MMHG | SYSTOLIC BLOOD PRESSURE: 134 MMHG

## 2021-07-03 VITALS — SYSTOLIC BLOOD PRESSURE: 132 MMHG | DIASTOLIC BLOOD PRESSURE: 82 MMHG

## 2021-07-03 VITALS — SYSTOLIC BLOOD PRESSURE: 132 MMHG | DIASTOLIC BLOOD PRESSURE: 75 MMHG

## 2021-07-03 RX ADMIN — SODIUM CHLORIDE SCH MLS/HR: 9 INJECTION, SOLUTION INTRAVENOUS at 06:00

## 2021-07-03 RX ADMIN — PANTOPRAZOLE SODIUM SCH MG: 40 TABLET, DELAYED RELEASE ORAL at 08:30

## 2021-07-03 RX ADMIN — Medication PRN MG: at 02:52

## 2021-08-20 LAB
ALBUMIN SERPL-MCNC: 3.2 G/DL (ref 3.5–5)
ALBUMIN/GLOB SERPL: 0.7 {RATIO} (ref 0.8–2)
ALP SERPL-CCNC: 92 IU/L (ref 40–150)
ALT SERPL-CCNC: 11 IU/L (ref 0–55)
ANION GAP SERPL CALC-SCNC: 16.3 MMOL/L (ref 8–16)
BASOPHILS # BLD AUTO: 0 10*3/UL (ref 0–0.1)
BASOPHILS NFR BLD AUTO: 0.6 % (ref 0–1)
BUN SERPL-MCNC: 15 MG/DL (ref 7–26)
BUN/CREAT SERPL: 15 (ref 6–25)
CALCIUM SERPL-MCNC: 9.1 MG/DL (ref 8.4–10.2)
CHLORIDE SERPL-SCNC: 105 MMOL/L (ref 98–107)
CO2 SERPL-SCNC: 22 MMOL/L (ref 22–29)
DEPRECATED APTT PLAS QN: 30.3 SECONDS (ref 23.8–35.5)
DEPRECATED INR PLAS: 0.94
DEPRECATED NEUTROPHILS # BLD AUTO: 4.5 10*3/UL (ref 2.1–6.9)
EGFRCR SERPLBLD CKD-EPI 2021: 55 ML/MIN (ref 60–?)
EOSINOPHIL # BLD AUTO: 0.3 10*3/UL (ref 0–0.4)
EOSINOPHIL NFR BLD AUTO: 3.7 % (ref 0–6)
ERYTHROCYTE [DISTWIDTH] IN CORD BLOOD: 17.2 % (ref 11.7–14.4)
GLOBULIN PLAS-MCNC: 4.5 G/DL (ref 2.3–3.5)
GLUCOSE SERPLBLD-MCNC: 165 MG/DL (ref 74–118)
HCT VFR BLD AUTO: 34.2 % (ref 34.2–44.1)
HGB BLD-MCNC: 10.1 G/DL (ref 12–16)
LYMPHOCYTES # BLD: 1.9 10*3/UL (ref 1–3.2)
LYMPHOCYTES NFR BLD AUTO: 26.2 % (ref 18–39.1)
MCH RBC QN AUTO: 26.1 PG (ref 28–32)
MCHC RBC AUTO-ENTMCNC: 29.5 G/DL (ref 31–35)
MCV RBC AUTO: 88.4 FL (ref 81–99)
MONOCYTES # BLD AUTO: 0.5 10*3/UL (ref 0.2–0.8)
MONOCYTES NFR BLD AUTO: 6.6 % (ref 4.4–11.3)
NEUTS SEG NFR BLD AUTO: 62.5 % (ref 38.7–80)
PLATELET # BLD AUTO: 227 X10E3/UL (ref 140–360)
POTASSIUM SERPL-SCNC: 4.3 MMOL/L (ref 3.5–5.1)
PROTHROMBIN TIME: 12.8 SECONDS (ref 11.9–14.5)
RBC # BLD AUTO: 3.87 X10E6/UL (ref 3.6–5.1)
SODIUM SERPL-SCNC: 139 MMOL/L (ref 136–145)

## 2021-08-23 ENCOUNTER — HOSPITAL ENCOUNTER (OUTPATIENT)
Dept: HOSPITAL 88 - OR | Age: 56
Discharge: HOME | End: 2021-08-23
Attending: UROLOGY
Payer: COMMERCIAL

## 2021-08-23 VITALS — SYSTOLIC BLOOD PRESSURE: 120 MMHG | DIASTOLIC BLOOD PRESSURE: 79 MMHG

## 2021-08-23 DIAGNOSIS — N35.92: Primary | ICD-10-CM

## 2021-08-23 DIAGNOSIS — Z01.812: ICD-10-CM

## 2021-08-23 DIAGNOSIS — E78.5: ICD-10-CM

## 2021-08-23 DIAGNOSIS — N39.0: ICD-10-CM

## 2021-08-23 DIAGNOSIS — N39.41: ICD-10-CM

## 2021-08-23 DIAGNOSIS — N81.4: ICD-10-CM

## 2021-08-23 DIAGNOSIS — Z85.41: ICD-10-CM

## 2021-08-23 DIAGNOSIS — N13.70: ICD-10-CM

## 2021-08-23 DIAGNOSIS — G47.00: ICD-10-CM

## 2021-08-23 DIAGNOSIS — Z96.0: ICD-10-CM

## 2021-08-23 DIAGNOSIS — D64.9: ICD-10-CM

## 2021-08-23 DIAGNOSIS — Z86.16: ICD-10-CM

## 2021-08-23 DIAGNOSIS — N13.30: ICD-10-CM

## 2021-08-23 DIAGNOSIS — N95.2: ICD-10-CM

## 2021-08-23 DIAGNOSIS — R29.2: ICD-10-CM

## 2021-08-23 DIAGNOSIS — Z88.0: ICD-10-CM

## 2021-08-23 DIAGNOSIS — E66.8: ICD-10-CM

## 2021-08-23 DIAGNOSIS — Z01.818: ICD-10-CM

## 2021-08-23 DIAGNOSIS — Z01.810: ICD-10-CM

## 2021-08-23 DIAGNOSIS — F41.9: ICD-10-CM

## 2021-08-23 PROCEDURE — 74420 UROGRAPHY RTRGR +-KUB: CPT

## 2021-08-23 PROCEDURE — 85025 COMPLETE CBC W/AUTO DIFF WBC: CPT

## 2021-08-23 PROCEDURE — 85610 PROTHROMBIN TIME: CPT

## 2021-08-23 PROCEDURE — 36415 COLL VENOUS BLD VENIPUNCTURE: CPT

## 2021-08-23 PROCEDURE — 85730 THROMBOPLASTIN TIME PARTIAL: CPT

## 2021-08-23 PROCEDURE — 71046 X-RAY EXAM CHEST 2 VIEWS: CPT

## 2021-08-23 PROCEDURE — 93005 ELECTROCARDIOGRAM TRACING: CPT

## 2021-08-23 PROCEDURE — 80053 COMPREHEN METABOLIC PANEL: CPT

## 2021-09-13 ENCOUNTER — HOSPITAL ENCOUNTER (OUTPATIENT)
Dept: HOSPITAL 88 - DX | Age: 56
End: 2021-09-13
Attending: UROLOGY
Payer: COMMERCIAL

## 2021-09-13 DIAGNOSIS — N13.5: Primary | ICD-10-CM

## 2021-09-13 PROCEDURE — 50435 EXCHANGE NEPHROSTOMY CATH: CPT

## 2021-09-28 ENCOUNTER — HOSPITAL ENCOUNTER (EMERGENCY)
Dept: HOSPITAL 88 - ER | Age: 56
Discharge: HOME | End: 2021-09-28
Payer: COMMERCIAL

## 2021-09-28 VITALS — HEIGHT: 60 IN | WEIGHT: 185 LBS | BODY MASS INDEX: 36.32 KG/M2

## 2021-09-28 VITALS — SYSTOLIC BLOOD PRESSURE: 127 MMHG | DIASTOLIC BLOOD PRESSURE: 65 MMHG

## 2021-09-28 DIAGNOSIS — I10: ICD-10-CM

## 2021-09-28 DIAGNOSIS — Z85.528: ICD-10-CM

## 2021-09-28 DIAGNOSIS — F41.9: ICD-10-CM

## 2021-09-28 DIAGNOSIS — Z85.41: ICD-10-CM

## 2021-09-28 DIAGNOSIS — T83.018A: Primary | ICD-10-CM

## 2021-09-28 PROCEDURE — 99282 EMERGENCY DEPT VISIT SF MDM: CPT

## 2021-09-28 PROCEDURE — 51700 IRRIGATION OF BLADDER: CPT

## 2021-10-13 ENCOUNTER — HOSPITAL ENCOUNTER (EMERGENCY)
Dept: HOSPITAL 88 - ER | Age: 56
Discharge: HOME | End: 2021-10-13
Payer: COMMERCIAL

## 2021-10-13 VITALS — WEIGHT: 185 LBS | BODY MASS INDEX: 36.32 KG/M2 | HEIGHT: 60 IN

## 2021-10-13 DIAGNOSIS — I10: ICD-10-CM

## 2021-10-13 DIAGNOSIS — Z93.6: ICD-10-CM

## 2021-10-13 DIAGNOSIS — Z85.41: ICD-10-CM

## 2021-10-13 DIAGNOSIS — R30.0: Primary | ICD-10-CM

## 2021-10-13 DIAGNOSIS — N39.0: ICD-10-CM

## 2021-10-13 DIAGNOSIS — R50.9: ICD-10-CM

## 2021-10-13 DIAGNOSIS — F41.9: ICD-10-CM

## 2021-10-13 LAB
BACTERIA URNS QL MICRO: (no result) /HPF
CLARITY UR: (no result)
COLOR UR: YELLOW
DEPRECATED RBC URNS MANUAL-ACNC: >50 /HPF (ref 0–5)
EPI CELLS URNS QL MICRO: (no result) /LPF
KETONES UR QL STRIP.AUTO: NEGATIVE
LEUKOCYTE ESTERASE UR QL STRIP.AUTO: (no result)
NITRITE UR QL STRIP.AUTO: POSITIVE
PROT UR QL STRIP.AUTO: (no result)
SP GR UR STRIP: 1.02 (ref 1.01–1.02)
UROBILINOGEN UR STRIP-MCNC: 0.2 MG/DL (ref 0.2–1)
WBC #/AREA URNS HPF: >50 /HPF (ref 0–5)

## 2021-10-13 PROCEDURE — 81001 URINALYSIS AUTO W/SCOPE: CPT

## 2021-10-13 PROCEDURE — 99283 EMERGENCY DEPT VISIT LOW MDM: CPT

## 2021-11-16 ENCOUNTER — HOSPITAL ENCOUNTER (OUTPATIENT)
Dept: HOSPITAL 88 - DX | Age: 56
End: 2021-11-16
Attending: UROLOGY
Payer: COMMERCIAL

## 2021-11-16 DIAGNOSIS — N13.30: ICD-10-CM

## 2021-11-16 DIAGNOSIS — R33.9: Primary | ICD-10-CM

## 2021-11-16 PROCEDURE — 50435 EXCHANGE NEPHROSTOMY CATH: CPT

## 2021-12-02 ENCOUNTER — HOSPITAL ENCOUNTER (EMERGENCY)
Dept: HOSPITAL 88 - ER | Age: 56
Discharge: HOME | End: 2021-12-02
Payer: COMMERCIAL

## 2021-12-02 VITALS — HEIGHT: 60 IN | WEIGHT: 185 LBS | BODY MASS INDEX: 36.32 KG/M2

## 2021-12-02 DIAGNOSIS — F41.9: ICD-10-CM

## 2021-12-02 DIAGNOSIS — I10: ICD-10-CM

## 2021-12-02 DIAGNOSIS — Z46.6: Primary | ICD-10-CM

## 2021-12-02 DIAGNOSIS — Z85.41: ICD-10-CM

## 2021-12-02 DIAGNOSIS — R33.9: ICD-10-CM

## 2021-12-02 LAB
BACTERIA URNS QL MICRO: (no result) /HPF
CLARITY UR: (no result)
COLOR UR: YELLOW
DEPRECATED RBC URNS MANUAL-ACNC: >50 /HPF (ref 0–5)
EPI CELLS URNS QL MICRO: (no result) /LPF
KETONES UR QL STRIP.AUTO: NEGATIVE
LEUKOCYTE ESTERASE UR QL STRIP.AUTO: (no result)
NITRITE UR QL STRIP.AUTO: POSITIVE
PROT UR QL STRIP.AUTO: (no result)
SP GR UR STRIP: 1.02 (ref 1.01–1.02)
UROBILINOGEN UR STRIP-MCNC: 0.2 MG/DL (ref 0.2–1)
WBC #/AREA URNS HPF: (no result) /HPF (ref 0–5)

## 2021-12-02 PROCEDURE — 51700 IRRIGATION OF BLADDER: CPT

## 2021-12-02 PROCEDURE — 87086 URINE CULTURE/COLONY COUNT: CPT

## 2021-12-02 PROCEDURE — 99282 EMERGENCY DEPT VISIT SF MDM: CPT

## 2021-12-02 PROCEDURE — 81001 URINALYSIS AUTO W/SCOPE: CPT

## 2021-12-02 PROCEDURE — 87186 SC STD MICRODIL/AGAR DIL: CPT

## 2021-12-14 ENCOUNTER — HOSPITAL ENCOUNTER (EMERGENCY)
Dept: HOSPITAL 88 - FSED | Age: 56
Discharge: HOME | End: 2021-12-14
Payer: COMMERCIAL

## 2021-12-14 VITALS — WEIGHT: 184 LBS | HEIGHT: 60 IN | BODY MASS INDEX: 36.12 KG/M2

## 2021-12-14 DIAGNOSIS — Z93.6: ICD-10-CM

## 2021-12-14 DIAGNOSIS — I10: ICD-10-CM

## 2021-12-14 DIAGNOSIS — M54.16: ICD-10-CM

## 2021-12-14 DIAGNOSIS — Z85.41: ICD-10-CM

## 2021-12-14 DIAGNOSIS — R33.9: Primary | ICD-10-CM

## 2021-12-14 DIAGNOSIS — F41.9: ICD-10-CM

## 2021-12-14 LAB
ANION GAP SERPL CALC-SCNC: 16.9 MMOL/L (ref 8–16)
BASOPHILS # BLD AUTO: 0.1 10*3/UL (ref 0–0.1)
BASOPHILS NFR BLD AUTO: 0.6 % (ref 0–1)
BUN SERPL-MCNC: 15 MG/DL (ref 7–26)
BUN/CREAT SERPL: 16 (ref 6–25)
CALCIUM SERPL-MCNC: 9.1 MG/DL (ref 8.4–10.2)
CHLORIDE SERPL-SCNC: 105 MMOL/L (ref 98–107)
CO2 SERPL-SCNC: 21 MMOL/L (ref 22–29)
DEPRECATED NEUTROPHILS # BLD AUTO: 5.6 10*3/UL (ref 2.1–6.9)
EGFRCR SERPLBLD CKD-EPI 2021: 60 ML/MIN (ref 60–?)
EOSINOPHIL # BLD AUTO: 0.1 10*3/UL (ref 0–0.4)
EOSINOPHIL NFR BLD AUTO: 1.1 % (ref 0–6)
ERYTHROCYTE [DISTWIDTH] IN CORD BLOOD: 15 % (ref 11.7–14.4)
GLUCOSE SERPLBLD-MCNC: 174 MG/DL (ref 74–118)
HCT VFR BLD AUTO: 38.1 % (ref 34.2–44.1)
HGB BLD-MCNC: 11.3 G/DL (ref 12–16)
LYMPHOCYTES # BLD: 2.2 10*3/UL (ref 1–3.2)
LYMPHOCYTES NFR BLD AUTO: 25.7 % (ref 18–39.1)
MCH RBC QN AUTO: 26.1 PG (ref 28–32)
MCHC RBC AUTO-ENTMCNC: 29.7 G/DL (ref 31–35)
MCV RBC AUTO: 88 FL (ref 81–99)
MONOCYTES # BLD AUTO: 0.4 10*3/UL (ref 0.2–0.8)
MONOCYTES NFR BLD AUTO: 4.7 % (ref 4.4–11.3)
NEUTS SEG NFR BLD AUTO: 67.3 % (ref 38.7–80)
PLATELET # BLD AUTO: 264 X10E3/UL (ref 140–360)
POTASSIUM SERPL-SCNC: 3.9 MMOL/L (ref 3.5–5.1)
RBC # BLD AUTO: 4.33 X10E6/UL (ref 3.6–5.1)
SODIUM SERPL-SCNC: 139 MMOL/L (ref 136–145)

## 2021-12-14 PROCEDURE — 99283 EMERGENCY DEPT VISIT LOW MDM: CPT

## 2021-12-15 ENCOUNTER — HOSPITAL ENCOUNTER (OUTPATIENT)
Dept: HOSPITAL 88 - OR | Age: 56
Discharge: HOME | End: 2021-12-15
Attending: UROLOGY
Payer: COMMERCIAL

## 2021-12-15 VITALS — SYSTOLIC BLOOD PRESSURE: 121 MMHG | DIASTOLIC BLOOD PRESSURE: 71 MMHG

## 2021-12-15 DIAGNOSIS — Z88.8: ICD-10-CM

## 2021-12-15 DIAGNOSIS — Z46.6: ICD-10-CM

## 2021-12-15 DIAGNOSIS — E66.9: ICD-10-CM

## 2021-12-15 DIAGNOSIS — N39.0: ICD-10-CM

## 2021-12-15 DIAGNOSIS — Z92.3: ICD-10-CM

## 2021-12-15 DIAGNOSIS — N95.2: ICD-10-CM

## 2021-12-15 DIAGNOSIS — R07.9: ICD-10-CM

## 2021-12-15 DIAGNOSIS — N31.9: ICD-10-CM

## 2021-12-15 DIAGNOSIS — M79.604: ICD-10-CM

## 2021-12-15 DIAGNOSIS — R33.8: ICD-10-CM

## 2021-12-15 DIAGNOSIS — N13.30: ICD-10-CM

## 2021-12-15 DIAGNOSIS — F32.A: ICD-10-CM

## 2021-12-15 DIAGNOSIS — N13.1: Primary | ICD-10-CM

## 2021-12-15 DIAGNOSIS — Z85.41: ICD-10-CM

## 2021-12-15 DIAGNOSIS — Z86.16: ICD-10-CM

## 2021-12-15 DIAGNOSIS — Z01.812: ICD-10-CM

## 2021-12-15 DIAGNOSIS — Z93.6: ICD-10-CM

## 2021-12-15 DIAGNOSIS — Z20.822: ICD-10-CM

## 2021-12-15 DIAGNOSIS — Z88.0: ICD-10-CM

## 2021-12-15 DIAGNOSIS — Z92.21: ICD-10-CM

## 2021-12-15 DIAGNOSIS — Z01.810: ICD-10-CM

## 2021-12-15 PROCEDURE — 80048 BASIC METABOLIC PNL TOTAL CA: CPT

## 2021-12-15 PROCEDURE — 36415 COLL VENOUS BLD VENIPUNCTURE: CPT

## 2021-12-15 PROCEDURE — 85025 COMPLETE CBC W/AUTO DIFF WBC: CPT

## 2021-12-15 PROCEDURE — 93005 ELECTROCARDIOGRAM TRACING: CPT

## 2021-12-15 PROCEDURE — 74420 UROGRAPHY RTRGR +-KUB: CPT

## 2022-01-03 ENCOUNTER — HOSPITAL ENCOUNTER (EMERGENCY)
Dept: HOSPITAL 88 - ER | Age: 57
Discharge: HOME | End: 2022-01-03
Payer: COMMERCIAL

## 2022-01-03 VITALS — WEIGHT: 184 LBS | BODY MASS INDEX: 36.12 KG/M2 | HEIGHT: 60 IN

## 2022-01-03 DIAGNOSIS — M54.31: Primary | ICD-10-CM

## 2022-01-03 DIAGNOSIS — I10: ICD-10-CM

## 2022-01-03 DIAGNOSIS — N18.9: ICD-10-CM

## 2022-01-03 DIAGNOSIS — Z85.41: ICD-10-CM

## 2022-01-03 DIAGNOSIS — F41.9: ICD-10-CM

## 2022-01-03 PROCEDURE — 99283 EMERGENCY DEPT VISIT LOW MDM: CPT

## 2022-01-19 ENCOUNTER — HOSPITAL ENCOUNTER (EMERGENCY)
Dept: HOSPITAL 88 - ER | Age: 57
Discharge: HOME | End: 2022-01-19
Payer: COMMERCIAL

## 2022-01-19 VITALS — BODY MASS INDEX: 36.12 KG/M2 | WEIGHT: 184 LBS | HEIGHT: 60 IN

## 2022-01-19 DIAGNOSIS — K80.20: ICD-10-CM

## 2022-01-19 DIAGNOSIS — N13.30: ICD-10-CM

## 2022-01-19 DIAGNOSIS — Z43.6: Primary | ICD-10-CM

## 2022-01-19 DIAGNOSIS — Z85.41: ICD-10-CM

## 2022-01-19 DIAGNOSIS — N39.0: ICD-10-CM

## 2022-01-19 LAB
ALBUMIN SERPL-MCNC: 4 G/DL (ref 3.5–5)
ALBUMIN/GLOB SERPL: 0.8 {RATIO} (ref 0.8–2)
ALP SERPL-CCNC: 93 IU/L (ref 40–150)
ALT SERPL-CCNC: 20 IU/L (ref 0–55)
ANION GAP SERPL CALC-SCNC: 16.5 MMOL/L (ref 8–16)
BACTERIA URNS QL MICRO: (no result) /HPF
BASOPHILS # BLD AUTO: 0.1 10*3/UL (ref 0–0.1)
BASOPHILS NFR BLD AUTO: 0.4 % (ref 0–1)
BUN SERPL-MCNC: 14 MG/DL (ref 7–26)
BUN/CREAT SERPL: 15 (ref 6–25)
CALCIUM SERPL-MCNC: 10 MG/DL (ref 8.4–10.2)
CHLORIDE SERPL-SCNC: 106 MMOL/L (ref 98–107)
CLARITY UR: (no result)
CO2 SERPL-SCNC: 20 MMOL/L (ref 22–29)
COLOR UR: YELLOW
DEPRECATED APTT PLAS QN: 19.4 SECONDS (ref 23.8–35.5)
DEPRECATED INR PLAS: 0.82
DEPRECATED NEUTROPHILS # BLD AUTO: 11 10*3/UL (ref 2.1–6.9)
EGFRCR SERPLBLD CKD-EPI 2021: 61 ML/MIN (ref 60–?)
EOSINOPHIL # BLD AUTO: 0 10*3/UL (ref 0–0.4)
EOSINOPHIL NFR BLD AUTO: 0.2 % (ref 0–6)
EPI CELLS URNS QL MICRO: (no result) /LPF
ERYTHROCYTE [DISTWIDTH] IN CORD BLOOD: 15.3 % (ref 11.7–14.4)
GLOBULIN PLAS-MCNC: 5.3 G/DL (ref 2.3–3.5)
GLUCOSE SERPLBLD-MCNC: 154 MG/DL (ref 74–118)
HCT VFR BLD AUTO: 36.3 % (ref 34.2–44.1)
HGB BLD-MCNC: 10.9 G/DL (ref 12–16)
KETONES UR QL STRIP.AUTO: NEGATIVE
LEUKOCYTE ESTERASE UR QL STRIP.AUTO: (no result)
LYMPHOCYTES # BLD: 1.1 10*3/UL (ref 1–3.2)
LYMPHOCYTES NFR BLD AUTO: 8.3 % (ref 18–39.1)
MCH RBC QN AUTO: 26.3 PG (ref 28–32)
MCHC RBC AUTO-ENTMCNC: 30 G/DL (ref 31–35)
MCV RBC AUTO: 87.7 FL (ref 81–99)
MONOCYTES # BLD AUTO: 0.5 10*3/UL (ref 0.2–0.8)
MONOCYTES NFR BLD AUTO: 3.6 % (ref 4.4–11.3)
NEUTS SEG NFR BLD AUTO: 87.1 % (ref 38.7–80)
NITRITE UR QL STRIP.AUTO: POSITIVE
PLATELET # BLD AUTO: 308 X10E3/UL (ref 140–360)
POTASSIUM SERPL-SCNC: 4.5 MMOL/L (ref 3.5–5.1)
PROT UR QL STRIP.AUTO: >=300
PROTHROMBIN TIME: 11.9 SECONDS (ref 11.9–14.5)
RBC # BLD AUTO: 4.14 X10E6/UL (ref 3.6–5.1)
SODIUM SERPL-SCNC: 138 MMOL/L (ref 136–145)
SP GR UR STRIP: 1.02 (ref 1.01–1.02)
UROBILINOGEN UR STRIP-MCNC: 0.2 MG/DL (ref 0.2–1)
WBC #/AREA URNS HPF: >50 /HPF (ref 0–5)

## 2022-01-19 PROCEDURE — 85610 PROTHROMBIN TIME: CPT

## 2022-01-19 PROCEDURE — 99284 EMERGENCY DEPT VISIT MOD MDM: CPT

## 2022-01-19 PROCEDURE — 86900 BLOOD TYPING SEROLOGIC ABO: CPT

## 2022-01-19 PROCEDURE — 36415 COLL VENOUS BLD VENIPUNCTURE: CPT

## 2022-01-19 PROCEDURE — 85025 COMPLETE CBC W/AUTO DIFF WBC: CPT

## 2022-01-19 PROCEDURE — 87086 URINE CULTURE/COLONY COUNT: CPT

## 2022-01-19 PROCEDURE — 74176 CT ABD & PELVIS W/O CONTRAST: CPT

## 2022-01-19 PROCEDURE — 74470 X-RAY EXAM OF KIDNEY LESION: CPT

## 2022-01-19 PROCEDURE — 85730 THROMBOPLASTIN TIME PARTIAL: CPT

## 2022-01-19 PROCEDURE — 50435 EXCHANGE NEPHROSTOMY CATH: CPT

## 2022-01-19 PROCEDURE — 81001 URINALYSIS AUTO W/SCOPE: CPT

## 2022-01-19 PROCEDURE — 80053 COMPREHEN METABOLIC PANEL: CPT

## 2022-01-19 PROCEDURE — 86850 RBC ANTIBODY SCREEN: CPT

## 2022-02-15 ENCOUNTER — HOSPITAL ENCOUNTER (EMERGENCY)
Dept: HOSPITAL 88 - ER | Age: 57
Discharge: HOME | End: 2022-02-15
Payer: COMMERCIAL

## 2022-02-15 VITALS — BODY MASS INDEX: 36.12 KG/M2 | WEIGHT: 184 LBS | HEIGHT: 60 IN

## 2022-02-15 DIAGNOSIS — I10: ICD-10-CM

## 2022-02-15 DIAGNOSIS — Z85.41: ICD-10-CM

## 2022-02-15 DIAGNOSIS — F41.9: ICD-10-CM

## 2022-02-15 DIAGNOSIS — N18.9: ICD-10-CM

## 2022-02-15 DIAGNOSIS — T83.018A: Primary | ICD-10-CM

## 2022-02-15 LAB
BACTERIA URNS QL MICRO: (no result) /HPF
CLARITY UR: (no result)
COLOR UR: YELLOW
DEPRECATED RBC URNS MANUAL-ACNC: (no result) /HPF (ref 0–5)
EPI CELLS URNS QL MICRO: (no result) /LPF
KETONES UR QL STRIP.AUTO: NEGATIVE
LEUKOCYTE ESTERASE UR QL STRIP.AUTO: (no result)
NITRITE UR QL STRIP.AUTO: POSITIVE
PROT UR QL STRIP.AUTO: (no result)
SP GR UR STRIP: 1.02 (ref 1.01–1.02)
TRI-PHOS CRY URNS QL MICRO: (no result)
UROBILINOGEN UR STRIP-MCNC: 0.2 MG/DL (ref 0.2–1)
WBC #/AREA URNS HPF: (no result) /HPF (ref 0–5)

## 2022-02-15 PROCEDURE — 74176 CT ABD & PELVIS W/O CONTRAST: CPT

## 2022-02-15 PROCEDURE — 81001 URINALYSIS AUTO W/SCOPE: CPT

## 2022-02-15 PROCEDURE — 99283 EMERGENCY DEPT VISIT LOW MDM: CPT

## 2022-03-09 ENCOUNTER — HOSPITAL ENCOUNTER (EMERGENCY)
Dept: HOSPITAL 88 - ER | Age: 57
Discharge: HOME | End: 2022-03-09
Payer: COMMERCIAL

## 2022-03-09 VITALS — HEIGHT: 60 IN | BODY MASS INDEX: 36.12 KG/M2 | WEIGHT: 184 LBS

## 2022-03-09 DIAGNOSIS — I12.9: ICD-10-CM

## 2022-03-09 DIAGNOSIS — M54.50: ICD-10-CM

## 2022-03-09 DIAGNOSIS — F41.9: ICD-10-CM

## 2022-03-09 DIAGNOSIS — N18.9: ICD-10-CM

## 2022-03-09 DIAGNOSIS — Z85.41: ICD-10-CM

## 2022-03-09 DIAGNOSIS — Z43.6: Primary | ICD-10-CM

## 2022-03-09 LAB
ANION GAP SERPL CALC-SCNC: 16.7 MMOL/L (ref 8–16)
BASOPHILS # BLD AUTO: 0.1 10*3/UL (ref 0–0.1)
BASOPHILS NFR BLD AUTO: 0.6 % (ref 0–1)
BUN SERPL-MCNC: 15 MG/DL (ref 7–26)
BUN/CREAT SERPL: 14 (ref 6–25)
CALCIUM SERPL-MCNC: 9.7 MG/DL (ref 8.4–10.2)
CHLORIDE SERPL-SCNC: 105 MMOL/L (ref 98–107)
CO2 SERPL-SCNC: 23 MMOL/L (ref 22–29)
DEPRECATED NEUTROPHILS # BLD AUTO: 6.3 10*3/UL (ref 2.1–6.9)
EGFRCR SERPLBLD CKD-EPI 2021: 52 ML/MIN (ref 60–?)
EOSINOPHIL # BLD AUTO: 0.1 10*3/UL (ref 0–0.4)
EOSINOPHIL NFR BLD AUTO: 0.6 % (ref 0–6)
ERYTHROCYTE [DISTWIDTH] IN CORD BLOOD: 14.4 % (ref 11.7–14.4)
GLUCOSE SERPLBLD-MCNC: 178 MG/DL (ref 74–118)
HCT VFR BLD AUTO: 37 % (ref 34.2–44.1)
HGB BLD-MCNC: 11.3 G/DL (ref 12–16)
LYMPHOCYTES # BLD: 1.5 10*3/UL (ref 1–3.2)
LYMPHOCYTES NFR BLD AUTO: 17.8 % (ref 18–39.1)
MCH RBC QN AUTO: 26.7 PG (ref 28–32)
MCHC RBC AUTO-ENTMCNC: 30.5 G/DL (ref 31–35)
MCV RBC AUTO: 87.3 FL (ref 81–99)
MONOCYTES # BLD AUTO: 0.4 10*3/UL (ref 0.2–0.8)
MONOCYTES NFR BLD AUTO: 4.3 % (ref 4.4–11.3)
NEUTS SEG NFR BLD AUTO: 76.5 % (ref 38.7–80)
PLATELET # BLD AUTO: 265 X10E3/UL (ref 140–360)
POTASSIUM SERPL-SCNC: 3.7 MMOL/L (ref 3.5–5.1)
RBC # BLD AUTO: 4.24 X10E6/UL (ref 3.6–5.1)
SODIUM SERPL-SCNC: 141 MMOL/L (ref 136–145)

## 2022-03-09 PROCEDURE — 80048 BASIC METABOLIC PNL TOTAL CA: CPT

## 2022-03-09 PROCEDURE — 50435 EXCHANGE NEPHROSTOMY CATH: CPT

## 2022-03-09 PROCEDURE — 99283 EMERGENCY DEPT VISIT LOW MDM: CPT

## 2022-03-09 PROCEDURE — 36415 COLL VENOUS BLD VENIPUNCTURE: CPT

## 2022-03-09 PROCEDURE — 85025 COMPLETE CBC W/AUTO DIFF WBC: CPT

## 2022-03-31 LAB
ANION GAP SERPL CALC-SCNC: 13.2 MMOL/L (ref 8–16)
BASOPHILS # BLD AUTO: 0.1 10*3/UL (ref 0–0.1)
BASOPHILS NFR BLD AUTO: 0.7 % (ref 0–1)
BUN SERPL-MCNC: 12 MG/DL (ref 7–26)
BUN/CREAT SERPL: 13 (ref 6–25)
CALCIUM SERPL-MCNC: 9 MG/DL (ref 8.4–10.2)
CHLORIDE SERPL-SCNC: 110 MMOL/L (ref 98–107)
CO2 SERPL-SCNC: 23 MMOL/L (ref 22–29)
DEPRECATED NEUTROPHILS # BLD AUTO: 4.2 10*3/UL (ref 2.1–6.9)
EGFRCR SERPLBLD CKD-EPI 2021: 61 ML/MIN (ref 60–?)
EOSINOPHIL # BLD AUTO: 0.1 10*3/UL (ref 0–0.4)
EOSINOPHIL NFR BLD AUTO: 2.1 % (ref 0–6)
ERYTHROCYTE [DISTWIDTH] IN CORD BLOOD: 14.5 % (ref 11.7–14.4)
GLUCOSE SERPLBLD-MCNC: 182 MG/DL (ref 74–118)
HCT VFR BLD AUTO: 37.8 % (ref 34.2–44.1)
HGB BLD-MCNC: 11.7 G/DL (ref 12–16)
LYMPHOCYTES # BLD: 2.1 10*3/UL (ref 1–3.2)
LYMPHOCYTES NFR BLD AUTO: 31.2 % (ref 18–39.1)
MCH RBC QN AUTO: 26.8 PG (ref 28–32)
MCHC RBC AUTO-ENTMCNC: 31 G/DL (ref 31–35)
MCV RBC AUTO: 86.7 FL (ref 81–99)
MONOCYTES # BLD AUTO: 0.3 10*3/UL (ref 0.2–0.8)
MONOCYTES NFR BLD AUTO: 4.6 % (ref 4.4–11.3)
NEUTS SEG NFR BLD AUTO: 61.3 % (ref 38.7–80)
PLATELET # BLD AUTO: 271 X10E3/UL (ref 140–360)
POTASSIUM SERPL-SCNC: 3.2 MMOL/L (ref 3.5–5.1)
RBC # BLD AUTO: 4.36 X10E6/UL (ref 3.6–5.1)
SODIUM SERPL-SCNC: 143 MMOL/L (ref 136–145)

## 2022-04-01 ENCOUNTER — HOSPITAL ENCOUNTER (OUTPATIENT)
Dept: HOSPITAL 88 - OR | Age: 57
Discharge: HOME | End: 2022-04-01
Attending: UROLOGY
Payer: COMMERCIAL

## 2022-04-01 VITALS — SYSTOLIC BLOOD PRESSURE: 161 MMHG | DIASTOLIC BLOOD PRESSURE: 83 MMHG

## 2022-04-01 DIAGNOSIS — N13.1: Primary | ICD-10-CM

## 2022-04-01 DIAGNOSIS — R33.9: ICD-10-CM

## 2022-04-01 DIAGNOSIS — E66.9: ICD-10-CM

## 2022-04-01 DIAGNOSIS — Z20.822: ICD-10-CM

## 2022-04-01 DIAGNOSIS — Z43.5: ICD-10-CM

## 2022-04-01 DIAGNOSIS — N39.41: ICD-10-CM

## 2022-04-01 DIAGNOSIS — Z01.812: ICD-10-CM

## 2022-04-01 DIAGNOSIS — R35.1: ICD-10-CM

## 2022-04-01 DIAGNOSIS — N31.9: ICD-10-CM

## 2022-04-01 DIAGNOSIS — N36.8: ICD-10-CM

## 2022-04-01 DIAGNOSIS — Z46.6: ICD-10-CM

## 2022-04-01 DIAGNOSIS — Z92.21: ICD-10-CM

## 2022-04-01 DIAGNOSIS — N39.0: ICD-10-CM

## 2022-04-01 DIAGNOSIS — N95.2: ICD-10-CM

## 2022-04-01 DIAGNOSIS — I10: ICD-10-CM

## 2022-04-01 DIAGNOSIS — N81.10: ICD-10-CM

## 2022-04-01 DIAGNOSIS — Z85.41: ICD-10-CM

## 2022-04-01 DIAGNOSIS — Z01.810: ICD-10-CM

## 2022-04-01 DIAGNOSIS — Z88.0: ICD-10-CM

## 2022-04-01 PROCEDURE — 52341 CYSTO W/URETER STRICTURE TX: CPT

## 2022-04-01 PROCEDURE — 51705 CHANGE OF BLADDER TUBE: CPT

## 2022-04-01 PROCEDURE — 80048 BASIC METABOLIC PNL TOTAL CA: CPT

## 2022-04-01 PROCEDURE — 36415 COLL VENOUS BLD VENIPUNCTURE: CPT

## 2022-04-01 PROCEDURE — 87086 URINE CULTURE/COLONY COUNT: CPT

## 2022-04-01 PROCEDURE — 52332 CYSTOSCOPY AND TREATMENT: CPT

## 2022-04-01 PROCEDURE — 74420 UROGRAPHY RTRGR +-KUB: CPT

## 2022-04-01 PROCEDURE — 85025 COMPLETE CBC W/AUTO DIFF WBC: CPT

## 2022-04-01 PROCEDURE — 93005 ELECTROCARDIOGRAM TRACING: CPT

## 2022-05-20 NOTE — NUR
ADMISSION NOTE:

Received patient AAOx 4. Being admitted to 206 for a "complicated UTI" per admitting 
diagnosis. patient voices no concerns. Nephrostomy tubes to left and right flank intact and 
draining clear yellow normal smelling urine. Drainage bad attached to Left Nephrostomy 
tubing.Right nephrostomy tubing remains with the pephrostomy bag drain.  V/S taken, blood 
pressure recorded a bit elevated but patient said it will come down "i am a bit anxious" . 
will recheck BP in 1 hr at 0000 hours V/S rounding. Admission assessment initiated. Glucose 
was 142mg/dl on admission. Patient has a Hx Of DM 2. Will initiate Accuchecks AcHS per 
nursing interventions. Dr. Rachel has been notified of patient per ER nurse giving report. 
IV Fluids started.Received IV antibiotic already per ER Nurse.
[facility] BREANN WHITE [phone] CALLED AND OFFERED SERVICES FOR DISCHARGE 
ASSISTANCE, STATES WILL EMAIL AND FAX [facility].
I spoke with Dr. Elias as requested by Dr. Rachel for dc order and he is not ready for the pt 
to go home as she needs further antibiotics and he will maybe send her tomorrow.
PCALLED PHYSICIAN FOR PAIN MED CHANGE. TYLENOL #3 ORDERED AND 2 TABLETS GIVEN FOR PAIN.PT 
VERBALIZE FEELING DEPRESSED AND FEARFUL ABOUT THE WORLD, CARONA VIRUS AND HER FAMILY. SHE 
REFUSED WANTING TO TALK TO .
PSYCH ASSESSMENT:

Patient appeared teary and sad on admission. When i sat to talk to the patient she mentioned 
that she has been "depressed" for quite a while and she took antidepressants and she has 
been out for a while so she has not taken them. I comforted patient and asked patient if she 
has ever had any thoughts of hurting herself. She shook her head, also She did not say that 
she has ever thought of it but she did mention thinking several times to God to "take her 
away . Asked about ever trying to take her life she stated "NO". suicide intervention 
documented. At this time patient will closely be observed . She shows no current thoughts or 
attempts of suicide. She does need her antidepressants and antianxiety continued but she 
does not know the name of them and will have her son call to give the medication names . As 
for now, general med class listed under Home med Rec. Until then, Day nurse will be notified 
to keep a close watch on patient. MD will be notified by the day nurse routinely to possibly 
review,eval and treat her reported symptoms.
PT C/O OF FEELING ANXIOUS. BUSPAR GIVEN FOR ANXIETY PRN.WILL MONITOR FOR DECREASE IN 
ANXIETY. PT C/O IV IN LT HAND PAINFUL. IV D/C WITH CATHETER INTACT. PRESSURE DRESSING TO 
SITE. IV STARTED IN RT UPPER ARM 20 G. SITE HEALTHY AND FLUSHES EASILY.
RECEIVED BEDSIDE SHIFT  REPORT FROM DAY RN. PT IS ALERT AND ORIENTED X3. RESPIRATIONS ARE 
EVEN AND UNLABORED.  RT NEPHROSTOMY TUBE DRAINING CLEAR YELLOW URINE- TO SMALL BAG. NOLASCO TO 
GRAVITY. URINE CLOUDY WITH TISSUE IN BAG. 20 G SL IN LEFT HAND. DENIES PAIN. PT WATCHING TV. 
CALL LIGHT WTTHIN REAC. BED IN LOW POSITION. PT WANTS PAIN MEDS CHANGED. TO TYLENOL # 3. 
WILL CALL FOR NEW ORDERS.
Report to the oncoming nurse.
The pt's iv was discontinued and leg bag was supplied in preparation of the pt. discharging 
home. She reports that she doesn't feel that she is ready to go home. The pt. was encouraged 
to refrain from laying around and get out of bed frequently and to eat balanced meals. She 
was provided  discharge instructions and prescriptions.
The pt. is awake and anticipating discharge home. She denies pain or discomfort at this 
time.
The pt. is resting quietly post earlier pain med.
URETERAL STENTS:

Bilateral Ureteral stents 7FR Size each ; placed 4/30/2020 per scanned report during a prior 
visit 

-------------------------------------------------------------------------------

Addendum: 05/26/20 at 0335 by Dennise Mckenzie RN

-------------------------------------------------------------------------------

Amended: Links added.
pt decline paez leg bag .
to private car for self transport home. The pt's condition is stable.
walking rounds complete, pt stable at this time, report given to oncoming nurse.
Yes - the patient is able to be screened

## 2022-06-28 ENCOUNTER — HOSPITAL ENCOUNTER (OUTPATIENT)
Dept: HOSPITAL 88 - DX | Age: 57
Discharge: HOME | End: 2022-06-28
Attending: UROLOGY
Payer: COMMERCIAL

## 2022-06-28 DIAGNOSIS — T19.1XXA: ICD-10-CM

## 2022-06-28 DIAGNOSIS — N13.30: Primary | ICD-10-CM
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
## 2022-06-28 DIAGNOSIS — Z01.812: ICD-10-CM

## 2022-06-28 DIAGNOSIS — Z20.822: ICD-10-CM

## 2022-06-28 PROCEDURE — 50435 EXCHANGE NEPHROSTOMY CATH: CPT

## 2022-06-28 PROCEDURE — 0223U NFCT DS 22 TRGT SARS-COV-2: CPT

## 2022-06-28 PROCEDURE — 36415 COLL VENOUS BLD VENIPUNCTURE: CPT

## 2022-07-01 ENCOUNTER — HOSPITAL ENCOUNTER (EMERGENCY)
Dept: HOSPITAL 88 - ER | Age: 57
Discharge: HOME | End: 2022-07-01
Payer: COMMERCIAL

## 2022-07-01 VITALS — BODY MASS INDEX: 36.12 KG/M2 | HEIGHT: 60 IN | WEIGHT: 184 LBS

## 2022-07-01 DIAGNOSIS — F41.9: ICD-10-CM

## 2022-07-01 DIAGNOSIS — Z46.6: Primary | ICD-10-CM

## 2022-07-01 DIAGNOSIS — N18.9: ICD-10-CM

## 2022-07-01 DIAGNOSIS — I12.9: ICD-10-CM

## 2022-07-01 DIAGNOSIS — Z85.41: ICD-10-CM

## 2022-07-01 PROCEDURE — 99283 EMERGENCY DEPT VISIT LOW MDM: CPT

## 2022-07-01 PROCEDURE — 87086 URINE CULTURE/COLONY COUNT: CPT

## 2022-07-18 ENCOUNTER — HOSPITAL ENCOUNTER (EMERGENCY)
Dept: HOSPITAL 88 - ER | Age: 57
Discharge: HOME | End: 2022-07-18
Payer: COMMERCIAL

## 2022-07-18 VITALS — WEIGHT: 184 LBS | HEIGHT: 60 IN | BODY MASS INDEX: 36.12 KG/M2

## 2022-07-18 DIAGNOSIS — R53.81: Primary | ICD-10-CM

## 2022-07-18 DIAGNOSIS — I12.9: ICD-10-CM

## 2022-07-18 DIAGNOSIS — F32.A: ICD-10-CM

## 2022-07-18 DIAGNOSIS — F41.9: ICD-10-CM

## 2022-07-18 DIAGNOSIS — N30.90: ICD-10-CM

## 2022-07-18 DIAGNOSIS — Z85.41: ICD-10-CM

## 2022-07-18 DIAGNOSIS — N18.9: ICD-10-CM

## 2022-07-18 LAB
ALBUMIN SERPL-MCNC: 3.5 G/DL (ref 3.5–5)
ALBUMIN/GLOB SERPL: 0.7 {RATIO} (ref 0.8–2)
ALP SERPL-CCNC: 91 IU/L (ref 40–150)
ALT SERPL-CCNC: 16 IU/L (ref 0–55)
ANION GAP SERPL CALC-SCNC: 16 MMOL/L (ref 8–16)
BACTERIA URNS QL MICRO: (no result) /HPF
BASOPHILS # BLD AUTO: 0 10*3/UL (ref 0–0.1)
BASOPHILS NFR BLD AUTO: 0.5 % (ref 0–1)
BUN SERPL-MCNC: 16 MG/DL (ref 7–26)
BUN/CREAT SERPL: 18 (ref 6–25)
CALCIUM SERPL-MCNC: 8.8 MG/DL (ref 8.4–10.2)
CHLORIDE SERPL-SCNC: 104 MMOL/L (ref 98–107)
CLARITY UR: (no result)
CO2 SERPL-SCNC: 23 MMOL/L (ref 22–29)
COLOR UR: (no result)
DEPRECATED NEUTROPHILS # BLD AUTO: 4.2 10*3/UL (ref 2.1–6.9)
DEPRECATED RBC URNS MANUAL-ACNC: >50 /HPF (ref 0–5)
EOSINOPHIL # BLD AUTO: 0.1 10*3/UL (ref 0–0.4)
EOSINOPHIL NFR BLD AUTO: 1.5 % (ref 0–6)
EPI CELLS URNS QL MICRO: (no result) /LPF
ERYTHROCYTE [DISTWIDTH] IN CORD BLOOD: 14.6 % (ref 11.7–14.4)
GLOBULIN PLAS-MCNC: 5.3 G/DL (ref 2.3–3.5)
GLUCOSE SERPLBLD-MCNC: 191 MG/DL (ref 74–118)
HCT VFR BLD AUTO: 37.2 % (ref 34.2–44.1)
HGB BLD-MCNC: 11.9 G/DL (ref 12–16)
KETONES UR QL STRIP.AUTO: NEGATIVE
LEUKOCYTE ESTERASE UR QL STRIP.AUTO: (no result)
LYMPHOCYTES # BLD: 1.9 10*3/UL (ref 1–3.2)
LYMPHOCYTES NFR BLD AUTO: 28.6 % (ref 18–39.1)
MCH RBC QN AUTO: 27.1 PG (ref 28–32)
MCHC RBC AUTO-ENTMCNC: 32 G/DL (ref 31–35)
MCV RBC AUTO: 84.7 FL (ref 81–99)
MONOCYTES # BLD AUTO: 0.3 10*3/UL (ref 0.2–0.8)
MONOCYTES NFR BLD AUTO: 4.1 % (ref 4.4–11.3)
NEUTS SEG NFR BLD AUTO: 65.1 % (ref 38.7–80)
NITRITE UR QL STRIP.AUTO: NEGATIVE
PLATELET # BLD AUTO: 273 X10E3/UL (ref 140–360)
POTASSIUM SERPL-SCNC: 4 MMOL/L (ref 3.5–5.1)
PROT UR QL STRIP.AUTO: >=300
RBC # BLD AUTO: 4.39 X10E6/UL (ref 3.6–5.1)
SODIUM SERPL-SCNC: 139 MMOL/L (ref 136–145)
SP GR UR STRIP: >=1.03 (ref 1.01–1.02)
UROBILINOGEN UR STRIP-MCNC: 0.2 MG/DL (ref 0.2–1)

## 2022-07-18 PROCEDURE — 36415 COLL VENOUS BLD VENIPUNCTURE: CPT

## 2022-07-18 PROCEDURE — 84484 ASSAY OF TROPONIN QUANT: CPT

## 2022-07-18 PROCEDURE — 87086 URINE CULTURE/COLONY COUNT: CPT

## 2022-07-18 PROCEDURE — 87186 SC STD MICRODIL/AGAR DIL: CPT

## 2022-07-18 PROCEDURE — 99283 EMERGENCY DEPT VISIT LOW MDM: CPT

## 2022-07-18 PROCEDURE — 85025 COMPLETE CBC W/AUTO DIFF WBC: CPT

## 2022-07-18 PROCEDURE — 81001 URINALYSIS AUTO W/SCOPE: CPT

## 2022-07-18 PROCEDURE — 80053 COMPREHEN METABOLIC PANEL: CPT

## 2022-08-05 ENCOUNTER — HOSPITAL ENCOUNTER (EMERGENCY)
Dept: HOSPITAL 88 - ER | Age: 57
Discharge: HOME | End: 2022-08-05
Payer: COMMERCIAL

## 2022-08-05 VITALS — BODY MASS INDEX: 36.12 KG/M2 | WEIGHT: 184 LBS | HEIGHT: 60 IN

## 2022-08-05 VITALS — SYSTOLIC BLOOD PRESSURE: 128 MMHG | DIASTOLIC BLOOD PRESSURE: 78 MMHG

## 2022-08-05 DIAGNOSIS — Z85.41: ICD-10-CM

## 2022-08-05 DIAGNOSIS — N18.9: ICD-10-CM

## 2022-08-05 DIAGNOSIS — F41.9: ICD-10-CM

## 2022-08-05 DIAGNOSIS — I10: ICD-10-CM

## 2022-08-05 DIAGNOSIS — R07.89: Primary | ICD-10-CM

## 2022-08-05 DIAGNOSIS — N39.0: ICD-10-CM

## 2022-08-05 LAB
ALBUMIN SERPL-MCNC: 3.2 G/DL (ref 3.5–5)
ALBUMIN/GLOB SERPL: 0.7 {RATIO} (ref 0.8–2)
ALP SERPL-CCNC: 90 IU/L (ref 40–150)
ALT SERPL-CCNC: 16 IU/L (ref 0–55)
ANION GAP SERPL CALC-SCNC: 12.9 MMOL/L (ref 8–16)
BASOPHILS # BLD AUTO: 0 10*3/UL (ref 0–0.1)
BASOPHILS NFR BLD AUTO: 0.5 % (ref 0–1)
BUN SERPL-MCNC: 16 MG/DL (ref 7–26)
BUN/CREAT SERPL: 18 (ref 6–25)
CALCIUM SERPL-MCNC: 8.3 MG/DL (ref 8.4–10.2)
CHLORIDE SERPL-SCNC: 109 MMOL/L (ref 98–107)
CO2 SERPL-SCNC: 25 MMOL/L (ref 22–29)
DEPRECATED NEUTROPHILS # BLD AUTO: 4.9 10*3/UL (ref 2.1–6.9)
EOSINOPHIL # BLD AUTO: 0.1 10*3/UL (ref 0–0.4)
EOSINOPHIL NFR BLD AUTO: 1 % (ref 0–6)
ERYTHROCYTE [DISTWIDTH] IN CORD BLOOD: 14.8 % (ref 11.7–14.4)
GLOBULIN PLAS-MCNC: 4.6 G/DL (ref 2.3–3.5)
GLUCOSE SERPLBLD-MCNC: 129 MG/DL (ref 74–118)
HCT VFR BLD AUTO: 36.8 % (ref 34.2–44.1)
HGB BLD-MCNC: 11.4 G/DL (ref 12–16)
LYMPHOCYTES # BLD: 2.8 10*3/UL (ref 1–3.2)
LYMPHOCYTES NFR BLD AUTO: 34.4 % (ref 18–39.1)
MCH RBC QN AUTO: 26.7 PG (ref 28–32)
MCHC RBC AUTO-ENTMCNC: 31 G/DL (ref 31–35)
MCV RBC AUTO: 86.2 FL (ref 81–99)
MONOCYTES # BLD AUTO: 0.4 10*3/UL (ref 0.2–0.8)
MONOCYTES NFR BLD AUTO: 4.3 % (ref 4.4–11.3)
NEUTS SEG NFR BLD AUTO: 59.4 % (ref 38.7–80)
PLATELET # BLD AUTO: 284 X10E3/UL (ref 140–360)
POTASSIUM SERPL-SCNC: 3.9 MMOL/L (ref 3.5–5.1)
RBC # BLD AUTO: 4.27 X10E6/UL (ref 3.6–5.1)
SODIUM SERPL-SCNC: 143 MMOL/L (ref 136–145)

## 2022-08-05 PROCEDURE — 71045 X-RAY EXAM CHEST 1 VIEW: CPT

## 2022-08-05 PROCEDURE — 93005 ELECTROCARDIOGRAM TRACING: CPT

## 2022-08-05 PROCEDURE — 84484 ASSAY OF TROPONIN QUANT: CPT

## 2022-08-05 PROCEDURE — 99284 EMERGENCY DEPT VISIT MOD MDM: CPT

## 2022-08-05 PROCEDURE — 36415 COLL VENOUS BLD VENIPUNCTURE: CPT

## 2022-08-05 PROCEDURE — 85025 COMPLETE CBC W/AUTO DIFF WBC: CPT

## 2022-08-05 PROCEDURE — 94760 N-INVAS EAR/PLS OXIMETRY 1: CPT

## 2022-08-05 PROCEDURE — 80053 COMPREHEN METABOLIC PANEL: CPT

## 2022-09-21 ENCOUNTER — HOSPITAL ENCOUNTER (OUTPATIENT)
Dept: HOSPITAL 88 - DX | Age: 57
End: 2022-09-21
Attending: UROLOGY
Payer: COMMERCIAL

## 2022-09-21 DIAGNOSIS — Z43.6: Primary | ICD-10-CM

## 2022-09-21 PROCEDURE — 50435 EXCHANGE NEPHROSTOMY CATH: CPT

## 2022-10-09 ENCOUNTER — HOSPITAL ENCOUNTER (EMERGENCY)
Dept: HOSPITAL 88 - ER | Age: 57
Discharge: HOME | End: 2022-10-09
Payer: COMMERCIAL

## 2022-10-09 VITALS — BODY MASS INDEX: 36.12 KG/M2 | WEIGHT: 184 LBS | HEIGHT: 60 IN

## 2022-10-09 DIAGNOSIS — Z85.41: ICD-10-CM

## 2022-10-09 DIAGNOSIS — R50.9: Primary | ICD-10-CM

## 2022-10-09 DIAGNOSIS — I10: ICD-10-CM

## 2022-10-09 DIAGNOSIS — N28.9: ICD-10-CM

## 2022-10-09 DIAGNOSIS — N39.0: ICD-10-CM

## 2022-10-09 DIAGNOSIS — F41.9: ICD-10-CM

## 2022-10-09 DIAGNOSIS — R94.31: ICD-10-CM

## 2022-10-09 LAB
ALBUMIN SERPL-MCNC: 3.5 G/DL (ref 3.5–5)
ALBUMIN/GLOB SERPL: 0.6 {RATIO} (ref 0.8–2)
ALP SERPL-CCNC: 129 IU/L (ref 40–150)
ALT SERPL-CCNC: 32 IU/L (ref 0–55)
ANION GAP SERPL CALC-SCNC: 19.5 MMOL/L (ref 8–16)
BACTERIA URNS QL MICRO: (no result) /HPF
BACTERIA URNS QL MICRO: (no result) /HPF
BASOPHILS # BLD AUTO: 0.1 10*3/UL (ref 0–0.1)
BASOPHILS NFR BLD AUTO: 0.8 % (ref 0–1)
BUN SERPL-MCNC: 17 MG/DL (ref 7–26)
BUN/CREAT SERPL: 17 (ref 6–25)
CALCIUM SERPL-MCNC: 9.8 MG/DL (ref 8.4–10.2)
CHLORIDE SERPL-SCNC: 104 MMOL/L (ref 98–107)
CK MB SERPL-MCNC: 3.1 NG/ML (ref 0–5)
CK SERPL-CCNC: 52 IU/L (ref 29–168)
CLARITY UR: (no result)
CO2 SERPL-SCNC: 21 MMOL/L (ref 22–29)
COLOR UR: (no result)
DEPRECATED NEUTROPHILS # BLD AUTO: 5.9 10*3/UL (ref 2.1–6.9)
DEPRECATED RBC URNS MANUAL-ACNC: (no result) /HPF (ref 0–5)
DEPRECATED RBC URNS MANUAL-ACNC: >50 /HPF (ref 0–5)
EOSINOPHIL # BLD AUTO: 0.1 10*3/UL (ref 0–0.4)
EOSINOPHIL NFR BLD AUTO: 1.2 % (ref 0–6)
EPI CELLS URNS QL MICRO: (no result) /LPF
EPI CELLS URNS QL MICRO: (no result) /LPF
ERYTHROCYTE [DISTWIDTH] IN CORD BLOOD: 14.3 % (ref 11.7–14.4)
FINE GRAN CASTS #/AREA URNS LPF: (no result) /[LPF]
FINE GRAN CASTS #/AREA URNS LPF: (no result) /[LPF]
GLOBULIN PLAS-MCNC: 5.8 G/DL (ref 2.3–3.5)
GLUCOSE SERPLBLD-MCNC: 156 MG/DL (ref 74–118)
HCT VFR BLD AUTO: 35.7 % (ref 34.2–44.1)
HGB BLD-MCNC: 11.6 G/DL (ref 12–16)
KETONES UR QL STRIP.AUTO: NEGATIVE
KETONES UR QL STRIP.AUTO: NEGATIVE
LEUKOCYTE ESTERASE UR QL STRIP.AUTO: (no result)
LYMPHOCYTES # BLD: 1.9 10*3/UL (ref 1–3.2)
LYMPHOCYTES NFR BLD AUTO: 21.6 % (ref 18–39.1)
MCH RBC QN AUTO: 27 PG (ref 28–32)
MCHC RBC AUTO-ENTMCNC: 32.5 G/DL (ref 31–35)
MCV RBC AUTO: 83.2 FL (ref 81–99)
MONOCYTES # BLD AUTO: 0.7 10*3/UL (ref 0.2–0.8)
MONOCYTES NFR BLD AUTO: 7.6 % (ref 4.4–11.3)
MUCOUS THREADS URNS QL MICRO: (no result)
MUCOUS THREADS URNS QL MICRO: (no result)
NEUTS SEG NFR BLD AUTO: 68.5 % (ref 38.7–80)
NITRITE UR QL STRIP.AUTO: POSITIVE
PLATELET # BLD AUTO: 306 X10E3/UL (ref 140–360)
POTASSIUM SERPL-SCNC: 3.5 MMOL/L (ref 3.5–5.1)
PROT UR QL STRIP.AUTO: (no result)
PROT UR QL STRIP.AUTO: >=300
RBC # BLD AUTO: 4.29 X10E6/UL (ref 3.6–5.1)
SODIUM SERPL-SCNC: 141 MMOL/L (ref 136–145)
SP GR UR STRIP: >=1.03 (ref 1.01–1.02)
UROBILINOGEN UR STRIP-MCNC: 0.2 MG/DL (ref 0.2–1)
UROBILINOGEN UR STRIP-MCNC: 1 MG/DL (ref 0.2–1)
WBC #/AREA URNS HPF: >50 /HPF (ref 0–5)
WBC #/AREA URNS HPF: >50 /HPF (ref 0–5)

## 2022-10-09 PROCEDURE — 82553 CREATINE MB FRACTION: CPT

## 2022-10-09 PROCEDURE — 84484 ASSAY OF TROPONIN QUANT: CPT

## 2022-10-09 PROCEDURE — 81001 URINALYSIS AUTO W/SCOPE: CPT

## 2022-10-09 PROCEDURE — 87186 SC STD MICRODIL/AGAR DIL: CPT

## 2022-10-09 PROCEDURE — 83605 ASSAY OF LACTIC ACID: CPT

## 2022-10-09 PROCEDURE — 93005 ELECTROCARDIOGRAM TRACING: CPT

## 2022-10-09 PROCEDURE — 85025 COMPLETE CBC W/AUTO DIFF WBC: CPT

## 2022-10-09 PROCEDURE — 82550 ASSAY OF CK (CPK): CPT

## 2022-10-09 PROCEDURE — 36415 COLL VENOUS BLD VENIPUNCTURE: CPT

## 2022-10-09 PROCEDURE — 80053 COMPREHEN METABOLIC PANEL: CPT

## 2022-10-09 PROCEDURE — 87086 URINE CULTURE/COLONY COUNT: CPT

## 2022-10-09 PROCEDURE — 99283 EMERGENCY DEPT VISIT LOW MDM: CPT

## 2022-10-09 PROCEDURE — 87040 BLOOD CULTURE FOR BACTERIA: CPT

## 2022-10-22 ENCOUNTER — HOSPITAL ENCOUNTER (EMERGENCY)
Dept: HOSPITAL 88 - ER | Age: 57
Discharge: HOME | End: 2022-10-22
Payer: COMMERCIAL

## 2022-10-22 VITALS — BODY MASS INDEX: 36.12 KG/M2 | HEIGHT: 60 IN | WEIGHT: 184 LBS

## 2022-10-22 DIAGNOSIS — F41.9: ICD-10-CM

## 2022-10-22 DIAGNOSIS — I10: ICD-10-CM

## 2022-10-22 DIAGNOSIS — N28.9: ICD-10-CM

## 2022-10-22 DIAGNOSIS — Z79.899: ICD-10-CM

## 2022-10-22 DIAGNOSIS — Z46.6: Primary | ICD-10-CM

## 2022-10-22 DIAGNOSIS — Z85.41: ICD-10-CM

## 2022-10-22 PROCEDURE — 99282 EMERGENCY DEPT VISIT SF MDM: CPT

## 2022-11-22 LAB
ANION GAP SERPL CALC-SCNC: 14.6 MMOL/L (ref 8–16)
BASOPHILS # BLD AUTO: 0 10*3/UL (ref 0–0.1)
BASOPHILS NFR BLD AUTO: 0.4 % (ref 0–1)
BUN SERPL-MCNC: 18 MG/DL (ref 7–26)
BUN/CREAT SERPL: 18 (ref 6–25)
CALCIUM SERPL-MCNC: 9.4 MG/DL (ref 8.4–10.2)
CHLORIDE SERPL-SCNC: 105 MMOL/L (ref 98–107)
CO2 SERPL-SCNC: 25 MMOL/L (ref 22–29)
DEPRECATED NEUTROPHILS # BLD AUTO: 6.3 10*3/UL (ref 2.1–6.9)
EOSINOPHIL # BLD AUTO: 0.1 10*3/UL (ref 0–0.4)
EOSINOPHIL NFR BLD AUTO: 1.2 % (ref 0–6)
ERYTHROCYTE [DISTWIDTH] IN CORD BLOOD: 14.4 % (ref 11.7–14.4)
GLUCOSE SERPLBLD-MCNC: 156 MG/DL (ref 74–118)
HCT VFR BLD AUTO: 37.3 % (ref 34.2–44.1)
HGB BLD-MCNC: 11 G/DL (ref 12–16)
LYMPHOCYTES # BLD: 2.4 10*3/UL (ref 1–3.2)
LYMPHOCYTES NFR BLD AUTO: 25.3 % (ref 18–39.1)
MCH RBC QN AUTO: 26.6 PG (ref 28–32)
MCHC RBC AUTO-ENTMCNC: 29.5 G/DL (ref 31–35)
MCV RBC AUTO: 90.1 FL (ref 81–99)
MONOCYTES # BLD AUTO: 0.5 10*3/UL (ref 0.2–0.8)
MONOCYTES NFR BLD AUTO: 5.2 % (ref 4.4–11.3)
NEUTS SEG NFR BLD AUTO: 67.7 % (ref 38.7–80)
PLATELET # BLD AUTO: 296 X10E3/UL (ref 140–360)
POTASSIUM SERPL-SCNC: 3.6 MMOL/L (ref 3.5–5.1)
RBC # BLD AUTO: 4.14 X10E6/UL (ref 3.6–5.1)
SODIUM SERPL-SCNC: 141 MMOL/L (ref 136–145)

## 2022-11-23 ENCOUNTER — HOSPITAL ENCOUNTER (OUTPATIENT)
Dept: HOSPITAL 88 - OR | Age: 57
Discharge: HOME | End: 2022-11-23
Attending: UROLOGY
Payer: COMMERCIAL

## 2022-11-23 VITALS — SYSTOLIC BLOOD PRESSURE: 121 MMHG | DIASTOLIC BLOOD PRESSURE: 82 MMHG

## 2022-11-23 DIAGNOSIS — I10: ICD-10-CM

## 2022-11-23 DIAGNOSIS — Z46.6: Primary | ICD-10-CM

## 2022-11-23 DIAGNOSIS — Z92.3: ICD-10-CM

## 2022-11-23 DIAGNOSIS — N13.1: ICD-10-CM

## 2022-11-23 DIAGNOSIS — Z01.810: ICD-10-CM

## 2022-11-23 DIAGNOSIS — Z92.21: ICD-10-CM

## 2022-11-23 DIAGNOSIS — N39.0: ICD-10-CM

## 2022-11-23 DIAGNOSIS — Z86.16: ICD-10-CM

## 2022-11-23 DIAGNOSIS — Z01.812: ICD-10-CM

## 2022-11-23 DIAGNOSIS — E66.9: ICD-10-CM

## 2022-11-23 DIAGNOSIS — Z79.899: ICD-10-CM

## 2022-11-23 DIAGNOSIS — N31.9: ICD-10-CM

## 2022-11-23 DIAGNOSIS — N20.1: ICD-10-CM

## 2022-11-23 DIAGNOSIS — N95.2: ICD-10-CM

## 2022-11-23 DIAGNOSIS — Z85.41: ICD-10-CM

## 2022-11-23 DIAGNOSIS — N13.5: ICD-10-CM

## 2022-11-23 DIAGNOSIS — N39.41: ICD-10-CM

## 2022-11-23 DIAGNOSIS — R35.1: ICD-10-CM

## 2022-11-23 DIAGNOSIS — Z93.50: ICD-10-CM

## 2022-11-23 PROCEDURE — 74420 UROGRAPHY RTRGR +-KUB: CPT

## 2022-11-23 PROCEDURE — 93005 ELECTROCARDIOGRAM TRACING: CPT

## 2022-11-23 PROCEDURE — 85025 COMPLETE CBC W/AUTO DIFF WBC: CPT

## 2022-11-23 PROCEDURE — 82948 REAGENT STRIP/BLOOD GLUCOSE: CPT

## 2022-11-23 PROCEDURE — 36415 COLL VENOUS BLD VENIPUNCTURE: CPT

## 2022-11-23 PROCEDURE — 80048 BASIC METABOLIC PNL TOTAL CA: CPT

## 2022-12-06 ENCOUNTER — HOSPITAL ENCOUNTER (OUTPATIENT)
Dept: HOSPITAL 88 - DX | Age: 57
End: 2022-12-06
Attending: UROLOGY
Payer: COMMERCIAL

## 2022-12-06 DIAGNOSIS — N13.30: Primary | ICD-10-CM

## 2022-12-06 PROCEDURE — 50435 EXCHANGE NEPHROSTOMY CATH: CPT

## 2023-02-02 ENCOUNTER — HOSPITAL ENCOUNTER (INPATIENT)
Dept: HOSPITAL 88 - ER | Age: 58
LOS: 7 days | Discharge: HOME | DRG: 660 | End: 2023-02-09
Attending: STUDENT IN AN ORGANIZED HEALTH CARE EDUCATION/TRAINING PROGRAM | Admitting: STUDENT IN AN ORGANIZED HEALTH CARE EDUCATION/TRAINING PROGRAM
Payer: COMMERCIAL

## 2023-02-02 VITALS — DIASTOLIC BLOOD PRESSURE: 70 MMHG | SYSTOLIC BLOOD PRESSURE: 124 MMHG

## 2023-02-02 VITALS — DIASTOLIC BLOOD PRESSURE: 83 MMHG | SYSTOLIC BLOOD PRESSURE: 159 MMHG

## 2023-02-02 VITALS — BODY MASS INDEX: 36.12 KG/M2 | HEIGHT: 60 IN | WEIGHT: 184 LBS

## 2023-02-02 VITALS — SYSTOLIC BLOOD PRESSURE: 166 MMHG | DIASTOLIC BLOOD PRESSURE: 96 MMHG

## 2023-02-02 DIAGNOSIS — N17.9: ICD-10-CM

## 2023-02-02 DIAGNOSIS — N31.9: ICD-10-CM

## 2023-02-02 DIAGNOSIS — N13.5: ICD-10-CM

## 2023-02-02 DIAGNOSIS — D64.9: ICD-10-CM

## 2023-02-02 DIAGNOSIS — N95.2: ICD-10-CM

## 2023-02-02 DIAGNOSIS — Z43.6: Primary | ICD-10-CM

## 2023-02-02 DIAGNOSIS — R33.8: ICD-10-CM

## 2023-02-02 DIAGNOSIS — G89.4: ICD-10-CM

## 2023-02-02 DIAGNOSIS — Z20.822: ICD-10-CM

## 2023-02-02 DIAGNOSIS — B95.5: ICD-10-CM

## 2023-02-02 DIAGNOSIS — Z85.41: ICD-10-CM

## 2023-02-02 DIAGNOSIS — F33.41: ICD-10-CM

## 2023-02-02 DIAGNOSIS — F41.9: ICD-10-CM

## 2023-02-02 DIAGNOSIS — N82.0: ICD-10-CM

## 2023-02-02 DIAGNOSIS — E66.09: ICD-10-CM

## 2023-02-02 DIAGNOSIS — B96.1: ICD-10-CM

## 2023-02-02 DIAGNOSIS — N12: ICD-10-CM

## 2023-02-02 DIAGNOSIS — Z16.12: ICD-10-CM

## 2023-02-02 LAB
ANION GAP SERPL CALC-SCNC: 14.6 MMOL/L (ref 8–16)
BASOPHILS # BLD AUTO: 0.1 10*3/UL (ref 0–0.1)
BASOPHILS NFR BLD AUTO: 0.7 % (ref 0–1)
BUN SERPL-MCNC: 15 MG/DL (ref 7–26)
BUN/CREAT SERPL: 17 (ref 6–25)
CALCIUM SERPL-MCNC: 9.4 MG/DL (ref 8.4–10.2)
CHLORIDE SERPL-SCNC: 106 MMOL/L (ref 98–107)
CO2 SERPL-SCNC: 24 MMOL/L (ref 22–29)
DEPRECATED INR PLAS: 0.98
DEPRECATED NEUTROPHILS # BLD AUTO: 4.8 10*3/UL (ref 2.1–6.9)
EOSINOPHIL # BLD AUTO: 0.1 10*3/UL (ref 0–0.4)
EOSINOPHIL NFR BLD AUTO: 1.6 % (ref 0–6)
ERYTHROCYTE [DISTWIDTH] IN CORD BLOOD: 13.5 % (ref 11.7–14.4)
GLUCOSE SERPLBLD-MCNC: 147 MG/DL (ref 74–118)
HCT VFR BLD AUTO: 38.3 % (ref 34.2–44.1)
HGB BLD-MCNC: 11.1 G/DL (ref 12–16)
LYMPHOCYTES # BLD: 2.2 10*3/UL (ref 1–3.2)
LYMPHOCYTES NFR BLD AUTO: 28.7 % (ref 18–39.1)
MCH RBC QN AUTO: 26.2 PG (ref 28–32)
MCHC RBC AUTO-ENTMCNC: 29 G/DL (ref 31–35)
MCV RBC AUTO: 90.5 FL (ref 81–99)
MONOCYTES # BLD AUTO: 0.4 10*3/UL (ref 0.2–0.8)
MONOCYTES NFR BLD AUTO: 5.3 % (ref 4.4–11.3)
NEUTS SEG NFR BLD AUTO: 63.4 % (ref 38.7–80)
PLATELET # BLD AUTO: 280 X10E3/UL (ref 140–360)
POTASSIUM SERPL-SCNC: 3.6 MMOL/L (ref 3.5–5.1)
PROTHROMBIN TIME: 13.2 SECONDS (ref 11.9–14.5)
RBC # BLD AUTO: 4.23 X10E6/UL (ref 3.6–5.1)
SODIUM SERPL-SCNC: 141 MMOL/L (ref 136–145)

## 2023-02-02 PROCEDURE — 36569 INSJ PICC 5 YR+ W/O IMAGING: CPT

## 2023-02-02 PROCEDURE — 71045 X-RAY EXAM CHEST 1 VIEW: CPT

## 2023-02-02 PROCEDURE — 36415 COLL VENOUS BLD VENIPUNCTURE: CPT

## 2023-02-02 PROCEDURE — 85025 COMPLETE CBC W/AUTO DIFF WBC: CPT

## 2023-02-02 PROCEDURE — 80048 BASIC METABOLIC PNL TOTAL CA: CPT

## 2023-02-02 PROCEDURE — 81001 URINALYSIS AUTO W/SCOPE: CPT

## 2023-02-02 PROCEDURE — 99284 EMERGENCY DEPT VISIT MOD MDM: CPT

## 2023-02-02 PROCEDURE — 84466 ASSAY OF TRANSFERRIN: CPT

## 2023-02-02 PROCEDURE — 83540 ASSAY OF IRON: CPT

## 2023-02-02 PROCEDURE — 50435 EXCHANGE NEPHROSTOMY CATH: CPT

## 2023-02-02 PROCEDURE — 82746 ASSAY OF FOLIC ACID SERUM: CPT

## 2023-02-02 PROCEDURE — 82728 ASSAY OF FERRITIN: CPT

## 2023-02-02 PROCEDURE — 94799 UNLISTED PULMONARY SVC/PX: CPT

## 2023-02-02 PROCEDURE — 82607 VITAMIN B-12: CPT

## 2023-02-02 PROCEDURE — 74470 X-RAY EXAM OF KIDNEY LESION: CPT

## 2023-02-02 PROCEDURE — 87186 SC STD MICRODIL/AGAR DIL: CPT

## 2023-02-02 PROCEDURE — 87086 URINE CULTURE/COLONY COUNT: CPT

## 2023-02-02 PROCEDURE — 85610 PROTHROMBIN TIME: CPT

## 2023-02-02 PROCEDURE — 80053 COMPREHEN METABOLIC PANEL: CPT

## 2023-02-02 PROCEDURE — 83735 ASSAY OF MAGNESIUM: CPT

## 2023-02-02 PROCEDURE — 74420 UROGRAPHY RTRGR +-KUB: CPT

## 2023-02-02 RX ADMIN — Medication SCH MG: at 09:00

## 2023-02-02 RX ADMIN — HYDROCODONE BITARTRATE AND ACETAMINOPHEN PRN EA: 5; 325 TABLET ORAL at 14:47

## 2023-02-02 RX ADMIN — HYDROMORPHONE HYDROCHLORIDE PRN MG: 1 INJECTION, SOLUTION INTRAMUSCULAR; INTRAVENOUS; SUBCUTANEOUS at 15:01

## 2023-02-02 RX ADMIN — Medication PRN MG: at 11:15

## 2023-02-02 RX ADMIN — HYDROMORPHONE HYDROCHLORIDE PRN MG: 1 INJECTION, SOLUTION INTRAMUSCULAR; INTRAVENOUS; SUBCUTANEOUS at 23:25

## 2023-02-02 RX ADMIN — SODIUM CHLORIDE PRN MG: 900 INJECTION INTRAVENOUS at 08:34

## 2023-02-02 RX ADMIN — HYDROCODONE BITARTRATE AND ACETAMINOPHEN PRN EA: 5; 325 TABLET ORAL at 16:34

## 2023-02-02 RX ADMIN — DOCUSATE SODIUM SCH MG: 100 TABLET, FILM COATED ORAL at 09:00

## 2023-02-02 RX ADMIN — SODIUM CHLORIDE PRN MG: 900 INJECTION INTRAVENOUS at 13:04

## 2023-02-02 RX ADMIN — Medication PRN MG: at 10:39

## 2023-02-02 RX ADMIN — HYDROMORPHONE HYDROCHLORIDE PRN MG: 1 INJECTION, SOLUTION INTRAMUSCULAR; INTRAVENOUS; SUBCUTANEOUS at 13:04

## 2023-02-02 RX ADMIN — HYDROMORPHONE HYDROCHLORIDE PRN MG: 1 INJECTION, SOLUTION INTRAMUSCULAR; INTRAVENOUS; SUBCUTANEOUS at 17:09

## 2023-02-02 RX ADMIN — SODIUM CHLORIDE PRN MG: 900 INJECTION INTRAVENOUS at 17:09

## 2023-02-03 VITALS — DIASTOLIC BLOOD PRESSURE: 63 MMHG | SYSTOLIC BLOOD PRESSURE: 113 MMHG

## 2023-02-03 VITALS — DIASTOLIC BLOOD PRESSURE: 95 MMHG | SYSTOLIC BLOOD PRESSURE: 140 MMHG

## 2023-02-03 VITALS — SYSTOLIC BLOOD PRESSURE: 131 MMHG | DIASTOLIC BLOOD PRESSURE: 78 MMHG

## 2023-02-03 VITALS — SYSTOLIC BLOOD PRESSURE: 128 MMHG | DIASTOLIC BLOOD PRESSURE: 71 MMHG

## 2023-02-03 VITALS — SYSTOLIC BLOOD PRESSURE: 154 MMHG | DIASTOLIC BLOOD PRESSURE: 90 MMHG

## 2023-02-03 VITALS — DIASTOLIC BLOOD PRESSURE: 69 MMHG | SYSTOLIC BLOOD PRESSURE: 125 MMHG

## 2023-02-03 LAB
ANION GAP SERPL CALC-SCNC: 13.8 MMOL/L (ref 8–16)
BASOPHILS # BLD AUTO: 0.1 10*3/UL (ref 0–0.1)
BASOPHILS NFR BLD AUTO: 0.5 % (ref 0–1)
BUN SERPL-MCNC: 22 MG/DL (ref 7–26)
BUN/CREAT SERPL: 16 (ref 6–25)
CALCIUM SERPL-MCNC: 9.1 MG/DL (ref 8.4–10.2)
CHLORIDE SERPL-SCNC: 105 MMOL/L (ref 98–107)
CO2 SERPL-SCNC: 24 MMOL/L (ref 22–29)
DEPRECATED INR PLAS: 1.01
DEPRECATED NEUTROPHILS # BLD AUTO: 10 10*3/UL (ref 2.1–6.9)
EOSINOPHIL # BLD AUTO: 0 10*3/UL (ref 0–0.4)
EOSINOPHIL NFR BLD AUTO: 0.2 % (ref 0–6)
ERYTHROCYTE [DISTWIDTH] IN CORD BLOOD: 14 % (ref 11.7–14.4)
FERRITIN SERPL-MCNC: 60.71 NG/ML (ref 4.63–204)
GLUCOSE SERPLBLD-MCNC: 182 MG/DL (ref 74–118)
HCT VFR BLD AUTO: 34.5 % (ref 34.2–44.1)
HGB BLD-MCNC: 10.7 G/DL (ref 12–16)
IRON SATN MFR SERPL: 9 % (ref 15–50)
IRON SERPL-MCNC: 28 UG/DL (ref 50–170)
LYMPHOCYTES # BLD: 1.3 10*3/UL (ref 1–3.2)
LYMPHOCYTES NFR BLD AUTO: 10.3 % (ref 18–39.1)
MCH RBC QN AUTO: 26.6 PG (ref 28–32)
MCHC RBC AUTO-ENTMCNC: 31 G/DL (ref 31–35)
MCV RBC AUTO: 85.8 FL (ref 81–99)
MONOCYTES # BLD AUTO: 0.9 10*3/UL (ref 0.2–0.8)
MONOCYTES NFR BLD AUTO: 7.4 % (ref 4.4–11.3)
NEUTS SEG NFR BLD AUTO: 81.1 % (ref 38.7–80)
PLATELET # BLD AUTO: 248 X10E3/UL (ref 140–360)
POTASSIUM SERPL-SCNC: 3.8 MMOL/L (ref 3.5–5.1)
PROTHROMBIN TIME: 13.5 SECONDS (ref 11.9–14.5)
RBC # BLD AUTO: 4.02 X10E6/UL (ref 3.6–5.1)
SODIUM SERPL-SCNC: 139 MMOL/L (ref 136–145)
TIBC SERPL-MCNC: 325 UG/DL (ref 261–478)
TRANSFERRIN SERPL-MCNC: 232 MG/DL (ref 180–382)

## 2023-02-03 RX ADMIN — HYDROCODONE BITARTRATE AND ACETAMINOPHEN PRN EA: 5; 325 TABLET ORAL at 14:51

## 2023-02-03 RX ADMIN — HYDROMORPHONE HYDROCHLORIDE PRN MG: 1 INJECTION, SOLUTION INTRAMUSCULAR; INTRAVENOUS; SUBCUTANEOUS at 20:04

## 2023-02-03 RX ADMIN — HYDROCODONE BITARTRATE AND ACETAMINOPHEN PRN EA: 5; 325 TABLET ORAL at 07:31

## 2023-02-03 RX ADMIN — DOCUSATE SODIUM SCH MG: 100 TABLET, FILM COATED ORAL at 09:00

## 2023-02-03 RX ADMIN — Medication SCH MG: at 09:00

## 2023-02-03 RX ADMIN — HYDROCODONE BITARTRATE AND ACETAMINOPHEN PRN EA: 5; 325 TABLET ORAL at 03:57

## 2023-02-03 RX ADMIN — SODIUM CHLORIDE PRN MG: 900 INJECTION INTRAVENOUS at 10:14

## 2023-02-03 RX ADMIN — HYDROMORPHONE HYDROCHLORIDE PRN MG: 1 INJECTION, SOLUTION INTRAMUSCULAR; INTRAVENOUS; SUBCUTANEOUS at 10:11

## 2023-02-04 VITALS — SYSTOLIC BLOOD PRESSURE: 99 MMHG | DIASTOLIC BLOOD PRESSURE: 64 MMHG

## 2023-02-04 VITALS — DIASTOLIC BLOOD PRESSURE: 67 MMHG | SYSTOLIC BLOOD PRESSURE: 105 MMHG

## 2023-02-04 VITALS — DIASTOLIC BLOOD PRESSURE: 66 MMHG | SYSTOLIC BLOOD PRESSURE: 128 MMHG

## 2023-02-04 VITALS — DIASTOLIC BLOOD PRESSURE: 67 MMHG | SYSTOLIC BLOOD PRESSURE: 109 MMHG

## 2023-02-04 VITALS — SYSTOLIC BLOOD PRESSURE: 117 MMHG | DIASTOLIC BLOOD PRESSURE: 72 MMHG

## 2023-02-04 VITALS — DIASTOLIC BLOOD PRESSURE: 70 MMHG | SYSTOLIC BLOOD PRESSURE: 119 MMHG

## 2023-02-04 VITALS — DIASTOLIC BLOOD PRESSURE: 73 MMHG | SYSTOLIC BLOOD PRESSURE: 124 MMHG

## 2023-02-04 LAB
ANION GAP SERPL CALC-SCNC: 15.5 MMOL/L (ref 8–16)
BASOPHILS # BLD AUTO: 0.1 10*3/UL (ref 0–0.1)
BASOPHILS NFR BLD AUTO: 0.5 % (ref 0–1)
BUN SERPL-MCNC: 18 MG/DL (ref 7–26)
BUN/CREAT SERPL: 17 (ref 6–25)
CALCIUM SERPL-MCNC: 9.2 MG/DL (ref 8.4–10.2)
CHLORIDE SERPL-SCNC: 104 MMOL/L (ref 98–107)
CO2 SERPL-SCNC: 23 MMOL/L (ref 22–29)
DEPRECATED NEUTROPHILS # BLD AUTO: 8 10*3/UL (ref 2.1–6.9)
EOSINOPHIL # BLD AUTO: 0 10*3/UL (ref 0–0.4)
EOSINOPHIL NFR BLD AUTO: 0.3 % (ref 0–6)
ERYTHROCYTE [DISTWIDTH] IN CORD BLOOD: 14.1 % (ref 11.7–14.4)
GLUCOSE SERPLBLD-MCNC: 152 MG/DL (ref 74–118)
HCT VFR BLD AUTO: 33.7 % (ref 34.2–44.1)
HGB BLD-MCNC: 10.4 G/DL (ref 12–16)
LYMPHOCYTES # BLD: 1.7 10*3/UL (ref 1–3.2)
LYMPHOCYTES NFR BLD AUTO: 16 % (ref 18–39.1)
MCH RBC QN AUTO: 26.1 PG (ref 28–32)
MCHC RBC AUTO-ENTMCNC: 30.9 G/DL (ref 31–35)
MCV RBC AUTO: 84.7 FL (ref 81–99)
MONOCYTES # BLD AUTO: 0.8 10*3/UL (ref 0.2–0.8)
MONOCYTES NFR BLD AUTO: 7.7 % (ref 4.4–11.3)
NEUTS SEG NFR BLD AUTO: 75.1 % (ref 38.7–80)
PLATELET # BLD AUTO: 250 X10E3/UL (ref 140–360)
POTASSIUM SERPL-SCNC: 3.5 MMOL/L (ref 3.5–5.1)
RBC # BLD AUTO: 3.98 X10E6/UL (ref 3.6–5.1)
SODIUM SERPL-SCNC: 139 MMOL/L (ref 136–145)

## 2023-02-04 PROCEDURE — 02HV33Z INSERTION OF INFUSION DEVICE INTO SUPERIOR VENA CAVA, PERCUTANEOUS APPROACH: ICD-10-PCS | Performed by: STUDENT IN AN ORGANIZED HEALTH CARE EDUCATION/TRAINING PROGRAM

## 2023-02-04 PROCEDURE — 3E0K8GC INTRODUCTION OF OTHER THERAPEUTIC SUBSTANCE INTO GENITOURINARY TRACT, VIA NATURAL OR ARTIFICIAL OPENING ENDOSCOPIC: ICD-10-PCS | Performed by: UROLOGY

## 2023-02-04 PROCEDURE — 0T9330Z DRAINAGE OF RIGHT KIDNEY PELVIS WITH DRAINAGE DEVICE, PERCUTANEOUS APPROACH: ICD-10-PCS | Performed by: INTERNAL MEDICINE

## 2023-02-04 PROCEDURE — 0TP98DZ REMOVAL OF INTRALUMINAL DEVICE FROM URETER, VIA NATURAL OR ARTIFICIAL OPENING ENDOSCOPIC: ICD-10-PCS | Performed by: UROLOGY

## 2023-02-04 PROCEDURE — 0T9B80Z DRAINAGE OF BLADDER WITH DRAINAGE DEVICE, VIA NATURAL OR ARTIFICIAL OPENING ENDOSCOPIC: ICD-10-PCS | Performed by: UROLOGY

## 2023-02-04 PROCEDURE — 0T778DZ DILATION OF LEFT URETER WITH INTRALUMINAL DEVICE, VIA NATURAL OR ARTIFICIAL OPENING ENDOSCOPIC: ICD-10-PCS | Performed by: UROLOGY

## 2023-02-04 RX ADMIN — HYDROMORPHONE HYDROCHLORIDE PRN MG: 1 INJECTION, SOLUTION INTRAMUSCULAR; INTRAVENOUS; SUBCUTANEOUS at 15:31

## 2023-02-04 RX ADMIN — HYDROMORPHONE HYDROCHLORIDE PRN MG: 1 INJECTION, SOLUTION INTRAMUSCULAR; INTRAVENOUS; SUBCUTANEOUS at 20:15

## 2023-02-04 RX ADMIN — SODIUM CHLORIDE PRN MG: 900 INJECTION INTRAVENOUS at 12:45

## 2023-02-04 RX ADMIN — DOCUSATE SODIUM SCH MG: 100 TABLET, FILM COATED ORAL at 09:42

## 2023-02-04 RX ADMIN — HYDROCODONE BITARTRATE AND ACETAMINOPHEN PRN EA: 5; 325 TABLET ORAL at 12:28

## 2023-02-04 RX ADMIN — Medication SCH MG: at 09:42

## 2023-02-04 RX ADMIN — HYDROMORPHONE HYDROCHLORIDE PRN MG: 1 INJECTION, SOLUTION INTRAMUSCULAR; INTRAVENOUS; SUBCUTANEOUS at 04:15

## 2023-02-05 VITALS — DIASTOLIC BLOOD PRESSURE: 85 MMHG | SYSTOLIC BLOOD PRESSURE: 119 MMHG

## 2023-02-05 VITALS — SYSTOLIC BLOOD PRESSURE: 114 MMHG | DIASTOLIC BLOOD PRESSURE: 75 MMHG

## 2023-02-05 VITALS — SYSTOLIC BLOOD PRESSURE: 122 MMHG | DIASTOLIC BLOOD PRESSURE: 78 MMHG

## 2023-02-05 VITALS — DIASTOLIC BLOOD PRESSURE: 60 MMHG | SYSTOLIC BLOOD PRESSURE: 97 MMHG

## 2023-02-05 VITALS — DIASTOLIC BLOOD PRESSURE: 77 MMHG | SYSTOLIC BLOOD PRESSURE: 107 MMHG

## 2023-02-05 VITALS — SYSTOLIC BLOOD PRESSURE: 119 MMHG | DIASTOLIC BLOOD PRESSURE: 85 MMHG

## 2023-02-05 VITALS — SYSTOLIC BLOOD PRESSURE: 126 MMHG | DIASTOLIC BLOOD PRESSURE: 72 MMHG

## 2023-02-05 LAB
ALBUMIN SERPL-MCNC: 3 G/DL (ref 3.5–5)
ALBUMIN/GLOB SERPL: 0.6 {RATIO} (ref 0.8–2)
ALP SERPL-CCNC: 107 IU/L (ref 40–150)
ALT SERPL-CCNC: 30 IU/L (ref 0–55)
ANION GAP SERPL CALC-SCNC: 12.3 MMOL/L (ref 8–16)
BASOPHILS # BLD AUTO: 0 10*3/UL (ref 0–0.1)
BASOPHILS NFR BLD AUTO: 0.4 % (ref 0–1)
BUN SERPL-MCNC: 15 MG/DL (ref 7–26)
BUN/CREAT SERPL: 16 (ref 6–25)
CALCIUM SERPL-MCNC: 9.1 MG/DL (ref 8.4–10.2)
CHLORIDE SERPL-SCNC: 104 MMOL/L (ref 98–107)
CO2 SERPL-SCNC: 24 MMOL/L (ref 22–29)
DEPRECATED NEUTROPHILS # BLD AUTO: 7.1 10*3/UL (ref 2.1–6.9)
EOSINOPHIL # BLD AUTO: 0.1 10*3/UL (ref 0–0.4)
EOSINOPHIL NFR BLD AUTO: 0.8 % (ref 0–6)
ERYTHROCYTE [DISTWIDTH] IN CORD BLOOD: 14.1 % (ref 11.7–14.4)
GLOBULIN PLAS-MCNC: 4.8 G/DL (ref 2.3–3.5)
GLUCOSE SERPLBLD-MCNC: 136 MG/DL (ref 74–118)
HCT VFR BLD AUTO: 33.5 % (ref 34.2–44.1)
HGB BLD-MCNC: 10.5 G/DL (ref 12–16)
LYMPHOCYTES # BLD: 2.3 10*3/UL (ref 1–3.2)
LYMPHOCYTES NFR BLD AUTO: 21.5 % (ref 18–39.1)
MAGNESIUM SERPL-MCNC: 1.7 MG/DL (ref 1.3–2.1)
MCH RBC QN AUTO: 26.5 PG (ref 28–32)
MCHC RBC AUTO-ENTMCNC: 31.3 G/DL (ref 31–35)
MCV RBC AUTO: 84.6 FL (ref 81–99)
MONOCYTES # BLD AUTO: 0.9 10*3/UL (ref 0.2–0.8)
MONOCYTES NFR BLD AUTO: 8.5 % (ref 4.4–11.3)
NEUTS SEG NFR BLD AUTO: 68 % (ref 38.7–80)
PLATELET # BLD AUTO: 236 X10E3/UL (ref 140–360)
POTASSIUM SERPL-SCNC: 3.3 MMOL/L (ref 3.5–5.1)
RBC # BLD AUTO: 3.96 X10E6/UL (ref 3.6–5.1)
SODIUM SERPL-SCNC: 137 MMOL/L (ref 136–145)

## 2023-02-05 RX ADMIN — HYDROCODONE BITARTRATE AND ACETAMINOPHEN PRN EA: 5; 325 TABLET ORAL at 13:49

## 2023-02-05 RX ADMIN — HYDROMORPHONE HYDROCHLORIDE PRN MG: 1 INJECTION, SOLUTION INTRAMUSCULAR; INTRAVENOUS; SUBCUTANEOUS at 01:17

## 2023-02-05 RX ADMIN — DOCUSATE SODIUM SCH MG: 100 TABLET, FILM COATED ORAL at 08:45

## 2023-02-05 RX ADMIN — HYDROCODONE BITARTRATE AND ACETAMINOPHEN PRN EA: 5; 325 TABLET ORAL at 21:25

## 2023-02-05 RX ADMIN — HYDROCODONE BITARTRATE AND ACETAMINOPHEN PRN EA: 5; 325 TABLET ORAL at 08:44

## 2023-02-05 RX ADMIN — Medication SCH MG: at 08:45

## 2023-02-06 VITALS — SYSTOLIC BLOOD PRESSURE: 116 MMHG | DIASTOLIC BLOOD PRESSURE: 71 MMHG

## 2023-02-06 VITALS — DIASTOLIC BLOOD PRESSURE: 66 MMHG | SYSTOLIC BLOOD PRESSURE: 121 MMHG

## 2023-02-06 VITALS — DIASTOLIC BLOOD PRESSURE: 72 MMHG | SYSTOLIC BLOOD PRESSURE: 129 MMHG

## 2023-02-06 VITALS — DIASTOLIC BLOOD PRESSURE: 93 MMHG | SYSTOLIC BLOOD PRESSURE: 150 MMHG

## 2023-02-06 VITALS — DIASTOLIC BLOOD PRESSURE: 90 MMHG | SYSTOLIC BLOOD PRESSURE: 153 MMHG

## 2023-02-06 VITALS — SYSTOLIC BLOOD PRESSURE: 135 MMHG | DIASTOLIC BLOOD PRESSURE: 88 MMHG

## 2023-02-06 LAB
ANION GAP SERPL CALC-SCNC: 13.7 MMOL/L (ref 8–16)
BUN SERPL-MCNC: 18 MG/DL (ref 7–26)
BUN/CREAT SERPL: 22 (ref 6–25)
CALCIUM SERPL-MCNC: 9.2 MG/DL (ref 8.4–10.2)
CHLORIDE SERPL-SCNC: 107 MMOL/L (ref 98–107)
CO2 SERPL-SCNC: 23 MMOL/L (ref 22–29)
GLUCOSE SERPLBLD-MCNC: 143 MG/DL (ref 74–118)
MAGNESIUM SERPL-MCNC: 1.9 MG/DL (ref 1.3–2.1)
POTASSIUM SERPL-SCNC: 3.7 MMOL/L (ref 3.5–5.1)
SODIUM SERPL-SCNC: 140 MMOL/L (ref 136–145)

## 2023-02-06 RX ADMIN — HYDROCODONE BITARTRATE AND ACETAMINOPHEN PRN EA: 5; 325 TABLET ORAL at 05:11

## 2023-02-06 RX ADMIN — HYDROCODONE BITARTRATE AND ACETAMINOPHEN PRN EA: 5; 325 TABLET ORAL at 15:00

## 2023-02-06 RX ADMIN — HYDROCODONE BITARTRATE AND ACETAMINOPHEN PRN EA: 5; 325 TABLET ORAL at 21:35

## 2023-02-06 RX ADMIN — DOCUSATE SODIUM SCH MG: 100 TABLET, FILM COATED ORAL at 08:22

## 2023-02-06 RX ADMIN — Medication SCH MG: at 08:22

## 2023-02-07 VITALS — DIASTOLIC BLOOD PRESSURE: 70 MMHG | SYSTOLIC BLOOD PRESSURE: 119 MMHG

## 2023-02-07 VITALS — SYSTOLIC BLOOD PRESSURE: 141 MMHG | DIASTOLIC BLOOD PRESSURE: 89 MMHG

## 2023-02-07 VITALS — SYSTOLIC BLOOD PRESSURE: 148 MMHG | DIASTOLIC BLOOD PRESSURE: 94 MMHG

## 2023-02-07 VITALS — SYSTOLIC BLOOD PRESSURE: 109 MMHG | DIASTOLIC BLOOD PRESSURE: 75 MMHG

## 2023-02-07 VITALS — SYSTOLIC BLOOD PRESSURE: 134 MMHG | DIASTOLIC BLOOD PRESSURE: 82 MMHG

## 2023-02-07 VITALS — DIASTOLIC BLOOD PRESSURE: 94 MMHG | SYSTOLIC BLOOD PRESSURE: 154 MMHG

## 2023-02-07 LAB
ALBUMIN SERPL-MCNC: 3.3 G/DL (ref 3.5–5)
ALBUMIN/GLOB SERPL: 0.7 {RATIO} (ref 0.8–2)
ALP SERPL-CCNC: 104 IU/L (ref 40–150)
ALT SERPL-CCNC: 33 IU/L (ref 0–55)
ANION GAP SERPL CALC-SCNC: 13.6 MMOL/L (ref 8–16)
BASOPHILS # BLD AUTO: 0.1 10*3/UL (ref 0–0.1)
BASOPHILS NFR BLD AUTO: 0.6 % (ref 0–1)
BUN SERPL-MCNC: 15 MG/DL (ref 7–26)
BUN/CREAT SERPL: 19 (ref 6–25)
CALCIUM SERPL-MCNC: 9.7 MG/DL (ref 8.4–10.2)
CHLORIDE SERPL-SCNC: 107 MMOL/L (ref 98–107)
CO2 SERPL-SCNC: 24 MMOL/L (ref 22–29)
DEPRECATED NEUTROPHILS # BLD AUTO: 5.5 10*3/UL (ref 2.1–6.9)
EOSINOPHIL # BLD AUTO: 0.2 10*3/UL (ref 0–0.4)
EOSINOPHIL NFR BLD AUTO: 2.4 % (ref 0–6)
ERYTHROCYTE [DISTWIDTH] IN CORD BLOOD: 13.3 % (ref 11.7–14.4)
GLOBULIN PLAS-MCNC: 5 G/DL (ref 2.3–3.5)
GLUCOSE SERPLBLD-MCNC: 132 MG/DL (ref 74–118)
HCT VFR BLD AUTO: 38 % (ref 34.2–44.1)
HGB BLD-MCNC: 11 G/DL (ref 12–16)
LYMPHOCYTES # BLD: 2.4 10*3/UL (ref 1–3.2)
LYMPHOCYTES NFR BLD AUTO: 27.4 % (ref 18–39.1)
MAGNESIUM SERPL-MCNC: 1.5 MG/DL (ref 1.3–2.1)
MCH RBC QN AUTO: 26.1 PG (ref 28–32)
MCHC RBC AUTO-ENTMCNC: 28.9 G/DL (ref 31–35)
MCV RBC AUTO: 90 FL (ref 81–99)
MONOCYTES # BLD AUTO: 0.7 10*3/UL (ref 0.2–0.8)
MONOCYTES NFR BLD AUTO: 7.4 % (ref 4.4–11.3)
NEUTS SEG NFR BLD AUTO: 61.9 % (ref 38.7–80)
PLATELET # BLD AUTO: 274 X10E3/UL (ref 140–360)
POTASSIUM SERPL-SCNC: 3.6 MMOL/L (ref 3.5–5.1)
RBC # BLD AUTO: 4.22 X10E6/UL (ref 3.6–5.1)
SODIUM SERPL-SCNC: 141 MMOL/L (ref 136–145)

## 2023-02-07 RX ADMIN — HYDROCODONE BITARTRATE AND ACETAMINOPHEN PRN EA: 5; 325 TABLET ORAL at 05:25

## 2023-02-07 RX ADMIN — HYDROCODONE BITARTRATE AND ACETAMINOPHEN PRN EA: 5; 325 TABLET ORAL at 11:55

## 2023-02-07 RX ADMIN — HYDROCODONE BITARTRATE AND ACETAMINOPHEN PRN EA: 5; 325 TABLET ORAL at 17:51

## 2023-02-07 RX ADMIN — ACETAMINOPHEN AND CODEINE PHOSPHATE PRN EA: 300; 30 TABLET ORAL at 22:06

## 2023-02-07 RX ADMIN — DOCUSATE SODIUM SCH MG: 100 TABLET, FILM COATED ORAL at 09:00

## 2023-02-07 RX ADMIN — Medication SCH MG: at 09:00

## 2023-02-08 VITALS — SYSTOLIC BLOOD PRESSURE: 122 MMHG | DIASTOLIC BLOOD PRESSURE: 79 MMHG

## 2023-02-08 VITALS — SYSTOLIC BLOOD PRESSURE: 134 MMHG | DIASTOLIC BLOOD PRESSURE: 73 MMHG

## 2023-02-08 VITALS — DIASTOLIC BLOOD PRESSURE: 77 MMHG | SYSTOLIC BLOOD PRESSURE: 136 MMHG

## 2023-02-08 VITALS — SYSTOLIC BLOOD PRESSURE: 143 MMHG | DIASTOLIC BLOOD PRESSURE: 83 MMHG

## 2023-02-08 VITALS — SYSTOLIC BLOOD PRESSURE: 129 MMHG | DIASTOLIC BLOOD PRESSURE: 81 MMHG

## 2023-02-08 VITALS — DIASTOLIC BLOOD PRESSURE: 78 MMHG | SYSTOLIC BLOOD PRESSURE: 96 MMHG

## 2023-02-08 VITALS — DIASTOLIC BLOOD PRESSURE: 73 MMHG | SYSTOLIC BLOOD PRESSURE: 117 MMHG

## 2023-02-08 LAB
ANION GAP SERPL CALC-SCNC: 12.6 MMOL/L (ref 8–16)
BACTERIA URNS QL MICRO: (no result) /HPF
BASOPHILS # BLD AUTO: 0 10*3/UL (ref 0–0.1)
BASOPHILS NFR BLD AUTO: 0.6 % (ref 0–1)
BUN SERPL-MCNC: 16 MG/DL (ref 7–26)
BUN/CREAT SERPL: 21 (ref 6–25)
CALCIUM SERPL-MCNC: 9.1 MG/DL (ref 8.4–10.2)
CHLORIDE SERPL-SCNC: 108 MMOL/L (ref 98–107)
CLARITY UR: (no result)
CO2 SERPL-SCNC: 25 MMOL/L (ref 22–29)
COLOR UR: (no result)
DEPRECATED NEUTROPHILS # BLD AUTO: 3.2 10*3/UL (ref 2.1–6.9)
DEPRECATED RBC URNS MANUAL-ACNC: >50 /HPF (ref 0–5)
EOSINOPHIL # BLD AUTO: 0.2 10*3/UL (ref 0–0.4)
EOSINOPHIL NFR BLD AUTO: 3.3 % (ref 0–6)
EPI CELLS URNS QL MICRO: (no result) /LPF
ERYTHROCYTE [DISTWIDTH] IN CORD BLOOD: 13.5 % (ref 11.7–14.4)
GLUCOSE SERPLBLD-MCNC: 120 MG/DL (ref 74–118)
HCT VFR BLD AUTO: 32.5 % (ref 34.2–44.1)
HGB BLD-MCNC: 10.1 G/DL (ref 12–16)
KETONES UR QL STRIP.AUTO: NEGATIVE
LEUKOCYTE ESTERASE UR QL STRIP.AUTO: (no result)
LYMPHOCYTES # BLD: 2.4 10*3/UL (ref 1–3.2)
LYMPHOCYTES NFR BLD AUTO: 37.8 % (ref 18–39.1)
MAGNESIUM SERPL-MCNC: 1.7 MG/DL (ref 1.3–2.1)
MCH RBC QN AUTO: 26.4 PG (ref 28–32)
MCHC RBC AUTO-ENTMCNC: 31.1 G/DL (ref 31–35)
MCV RBC AUTO: 85.1 FL (ref 81–99)
MONOCYTES # BLD AUTO: 0.5 10*3/UL (ref 0.2–0.8)
MONOCYTES NFR BLD AUTO: 7.5 % (ref 4.4–11.3)
NEUTS SEG NFR BLD AUTO: 50.5 % (ref 38.7–80)
NITRITE UR QL STRIP.AUTO: NEGATIVE
PLATELET # BLD AUTO: 217 X10E3/UL (ref 140–360)
POTASSIUM SERPL-SCNC: 3.6 MMOL/L (ref 3.5–5.1)
PROT UR QL STRIP.AUTO: >=300
RBC # BLD AUTO: 3.82 X10E6/UL (ref 3.6–5.1)
SODIUM SERPL-SCNC: 142 MMOL/L (ref 136–145)
SP GR UR STRIP: 1.02 (ref 1.01–1.02)
UROBILINOGEN UR STRIP-MCNC: 0.2 MG/DL (ref 0.2–1)
WBC #/AREA URNS HPF: (no result) /HPF (ref 0–5)

## 2023-02-08 RX ADMIN — Medication SCH MG: at 08:35

## 2023-02-08 RX ADMIN — DOCUSATE SODIUM SCH MG: 100 TABLET, FILM COATED ORAL at 08:34

## 2023-02-08 RX ADMIN — ACETAMINOPHEN AND CODEINE PHOSPHATE PRN EA: 300; 30 TABLET ORAL at 23:20

## 2023-02-08 RX ADMIN — HYDROMORPHONE HYDROCHLORIDE PRN MG: 1 INJECTION, SOLUTION INTRAMUSCULAR; INTRAVENOUS; SUBCUTANEOUS at 01:30

## 2023-02-08 RX ADMIN — ACETAMINOPHEN AND CODEINE PHOSPHATE PRN EA: 300; 30 TABLET ORAL at 12:15

## 2023-02-08 RX ADMIN — HYDROMORPHONE HYDROCHLORIDE PRN MG: 1 INJECTION, SOLUTION INTRAMUSCULAR; INTRAVENOUS; SUBCUTANEOUS at 14:32

## 2023-02-09 VITALS — SYSTOLIC BLOOD PRESSURE: 135 MMHG | DIASTOLIC BLOOD PRESSURE: 84 MMHG

## 2023-02-09 VITALS — DIASTOLIC BLOOD PRESSURE: 84 MMHG | SYSTOLIC BLOOD PRESSURE: 135 MMHG

## 2023-02-09 VITALS — SYSTOLIC BLOOD PRESSURE: 123 MMHG | DIASTOLIC BLOOD PRESSURE: 76 MMHG

## 2023-02-09 VITALS — DIASTOLIC BLOOD PRESSURE: 78 MMHG | SYSTOLIC BLOOD PRESSURE: 132 MMHG

## 2023-02-09 RX ADMIN — Medication SCH MG: at 09:00

## 2023-02-09 RX ADMIN — HYDROMORPHONE HYDROCHLORIDE PRN MG: 1 INJECTION, SOLUTION INTRAMUSCULAR; INTRAVENOUS; SUBCUTANEOUS at 03:28

## 2023-02-09 RX ADMIN — DOCUSATE SODIUM SCH MG: 100 TABLET, FILM COATED ORAL at 09:00

## 2023-04-03 ENCOUNTER — HOSPITAL ENCOUNTER (OUTPATIENT)
Dept: HOSPITAL 88 - DX | Age: 58
End: 2023-04-03
Attending: UROLOGY
Payer: COMMERCIAL

## 2023-04-03 DIAGNOSIS — N13.30: Primary | ICD-10-CM

## 2023-04-03 LAB
DEPRECATED APTT PLAS QN: 28.2 SECONDS (ref 23.8–35.5)
DEPRECATED INR PLAS: 0.91
HGB BLD-MCNC: 11.6 G/DL (ref 12–16)
PLATELET # BLD AUTO: 289 X10E3/UL (ref 140–360)
PROTHROMBIN TIME: 12.8 SECONDS (ref 11.9–14.5)

## 2023-04-03 PROCEDURE — 85014 HEMATOCRIT: CPT

## 2023-04-03 PROCEDURE — 36415 COLL VENOUS BLD VENIPUNCTURE: CPT

## 2023-04-03 PROCEDURE — 99152 MOD SED SAME PHYS/QHP 5/>YRS: CPT

## 2023-04-03 PROCEDURE — 85049 AUTOMATED PLATELET COUNT: CPT

## 2023-04-03 PROCEDURE — 85730 THROMBOPLASTIN TIME PARTIAL: CPT

## 2023-04-03 PROCEDURE — 99153 MOD SED SAME PHYS/QHP EA: CPT

## 2023-04-03 PROCEDURE — 85610 PROTHROMBIN TIME: CPT

## 2023-04-03 PROCEDURE — 76942 ECHO GUIDE FOR BIOPSY: CPT

## 2023-04-03 PROCEDURE — 50435 EXCHANGE NEPHROSTOMY CATH: CPT

## 2023-04-03 PROCEDURE — 50430 NJX PX NFROSGRM &/URTRGRM: CPT

## 2023-04-05 LAB
ANION GAP SERPL CALC-SCNC: 15.8 MMOL/L (ref 8–16)
BASOPHILS # BLD AUTO: 0 10*3/UL (ref 0–0.1)
BASOPHILS NFR BLD AUTO: 0.3 % (ref 0–1)
BUN SERPL-MCNC: 11 MG/DL (ref 7–26)
BUN/CREAT SERPL: 13 (ref 6–25)
CALCIUM SERPL-MCNC: 9.7 MG/DL (ref 8.4–10.2)
CHLORIDE SERPL-SCNC: 105 MMOL/L (ref 98–107)
CO2 SERPL-SCNC: 22 MMOL/L (ref 22–29)
DEPRECATED APTT PLAS QN: 31.1 SECONDS (ref 23.8–35.5)
DEPRECATED INR PLAS: 0.92
DEPRECATED NEUTROPHILS # BLD AUTO: 6.2 10*3/UL (ref 2.1–6.9)
EOSINOPHIL # BLD AUTO: 0.1 10*3/UL (ref 0–0.4)
EOSINOPHIL NFR BLD AUTO: 0.8 % (ref 0–6)
ERYTHROCYTE [DISTWIDTH] IN CORD BLOOD: 14.4 % (ref 11.7–14.4)
GLUCOSE SERPLBLD-MCNC: 124 MG/DL (ref 74–118)
HCT VFR BLD AUTO: 36.2 % (ref 34.2–44.1)
HGB BLD-MCNC: 11.4 G/DL (ref 12–16)
LYMPHOCYTES # BLD: 1.9 10*3/UL (ref 1–3.2)
LYMPHOCYTES NFR BLD AUTO: 21.3 % (ref 18–39.1)
MCH RBC QN AUTO: 26.3 PG (ref 28–32)
MCHC RBC AUTO-ENTMCNC: 31.5 G/DL (ref 31–35)
MCV RBC AUTO: 83.4 FL (ref 81–99)
MONOCYTES # BLD AUTO: 0.5 10*3/UL (ref 0.2–0.8)
MONOCYTES NFR BLD AUTO: 5.7 % (ref 4.4–11.3)
NEUTS SEG NFR BLD AUTO: 71.7 % (ref 38.7–80)
PLATELET # BLD AUTO: 284 X10E3/UL (ref 140–360)
POTASSIUM SERPL-SCNC: 3.8 MMOL/L (ref 3.5–5.1)
PROTHROMBIN TIME: 12.9 SECONDS (ref 11.9–14.5)
RBC # BLD AUTO: 4.34 X10E6/UL (ref 3.6–5.1)
SODIUM SERPL-SCNC: 139 MMOL/L (ref 136–145)

## 2023-04-10 ENCOUNTER — HOSPITAL ENCOUNTER (OUTPATIENT)
Dept: HOSPITAL 88 - OR | Age: 58
Discharge: HOME | End: 2023-04-10
Attending: UROLOGY
Payer: COMMERCIAL

## 2023-04-10 VITALS — SYSTOLIC BLOOD PRESSURE: 146 MMHG | DIASTOLIC BLOOD PRESSURE: 76 MMHG

## 2023-04-10 DIAGNOSIS — N82.0: ICD-10-CM

## 2023-04-10 DIAGNOSIS — N81.6: ICD-10-CM

## 2023-04-10 DIAGNOSIS — Z46.6: Primary | ICD-10-CM

## 2023-04-10 DIAGNOSIS — N13.30: ICD-10-CM

## 2023-04-10 DIAGNOSIS — Z01.810: ICD-10-CM

## 2023-04-10 DIAGNOSIS — N95.2: ICD-10-CM

## 2023-04-10 DIAGNOSIS — Z01.812: ICD-10-CM

## 2023-04-10 DIAGNOSIS — Z93.6: ICD-10-CM

## 2023-04-10 DIAGNOSIS — Z79.899: ICD-10-CM

## 2023-04-10 PROCEDURE — 76000 FLUOROSCOPY <1 HR PHYS/QHP: CPT

## 2023-04-10 PROCEDURE — 93005 ELECTROCARDIOGRAM TRACING: CPT

## 2023-04-10 PROCEDURE — 36415 COLL VENOUS BLD VENIPUNCTURE: CPT

## 2023-04-10 PROCEDURE — 85730 THROMBOPLASTIN TIME PARTIAL: CPT

## 2023-04-10 PROCEDURE — 85025 COMPLETE CBC W/AUTO DIFF WBC: CPT

## 2023-04-10 PROCEDURE — 82948 REAGENT STRIP/BLOOD GLUCOSE: CPT

## 2023-04-10 PROCEDURE — 57420 EXAM OF VAGINA W/SCOPE: CPT

## 2023-04-10 PROCEDURE — 52315 CYSTOSCOPY AND TREATMENT: CPT

## 2023-04-10 PROCEDURE — 80048 BASIC METABOLIC PNL TOTAL CA: CPT

## 2023-04-10 PROCEDURE — 85610 PROTHROMBIN TIME: CPT

## 2023-05-25 ENCOUNTER — HOSPITAL ENCOUNTER (OUTPATIENT)
Dept: HOSPITAL 88 - DX | Age: 58
End: 2023-05-25
Attending: UROLOGY
Payer: COMMERCIAL

## 2023-05-25 DIAGNOSIS — N13.30: Primary | ICD-10-CM

## 2023-05-25 PROCEDURE — 50435 EXCHANGE NEPHROSTOMY CATH: CPT

## 2023-06-16 ENCOUNTER — HOSPITAL ENCOUNTER (INPATIENT)
Dept: HOSPITAL 88 - ER | Age: 58
LOS: 4 days | Discharge: HOME | DRG: 698 | End: 2023-06-20
Attending: INTERNAL MEDICINE | Admitting: INTERNAL MEDICINE
Payer: COMMERCIAL

## 2023-06-16 VITALS
TEMPERATURE: 97.9 F | OXYGEN SATURATION: 100 % | DIASTOLIC BLOOD PRESSURE: 86 MMHG | RESPIRATION RATE: 20 BRPM | HEART RATE: 74 BPM | SYSTOLIC BLOOD PRESSURE: 147 MMHG

## 2023-06-16 VITALS
RESPIRATION RATE: 20 BRPM | DIASTOLIC BLOOD PRESSURE: 86 MMHG | TEMPERATURE: 97.9 F | HEART RATE: 74 BPM | OXYGEN SATURATION: 100 % | SYSTOLIC BLOOD PRESSURE: 147 MMHG

## 2023-06-16 VITALS — HEIGHT: 61 IN | BODY MASS INDEX: 34.74 KG/M2 | WEIGHT: 184 LBS

## 2023-06-16 DIAGNOSIS — K80.20: ICD-10-CM

## 2023-06-16 DIAGNOSIS — E66.9: ICD-10-CM

## 2023-06-16 DIAGNOSIS — Z20.822: ICD-10-CM

## 2023-06-16 DIAGNOSIS — N12: ICD-10-CM

## 2023-06-16 DIAGNOSIS — D68.9: ICD-10-CM

## 2023-06-16 DIAGNOSIS — Z88.2: ICD-10-CM

## 2023-06-16 DIAGNOSIS — Z16.11: ICD-10-CM

## 2023-06-16 DIAGNOSIS — I10: ICD-10-CM

## 2023-06-16 DIAGNOSIS — E86.0: ICD-10-CM

## 2023-06-16 DIAGNOSIS — F41.9: ICD-10-CM

## 2023-06-16 DIAGNOSIS — A41.53: ICD-10-CM

## 2023-06-16 DIAGNOSIS — Z88.0: ICD-10-CM

## 2023-06-16 DIAGNOSIS — N39.0: ICD-10-CM

## 2023-06-16 DIAGNOSIS — D64.9: ICD-10-CM

## 2023-06-16 DIAGNOSIS — T83.512A: Primary | ICD-10-CM

## 2023-06-16 DIAGNOSIS — N17.9: ICD-10-CM

## 2023-06-16 DIAGNOSIS — F32.A: ICD-10-CM

## 2023-06-16 LAB
ANION GAP SERPL CALC-SCNC: 16.9 MMOL/L (ref 8–16)
BASOPHILS # BLD AUTO: 0 10*3/UL (ref 0–0.1)
BASOPHILS NFR BLD AUTO: 0.3 % (ref 0–1)
BUN SERPL-MCNC: 14 MG/DL (ref 7–26)
BUN/CREAT SERPL: 13 (ref 6–25)
CALCIUM SERPL-MCNC: 9.5 MG/DL (ref 8.4–10.2)
CHLORIDE SERPL-SCNC: 101 MMOL/L (ref 98–107)
CO2 SERPL-SCNC: 23 MMOL/L (ref 22–29)
DEPRECATED APTT PLAS QN: 32.7 SECONDS (ref 23.8–35.5)
DEPRECATED INR PLAS: 1.03
DEPRECATED NEUTROPHILS # BLD AUTO: 7.7 10*3/UL (ref 2.1–6.9)
EOSINOPHIL # BLD AUTO: 0 10*3/UL (ref 0–0.4)
EOSINOPHIL NFR BLD AUTO: 0.3 % (ref 0–6)
ERYTHROCYTE [DISTWIDTH] IN CORD BLOOD: 14.2 % (ref 11.7–14.4)
GLUCOSE SERPLBLD-MCNC: 194 MG/DL (ref 74–118)
HCT VFR BLD AUTO: 33.4 % (ref 34.2–44.1)
HGB BLD-MCNC: 10.7 G/DL (ref 12–16)
LYMPHOCYTES # BLD: 1.5 10*3/UL (ref 1–3.2)
LYMPHOCYTES NFR BLD AUTO: 14.6 % (ref 18–39.1)
MCH RBC QN AUTO: 26.5 PG (ref 28–32)
MCHC RBC AUTO-ENTMCNC: 32 G/DL (ref 31–35)
MCV RBC AUTO: 82.7 FL (ref 81–99)
MONOCYTES # BLD AUTO: 0.8 10*3/UL (ref 0.2–0.8)
MONOCYTES NFR BLD AUTO: 7.8 % (ref 4.4–11.3)
NEUTS SEG NFR BLD AUTO: 76.7 % (ref 38.7–80)
PLATELET # BLD AUTO: 269 X10E3/UL (ref 140–360)
POTASSIUM SERPL-SCNC: 3.9 MMOL/L (ref 3.5–5.1)
PROTHROMBIN TIME: 14 SECONDS (ref 11.9–14.5)
RBC # BLD AUTO: 4.04 X10E6/UL (ref 3.6–5.1)
SODIUM SERPL-SCNC: 136 MMOL/L (ref 136–145)

## 2023-06-16 PROCEDURE — 87071 CULTURE AEROBIC QUANT OTHER: CPT

## 2023-06-16 PROCEDURE — 99284 EMERGENCY DEPT VISIT MOD MDM: CPT

## 2023-06-16 PROCEDURE — 94799 UNLISTED PULMONARY SVC/PX: CPT

## 2023-06-16 PROCEDURE — 87205 SMEAR GRAM STAIN: CPT

## 2023-06-16 PROCEDURE — 0223U NFCT DS 22 TRGT SARS-COV-2: CPT

## 2023-06-16 PROCEDURE — 87086 URINE CULTURE/COLONY COUNT: CPT

## 2023-06-16 PROCEDURE — 87040 BLOOD CULTURE FOR BACTERIA: CPT

## 2023-06-16 PROCEDURE — 74176 CT ABD & PELVIS W/O CONTRAST: CPT

## 2023-06-16 PROCEDURE — 36415 COLL VENOUS BLD VENIPUNCTURE: CPT

## 2023-06-16 PROCEDURE — 81001 URINALYSIS AUTO W/SCOPE: CPT

## 2023-06-16 PROCEDURE — 85025 COMPLETE CBC W/AUTO DIFF WBC: CPT

## 2023-06-16 PROCEDURE — 85610 PROTHROMBIN TIME: CPT

## 2023-06-16 PROCEDURE — 50435 EXCHANGE NEPHROSTOMY CATH: CPT

## 2023-06-16 PROCEDURE — 85730 THROMBOPLASTIN TIME PARTIAL: CPT

## 2023-06-16 PROCEDURE — 74470 X-RAY EXAM OF KIDNEY LESION: CPT

## 2023-06-16 PROCEDURE — 83605 ASSAY OF LACTIC ACID: CPT

## 2023-06-16 PROCEDURE — 87186 SC STD MICRODIL/AGAR DIL: CPT

## 2023-06-16 PROCEDURE — 80048 BASIC METABOLIC PNL TOTAL CA: CPT

## 2023-06-16 RX ADMIN — Medication PRN MG: at 18:45

## 2023-06-16 RX ADMIN — Medication PRN MG: at 14:58

## 2023-06-16 RX ADMIN — SODIUM CHLORIDE, POTASSIUM CHLORIDE, SODIUM LACTATE AND CALCIUM CHLORIDE SCH MLS/HR: 600; 310; 30; 20 INJECTION, SOLUTION INTRAVENOUS at 18:50

## 2023-06-17 VITALS
HEART RATE: 70 BPM | DIASTOLIC BLOOD PRESSURE: 85 MMHG | OXYGEN SATURATION: 100 % | SYSTOLIC BLOOD PRESSURE: 167 MMHG | RESPIRATION RATE: 20 BRPM | TEMPERATURE: 98.7 F

## 2023-06-17 VITALS
TEMPERATURE: 97.9 F | SYSTOLIC BLOOD PRESSURE: 181 MMHG | DIASTOLIC BLOOD PRESSURE: 86 MMHG | RESPIRATION RATE: 17 BRPM | HEART RATE: 65 BPM | OXYGEN SATURATION: 100 %

## 2023-06-17 VITALS
SYSTOLIC BLOOD PRESSURE: 181 MMHG | RESPIRATION RATE: 17 BRPM | HEART RATE: 65 BPM | DIASTOLIC BLOOD PRESSURE: 86 MMHG | OXYGEN SATURATION: 100 % | TEMPERATURE: 97.9 F

## 2023-06-17 VITALS
HEART RATE: 69 BPM | DIASTOLIC BLOOD PRESSURE: 84 MMHG | OXYGEN SATURATION: 96 % | TEMPERATURE: 97.9 F | SYSTOLIC BLOOD PRESSURE: 150 MMHG | RESPIRATION RATE: 18 BRPM

## 2023-06-17 VITALS
OXYGEN SATURATION: 99 % | TEMPERATURE: 97.4 F | SYSTOLIC BLOOD PRESSURE: 148 MMHG | HEART RATE: 65 BPM | RESPIRATION RATE: 19 BRPM | DIASTOLIC BLOOD PRESSURE: 80 MMHG

## 2023-06-17 VITALS
TEMPERATURE: 98.8 F | OXYGEN SATURATION: 100 % | SYSTOLIC BLOOD PRESSURE: 149 MMHG | HEART RATE: 65 BPM | DIASTOLIC BLOOD PRESSURE: 88 MMHG | RESPIRATION RATE: 19 BRPM

## 2023-06-17 VITALS
TEMPERATURE: 97.8 F | DIASTOLIC BLOOD PRESSURE: 75 MMHG | OXYGEN SATURATION: 99 % | HEART RATE: 67 BPM | RESPIRATION RATE: 18 BRPM | SYSTOLIC BLOOD PRESSURE: 130 MMHG

## 2023-06-17 VITALS — OXYGEN SATURATION: 100 % | HEART RATE: 70 BPM | RESPIRATION RATE: 20 BRPM

## 2023-06-17 LAB
BACTERIA URNS QL MICRO: (no result) /HPF
CLARITY UR: (no result)
COLOR UR: YELLOW
DEPRECATED RBC URNS MANUAL-ACNC: >50 /HPF (ref 0–5)
EPI CELLS URNS QL MICRO: (no result) /LPF
KETONES UR QL STRIP.AUTO: NEGATIVE
LEUKOCYTE ESTERASE UR QL STRIP.AUTO: (no result)
NITRITE UR QL STRIP.AUTO: NEGATIVE
PROT UR QL STRIP.AUTO: (no result)
SP GR UR STRIP: 1.01 (ref 1.01–1.02)
UROBILINOGEN UR STRIP-MCNC: 0.2 MG/DL (ref 0.2–1)
WBC #/AREA URNS HPF: >50 /HPF (ref 0–5)

## 2023-06-17 RX ADMIN — SODIUM CHLORIDE, POTASSIUM CHLORIDE, SODIUM LACTATE AND CALCIUM CHLORIDE SCH MLS/HR: 600; 310; 30; 20 INJECTION, SOLUTION INTRAVENOUS at 03:26

## 2023-06-17 RX ADMIN — Medication PRN MG: at 10:53

## 2023-06-17 RX ADMIN — Medication PRN MG: at 16:56

## 2023-06-17 RX ADMIN — Medication PRN MG: at 20:16

## 2023-06-17 RX ADMIN — SODIUM CHLORIDE, POTASSIUM CHLORIDE, SODIUM LACTATE AND CALCIUM CHLORIDE SCH MLS/HR: 600; 310; 30; 20 INJECTION, SOLUTION INTRAVENOUS at 16:45

## 2023-06-17 RX ADMIN — SODIUM CHLORIDE, POTASSIUM CHLORIDE, SODIUM LACTATE AND CALCIUM CHLORIDE SCH MLS/HR: 600; 310; 30; 20 INJECTION, SOLUTION INTRAVENOUS at 20:14

## 2023-06-17 RX ADMIN — ENOXAPARIN SODIUM SCH MG: 40 INJECTION SUBCUTANEOUS at 16:45

## 2023-06-17 RX ADMIN — Medication SCH MG: at 16:45

## 2023-06-17 RX ADMIN — SODIUM CHLORIDE, POTASSIUM CHLORIDE, SODIUM LACTATE AND CALCIUM CHLORIDE SCH MLS/HR: 600; 310; 30; 20 INJECTION, SOLUTION INTRAVENOUS at 10:21

## 2023-06-18 VITALS
RESPIRATION RATE: 22 BRPM | TEMPERATURE: 97.6 F | DIASTOLIC BLOOD PRESSURE: 100 MMHG | HEART RATE: 70 BPM | OXYGEN SATURATION: 100 % | SYSTOLIC BLOOD PRESSURE: 170 MMHG

## 2023-06-18 VITALS
RESPIRATION RATE: 20 BRPM | HEART RATE: 70 BPM | DIASTOLIC BLOOD PRESSURE: 88 MMHG | SYSTOLIC BLOOD PRESSURE: 176 MMHG | TEMPERATURE: 97.7 F | OXYGEN SATURATION: 99 %

## 2023-06-18 VITALS
TEMPERATURE: 98.8 F | OXYGEN SATURATION: 100 % | SYSTOLIC BLOOD PRESSURE: 157 MMHG | HEART RATE: 72 BPM | RESPIRATION RATE: 20 BRPM | DIASTOLIC BLOOD PRESSURE: 94 MMHG

## 2023-06-18 VITALS
DIASTOLIC BLOOD PRESSURE: 88 MMHG | HEART RATE: 70 BPM | SYSTOLIC BLOOD PRESSURE: 176 MMHG | RESPIRATION RATE: 20 BRPM | OXYGEN SATURATION: 99 % | TEMPERATURE: 97.7 F

## 2023-06-18 VITALS
SYSTOLIC BLOOD PRESSURE: 127 MMHG | HEART RATE: 73 BPM | OXYGEN SATURATION: 100 % | TEMPERATURE: 97.5 F | RESPIRATION RATE: 20 BRPM | DIASTOLIC BLOOD PRESSURE: 76 MMHG

## 2023-06-18 VITALS — DIASTOLIC BLOOD PRESSURE: 79 MMHG | SYSTOLIC BLOOD PRESSURE: 138 MMHG

## 2023-06-18 VITALS
OXYGEN SATURATION: 100 % | HEART RATE: 70 BPM | DIASTOLIC BLOOD PRESSURE: 74 MMHG | SYSTOLIC BLOOD PRESSURE: 131 MMHG | TEMPERATURE: 97.1 F | RESPIRATION RATE: 17 BRPM

## 2023-06-18 VITALS
TEMPERATURE: 97.7 F | SYSTOLIC BLOOD PRESSURE: 124 MMHG | DIASTOLIC BLOOD PRESSURE: 64 MMHG | OXYGEN SATURATION: 100 % | HEART RATE: 64 BPM | RESPIRATION RATE: 20 BRPM

## 2023-06-18 VITALS — DIASTOLIC BLOOD PRESSURE: 66 MMHG | SYSTOLIC BLOOD PRESSURE: 126 MMHG

## 2023-06-18 VITALS — OXYGEN SATURATION: 98 % | RESPIRATION RATE: 20 BRPM | HEART RATE: 75 BPM

## 2023-06-18 VITALS — RESPIRATION RATE: 20 BRPM | OXYGEN SATURATION: 99 % | HEART RATE: 72 BPM

## 2023-06-18 LAB
ANION GAP SERPL CALC-SCNC: 13.6 MMOL/L (ref 8–16)
BASOPHILS # BLD AUTO: 0.1 10*3/UL (ref 0–0.1)
BASOPHILS NFR BLD AUTO: 0.7 % (ref 0–1)
BUN SERPL-MCNC: 14 MG/DL (ref 7–26)
BUN/CREAT SERPL: 18 (ref 6–25)
CALCIUM SERPL-MCNC: 9.3 MG/DL (ref 8.4–10.2)
CHLORIDE SERPL-SCNC: 106 MMOL/L (ref 98–107)
CO2 SERPL-SCNC: 25 MMOL/L (ref 22–29)
DEPRECATED NEUTROPHILS # BLD AUTO: 4.1 10*3/UL (ref 2.1–6.9)
EOSINOPHIL # BLD AUTO: 0.1 10*3/UL (ref 0–0.4)
EOSINOPHIL NFR BLD AUTO: 1.1 % (ref 0–6)
ERYTHROCYTE [DISTWIDTH] IN CORD BLOOD: 13.7 % (ref 11.7–14.4)
GLUCOSE SERPLBLD-MCNC: 116 MG/DL (ref 74–118)
HCT VFR BLD AUTO: 35.7 % (ref 34.2–44.1)
HGB BLD-MCNC: 11 G/DL (ref 12–16)
LYMPHOCYTES # BLD: 2.7 10*3/UL (ref 1–3.2)
LYMPHOCYTES NFR BLD AUTO: 36 % (ref 18–39.1)
MCH RBC QN AUTO: 26.4 PG (ref 28–32)
MCHC RBC AUTO-ENTMCNC: 30.8 G/DL (ref 31–35)
MCV RBC AUTO: 85.6 FL (ref 81–99)
MONOCYTES # BLD AUTO: 0.5 10*3/UL (ref 0.2–0.8)
MONOCYTES NFR BLD AUTO: 6.9 % (ref 4.4–11.3)
NEUTS SEG NFR BLD AUTO: 54.4 % (ref 38.7–80)
PLATELET # BLD AUTO: 265 X10E3/UL (ref 140–360)
POTASSIUM SERPL-SCNC: 3.6 MMOL/L (ref 3.5–5.1)
RBC # BLD AUTO: 4.17 X10E6/UL (ref 3.6–5.1)
SODIUM SERPL-SCNC: 141 MMOL/L (ref 136–145)

## 2023-06-18 RX ADMIN — Medication PRN MG: at 20:06

## 2023-06-18 RX ADMIN — Medication PRN MG: at 06:47

## 2023-06-18 RX ADMIN — Medication SCH MG: at 17:30

## 2023-06-18 RX ADMIN — PANTOPRAZOLE SODIUM SCH MG: 40 TABLET, DELAYED RELEASE ORAL at 08:53

## 2023-06-18 RX ADMIN — Medication SCH MG: at 08:53

## 2023-06-18 RX ADMIN — ENOXAPARIN SODIUM SCH MG: 40 INJECTION SUBCUTANEOUS at 17:30

## 2023-06-18 RX ADMIN — SODIUM CHLORIDE, POTASSIUM CHLORIDE, SODIUM LACTATE AND CALCIUM CHLORIDE SCH MLS/HR: 600; 310; 30; 20 INJECTION, SOLUTION INTRAVENOUS at 20:05

## 2023-06-18 RX ADMIN — SODIUM CHLORIDE, POTASSIUM CHLORIDE, SODIUM LACTATE AND CALCIUM CHLORIDE SCH MLS/HR: 600; 310; 30; 20 INJECTION, SOLUTION INTRAVENOUS at 23:09

## 2023-06-18 RX ADMIN — SODIUM CHLORIDE, POTASSIUM CHLORIDE, SODIUM LACTATE AND CALCIUM CHLORIDE SCH MLS/HR: 600; 310; 30; 20 INJECTION, SOLUTION INTRAVENOUS at 12:14

## 2023-06-18 RX ADMIN — Medication PRN MG: at 12:13

## 2023-06-18 RX ADMIN — SODIUM CHLORIDE, POTASSIUM CHLORIDE, SODIUM LACTATE AND CALCIUM CHLORIDE SCH MLS/HR: 600; 310; 30; 20 INJECTION, SOLUTION INTRAVENOUS at 05:40

## 2023-06-19 VITALS
SYSTOLIC BLOOD PRESSURE: 140 MMHG | OXYGEN SATURATION: 100 % | DIASTOLIC BLOOD PRESSURE: 89 MMHG | HEART RATE: 79 BPM | TEMPERATURE: 97.8 F | RESPIRATION RATE: 19 BRPM

## 2023-06-19 VITALS
RESPIRATION RATE: 20 BRPM | DIASTOLIC BLOOD PRESSURE: 89 MMHG | SYSTOLIC BLOOD PRESSURE: 146 MMHG | TEMPERATURE: 97.5 F | OXYGEN SATURATION: 99 % | HEART RATE: 75 BPM

## 2023-06-19 VITALS
SYSTOLIC BLOOD PRESSURE: 142 MMHG | RESPIRATION RATE: 19 BRPM | DIASTOLIC BLOOD PRESSURE: 89 MMHG | TEMPERATURE: 97.9 F | OXYGEN SATURATION: 100 % | HEART RATE: 87 BPM

## 2023-06-19 VITALS
DIASTOLIC BLOOD PRESSURE: 93 MMHG | SYSTOLIC BLOOD PRESSURE: 156 MMHG | HEART RATE: 74 BPM | TEMPERATURE: 97.9 F | RESPIRATION RATE: 20 BRPM | OXYGEN SATURATION: 100 %

## 2023-06-19 VITALS
TEMPERATURE: 97.9 F | RESPIRATION RATE: 19 BRPM | OXYGEN SATURATION: 100 % | SYSTOLIC BLOOD PRESSURE: 142 MMHG | DIASTOLIC BLOOD PRESSURE: 89 MMHG | HEART RATE: 87 BPM

## 2023-06-19 VITALS
SYSTOLIC BLOOD PRESSURE: 144 MMHG | OXYGEN SATURATION: 100 % | DIASTOLIC BLOOD PRESSURE: 81 MMHG | HEART RATE: 84 BPM | TEMPERATURE: 98.1 F | RESPIRATION RATE: 18 BRPM

## 2023-06-19 VITALS
TEMPERATURE: 97.8 F | HEART RATE: 79 BPM | SYSTOLIC BLOOD PRESSURE: 140 MMHG | RESPIRATION RATE: 19 BRPM | OXYGEN SATURATION: 100 % | DIASTOLIC BLOOD PRESSURE: 89 MMHG

## 2023-06-19 VITALS
TEMPERATURE: 98 F | HEART RATE: 78 BPM | DIASTOLIC BLOOD PRESSURE: 92 MMHG | RESPIRATION RATE: 19 BRPM | SYSTOLIC BLOOD PRESSURE: 147 MMHG | OXYGEN SATURATION: 100 %

## 2023-06-19 VITALS — RESPIRATION RATE: 20 BRPM | OXYGEN SATURATION: 98 % | HEART RATE: 75 BPM

## 2023-06-19 VITALS — HEART RATE: 84 BPM | OXYGEN SATURATION: 100 % | RESPIRATION RATE: 20 BRPM

## 2023-06-19 RX ADMIN — ENOXAPARIN SODIUM SCH MG: 40 INJECTION SUBCUTANEOUS at 18:17

## 2023-06-19 RX ADMIN — Medication SCH MG: at 12:52

## 2023-06-19 RX ADMIN — SODIUM CHLORIDE, POTASSIUM CHLORIDE, SODIUM LACTATE AND CALCIUM CHLORIDE SCH MLS/HR: 600; 310; 30; 20 INJECTION, SOLUTION INTRAVENOUS at 20:59

## 2023-06-19 RX ADMIN — SODIUM CHLORIDE, POTASSIUM CHLORIDE, SODIUM LACTATE AND CALCIUM CHLORIDE SCH MLS/HR: 600; 310; 30; 20 INJECTION, SOLUTION INTRAVENOUS at 12:52

## 2023-06-19 RX ADMIN — SODIUM CHLORIDE, POTASSIUM CHLORIDE, SODIUM LACTATE AND CALCIUM CHLORIDE SCH MLS/HR: 600; 310; 30; 20 INJECTION, SOLUTION INTRAVENOUS at 05:01

## 2023-06-19 RX ADMIN — Medication SCH MG: at 18:16

## 2023-06-19 RX ADMIN — PANTOPRAZOLE SODIUM SCH MG: 40 TABLET, DELAYED RELEASE ORAL at 12:52

## 2023-06-20 VITALS
SYSTOLIC BLOOD PRESSURE: 136 MMHG | RESPIRATION RATE: 19 BRPM | DIASTOLIC BLOOD PRESSURE: 85 MMHG | TEMPERATURE: 98.3 F | OXYGEN SATURATION: 100 % | HEART RATE: 73 BPM

## 2023-06-20 VITALS
TEMPERATURE: 97.9 F | RESPIRATION RATE: 19 BRPM | HEART RATE: 72 BPM | OXYGEN SATURATION: 95 % | SYSTOLIC BLOOD PRESSURE: 145 MMHG | DIASTOLIC BLOOD PRESSURE: 85 MMHG

## 2023-06-20 VITALS
DIASTOLIC BLOOD PRESSURE: 60 MMHG | SYSTOLIC BLOOD PRESSURE: 111 MMHG | HEART RATE: 83 BPM | OXYGEN SATURATION: 99 % | TEMPERATURE: 98.1 F | RESPIRATION RATE: 18 BRPM

## 2023-06-20 VITALS
TEMPERATURE: 97.9 F | HEART RATE: 76 BPM | SYSTOLIC BLOOD PRESSURE: 136 MMHG | DIASTOLIC BLOOD PRESSURE: 83 MMHG | OXYGEN SATURATION: 100 % | RESPIRATION RATE: 18 BRPM

## 2023-06-20 VITALS
OXYGEN SATURATION: 99 % | TEMPERATURE: 98.1 F | HEART RATE: 97 BPM | RESPIRATION RATE: 18 BRPM | DIASTOLIC BLOOD PRESSURE: 79 MMHG | SYSTOLIC BLOOD PRESSURE: 128 MMHG

## 2023-06-20 VITALS — RESPIRATION RATE: 20 BRPM | HEART RATE: 85 BPM | OXYGEN SATURATION: 98 %

## 2023-06-20 LAB
ANION GAP SERPL CALC-SCNC: 13.3 MMOL/L (ref 8–16)
BASOPHILS # BLD AUTO: 0 10*3/UL (ref 0–0.1)
BASOPHILS NFR BLD AUTO: 0.6 % (ref 0–1)
BUN SERPL-MCNC: 11 MG/DL (ref 7–26)
BUN/CREAT SERPL: 14 (ref 6–25)
CALCIUM SERPL-MCNC: 8.3 MG/DL (ref 8.4–10.2)
CHLORIDE SERPL-SCNC: 105 MMOL/L (ref 98–107)
CO2 SERPL-SCNC: 25 MMOL/L (ref 22–29)
DEPRECATED NEUTROPHILS # BLD AUTO: 3.5 10*3/UL (ref 2.1–6.9)
EOSINOPHIL # BLD AUTO: 0.2 10*3/UL (ref 0–0.4)
EOSINOPHIL NFR BLD AUTO: 2.7 % (ref 0–6)
ERYTHROCYTE [DISTWIDTH] IN CORD BLOOD: 14.2 % (ref 11.7–14.4)
GLUCOSE SERPLBLD-MCNC: 160 MG/DL (ref 74–118)
HCT VFR BLD AUTO: 29.7 % (ref 34.2–44.1)
HGB BLD-MCNC: 9.2 G/DL (ref 12–16)
LYMPHOCYTES # BLD: 2.3 10*3/UL (ref 1–3.2)
LYMPHOCYTES NFR BLD AUTO: 34.3 % (ref 18–39.1)
MCH RBC QN AUTO: 26.5 PG (ref 28–32)
MCHC RBC AUTO-ENTMCNC: 31 G/DL (ref 31–35)
MCV RBC AUTO: 85.6 FL (ref 81–99)
MONOCYTES # BLD AUTO: 0.6 10*3/UL (ref 0.2–0.8)
MONOCYTES NFR BLD AUTO: 8.6 % (ref 4.4–11.3)
NEUTS SEG NFR BLD AUTO: 52.2 % (ref 38.7–80)
PLATELET # BLD AUTO: 279 X10E3/UL (ref 140–360)
POTASSIUM SERPL-SCNC: 3.3 MMOL/L (ref 3.5–5.1)
RBC # BLD AUTO: 3.47 X10E6/UL (ref 3.6–5.1)
SODIUM SERPL-SCNC: 140 MMOL/L (ref 136–145)

## 2023-06-20 RX ADMIN — Medication SCH MG: at 09:40

## 2023-06-20 RX ADMIN — SODIUM CHLORIDE, POTASSIUM CHLORIDE, SODIUM LACTATE AND CALCIUM CHLORIDE SCH MLS/HR: 600; 310; 30; 20 INJECTION, SOLUTION INTRAVENOUS at 14:54

## 2023-06-20 RX ADMIN — SODIUM CHLORIDE, POTASSIUM CHLORIDE, SODIUM LACTATE AND CALCIUM CHLORIDE SCH MLS/HR: 600; 310; 30; 20 INJECTION, SOLUTION INTRAVENOUS at 06:17

## 2023-06-20 RX ADMIN — PANTOPRAZOLE SODIUM SCH MG: 40 TABLET, DELAYED RELEASE ORAL at 07:30

## 2023-06-20 RX ADMIN — SODIUM CHLORIDE, POTASSIUM CHLORIDE, SODIUM LACTATE AND CALCIUM CHLORIDE SCH MLS/HR: 600; 310; 30; 20 INJECTION, SOLUTION INTRAVENOUS at 08:40

## 2023-11-01 ENCOUNTER — HOSPITAL ENCOUNTER (EMERGENCY)
Dept: HOSPITAL 88 - ER | Age: 58
Discharge: HOME | End: 2023-11-01
Payer: COMMERCIAL

## 2023-11-01 VITALS — WEIGHT: 187 LBS | BODY MASS INDEX: 36.71 KG/M2 | HEIGHT: 60 IN

## 2023-11-01 VITALS — OXYGEN SATURATION: 100 %

## 2023-11-01 DIAGNOSIS — Z47.89: Primary | ICD-10-CM

## 2023-11-01 DIAGNOSIS — S52.612D: ICD-10-CM

## 2023-11-01 DIAGNOSIS — Z85.41: ICD-10-CM

## 2023-11-01 DIAGNOSIS — S52.592D: ICD-10-CM

## 2023-11-01 PROCEDURE — 99284 EMERGENCY DEPT VISIT MOD MDM: CPT

## 2023-11-14 NOTE — NUR
Patient endorsed to next shift for continuity of care. Ten year colonoscopy recall placed, Care Gaps updated.

## 2024-07-22 ENCOUNTER — HOSPITAL ENCOUNTER (INPATIENT)
Dept: HOSPITAL 88 - ER | Age: 59
LOS: 3 days | Discharge: HOME | DRG: 872 | End: 2024-07-25
Attending: INTERNAL MEDICINE | Admitting: INTERNAL MEDICINE
Payer: COMMERCIAL

## 2024-07-22 VITALS — HEIGHT: 60 IN | BODY MASS INDEX: 36.32 KG/M2 | WEIGHT: 185 LBS

## 2024-07-22 VITALS
RESPIRATION RATE: 18 BRPM | TEMPERATURE: 99.3 F | HEART RATE: 88 BPM | OXYGEN SATURATION: 99 % | DIASTOLIC BLOOD PRESSURE: 73 MMHG | SYSTOLIC BLOOD PRESSURE: 113 MMHG

## 2024-07-22 VITALS
RESPIRATION RATE: 18 BRPM | SYSTOLIC BLOOD PRESSURE: 137 MMHG | TEMPERATURE: 99.3 F | OXYGEN SATURATION: 98 % | HEART RATE: 88 BPM | DIASTOLIC BLOOD PRESSURE: 94 MMHG

## 2024-07-22 VITALS — HEART RATE: 93 BPM | TEMPERATURE: 99.5 F

## 2024-07-22 VITALS
HEART RATE: 88 BPM | OXYGEN SATURATION: 98 % | SYSTOLIC BLOOD PRESSURE: 113 MMHG | DIASTOLIC BLOOD PRESSURE: 73 MMHG | RESPIRATION RATE: 18 BRPM | TEMPERATURE: 99.3 F

## 2024-07-22 DIAGNOSIS — A41.50: Primary | ICD-10-CM

## 2024-07-22 DIAGNOSIS — Z11.52: ICD-10-CM

## 2024-07-22 DIAGNOSIS — R73.03: ICD-10-CM

## 2024-07-22 DIAGNOSIS — E86.0: ICD-10-CM

## 2024-07-22 DIAGNOSIS — Z88.0: ICD-10-CM

## 2024-07-22 DIAGNOSIS — Z96.0: ICD-10-CM

## 2024-07-22 DIAGNOSIS — F32.A: ICD-10-CM

## 2024-07-22 DIAGNOSIS — Z88.2: ICD-10-CM

## 2024-07-22 DIAGNOSIS — E66.9: ICD-10-CM

## 2024-07-22 DIAGNOSIS — E87.1: ICD-10-CM

## 2024-07-22 DIAGNOSIS — N13.8: ICD-10-CM

## 2024-07-22 DIAGNOSIS — Z85.41: ICD-10-CM

## 2024-07-22 DIAGNOSIS — F41.9: ICD-10-CM

## 2024-07-22 LAB
ALBUMIN SERPL-MCNC: 3.8 G/DL (ref 3.5–5)
ALBUMIN/GLOB SERPL: 0.7 {RATIO} (ref 0.8–2)
ALP SERPL-CCNC: 130 IU/L (ref 40–150)
ALT SERPL-CCNC: 22 IU/L (ref 0–55)
ANION GAP SERPL CALC-SCNC: 16 MMOL/L (ref 8–16)
BACTERIA URNS QL MICRO: (no result) /HPF
BASOPHILS # BLD AUTO: 0 10*3/UL (ref 0–0.1)
BASOPHILS NFR BLD AUTO: 0.3 % (ref 0–1)
BILIRUB SERPL-MCNC: 0.8 MG/DL (ref 0.2–1.2)
BILIRUB UR QL: NEGATIVE
BUN SERPL-MCNC: 17 MG/DL (ref 7–26)
BUN/CREAT SERPL: 16 (ref 6–25)
CALCIUM SERPL-MCNC: 9.7 MG/DL (ref 8.4–10.2)
CHLORIDE SERPL-SCNC: 96 MMOL/L (ref 98–107)
CLARITY UR: (no result)
CO2 SERPL-SCNC: 24 MMOL/L (ref 22–29)
COLOR UR: YELLOW
DEPRECATED NEUTROPHILS # BLD AUTO: 10.6 10*3/UL (ref 2.1–6.9)
EGFRCR SERPLBLD CKD-EPI 2021: 62 ML/MIN (ref 60–?)
EOSINOPHIL # BLD AUTO: 0 10*3/UL (ref 0–0.4)
EOSINOPHIL NFR BLD AUTO: 0.1 % (ref 0–6)
EPI CELLS URNS QL MICRO: (no result) /LPF
ERYTHROCYTE [DISTWIDTH] IN CORD BLOOD: 14.5 % (ref 11.7–14.4)
GLOBULIN PLAS-MCNC: 5.3 G/DL (ref 2.3–3.5)
GLUCOSE SERPLBLD-MCNC: 164 MG/DL (ref 74–118)
GLUCOSE UR QL STRIP.AUTO: NEGATIVE
HCT VFR BLD AUTO: 38.8 % (ref 34.2–44.1)
HGB BLD-MCNC: 12.3 G/DL (ref 12–16)
KETONES UR QL STRIP.AUTO: NEGATIVE
LEUKOCYTE ESTERASE UR QL STRIP.AUTO: (no result)
LYMPHOCYTES # BLD: 1.3 10*3/UL (ref 1–3.2)
LYMPHOCYTES NFR BLD AUTO: 9.8 % (ref 18–39.1)
MCH RBC QN AUTO: 28 PG (ref 28–32)
MCHC RBC AUTO-ENTMCNC: 31.7 G/DL (ref 31–35)
MCV RBC AUTO: 88.2 FL (ref 81–99)
MONOCYTES # BLD AUTO: 0.8 10*3/UL (ref 0.2–0.8)
MONOCYTES NFR BLD AUTO: 6.3 % (ref 4.4–11.3)
NEUTS SEG NFR BLD AUTO: 82.8 % (ref 38.7–80)
NITRITE UR QL STRIP.AUTO: POSITIVE
PH UR STRIP.AUTO: 6.5 [PH] (ref 5–7)
PLATELET # BLD AUTO: 217 X10E3/UL (ref 140–360)
POTASSIUM SERPL-SCNC: 4 MMOL/L (ref 3.5–5.1)
PROT SERPL-MCNC: 9.1 G/DL (ref 6.5–8.1)
PROT UR QL STRIP.AUTO: (no result)
RBC # BLD AUTO: 4.4 X10E6/UL (ref 3.6–5.1)
SODIUM SERPL-SCNC: 132 MMOL/L (ref 136–145)
SP GR UR STRIP: 1.01 (ref 1.01–1.02)
UROBILINOGEN UR STRIP-MCNC: 0.2 MG/DL (ref 0.2–1)
WBC # BLD: 12.81 X10E3/UL (ref 4.8–10.8)
WBC #/AREA URNS HPF: >50 /HPF (ref 0–5)

## 2024-07-22 PROCEDURE — 83605 ASSAY OF LACTIC ACID: CPT

## 2024-07-22 PROCEDURE — 83735 ASSAY OF MAGNESIUM: CPT

## 2024-07-22 PROCEDURE — 80048 BASIC METABOLIC PNL TOTAL CA: CPT

## 2024-07-22 PROCEDURE — 36415 COLL VENOUS BLD VENIPUNCTURE: CPT

## 2024-07-22 PROCEDURE — 99284 EMERGENCY DEPT VISIT MOD MDM: CPT

## 2024-07-22 PROCEDURE — 87186 SC STD MICRODIL/AGAR DIL: CPT

## 2024-07-22 PROCEDURE — 87086 URINE CULTURE/COLONY COUNT: CPT

## 2024-07-22 PROCEDURE — 85025 COMPLETE CBC W/AUTO DIFF WBC: CPT

## 2024-07-22 PROCEDURE — 87071 CULTURE AEROBIC QUANT OTHER: CPT

## 2024-07-22 PROCEDURE — 80053 COMPREHEN METABOLIC PANEL: CPT

## 2024-07-22 PROCEDURE — 81001 URINALYSIS AUTO W/SCOPE: CPT

## 2024-07-22 PROCEDURE — 84100 ASSAY OF PHOSPHORUS: CPT

## 2024-07-22 PROCEDURE — 74176 CT ABD & PELVIS W/O CONTRAST: CPT

## 2024-07-22 PROCEDURE — 87040 BLOOD CULTURE FOR BACTERIA: CPT

## 2024-07-22 PROCEDURE — 87205 SMEAR GRAM STAIN: CPT

## 2024-07-22 PROCEDURE — 83036 HEMOGLOBIN GLYCOSYLATED A1C: CPT

## 2024-07-22 PROCEDURE — 80061 LIPID PANEL: CPT

## 2024-07-22 RX ADMIN — Medication PRN MG: at 18:38

## 2024-07-22 RX ADMIN — SODIUM CHLORIDE PRN MG: 900 INJECTION INTRAVENOUS at 18:38

## 2024-07-22 RX ADMIN — MEROPENEM SCH MLS/HR: 1 INJECTION INTRAVENOUS at 18:36

## 2024-07-22 RX ADMIN — SODIUM CHLORIDE ONE MLS/HR: 9 INJECTION, SOLUTION INTRAVENOUS at 18:38

## 2024-07-22 RX ADMIN — DIPHENHYDRAMINE HYDROCHLORIDE ONE MG: 50 INJECTION INTRAMUSCULAR; INTRAVENOUS at 19:59

## 2024-07-23 VITALS
SYSTOLIC BLOOD PRESSURE: 129 MMHG | TEMPERATURE: 98.8 F | HEART RATE: 103 BPM | OXYGEN SATURATION: 100 % | RESPIRATION RATE: 18 BRPM | DIASTOLIC BLOOD PRESSURE: 77 MMHG

## 2024-07-23 VITALS
SYSTOLIC BLOOD PRESSURE: 139 MMHG | OXYGEN SATURATION: 100 % | TEMPERATURE: 98.8 F | DIASTOLIC BLOOD PRESSURE: 85 MMHG | RESPIRATION RATE: 18 BRPM | HEART RATE: 101 BPM

## 2024-07-23 VITALS
DIASTOLIC BLOOD PRESSURE: 79 MMHG | SYSTOLIC BLOOD PRESSURE: 115 MMHG | OXYGEN SATURATION: 99 % | TEMPERATURE: 98.8 F | RESPIRATION RATE: 18 BRPM | HEART RATE: 97 BPM

## 2024-07-23 VITALS
OXYGEN SATURATION: 99 % | SYSTOLIC BLOOD PRESSURE: 126 MMHG | RESPIRATION RATE: 19 BRPM | DIASTOLIC BLOOD PRESSURE: 85 MMHG | TEMPERATURE: 98.8 F | HEART RATE: 90 BPM

## 2024-07-23 VITALS
RESPIRATION RATE: 18 BRPM | DIASTOLIC BLOOD PRESSURE: 85 MMHG | SYSTOLIC BLOOD PRESSURE: 139 MMHG | HEART RATE: 101 BPM | OXYGEN SATURATION: 100 % | TEMPERATURE: 98.8 F

## 2024-07-23 VITALS
TEMPERATURE: 98.6 F | SYSTOLIC BLOOD PRESSURE: 119 MMHG | OXYGEN SATURATION: 99 % | DIASTOLIC BLOOD PRESSURE: 73 MMHG | HEART RATE: 84 BPM | RESPIRATION RATE: 18 BRPM

## 2024-07-23 VITALS
DIASTOLIC BLOOD PRESSURE: 70 MMHG | OXYGEN SATURATION: 98 % | HEART RATE: 86 BPM | RESPIRATION RATE: 18 BRPM | TEMPERATURE: 98.8 F | SYSTOLIC BLOOD PRESSURE: 111 MMHG

## 2024-07-23 LAB
ALBUMIN SERPL-MCNC: 2.9 G/DL (ref 3.5–5)
ALBUMIN/GLOB SERPL: 0.7 {RATIO} (ref 0.8–2)
ALP SERPL-CCNC: 110 IU/L (ref 40–150)
ALT SERPL-CCNC: 26 IU/L (ref 0–55)
ANION GAP SERPL CALC-SCNC: 12.7 MMOL/L (ref 8–16)
BASOPHILS # BLD AUTO: 0.1 10*3/UL (ref 0–0.1)
BASOPHILS NFR BLD AUTO: 0.5 % (ref 0–1)
BILIRUB SERPL-MCNC: 0.6 MG/DL (ref 0.2–1.2)
BUN SERPL-MCNC: 16 MG/DL (ref 7–26)
BUN/CREAT SERPL: 19 (ref 6–25)
CALCIUM SERPL-MCNC: 8.5 MG/DL (ref 8.4–10.2)
CHLORIDE SERPL-SCNC: 105 MMOL/L (ref 98–107)
CHOLEST SERPL-MCNC: 187 MD/DL (ref 0–199)
CHOLEST/HDLC SERPL: 2.6 {RATIO} (ref 3–3.6)
CO2 SERPL-SCNC: 22 MMOL/L (ref 22–29)
DEPRECATED NEUTROPHILS # BLD AUTO: 7.6 10*3/UL (ref 2.1–6.9)
EGFRCR SERPLBLD CKD-EPI 2021: 80 ML/MIN (ref 60–?)
EOSINOPHIL # BLD AUTO: 0.1 10*3/UL (ref 0–0.4)
EOSINOPHIL NFR BLD AUTO: 0.5 % (ref 0–6)
ERYTHROCYTE [DISTWIDTH] IN CORD BLOOD: 14.4 % (ref 11.7–14.4)
GLOBULIN PLAS-MCNC: 4.2 G/DL (ref 2.3–3.5)
GLUCOSE SERPLBLD-MCNC: 165 MG/DL (ref 74–118)
HCT VFR BLD AUTO: 32.6 % (ref 34.2–44.1)
HDLC SERPL-MSCNC: 73 MG/DL (ref 40–60)
HGB BLD-MCNC: 10.2 G/DL (ref 12–16)
LDLC SERPL CALC-MCNC: 89 MG/DL (ref 60–130)
LYMPHOCYTES # BLD: 1.7 10*3/UL (ref 1–3.2)
LYMPHOCYTES NFR BLD AUTO: 16.4 % (ref 18–39.1)
MCH RBC QN AUTO: 27.9 PG (ref 28–32)
MCHC RBC AUTO-ENTMCNC: 31.3 G/DL (ref 31–35)
MCV RBC AUTO: 89.1 FL (ref 81–99)
MONOCYTES # BLD AUTO: 0.9 10*3/UL (ref 0.2–0.8)
MONOCYTES NFR BLD AUTO: 8.8 % (ref 4.4–11.3)
NEUTS SEG NFR BLD AUTO: 72.9 % (ref 38.7–80)
PLATELET # BLD AUTO: 171 X10E3/UL (ref 140–360)
POTASSIUM SERPL-SCNC: 3.7 MMOL/L (ref 3.5–5.1)
PROT SERPL-MCNC: 7.1 G/DL (ref 6.5–8.1)
RBC # BLD AUTO: 3.66 X10E6/UL (ref 3.6–5.1)
SODIUM SERPL-SCNC: 136 MMOL/L (ref 136–145)
TRIGL SERPL-MCNC: 126 MG/DL (ref 0–149)
WBC # BLD: 10.42 X10E3/UL (ref 4.8–10.8)

## 2024-07-23 PROCEDURE — 3E0333Z INTRODUCTION OF ANTI-INFLAMMATORY INTO PERIPHERAL VEIN, PERCUTANEOUS APPROACH: ICD-10-PCS

## 2024-07-23 RX ADMIN — SODIUM CHLORIDE SCH MLS/HR: 9 INJECTION, SOLUTION INTRAVENOUS at 13:30

## 2024-07-24 VITALS
RESPIRATION RATE: 18 BRPM | OXYGEN SATURATION: 100 % | SYSTOLIC BLOOD PRESSURE: 143 MMHG | DIASTOLIC BLOOD PRESSURE: 78 MMHG | HEART RATE: 81 BPM | TEMPERATURE: 97.4 F

## 2024-07-24 VITALS
HEART RATE: 107 BPM | RESPIRATION RATE: 18 BRPM | SYSTOLIC BLOOD PRESSURE: 115 MMHG | TEMPERATURE: 98.5 F | OXYGEN SATURATION: 99 % | DIASTOLIC BLOOD PRESSURE: 78 MMHG

## 2024-07-24 VITALS
HEART RATE: 81 BPM | SYSTOLIC BLOOD PRESSURE: 143 MMHG | TEMPERATURE: 97.9 F | RESPIRATION RATE: 18 BRPM | OXYGEN SATURATION: 100 % | DIASTOLIC BLOOD PRESSURE: 78 MMHG

## 2024-07-24 VITALS
OXYGEN SATURATION: 100 % | DIASTOLIC BLOOD PRESSURE: 83 MMHG | RESPIRATION RATE: 18 BRPM | HEART RATE: 85 BPM | SYSTOLIC BLOOD PRESSURE: 122 MMHG | TEMPERATURE: 97.9 F

## 2024-07-24 LAB
ANION GAP SERPL CALC-SCNC: 13.8 MMOL/L (ref 8–16)
BASOPHILS # BLD AUTO: 0 10*3/UL (ref 0–0.1)
BASOPHILS NFR BLD AUTO: 0.5 % (ref 0–1)
BUN SERPL-MCNC: 13 MG/DL (ref 7–26)
BUN/CREAT SERPL: 16 (ref 6–25)
CALCIUM SERPL-MCNC: 8.7 MG/DL (ref 8.4–10.2)
CHLORIDE SERPL-SCNC: 108 MMOL/L (ref 98–107)
CO2 SERPL-SCNC: 21 MMOL/L (ref 22–29)
DEPRECATED NEUTROPHILS # BLD AUTO: 5.4 10*3/UL (ref 2.1–6.9)
DEPRECATED PHOSPHATE SERPL-MCNC: 3.2 MG/DL (ref 2.3–4.7)
EGFRCR SERPLBLD CKD-EPI 2021: 86 ML/MIN (ref 60–?)
EOSINOPHIL # BLD AUTO: 0.2 10*3/UL (ref 0–0.4)
EOSINOPHIL NFR BLD AUTO: 1.8 % (ref 0–6)
ERYTHROCYTE [DISTWIDTH] IN CORD BLOOD: 14.2 % (ref 11.7–14.4)
GLUCOSE SERPLBLD-MCNC: 153 MG/DL (ref 74–118)
HCT VFR BLD AUTO: 34.1 % (ref 34.2–44.1)
HGB BLD-MCNC: 10.5 G/DL (ref 12–16)
LYMPHOCYTES # BLD: 2 10*3/UL (ref 1–3.2)
LYMPHOCYTES NFR BLD AUTO: 23.4 % (ref 18–39.1)
MAGNESIUM SERPL-MCNC: 1.9 MG/DL (ref 1.3–2.1)
MCH RBC QN AUTO: 27.7 PG (ref 28–32)
MCHC RBC AUTO-ENTMCNC: 30.8 G/DL (ref 31–35)
MCV RBC AUTO: 90 FL (ref 81–99)
MONOCYTES # BLD AUTO: 0.8 10*3/UL (ref 0.2–0.8)
MONOCYTES NFR BLD AUTO: 9.6 % (ref 4.4–11.3)
NEUTS SEG NFR BLD AUTO: 63.8 % (ref 38.7–80)
PLATELET # BLD AUTO: 196 X10E3/UL (ref 140–360)
POTASSIUM SERPL-SCNC: 3.8 MMOL/L (ref 3.5–5.1)
RBC # BLD AUTO: 3.79 X10E6/UL (ref 3.6–5.1)
SODIUM SERPL-SCNC: 139 MMOL/L (ref 136–145)
WBC # BLD: 8.5 X10E3/UL (ref 4.8–10.8)

## 2024-07-24 RX ADMIN — SODIUM CHLORIDE SCH MLS/HR: 9 INJECTION, SOLUTION INTRAVENOUS at 08:59

## 2024-07-25 VITALS
DIASTOLIC BLOOD PRESSURE: 75 MMHG | SYSTOLIC BLOOD PRESSURE: 135 MMHG | TEMPERATURE: 98.3 F | HEART RATE: 69 BPM | OXYGEN SATURATION: 100 % | RESPIRATION RATE: 17 BRPM

## 2024-07-25 VITALS
OXYGEN SATURATION: 100 % | HEART RATE: 74 BPM | TEMPERATURE: 98.5 F | DIASTOLIC BLOOD PRESSURE: 86 MMHG | SYSTOLIC BLOOD PRESSURE: 146 MMHG | RESPIRATION RATE: 20 BRPM

## 2024-07-25 VITALS
DIASTOLIC BLOOD PRESSURE: 87 MMHG | TEMPERATURE: 98.4 F | SYSTOLIC BLOOD PRESSURE: 152 MMHG | OXYGEN SATURATION: 100 % | HEART RATE: 81 BPM | RESPIRATION RATE: 18 BRPM

## 2024-07-25 LAB
ANION GAP SERPL CALC-SCNC: 12.9 MMOL/L (ref 8–16)
BASOPHILS # BLD AUTO: 0.1 10*3/UL (ref 0–0.1)
BASOPHILS NFR BLD AUTO: 0.6 % (ref 0–1)
BUN SERPL-MCNC: 12 MG/DL (ref 7–26)
BUN/CREAT SERPL: 15 (ref 6–25)
CALCIUM SERPL-MCNC: 8.8 MG/DL (ref 8.4–10.2)
CHLORIDE SERPL-SCNC: 109 MMOL/L (ref 98–107)
CO2 SERPL-SCNC: 21 MMOL/L (ref 22–29)
DEPRECATED NEUTROPHILS # BLD AUTO: 5.1 10*3/UL (ref 2.1–6.9)
DEPRECATED PHOSPHATE SERPL-MCNC: 3 MG/DL (ref 2.3–4.7)
EGFRCR SERPLBLD CKD-EPI 2021: 86 ML/MIN (ref 60–?)
EOSINOPHIL # BLD AUTO: 0.2 10*3/UL (ref 0–0.4)
EOSINOPHIL NFR BLD AUTO: 2.1 % (ref 0–6)
ERYTHROCYTE [DISTWIDTH] IN CORD BLOOD: 13.9 % (ref 11.7–14.4)
GLUCOSE SERPLBLD-MCNC: 116 MG/DL (ref 74–118)
HCT VFR BLD AUTO: 33.1 % (ref 34.2–44.1)
HGB BLD-MCNC: 10.4 G/DL (ref 12–16)
LYMPHOCYTES # BLD: 2.2 10*3/UL (ref 1–3.2)
LYMPHOCYTES NFR BLD AUTO: 26.4 % (ref 18–39.1)
MAGNESIUM SERPL-MCNC: 1.7 MG/DL (ref 1.3–2.1)
MCH RBC QN AUTO: 28.1 PG (ref 28–32)
MCHC RBC AUTO-ENTMCNC: 31.4 G/DL (ref 31–35)
MCV RBC AUTO: 89.5 FL (ref 81–99)
MONOCYTES # BLD AUTO: 0.7 10*3/UL (ref 0.2–0.8)
MONOCYTES NFR BLD AUTO: 8.1 % (ref 4.4–11.3)
NEUTS SEG NFR BLD AUTO: 61.8 % (ref 38.7–80)
PLATELET # BLD AUTO: 251 X10E3/UL (ref 140–360)
POTASSIUM SERPL-SCNC: 3.9 MMOL/L (ref 3.5–5.1)
RBC # BLD AUTO: 3.7 X10E6/UL (ref 3.6–5.1)
SODIUM SERPL-SCNC: 139 MMOL/L (ref 136–145)
WBC # BLD: 8.27 X10E3/UL (ref 4.8–10.8)

## 2024-08-19 ENCOUNTER — HOSPITAL ENCOUNTER (OUTPATIENT)
Dept: HOSPITAL 88 - DX | Age: 59
End: 2024-08-19
Attending: UROLOGY
Payer: COMMERCIAL

## 2024-08-19 DIAGNOSIS — N13.30: Primary | ICD-10-CM

## 2024-08-19 PROCEDURE — 50435 EXCHANGE NEPHROSTOMY CATH: CPT

## 2024-10-17 ENCOUNTER — HOSPITAL ENCOUNTER (OUTPATIENT)
Dept: HOSPITAL 88 - DX | Age: 59
End: 2024-10-17
Attending: UROLOGY
Payer: COMMERCIAL

## 2024-10-17 DIAGNOSIS — N13.30: Primary | ICD-10-CM

## 2024-10-17 PROCEDURE — 50435 EXCHANGE NEPHROSTOMY CATH: CPT

## 2024-10-18 ENCOUNTER — HOSPITAL ENCOUNTER (EMERGENCY)
Dept: HOSPITAL 88 - ER | Age: 59
Discharge: HOME | End: 2024-10-18
Payer: COMMERCIAL

## 2024-10-18 VITALS — HEIGHT: 66 IN | BODY MASS INDEX: 29.73 KG/M2 | WEIGHT: 185 LBS

## 2024-10-18 VITALS — TEMPERATURE: 98.4 F | OXYGEN SATURATION: 100 % | HEART RATE: 83 BPM

## 2024-10-18 DIAGNOSIS — F32.A: ICD-10-CM

## 2024-10-18 DIAGNOSIS — I10: ICD-10-CM

## 2024-10-18 DIAGNOSIS — F41.9: ICD-10-CM

## 2024-10-18 DIAGNOSIS — Z85.41: ICD-10-CM

## 2024-10-18 DIAGNOSIS — Z43.6: Primary | ICD-10-CM

## 2024-10-18 PROCEDURE — 87186 SC STD MICRODIL/AGAR DIL: CPT

## 2024-10-18 PROCEDURE — 87086 URINE CULTURE/COLONY COUNT: CPT

## 2024-10-18 PROCEDURE — 99283 EMERGENCY DEPT VISIT LOW MDM: CPT

## 2024-10-18 RX ADMIN — ONDANSETRON ONE MG: 4 TABLET, ORALLY DISINTEGRATING ORAL at 10:25

## 2024-10-18 RX ADMIN — KETOROLAC TROMETHAMINE ONE MG: 30 INJECTION, SOLUTION INTRAMUSCULAR at 10:25
